# Patient Record
Sex: FEMALE | Race: WHITE | NOT HISPANIC OR LATINO | Employment: OTHER | URBAN - METROPOLITAN AREA
[De-identification: names, ages, dates, MRNs, and addresses within clinical notes are randomized per-mention and may not be internally consistent; named-entity substitution may affect disease eponyms.]

---

## 2017-01-18 ENCOUNTER — TELEPHONE (OUTPATIENT)
Dept: PHARMACY | Facility: CLINIC | Age: 57
End: 2017-01-18

## 2017-01-19 ENCOUNTER — ALLIED HEALTH/NURSE VISIT (OUTPATIENT)
Dept: PHARMACY | Facility: CLINIC | Age: 57
End: 2017-01-19
Payer: MEDICAID

## 2017-01-19 DIAGNOSIS — M79.7 FIBROMYALGIA: ICD-10-CM

## 2017-01-19 DIAGNOSIS — R60.1 GENERALIZED EDEMA: ICD-10-CM

## 2017-01-19 DIAGNOSIS — G25.81 RESTLESS LEGS SYNDROME (RLS): ICD-10-CM

## 2017-01-19 DIAGNOSIS — E78.2 MIXED HYPERLIPIDEMIA: ICD-10-CM

## 2017-01-19 DIAGNOSIS — E03.9 HYPOTHYROIDISM, UNSPECIFIED TYPE: ICD-10-CM

## 2017-01-19 DIAGNOSIS — G47.09 OTHER INSOMNIA: ICD-10-CM

## 2017-01-19 DIAGNOSIS — E11.40 TYPE 2 DIABETES MELLITUS WITH DIABETIC NEUROPATHY, WITHOUT LONG-TERM CURRENT USE OF INSULIN (H): ICD-10-CM

## 2017-01-19 DIAGNOSIS — F33.0 MAJOR DEPRESSIVE DISORDER, RECURRENT EPISODE, MILD (H): Primary | ICD-10-CM

## 2017-01-19 DIAGNOSIS — K21.9 GASTROESOPHAGEAL REFLUX DISEASE WITHOUT ESOPHAGITIS: ICD-10-CM

## 2017-01-19 DIAGNOSIS — M54.16 LEFT LUMBAR RADICULITIS: ICD-10-CM

## 2017-01-19 DIAGNOSIS — Z91.030 ALLERGY TO BEE STING: ICD-10-CM

## 2017-01-19 DIAGNOSIS — G44.029 CHRONIC CLUSTER HEADACHE, NOT INTRACTABLE: ICD-10-CM

## 2017-01-19 PROCEDURE — 99605 MTMS BY PHARM NP 15 MIN: CPT | Performed by: PHARMACIST

## 2017-01-19 PROCEDURE — 99607 MTMS BY PHARM ADDL 15 MIN: CPT | Performed by: PHARMACIST

## 2017-01-19 RX ORDER — DULOXETIN HYDROCHLORIDE 60 MG/1
60 CAPSULE, DELAYED RELEASE ORAL 2 TIMES DAILY
Refills: 0 | COMMUNITY
Start: 2016-02-04 | End: 2017-07-31

## 2017-01-19 NOTE — PROGRESS NOTES
SUBJECTIVE/OBJECTIVE:                                                    Ana Luisa Byers is a 56 year old female called for a follow-up visit for Medication Therapy Management.  She was referred to me from Dr. Garza.   First visit in 2017.    Chief Complaint: issue with 'hotness' - every day all day; sleeping a lot  Goal:  Reduce pill burdon    Allergies/ADRs: Reviewed in Epic; gabapentin caused mood swings  Tobacco: No tobacco use   Alcohol: not currently using  Caffeine: 5 sodas/day  Activity: pool at the [facility] M/W/Fri  PMH: Shoulder replacement; back surgery (2-3 years ago); plate in left elbow    Medication Adherence: issues found and discussed below    Hypothyroidism: Patient is taking levothyroxine 25 mcg daily - has been out for last week. Patient is having the following symptoms: hyperthyroidism -  moist, warm skin - sweating.    Labs checked in July and feels symptoms are getting worse.    Edema: Taking furosemide 40 mg twice daily and potassium daily.  Tolerating regimen without side effects.      GERD: Current medications include: Prilosec (omeprazole) 40 mg once daily. Waiting for a refill approval for pantoprazole.  Pt c/o throat clearing, food doesn't go all the way down (happens every meal). Will choke at times. Will awake with 'acid attack'; maybe occurs less than once per month.  Doesn't eat after 8pm; sleeps with head elevated.  Greasy foods makes symptoms worse.  Not getting much calcium in diet but enjoys some yogurt.    Diabetes:  Pt currently taking glipizide 2.5 mg BID.  Pt is not experiencing side effects  SMBG: three times daily.   Ranges (patient reported): 108-110  Symptoms of low blood sugar? none. Frequency of hypoglycemia? Never; has checked blood sugar when sweating and it has been fine  Recent symptoms of high blood sugar? none  Eye exam: up to date  Foot exam: up to date  Microalbumin is < 30 mg/g. Pt is not taking an ACEi/ARB.  Aspirin: Taking 81mg daily and denies side  effects  Diet/Exercise: has been avoiding sweets    Hyperlipidemia: Current therapy includes atorvastatin 40 mg once daily.  Pt reports myalgias in back since on medication.   The 10-year ASCVD risk score (Homa WILKINS Jr., et al., 2013) is: 4.4%    Values used to calculate the score:      Age: 56 years      Sex: Female      Is an : No      Diabetic: Yes      Tobacco smoker: No      Systolic Blood Pressure: 122 mmHg      Prescribed Antihypertensives: No      HDL Cholesterol: 47 mg/dL      Total Cholesterol: 199 mg/dL     RLS: No therapy at this time.  Was on ropinirole in the past but didn't feel like this was helping.  Symptoms controlled at this time.        HA: Daily headaches; not migraines. Topiramate was helpful but caused numbness.        Back Pain: Taking tramadol 50 mg BID and methocarbamol as needed.  The addition of tramadol did not make her flushing worse.  This regimen has been effective.  No side effects.  Followed by pain clinic.    Depression:  Current medications include: duloxetine 60 mg BID  Has tried bupropion in the past without benefit.  No interesting in counseling.    Pt reports that depression symptoms are worse.  Sleeping all day, not interested in doing anything outside the home.  PHQ-9 SCORE 6/27/2016 8/12/2016 1/19/2017   Total Score - - -   Total Score MyChart - - -   Total Score 20 19 19     Fibromyalgia: Taking duloxetine 120 mg daily and Lyrica 50 mg TID.  This regimen has been helpful.  No side effects with the exception of the flushing; she doesn't related it to a certain medication.      Bee Allergies: Has EPI pen to use as needed.  Needed to use once this summer already.      Insomnia:  Taking zolpidem 10 mg HS as needed. Maybe taking twice per week.    Current labs include:  BP Readings from Last 3 Encounters:   11/23/16 122/78   11/11/16 126/84   11/09/16 130/88     A1C      5.9   7/5/2016.  CHOL      199   4/13/2016  TRIG      166   4/13/2016  HDL       47    4/13/2016  LDL      119   4/13/2016    Liver Function Studies -   Recent Labs   Lab Test  07/05/16   0924   07/31/09   0300   PROTTOTAL  8.5   < >  7.5   ALBUMIN  4.2   < >  4.2   BILITOTAL  0.4   < >  0.3   ALKPHOS  100   < >  68   AST  25   < >  25   ALT  37   < >  27   BILIDIRECT   --    --   0.1    < > = values in this interval not displayed.       MICROL       24   12/4/2015  MICROALBUMIN    16.67   12/4/2015    Last Basic Metabolic Panel:  NA      137   7/5/2016   POTASSIUM      3.7   7/5/2016  CHLORIDE      102   7/5/2016  BUN       17   7/5/2016  BUN   NOT APPLICABLE   11/26/2011  CR     1.14   7/5/2016  GFR ESTIMATE   Date Value Ref Range Status   10/28/2016 53* >60 mL/min/1.7m2 Final     Comment:     Non  GFR Calc   09/09/2016 50* >60 mL/min/1.7m2 Final     Comment:     Non  GFR Calc   07/05/2016 49* >60 mL/min/1.7m2 Final     Comment:     Non  GFR Calc     GFR ESTIMATE IF BLACK   Date Value Ref Range Status   10/28/2016 65 >60 mL/min/1.7m2 Final     Comment:      GFR Calc   09/09/2016 61 >60 mL/min/1.7m2 Final     Comment:      GFR Calc   07/05/2016 60* >60 mL/min/1.7m2 Final     Comment:      GFR Calc     TSH   Date Value Ref Range Status   07/05/2016 2.06 0.40 - 4.00 mU/L Final   ]  LMP 02/13/2009    Most Recent Immunizations   Administered Date(s) Administered     Pneumococcal (PCV 13) 08/31/2015     TD (ADULT, 7+) 05/01/2007     TDAP (BOOSTRIX AGES 10-64) 10/27/2012     ASSESSMENT:                                                       Current medications were reviewed with her today.     Medication Adherence: no issues identified    Hypothyroidism: recommend recheck labs to rule out hyperthyroid as a cause of her flushing    Edema: stable    GERD: recommended ranitidine or TUMS as needed; adding Protonix to omeprazole is not recommended    Diabetes: stable    Hyperlipidemia: stable    RLS: stable    HA:  needs improvement.  Could consider daily verapamil for cluster headaches prophylaxis.      Back Pain: stable.  Tramadol taken with duloxetine may enhance serotonergic effects which include flushing; however she doesn't feel her symptoms have gotten worse with the addition of the tramadol.    Depression:  Needs improvement.  PHQ9 >5.  Could consider atypical such as Abilify for add-on therapy.    Fibromyalgia: stable    Bee Allergies: stable    Insomnia:  Improved.  Not needed zolpidem daily.  Continue to work on sleep hygiene, improvement in mood may be helpful    PLAN:                                                      1.  Ranitidine or TUMS as needed for breakthrough symptoms    Consideration for Dr. Garza:  1.  Recheck thyroid labs - labs checked  2.  Discuss options for depression - could consider additional therapy with abilify  3.  Cluster headache prophylaxis with verapamil 240 mg daily    I spent 60 minutes with this patient today.  All changes were made via collaborative practice agreement with Bridget Garza. A copy of the visit note was provided to the patient's primary care provider.    Will follow up in 3 months.    The patient was given a summary of these recommendations as an after visit summary.     Nadine Johnson , Pharm D  [phone] (phone)  [phone] (pager)  Medication Therapy Management Pharmacist

## 2017-01-20 ENCOUNTER — TRANSFERRED RECORDS (OUTPATIENT)
Dept: HEALTH INFORMATION MANAGEMENT | Facility: CLINIC | Age: 57
End: 2017-01-20

## 2017-01-20 ASSESSMENT — PATIENT HEALTH QUESTIONNAIRE - PHQ9: SUM OF ALL RESPONSES TO PHQ QUESTIONS 1-9: 19

## 2017-01-24 ENCOUNTER — OFFICE VISIT (OUTPATIENT)
Dept: FAMILY MEDICINE | Facility: CLINIC | Age: 57
End: 2017-01-24
Payer: MEDICARE

## 2017-01-24 VITALS
BODY MASS INDEX: 43.65 KG/M2 | DIASTOLIC BLOOD PRESSURE: 80 MMHG | HEART RATE: 82 BPM | SYSTOLIC BLOOD PRESSURE: 124 MMHG | HEIGHT: 65 IN | WEIGHT: 262 LBS | TEMPERATURE: 98.2 F

## 2017-01-24 DIAGNOSIS — I10 BENIGN ESSENTIAL HYPERTENSION: ICD-10-CM

## 2017-01-24 DIAGNOSIS — B00.9 HSV-1 INFECTION: ICD-10-CM

## 2017-01-24 DIAGNOSIS — K21.9 GASTROESOPHAGEAL REFLUX DISEASE WITHOUT ESOPHAGITIS: ICD-10-CM

## 2017-01-24 DIAGNOSIS — E11.40 TYPE 2 DIABETES MELLITUS WITH DIABETIC NEUROPATHY, WITHOUT LONG-TERM CURRENT USE OF INSULIN (H): ICD-10-CM

## 2017-01-24 DIAGNOSIS — Z01.818 PREOP GENERAL PHYSICAL EXAM: Primary | ICD-10-CM

## 2017-01-24 DIAGNOSIS — F33.0 MAJOR DEPRESSIVE DISORDER, RECURRENT EPISODE, MILD (H): ICD-10-CM

## 2017-01-24 DIAGNOSIS — G89.4 CHRONIC PAIN ASSOCIATED WITH SIGNIFICANT PSYCHOSOCIAL DYSFUNCTION: ICD-10-CM

## 2017-01-24 DIAGNOSIS — G56.21 LESION OF RIGHT ULNAR NERVE: ICD-10-CM

## 2017-01-24 DIAGNOSIS — E03.9 ACQUIRED HYPOTHYROIDISM: ICD-10-CM

## 2017-01-24 DIAGNOSIS — E66.01 MORBID OBESITY, UNSPECIFIED OBESITY TYPE (H): ICD-10-CM

## 2017-01-24 LAB
ANION GAP SERPL CALCULATED.3IONS-SCNC: 7 MMOL/L (ref 3–14)
BUN SERPL-MCNC: 13 MG/DL (ref 7–30)
CALCIUM SERPL-MCNC: 9.7 MG/DL (ref 8.5–10.1)
CHLORIDE SERPL-SCNC: 101 MMOL/L (ref 94–109)
CO2 SERPL-SCNC: 30 MMOL/L (ref 20–32)
CREAT SERPL-MCNC: 1 MG/DL (ref 0.52–1.04)
GFR SERPL CREATININE-BSD FRML MDRD: 57 ML/MIN/1.7M2
GLUCOSE SERPL-MCNC: 143 MG/DL (ref 70–99)
POTASSIUM SERPL-SCNC: 3.2 MMOL/L (ref 3.4–5.3)
SODIUM SERPL-SCNC: 138 MMOL/L (ref 133–144)
TSH SERPL DL<=0.005 MIU/L-ACNC: 2.99 MU/L (ref 0.4–4)

## 2017-01-24 PROCEDURE — 93000 ELECTROCARDIOGRAM COMPLETE: CPT | Performed by: FAMILY MEDICINE

## 2017-01-24 PROCEDURE — 99214 OFFICE O/P EST MOD 30 MIN: CPT | Performed by: FAMILY MEDICINE

## 2017-01-24 PROCEDURE — 36415 COLL VENOUS BLD VENIPUNCTURE: CPT | Performed by: FAMILY MEDICINE

## 2017-01-24 PROCEDURE — 84443 ASSAY THYROID STIM HORMONE: CPT | Performed by: FAMILY MEDICINE

## 2017-01-24 PROCEDURE — 80048 BASIC METABOLIC PNL TOTAL CA: CPT | Performed by: FAMILY MEDICINE

## 2017-01-24 RX ORDER — CHOLECALCIFEROL (VITAMIN D3) 50 MCG
1 TABLET ORAL DAILY
Qty: 90 TABLET | Refills: 3 | Status: SHIPPED | OUTPATIENT
Start: 2017-01-24 | End: 2019-12-12

## 2017-01-24 RX ORDER — VALACYCLOVIR HYDROCHLORIDE 1 G/1
2000 TABLET, FILM COATED ORAL 2 TIMES DAILY
Qty: 4 TABLET | Refills: 3 | Status: SHIPPED | OUTPATIENT
Start: 2017-01-24 | End: 2017-10-12

## 2017-01-24 RX ORDER — TRAMADOL HYDROCHLORIDE 50 MG/1
50 TABLET ORAL EVERY 8 HOURS PRN
Qty: 90 TABLET | Refills: 0 | Status: CANCELLED | OUTPATIENT
Start: 2017-01-24

## 2017-01-24 RX ORDER — GLIPIZIDE 2.5 MG/1
2.5 TABLET, EXTENDED RELEASE ORAL 2 TIMES DAILY
Qty: 180 TABLET | Refills: 1 | Status: SHIPPED | OUTPATIENT
Start: 2017-01-24 | End: 2017-07-30

## 2017-01-24 RX ORDER — METHOCARBAMOL 500 MG/1
1000 TABLET, FILM COATED ORAL 3 TIMES DAILY PRN
Qty: 20 TABLET | Refills: 3 | Status: CANCELLED | OUTPATIENT
Start: 2017-01-24

## 2017-01-24 RX ORDER — OMEPRAZOLE 40 MG/1
40 CAPSULE, DELAYED RELEASE ORAL DAILY
Qty: 90 CAPSULE | Refills: 3 | Status: SHIPPED | OUTPATIENT
Start: 2017-01-24 | End: 2017-03-22

## 2017-01-24 ASSESSMENT — ANXIETY QUESTIONNAIRES
1. FEELING NERVOUS, ANXIOUS, OR ON EDGE: MORE THAN HALF THE DAYS
7. FEELING AFRAID AS IF SOMETHING AWFUL MIGHT HAPPEN: SEVERAL DAYS
3. WORRYING TOO MUCH ABOUT DIFFERENT THINGS: MORE THAN HALF THE DAYS
5. BEING SO RESTLESS THAT IT IS HARD TO SIT STILL: NEARLY EVERY DAY
IF YOU CHECKED OFF ANY PROBLEMS ON THIS QUESTIONNAIRE, HOW DIFFICULT HAVE THESE PROBLEMS MADE IT FOR YOU TO DO YOUR WORK, TAKE CARE OF THINGS AT HOME, OR GET ALONG WITH OTHER PEOPLE: SOMEWHAT DIFFICULT
2. NOT BEING ABLE TO STOP OR CONTROL WORRYING: SEVERAL DAYS
6. BECOMING EASILY ANNOYED OR IRRITABLE: MORE THAN HALF THE DAYS
GAD7 TOTAL SCORE: 13

## 2017-01-24 ASSESSMENT — PATIENT HEALTH QUESTIONNAIRE - PHQ9: 5. POOR APPETITE OR OVEREATING: MORE THAN HALF THE DAYS

## 2017-01-24 NOTE — PATIENT INSTRUCTIONS
Hold glipizide and lasix morning of procedure, rest okay to take with sip of water.    Hold tramadol for 5 days prior to procedure.     Before Your Surgery      Call your surgeon if there is any change in your health. This includes signs of a cold or flu (such as a sore throat, runny nose, cough, rash or fever).    Do not smoke, drink alcohol or take over the counter medicine (unless your surgeon or primary care doctor tells you to) for the 24 hours before and after surgery.    If you take prescribed drugs: Follow your doctor s orders about which medicines to take and which to stop until after surgery.    Eating and drinking prior to surgery: follow the instructions from your surgeon    Take a shower or bath the night before surgery. Use the soap your surgeon gave you to gently clean your skin. If you do not have soap from your surgeon, use your regular soap. Do not shave or scrub the surgery site.  Wear clean pajamas and have clean sheets on your bed.

## 2017-01-24 NOTE — NURSING NOTE
"Chief Complaint   Patient presents with     Pre-Op Exam       Initial /80 mmHg  Pulse 82  Temp(Src) 98.2  F (36.8  C) (Oral)  Ht 5' 5\" (1.651 m)  Wt 262 lb (118.842 kg)  BMI 43.60 kg/m2  LMP 02/13/2009  Breastfeeding? No Estimated body mass index is 43.6 kg/(m^2) as calculated from the following:    Height as of this encounter: 5' 5\" (1.651 m).    Weight as of this encounter: 262 lb (118.842 kg).  BP completed using cuff size: zack Cho CMA          "

## 2017-01-24 NOTE — MR AVS SNAPSHOT
After Visit Summary   1/24/2017    Ana Luisa Byers    MRN: [id number]           Patient Information     Date Of Birth          1960        Visit Information        Provider Department      1/24/2017 8:30 AM Bridget Garza MD [facility]        Today's Diagnoses     Preop general physical exam    -  1     Lesion of right ulnar nerve         Gastroesophageal reflux disease without esophagitis         HSV-1 infection         Type 2 diabetes mellitus with diabetic neuropathy, without long-term current use of insulin (H)           Care Instructions    Hold glipizide and lasix morning of procedure, rest okay to take with sip of water.    Hold tramadol for 5 days prior to procedure.     Before Your Surgery      Call your surgeon if there is any change in your health. This includes signs of a cold or flu (such as a sore throat, runny nose, cough, rash or fever).    Do not smoke, drink alcohol or take over the counter medicine (unless your surgeon or primary care doctor tells you to) for the 24 hours before and after surgery.    If you take prescribed drugs: Follow your doctor s orders about which medicines to take and which to stop until after surgery.    Eating and drinking prior to surgery: follow the instructions from your surgeon    Take a shower or bath the night before surgery. Use the soap your surgeon gave you to gently clean your skin. If you do not have soap from your surgeon, use your regular soap. Do not shave or scrub the surgery site.  Wear clean pajamas and have clean sheets on your bed.         Follow-ups after your visit        Who to contact     If you have questions or need follow up information about today's clinic visit or your schedule please contact [facility] directly at [phone].  Normal or non-critical lab and imaging results will be communicated to you by MyChart, letter or phone within 4 business days after the clinic has received the  "results. If you do not hear from us within 7 days, please contact the clinic through [employer] or phone. If you have a critical or abnormal lab result, we will notify you by phone as soon as possible.  Submit refill requests through [employer] or call your pharmacy and they will forward the refill request to us. Please allow 3 business days for your refill to be completed.          Additional Information About Your Visit        [employer] Information     [employer] gives you secure access to your electronic health record. If you see a primary care provider, you can also send messages to your care team and make appointments. If you have questions, please call your primary care clinic.  If you do not have a primary care provider, please call [phone] and they will assist you.        Care EveryWhere ID     This is your Care EveryWhere ID. This could be used by other organizations to access your [place] [facility] medical records  [id number]        Your Vitals Were     Pulse Temperature Height BMI (Body Mass Index) Last Period Breastfeeding?    82 98.2  F (36.8  C) (Oral) 5' 5\" (1.651 m) 43.60 kg/m2 02/13/2009 No       Blood Pressure from Last 3 Encounters:   01/24/17 124/80   11/23/16 122/78   11/11/16 126/84    Weight from Last 3 Encounters:   01/24/17 262 lb (118.842 kg)   11/23/16 266 lb (120.657 kg)   11/11/16 266 lb (120.657 kg)              We Performed the Following     Basic metabolic panel  (Ca, Cl, CO2, Creat, Gluc, K, Na, BUN)     EKG 12-lead complete w/read - Clinics          Today's Medication Changes          These changes are accurate as of: 1/24/17  9:02 AM.  If you have any questions, ask your nurse or doctor.               Start taking these medicines.        Dose/Directions    valACYclovir 1000 mg tablet   Commonly known as:  VALTREX   Used for:  HSV-1 infection   Started by:  Bridget Garza MD        Dose:  2000 mg   Take 2 tablets (2,000 mg) by mouth 2 times daily   Quantity:  4 tablet   Refills:  3       "   These medicines have changed or have updated prescriptions.        Dose/Directions    * order for DME   This may have changed:  Another medication with the same name was added. Make sure you understand how and when to take each.   Used for:  Other lymphedema   Changed by:  Marlin Ayala MD        Equipment being ordered: compression socks, high strength, knee high, custom made 2 pairs, 11 refill   Quantity:  2 Device   Refills:  11       * order for DME   This may have changed:  Another medication with the same name was added. Make sure you understand how and when to take each.   Used for:  Type 2 diabetes mellitus with diabetic neuropathy (H)   Changed by:  Bridget Garza MD        Equipment being ordered: diabetic shoes   Quantity:  1 Units   Refills:  0       * order for DME   This may have changed:  Another medication with the same name was added. Make sure you understand how and when to take each.   Used for:  Morbid obesity due to excess calories (H), Type 2 diabetes mellitus with diabetic neuropathy (H), Fibromyalgia   Changed by:  Bridget Garza MD        Equipment being ordered: Rollator walker with seat   Quantity:  1 each   Refills:  0       * order for DME   This may have changed:  Another medication with the same name was added. Make sure you understand how and when to take each.   Used for:  Fibromyalgia, Left lumbar radiculitis, Morbid obesity, unspecified obesity type (H), Primary osteoarthritis involving multiple joints   Changed by:  Bridget Garza MD        Equipment being ordered: Lift Chair   Quantity:  1 each   Refills:  0       * order for DME   This may have changed:  You were already taking a medication with the same name, and this prescription was added. Make sure you understand how and when to take each.   Used for:  Type 2 diabetes mellitus with diabetic neuropathy, without long-term current use of insulin (H)   Changed by:  Bridget Garza  MD        Equipment being ordered: diabetic shoes  Feet have been examined, no changes   Quantity:  1 Units   Refills:  0       * Notice:  This list has 5 medication(s) that are the same as other medications prescribed for you. Read the directions carefully, and ask your doctor or other care provider to review them with you.         Where to get your medicines      These medications were sent to [facility] [id number] [facility] [id number] [place] [address] AT [address] [address] [facility] [address]     Phone:  [phone]    - glipiZIDE 2.5 MG 24 hr tablet  - omeprazole 40 MG capsule  - valACYclovir 1000 mg tablet  - vitamin D 2000 UNITS tablet      Some of these will need a paper prescription and others can be bought over the counter.  Ask your nurse if you have questions.     Bring a paper prescription for each of these medications    - order for DME             Primary Care Provider Office Phone # Fax #    Bridget Garza [phone]       [facility] [address]  [facility] [address]        Thank you!     Thank you for choosing [facility]  for your care. Our goal is always to provide you with excellent care. Hearing back from our patients is one way we can continue to improve our services. Please take a few minutes to complete the written survey that you may receive in the mail after your visit with us. Thank you!             Your Updated Medication List - Protect others around you: Learn how to safely use, store and throw away your medicines at www.disposemymeds.org.          This list is accurate as of: 1/24/17  9:02 AM.  Always use your most recent med list.                   Brand Name Dispense Instructions for use    aspirin 81 MG chewable tablet     108 tablet    Take 1 tablet (81 mg) by mouth daily       atorvastatin 40 MG tablet    LIPITOR    90 tablet    Take 1 tablet (40 mg) by mouth daily       DULoxetine 60 MG EC  capsule    CYMBALTA     60 mg 2 times daily       EPINEPHrine 0.3 MG/0.3ML injection     2 each    Inject 0.3 mLs (0.3 mg) into the muscle once as needed for anaphylaxis       furosemide 40 MG tablet    LASIX    60 tablet    Take 1 tablet (40 mg) by mouth 2 times daily       glipiZIDE 2.5 MG 24 hr tablet    glipiZIDE XL    180 tablet    Take 1 tablet (2.5 mg) by mouth 2 times daily       levothyroxine 25 MCG tablet    SYNTHROID/LEVOTHROID    90 tablet    Take 1 tablet (25 mcg) by mouth daily       meclizine 25 MG tablet    ANTIVERT    90 tablet    Take 1 tablet (25 mg) by mouth 3 times daily as needed for dizziness       methocarbamol 500 MG tablet    ROBAXIN    20 tablet    Take 2 tablets (1,000 mg) by mouth 3 times daily as needed for muscle spasms       NITROSTAT 0.4 MG sublingual tablet   Generic drug:  nitroglycerin      Place 1 tablet under the tongue as needed       omeprazole 40 MG capsule    priLOSEC    90 capsule    Take 1 capsule (40 mg) by mouth daily Take 30-60 minutes before a meal.       * order for DME     2 Device    Equipment being ordered: compression socks, high strength, knee high, custom made 2 pairs, 11 refill       * order for DME     1 Units    Equipment being ordered: diabetic shoes       * order for DME     1 each    Equipment being ordered: Rollator walker with seat       * order for DME     1 each    Equipment being ordered: Lift Chair       * order for DME     1 Units    Equipment being ordered: diabetic shoes  Feet have been examined, no changes       Potassium Chloride ER 20 MEQ Tbcr     30 tablet    Take 1 tablet (20 mEq) by mouth daily       pregabalin 50 MG capsule    LYRICA    90 capsule    Take 1 capsule (50 mg) by mouth 3 times daily       traMADol 50 MG tablet    ULTRAM    90 tablet    Take 1 tablet (50 mg) by mouth every 8 hours as needed for moderate pain Max #3/day. Last picked up 11/11/16. Ok to dispense on/after 12/09/16 and START 12/11/16. 30 day supply.        valACYclovir 1000 mg tablet    VALTREX    4 tablet    Take 2 tablets (2,000 mg) by mouth 2 times daily       vitamin D 2000 UNITS tablet     90 tablet    Take 1 tablet by mouth daily       zolpidem 10 MG tablet    AMBIEN    30 tablet    Take 1 tablet (10 mg) by mouth nightly as needed for sleep       * Notice:  This list has 5 medication(s) that are the same as other medications prescribed for you. Read the directions carefully, and ask your doctor or other care provider to review them with you.

## 2017-01-24 NOTE — PROGRESS NOTES
[facility]  [id number] [place] [id number]  [phone]  Dept: [phone]    PRE-OP EVALUATION:  Today's date: 2017    Ana Luisa Byers (: 1960) presents for pre-operative evaluation assessment as requested by Dr. Raymond Wagner.  She requires evaluation and anesthesia risk assessment prior to undergoing surgery/procedure for treatment of Ulnar neuropathy.  Proposed procedure: Ulnar Nerve reposition    Date of Surgery/ Procedure: 17  Time of Surgery/ Procedure: 10AM  Hospital/Surgical Facility: [facility]  Fax number for surgical facility: [phone]  Primary Physician: Bridget Garza  Type of Anesthesia Anticipated: General    Patient has a Health Care Directive or Living Will:  NO    1. NO - Do you have a history of heart attack, stroke, stent, bypass or surgery on an artery in the head, neck, heart or legs?  2. NO - Do you ever have any pain or discomfort in your chest?  3. NO - Do you have a history of  Heart Failure?  4. YES - ARE YOUR TROUBLED BY SHORTNESS OF BREATH WHEN WALKING ON THE LEVEL, UP A SLIGHT HILL OR AT NIGHT? Due to weight  5. NO - Do you currently have a cold, bronchitis or other respiratory infection?  6. NO - Do you have a cough, shortness of breath or wheezing?  7. NO - DO YOU SOMETIMES GET PAINS IN THE CALVES OF YOUR LEGS WHEN YOU WALK?  8. NO - Do you or anyone in your family have previous history of blood clots?  9. NO - Do you or does anyone in your family have a serious bleeding problem such as prolonged bleeding following surgeries or cuts?  10. NO - Have you ever had problems with anemia or been told to take iron pills?  11. NO - Have you had any abnormal blood loss such as black, tarry or bloody stools, or abnormal vaginal bleeding?  12. NO - Have you ever had a blood transfusion?  13. YES - HAVE YOU OR ANY OF YOUR RELATIVES EVER HAD PROBLEMS WITH ANESTHESIA? nausea  14. NO - Do you have sleep apnea, excessive snoring or  daytime drowsiness?  15. NO - Do you have any prosthetic heart valves?  16. YES - DO YOU HAVE PROSTHETIC JOINTS? Left shoulder  17. NO - Is there any chance that you may be pregnant?    HPI:                                                      Brief HPI related to upcoming procedure: tingling in right hand, EMG noted ulnar neuropathy    See problem list for active medical problems.  Problems all longstanding and stable, except as noted/documented.  See ROS for pertinent symptoms related to these conditions.                                                                                                  .    MEDICAL HISTORY:                                                      Patient Active Problem List    Diagnosis Date Noted     Type 2 diabetes mellitus with diabetic neuropathy, without long-term current use of insulin (H) 01/24/2017     Priority: Medium     Acquired hypothyroidism 01/24/2017     Priority: Medium     Benign essential hypertension 01/24/2017     Priority: Medium     Morbid obesity, unspecified obesity type (H) 09/09/2016     Priority: Medium     Left lumbar radiculitis 08/25/2016     Priority: Medium     Generalized edema 08/04/2016     Priority: Medium     Restless legs syndrome (RLS) 04/29/2016     Priority: Medium     CKD (chronic kidney disease) stage 2, GFR 60-89 ml/min 04/29/2016     Priority: Medium     HSV-1 infection 04/29/2016     Priority: Medium     Gastroesophageal reflux disease without esophagitis 04/13/2016     Priority: Medium     HTN, goal below 140/90 03/08/2016     Priority: Medium     Other insomnia 12/08/2015     Priority: Medium     Plantar fasciitis 12/03/2015     Priority: Medium     Fibromyalgia      Priority: Medium     Cluster headaches 08/13/2014     Priority: Medium     S/P shoulder replacement 07/31/2013     Priority: Medium     Osteoarthritis 07/25/2013     Priority: Medium     Chronic pain associated with significant psychosocial dysfunction 06/20/2013     Priority:  Medium     Mild major depression (H) 09/11/2012     Priority: Medium      Past Medical History   Diagnosis Date     HTN, goal below 140/90      Hyperlipidemia LDL goal <100      CKD (chronic kidney disease), stage III      Major depression      chronic kidney disease-stage 3     Gastro-oesophageal reflux disease      PONV (postoperative nausea and vomiting)      Arthritis      left shoulder and back     Chronic pain      fibromalgia      Fibromyalgia      Peripheral neuropathy (H) Feb 2015     + callus. diabetic shoes rx done     Hypothyroidism      Rheumatoid arthritis of foot (H)      Type 2 diabetes, HbA1C goal < 8% (H)      Past Surgical History   Procedure Laterality Date     C appendectomy  age 19     Elbow wrist hand finger orthosis, rigid, without joints, may include soft  5/2007     put in [place]     Hcl pap smear  6/2008     WNL     Surgical history of -   2009     ulnar nerve release, carpal tunnel- left     Back surgery  2011     L45 laminectomy     C ligate fallopian tube,postpartum  1982     Tubal Ligation     Esophagoscopy, gastroscopy, duodenoscopy (egd), combined  9/23/2015     Dr. Thomas [facility]     Orthopedic surgery       left shoulder scoped and plate left elbow     Arthroplasty shoulder  7/25/2013     Procedure: ARTHROPLASTY SHOULDER;  RIGHT TOTAL SHOULDER ARTHROPLASTY (TORNIER AFFINITY SHOULDER SYSTEM)^;  Surgeon: Dung Morales MD;  Location:  OR     Esophagoscopy, gastroscopy, duodenoscopy (egd), combined N/A 9/23/2015     Procedure: COMBINED ESOPHAGOSCOPY, GASTROSCOPY, DUODENOSCOPY (EGD), BIOPSY SINGLE OR MULTIPLE;  Surgeon: Jose Thomas MD;  Location:  GI     Current Outpatient Prescriptions   Medication Sig Dispense Refill     omeprazole (PRILOSEC) 40 MG capsule Take 1 capsule (40 mg) by mouth daily Take 30-60 minutes before a meal. 90 capsule 3     Cholecalciferol (VITAMIN D) 2000 UNITS tablet Take 1 tablet by mouth daily 90 tablet 3     glipiZIDE (GLIPIZIDE XL) 2.5 MG 24 hr  tablet Take 1 tablet (2.5 mg) by mouth 2 times daily 180 tablet 1     valACYclovir (VALTREX) 1000 mg tablet Take 2 tablets (2,000 mg) by mouth 2 times daily 4 tablet 3     order for DME Equipment being ordered: diabetic shoes    Feet have been examined, no changes 1 Units 0     DULoxetine (CYMBALTA) 60 MG EC capsule 60 mg 2 times daily  0     Potassium Chloride ER 20 MEQ TBCR Take 1 tablet (20 mEq) by mouth daily 30 tablet 0     furosemide (LASIX) 40 MG tablet Take 1 tablet (40 mg) by mouth 2 times daily 60 tablet 3     traMADol (ULTRAM) 50 MG tablet Take 1 tablet (50 mg) by mouth every 8 hours as needed for moderate pain Max #3/day. Last picked up 11/11/16. Ok to dispense on/after 12/09/16 and START 12/11/16. 30 day supply. 90 tablet 0     pregabalin (LYRICA) 50 MG capsule Take 1 capsule (50 mg) by mouth 3 times daily 90 capsule 3     meclizine (ANTIVERT) 25 MG tablet Take 1 tablet (25 mg) by mouth 3 times daily as needed for dizziness 90 tablet 0     methocarbamol (ROBAXIN) 500 MG tablet Take 2 tablets (1,000 mg) by mouth 3 times daily as needed for muscle spasms 20 tablet 3     order for DME Equipment being ordered: Lift Chair 1 each 0     zolpidem (AMBIEN) 10 MG tablet Take 1 tablet (10 mg) by mouth nightly as needed for sleep 30 tablet 3     levothyroxine (SYNTHROID, LEVOTHROID) 25 MCG tablet Take 1 tablet (25 mcg) by mouth daily 90 tablet 2     order for DME Equipment being ordered: Rollator walker with seat 1 each 0     NITROSTAT 0.4 MG SL tablet Place 1 tablet under the tongue as needed  0     EPINEPHrine (EPIPEN) 0.3 MG/0.3ML injection Inject 0.3 mLs (0.3 mg) into the muscle once as needed for anaphylaxis 2 each 1     atorvastatin (LIPITOR) 40 MG tablet Take 1 tablet (40 mg) by mouth daily 90 tablet 3     order for DME Equipment being ordered: diabetic shoes 1 Units 0     ORDER FOR DME Equipment being ordered: compression socks, high strength, knee high, custom made  2 pairs, 11 refill 2 Device 11      "aspirin 81 MG chewable tablet Take 1 tablet (81 mg) by mouth daily 108 tablet 3     [DISCONTINUED] glipiZIDE (GLIPIZIDE XL) 2.5 MG 24 hr tablet Take 1 tablet (2.5 mg) by mouth 2 times daily 180 tablet 0     OTC products: None, except as noted above    Allergies   Allergen Reactions     Bee Swelling     Gabapentin Other (See Comments)     Mood Swings     Lisinopril Rash     Metformin Itching     Novocain [Procaine Hcl] Swelling     Penicillins      Topiramate Other (See Comments)     numbness      Latex Allergy: NO    Social History   Substance Use Topics     Smoking status: Never Smoker      Smokeless tobacco: Never Used     Alcohol Use: No     History   Drug Use No       REVIEW OF SYSTEMS:                                                    Constitutional, neuro, ENT, endocrine, pulmonary, cardiac, gastrointestinal, genitourinary, musculoskeletal, integument and psychiatric systems are negative, except as otherwise noted.    EXAM:                                                    /80 mmHg  Pulse 82  Temp(Src) 98.2  F (36.8  C) (Oral)  Ht 5' 5\" (1.651 m)  Wt 262 lb (118.842 kg)  BMI 43.60 kg/m2  LMP 02/13/2009  Breastfeeding? No    GENERAL APPEARANCE: healthy, alert and no distress     EYES: EOMI,- PERRL     HENT: ear canals and TM's normal and nose and mouth without ulcers or lesions     NECK: no adenopathy, no asymmetry, masses, or scars and thyroid normal to palpation     RESP: lungs clear to auscultation - no rales, rhonchi or wheezes     CV: regular rates and rhythm, normal S1 S2, no S3 or S4 and no murmur, click or rub -     ABDOMEN:  soft, nontender, no HSM or masses and bowel sounds normal     MS: extremities normal- no gross deformities noted, no evidence of inflammation in joints, FROM in all extremities.     SKIN: no suspicious lesions or rashes     NEURO: Normal strength and tone, sensory exam grossly normal, mentation intact and speech normal     PSYCH: mentation appears normal. and affect " normal/bright     LYMPHATICS: No axillary, cervical, inguinal, or supraclavicular nodes    DIAGNOSTICS:                                                    EKG: appears normal, NSR, normal axis, normal intervals, no acute ST/T changes c/w ischemia, no LVH by voltage criteria, unchanged from previous tracings     Lab work pending  IMPRESSION:                                                      Diagnosis/reason for consult: right ulnar neuropathy    The proposed surgical procedure is considered INTERMEDIATE risk.    REVISED CARDIAC RISK INDEX  The patient has the following serious cardiovascular risks for perioperative complications such as (MI, PE, VFib and 3  AV Block):  No serious cardiac risks  INTERPRETATION: 0 risks: Class I (very low risk - 0.4% complication rate)    The patient has the following additional risks for perioperative complications:  No identified additional risks      ICD-10-CM    1. Preop general physical exam Z01.818 Basic metabolic panel  (Ca, Cl, CO2, Creat, Gluc, K, Na, BUN)     EKG 12-lead complete w/read - Clinics   2. Lesion of right ulnar nerve G56.21    3. Gastroesophageal reflux disease without esophagitis K21.9 Working well, omeprazole (PRILOSEC) 40 MG capsule   4. HSV-1 infection B00.9 Current outbreak, valACYclovir (VALTREX) 1000 mg tablet   5. Type 2 diabetes mellitus with diabetic neuropathy, without long-term current use of insulin (H) E11.40 Refills, Cholecalciferol (VITAMIN D) 2000 UNITS tablet     Due for DM visit in March, glipiZIDE (GLIPIZIDE XL) 2.5 MG 24 hr tablet     order for DME   6. Acquired hypothyroidism E03.9 TSH with free T4 reflex   7. Benign essential hypertension I10 Well controlled   8. Morbid obesity, unspecified obesity type (H) E66.01 Has lost weight through diet and physical activity   9. Chronic pain associated with significant psychosocial dysfunction G89.4 Seeing pain clinic, needs visit with Pain Psych   10. Major depressive disorder, recurrent episode,  mild (H) F33.0 On Cymbalta, would like to discuss in near future       RECOMMENDATIONS:                                                      --Patient is to take all scheduled medications on the day of surgery EXCEPT for modifications listed - hold glipizide and lasix AM of procedure, stop NSAID products 5 days prior to procedure    APPROVAL GIVEN to proceed with proposed procedure, without further diagnostic evaluation       Signed Electronically by: Bridget Garza MD    Copy of this evaluation report is provided to requesting physician.    [place] Preop Guidelines

## 2017-01-25 ASSESSMENT — PATIENT HEALTH QUESTIONNAIRE - PHQ9: SUM OF ALL RESPONSES TO PHQ QUESTIONS 1-9: 19

## 2017-01-25 ASSESSMENT — ANXIETY QUESTIONNAIRES: GAD7 TOTAL SCORE: 13

## 2017-01-31 ENCOUNTER — TRANSFERRED RECORDS (OUTPATIENT)
Dept: HEALTH INFORMATION MANAGEMENT | Facility: CLINIC | Age: 57
End: 2017-01-31

## 2017-02-03 DIAGNOSIS — K21.9 GASTROESOPHAGEAL REFLUX DISEASE WITHOUT ESOPHAGITIS: ICD-10-CM

## 2017-02-03 DIAGNOSIS — M54.2 CERVICALGIA: ICD-10-CM

## 2017-02-03 DIAGNOSIS — G89.4 CHRONIC PAIN ASSOCIATED WITH SIGNIFICANT PSYCHOSOCIAL DYSFUNCTION: Primary | ICD-10-CM

## 2017-02-03 RX ORDER — TOPIRAMATE 50 MG/1
50 TABLET, FILM COATED ORAL 2 TIMES DAILY
Qty: 60 TABLET | Refills: 1 | Status: SHIPPED | OUTPATIENT
Start: 2017-02-03 | End: 2017-03-22

## 2017-02-03 RX ORDER — METHOCARBAMOL 500 MG/1
1000 TABLET, FILM COATED ORAL 3 TIMES DAILY PRN
Qty: 20 TABLET | Refills: 3 | Status: SHIPPED | OUTPATIENT
Start: 2017-02-03 | End: 2017-05-05

## 2017-02-03 RX ORDER — PANTOPRAZOLE SODIUM 40 MG/1
40 TABLET, DELAYED RELEASE ORAL DAILY
Qty: 90 TABLET | Refills: 3 | Status: SHIPPED | OUTPATIENT
Start: 2017-02-03 | End: 2017-11-22

## 2017-02-03 NOTE — TELEPHONE ENCOUNTER
Methocarbamol  Last Written Prescription Date:  11/1/16  Last Fill Quantity: 20,   # refills: 3  Last Office Visit with [facility] or  [facility] prescribing provider: 01/24/17  Future Office visit:       Routing refill request to provider for review/approval because:  Drug not on the [facility] or  [facility] refill protocol or controlled substance    Pantoproazole  Last Written Prescription Date: 08/31/15  Last Fill Quantity: 90,  # refills: 3   Last Office Visit with [facility] or  [facility] prescribing provider: 01/24/17

## 2017-02-03 NOTE — TELEPHONE ENCOUNTER
Topiramate     Last Written Prescription Date: 11/11/16  Last Fill Quantity: 60, # refills: 1  Last Office Visit with [facility], [facility] or [facility] prescribing provider: 01/24/17       BP Readings from Last 3 Encounters:   01/24/17 124/80   11/23/16 122/78   11/11/16 126/84

## 2017-02-06 ENCOUNTER — TRANSFERRED RECORDS (OUTPATIENT)
Dept: HEALTH INFORMATION MANAGEMENT | Facility: CLINIC | Age: 57
End: 2017-02-06

## 2017-02-13 DIAGNOSIS — E66.01 MORBID OBESITY, UNSPECIFIED OBESITY TYPE (H): ICD-10-CM

## 2017-02-13 DIAGNOSIS — G47.09 OTHER INSOMNIA: ICD-10-CM

## 2017-02-13 RX ORDER — PHENTERMINE HYDROCHLORIDE 37.5 MG/1
37.5 TABLET ORAL
Qty: 30 TABLET | Refills: 1 | Status: CANCELLED | OUTPATIENT
Start: 2017-02-13

## 2017-02-13 NOTE — TELEPHONE ENCOUNTER
phentermine (ADIPEX-P) 37.5 MG tablet      Last Written Prescription Date:  12-  Last Fill Quantity: 30,   # refills: 1  Last Office Visit with [facility], [facility] or [facility] prescribing provider: 01-  Future Office visit:       Routing refill request to provider for review/approval because:  phentermine (ADIPEX-P) 37.5 MG tablet

## 2017-02-15 NOTE — TELEPHONE ENCOUNTER
Looks like she has not had in months. Please call Ana Luisa to investigate. Will need baseline weight if we are restarting. JH

## 2017-02-16 NOTE — TELEPHONE ENCOUNTER
Pt has read my chart will postpone and await call back for appt/wt check    RX [place] reviewed:  reviewed- recommend provider review.  She has recent narcotic's prescribed and last phentermine was in November.      [place] profile:  [email or web address]    Shira Hamm RN

## 2017-02-23 ENCOUNTER — TELEPHONE (OUTPATIENT)
Dept: FAMILY MEDICINE | Facility: CLINIC | Age: 57
End: 2017-02-23

## 2017-02-23 NOTE — TELEPHONE ENCOUNTER
Received orders from [facility] regarding [employer] for Medicare,Dr. Garza reviewed the orders and signed them. Orders were faxed to [phone], then orders were sent to [place] folder at the station. Karen Wilde

## 2017-02-28 DIAGNOSIS — M54.2 CERVICAL PAIN: ICD-10-CM

## 2017-02-28 NOTE — TELEPHONE ENCOUNTER
Received fax request from [facility] pharmacy requesting refill(s) for Tramadol    Last picked up at the pharmacy on 12/6/16 ,to start on 12/11/16    Pt last seen on 11/23/16  Next appt not scheduled     Will facilitate refill.

## 2017-03-02 NOTE — TELEPHONE ENCOUNTER
Chart check, patient was asked to follow up in one month, Canceled appt for 01/18/17, no further follow up     Ana Maria RAMÍREZN-RN Care Coordinator  [facility]

## 2017-03-03 RX ORDER — TRAMADOL HYDROCHLORIDE 50 MG/1
50 TABLET ORAL EVERY 8 HOURS PRN
Qty: 90 TABLET | Refills: 0 | Status: SHIPPED | OUTPATIENT
Start: 2017-03-03 | End: 2017-03-20

## 2017-03-03 NOTE — TELEPHONE ENCOUNTER
Rx faxed to [facility]. Fax confirmation received. Hardcopy destroyed.   Marina George, [facility] [place]

## 2017-03-08 ENCOUNTER — TELEPHONE (OUTPATIENT)
Dept: FAMILY MEDICINE | Facility: CLINIC | Age: 57
End: 2017-03-08

## 2017-03-08 RX ORDER — ZOLPIDEM TARTRATE 10 MG/1
10 TABLET ORAL
Qty: 30 TABLET | Refills: 3 | Status: SHIPPED | OUTPATIENT
Start: 2017-03-08 | End: 2017-06-25

## 2017-03-08 NOTE — TELEPHONE ENCOUNTER
RX for Ambien faxed to [phone] to [facility] [address] in [place] at 5pm on 3/8/2017, copy to [facility] folder.Jay Garcia CMA

## 2017-03-08 NOTE — TELEPHONE ENCOUNTER
RX [place] ([place]) reviewed:  reviewed- recommend provider review    Pt has recent hydromorphone RX from [facility]   Otherwise no issues     [place] profile:  [email or web address]    Shira Hamm RN

## 2017-03-08 NOTE — TELEPHONE ENCOUNTER
Pending Prescriptions:                       Disp   Refills    zolpidem (AMBIEN) 10 MG tablet            30 tab*3            Sig: Take 1 tablet (10 mg) by mouth nightly as needed           for sleep          Last Written Prescription Date:  10/28/2016  Last Fill Quantity: 30,   # refills: 3  Last Office Visit with [facility] [place], [facility] or [facility] prescribing provider: 1/24/2017Greg  Future Office visit:       Routing refill request to provider for review/approval because:  Drug not on the [facility] [place], [facility] or [facility] refill protocol or controlled substance    Mariluz Schuler RT(R)

## 2017-03-13 ENCOUNTER — TRANSFERRED RECORDS (OUTPATIENT)
Dept: HEALTH INFORMATION MANAGEMENT | Facility: CLINIC | Age: 57
End: 2017-03-13

## 2017-03-15 ENCOUNTER — TRANSFERRED RECORDS (OUTPATIENT)
Dept: HEALTH INFORMATION MANAGEMENT | Facility: CLINIC | Age: 57
End: 2017-03-15

## 2017-03-20 DIAGNOSIS — M54.2 CERVICAL PAIN: ICD-10-CM

## 2017-03-20 NOTE — TELEPHONE ENCOUNTER
Received fax from [facility] requesting refill(s) of Tramadol     Last picked up from pharmacy on 3/3/2017    Pt last seen by prescribing provider on 11/23/16  Next appt scheduled for 3/22/17    Last urine drug screen date 11/9/16  Current opioid agreement on file (completed within the last year) Yes Date of opioid agreement: 11/9/16    Processing (pick one and delete the others):      Fax to [facility] pharmacy     Will route to nursing pool for review and preparation of prescription(s).     Marina George, [facility]

## 2017-03-20 NOTE — TELEPHONE ENCOUNTER
Medication refill information reviewed.     Due date for Tramadol is 4/2/17     Prescriptions prepped for review.     Will route to provider.

## 2017-03-21 RX ORDER — TRAMADOL HYDROCHLORIDE 50 MG/1
50 TABLET ORAL EVERY 8 HOURS PRN
Qty: 90 TABLET | Refills: 0 | Status: SHIPPED | OUTPATIENT
Start: 2017-04-02 | End: 2017-10-12

## 2017-03-21 NOTE — TELEPHONE ENCOUNTER
I faxed patients prescription to the [facility] in [place]. I received conformation that the fax went through. I destroyed the paper copy.      Neisha [facility] [place]

## 2017-03-22 ENCOUNTER — OFFICE VISIT (OUTPATIENT)
Dept: FAMILY MEDICINE | Facility: CLINIC | Age: 57
End: 2017-03-22
Payer: MEDICARE

## 2017-03-22 VITALS
HEIGHT: 64 IN | DIASTOLIC BLOOD PRESSURE: 82 MMHG | OXYGEN SATURATION: 98 % | BODY MASS INDEX: 45.85 KG/M2 | WEIGHT: 268.6 LBS | SYSTOLIC BLOOD PRESSURE: 130 MMHG | HEART RATE: 90 BPM

## 2017-03-22 DIAGNOSIS — E03.9 ACQUIRED HYPOTHYROIDISM: ICD-10-CM

## 2017-03-22 DIAGNOSIS — R63.5 WEIGHT GAIN: Primary | ICD-10-CM

## 2017-03-22 PROCEDURE — 99214 OFFICE O/P EST MOD 30 MIN: CPT | Performed by: FAMILY MEDICINE

## 2017-03-22 NOTE — MR AVS SNAPSHOT
After Visit Summary   3/22/2017    Ana Luisa Byers    MRN: [id number]           Patient Information     Date Of Birth          1960        Visit Information        Provider Department      3/22/2017 1:30 PM Bridget Garza MD [facility]        Care Instructions    Track food, portion sizes, what you cook foods in for next 1-2 weeks,     Return to clinic to review    Ask Dr. Gan about cymbalta and lyrica and weight gain         Follow-ups after your visit        Who to contact     If you have questions or need follow up information about today's clinic visit or your schedule please contact [facility] directly at [phone].  Normal or non-critical lab and imaging results will be communicated to you by MyChart, letter or phone within 4 business days after the clinic has received the results. If you do not hear from us within 7 days, please contact the clinic through [employer] or phone. If you have a critical or abnormal lab result, we will notify you by phone as soon as possible.  Submit refill requests through [employer] or call your pharmacy and they will forward the refill request to us. Please allow 3 business days for your refill to be completed.          Additional Information About Your Visit        MyChart Information     [employer] gives you secure access to your electronic health record. If you see a primary care provider, you can also send messages to your care team and make appointments. If you have questions, please call your primary care clinic.  If you do not have a primary care provider, please call [phone] and they will assist you.        Care EveryWhere ID     This is your Care EveryWhere ID. This could be used by other organizations to access your [facility] medical records  [id number]        Your Vitals Were     Last Period                   02/13/2009            Blood Pressure from Last 3 Encounters:   01/24/17 124/80   11/23/16 122/78    11/11/16 126/84    Weight from Last 3 Encounters:   01/24/17 262 lb (118.8 kg)   11/23/16 266 lb (120.7 kg)   11/11/16 266 lb (120.7 kg)              Today, you had the following     No orders found for display       Primary Care Provider Office Phone # Fax #    Bridget Ambrocio Garza [phone]       [facility] [address]  [facility] [address]        Thank you!     Thank you for choosing [facility]  for your care. Our goal is always to provide you with excellent care. Hearing back from our patients is one way we can continue to improve our services. Please take a few minutes to complete the written survey that you may receive in the mail after your visit with us. Thank you!             Your Updated Medication List - Protect others around you: Learn how to safely use, store and throw away your medicines at www.disposemymeds.org.          This list is accurate as of: 3/22/17  1:44 PM.  Always use your most recent med list.                   Brand Name Dispense Instructions for use    aspirin 81 MG chewable tablet     108 tablet    Take 1 tablet (81 mg) by mouth daily       atorvastatin 40 MG tablet    LIPITOR    90 tablet    Take 1 tablet (40 mg) by mouth daily       DULoxetine 60 MG EC capsule    CYMBALTA     60 mg 2 times daily       EPINEPHrine 0.3 MG/0.3ML injection     2 each    Inject 0.3 mLs (0.3 mg) into the muscle once as needed for anaphylaxis       furosemide 40 MG tablet    LASIX    60 tablet    Take 1 tablet (40 mg) by mouth 2 times daily       glipiZIDE 2.5 MG 24 hr tablet    glipiZIDE XL    180 tablet    Take 1 tablet (2.5 mg) by mouth 2 times daily       levothyroxine 25 MCG tablet    SYNTHROID/LEVOTHROID    90 tablet    Take 1 tablet (25 mcg) by mouth daily       meclizine 25 MG tablet    ANTIVERT    90 tablet    Take 1 tablet (25 mg) by mouth 3 times daily as needed for dizziness       methocarbamol 500 MG tablet    ROBAXIN    20 tablet    Take 2  tablets (1,000 mg) by mouth 3 times daily as needed for muscle spasms       NITROSTAT 0.4 MG sublingual tablet   Generic drug:  nitroglycerin      Place 1 tablet under the tongue as needed       omeprazole 40 MG capsule    priLOSEC    90 capsule    Take 1 capsule (40 mg) by mouth daily Take 30-60 minutes before a meal.       * order for DME     2 Device    Equipment being ordered: compression socks, high strength, knee high, custom made 2 pairs, 11 refill       * order for DME     1 Units    Equipment being ordered: diabetic shoes       * order for DME     1 each    Equipment being ordered: Rollator walker with seat       * order for DME     1 each    Equipment being ordered: Lift Chair       * order for DME     1 Units    Equipment being ordered: diabetic shoes  Feet have been examined, no changes       pantoprazole 40 MG EC tablet    PROTONIX    90 tablet    Take 1 tablet (40 mg) by mouth daily Take 30-60 minutes before a meal.       Potassium Chloride ER 20 MEQ Tbcr     30 tablet    Take 1 tablet (20 mEq) by mouth daily       pregabalin 50 MG capsule    LYRICA    90 capsule    Take 1 capsule (50 mg) by mouth 3 times daily       topiramate 50 MG tablet    TOPAMAX    60 tablet    Take 1 tablet (50 mg) by mouth 2 times daily       traMADol 50 MG tablet   Start taking on:  4/2/2017    ULTRAM    90 tablet    Take 1 tablet (50 mg) by mouth every 8 hours as needed for moderate pain Max #3/day. OK to dispense 3/31/17 to start 4/2/17.       valACYclovir 1000 mg tablet    VALTREX    4 tablet    Take 2 tablets (2,000 mg) by mouth 2 times daily       vitamin D 2000 UNITS tablet     90 tablet    Take 1 tablet by mouth daily       zolpidem 10 MG tablet    AMBIEN    30 tablet    Take 1 tablet (10 mg) by mouth nightly as needed for sleep       * Notice:  This list has 5 medication(s) that are the same as other medications prescribed for you. Read the directions carefully, and ask your doctor or other care provider to review them  with you.

## 2017-03-22 NOTE — PATIENT INSTRUCTIONS
Track food, portion sizes, what you cook foods in for next 1-2 weeks,     Return to clinic to review    Ask Dr. Gan about cymbalta and lyrica and weight gain

## 2017-03-22 NOTE — PROGRESS NOTES
SUBJECTIVE:                                                    Ana Luisa Byers is a 57 year old female who presents to clinic today for the following health issues:    Has concerns regarding recent weight gain.    Says the scale went up a pound over 2 days.    Restarted Lyrica, in November 2016. Had trouble with weight gain on Lyrica previously, but it was restarted due to worsened low back pain off of the medication.    Thyroid studies in January were in range. She continues to take her Synthroid daily.  Reports some increased fluid retention. No chest pain or shortness of breath.  Denies constipation.    Drinking 72 ounces of water daily. Trying to watch her portions. Exercises every other day.    Says her insurance company will not cover a nutrition consultation more than once.    Reports success with weight loss on phentermine, she was not losing weight according to our scale, so the medication was stopped.      Problem list and histories reviewed & adjusted, as indicated.  Additional history: none    Patient Active Problem List   Diagnosis     Mild major depression (H)     Chronic pain associated with significant psychosocial dysfunction     Osteoarthritis     S/P shoulder replacement     Cluster headaches     Fibromyalgia     Plantar fasciitis     Other insomnia     HTN, goal below 140/90     Gastroesophageal reflux disease without esophagitis     Restless legs syndrome (RLS)     CKD (chronic kidney disease) stage 2, GFR 60-89 ml/min     HSV-1 infection     Generalized edema     Left lumbar radiculitis     Morbid obesity, unspecified obesity type (H)     Type 2 diabetes mellitus with diabetic neuropathy, without long-term current use of insulin (H)     Acquired hypothyroidism     Benign essential hypertension     Past Surgical History:   Procedure Laterality Date     ARTHROPLASTY SHOULDER  7/25/2013    Procedure: ARTHROPLASTY SHOULDER;  RIGHT TOTAL SHOULDER ARTHROPLASTY (TORNIER AFFINITY SHOULDER SYSTEM)^;   "Surgeon: Dung Morales MD;  Location: [facility]     BACK SURGERY  2011    L45 laminectomy     C APPENDECTOMY  age 19     C LIGATE FALLOPIAN TUBE,POSTPARTUM  1982    Tubal Ligation     ELBOW WRIST HAND FINGER ORTHOSIS, RIGID, WITHOUT JOINTS, MAY INCLUDE SOFT  5/2007    put in [place]     ESOPHAGOSCOPY, GASTROSCOPY, DUODENOSCOPY (EGD), COMBINED  9/23/2015    Dr. Thomas [facility]     ESOPHAGOSCOPY, GASTROSCOPY, DUODENOSCOPY (EGD), COMBINED N/A 9/23/2015    Procedure: COMBINED ESOPHAGOSCOPY, GASTROSCOPY, DUODENOSCOPY (EGD), BIOPSY SINGLE OR MULTIPLE;  Surgeon: Jose Thomas MD;  Location: [facility]     HCL PAP SMEAR  6/2008    WNL     ORTHOPEDIC SURGERY      left shoulder scoped and plate left elbow     SURGICAL HISTORY OF -   2009    ulnar nerve release, carpal tunnel- left       Social History   Substance Use Topics     Smoking status: Never Smoker     Smokeless tobacco: Never Used     Alcohol use No     Family History   Problem Relation Age of Onset     C.A.D. Mother      Neurologic Disorder Mother      lupus     Depression Mother      DIABETES Maternal Aunt      Colon Cancer No family hx of      Family History Negative Father            Reviewed and updated as needed this visit by clinical staff  Tobacco  Meds       Reviewed and updated as needed this visit by Provider  Tobacco  Meds         ROS:  Constitutional, HEENT, cardiovascular, pulmonary, gi and gu systems are negative, except as otherwise noted.    OBJECTIVE:                                                    /82  Pulse 90  Ht 5' 4\" (1.626 m)  Wt 268 lb 9.6 oz (121.8 kg)  LMP 02/13/2009  SpO2 98%  BMI 46.11 kg/m2  Body mass index is 46.11 kg/(m^2).  GENERAL: healthy, alert and no distress  NECK: no adenopathy, no asymmetry, masses, or scars and thyroid normal to palpation  RESP: lungs clear to auscultation - no rales, rhonchi or wheezes  CV: regular rate and rhythm, normal S1 S2, no S3 or S4, no murmur, click or rub, no peripheral edema and " peripheral pulses strong  NEURO: Normal strength and tone, mentation intact and speech normal  PSYCH: mentation appears normal, affect normal/bright    Diagnostic Test Results:  none      ASSESSMENT/PLAN:                                                      1. Weight gain - she has concern with weight gain. Is currently in the range that she tends towards. Thyroid is functioning well. Sounds like she has some misconceptions about what high-protein foods were. Advised her to track her foods, portions, additives for the next 1-2 weeks and return with her log. We will review her log, and I can make suggestions to improve her diet. Need to consider this is a side effect of Lyrica as well. She has good control of her low back and neuropathy symptoms, so it would be unfortunate to stop this medication and have those symptoms return. She is interested in restarting phentermine in the near future. Discussed nutrition discussion prior to phentermine.    2. BMI 40.0-44.9, adult (H) - see above    3. Acquired hypothyroidism - was well controlled when checked 2 months ago    4. Diabetes, overdue for a follow-up, advised diabetes recheck in 1-2 weeks when she returns for nutrition consult.    Bridget Garza MD  [facility]

## 2017-04-03 NOTE — TELEPHONE ENCOUNTER
Refill request for topiaramate 50 mg -  This was removed from med list at last OV.     Is pt to still be taking this?    Shira Hamm, RN

## 2017-04-04 ENCOUNTER — OFFICE VISIT (OUTPATIENT)
Dept: FAMILY MEDICINE | Facility: CLINIC | Age: 57
End: 2017-04-04
Payer: MEDICARE

## 2017-04-04 VITALS
BODY MASS INDEX: 46.44 KG/M2 | DIASTOLIC BLOOD PRESSURE: 80 MMHG | HEART RATE: 77 BPM | SYSTOLIC BLOOD PRESSURE: 130 MMHG | TEMPERATURE: 98.2 F | HEIGHT: 64 IN | WEIGHT: 272 LBS

## 2017-04-04 DIAGNOSIS — L98.491 SKIN ULCER, LIMITED TO BREAKDOWN OF SKIN (H): Primary | ICD-10-CM

## 2017-04-04 PROCEDURE — 99213 OFFICE O/P EST LOW 20 MIN: CPT | Performed by: FAMILY MEDICINE

## 2017-04-04 RX ORDER — TOPIRAMATE 50 MG/1
50 TABLET, FILM COATED ORAL 2 TIMES DAILY
Qty: 60 TABLET | Refills: 1 | OUTPATIENT
Start: 2017-04-04

## 2017-04-04 NOTE — MR AVS SNAPSHOT
"              After Visit Summary   4/4/2017    Ana Luisa Byers    MRN: [id number]           Patient Information     Date Of Birth          1960        Visit Information        Provider Department      4/4/2017 9:30 AM Bridget Garza MD [facility]        Today's Diagnoses     Skin ulcer, limited to breakdown of skin (H)    -  1       Follow-ups after your visit        Who to contact     If you have questions or need follow up information about today's clinic visit or your schedule please contact [facility] directly at [phone].  Normal or non-critical lab and imaging results will be communicated to you by [employer], letter or phone within 4 business days after the clinic has received the results. If you do not hear from us within 7 days, please contact the clinic through [employer] or phone. If you have a critical or abnormal lab result, we will notify you by phone as soon as possible.  Submit refill requests through [employer] or call your pharmacy and they will forward the refill request to us. Please allow 3 business days for your refill to be completed.          Additional Information About Your Visit        MyChart Information     [employer] gives you secure access to your electronic health record. If you see a primary care provider, you can also send messages to your care team and make appointments. If you have questions, please call your primary care clinic.  If you do not have a primary care provider, please call [phone] and they will assist you.        Care EveryWhere ID     This is your Care EveryWhere ID. This could be used by other organizations to access your [place] medical records  [id number]        Your Vitals Were     Pulse Temperature Height Last Period Breastfeeding? BMI (Body Mass Index)    77 98.2  F (36.8  C) (Oral) 5' 4\" (1.626 m) 02/13/2009 No 46.69 kg/m2       Blood Pressure from Last 3 Encounters:   04/04/17 130/80   03/22/17 130/82   01/24/17 " 124/80    Weight from Last 3 Encounters:   04/04/17 272 lb (123.4 kg)   03/22/17 268 lb 9.6 oz (121.8 kg)   01/24/17 262 lb (118.8 kg)              Today, you had the following     No orders found for display       Primary Care Provider Office Phone # Fax #    Bridget Ambrocio Garza [phone]       [facility] [address]  [facility] [address]        Thank you!     Thank you for choosing [facility]  for your care. Our goal is always to provide you with excellent care. Hearing back from our patients is one way we can continue to improve our services. Please take a few minutes to complete the written survey that you may receive in the mail after your visit with us. Thank you!             Your Updated Medication List - Protect others around you: Learn how to safely use, store and throw away your medicines at www.disposemymeds.org.          This list is accurate as of: 4/4/17  1:16 PM.  Always use your most recent med list.                   Brand Name Dispense Instructions for use    aspirin 81 MG chewable tablet     108 tablet    Take 1 tablet (81 mg) by mouth daily       atorvastatin 40 MG tablet    LIPITOR    90 tablet    Take 1 tablet (40 mg) by mouth daily       DULoxetine 60 MG EC capsule    CYMBALTA     60 mg 2 times daily       EPINEPHrine 0.3 MG/0.3ML injection     2 each    Inject 0.3 mLs (0.3 mg) into the muscle once as needed for anaphylaxis       furosemide 40 MG tablet    LASIX    60 tablet    Take 1 tablet (40 mg) by mouth 2 times daily       glipiZIDE 2.5 MG 24 hr tablet    glipiZIDE XL    180 tablet    Take 1 tablet (2.5 mg) by mouth 2 times daily       levothyroxine 25 MCG tablet    SYNTHROID/LEVOTHROID    90 tablet    Take 1 tablet (25 mcg) by mouth daily       meclizine 25 MG tablet    ANTIVERT    90 tablet    Take 1 tablet (25 mg) by mouth 3 times daily as needed for dizziness       methocarbamol 500 MG tablet    ROBAXIN    20 tablet    Take 2  tablets (1,000 mg) by mouth 3 times daily as needed for muscle spasms       NITROSTAT 0.4 MG sublingual tablet   Generic drug:  nitroglycerin      Place 1 tablet under the tongue as needed       * order for DME     2 Device    Equipment being ordered: compression socks, high strength, knee high, custom made 2 pairs, 11 refill       * order for DME     1 Units    Equipment being ordered: diabetic shoes       * order for DME     1 each    Equipment being ordered: Rollator walker with seat       * order for DME     1 each    Equipment being ordered: Lift Chair       * order for DME     1 Units    Equipment being ordered: diabetic shoes  Feet have been examined, no changes       pantoprazole 40 MG EC tablet    PROTONIX    90 tablet    Take 1 tablet (40 mg) by mouth daily Take 30-60 minutes before a meal.       Potassium Chloride ER 20 MEQ Tbcr     30 tablet    Take 1 tablet (20 mEq) by mouth daily       pregabalin 50 MG capsule    LYRICA    90 capsule    Take 1 capsule (50 mg) by mouth 3 times daily       traMADol 50 MG tablet    ULTRAM    90 tablet    Take 1 tablet (50 mg) by mouth every 8 hours as needed for moderate pain Max #3/day. OK to dispense 3/31/17 to start 4/2/17.       valACYclovir 1000 mg tablet    VALTREX    4 tablet    Take 2 tablets (2,000 mg) by mouth 2 times daily       vitamin D 2000 UNITS tablet     90 tablet    Take 1 tablet by mouth daily       zolpidem 10 MG tablet    AMBIEN    30 tablet    Take 1 tablet (10 mg) by mouth nightly as needed for sleep       * Notice:  This list has 5 medication(s) that are the same as other medications prescribed for you. Read the directions carefully, and ask your doctor or other care provider to review them with you.

## 2017-04-04 NOTE — PROGRESS NOTES
"  SUBJECTIVE:                                                    Ana Luisa Byers is a 57 year old female who presents to clinic today for the following health issues:    Ulcerations on right elbow, present since shortly after cubital tunnel release in lat January. Was treated with a course of abx through Ortho, did not help.     Feels like the sores have worsened over the past 2 months. Not tender over the ulcerations, but tenderness surrounding the incision. Feels like she \"having a hot iron\" on the skin.     Has an abscess within the larger ulceration, says it drains brown material from time to time.       Problem list and histories reviewed & adjusted, as indicated.  Additional history: none    Patient Active Problem List   Diagnosis     Mild major depression (H)     Chronic pain associated with significant psychosocial dysfunction     Osteoarthritis     S/P shoulder replacement     Cluster headaches     Fibromyalgia     Plantar fasciitis     Other insomnia     HTN, goal below 140/90     Gastroesophageal reflux disease without esophagitis     Restless legs syndrome (RLS)     CKD (chronic kidney disease) stage 2, GFR 60-89 ml/min     HSV-1 infection     Generalized edema     Left lumbar radiculitis     Type 2 diabetes mellitus with diabetic neuropathy, without long-term current use of insulin (H)     Acquired hypothyroidism     Benign essential hypertension     BMI 40.0-44.9, adult (H)     Past Surgical History:   Procedure Laterality Date     ARTHROPLASTY SHOULDER  7/25/2013    Procedure: ARTHROPLASTY SHOULDER;  RIGHT TOTAL SHOULDER ARTHROPLASTY (TORNIER AFFINITY SHOULDER SYSTEM)^;  Surgeon: Dung Morales MD;  Location: [facility]     BACK SURGERY  2011    L45 laminectomy     C APPENDECTOMY  age 19     C LIGATE FALLOPIAN TUBE,POSTPARTUM  1982    Tubal Ligation     ELBOW WRIST HAND FINGER ORTHOSIS, RIGID, WITHOUT JOINTS, MAY INCLUDE SOFT  5/2007    put in [place]     ESOPHAGOSCOPY, GASTROSCOPY, DUODENOSCOPY (EGD), " "COMBINED  9/23/2015    Dr. Thomas [facility]     ESOPHAGOSCOPY, GASTROSCOPY, DUODENOSCOPY (EGD), COMBINED N/A 9/23/2015    Procedure: COMBINED ESOPHAGOSCOPY, GASTROSCOPY, DUODENOSCOPY (EGD), BIOPSY SINGLE OR MULTIPLE;  Surgeon: Jose Thomas MD;  Location: [facility]     HCL PAP SMEAR  6/2008    WNL     ORTHOPEDIC SURGERY      left shoulder scoped and plate left elbow     SURGICAL HISTORY OF -   2009    ulnar nerve release, carpal tunnel- left       Social History   Substance Use Topics     Smoking status: Never Smoker     Smokeless tobacco: Never Used     Alcohol use No     Family History   Problem Relation Age of Onset     C.A.D. Mother      Neurologic Disorder Mother      lupus     Depression Mother      Family History Negative Father      DIABETES Maternal Aunt      Colon Cancer No family hx of            Reviewed and updated as needed this visit by clinical staff       Reviewed and updated as needed this visit by Provider         ROS:  Constitutional, HEENT, cardiovascular, pulmonary, gi and gu systems are negative, except as otherwise noted.    OBJECTIVE:                                                    /80 (BP Location: Right arm, Patient Position: Chair, Cuff Size: Adult Large)  Pulse 77  Temp 98.2  F (36.8  C) (Oral)  Ht 5' 4\" (1.626 m)  Wt 272 lb (123.4 kg)  LMP 02/13/2009  Breastfeeding? No  BMI 46.69 kg/m2  Body mass index is 46.69 kg/(m^2).  GENERAL: healthy, alert and no distress  SKIN: shallow non weeping ulcerations on the right posterior elbow, measuring 3x2 and 1.5x1.5, has a central abscess type lesion within the larger ulceration, attempted I&D but no material able to be expressed    Diagnostic Test Results:  none      ASSESSMENT/PLAN:                                                      1. Skin ulcer, limited to breakdown of skin (H) - suggested keeping wounds moisturized to encourage healthy skin growth. If not improvement in 1 week, will ask wound to consult.       Bridget Albrecht " MD Greg  [facility]

## 2017-04-04 NOTE — NURSING NOTE
"Chief Complaint   Patient presents with     Derm Problem     sores right elbow.        Initial /80 (BP Location: Right arm, Patient Position: Chair, Cuff Size: Adult Large)  Pulse 77  Temp 98.2  F (36.8  C) (Oral)  Ht 5' 4\" (1.626 m)  Wt 272 lb (123.4 kg)  LMP 02/13/2009  Breastfeeding? No  BMI 46.69 kg/m2 Estimated body mass index is 46.69 kg/(m^2) as calculated from the following:    Height as of this encounter: 5' 4\" (1.626 m).    Weight as of this encounter: 272 lb (123.4 kg).  Medication Reconciliation: complete     Gabino Cho CMA          "

## 2017-04-05 DIAGNOSIS — E11.42 DIABETIC POLYNEUROPATHY ASSOCIATED WITH TYPE 2 DIABETES MELLITUS (H): ICD-10-CM

## 2017-04-05 RX ORDER — PREGABALIN 50 MG/1
50 CAPSULE ORAL 3 TIMES DAILY
Qty: 90 CAPSULE | Refills: 3 | Status: SHIPPED | OUTPATIENT
Start: 2017-04-05 | End: 2017-08-30

## 2017-04-05 NOTE — TELEPHONE ENCOUNTER
Routing refill request to provider for review/approval because:  Drug not on the [facility] refill protocol     Shira Hamm RN

## 2017-04-05 NOTE — TELEPHONE ENCOUNTER
Pending Prescriptions:                       Disp   Refills    pregabalin (LYRICA) 50 MG capsule         90 cap*3            Sig: Take 1 capsule (50 mg) by mouth 3 times daily             Last Written Prescription Date: 11/22/2016  Last Fill Quantity: 90, # refills: 3  Last Office Visit with [facility], [facility] or [facility] prescribing provider:  03/04/2017        BP Readings from Last 3 Encounters:   04/04/17 130/80   03/22/17 130/82   01/24/17 124/80     Lab Results   Component Value Date    MICROL 24 12/04/2015     Lab Results   Component Value Date    UMALCR 16.67 12/04/2015     Creatinine   Date Value Ref Range Status   01/24/2017 1.00 0.52 - 1.04 mg/dL Final   ]  GFR Estimate   Date Value Ref Range Status   01/24/2017 57 (L) >60 mL/min/1.7m2 Final     Comment:     Non  GFR Calc   10/28/2016 53 (L) >60 mL/min/1.7m2 Final     Comment:     Non  GFR Calc   09/09/2016 50 (L) >60 mL/min/1.7m2 Final     Comment:     Non  GFR Calc     GFR Estimate If Black   Date Value Ref Range Status   01/24/2017 69 >60 mL/min/1.7m2 Final     Comment:      GFR Calc   10/28/2016 65 >60 mL/min/1.7m2 Final     Comment:      GFR Calc   09/09/2016 61 >60 mL/min/1.7m2 Final     Comment:      GFR Calc     Lab Results   Component Value Date    CHOL 199 04/13/2016     Lab Results   Component Value Date    HDL 47 04/13/2016     Lab Results   Component Value Date     04/13/2016     Lab Results   Component Value Date    TRIG 166 04/13/2016     Lab Results   Component Value Date    CHOLHDLRATIO 3.4 03/16/2015     Lab Results   Component Value Date    AST 25 07/05/2016     Lab Results   Component Value Date    ALT 37 07/05/2016     Lab Results   Component Value Date    A1C 5.9 09/09/2016    A1C 5.9 07/05/2016    A1C 6.1 12/04/2015    A1C 6.3 08/31/2015    A1C 6.3 03/16/2015     Potassium   Date Value Ref Range Status   01/24/2017 3.2 (L) 3.4 - 5.3 mmol/L  Final     Vidal Vargas XRT

## 2017-04-11 ENCOUNTER — OFFICE VISIT (OUTPATIENT)
Dept: FAMILY MEDICINE | Facility: CLINIC | Age: 57
End: 2017-04-11
Payer: MEDICARE

## 2017-04-11 VITALS
WEIGHT: 272 LBS | HEIGHT: 64 IN | HEART RATE: 70 BPM | TEMPERATURE: 98.5 F | DIASTOLIC BLOOD PRESSURE: 72 MMHG | BODY MASS INDEX: 46.44 KG/M2 | SYSTOLIC BLOOD PRESSURE: 114 MMHG

## 2017-04-11 DIAGNOSIS — N18.2 CKD (CHRONIC KIDNEY DISEASE) STAGE 2, GFR 60-89 ML/MIN: ICD-10-CM

## 2017-04-11 DIAGNOSIS — L98.491 SKIN ULCER, LIMITED TO BREAKDOWN OF SKIN (H): Primary | ICD-10-CM

## 2017-04-11 DIAGNOSIS — E11.42 TYPE 2 DIABETES MELLITUS WITH DIABETIC POLYNEUROPATHY, WITHOUT LONG-TERM CURRENT USE OF INSULIN (H): ICD-10-CM

## 2017-04-11 PROBLEM — E11.9 TYPE 2 DIABETES MELLITUS WITHOUT COMPLICATION, WITHOUT LONG-TERM CURRENT USE OF INSULIN (H): Status: ACTIVE | Noted: 2017-04-11

## 2017-04-11 PROBLEM — E11.40 TYPE 2 DIABETES MELLITUS WITH DIABETIC NEUROPATHY, WITHOUT LONG-TERM CURRENT USE OF INSULIN (H): Status: RESOLVED | Noted: 2017-01-24 | Resolved: 2017-04-11

## 2017-04-11 PROBLEM — E11.9 TYPE 2 DIABETES MELLITUS WITHOUT COMPLICATION, WITHOUT LONG-TERM CURRENT USE OF INSULIN (H): Status: RESOLVED | Noted: 2017-04-11 | Resolved: 2017-04-11

## 2017-04-11 PROBLEM — R73.03 PREDIABETES: Status: RESOLVED | Noted: 2017-04-11 | Resolved: 2017-04-11

## 2017-04-11 PROBLEM — I10 BENIGN ESSENTIAL HYPERTENSION: Status: RESOLVED | Noted: 2017-01-24 | Resolved: 2017-04-11

## 2017-04-11 PROBLEM — R73.03 PREDIABETES: Status: ACTIVE | Noted: 2017-04-11

## 2017-04-11 LAB — HBA1C MFR BLD: 6.4 % (ref 4.3–6)

## 2017-04-11 PROCEDURE — 36415 COLL VENOUS BLD VENIPUNCTURE: CPT | Performed by: FAMILY MEDICINE

## 2017-04-11 PROCEDURE — 80048 BASIC METABOLIC PNL TOTAL CA: CPT | Performed by: FAMILY MEDICINE

## 2017-04-11 PROCEDURE — 99214 OFFICE O/P EST MOD 30 MIN: CPT | Performed by: FAMILY MEDICINE

## 2017-04-11 PROCEDURE — 82043 UR ALBUMIN QUANTITATIVE: CPT | Performed by: FAMILY MEDICINE

## 2017-04-11 PROCEDURE — 83036 HEMOGLOBIN GLYCOSYLATED A1C: CPT | Performed by: FAMILY MEDICINE

## 2017-04-11 PROCEDURE — 99207 C FOOT EXAM  NO CHARGE: CPT | Mod: 25 | Performed by: FAMILY MEDICINE

## 2017-04-11 RX ORDER — LOSARTAN POTASSIUM 25 MG/1
12.5 TABLET ORAL DAILY
Qty: 15 TABLET | Refills: 1 | Status: SHIPPED | OUTPATIENT
Start: 2017-04-11 | End: 2017-06-15

## 2017-04-11 NOTE — MR AVS SNAPSHOT
After Visit Summary   4/11/2017    nAa Luisa Byers    MRN: [id number]           Patient Information     Date Of Birth          1960        Visit Information        Provider Department      4/11/2017 10:45 AM Bridget Garza MD [facility]        Today's Diagnoses     Skin ulcer, limited to breakdown of skin (H)    -  1    Type 2 diabetes mellitus without complication, without long-term current use of insulin (H)        CKD (chronic kidney disease) stage 2, GFR 60-89 ml/min        BMI 40.0-44.9, adult (H)          Care Instructions    Keep elbow ulcers moist with bandage overnight.    Keep open to air during the day.    Follow up in 2 weeks        Follow-ups after your visit        Who to contact     If you have questions or need follow up information about today's clinic visit or your schedule please contact [facility] directly at [phone].  Normal or non-critical lab and imaging results will be communicated to you by [employer], letter or phone within 4 business days after the clinic has received the results. If you do not hear from us within 7 days, please contact the clinic through [employer] or phone. If you have a critical or abnormal lab result, we will notify you by phone as soon as possible.  Submit refill requests through [employer] or call your pharmacy and they will forward the refill request to us. Please allow 3 business days for your refill to be completed.          Additional Information About Your Visit        MyChart Information     [employer] gives you secure access to your electronic health record. If you see a primary care provider, you can also send messages to your care team and make appointments. If you have questions, please call your primary care clinic.  If you do not have a primary care provider, please call [phone] and they will assist you.        Care EveryWhere ID     This is your Care EveryWhere ID. This could be used by other  "organizations to access your [place] medical records  [id number]        Your Vitals Were     Pulse Temperature Height Last Period BMI (Body Mass Index)       70 98.5  F (36.9  C) (Oral) 5' 4\" (1.626 m) 02/13/2009 46.69 kg/m2        Blood Pressure from Last 3 Encounters:   04/11/17 114/72   04/04/17 130/80   03/22/17 130/82    Weight from Last 3 Encounters:   04/11/17 272 lb (123.4 kg)   04/04/17 272 lb (123.4 kg)   03/22/17 268 lb 9.6 oz (121.8 kg)              We Performed the Following     Albumin Random Urine Quantitative     Basic metabolic panel  (Ca, Cl, CO2, Creat, Gluc, K, Na, BUN)     Hemoglobin A1c          Today's Medication Changes          These changes are accurate as of: 4/11/17 11:22 AM.  If you have any questions, ask your nurse or doctor.               Start taking these medicines.        Dose/Directions    losartan 25 MG tablet   Commonly known as:  COZAAR   Used for:  Type 2 diabetes mellitus without complication, without long-term current use of insulin (H)   Started by:  Bridget Garza MD        Dose:  12.5 mg   Take 0.5 tablets (12.5 mg) by mouth daily   Quantity:  15 tablet   Refills:  1            Where to get your medicines      [facility] [id number] [place] [address] [facility] [address]  [address] [facility] [address]     Phone:  [phone]     losartan 25 MG tablet                Primary Care Provider Office Phone # Fax #    Bridget Garza [phone]       [facility] [address] [address] [facility] [address]        Thank you!     Thank you for choosing [facility]  for your care. Our goal is always to provide you with excellent care. Hearing back from our patients is one way we can continue to improve our services. Please take a few minutes to complete the written survey that you may receive in the mail after your visit with us. Thank you!             Your " Updated Medication List - Protect others around you: Learn how to safely use, store and throw away your medicines at www.disposemymeds.org.          This list is accurate as of: 4/11/17 11:22 AM.  Always use your most recent med list.                   Brand Name Dispense Instructions for use    aspirin 81 MG chewable tablet     108 tablet    Take 1 tablet (81 mg) by mouth daily       atorvastatin 40 MG tablet    LIPITOR    90 tablet    Take 1 tablet (40 mg) by mouth daily       DULoxetine 60 MG EC capsule    CYMBALTA     60 mg 2 times daily       EPINEPHrine 0.3 MG/0.3ML injection     2 each    Inject 0.3 mLs (0.3 mg) into the muscle once as needed for anaphylaxis       furosemide 40 MG tablet    LASIX    60 tablet    Take 1 tablet (40 mg) by mouth 2 times daily       glipiZIDE 2.5 MG 24 hr tablet    glipiZIDE XL    180 tablet    Take 1 tablet (2.5 mg) by mouth 2 times daily       levothyroxine 25 MCG tablet    SYNTHROID/LEVOTHROID    90 tablet    Take 1 tablet (25 mcg) by mouth daily       losartan 25 MG tablet    COZAAR    15 tablet    Take 0.5 tablets (12.5 mg) by mouth daily       meclizine 25 MG tablet    ANTIVERT    90 tablet    Take 1 tablet (25 mg) by mouth 3 times daily as needed for dizziness       methocarbamol 500 MG tablet    ROBAXIN    20 tablet    Take 2 tablets (1,000 mg) by mouth 3 times daily as needed for muscle spasms       NITROSTAT 0.4 MG sublingual tablet   Generic drug:  nitroglycerin      Place 1 tablet under the tongue as needed       * order for DME     2 Device    Equipment being ordered: compression socks, high strength, knee high, custom made 2 pairs, 11 refill       * order for DME     1 Units    Equipment being ordered: diabetic shoes       * order for DME     1 each    Equipment being ordered: Rollator walker with seat       * order for DME     1 each    Equipment being ordered: Lift Chair       * order for DME     1 Units    Equipment being ordered: diabetic shoes  Feet have been  examined, no changes       pantoprazole 40 MG EC tablet    PROTONIX    90 tablet    Take 1 tablet (40 mg) by mouth daily Take 30-60 minutes before a meal.       Potassium Chloride ER 20 MEQ Tbcr     30 tablet    Take 1 tablet (20 mEq) by mouth daily       pregabalin 50 MG capsule    LYRICA    90 capsule    Take 1 capsule (50 mg) by mouth 3 times daily       traMADol 50 MG tablet    ULTRAM    90 tablet    Take 1 tablet (50 mg) by mouth every 8 hours as needed for moderate pain Max #3/day. OK to dispense 3/31/17 to start 4/2/17.       valACYclovir 1000 mg tablet    VALTREX    4 tablet    Take 2 tablets (2,000 mg) by mouth 2 times daily       vitamin D 2000 UNITS tablet     90 tablet    Take 1 tablet by mouth daily       zolpidem 10 MG tablet    AMBIEN    30 tablet    Take 1 tablet (10 mg) by mouth nightly as needed for sleep       * Notice:  This list has 5 medication(s) that are the same as other medications prescribed for you. Read the directions carefully, and ask your doctor or other care provider to review them with you.

## 2017-04-11 NOTE — PATIENT INSTRUCTIONS
Keep elbow ulcers moist with bandage overnight.    Keep open to air during the day.    Follow up in 2 weeks

## 2017-04-11 NOTE — PROGRESS NOTES
SUBJECTIVE:                                                    Ana Luisa Byers is a 57 year old female who presents to clinic today for the following health issues:    Recheck skin ulcers right elbow    Reports there does not seem to be any significant size change from her visit one week ago. She has been keeping her wounds covered with barrier ointment for the past week. They are not more uncomfortable. No new weeping. No new redness surrounding. Continues to have pain surrounding her surgical incision site.    His overdue for a diabetes visit. Is taking 2.5 mg of long-acting glipizide twice daily. This was initiated prior to establishing care with me as she was having higher blood sugars later in the day. No side effects to her glipizide. No report of paresthesia or numbness in her hands or feet. Not checking blood sugars regularly. Trying to eat a low carbohydrate diet. Has not been exercising regularly since her cubital tunnel release surgery but would like to get back in the pool.    No chest pain or shortness of breath. Is on a statin, and ASA.  Is not yet in ACEI/ARB.       Problem list and histories reviewed & adjusted, as indicated.  Additional history: none    Patient Active Problem List   Diagnosis     Mild major depression (H)     Chronic pain associated with significant psychosocial dysfunction     Osteoarthritis     S/P shoulder replacement     Cluster headaches     Fibromyalgia     Plantar fasciitis     Other insomnia     HTN, goal below 140/90     Gastroesophageal reflux disease without esophagitis     Restless legs syndrome (RLS)     CKD (chronic kidney disease) stage 2, GFR 60-89 ml/min     HSV-1 infection     Generalized edema     Left lumbar radiculitis     Acquired hypothyroidism     BMI 40.0-44.9, adult (H)     Type 2 diabetes mellitus without complication, without long-term current use of insulin (H)     Past Surgical History:   Procedure Laterality Date     ARTHROPLASTY SHOULDER  7/25/2013  "   Procedure: ARTHROPLASTY SHOULDER;  RIGHT TOTAL SHOULDER ARTHROPLASTY (TORNIER AFFINITY SHOULDER SYSTEM)^;  Surgeon: Dung Morales MD;  Location: [facility] OR     BACK SURGERY  2011    L45 laminectomy     C APPENDECTOMY  age 19     C LIGATE FALLOPIAN TUBE,POSTPARTUM  1982    Tubal Ligation     ELBOW WRIST HAND FINGER ORTHOSIS, RIGID, WITHOUT JOINTS, MAY INCLUDE SOFT  5/2007    put in [place]     ESOPHAGOSCOPY, GASTROSCOPY, DUODENOSCOPY (EGD), COMBINED  9/23/2015    Dr. Thomas Replaced by [facility]     ESOPHAGOSCOPY, GASTROSCOPY, DUODENOSCOPY (EGD), COMBINED N/A 9/23/2015    Procedure: COMBINED ESOPHAGOSCOPY, GASTROSCOPY, DUODENOSCOPY (EGD), BIOPSY SINGLE OR MULTIPLE;  Surgeon: Jose Thomas MD;  Location: [facility] GI     HCL PAP SMEAR  6/2008    WNL     ORTHOPEDIC SURGERY      left shoulder scoped and plate left elbow     SURGICAL HISTORY OF -   2009    ulnar nerve release, carpal tunnel- left       Social History   Substance Use Topics     Smoking status: Never Smoker     Smokeless tobacco: Never Used     Alcohol use No     Family History   Problem Relation Age of Onset     C.A.D. Mother      Neurologic Disorder Mother      lupus     Depression Mother      Family History Negative Father      DIABETES Maternal Aunt      Colon Cancer No family hx of            Reviewed and updated as needed this visit by clinical staff       Reviewed and updated as needed this visit by Provider         ROS:  Constitutional, HEENT, cardiovascular, pulmonary, gi and gu systems are negative, except as otherwise noted.    OBJECTIVE:                                                    /72 (BP Location: Right arm, Patient Position: Chair, Cuff Size: Adult Large)  Pulse 70  Temp 98.5  F (36.9  C) (Oral)  Ht 5' 4\" (1.626 m)  Wt 272 lb (123.4 kg)  LMP 02/13/2009  BMI 46.69 kg/m2  Body mass index is 46.69 kg/(m^2).  GENERAL: healthy, alert and no distress  NECK: no adenopathy, no asymmetry, masses, or scars and thyroid normal to palpation  RESP: " lungs clear to auscultation - no rales, rhonchi or wheezes  CV: regular rate and rhythm, normal S1 S2, no S3 or S4, no murmur, click or rub, no peripheral edema and peripheral pulses strong  SKIN: shallow ulcerations on right elbow, measuring 2.5 cm in diameter for the large ulceration and 0.75 cm in diameter for the smaller ulceration, continues to have nodule in the center of the larger ulceration    NEURO: Has decreased pin prick, vibratory sense, position sense from the ankle down on her right foot, has decreased pinprick on the left from the ankle down, normal vibratory sense and position sense in the left foot    Diagnostic Test Results:  none      ASSESSMENT/PLAN:                                                      1. Skin ulcer, limited to breakdown of skin (H) - overall improved based on appearance, although she continues to have pain.     2. Type 2 diabetes mellitus without complication, without long-term current use of insulin (H) - Well controlled. Discussed initiating renal protection. She is agreeable to this. Discussed potential orthostatic hypotension symptoms. Advised to call clinic should this occur.  - losartan (COZAAR) 25 MG tablet; Take 0.5 tablets (12.5 mg) by mouth daily  Dispense: 15 tablet; Refill: 1  - Albumin Random Urine Quantitative  - Hemoglobin A1c  - Basic metabolic panel  (Ca, Cl, CO2, Creat, Gluc, K, Na, BUN)    3. CKD (chronic kidney disease) stage 2, GFR 60-89 ml/min - has been stable    4. BMI 40.0-44.9, adult (H) - would like to get back to exercise with hopes to lose weight    Bridget Garza MD  [facility]

## 2017-04-11 NOTE — NURSING NOTE
"Chief Complaint   Patient presents with     RECHECK       Initial /72 (BP Location: Right arm, Patient Position: Chair, Cuff Size: Adult Large)  Pulse 70  Temp 98.5  F (36.9  C) (Oral)  Ht 5' 4\" (1.626 m)  Wt 272 lb (123.4 kg)  LMP 02/13/2009  BMI 46.69 kg/m2 Estimated body mass index is 46.69 kg/(m^2) as calculated from the following:    Height as of this encounter: 5' 4\" (1.626 m).    Weight as of this encounter: 272 lb (123.4 kg).  Medication Reconciliation: complete     Gabino Cho CMA          "

## 2017-04-12 LAB
ANION GAP SERPL CALCULATED.3IONS-SCNC: 9 MMOL/L (ref 3–14)
BUN SERPL-MCNC: 11 MG/DL (ref 7–30)
CALCIUM SERPL-MCNC: 9.8 MG/DL (ref 8.5–10.1)
CHLORIDE SERPL-SCNC: 106 MMOL/L (ref 94–109)
CO2 SERPL-SCNC: 27 MMOL/L (ref 20–32)
CREAT SERPL-MCNC: 1.02 MG/DL (ref 0.52–1.04)
CREAT UR-MCNC: 70 MG/DL
GFR SERPL CREATININE-BSD FRML MDRD: 56 ML/MIN/1.7M2
GLUCOSE SERPL-MCNC: 77 MG/DL (ref 70–99)
MICROALBUMIN UR-MCNC: 8 MG/L
MICROALBUMIN/CREAT UR: 10.99 MG/G CR (ref 0–25)
POTASSIUM SERPL-SCNC: 4 MMOL/L (ref 3.4–5.3)
SODIUM SERPL-SCNC: 142 MMOL/L (ref 133–144)

## 2017-04-24 ENCOUNTER — TRANSFERRED RECORDS (OUTPATIENT)
Dept: HEALTH INFORMATION MANAGEMENT | Facility: CLINIC | Age: 57
End: 2017-04-24

## 2017-04-26 ENCOUNTER — OFFICE VISIT (OUTPATIENT)
Dept: FAMILY MEDICINE | Facility: CLINIC | Age: 57
End: 2017-04-26
Payer: MEDICARE

## 2017-04-26 VITALS
TEMPERATURE: 97.9 F | HEIGHT: 64 IN | SYSTOLIC BLOOD PRESSURE: 126 MMHG | OXYGEN SATURATION: 96 % | DIASTOLIC BLOOD PRESSURE: 74 MMHG | WEIGHT: 273 LBS | HEART RATE: 85 BPM | BODY MASS INDEX: 46.61 KG/M2 | RESPIRATION RATE: 16 BRPM

## 2017-04-26 DIAGNOSIS — E11.42 TYPE 2 DIABETES MELLITUS WITH DIABETIC POLYNEUROPATHY, WITHOUT LONG-TERM CURRENT USE OF INSULIN (H): ICD-10-CM

## 2017-04-26 DIAGNOSIS — G89.18 POST-OP PAIN: Primary | ICD-10-CM

## 2017-04-26 DIAGNOSIS — Z98.890 S/P CUBITAL TUNNEL RELEASE: ICD-10-CM

## 2017-04-26 DIAGNOSIS — Z12.31 VISIT FOR SCREENING MAMMOGRAM: ICD-10-CM

## 2017-04-26 DIAGNOSIS — L98.491 SKIN ULCER, LIMITED TO BREAKDOWN OF SKIN (H): ICD-10-CM

## 2017-04-26 PROCEDURE — 99214 OFFICE O/P EST MOD 30 MIN: CPT | Performed by: FAMILY MEDICINE

## 2017-04-26 NOTE — PROGRESS NOTES
"  SUBJECTIVE:                                                    Ana Luisa Byers is a 57 year old female who presents to clinic today for the following health issues:      Musculoskeletal problem/pain      Duration: 1/31/2017    Description  Location: right elbow, was stated she damaged her never by icing it too much    Intensity:  moderate, severe    Accompanying signs and symptoms: whole right arm    History  Previous similar problem: no   Previous evaluation:  none    Precipitating or alleviating factors:  Trauma or overuse: no   Aggravating factors include: lifting, exercise and overuse    Therapies tried and outcome: advil PRN       That with her orthopedist last week, was told that she did permanent damage to the nerves of her elbow from a sitting to much. This was evidenced by a healing skin ulcer noted to be \"ice burns\". This upset her because she did not ice her elbow hardly at all. She notes continued pain that radiates down to her right hand, also has weakness.    Is working on cutting back on portions and decreasing carbohydrate. Has started exercising at the pool and feels better. Last A1c was 6.4%, which was higher than 5.9% in July 2016.      Problem list and histories reviewed & adjusted, as indicated.  Additional history: none    Patient Active Problem List   Diagnosis     Mild major depression (H)     Chronic pain associated with significant psychosocial dysfunction     Osteoarthritis     S/P shoulder replacement     Cluster headaches     Fibromyalgia     Plantar fasciitis     Other insomnia     HTN, goal below 140/90     Gastroesophageal reflux disease without esophagitis     Restless legs syndrome (RLS)     CKD (chronic kidney disease) stage 2, GFR 60-89 ml/min     HSV-1 infection     Generalized edema     Left lumbar radiculitis     Acquired hypothyroidism     BMI 40.0-44.9, adult (H)     Type 2 diabetes mellitus with diabetic polyneuropathy, without long-term current use of insulin (H)     " "Past Surgical History:   Procedure Laterality Date     ARTHROPLASTY SHOULDER  7/25/2013    Procedure: ARTHROPLASTY SHOULDER;  RIGHT TOTAL SHOULDER ARTHROPLASTY (TORNIER AFFINITY SHOULDER SYSTEM)^;  Surgeon: Dung Morales MD;  Location: [facility]     BACK SURGERY  2011    L45 laminectomy     C APPENDECTOMY  age 19     C LIGATE FALLOPIAN TUBE,POSTPARTUM  1982    Tubal Ligation     ELBOW WRIST HAND FINGER ORTHOSIS, RIGID, WITHOUT JOINTS, MAY INCLUDE SOFT  5/2007    put in [place]     ESOPHAGOSCOPY, GASTROSCOPY, DUODENOSCOPY (EGD), COMBINED  9/23/2015    Dr. Thomas [facility]     ESOPHAGOSCOPY, GASTROSCOPY, DUODENOSCOPY (EGD), COMBINED N/A 9/23/2015    Procedure: COMBINED ESOPHAGOSCOPY, GASTROSCOPY, DUODENOSCOPY (EGD), BIOPSY SINGLE OR MULTIPLE;  Surgeon: Jose Thomas MD;  Location: [facility]     HCL PAP SMEAR  6/2008    WNL     ORTHOPEDIC SURGERY      left shoulder scoped and plate left elbow     SURGICAL HISTORY OF -   2009    ulnar nerve release, carpal tunnel- left       Social History   Substance Use Topics     Smoking status: Never Smoker     Smokeless tobacco: Never Used     Alcohol use No     Family History   Problem Relation Age of Onset     C.A.D. Mother      Neurologic Disorder Mother      lupus     Depression Mother      Family History Negative Father      DIABETES Maternal Aunt      Colon Cancer No family hx of            Reviewed and updated as needed this visit by clinical staff       Reviewed and updated as needed this visit by Provider         ROS:  Constitutional, HEENT, cardiovascular, pulmonary, gi and gu systems are negative, except as otherwise noted.    OBJECTIVE:                                                    /74 (BP Location: Right arm, Patient Position: Chair, Cuff Size: Adult Large)  Pulse 85  Temp 97.9  F (36.6  C) (Oral)  Resp 16  Ht 5' 4\" (1.626 m)  Wt 273 lb (123.8 kg)  LMP 02/13/2009  SpO2 96%  BMI 46.86 kg/m2  Body mass index is 46.86 kg/(m^2).  GENERAL: healthy, alert and " no distress  SKIN: Well-healing ulceration on the right dorsal elbow, well-healing surgical incision    Diagnostic Test Results:  none      ASSESSMENT/PLAN:                                                      1. Post-op pain: Suggested a second opinion with [facility] to see if they have any additional suggestions.  - ORTHOPEDICS ADULT REFERRAL    2. S/P cubital tunnel release    3. Skin ulcer, limited to breakdown of skin (H) - much improved    4. Type 2 diabetes mellitus with diabetic polyneuropathy, without long-term current use of insulin (H) - still controlled but worsened control over the past 8 months. Suggested reconsultation with diabetes educator to help her work on healthy choices as well as weight loss.  - DIABETES EDUCATOR REFERRAL    5. BMI 40.0-44.9, adult (H)  - DIABETES EDUCATOR REFERRAL    6. Visit for screening mammogram  - MA SCREENING DIGITAL BILAT - Future  (s+30); Future    Bridget Garza MD  [facility]

## 2017-04-26 NOTE — MR AVS SNAPSHOT
After Visit Summary   4/26/2017    Ana Luisa Byers    MRN: [id number]           Patient Information     Date Of Birth          1960        Visit Information        Provider Department      4/26/2017 1:30 PM Bridget Garza MD [facility]        Today's Diagnoses     Post-op pain    -  1    S/P cubital tunnel release        Type 2 diabetes mellitus with diabetic polyneuropathy, without long-term current use of insulin (H)        BMI 40.0-44.9, adult (H)        Visit for screening mammogram           Follow-ups after your visit        Additional Services     DIABETES EDUCATOR REFERRAL       DIABETES SELF MANAGEMENT TRAINING (DSMT)      Your provider has referred you to Diabetes Education: FMG: Diabetes [facility] - [facility] [phone]   [email or web address]    Type of training and number of hours: Previous Diagnosis: Follow-up DSMT - 2 hours.    Medicare covers: 10 hours of initial DSMT in 12 month period from the time of first visit, plus 2 hours of follow-up DSMT annually, and additional hours as requested for insulin training.    Diabetes Type: Type 2 - On Oral Medication             Diabetes Co-Morbidities:                A1C Goal:  <7.0       A1C is: Lab Results       Component                Value               Date                       A1C                      6.4                 04/11/2017              If an urgent visit is needed or A1C is above 12, Care Team to call the Diabetes Education Team at [phone] or send an In Basket message to the Diabetes Education Pool (P DIAB ED-PATIENT CARE).    Diabetes Education Topics: Comprehensive Knowledge Assessment and Instruction and Knowledge: Healthy Eating and Being Active    Special Educational Needs Requiring Individual DSMT: None       MEDICAL NUTRITION THERAPY (MNT) for Diabetes    Medical Nutrition Therapy with a Registered Dietitian can be provided in  coordination with Diabetes Self-Management Training to assist in achieving optimal diabetes management.    MNT Type and Hours: Previous diagnosis: Annual follow-up MNT - 2 hours                       Medicare will cover: 3 hours initial MNT in 12 month period after first visit, plus 2 hours of follow-up MNT annually    Please be aware that coverage of these services is subject to the terms and limitations of your health insurance plan.  Call member services at your health plan to determine Diabetes Self-Management Training benefits and ask which blood glucose monitor brands are covered by your plan.      Please bring the following with you to your appointment:    (1)  List of current medications   (2)  List of Blood Glucose Monitor brands that are covered by your insurance plan  (3)  Blood Glucose Monitor and log book  (4)   Food records for the 3 days prior to your visit    The Certified Diabetes Educator may make diabetes medication adjustments per the CDE Protocol and Collaborative Practice Agreement.            ORTHOPEDICS ADULT REFERRAL       Your provider has referred you to: [facility]: [facility] [phone]   [email or web address]    Please be aware that coverage of these services is subject to the terms and limitations of your health insurance plan.  Call member services at your health plan with any benefit or coverage questions.      Please bring the following to your appointment:    >>   Any x-rays, CTs or MRIs which have been performed.  Contact the facility where they were done to arrange for  prior to your scheduled appointment.    >>   List of current medications   >>   This referral request   >>   Any documents/labs given to you for this referral                  Future tests that were ordered for you today     Open Future Orders        Priority Expected Expires Ordered    MA SCREENING  "DIGITAL BILAT - Future  (s+30) Routine  4/26/2018 4/26/2017            Who to contact     If you have questions or need follow up information about today's clinic visit or your schedule please contact [facility] directly at [phone].  Normal or non-critical lab and imaging results will be communicated to you by MyChart, letter or phone within 4 business days after the clinic has received the results. If you do not hear from us within 7 days, please contact the clinic through Sajan[employer] or phone. If you have a critical or abnormal lab result, we will notify you by phone as soon as possible.  Submit refill requests through [employer] or call your pharmacy and they will forward the refill request to us. Please allow 3 business days for your refill to be completed.          Additional Information About Your Visit        Sajanhart Information     [employer] gives you secure access to your electronic health record. If you see a primary care provider, you can also send messages to your care team and make appointments. If you have questions, please call your primary care clinic.  If you do not have a primary care provider, please call [phone] and they will assist you.        Care EveryWhere ID     This is your Care EveryWhere ID. This could be used by other organizations to access your [place] medical records  [id number]        Your Vitals Were     Pulse Temperature Respirations Height Last Period Pulse Oximetry    85 97.9  F (36.6  C) (Oral) 16 5' 4\" (1.626 m) 02/13/2009 96%    BMI (Body Mass Index)                   46.86 kg/m2            Blood Pressure from Last 3 Encounters:   04/26/17 126/74   04/11/17 114/72   04/04/17 130/80    Weight from Last 3 Encounters:   04/26/17 273 lb (123.8 kg)   04/11/17 272 lb (123.4 kg)   04/04/17 272 lb (123.4 kg)              We Performed the Following     DIABETES EDUCATOR REFERRAL     ORTHOPEDICS ADULT REFERRAL        Primary Care Provider Office Phone # Fax #    " Bridget Garza -[phone]       [facility] [address]  [facility] [address]        Thank you!     Thank you for choosing [facility]  for your care. Our goal is always to provide you with excellent care. Hearing back from our patients is one way we can continue to improve our services. Please take a few minutes to complete the written survey that you may receive in the mail after your visit with us. Thank you!             Your Updated Medication List - Protect others around you: Learn how to safely use, store and throw away your medicines at www.disposemymeds.org.          This list is accurate as of: 4/26/17  2:00 PM.  Always use your most recent med list.                   Brand Name Dispense Instructions for use    aspirin 81 MG chewable tablet     108 tablet    Take 1 tablet (81 mg) by mouth daily       atorvastatin 40 MG tablet    LIPITOR    90 tablet    Take 1 tablet (40 mg) by mouth daily       DULoxetine 60 MG EC capsule    CYMBALTA     60 mg 2 times daily       EPINEPHrine 0.3 MG/0.3ML injection     2 each    Inject 0.3 mLs (0.3 mg) into the muscle once as needed for anaphylaxis       furosemide 40 MG tablet    LASIX    60 tablet    Take 1 tablet (40 mg) by mouth 2 times daily       glipiZIDE 2.5 MG 24 hr tablet    glipiZIDE XL    180 tablet    Take 1 tablet (2.5 mg) by mouth 2 times daily       levothyroxine 25 MCG tablet    SYNTHROID/LEVOTHROID    90 tablet    Take 1 tablet (25 mcg) by mouth daily       losartan 25 MG tablet    COZAAR    15 tablet    Take 0.5 tablets (12.5 mg) by mouth daily       meclizine 25 MG tablet    ANTIVERT    90 tablet    Take 1 tablet (25 mg) by mouth 3 times daily as needed for dizziness       methocarbamol 500 MG tablet    ROBAXIN    20 tablet    Take 2 tablets (1,000 mg) by mouth 3 times daily as needed for muscle spasms       NITROSTAT 0.4 MG sublingual tablet   Generic drug:  nitroglycerin      Place 1 tablet under  the tongue as needed       * order for DME     2 Device    Equipment being ordered: compression socks, high strength, knee high, custom made 2 pairs, 11 refill       * order for DME     1 Units    Equipment being ordered: diabetic shoes       * order for DME     1 each    Equipment being ordered: Rollator walker with seat       * order for DME     1 each    Equipment being ordered: Lift Chair       * order for DME     1 Units    Equipment being ordered: diabetic shoes  Feet have been examined, no changes       pantoprazole 40 MG EC tablet    PROTONIX    90 tablet    Take 1 tablet (40 mg) by mouth daily Take 30-60 minutes before a meal.       Potassium Chloride ER 20 MEQ Tbcr     30 tablet    Take 1 tablet (20 mEq) by mouth daily       pregabalin 50 MG capsule    LYRICA    90 capsule    Take 1 capsule (50 mg) by mouth 3 times daily       traMADol 50 MG tablet    ULTRAM    90 tablet    Take 1 tablet (50 mg) by mouth every 8 hours as needed for moderate pain Max #3/day. OK to dispense 3/31/17 to start 4/2/17.       valACYclovir 1000 mg tablet    VALTREX    4 tablet    Take 2 tablets (2,000 mg) by mouth 2 times daily       vitamin D 2000 UNITS tablet     90 tablet    Take 1 tablet by mouth daily       zolpidem 10 MG tablet    AMBIEN    30 tablet    Take 1 tablet (10 mg) by mouth nightly as needed for sleep       * Notice:  This list has 5 medication(s) that are the same as other medications prescribed for you. Read the directions carefully, and ask your doctor or other care provider to review them with you.

## 2017-04-26 NOTE — NURSING NOTE
"Chief Complaint   Patient presents with     RECHECK     right elbow       Initial /74 (BP Location: Right arm, Patient Position: Chair, Cuff Size: Adult Large)  Pulse 85  Temp 97.9  F (36.6  C) (Oral)  Resp 16  Ht 5' 4\" (1.626 m)  Wt 273 lb (123.8 kg)  LMP 02/13/2009  SpO2 96%  BMI 46.86 kg/m2 Estimated body mass index is 46.86 kg/(m^2) as calculated from the following:    Height as of this encounter: 5' 4\" (1.626 m).    Weight as of this encounter: 273 lb (123.8 kg).  Medication Reconciliation: complete     Jay Garcia CMA      "

## 2017-04-27 DIAGNOSIS — R42 LIGHTHEADEDNESS: ICD-10-CM

## 2017-04-27 DIAGNOSIS — E66.01 MORBID OBESITY, UNSPECIFIED OBESITY TYPE (H): ICD-10-CM

## 2017-04-27 RX ORDER — MECLIZINE HYDROCHLORIDE 25 MG/1
25 TABLET ORAL 3 TIMES DAILY PRN
Qty: 90 TABLET | Refills: 0 | Status: SHIPPED | OUTPATIENT
Start: 2017-04-27 | End: 2017-10-12

## 2017-04-27 RX ORDER — PHENTERMINE HYDROCHLORIDE 37.5 MG/1
37.5 TABLET ORAL
Qty: 30 TABLET | Refills: 1 | OUTPATIENT
Start: 2017-04-27

## 2017-04-27 RX ORDER — LEVOTHYROXINE SODIUM 25 UG/1
25 TABLET ORAL DAILY
Qty: 90 TABLET | Refills: 2 | OUTPATIENT
Start: 2017-04-27

## 2017-04-27 NOTE — TELEPHONE ENCOUNTER
Pending Prescriptions:                       Disp   Refills    levothyroxine (SYNTHROID/LEVOTHROID) 25 M*90 tab*2            Sig: Take 1 tablet (25 mcg) by mouth daily    meclizine (ANTIVERT) 25 MG tablet         90 tab*0            Sig: Take 1 tablet (25 mg) by mouth 3 times daily as           needed for dizziness    phentermine (ADIPEX-P) 37.5 MG tablet     30 tab*1            Sig: Take 1 tablet (37.5 mg) by mouth every morning           (before breakfast)    Levothyroxine       Last Written Prescription Date: 10/18/2016  Last Quantity: 90, # refills: 2  Last Office Visit with [facility], [facility] or  [facility] [facility] [employer] prescribing provider: 04/26/2017        TSH   Date Value Ref Range Status   01/24/2017 2.99 0.40 - 4.00 mU/L Final     Meclizine        Last Written Prescription Date: 11/22/2016  Last Fill Quantity: 90,  # refills: 0   Last Office Visit with [facility], [facility] or  [facility] [facility] [employer] prescribing provider:                                              Phentermine        Last Written Prescription Date:  02/13/2017  Last Fill Quantity: 30,   # refills: 0  Last Office Visit with [facility], [facility] or  [employer] prescribing provider:   Future Office visit:       Routing refill request to provider for review/approval because:  Drug not on the [facility], [facility] or  [facility] refill protocol or controlled substance    Vidal RICHEY

## 2017-04-27 NOTE — TELEPHONE ENCOUNTER
Thyroid should have enough until July 2017    RX [place] ([place]) reviewed:  reviewed- no concerns    [place]:  [email or web address]

## 2017-05-03 ENCOUNTER — OFFICE VISIT (OUTPATIENT)
Dept: NEUROSURGERY | Facility: CLINIC | Age: 57
End: 2017-05-03
Attending: NURSE PRACTITIONER
Payer: MEDICARE

## 2017-05-03 VITALS
DIASTOLIC BLOOD PRESSURE: 88 MMHG | HEART RATE: 80 BPM | HEIGHT: 64 IN | BODY MASS INDEX: 46.61 KG/M2 | TEMPERATURE: 96.9 F | WEIGHT: 273 LBS | SYSTOLIC BLOOD PRESSURE: 149 MMHG | OXYGEN SATURATION: 97 %

## 2017-05-03 DIAGNOSIS — M79.601 RIGHT ARM PAIN: Primary | ICD-10-CM

## 2017-05-03 DIAGNOSIS — R60.1 GENERALIZED EDEMA: ICD-10-CM

## 2017-05-03 PROCEDURE — 99211 OFF/OP EST MAY X REQ PHY/QHP: CPT | Performed by: NURSE PRACTITIONER

## 2017-05-03 PROCEDURE — 99214 OFFICE O/P EST MOD 30 MIN: CPT | Performed by: NURSE PRACTITIONER

## 2017-05-03 RX ORDER — FUROSEMIDE 40 MG
40 TABLET ORAL 2 TIMES DAILY
Qty: 60 TABLET | Refills: 3 | Status: CANCELLED | OUTPATIENT
Start: 2017-05-03

## 2017-05-03 ASSESSMENT — PAIN SCALES - GENERAL: PAINLEVEL: SEVERE PAIN (7)

## 2017-05-03 NOTE — PROGRESS NOTES
[facility]  Neurosurgery followup:    HPI: Ms. Byers is a 57 year old female that returns with right arm pain. She was seen in the past here but since then she did undergo a right ulnar nerve decompression on 1- at [facility] in [place], [place]. She states that the only thing that is better is that her right fingers are no longer numb. However, now she has severe pain to her elbow. She states that it feels like she has Novocain to her elbow and she has shocking pain to her elbow.  She does feel that her hand is weak at times.  She has been back to her surgeon but no new scans were done.      Exam:  Constitutional:  Alert, well nourished, NAD.  HEENT: Normocephalic, atraumatic.   Pulm:  Without shortness of breath   CV:  No pitting edema of BLE.     Neurological:  Awake  Alert  Oriented x 3  Motor exam:     Shoulder Abduction:  Right:  5/5    Left:  5/5  Biceps:                      Right:  5/5    Left:  5/5  Triceps:                     Right:  5/5    Left:  5/5  Wrist Extensors:       Right:  5/5    Left:  5/5  Wrist Flexors:           Right:  5/5    Left:  5/5  Intrinsics:                  Right:  4/5    Left:  5/5  Right grasp strength 4/5    Able to spontaneously move U/E bilaterally  Decreased sensation to right arm and hand    Incisions:  Scarring to right elbow.     A/P: Ms. Byers is a 57 year old female that returns with right arm pain. She was seen in the past here but since then she did undergo a right ulnar nerve decompression on 1- at [facility] in [place], [place]. She states that the only thing that is better is that her right fingers are no longer numb. However, now she has severe pain to her elbow. She states that it feels like she has Novocain to her elbow and she has shocking pain to her elbow.  She does feel that her hand is weak at times.  She has been back to her surgeon but no new scans were done.  It was explained once again that her cervical MRI does not show any correlations for her pain.  She agreed. She denies any neck pain only pain to her elbow post surgery.  She was upset that she was referred back here. It was explained that she may benefit from Neurology or seeing a hand/arm specialist.      Plan:  1.  No surgery indicated.  2.  Recommend see neurology and possible second opinion arm/hand specialist.        Luisana Fisher [facility]  [facility]  [address] [address]    Tel [phone]  Pager [phone]

## 2017-05-03 NOTE — NURSING NOTE
"Ana Luisa Byers is a 57 year old female who presents for:  Chief Complaint   Patient presents with     Neurologic Problem     R arm shooting pain down to elbow, per Dr. Peng to see CNP        Initial Vitals:  /88 (BP Location: Left arm, Patient Position: Chair, Cuff Size: Adult Large)  Pulse 80  Temp 96.9  F (36.1  C)  Ht 5' 4\" (1.626 m)  Wt 273 lb (123.8 kg)  LMP 02/13/2009  SpO2 97%  BMI 46.86 kg/m2 Estimated body mass index is 46.86 kg/(m^2) as calculated from the following:    Height as of this encounter: 5' 4\" (1.626 m).    Weight as of this encounter: 273 lb (123.8 kg).. Body surface area is 2.36 meters squared. BP completed using cuff size: large  Severe Pain (7)    Do you feel safe in your environment?  Yes  Do you need any refills today? No    Nursing Comments: R arm shooting pain down to elbow, per Dr. Peng to see CNP.  Patient rates right arm and right elbow pain today as 7.      5 min. nursing intake time  Sandi Cano CMA      Discharge plan:    1.  No surgery indicated.  2.  Recommend see neurology and possible second opinion arm/hand specialist.      2 min. nursing discharge time  Sandi Cano CMA    "

## 2017-05-03 NOTE — TELEPHONE ENCOUNTER
Pending Prescriptions:                       Disp   Refills    furosemide (LASIX) 40 MG tablet           60 tab*3            Sig: Take 1 tablet (40 mg) by mouth 2 times daily              Last Written Prescription Date: 12/6/2016  Last Fill Quantity: 60, # refills: 3  Last Office Visit with [facility], [facility] or [facility] prescribing provider: 4/26/2017Greg    Next 5 appointments (look out 90 days)     May 04, 2017  9:00 AM CDT   MyChart Long with Bridget Garza MD   [facility]    [address] [place] [address] [phone]                   Potassium   Date Value Ref Range Status   04/11/2017 4.0 3.4 - 5.3 mmol/L Final     Creatinine   Date Value Ref Range Status   04/11/2017 1.02 0.52 - 1.04 mg/dL Final     BP Readings from Last 3 Encounters:   05/03/17 149/88   04/26/17 126/74   04/11/17 114/72         RT Frankie(R)

## 2017-05-03 NOTE — MR AVS SNAPSHOT
After Visit Summary   5/3/2017    Ana Luisa Byers    MRN: [id number]           Patient Information     Date Of Birth          1960        Visit Information        Provider Department      5/3/2017 9:00 AM Luisana Fisher APRN CNP [facility]        Care Instructions    1.  No surgery indicated.  2.  Recommend see neurology and possible second opinion arm/hand specialist.            Follow-ups after your visit        Your next 10 appointments already scheduled     May 04, 2017 10:30 AM CDT   Diabetic Education with [place] [facility]   [facility]    [address]  [facility] [address] [phone]              Who to contact     If you have questions or need follow up information about today's clinic visit or your schedule please contact [facility] directly at [phone].  Normal or non-critical lab and imaging results will be communicated to you by MyChart, letter or phone within 4 business days after the clinic has received the results. If you do not hear from us within 7 days, please contact the clinic through B[employer] or phone. If you have a critical or abnormal lab result, we will notify you by phone as soon as possible.  Submit refill requests through [employer] or call your pharmacy and they will forward the refill request to us. Please allow 3 business days for your refill to be completed.          Additional Information About Your Visit        MyChart Information     [employer] gives you secure access to your electronic health record. If you see a primary care provider, you can also send messages to your care team and make appointments. If you have questions, please call your primary care clinic.  If you do not have a primary care provider, please call [phone] and they will assist you.        Care EveryWhere ID     This is your Care EveryWhere ID. This could be used by  "other organizations to access your [place] medical records  [id number]        Your Vitals Were     Pulse Temperature Height Last Period Pulse Oximetry BMI (Body Mass Index)    80 96.9  F (36.1  C) 5' 4\" (1.626 m) 02/13/2009 97% 46.86 kg/m2       Blood Pressure from Last 3 Encounters:   05/03/17 149/88   04/26/17 126/74   04/11/17 114/72    Weight from Last 3 Encounters:   05/03/17 273 lb (123.8 kg)   04/26/17 273 lb (123.8 kg)   04/11/17 272 lb (123.4 kg)              Today, you had the following     No orders found for display       Primary Care Provider Office Phone # Fax #    Bridget Ambrocio Garza [phone]       [facility] [address] [address] [facility] [address]        Thank you!     Thank you for choosing [facility]  for your care. Our goal is always to provide you with excellent care. Hearing back from our patients is one way we can continue to improve our services. Please take a few minutes to complete the written survey that you may receive in the mail after your visit with us. Thank you!             Your Updated Medication List - Protect others around you: Learn how to safely use, store and throw away your medicines at www.disposemymeds.org.          This list is accurate as of: 5/3/17  9:17 AM.  Always use your most recent med list.                   Brand Name Dispense Instructions for use    aspirin 81 MG chewable tablet     108 tablet    Take 1 tablet (81 mg) by mouth daily       atorvastatin 40 MG tablet    LIPITOR    90 tablet    Take 1 tablet (40 mg) by mouth daily       DULoxetine 60 MG EC capsule    CYMBALTA     60 mg 2 times daily       EPINEPHrine 0.3 MG/0.3ML injection     2 each    Inject 0.3 mLs (0.3 mg) into the muscle once as needed for anaphylaxis       furosemide 40 MG tablet    LASIX    60 tablet    Take 1 tablet (40 mg) by mouth 2 times daily       glipiZIDE 2.5 MG 24 hr tablet    glipiZIDE XL    180 tablet    Take 1 tablet (2.5 mg) " by mouth 2 times daily       levothyroxine 25 MCG tablet    SYNTHROID/LEVOTHROID    90 tablet    Take 1 tablet (25 mcg) by mouth daily       losartan 25 MG tablet    COZAAR    15 tablet    Take 0.5 tablets (12.5 mg) by mouth daily       meclizine 25 MG tablet    ANTIVERT    90 tablet    Take 1 tablet (25 mg) by mouth 3 times daily as needed for dizziness       methocarbamol 500 MG tablet    ROBAXIN    20 tablet    Take 2 tablets (1,000 mg) by mouth 3 times daily as needed for muscle spasms       NITROSTAT 0.4 MG sublingual tablet   Generic drug:  nitroglycerin      Place 1 tablet under the tongue as needed       * order for DME     2 Device    Equipment being ordered: compression socks, high strength, knee high, custom made 2 pairs, 11 refill       * order for DME     1 Units    Equipment being ordered: diabetic shoes       * order for DME     1 each    Equipment being ordered: Rollator walker with seat       * order for DME     1 each    Equipment being ordered: Lift Chair       * order for DME     1 Units    Equipment being ordered: diabetic shoes  Feet have been examined, no changes       pantoprazole 40 MG EC tablet    PROTONIX    90 tablet    Take 1 tablet (40 mg) by mouth daily Take 30-60 minutes before a meal.       Potassium Chloride ER 20 MEQ Tbcr     30 tablet    Take 1 tablet (20 mEq) by mouth daily       pregabalin 50 MG capsule    LYRICA    90 capsule    Take 1 capsule (50 mg) by mouth 3 times daily       traMADol 50 MG tablet    ULTRAM    90 tablet    Take 1 tablet (50 mg) by mouth every 8 hours as needed for moderate pain Max #3/day. OK to dispense 3/31/17 to start 4/2/17.       valACYclovir 1000 mg tablet    VALTREX    4 tablet    Take 2 tablets (2,000 mg) by mouth 2 times daily       vitamin D 2000 UNITS tablet     90 tablet    Take 1 tablet by mouth daily       zolpidem 10 MG tablet    AMBIEN    30 tablet    Take 1 tablet (10 mg) by mouth nightly as needed for sleep       * Notice:  This list has 5  medication(s) that are the same as other medications prescribed for you. Read the directions carefully, and ask your doctor or other care provider to review them with you.

## 2017-05-04 ENCOUNTER — OFFICE VISIT (OUTPATIENT)
Dept: FAMILY MEDICINE | Facility: CLINIC | Age: 57
End: 2017-05-04
Payer: MEDICARE

## 2017-05-04 ENCOUNTER — TELEPHONE (OUTPATIENT)
Dept: FAMILY MEDICINE | Facility: CLINIC | Age: 57
End: 2017-05-04

## 2017-05-04 VITALS
TEMPERATURE: 97.9 F | HEART RATE: 78 BPM | HEIGHT: 64 IN | RESPIRATION RATE: 14 BRPM | BODY MASS INDEX: 46.95 KG/M2 | SYSTOLIC BLOOD PRESSURE: 110 MMHG | WEIGHT: 275 LBS | DIASTOLIC BLOOD PRESSURE: 80 MMHG

## 2017-05-04 DIAGNOSIS — Z98.890 S/P CUBITAL TUNNEL RELEASE: ICD-10-CM

## 2017-05-04 DIAGNOSIS — G89.18 POSTOPERATIVE PAIN: Primary | ICD-10-CM

## 2017-05-04 DIAGNOSIS — R10.9 FLANK PAIN: ICD-10-CM

## 2017-05-04 DIAGNOSIS — R30.0 DYSURIA: ICD-10-CM

## 2017-05-04 DIAGNOSIS — R60.0 BILATERAL LOWER EXTREMITY EDEMA: ICD-10-CM

## 2017-05-04 LAB
ALBUMIN UR-MCNC: NEGATIVE MG/DL
APPEARANCE UR: CLEAR
BILIRUB UR QL STRIP: NEGATIVE
COLOR UR AUTO: YELLOW
GLUCOSE UR STRIP-MCNC: NEGATIVE MG/DL
HGB UR QL STRIP: ABNORMAL
KETONES UR STRIP-MCNC: NEGATIVE MG/DL
LEUKOCYTE ESTERASE UR QL STRIP: NEGATIVE
NITRATE UR QL: NEGATIVE
NON-SQ EPI CELLS #/AREA URNS LPF: NORMAL /LPF
PH UR STRIP: 7 PH (ref 5–7)
RBC #/AREA URNS AUTO: NORMAL /HPF (ref 0–2)
SP GR UR STRIP: 1.01 (ref 1–1.03)
URN SPEC COLLECT METH UR: ABNORMAL
UROBILINOGEN UR STRIP-ACNC: 0.2 EU/DL (ref 0.2–1)
WBC #/AREA URNS AUTO: NORMAL /HPF (ref 0–2)

## 2017-05-04 PROCEDURE — 81001 URINALYSIS AUTO W/SCOPE: CPT | Performed by: FAMILY MEDICINE

## 2017-05-04 PROCEDURE — 99214 OFFICE O/P EST MOD 30 MIN: CPT | Performed by: FAMILY MEDICINE

## 2017-05-04 RX ORDER — PHENTERMINE HYDROCHLORIDE 37.5 MG/1
37.5 CAPSULE ORAL EVERY MORNING
Qty: 30 CAPSULE | Refills: 0 | Status: SHIPPED | OUTPATIENT
Start: 2017-05-04 | End: 2017-06-02

## 2017-05-04 RX ORDER — FUROSEMIDE 40 MG
40 TABLET ORAL 2 TIMES DAILY
Qty: 60 TABLET | Refills: 3 | Status: SHIPPED | OUTPATIENT
Start: 2017-05-04 | End: 2017-09-06

## 2017-05-04 ASSESSMENT — ANXIETY QUESTIONNAIRES
2. NOT BEING ABLE TO STOP OR CONTROL WORRYING: MORE THAN HALF THE DAYS
7. FEELING AFRAID AS IF SOMETHING AWFUL MIGHT HAPPEN: SEVERAL DAYS
3. WORRYING TOO MUCH ABOUT DIFFERENT THINGS: MORE THAN HALF THE DAYS
1. FEELING NERVOUS, ANXIOUS, OR ON EDGE: NEARLY EVERY DAY
6. BECOMING EASILY ANNOYED OR IRRITABLE: MORE THAN HALF THE DAYS
5. BEING SO RESTLESS THAT IT IS HARD TO SIT STILL: NEARLY EVERY DAY
GAD7 TOTAL SCORE: 16

## 2017-05-04 ASSESSMENT — PATIENT HEALTH QUESTIONNAIRE - PHQ9: 5. POOR APPETITE OR OVEREATING: NEARLY EVERY DAY

## 2017-05-04 NOTE — MR AVS SNAPSHOT
After Visit Summary   5/4/2017    Ana Luisa Byers    MRN: [id number]           Patient Information     Date Of Birth          1960        Visit Information        Provider Department      5/4/2017 9:00 AM Bridget Garza MD [facility]        Today's Diagnoses     Postoperative pain    -  1    S/P cubital tunnel release        Dysuria        Bilateral lower extremity edema        BMI 40.0-44.9, adult (H)           Follow-ups after your visit        Additional Services     NEUROLOGY ADULT REFERRAL       Your provider has referred you to: N: [facility] [phone]   [email or web address]    Reason for Referral: Consult and EMG    Please be aware that coverage of these services is subject to the terms and limitations of your health insurance plan.  Call member services at your health plan with any benefit or coverage questions.      Please bring the following with you to your appointment:    (1) Any X-Rays, CTs or MRIs which have been performed.  Contact the facility where they were done to arrange for  prior to your scheduled appointment.    (2) List of current medications  (3) This referral request   (4) Any documents/labs given to you for this referral            PHYSICAL THERAPY REFERRAL       *This therapy referral will be filtered to a centralized scheduling office at [facility] and the patient will receive a call to schedule an appointment at a [facility] location most convenient for them. *     [facility] provides Physical Therapy evaluation and treatment and many specialty services across the [facility] system.  If requesting a specialty program, please choose from the list below.    If you have not heard from the scheduling office within 2 business days, please call [phone] for all locations, with the exception of [place], please call  [phone].  Treatment: Evaluation & Treatment  Special Instructions/Modalities:   Special Programs: [facility]    Please be aware that coverage of these services is subject to the terms and limitations of your health insurance plan.  Call member services at your health plan with any benefit or coverage questions.      **Note to Provider:  If you are referring outside of [facility] for the therapy appointment, please list the name of the location in the  special instructions  above, print the referral and give to the patient to schedule the appointment.                  Who to contact     If you have questions or need follow up information about today's clinic visit or your schedule please contact [facility] directly at [phone].  Normal or non-critical lab and imaging results will be communicated to you by MyChart, letter or phone within 4 business days after the clinic has received the results. If you do not hear from us within 7 days, please contact the clinic through [employer] or phone. If you have a critical or abnormal lab result, we will notify you by phone as soon as possible.  Submit refill requests through [facility] or call your pharmacy and they will forward the refill request to us. Please allow 3 business days for your refill to be completed.          Additional Information About Your Visit        [employer] Information     [facility] PAL gives you secure access to your electronic health record. If you see a primary care provider, you can also send messages to your care team and make appointments. If you have questions, please call your primary care clinic.  If you do not have a primary care provider, please call [phone] and they will assist you.        Care EveryWhere ID     This is your Care EveryWhere ID. This could be used by other organizations to access your [facility] medical records  [id number]        Your Vitals Were     Pulse Temperature Respirations Height Last Period  "BMI (Body Mass Index)    78 97.9  F (36.6  C) (Oral) 14 5' 4\" (1.626 m) 02/13/2009 47.2 kg/m2       Blood Pressure from Last 3 Encounters:   05/04/17 110/80   05/03/17 149/88   04/26/17 126/74    Weight from Last 3 Encounters:   05/04/17 275 lb (124.7 kg)   05/03/17 273 lb (123.8 kg)   04/26/17 273 lb (123.8 kg)              We Performed the Following     *UA reflex to Microscopic and Culture ([place] and [facility] (except [place] and [place])     NEUROLOGY ADULT REFERRAL     PHYSICAL THERAPY REFERRAL     Urine Microscopic          Today's Medication Changes          These changes are accurate as of: 5/4/17  9:41 AM.  If you have any questions, ask your nurse or doctor.               Start taking these medicines.        Dose/Directions    phentermine 37.5 MG capsule   Used for:  BMI 40.0-44.9, adult (H)   Started by:  Bridget Garza MD        Dose:  37.5 mg   Take 1 capsule (37.5 mg) by mouth every morning   Quantity:  30 capsule   Refills:  0            Where to get your medicines      These medications were sent to [facility] [id number] [place] [address] [facility] AT [address] [address] [facility] [address]     Phone:  [phone]     furosemide 40 MG tablet         Some of these will need a paper prescription and others can be bought over the counter.  Ask your nurse if you have questions.     Bring a paper prescription for each of these medications     phentermine 37.5 MG capsule                Primary Care Provider Office Phone # Fax #    Bridget Garza [phone]       [facility] [address]  [facility] [address]        Thank you!     Thank you for choosing [facility]  for your care. Our goal is always to provide you with excellent care. Hearing back from our patients is one way we can continue to improve our services. Please take a few minutes to complete the written survey that you may receive in " the mail after your visit with us. Thank you!             Your Updated Medication List - Protect others around you: Learn how to safely use, store and throw away your medicines at www.disposemymeds.org.          This list is accurate as of: 5/4/17  9:41 AM.  Always use your most recent med list.                   Brand Name Dispense Instructions for use    aspirin 81 MG chewable tablet     108 tablet    Take 1 tablet (81 mg) by mouth daily       atorvastatin 40 MG tablet    LIPITOR    90 tablet    Take 1 tablet (40 mg) by mouth daily       DULoxetine 60 MG EC capsule    CYMBALTA     60 mg 2 times daily       EPINEPHrine 0.3 MG/0.3ML injection     2 each    Inject 0.3 mLs (0.3 mg) into the muscle once as needed for anaphylaxis       furosemide 40 MG tablet    LASIX    60 tablet    Take 1 tablet (40 mg) by mouth 2 times daily       glipiZIDE 2.5 MG 24 hr tablet    glipiZIDE XL    180 tablet    Take 1 tablet (2.5 mg) by mouth 2 times daily       levothyroxine 25 MCG tablet    SYNTHROID/LEVOTHROID    90 tablet    Take 1 tablet (25 mcg) by mouth daily       losartan 25 MG tablet    COZAAR    15 tablet    Take 0.5 tablets (12.5 mg) by mouth daily       meclizine 25 MG tablet    ANTIVERT    90 tablet    Take 1 tablet (25 mg) by mouth 3 times daily as needed for dizziness       methocarbamol 500 MG tablet    ROBAXIN    20 tablet    Take 2 tablets (1,000 mg) by mouth 3 times daily as needed for muscle spasms       NITROSTAT 0.4 MG sublingual tablet   Generic drug:  nitroglycerin      Place 1 tablet under the tongue as needed       * order for DME     2 Device    Equipment being ordered: compression socks, high strength, knee high, custom made 2 pairs, 11 refill       * order for DME     1 Units    Equipment being ordered: diabetic shoes       * order for DME     1 each    Equipment being ordered: Rollator walker with seat       * order for DME     1 each    Equipment being ordered: Lift Chair       * order for DME     1  Units    Equipment being ordered: diabetic shoes  Feet have been examined, no changes       pantoprazole 40 MG EC tablet    PROTONIX    90 tablet    Take 1 tablet (40 mg) by mouth daily Take 30-60 minutes before a meal.       phentermine 37.5 MG capsule     30 capsule    Take 1 capsule (37.5 mg) by mouth every morning       Potassium Chloride ER 20 MEQ Tbcr     30 tablet    Take 1 tablet (20 mEq) by mouth daily       pregabalin 50 MG capsule    LYRICA    90 capsule    Take 1 capsule (50 mg) by mouth 3 times daily       traMADol 50 MG tablet    ULTRAM    90 tablet    Take 1 tablet (50 mg) by mouth every 8 hours as needed for moderate pain Max #3/day. OK to dispense 3/31/17 to start 4/2/17.       valACYclovir 1000 mg tablet    VALTREX    4 tablet    Take 2 tablets (2,000 mg) by mouth 2 times daily       vitamin D 2000 UNITS tablet     90 tablet    Take 1 tablet by mouth daily       zolpidem 10 MG tablet    AMBIEN    30 tablet    Take 1 tablet (10 mg) by mouth nightly as needed for sleep       * Notice:  This list has 5 medication(s) that are the same as other medications prescribed for you. Read the directions carefully, and ask your doctor or other care provider to review them with you.

## 2017-05-04 NOTE — NURSING NOTE
"Chief Complaint   Patient presents with     Urinary Problem       Initial /80 (BP Location: Right arm, Patient Position: Chair, Cuff Size: Adult Large)  Pulse 78  Temp 97.9  F (36.6  C) (Oral)  Resp 14  Ht 5' 4\" (1.626 m)  Wt 275 lb (124.7 kg)  LMP 02/13/2009  BMI 47.2 kg/m2 Estimated body mass index is 47.2 kg/(m^2) as calculated from the following:    Height as of this encounter: 5' 4\" (1.626 m).    Weight as of this encounter: 275 lb (124.7 kg).  Medication Reconciliation: complete   Tati Baker CMA      "

## 2017-05-04 NOTE — PROGRESS NOTES
"  SUBJECTIVE:                                                    Ana Luisa Byers is a 57 year old female who presents to clinic today for the following health issues:      URINARY TRACT SYMPTOMS     Onset: Over 1 week    Description:   Painful urination (Dysuria): no   Blood in urine (Hematuria): YES  Delay in urine (Hesitency): YES    Intensity: severe, 7/10    Progression of Symptoms:  worsening and constant    Accompanying Signs & Symptoms:  Fever/chills: no   Flank pain YES  Nausea and vomiting: no   Any vaginal symptoms: none  Abdominal/Pelvic Pain: YES   History:   History of frequent UTI's: no   History of kidney stones: no   Sexually Active: no   Possibility of pregnancy: No    Precipitating factors:   None         Therapies Tried and outcome: Cranberry juice prn     Problem list and histories reviewed & adjusted, as indicated.  Additional history: as documented      Reports weight gain despite eating well and exercising regularly. Feels hungry all the time.  Notes fluid retention in her legs bilaterally, itchy. Has occurred in the past, was seen at LE clinic, helpful. Would like to do this again. Taking 40 mg lasix daily.    Was - for some reason - sent to neurosurgery clinic after I referred her for second opinion to Ortho. She was told to follow up with Neurology for her post operative right forearm issue - see previous notes.       Reviewed and updated as needed this visit by clinical staff       Reviewed and updated as needed this visit by Provider         ROS:  Constitutional, HEENT, cardiovascular, pulmonary, gi and gu systems are negative, except as otherwise noted.    OBJECTIVE:                                                    /80 (BP Location: Right arm, Patient Position: Chair, Cuff Size: Adult Large)  Pulse 78  Temp 97.9  F (36.6  C) (Oral)  Resp 14  Ht 5' 4\" (1.626 m)  Wt 275 lb (124.7 kg)  LMP 02/13/2009  BMI 47.2 kg/m2  Body mass index is 47.2 kg/(m^2).  GENERAL: healthy, alert " and no distress  NECK: no adenopathy, no asymmetry, masses, or scars and thyroid normal to palpation  RESP: lungs clear to auscultation - no rales, rhonchi or wheezes  CV: regular rate and rhythm, normal S1 S2, no S3 or S4, no murmur, click or rub, 1- pitting edema  MS: no gross musculoskeletal defects noted, no edema  NEURO: Normal strength and tone, mentation intact and speech normal  PSYCH: mentation appears normal, affect normal/bright  SKIN - well healing ulceration right posterior elbow    Diagnostic Test Results:  Results for orders placed or performed in visit on 05/04/17 (from the past 24 hour(s))   *UA reflex to Microscopic and Culture ([place] and [facility] (except [place] and [place])   Result Value Ref Range    Color Urine Yellow     Appearance Urine Clear     Glucose Urine Negative NEG mg/dL    Bilirubin Urine Negative NEG    Ketones Urine Negative NEG mg/dL    Specific Gravity Urine 1.015 1.003 - 1.035    Blood Urine Trace (A) NEG    pH Urine 7.0 5.0 - 7.0 pH    Protein Albumin Urine Negative NEG mg/dL    Urobilinogen Urine 0.2 0.2 - 1.0 EU/dL    Nitrite Urine Negative NEG    Leukocyte Esterase Urine Negative NEG    Source Midstream Urine    Urine Microscopic   Result Value Ref Range    WBC Urine O - 2 0 - 2 /HPF    RBC Urine O - 2 0 - 2 /HPF    Squamous Epithelial /LPF Urine Few FEW /LPF        ASSESSMENT/PLAN:                                                      1. Postoperative pain - was told to have follow up with Neurology  - NEUROLOGY ADULT REFERRAL    2. S/P cubital tunnel release  - NEUROLOGY ADULT REFERRAL    3. Dysuria - UA with small blood today, suggested renal ultrasound to evaluate further  - *UA reflex to Microscopic and Culture ([place] and [facility] (except [place] and [place])  - Urine Microscopic    4. Flank pain - see #3    5. Bilateral lower extremity edema - continue lasix, LE clinic referral  - furosemide (LASIX) 40 MG tablet; Take 1 tablet (40 mg) by mouth 2  times daily  Dispense: 60 tablet; Refill: 3  - PHYSICAL THERAPY REFERRAL    6. BMI 40.0-44.9, adult (H) - will restart phentermine as appetite suppression is something she will benefit from based on her complaint of always being hungry.   - phentermine 37.5 MG capsule; Take 1 capsule (37.5 mg) by mouth every morning  Dispense: 30 capsule; Refill: 0    Bridget Garza MD  [facility]

## 2017-05-04 NOTE — TELEPHONE ENCOUNTER
PA submitted thru covermymeds for    [employer] Non-Formulary Drug Coverage Form  [phone] phone  [phone] fax  Patient ID# [id number]    Vidal RICHEY

## 2017-05-05 DIAGNOSIS — M54.2 CERVICALGIA: ICD-10-CM

## 2017-05-05 RX ORDER — METHOCARBAMOL 500 MG/1
1000 TABLET, FILM COATED ORAL 3 TIMES DAILY PRN
Qty: 20 TABLET | Refills: 3 | Status: SHIPPED | OUTPATIENT
Start: 2017-05-05 | End: 2017-09-05

## 2017-05-05 ASSESSMENT — PATIENT HEALTH QUESTIONNAIRE - PHQ9: SUM OF ALL RESPONSES TO PHQ QUESTIONS 1-9: 22

## 2017-05-05 ASSESSMENT — ANXIETY QUESTIONNAIRES: GAD7 TOTAL SCORE: 16

## 2017-05-05 NOTE — TELEPHONE ENCOUNTER
Pending Prescriptions:                       Disp   Refills    methocarbamol (ROBAXIN) 500 MG tablet     20 tab*3            Sig: Take 2 tablets (1,000 mg) by mouth 3 times daily           as needed for muscle spasms          Last Written Prescription Date:  02/03/2017  Last Fill Quantity: 20,   # refills: 3  Last Office Visit with [facility], [facility] or [facility] prescribing provider: 05/04/2017  Future Office visit:       Routing refill request to provider for review/approval because:  Drug not on the [facility], [facility] or [facility] refill protocol or controlled substance    Vidal RICHEY

## 2017-05-08 ENCOUNTER — HOSPITAL ENCOUNTER (OUTPATIENT)
Dept: ULTRASOUND IMAGING | Facility: CLINIC | Age: 57
Discharge: HOME | End: 2017-05-08
Attending: FAMILY MEDICINE | Admitting: FAMILY MEDICINE
Payer: MEDICARE

## 2017-05-08 DIAGNOSIS — R10.9 FLANK PAIN: ICD-10-CM

## 2017-05-08 DIAGNOSIS — R30.0 DYSURIA: ICD-10-CM

## 2017-05-08 PROCEDURE — 76770 US EXAM ABDO BACK WALL COMP: CPT

## 2017-05-10 ENCOUNTER — HOSPITAL ENCOUNTER (OUTPATIENT)
Dept: OCCUPATIONAL THERAPY | Facility: CLINIC | Age: 57
Setting detail: THERAPIES SERIES
End: 2017-05-10
Attending: FAMILY MEDICINE
Payer: MEDICARE

## 2017-05-10 DIAGNOSIS — R60.9 EDEMA, UNSPECIFIED TYPE: Primary | ICD-10-CM

## 2017-05-10 PROCEDURE — G8979 MOBILITY GOAL STATUS: HCPCS | Mod: GO,CL | Performed by: OCCUPATIONAL THERAPIST

## 2017-05-10 PROCEDURE — 97165 OT EVAL LOW COMPLEX 30 MIN: CPT | Mod: GO | Performed by: OCCUPATIONAL THERAPIST

## 2017-05-10 PROCEDURE — 40000445 ZZHC STATISTIC OT VISIT, LYMPHEDEMA: Performed by: OCCUPATIONAL THERAPIST

## 2017-05-10 PROCEDURE — G8978 MOBILITY CURRENT STATUS: HCPCS | Mod: GO,CL | Performed by: OCCUPATIONAL THERAPIST

## 2017-05-10 PROCEDURE — G8980 MOBILITY D/C STATUS: HCPCS | Mod: GO,CL | Performed by: OCCUPATIONAL THERAPIST

## 2017-05-10 NOTE — PROGRESS NOTES
05/10/17 1300   Quick Adds   Quick Adds Certification   Rehab Discipline   Discipline OT   Type of Visit   Type of visit Initial Edema Evaluation       present No   General Information   Start of care 05/10/17   Referring physician Dr. Bridget Cote   Orders Evaluate and treat as indicated   Order date 05/09/17   Medical diagnosis BLE edema    Onset of illness / date of surgery 04/10/17   Edema onset 02/02/13   Affected body parts LLE;RLE   Edema etiology Insidious   Edema etiology comments Has diabetes, kidney dz x 5 years   Pertinent history of current problem (PT: include personal factors and/or comorbidities that impact the POC; OT: include additional occupational profile info) Recent surgery, worsening diabetes, swelling worsening, she cannot reach to feet for sock/shoe donning.  She has high pain in the legs that relates to her swelling.   Surgical / medical history reviewed Yes   Prior level of functional mobility Pt has help to reach to feet and for days when her pain is high.   Prior treatment Complete decongestive therapy;Compression garments;Exercise;JUMANA hose;Elevation;Diuretics;ACE wraps;MLD;Gradient compression bandaging   Community support Family / friend caregiver   Patient role / employment history (Disabled since 2007)   Psychosocial concerns Depression;Financial concerns;Impaired coping;Impaired sleep   Living environment Apartment / condo   Living environment comments Pt loves walking, but cannot when her pain is too high in the legs.   Current assistive devices 4 wheeled walker   Fall Screening   Fall screen completed by OT   Per patient, fall 2 or more times in past year? Yes   Per patient, fall with injury in past year? Yes   Is patient a fall risk? Yes   Fall screen comments She is numb in the feet.  She will use a walker for longer distances and not walk on high pain days.  She does not feel PT would be helpful, her legs are strong.   System Outcome Measures   Outcome  Measures Lymphedema   Lymphedema Life Impact Scale (score range 0-72). A higher score indicates greater impairment. 56   Subjective Report   Patient report of symptoms High pain with the legs, higher when more swollen.  Legs are taut, she cannot reach to them.  She reports her current compression socks are too small inthe calves.   Precautions   Precautions comments Kidney disease - normal output.   Patient / Family Goals   Patient / family goals statement Pt wants to walk 10 min 3x day every day.   Special instructions Pt currently has days that are bad and some that are better.   Pain   Patient currently in pain Yes   Pain location B LEs   Pain rating 7/10   Pain description Ache   Pain comments She has all over body pain at 5/10 today.   Vital Signs   Vital signs BMI   BMI 4702   Cognitive Status   Orientation Orientation to person, place and time   Level of consciousness Alert   Follows commands and answers questions 100% of the time   Personal safety and judgement Intact   Memory Intact   Edema Exam / Assessment   Skin condition Pitting;Dryness;Intact   Skin condition comments Pt has fibrosis in BLE calves.  There is shiny tauth skin and fullness in the calves.   Pitting 2+   Pitting location pretibial   Scar No   Capillary refill Symmetrical   Dorsal pedal pulse Symmetrical   Stemmer sign Positive   Stemmer sign comments In BLEs.   Ulceration No   Girth Measurements   Girth Measurements Refer to separate girth measurement flowsheet   Volume LE   Right LE (mL) 80950   Left LE (mL) 78653   LE volume comparison LLE volume greater than RLE volume   % difference 2   Range of Motion   ROM Other   ROM comments Pt has difficulty reaching to feet/back/hair.   Strength   Strength No deficits were identified   Posture   Posture Forward head position   Palpation   Palpation Pt has pain with palpation for pitting.   Activities of Daily Living   Activities of Daily Living Pt requires A reaching to feet.   Bed Mobility   Bed  mobility Pt is independent.   Transfers   Transfers Pt is independent.   Gait / Locomotion   Gait / Locomotion Pt can walk some days 3x day for 10 min, other days if pain is too high cannot walk.  She has had multiple falls from numbness.   Sensory   Sensory perception Light touch   Light touch Impaired   Sensory perception comments Pt is numb in her feet and dull in the calves.   Vascular Assessment   Vascular Assessment Comments no concerns   Coordination   Coordination Gross motor coordination appropriate   Muscle Tone   Muscle tone No deficits were identified   Planned Edema Interventions   Planned edema interventions Fit for compression garment;Precautions to prevent infection / exacerbation;Education;ADL training;Skin care / precautions;Home management program development   Clinical Impression   Criteria for skilled therapeutic intervention met Yes   Therapy diagnosis Edema   Influenced by the following impairments / conditions Edema;Stage 2   Functional limitations due to impairments / conditions Pt has great pain with walking that she feels is worse on her more swollen days.  Her current socks do not fit.   Assessment of Occupational Performance 1-3 Performance Deficits   Identified Performance Deficits Pain, walking decreased, cannot reach to feet.   Clinical Decision Making (Complexity) Low complexity   Treatment frequency 1 time / week   Treatment duration this session only.   Patient / family and/or staff in agreement with plan of care Yes   Risks and benefits of therapy have been explained Yes   Clinical impression comments Pt is late for picking up her  and declines further info.   Education Assessment   Preferred learning style Listening;Reading;Demonstration;Pictures / video   Barriers to learning Physical   Goals   Edema Eval Goals 1;2;3;4;5;6   Goal 1   Goal identifier 1   Goal description In order to improve functional mobility for activities of living, by the completion of intensive  treatment, patient and/or caregiver will;   Target date 05/10/17   Goal 2   Goal description know what to wear on her legs for compression.   Target date 05/10/17   Total Evaluation Time   Total evaluation time 40   Certification   Certification date from 05/10/17   Certification date to 05/10/17   Medical Diagnosis Edema   Certification I certify the need for these services furnished under this plan of treatment and while under my care.  (Physician co-signature of this document indicates review and certification of the therapy plan).

## 2017-05-10 NOTE — PROGRESS NOTES
[facility]        OUTPATIENT OCCUPATIONAL THERAPY EDEMA EVALUATION  PLAN OF TREATMENT FOR OUTPATIENT REHABILITATION  (COMPLETE FOR INITIAL CLAIMS ONLY)  Patient's Last Name, First Name, LARYNiyahANNE ByersAna Luisa DELUNA                           Provider s Name:   [facility] Medical Record No.  [id number]     Start of Care Date:  05/10/17   Onset Date:  02/02/13   Type:  OT   Medical Diagnosis:  Edema   Therapy Diagnosis:  Edema Visits from SOC:  1                                     __________________________________________________________________________________   Plan of Treatment/Functional Goals:    Fit for compression garment, Precautions to prevent infection / exacerbation, Education, ADL training, Skin care / precautions, Home management program development        GOALS  1. Goal description: In order to improve functional mobility for activities of living, by the completion of intensive treatment, patient and/or caregiver will;       Target date: 05/10/17  2. Goal description: know what to wear on her legs for compression.       Target date: 05/10/17  3.            4.            5.            6.               7.             8.              Treatment frequency: 1 time / week   Treatment duration: this session only.    Yahaira Phan OT                                    I CERTIFY THE NEED FOR THESE SERVICES FURNISHED UNDER        THIS PLAN OF TREATMENT AND WHILE UNDER MY CARE     (Physician co-signature of this document indicates review and certification of the therapy plan).                   Certification date from: 05/10/17       Certification date to: 05/10/17           Referring physician: Dr. Bridget Cote   Initial Assessment  See Epic Evaluation- Start of care: 05/10/17

## 2017-05-11 NOTE — PROGRESS NOTES
Outpatient Occupational Therapy Discharge Note     Patient: Jonathan Mason  : 1960  Insurance:   Payor/Plan Subscriber Name Rel Member # Group #   MEDICARE - MEDICARE JONATHAN MASON  [id number]       ATTN CLAIMS, PO    [place] JONATHAN MASON  [id number]       [address] [address], [address]       Beginning/End Dates of Reporting Period:  5/10/17 to 5/10/2017    Referring Provider: Dr. Bridget Garza    Therapy Diagnosis: Edema    Client Self Report:   Pt reports high pain that limits walking.  She feels the pain is worse with increased swelling.    Objective Measurements:     no change from eval    Outcome Measures (most recent score):  Lymphedema Life Impact Scale (score range 0-72). A higher score indicates greater impairment.: 56    Goals:   Goal Identifier 1   Goal Description In order to improve functional mobility for activities of living, by the completion of intensive treatment, patient and/or caregiver will;   Target Date 05/10/17   Date Met  05/10/17   Progress:goal met     Goal Identifier     Goal Description know what to wear on her legs for compression.   Target Date 05/10/17   Date Met  05/10/17   Progress:goal met   Progress Toward Goals:   Progress this reporting period: Pt was only seen for this session.  She needs new compression socks as the old won't fit her calves.  She is appropriate for a size III Medi wide calf petite sock.  She does not fit into standard sizes and needed this medically skilled input as the other socks cause damage by not fitting her.      Plan:  Discharge from therapy.    Discharge:    Reason for Discharge: Patient has met all goals.    Equipment Issued: she will get new socks    Discharge Plan: Patient to continue home program.

## 2017-05-16 ENCOUNTER — MYC MEDICAL ADVICE (OUTPATIENT)
Dept: FAMILY MEDICINE | Facility: CLINIC | Age: 57
End: 2017-05-16

## 2017-05-16 DIAGNOSIS — R31.9 HEMATURIA: Primary | ICD-10-CM

## 2017-05-18 ENCOUNTER — MYC MEDICAL ADVICE (OUTPATIENT)
Dept: FAMILY MEDICINE | Facility: CLINIC | Age: 57
End: 2017-05-18

## 2017-05-18 DIAGNOSIS — B37.0 CANDIDIASIS OF MOUTH: Primary | ICD-10-CM

## 2017-05-18 DIAGNOSIS — B37.0 CANDIDA, ORAL: ICD-10-CM

## 2017-05-18 RX ORDER — NYSTATIN 100000/ML
500000 SUSPENSION, ORAL (FINAL DOSE FORM) ORAL 4 TIMES DAILY
Qty: 60 ML | Refills: 0 | Status: SHIPPED | OUTPATIENT
Start: 2017-05-18 | End: 2017-10-12

## 2017-05-18 NOTE — TELEPHONE ENCOUNTER
Please see my chart and sign order for ENT for pt as PCP is out of the clinic.     Shira Hamm, YEISON      Refill sent. Maybe needs to see ENT if infections continue. JH

## 2017-05-25 ENCOUNTER — TRANSFERRED RECORDS (OUTPATIENT)
Dept: HEALTH INFORMATION MANAGEMENT | Facility: CLINIC | Age: 57
End: 2017-05-25

## 2017-05-31 DIAGNOSIS — G89.4 CHRONIC PAIN ASSOCIATED WITH SIGNIFICANT PSYCHOSOCIAL DYSFUNCTION: ICD-10-CM

## 2017-05-31 NOTE — TELEPHONE ENCOUNTER
topiramate (TOPAMAX) 50 MG tablet (Discontinued)      Last Written Prescription Date: 02-  Last Fill Quantity: 60, # refills: 1  Last Office Visit with [facility], [facility] or [facility] prescribing provider: 05-  Next 5 appointments (look out 90 days)     Jun 02, 2017  8:30 AM CDT   MyChart Long with Bridget Garza MD   [facility] ([facility])    [address] [address] [phone]                   BP Readings from Last 3 Encounters:   05/04/17 110/80   05/03/17 149/88   04/26/17 126/74

## 2017-06-01 RX ORDER — TOPIRAMATE 50 MG/1
50 TABLET, FILM COATED ORAL 2 TIMES DAILY
Qty: 60 TABLET | Refills: 1 | OUTPATIENT
Start: 2017-06-01

## 2017-06-02 ENCOUNTER — RADIANT APPOINTMENT (OUTPATIENT)
Dept: GENERAL RADIOLOGY | Facility: CLINIC | Age: 57
End: 2017-06-02
Attending: FAMILY MEDICINE
Payer: MEDICARE

## 2017-06-02 ENCOUNTER — OFFICE VISIT (OUTPATIENT)
Dept: FAMILY MEDICINE | Facility: CLINIC | Age: 57
End: 2017-06-02
Payer: MEDICARE

## 2017-06-02 VITALS
OXYGEN SATURATION: 97 % | TEMPERATURE: 98.6 F | SYSTOLIC BLOOD PRESSURE: 118 MMHG | WEIGHT: 268 LBS | HEART RATE: 74 BPM | BODY MASS INDEX: 46 KG/M2 | DIASTOLIC BLOOD PRESSURE: 72 MMHG

## 2017-06-02 DIAGNOSIS — M25.561 CHRONIC PAIN OF RIGHT KNEE: ICD-10-CM

## 2017-06-02 DIAGNOSIS — M25.561 CHRONIC PAIN OF RIGHT KNEE: Primary | ICD-10-CM

## 2017-06-02 DIAGNOSIS — M54.17 LUMBOSACRAL RADICULOPATHY AT L5: ICD-10-CM

## 2017-06-02 DIAGNOSIS — G89.29 CHRONIC PAIN OF RIGHT KNEE: Primary | ICD-10-CM

## 2017-06-02 DIAGNOSIS — G89.29 CHRONIC PAIN OF RIGHT KNEE: ICD-10-CM

## 2017-06-02 PROCEDURE — 73562 X-RAY EXAM OF KNEE 3: CPT | Mod: RT

## 2017-06-02 PROCEDURE — 99214 OFFICE O/P EST MOD 30 MIN: CPT | Performed by: FAMILY MEDICINE

## 2017-06-02 RX ORDER — PHENTERMINE HYDROCHLORIDE 37.5 MG/1
37.5 CAPSULE ORAL EVERY MORNING
Qty: 30 CAPSULE | Refills: 3 | Status: SHIPPED | OUTPATIENT
Start: 2017-06-02 | End: 2017-11-09

## 2017-06-02 NOTE — MR AVS SNAPSHOT
After Visit Summary   6/2/2017    Ana Luisa Byers    MRN: [id number]           Patient Information     Date Of Birth          1960        Visit Information        Provider Department      6/2/2017 8:30 AM Bridget Garza MD [facility]        Today's Diagnoses     Chronic pain of right knee    -  1    Lumbosacral radiculopathy at L5           Follow-ups after your visit        Additional Services     UMA PT, HAND, AND CHIROPRACTIC REFERRAL       **This order will print in the [facility] Scheduling Office**    Physical Therapy, Hand Therapy and Chiropractic Care are available through:    *[facility]  *[facility]  *[facility]    Call one number to schedule at any of the above locations: [phone].    Your provider has referred you to: Physical Therapy at [facility] or [facility]    Indication/Reason for Referral: numbness lateral right thigh and calf, likely L5 radiculopathy  Onset of Illness: 1-2 month  Therapy Orders: Evaluate and Treat  Special Programs: None  Special Request: None    Cheko Still      Additional Comments for the Therapist or Chiropractor:     Please be aware that coverage of these services is subject to the terms and limitations of your health insurance plan.  Call member services at your health plan with any benefit or coverage questions.      Please bring the following to your appointment:    *Your personal calendar for scheduling future appointments  *Comfortable clothing                  Who to contact     If you have questions or need follow up information about today's clinic visit or your schedule please contact [facility] directly at [phone].  Normal or non-critical lab and imaging results will be communicated to you by MyChart, letter or phone within 4 business days after the clinic has received the results. If you do not hear from us within 7 days, please contact the clinic through  [employer] or phone. If you have a critical or abnormal lab result, we will notify you by phone as soon as possible.  Submit refill requests through [employer] or call your pharmacy and they will forward the refill request to us. Please allow 3 business days for your refill to be completed.          Additional Information About Your Visit        [employer] Information     [employer] gives you secure access to your electronic health record. If you see a primary care provider, you can also send messages to your care team and make appointments. If you have questions, please call your primary care clinic.  If you do not have a primary care provider, please call [phone] and they will assist you.        Care EveryWhere ID     This is your Care EveryWhere ID. This could be used by other organizations to access your [place] medical records  [id number]        Your Vitals Were     Pulse Temperature Last Period Pulse Oximetry BMI (Body Mass Index)       74 98.6  F (37  C) (Oral) 02/13/2009 97% 46 kg/m2        Blood Pressure from Last 3 Encounters:   06/02/17 118/72   05/04/17 110/80   05/03/17 149/88    Weight from Last 3 Encounters:   06/02/17 268 lb (121.6 kg)   05/04/17 275 lb (124.7 kg)   05/03/17 273 lb (123.8 kg)              We Performed the Following     UMA PT, HAND, AND CHIROPRACTIC REFERRAL        Primary Care Provider Office Phone # Fax #    Bridget Ambrocio Garza [phone]       [facility] [address]  [facility] [address]        Thank you!     Thank you for choosing [facility]  for your care. Our goal is always to provide you with excellent care. Hearing back from our patients is one way we can continue to improve our services. Please take a few minutes to complete the written survey that you may receive in the mail after your visit with us. Thank you!             Your Updated Medication List - Protect others around you: Learn how to safely use, store and throw away  your medicines at www.disposemymeds.org.          This list is accurate as of: 6/2/17  9:34 AM.  Always use your most recent med list.                   Brand Name Dispense Instructions for use    aspirin 81 MG chewable tablet     108 tablet    Take 1 tablet (81 mg) by mouth daily       atorvastatin 40 MG tablet    LIPITOR    90 tablet    Take 1 tablet (40 mg) by mouth daily       DULoxetine 60 MG EC capsule    CYMBALTA     60 mg 2 times daily       EPINEPHrine 0.3 MG/0.3ML injection     2 each    Inject 0.3 mLs (0.3 mg) into the muscle once as needed for anaphylaxis       furosemide 40 MG tablet    LASIX    60 tablet    Take 1 tablet (40 mg) by mouth 2 times daily       glipiZIDE 2.5 MG 24 hr tablet    glipiZIDE XL    180 tablet    Take 1 tablet (2.5 mg) by mouth 2 times daily       levothyroxine 25 MCG tablet    SYNTHROID/LEVOTHROID    90 tablet    Take 1 tablet (25 mcg) by mouth daily       losartan 25 MG tablet    COZAAR    15 tablet    Take 0.5 tablets (12.5 mg) by mouth daily       meclizine 25 MG tablet    ANTIVERT    90 tablet    Take 1 tablet (25 mg) by mouth 3 times daily as needed for dizziness       methocarbamol 500 MG tablet    ROBAXIN    20 tablet    Take 2 tablets (1,000 mg) by mouth 3 times daily as needed for muscle spasms       NITROSTAT 0.4 MG sublingual tablet   Generic drug:  nitroglycerin      Place 1 tablet under the tongue as needed       nystatin 423216 UNIT/ML suspension    MYCOSTATIN    60 mL    Take 5 mLs (500,000 Units) by mouth 4 times daily       * order for DME     2 Device    Equipment being ordered: compression socks, high strength, knee high, custom made 2 pairs, 11 refill       * order for DME     1 Units    Equipment being ordered: diabetic shoes       * order for DME     1 each    Equipment being ordered: Rollator walker with seat       * order for DME     1 each    Equipment being ordered: Lift Chair       * order for DME     1 Units    Equipment being ordered: diabetic shoes   Feet have been examined, no changes       * order for DME     1 each    by * route daily BLE knee high compression socks 15-20mmHg       pantoprazole 40 MG EC tablet    PROTONIX    90 tablet    Take 1 tablet (40 mg) by mouth daily Take 30-60 minutes before a meal.       phentermine 37.5 MG capsule     30 capsule    Take 1 capsule (37.5 mg) by mouth every morning       Potassium Chloride ER 20 MEQ Tbcr     30 tablet    Take 1 tablet (20 mEq) by mouth daily       pregabalin 50 MG capsule    LYRICA    90 capsule    Take 1 capsule (50 mg) by mouth 3 times daily       traMADol 50 MG tablet    ULTRAM    90 tablet    Take 1 tablet (50 mg) by mouth every 8 hours as needed for moderate pain Max #3/day. OK to dispense 3/31/17 to start 4/2/17.       valACYclovir 1000 mg tablet    VALTREX    4 tablet    Take 2 tablets (2,000 mg) by mouth 2 times daily       vitamin D 2000 UNITS tablet     90 tablet    Take 1 tablet by mouth daily       zolpidem 10 MG tablet    AMBIEN    30 tablet    Take 1 tablet (10 mg) by mouth nightly as needed for sleep       * Notice:  This list has 6 medication(s) that are the same as other medications prescribed for you. Read the directions carefully, and ask your doctor or other care provider to review them with you.

## 2017-06-02 NOTE — NURSING NOTE
"Chief Complaint   Patient presents with     RECHECK     weight        Initial /72 (BP Location: Right arm, Patient Position: Chair, Cuff Size: Adult Large)  Pulse 74  Temp 98.6  F (37  C) (Oral)  Wt 268 lb (121.6 kg)  LMP 02/13/2009  SpO2 97%  BMI 46 kg/m2 Estimated body mass index is 46 kg/(m^2) as calculated from the following:    Height as of 5/4/17: 5' 4\" (1.626 m).    Weight as of this encounter: 268 lb (121.6 kg).  Medication Reconciliation: complete.Anita QUINTERO MA      "

## 2017-06-02 NOTE — PROGRESS NOTES
"  SUBJECTIVE:                                                    Ana Luisa Byers is a 57 year old female who presents to clinic today for the following health issues:    Medication Followup of phentermine    Taking Medication as prescribed: yes    Side Effects:  None    Medication Helping Symptoms:  Yes, down 11lbs       Feeling better since starting the phentermine. Has lost weight, more energy.     Notes new symptoms of numbness on the lateral right thigh and calf, seems to be spreading. Still has good strength in the leg. No radiation of symptoms to the feet.    No back pain. No new or increased activities.    Or bothersome to her overnight. No recent change medications besides the addition of phentermine. She did not have the symptoms when she was on phentermine previously.    Note continued pain of the right knee, seems to be worsening. On occasion feels a \"crunching\" noise. Climbing stairs painful for her. Rest makes the pain better.      Problem list and histories reviewed & adjusted, as indicated.  Additional history: none    Patient Active Problem List   Diagnosis     Mild major depression (H)     Chronic pain associated with significant psychosocial dysfunction     Osteoarthritis     S/P shoulder replacement     Cluster headaches     Fibromyalgia     Plantar fasciitis     Other insomnia     HTN, goal below 140/90     Gastroesophageal reflux disease without esophagitis     Restless legs syndrome (RLS)     CKD (chronic kidney disease) stage 2, GFR 60-89 ml/min     HSV-1 infection     Left lumbar radiculitis     Acquired hypothyroidism     BMI 40.0-44.9, adult (H)     Type 2 diabetes mellitus with diabetic polyneuropathy, without long-term current use of insulin (H)     Bilateral lower extremity edema     Past Surgical History:   Procedure Laterality Date     ARTHROPLASTY SHOULDER  7/25/2013    Procedure: ARTHROPLASTY SHOULDER;  RIGHT TOTAL SHOULDER ARTHROPLASTY (TORNIER AFFINITY SHOULDER SYSTEM)^;  Surgeon: " Dung Morales MD;  Location:  OR     BACK SURGERY  2011    L45 laminectomy     C APPENDECTOMY  age 19     C LIGATE FALLOPIAN TUBE,POSTPARTUM  1982    Tubal Ligation     ELBOW WRIST HAND FINGER ORTHOSIS, RIGID, WITHOUT JOINTS, MAY INCLUDE SOFT  5/2007    put in [place]     ESOPHAGOSCOPY, GASTROSCOPY, DUODENOSCOPY (EGD), COMBINED  9/23/2015    Dr. [facility]     ESOPHAGOSCOPY, GASTROSCOPY, DUODENOSCOPY (EGD), COMBINED N/A 9/23/2015    Procedure: COMBINED ESOPHAGOSCOPY, GASTROSCOPY, DUODENOSCOPY (EGD), BIOPSY SINGLE OR MULTIPLE;  Surgeon: Jose Thomas MD;  Location:  GI     HCL PAP SMEAR  6/2008    [facility]     ORTHOPEDIC SURGERY      left shoulder scoped and plate left elbow     SURGICAL HISTORY OF -   2009    ulnar nerve release, carpal tunnel- left       Social History   Substance Use Topics     Smoking status: Never Smoker     Smokeless tobacco: Never Used     Alcohol use No     Family History   Problem Relation Age of Onset     C.A.D. Mother      Neurologic Disorder Mother      lupus     Depression Mother      Family History Negative Father      DIABETES Maternal Aunt      Colon Cancer No family hx of            Reviewed and updated as needed this visit by clinical staff  Tobacco  Allergies  Med Hx  Surg Hx  Fam Hx  Soc Hx      Reviewed and updated as needed this visit by Provider         ROS:  Constitutional, HEENT, cardiovascular, pulmonary, gi and gu systems are negative, except as otherwise noted.    OBJECTIVE:                                                    /72 (BP Location: Right arm, Patient Position: Chair, Cuff Size: Adult Large)  Pulse 74  Temp 98.6  F (37  C) (Oral)  Wt 268 lb (121.6 kg)  LMP 02/13/2009  SpO2 97%  BMI 46 kg/m2  Body mass index is 46 kg/(m^2).  GENERAL: healthy, alert and no distress  RESP: lungs clear to auscultation - no rales, rhonchi or wheezes  CV: regular rate and rhythm, normal S1 S2, no S3 or S4, no murmur, click or rub, no peripheral edema and  peripheral pulses strong  MS: Normal range of motion of the hip and ankle of the right leg, no medial or lateral joint pain to palpation, no pain over the patellar ligament, patella is not ballotable, negative grind  NEURO: 5 out of 5 strength in the right lower extremity, slightly decreased sensation over the lateral thigh and calf, normal patellar reflexes    Diagnostic Test Results:  Right knee x-ray, some joint narrowing in the medial compartment, otherwise normal      ASSESSMENT/PLAN:                                                      1. Chronic pain of right knee - discussed may be some arthritis to explain her knee pain, will try topical Biofreeze for now  - XR Knee Right 3 Views; Future    2. Lumbosacral radiculopathy at L5 - suspect lumbar radiculopathy, will try physical therapy as a first measure.  - UMA PT, HAND, AND CHIROPRACTIC REFERRAL    3. BMI 40.0-44.9, adult (H) - given recent weight loss, will continue with phentermine refills. Suggested follow-up in 3 months.  - phentermine 37.5 MG capsule; Take 1 capsule (37.5 mg) by mouth every morning  Dispense: 30 capsule; Refill: 3      Bridget Garza MD  [facility]

## 2017-06-15 ENCOUNTER — TELEPHONE (OUTPATIENT)
Dept: FAMILY MEDICINE | Facility: CLINIC | Age: 57
End: 2017-06-15

## 2017-06-15 DIAGNOSIS — E11.42 TYPE 2 DIABETES MELLITUS WITH DIABETIC POLYNEUROPATHY, WITHOUT LONG-TERM CURRENT USE OF INSULIN (H): ICD-10-CM

## 2017-06-15 RX ORDER — LOSARTAN POTASSIUM 25 MG/1
12.5 TABLET ORAL DAILY
Qty: 45 TABLET | Refills: 0 | Status: SHIPPED | OUTPATIENT
Start: 2017-06-15 | End: 2017-09-06

## 2017-06-15 NOTE — TELEPHONE ENCOUNTER
[email or web address] sent requesting appt.  Pt past due for px and labs can be ordered at that time  Michael Jasso RN, BSN

## 2017-06-15 NOTE — TELEPHONE ENCOUNTER
Pending Prescriptions:                       Disp   Refills    losartan (COZAAR) 25 MG tablet            15 tab*1            Sig: Take 0.5 tablets (12.5 mg) by mouth daily          Last Written Prescription Date: 04/11/2017  Last Fill Quantity: 15, # refills: 1  Last Office Visit with [facility], [facility] or [facility] prescribing provider: 06/02/2017       Potassium   Date Value Ref Range Status   04/11/2017 4.0 3.4 - 5.3 mmol/L Final     Creatinine   Date Value Ref Range Status   04/11/2017 1.02 0.52 - 1.04 mg/dL Final     BP Readings from Last 3 Encounters:   06/02/17 118/72   05/04/17 110/80   05/03/17 149/88     Vidal GARCIAT

## 2017-06-15 NOTE — TELEPHONE ENCOUNTER
Prescription approved per [facility] Refill Protocol.  Michael Jasso RN, BSN

## 2017-06-25 DIAGNOSIS — G47.09 OTHER INSOMNIA: ICD-10-CM

## 2017-06-25 NOTE — TELEPHONE ENCOUNTER
Pending Prescriptions:                       Disp   Refills    zolpidem (AMBIEN) 10 MG tablet            30 tab*3            Sig: Take 1 tablet (10 mg) by mouth nightly as needed           for sleep          ZOLPIDEM      Last Written Prescription Date:  3/8/2017  Last Fill Quantity: 30,   # refills: 3  Last Office Visit with [facility], [facility] or [facility] prescribing provider: 6/2/2017Greg    Future Office visit:       Routing refill request to provider for review/approval because:  Drug not on the [facility], [facility] or [facility] refill protocol or controlled substance

## 2017-06-26 DIAGNOSIS — G89.4 CHRONIC PAIN ASSOCIATED WITH SIGNIFICANT PSYCHOSOCIAL DYSFUNCTION: ICD-10-CM

## 2017-06-26 RX ORDER — TOPIRAMATE 50 MG/1
50 TABLET, FILM COATED ORAL 2 TIMES DAILY
Qty: 60 TABLET | Refills: 1 | OUTPATIENT
Start: 2017-06-26

## 2017-06-26 NOTE — TELEPHONE ENCOUNTER
[place] reviewed:  reviewed- no concerns    [place]  [email or web address]

## 2017-06-26 NOTE — TELEPHONE ENCOUNTER
Pending Prescriptions:                       Disp   Refills    topiramate (TOPAMAX) 50 MG tablet         60 tab*1            Sig: Take 1 tablet (50 mg) by mouth 2 times daily          Last Written Prescription Date: 05/31/2017  Last Fill Quantity: 60, # refills: 1  Last Office Visit with [facility], [facility] or [facility] prescribing provider: 06/02/2017       BP Readings from Last 3 Encounters:   06/02/17 118/72   05/04/17 110/80   05/03/17 149/88     Vidal RICHEY

## 2017-06-28 RX ORDER — ZOLPIDEM TARTRATE 10 MG/1
10 TABLET ORAL
Qty: 30 TABLET | Refills: 3 | Status: SHIPPED | OUTPATIENT
Start: 2017-06-28 | End: 2017-10-29

## 2017-06-29 ENCOUNTER — TRANSFERRED RECORDS (OUTPATIENT)
Dept: HEALTH INFORMATION MANAGEMENT | Facility: CLINIC | Age: 57
End: 2017-06-29

## 2017-06-30 ENCOUNTER — TELEPHONE (OUTPATIENT)
Dept: FAMILY MEDICINE | Facility: CLINIC | Age: 57
End: 2017-06-30

## 2017-06-30 NOTE — TELEPHONE ENCOUNTER
Panel Management Review      Patient has the following on her problem list:     Diabetes    ASA: Failed    Last A1C  Lab Results   Component Value Date    A1C 6.4 04/11/2017    A1C 5.9 09/09/2016    A1C 5.9 07/05/2016    A1C 6.1 12/04/2015    A1C 6.3 08/31/2015     A1C tested: Passed    Last LDL:    Lab Results   Component Value Date    CHOL 199 04/13/2016     Lab Results   Component Value Date    HDL 47 04/13/2016     Lab Results   Component Value Date     04/13/2016     Lab Results   Component Value Date    TRIG 166 04/13/2016     Lab Results   Component Value Date    CHOLHDLRATIO 3.4 03/16/2015     Lab Results   Component Value Date    NHDL 152 04/13/2016       Is the patient on a Statin? YES             Is the patient on Aspirin? NO    Medications     HMG CoA Reductase Inhibitors    atorvastatin (LIPITOR) 40 MG tablet    Salicylates    aspirin 81 MG chewable tablet          Last three blood pressure readings:  BP Readings from Last 3 Encounters:   06/02/17 118/72   05/04/17 110/80   05/03/17 149/88       Date of last diabetes office visit: not seen     Tobacco History:     History   Smoking Status     Never Smoker   Smokeless Tobacco     Never Used           Composite cancer screening  Chart review shows that this patient is due/due soon for the following Mammogram. Scheduled next week.   Gabino Cho [facility]           Chart routed to none .

## 2017-07-20 ENCOUNTER — TRANSFERRED RECORDS (OUTPATIENT)
Dept: HEALTH INFORMATION MANAGEMENT | Facility: CLINIC | Age: 57
End: 2017-07-20

## 2017-07-20 ENCOUNTER — OFFICE VISIT (OUTPATIENT)
Dept: FAMILY MEDICINE | Facility: CLINIC | Age: 57
End: 2017-07-20
Payer: MEDICARE

## 2017-07-20 VITALS
TEMPERATURE: 98.6 F | SYSTOLIC BLOOD PRESSURE: 126 MMHG | WEIGHT: 266 LBS | HEIGHT: 64 IN | HEART RATE: 86 BPM | DIASTOLIC BLOOD PRESSURE: 80 MMHG | BODY MASS INDEX: 45.41 KG/M2

## 2017-07-20 DIAGNOSIS — F43.22 ADJUSTMENT DISORDER WITH ANXIOUS MOOD: ICD-10-CM

## 2017-07-20 DIAGNOSIS — F32.0 MILD MAJOR DEPRESSION (H): ICD-10-CM

## 2017-07-20 DIAGNOSIS — R82.90 NONSPECIFIC FINDING ON EXAMINATION OF URINE: ICD-10-CM

## 2017-07-20 DIAGNOSIS — R31.9 HEMATURIA: ICD-10-CM

## 2017-07-20 DIAGNOSIS — N30.01 ACUTE CYSTITIS WITH HEMATURIA: Primary | ICD-10-CM

## 2017-07-20 LAB
ALBUMIN UR-MCNC: 30 MG/DL
APPEARANCE UR: ABNORMAL
BACTERIA #/AREA URNS HPF: ABNORMAL /HPF
BILIRUB UR QL STRIP: NEGATIVE
COLOR UR AUTO: YELLOW
GLUCOSE UR STRIP-MCNC: NEGATIVE MG/DL
HGB UR QL STRIP: ABNORMAL
KETONES UR STRIP-MCNC: NEGATIVE MG/DL
LEUKOCYTE ESTERASE UR QL STRIP: ABNORMAL
NITRATE UR QL: NEGATIVE
NON-SQ EPI CELLS #/AREA URNS LPF: ABNORMAL /LPF
PH UR STRIP: 6 PH (ref 5–7)
RBC #/AREA URNS AUTO: ABNORMAL /HPF (ref 0–2)
SP GR UR STRIP: 1.02 (ref 1–1.03)
URN SPEC COLLECT METH UR: ABNORMAL
UROBILINOGEN UR STRIP-ACNC: 2 EU/DL (ref 0.2–1)
WBC #/AREA URNS AUTO: ABNORMAL /HPF (ref 0–2)

## 2017-07-20 PROCEDURE — 81001 URINALYSIS AUTO W/SCOPE: CPT | Performed by: FAMILY MEDICINE

## 2017-07-20 PROCEDURE — 87086 URINE CULTURE/COLONY COUNT: CPT | Performed by: FAMILY MEDICINE

## 2017-07-20 PROCEDURE — 99214 OFFICE O/P EST MOD 30 MIN: CPT | Performed by: FAMILY MEDICINE

## 2017-07-20 RX ORDER — NITROFURANTOIN 25; 75 MG/1; MG/1
100 CAPSULE ORAL 2 TIMES DAILY
Qty: 14 CAPSULE | Refills: 0 | Status: SHIPPED | OUTPATIENT
Start: 2017-07-20 | End: 2017-08-18

## 2017-07-20 NOTE — NURSING NOTE
"Chief Complaint   Patient presents with     Urinary Problem       Initial /80 (BP Location: Right arm, Patient Position: Chair, Cuff Size: Adult Large)  Pulse 86  Temp 98.6  F (37  C) (Oral)  Ht 5' 4\" (1.626 m)  Wt 266 lb (120.7 kg)  LMP 02/13/2009  Breastfeeding? No  BMI 45.66 kg/m2 Estimated body mass index is 45.66 kg/(m^2) as calculated from the following:    Height as of this encounter: 5' 4\" (1.626 m).    Weight as of this encounter: 266 lb (120.7 kg).  Medication Reconciliation: complete     Gabino Cho CMA          "

## 2017-07-20 NOTE — PROGRESS NOTES
SUBJECTIVE:                                                    Ana Luisa Byers is a 57 year old female who presents to clinic today for the following health issues:      URINARY TRACT SYMPTOMS      Duration: one week    Description  hematuria and abdominal pain and some incontinence     Intensity:  moderate    Accompanying signs and symptoms:  Fever/chills: no   Flank pain no   Nausea and vomiting: YES- nausea  Vaginal symptoms: none  Abdominal/Pelvic Pain: YES    History  History of frequent UTI's: YES- past  History of kidney stones: no   Sexually Active: no   Possibility of pregnancy: No    Precipitating or alleviating factors: one accident at night    Therapies tried and outcome: cranberry juice    Outcome: not helping      Would like to meet with a counselor to discuss some trouble with her sig other. He retired a few months back. She is having trouble feeling insecure now that he is around more often. Says she knows this is uncalled for, but it is troubling her. Causing her more anxiety.       Problem list and histories reviewed & adjusted, as indicated.  Additional history: none    Patient Active Problem List   Diagnosis     Mild major depression (H)     Chronic pain associated with significant psychosocial dysfunction     Osteoarthritis     S/P shoulder replacement     Cluster headaches     Fibromyalgia     Plantar fasciitis     Other insomnia     HTN, goal below 140/90     Gastroesophageal reflux disease without esophagitis     Restless legs syndrome (RLS)     CKD (chronic kidney disease) stage 2, GFR 60-89 ml/min     HSV-1 infection     Left lumbar radiculitis     Acquired hypothyroidism     BMI 40.0-44.9, adult (H)     Type 2 diabetes mellitus with diabetic polyneuropathy, without long-term current use of insulin (H)     Bilateral lower extremity edema     Past Surgical History:   Procedure Laterality Date     ARTHROPLASTY SHOULDER  7/25/2013    Procedure: ARTHROPLASTY SHOULDER;  RIGHT TOTAL SHOULDER  "ARTHROPLASTY (TORNIER AFFINITY SHOULDER SYSTEM)^;  Surgeon: Dung Morales MD;  Location: [facility] OR     BACK SURGERY  2011    L45 laminectomy     C APPENDECTOMY  age 19     C LIGATE FALLOPIAN TUBE,POSTPARTUM  1982    Tubal Ligation     ELBOW WRIST HAND FINGER ORTHOSIS, RIGID, WITHOUT JOINTS, MAY INCLUDE SOFT  5/2007    put in [place]     ESOPHAGOSCOPY, GASTROSCOPY, DUODENOSCOPY (EGD), COMBINED  9/23/2015    Dr. Thomas [facility]     ESOPHAGOSCOPY, GASTROSCOPY, DUODENOSCOPY (EGD), COMBINED N/A 9/23/2015    Procedure: COMBINED ESOPHAGOSCOPY, GASTROSCOPY, DUODENOSCOPY (EGD), BIOPSY SINGLE OR MULTIPLE;  Surgeon: Jose Thomas MD;  Location:  GI     HCL PAP SMEAR  6/2008    [facility]     ORTHOPEDIC SURGERY      left shoulder scoped and plate left elbow     SURGICAL HISTORY OF -   2009    ulnar nerve release, carpal tunnel- left       Social History   Substance Use Topics     Smoking status: Never Smoker     Smokeless tobacco: Never Used     Alcohol use No     Family History   Problem Relation Age of Onset     C.A.D. Mother      Neurologic Disorder Mother      lupus     Depression Mother      Family History Negative Father      DIABETES Maternal Aunt      Colon Cancer No family hx of              Reviewed and updated as needed this visit by clinical staff     Reviewed and updated as needed this visit by Provider         ROS:  Constitutional, HEENT, cardiovascular, pulmonary, gi and gu systems are negative, except as otherwise noted.      OBJECTIVE:   /80 (BP Location: Right arm, Patient Position: Chair, Cuff Size: Adult Large)  Pulse 86  Temp 98.6  F (37  C) (Oral)  Ht 5' 4\" (1.626 m)  Wt 266 lb (120.7 kg)  LMP 02/13/2009  Breastfeeding? No  BMI 45.66 kg/m2  Body mass index is 45.66 kg/(m^2).  GENERAL: healthy, alert and no distress  ABDOMEN: ttp suprapubic, soft, no masses and bowel sounds normal  BACK: no CVA tenderness  PSYCH: mentation appears normal, affect normal/bright    Diagnostic Test Results:  Results " for orders placed or performed in visit on 07/20/17 (from the past 24 hour(s))   *UA reflex to Microscopic and Culture ([facility] (except [place] and [place])   Result Value Ref Range    Color Urine Yellow     Appearance Urine Cloudy     Glucose Urine Negative NEG mg/dL    Bilirubin Urine Negative NEG    Ketones Urine Negative NEG mg/dL    Specific Gravity Urine 1.020 1.003 - 1.035    Blood Urine Moderate (A) NEG    pH Urine 6.0 5.0 - 7.0 pH    Protein Albumin Urine 30 (A) NEG mg/dL    Urobilinogen Urine 2.0 (H) 0.2 - 1.0 EU/dL    Nitrite Urine Negative NEG    Leukocyte Esterase Urine Small (A) NEG    Source Midstream Urine    Urine Microscopic   Result Value Ref Range    WBC Urine 10-25  CULTURED.   (A) 0 - 2 /HPF    RBC Urine O - 2 0 - 2 /HPF    Squamous Epithelial /LPF Urine Many (A) FEW /LPF    Bacteria Urine Moderate (A) NEG /HPF       ASSESSMENT/PLAN:     1. Acute cystitis with hematuria - will treat with abx, discussed potential side effects, await culture results  - nitroFURantoin, macrocrystal-monohydrate, (MACROBID) 100 MG capsule; Take 1 capsule (100 mg) by mouth 2 times daily  Dispense: 14 capsule; Refill: 0    2. Hematuria  - *UA reflex to Microscopic and Culture ([facility] (except [place] and [place])  - Urine Microscopic  - Urine Culture Aerobic Bacterial    3. Mild major depression (H) - stable  - DEPRESSION ACTION PLAN (DAP)    4. Adjustment disorder with anxious mood - discussed meeting with therapy may be helpful for her, gave Martín Tran's card today  - MENTAL HEALTH REFERRAL    5. Nonspecific finding on examination of urine  - Urine Culture Aerobic Bacterial      Bridget Garza MD  [facility]

## 2017-07-20 NOTE — MR AVS SNAPSHOT
After Visit Summary   7/20/2017    Ana Luisa Byers    MRN: [id number]           Patient Information     Date Of Birth          1960        Visit Information        Provider Department      7/20/2017 8:45 AM Bridget Garza MD [facility]        Today's Diagnoses     Acute cystitis with hematuria    -  1    Hematuria        Mild major depression (H)        Adjustment disorder with anxious mood          Care Instructions    AZO - pyridium for symptom relief temporarily          Follow-ups after your visit        Additional Services     MENTAL HEALTH REFERRAL       Your provider has referred you to: [facility]: [facility] - Counseling (Individual/Couples/Family) - [facility] [phone]   [email or web address]   *Patient will be contacted by [facility] scheduling partner[facility]), to schedule an appointment.  Patients may also call [facility] to schedule.    All scheduling is subject to the client's specific insurance plan & benefits, provider/location availability, and provider clinical specialities.  Please arrive 15 minutes early for your first appointment and bring your completed paperwork.    Please be aware that coverage of these services is subject to the terms and limitations of your health insurance plan.  Call member services at your health plan with any benefit or coverage questions.                  Your next 10 appointments already scheduled     Jul 29, 2017 10:15 AM CDT   MA SCREENING DIGITAL BILATERAL with LVMA1   [facility] ([facility]    [address] [facility] [facility] [address] [phone]           Do not use any powder, lotion or deodorant under your arms or on your breast. If you do, we will ask you to remove it before your exam.  Wear comfortable, two-piece clothing.  If you have any allergies, tell your care team.   "Bring any previous mammograms from other facilities or have them mailed to the breast center.              Who to contact     If you have questions or need follow up information about today's clinic visit or your schedule please contact [facility] directly at [phone].  Normal or non-critical lab and imaging results will be communicated to you by MyChart, letter or phone within 4 business days after the clinic has received the results. If you do not hear from us within 7 days, please contact the clinic through [employer] or phone. If you have a critical or abnormal lab result, we will notify you by phone as soon as possible.  Submit refill requests through [employer] or call your pharmacy and they will forward the refill request to us. Please allow 3 business days for your refill to be completed.          Additional Information About Your Visit        [email or web address] Information     [employer] gives you secure access to your electronic health record. If you see a primary care provider, you can also send messages to your care team and make appointments. If you have questions, please call your primary care clinic.  If you do not have a primary care provider, please call [phone] and they will assist you.        Care EveryWhere ID     This is your Care EveryWhere ID. This could be used by other organizations to access your [facility] medical records  [id number]        Your Vitals Were     Pulse Temperature Height Last Period Breastfeeding? BMI (Body Mass Index)    86 98.6  F (37  C) (Oral) 5' 4\" (1.626 m) 02/13/2009 No 45.66 kg/m2       Blood Pressure from Last 3 Encounters:   07/20/17 126/80   06/02/17 118/72   05/04/17 110/80    Weight from Last 3 Encounters:   07/20/17 266 lb (120.7 kg)   06/02/17 268 lb (121.6 kg)   05/04/17 275 lb (124.7 kg)              We Performed the Following     *UA reflex to Microscopic and Culture ([facility] (except [place] and [place])     DEPRESSION ACTION " PLAN (DAP)     MENTAL HEALTH REFERRAL     Urine Microscopic          Today's Medication Changes          These changes are accurate as of: 7/20/17  9:07 AM.  If you have any questions, ask your nurse or doctor.               Start taking these medicines.        Dose/Directions    nitroFURantoin (macrocrystal-monohydrate) 100 MG capsule   Commonly known as:  MACROBID   Used for:  Acute cystitis with hematuria   Started by:  Bridget Garza MD        Dose:  100 mg   Take 1 capsule (100 mg) by mouth 2 times daily   Quantity:  14 capsule   Refills:  0            Where to get your medicines      These medications were sent to [facility] [address] [facility] [address] [facility] [address] [address] [facility] [address]     Phone:  [phone]     nitroFURantoin (macrocrystal-monohydrate) 100 MG capsule                Primary Care Provider Office Phone # Fax #    Bridget Garza [phone]       [facility] [address] [address] [facility] [address]        Equal Access to Services     [facility] : Hadii aad ku hadasho Soomaali, waaxda luqadaha, qaybta kaalmada adeegyada, pool drew hayalban lopez. So [facility] [phone].    ATENCIÓN: Si habla español, tiene a valerio disposición servicios gratuitos de asistencia lingüística. [facility] [phone].    We comply with applicable federal civil rights laws and [place] laws. We do not discriminate on the basis of race, color, national origin, age, disability sex, sexual orientation or gender identity.            Thank you!     Thank you for choosing [facility]  for your care. Our goal is always to provide you with excellent care. Hearing back from our patients is one way we can continue to improve our services. Please take a few minutes to complete the written survey that you may receive in the mail after your visit with us. Thank you!             Your Updated Medication  List - Protect others around you: Learn how to safely use, store and throw away your medicines at www.disposemymeds.org.          This list is accurate as of: 7/20/17  9:07 AM.  Always use your most recent med list.                   Brand Name Dispense Instructions for use Diagnosis    aspirin 81 MG chewable tablet     108 tablet    Take 1 tablet (81 mg) by mouth daily    Diabetes mellitus, type 2 (H)       atorvastatin 40 MG tablet    LIPITOR    90 tablet    Take 1 tablet (40 mg) by mouth daily    Hyperlipidemia LDL goal <100       DULoxetine 60 MG EC capsule    CYMBALTA     60 mg 2 times daily        EPINEPHrine 0.3 MG/0.3ML injection 2-pack    EPIPEN/ADRENACLICK/or ANY BX GENERIC EQUIV    2 each    Inject 0.3 mLs (0.3 mg) into the muscle once as needed for anaphylaxis    Bee sting allergy       furosemide 40 MG tablet    LASIX    60 tablet    Take 1 tablet (40 mg) by mouth 2 times daily    Bilateral lower extremity edema       glipiZIDE 2.5 MG 24 hr tablet    glipiZIDE XL    180 tablet    Take 1 tablet (2.5 mg) by mouth 2 times daily    Type 2 diabetes mellitus with diabetic neuropathy, without long-term current use of insulin (H)       levothyroxine 25 MCG tablet    SYNTHROID/LEVOTHROID    90 tablet    Take 1 tablet (25 mcg) by mouth daily    Hypothyroidism       losartan 25 MG tablet    COZAAR    45 tablet    Take 0.5 tablets (12.5 mg) by mouth daily    Type 2 diabetes mellitus with diabetic polyneuropathy, without long-term current use of insulin (H)       meclizine 25 MG tablet    ANTIVERT    90 tablet    Take 1 tablet (25 mg) by mouth 3 times daily as needed for dizziness    Lightheadedness       methocarbamol 500 MG tablet    ROBAXIN    20 tablet    Take 2 tablets (1,000 mg) by mouth 3 times daily as needed for muscle spasms    Cervicalgia       nitroFURantoin (macrocrystal-monohydrate) 100 MG capsule    MACROBID    14 capsule    Take 1 capsule (100 mg) by mouth 2 times daily    Acute cystitis with  hematuria       NITROSTAT 0.4 MG sublingual tablet   Generic drug:  nitroGLYcerin      Place 1 tablet under the tongue as needed        nystatin 561815 UNIT/ML suspension    MYCOSTATIN    60 mL    Take 5 mLs (500,000 Units) by mouth 4 times daily    Candida, oral       * order for DME     2 Device    Equipment being ordered: compression socks, high strength, knee high, custom made 2 pairs, 11 refill    Other lymphedema       * order for DME     1 Units    Equipment being ordered: diabetic shoes    Type 2 diabetes mellitus with diabetic neuropathy (H)       * order for DME     1 each    Equipment being ordered: Rollator walker with seat    Morbid obesity due to excess calories (H), Type 2 diabetes mellitus with diabetic neuropathy (H), Fibromyalgia       * order for DME     1 each    Equipment being ordered: Lift Chair    Fibromyalgia, Left lumbar radiculitis, Morbid obesity, unspecified obesity type (H), Primary osteoarthritis involving multiple joints       * order for DME     1 Units    Equipment being ordered: diabetic shoes  Feet have been examined, no changes    Type 2 diabetes mellitus with diabetic neuropathy, without long-term current use of insulin (H)       * order for DME     1 each    by * route daily BLE knee high compression socks 15-20mmHg    Edema, unspecified type       pantoprazole 40 MG EC tablet    PROTONIX    90 tablet    Take 1 tablet (40 mg) by mouth daily Take 30-60 minutes before a meal.    Gastroesophageal reflux disease without esophagitis       phentermine 37.5 MG capsule     30 capsule    Take 1 capsule (37.5 mg) by mouth every morning    BMI 40.0-44.9, adult (H)       Potassium Chloride ER 20 MEQ Tbcr     30 tablet    Take 1 tablet (20 mEq) by mouth daily    Hypokalemia       pregabalin 50 MG capsule    LYRICA    90 capsule    Take 1 capsule (50 mg) by mouth 3 times daily    Diabetic polyneuropathy associated with type 2 diabetes mellitus (H)       traMADol 50 MG tablet    ULTRAM    90  tablet    Take 1 tablet (50 mg) by mouth every 8 hours as needed for moderate pain Max #3/day. OK to dispense 3/31/17 to start 4/2/17.    Cervical pain       valACYclovir 1000 mg tablet    VALTREX    4 tablet    Take 2 tablets (2,000 mg) by mouth 2 times daily    HSV-1 infection       vitamin D 2000 UNITS tablet     90 tablet    Take 1 tablet by mouth daily    Type 2 diabetes mellitus with diabetic neuropathy, without long-term current use of insulin (H)       zolpidem 10 MG tablet    AMBIEN    30 tablet    Take 1 tablet (10 mg) by mouth nightly as needed for sleep    Other insomnia       * Notice:  This list has 6 medication(s) that are the same as other medications prescribed for you. Read the directions carefully, and ask your doctor or other care provider to review them with you.

## 2017-07-21 DIAGNOSIS — M79.18 MYOFASCIAL PAIN: ICD-10-CM

## 2017-07-21 DIAGNOSIS — G89.4 CHRONIC PAIN ASSOCIATED WITH SIGNIFICANT PSYCHOSOCIAL DYSFUNCTION: ICD-10-CM

## 2017-07-21 LAB
BACTERIA SPEC CULT: NORMAL
MICRO REPORT STATUS: NORMAL
SPECIMEN SOURCE: NORMAL

## 2017-07-21 NOTE — TELEPHONE ENCOUNTER
Pending Prescriptions:                       Disp   Refills    DULoxetine (CYMBALTA) 30 MG EC capsule [P*90 cap*0            Sig: TAKE 1 CAPSULE BY MOUTH DAILY WITH 60MG PER DAY           FOR A TOTAL OF 90MG PER DAY            Last Written Prescription Date: 2/4/2016  Last Fill Quantity: not documented , # refills: not documented  Last Office Visit with [facility], [facility] or [facility] prescribing provider[facility]        BP Readings from Last 3 Encounters:   07/20/17 126/80   06/02/17 118/72   05/04/17 110/80     Pulse: (for [place])  Creatinine   Date Value Ref Range Status   04/11/2017 1.02 0.52 - 1.04 mg/dL Final   ]    Last PHQ-9 score on record=   PHQ-9 SCORE 5/4/2017   Total Score -   Total Score MyChart -   Total Score 22

## 2017-07-24 NOTE — TELEPHONE ENCOUNTER
Routing refill request to provider for review/approval because:  Medication is reported/historical  PHQ>5  Michael Jasso RN, BSN

## 2017-07-26 DIAGNOSIS — E78.5 HYPERLIPIDEMIA LDL GOAL <100: ICD-10-CM

## 2017-07-26 RX ORDER — ATORVASTATIN CALCIUM 40 MG/1
40 TABLET, FILM COATED ORAL DAILY
Qty: 90 TABLET | Refills: 3 | Status: SHIPPED | OUTPATIENT
Start: 2017-07-26 | End: 2018-08-13

## 2017-07-26 RX ORDER — DULOXETIN HYDROCHLORIDE 30 MG/1
CAPSULE, DELAYED RELEASE ORAL
Qty: 90 CAPSULE | Refills: 0 | Status: SHIPPED | OUTPATIENT
Start: 2017-07-26 | End: 2017-09-13

## 2017-07-26 NOTE — TELEPHONE ENCOUNTER
Pending Prescriptions:                       Disp   Refills    atorvastatin (LIPITOR) 40 MG tablet       90 tab*3            Sig: Take 1 tablet (40 mg) by mouth daily         Last Written Prescription Date: 06/28/2016  Last Fill Quantity: 90, # refills: 3  Last Office Visit with [facility], [facility] or [facility] prescribing provider: 07/20/2017       Lab Results   Component Value Date    CHOL 199 04/13/2016     Lab Results   Component Value Date    HDL 47 04/13/2016     Lab Results   Component Value Date     04/13/2016     Lab Results   Component Value Date    TRIG 166 04/13/2016     Lab Results   Component Value Date    CHOLHDLRATIO 3.4 03/16/2015     Vidal RICHEY

## 2017-07-26 NOTE — TELEPHONE ENCOUNTER
Routing refill request to provider for review/approval because:  Labs not current:  Lipids  Michael Jasso RN, BSN

## 2017-07-30 DIAGNOSIS — E11.40 TYPE 2 DIABETES MELLITUS WITH DIABETIC NEUROPATHY, WITHOUT LONG-TERM CURRENT USE OF INSULIN (H): ICD-10-CM

## 2017-07-30 NOTE — TELEPHONE ENCOUNTER
Glipizide ER 2.5MG Tablets/Topiramate 50MG Tablets         Last Written Prescription Date: 01/24/2017-02/03/2017  Last Fill Quantity: 180/60, # refills: 1  Last Office Visit with G, P or [facility] prescribing provider:  07/20/2017        BP Readings from Last 3 Encounters:   07/20/17 126/80   06/02/17 118/72   05/04/17 110/80     Lab Results   Component Value Date    MICROL 8 04/11/2017     Lab Results   Component Value Date    UMALCR 10.99 04/11/2017     Creatinine   Date Value Ref Range Status   04/11/2017 1.02 0.52 - 1.04 mg/dL Final   ]  GFR Estimate   Date Value Ref Range Status   04/11/2017 56 (L) >60 mL/min/1.7m2 Final     Comment:     Non  GFR Calc   01/24/2017 57 (L) >60 mL/min/1.7m2 Final     Comment:     Non  GFR Calc   10/28/2016 53 (L) >60 mL/min/1.7m2 Final     Comment:     Non  GFR Calc     GFR Estimate If Black   Date Value Ref Range Status   04/11/2017 68 >60 mL/min/1.7m2 Final     Comment:      GFR Calc   01/24/2017 69 >60 mL/min/1.7m2 Final     Comment:      GFR Calc   10/28/2016 65 >60 mL/min/1.7m2 Final     Comment:      GFR Calc     Lab Results   Component Value Date    CHOL 199 04/13/2016     Lab Results   Component Value Date    HDL 47 04/13/2016     Lab Results   Component Value Date     04/13/2016     Lab Results   Component Value Date    TRIG 166 04/13/2016     Lab Results   Component Value Date    CHOLHDLRATIO 3.4 03/16/2015     Lab Results   Component Value Date    AST 25 07/05/2016     Lab Results   Component Value Date    ALT 37 07/05/2016     Lab Results   Component Value Date    A1C 6.4 04/11/2017    A1C 5.9 09/09/2016    A1C 5.9 07/05/2016    A1C 6.1 12/04/2015    A1C 6.3 08/31/2015     Potassium   Date Value Ref Range Status   04/11/2017 4.0 3.4 - 5.3 mmol/L Final

## 2017-07-31 RX ORDER — GLIPIZIDE 2.5 MG/1
2.5 TABLET, EXTENDED RELEASE ORAL 2 TIMES DAILY
Qty: 180 TABLET | Refills: 0 | Status: SHIPPED | OUTPATIENT
Start: 2017-07-31 | End: 2017-10-23

## 2017-08-14 ENCOUNTER — TELEPHONE (OUTPATIENT)
Dept: PHARMACY | Facility: CLINIC | Age: 57
End: 2017-08-14

## 2017-08-14 NOTE — TELEPHONE ENCOUNTER
Called patient to follow-up. Left message for patient to call back.    Have not been able to connect with patient after multiple attempts.  No further attempts will be made at this time.  I would be happy to assist in the care of this patient in the future if needed.    Nadine Johnson , Pharm D  [phone] (phone)  [phone] (pager)  Medication Therapy Management Pharmacist

## 2017-08-18 ENCOUNTER — OFFICE VISIT (OUTPATIENT)
Dept: FAMILY MEDICINE | Facility: CLINIC | Age: 57
End: 2017-08-18
Payer: MEDICARE

## 2017-08-18 VITALS
SYSTOLIC BLOOD PRESSURE: 140 MMHG | WEIGHT: 258.7 LBS | HEART RATE: 92 BPM | OXYGEN SATURATION: 95 % | BODY MASS INDEX: 44.16 KG/M2 | DIASTOLIC BLOOD PRESSURE: 90 MMHG | TEMPERATURE: 97.8 F | HEIGHT: 64 IN

## 2017-08-18 DIAGNOSIS — R31.9 URINARY TRACT INFECTION WITH HEMATURIA, SITE UNSPECIFIED: ICD-10-CM

## 2017-08-18 DIAGNOSIS — R30.0 DYSURIA: Primary | ICD-10-CM

## 2017-08-18 DIAGNOSIS — R82.90 NONSPECIFIC FINDING ON EXAMINATION OF URINE: ICD-10-CM

## 2017-08-18 DIAGNOSIS — N39.0 URINARY TRACT INFECTION WITH HEMATURIA, SITE UNSPECIFIED: ICD-10-CM

## 2017-08-18 LAB
ALBUMIN UR-MCNC: NEGATIVE MG/DL
APPEARANCE UR: ABNORMAL
BACTERIA #/AREA URNS HPF: ABNORMAL /HPF
BILIRUB UR QL STRIP: NEGATIVE
COLOR UR AUTO: YELLOW
GLUCOSE UR STRIP-MCNC: NEGATIVE MG/DL
HGB UR QL STRIP: ABNORMAL
KETONES UR STRIP-MCNC: NEGATIVE MG/DL
LEUKOCYTE ESTERASE UR QL STRIP: ABNORMAL
NITRATE UR QL: NEGATIVE
PH UR STRIP: 7 PH (ref 5–7)
RBC #/AREA URNS AUTO: ABNORMAL /HPF
SOURCE: ABNORMAL
SP GR UR STRIP: 1.01 (ref 1–1.03)
UROBILINOGEN UR STRIP-ACNC: 1 EU/DL (ref 0.2–1)
WBC #/AREA URNS AUTO: ABNORMAL /HPF

## 2017-08-18 PROCEDURE — 87186 SC STD MICRODIL/AGAR DIL: CPT | Performed by: PHYSICIAN ASSISTANT

## 2017-08-18 PROCEDURE — 87086 URINE CULTURE/COLONY COUNT: CPT | Performed by: PHYSICIAN ASSISTANT

## 2017-08-18 PROCEDURE — 81001 URINALYSIS AUTO W/SCOPE: CPT | Performed by: PHYSICIAN ASSISTANT

## 2017-08-18 PROCEDURE — 87088 URINE BACTERIA CULTURE: CPT | Performed by: PHYSICIAN ASSISTANT

## 2017-08-18 PROCEDURE — 99213 OFFICE O/P EST LOW 20 MIN: CPT | Performed by: PHYSICIAN ASSISTANT

## 2017-08-18 RX ORDER — SULFAMETHOXAZOLE/TRIMETHOPRIM 800-160 MG
1 TABLET ORAL 2 TIMES DAILY
Qty: 14 TABLET | Refills: 0 | Status: SHIPPED | OUTPATIENT
Start: 2017-08-18 | End: 2017-10-12

## 2017-08-18 NOTE — NURSING NOTE
"Chief Complaint   Patient presents with     UTI       Initial /90 (BP Location: Right arm, Patient Position: Chair, Cuff Size: Adult Large)  Pulse 92  Temp 97.8  F (36.6  C) (Oral)  Ht 5' 4\" (1.626 m)  Wt 258 lb 11.2 oz (117.3 kg)  LMP 02/13/2009  SpO2 95%  Breastfeeding? No  BMI 44.41 kg/m2 Estimated body mass index is 44.41 kg/(m^2) as calculated from the following:    Height as of this encounter: 5' 4\" (1.626 m).    Weight as of this encounter: 258 lb 11.2 oz (117.3 kg).  Medication Reconciliation: complete   GILBERTO Mirza      "

## 2017-08-18 NOTE — MR AVS SNAPSHOT
"              After Visit Summary   8/18/2017    Ana Luisa Byers    MRN: [id number]           Patient Information     Date Of Birth          1960        Visit Information        Provider Department      8/18/2017 1:30 PM Aaseby-Aguilera, Ramona Ann, PA-C [facility]        Today's Diagnoses     Dysuria    -  1    Urinary tract infection with hematuria, site unspecified        Nonspecific finding on examination of urine           Follow-ups after your visit        Who to contact     If you have questions or need follow up information about today's clinic visit or your schedule please contact [facility] directly at [phone].  Normal or non-critical lab and imaging results will be communicated to you by MyChart, letter or phone within 4 business days after the clinic has received the results. If you do not hear from us within 7 days, please contact the clinic through E-Househart or phone. If you have a critical or abnormal lab result, we will notify you by phone as soon as possible.  Submit refill requests through ShowEvidence or call your pharmacy and they will forward the refill request to us. Please allow 3 business days for your refill to be completed.          Additional Information About Your Visit        MyChart Information     ShowEvidence gives you secure access to your electronic health record. If you see a primary care provider, you can also send messages to your care team and make appointments. If you have questions, please call your primary care clinic.  If you do not have a primary care provider, please call [phone] and they will assist you.        Care EveryWhere ID     This is your Care EveryWhere ID. This could be used by other organizations to access your [facility] medical records  [id number]        Your Vitals Were     Pulse Temperature Height Last Period Pulse Oximetry Breastfeeding?    92 97.8  F (36.6  C) (Oral) 5' 4\" (1.626 m) 02/13/2009 95% No    BMI (Body Mass " Index)                   44.41 kg/m2            Blood Pressure from Last 3 Encounters:   08/18/17 140/90   07/20/17 126/80   06/02/17 118/72    Weight from Last 3 Encounters:   08/18/17 258 lb 11.2 oz (117.3 kg)   07/20/17 266 lb (120.7 kg)   06/02/17 268 lb (121.6 kg)              We Performed the Following     UA reflex to Microscopic and Culture     Urine Culture Aerobic Bacterial     Urine Microscopic          Today's Medication Changes          These changes are accurate as of: 8/18/17  2:22 PM.  If you have any questions, ask your nurse or doctor.               Start taking these medicines.        Dose/Directions    sulfamethoxazole-trimethoprim 800-160 MG per tablet   Commonly known as:  BACTRIM DS/SEPTRA DS   Used for:  Urinary tract infection with hematuria, site unspecified   Started by:  Aaseby-Aguilera, Ramona Ann, PA-C        Dose:  1 tablet   Take 1 tablet by mouth 2 times daily   Quantity:  14 tablet   Refills:  0            Where to get your medicines      These medications [facility] to [facility] [address] [place] [address] [id number] [place] [address] AT [address]  [address] [facility] [address]     Phone:  [phone]     sulfamethoxazole-trimethoprim 800-160 MG per tablet                Primary Care Provider Office Phone # Fax #    Bridget Ambrocio Garza [phone] [address] [facility] [address]        Equal Access to Services     BOB LUCIA AH: Hadii aad ku hadasho Soomaali, waaxda luqadaha, qaybta kaalmada adeegyada, waxay rajendra lopez. So [facility] [phone].    ATENCIÓN: Si habla español, tiene a valerio disposición servicios gratuitos de asistencia lingüística. Llame al [phone].    We comply with applicable federal civil rights laws and [place] laws. We do not discriminate on the basis of race, color, national origin, age, disability sex, sexual orientation or gender identity.            Thank you!     Thank you for  choosing [facility]  for your care. Our goal is always to provide you with excellent care. Hearing back from our patients is one way we can continue to improve our services. Please take a few minutes to complete the written survey that you may receive in the mail after your visit with us. Thank you!             Your Updated Medication List - Protect others around you: Learn how to safely use, store and throw away your medicines at www.disposemymeds.org.          This list is accurate as of: 8/18/17  2:22 PM.  Always use your most recent med list.                   Brand Name Dispense Instructions for use Diagnosis    aspirin 81 MG chewable tablet     108 tablet    Take 1 tablet (81 mg) by mouth daily    Diabetes mellitus, type 2 (H)       atorvastatin 40 MG tablet    LIPITOR    90 tablet    Take 1 tablet (40 mg) by mouth daily    Hyperlipidemia LDL goal <100       DULoxetine 30 MG EC capsule    CYMBALTA    90 capsule    TAKE 1 CAPSULE BY MOUTH DAILY WITH 60MG PER DAY FOR A TOTAL OF 90MG PER DAY    Myofascial pain, Chronic pain associated with significant psychosocial dysfunction       EPINEPHrine 0.3 MG/0.3ML injection 2-pack    EPIPEN/ADRENACLICK/or ANY BX GENERIC EQUIV    2 each    Inject 0.3 mLs (0.3 mg) into the muscle once as needed for anaphylaxis    Bee sting allergy       furosemide 40 MG tablet    LASIX    60 tablet    Take 1 tablet (40 mg) by mouth 2 times daily    Bilateral lower extremity edema       glipiZIDE 2.5 MG 24 hr tablet    glipiZIDE XL    180 tablet    Take 1 tablet (2.5 mg) by mouth 2 times daily    Type 2 diabetes mellitus with diabetic neuropathy, without long-term current use of insulin (H)       levothyroxine 25 MCG tablet    SYNTHROID/LEVOTHROID    90 tablet    Take 1 tablet (25 mcg) by mouth daily    Hypothyroidism       losartan 25 MG tablet    COZAAR    45 tablet    Take 0.5 tablets (12.5 mg) by mouth daily    Type 2 diabetes mellitus with diabetic polyneuropathy,  without long-term current use of insulin (H)       meclizine 25 MG tablet    ANTIVERT    90 tablet    Take 1 tablet (25 mg) by mouth 3 times daily as needed for dizziness    Lightheadedness       methocarbamol 500 MG tablet    ROBAXIN    20 tablet    Take 2 tablets (1,000 mg) by mouth 3 times daily as needed for muscle spasms    Cervicalgia       NITROSTAT 0.4 MG sublingual tablet   Generic drug:  nitroGLYcerin      Place 1 tablet under the tongue as needed        nystatin 492944 UNIT/ML suspension    MYCOSTATIN    60 mL    Take 5 mLs (500,000 Units) by mouth 4 times daily    Candida, oral       * order for DME     2 Device    Equipment being ordered: compression socks, high strength, knee high, custom made 2 pairs, 11 refill    Other lymphedema       * order for DME     1 Units    Equipment being ordered: diabetic shoes    Type 2 diabetes mellitus with diabetic neuropathy (H)       * order for DME     1 each    Equipment being ordered: Rollator walker with seat    Morbid obesity due to excess calories (H), Type 2 diabetes mellitus with diabetic neuropathy (H), Fibromyalgia       * order for DME     1 each    Equipment being ordered: Lift Chair    Fibromyalgia, Left lumbar radiculitis, Morbid obesity, unspecified obesity type (H), Primary osteoarthritis involving multiple joints       * order for DME     1 Units    Equipment being ordered: diabetic shoes  Feet have been examined, no changes    Type 2 diabetes mellitus with diabetic neuropathy, without long-term current use of insulin (H)       * order for DME     1 each    by * route daily BLE knee high compression socks 15-20mmHg    Edema, unspecified type       pantoprazole 40 MG EC tablet    PROTONIX    90 tablet    Take 1 tablet (40 mg) by mouth daily Take 30-60 minutes before a meal.    Gastroesophageal reflux disease without esophagitis       phentermine 37.5 MG capsule     30 capsule    Take 1 capsule (37.5 mg) by mouth every morning    BMI 40.0-44.9, adult  (H)       Potassium Chloride ER 20 MEQ Tbcr     30 tablet    Take 1 tablet (20 mEq) by mouth daily    Hypokalemia       pregabalin 50 MG capsule    LYRICA    90 capsule    Take 1 capsule (50 mg) by mouth 3 times daily    Diabetic polyneuropathy associated with type 2 diabetes mellitus (H)       sulfamethoxazole-trimethoprim 800-160 MG per tablet    BACTRIM DS/SEPTRA DS    14 tablet    Take 1 tablet by mouth 2 times daily    Urinary tract infection with hematuria, site unspecified       traMADol 50 MG tablet    ULTRAM    90 tablet    Take 1 tablet (50 mg) by mouth every 8 hours as needed for moderate pain Max #3/day. OK to dispense 3/31/17 to start 4/2/17.    Cervical pain       valACYclovir 1000 mg tablet    VALTREX    4 tablet    Take 2 tablets (2,000 mg) by mouth 2 times daily    HSV-1 infection       vitamin D 2000 UNITS tablet     90 tablet    Take 1 tablet by mouth daily    Type 2 diabetes mellitus with diabetic neuropathy, without long-term current use of insulin (H)       zolpidem 10 MG tablet    AMBIEN    30 tablet    Take 1 tablet (10 mg) by mouth nightly as needed for sleep    Other insomnia       * Notice:  This list has 6 medication(s) that are the same as other medications prescribed for you. Read the directions carefully, and ask your doctor or other care provider to review them with you.

## 2017-08-18 NOTE — PROGRESS NOTES
SUBJECTIVE:   Ana Luisa Byers is a 57 year old female who presents to clinic today for the following health issues:      URINARY TRACT SYMPTOMS  Onset: 1 week    Description:   Painful urination (Dysuria): YES  Blood in urine (Hematuria): no   Delay in urine (Hesitency): YES    Intensity: moderate    Progression of Symptoms:  same    Accompanying Signs & Symptoms:  Fever/chills: YES  Flank pain YES  Nausea and vomiting: no   Any vaginal symptoms: none  Abdominal/Pelvic Pain: YES    History:   History of frequent UTI's: no   History of kidney stones: no   Sexually Active: no   Possibility of pregnancy: No    Precipitating factors:       Therapies Tried and outcome: Cranberry juice prn (contraindicated in Coumadin patients) and Increase fluid intake          Problem list and histories reviewed & adjusted, as indicated.  Additional history: as documented    Current Outpatient Prescriptions   Medication Sig Dispense Refill     sulfamethoxazole-trimethoprim (BACTRIM DS/SEPTRA DS) 800-160 MG per tablet Take 1 tablet by mouth 2 times daily 14 tablet 0     glipiZIDE (GLIPIZIDE XL) 2.5 MG 24 hr tablet Take 1 tablet (2.5 mg) by mouth 2 times daily 180 tablet 0     DULoxetine (CYMBALTA) 30 MG EC capsule TAKE 1 CAPSULE BY MOUTH DAILY WITH 60MG PER DAY FOR A TOTAL OF 90MG PER DAY 90 capsule 0     atorvastatin (LIPITOR) 40 MG tablet Take 1 tablet (40 mg) by mouth daily 90 tablet 3     zolpidem (AMBIEN) 10 MG tablet Take 1 tablet (10 mg) by mouth nightly as needed for sleep 30 tablet 3     losartan (COZAAR) 25 MG tablet Take 0.5 tablets (12.5 mg) by mouth daily 45 tablet 0     phentermine 37.5 MG capsule Take 1 capsule (37.5 mg) by mouth every morning 30 capsule 3     nystatin (MYCOSTATIN) 833012 UNIT/ML suspension Take 5 mLs (500,000 Units) by mouth 4 times daily 60 mL 0     order for DME by * route daily BLE knee high compression socks 15-20mmHg 1 each 0     methocarbamol (ROBAXIN) 500 MG tablet Take 2 tablets (1,000 mg) by  mouth 3 times daily as needed for muscle spasms 20 tablet 3     furosemide (LASIX) 40 MG tablet Take 1 tablet (40 mg) by mouth 2 times daily 60 tablet 3     meclizine (ANTIVERT) 25 MG tablet Take 1 tablet (25 mg) by mouth 3 times daily as needed for dizziness 90 tablet 0     pregabalin (LYRICA) 50 MG capsule Take 1 capsule (50 mg) by mouth 3 times daily 90 capsule 3     traMADol (ULTRAM) 50 MG tablet Take 1 tablet (50 mg) by mouth every 8 hours as needed for moderate pain Max #3/day. OK to dispense 3/31/17 to start 4/2/17. 90 tablet 0     pantoprazole (PROTONIX) 40 MG EC tablet Take 1 tablet (40 mg) by mouth daily Take 30-60 minutes before a meal. 90 tablet 3     Cholecalciferol (VITAMIN D) 2000 UNITS tablet Take 1 tablet by mouth daily 90 tablet 3     valACYclovir (VALTREX) 1000 mg tablet Take 2 tablets (2,000 mg) by mouth 2 times daily 4 tablet 3     order for DME Equipment being ordered: diabetic shoes    Feet have been examined, no changes 1 Units 0     Potassium Chloride ER 20 MEQ TBCR Take 1 tablet (20 mEq) by mouth daily 30 tablet 0     order for DME Equipment being ordered: Lift Chair 1 each 0     levothyroxine (SYNTHROID, LEVOTHROID) 25 MCG tablet Take 1 tablet (25 mcg) by mouth daily 90 tablet 2     order for DME Equipment being ordered: Rollator walker with seat 1 each 0     NITROSTAT 0.4 MG SL tablet Place 1 tablet under the tongue as needed  0     EPINEPHrine (EPIPEN) 0.3 MG/0.3ML injection Inject 0.3 mLs (0.3 mg) into the muscle once as needed for anaphylaxis 2 each 1     order for DME Equipment being ordered: diabetic shoes 1 Units 0     ORDER FOR DME Equipment being ordered: compression socks, high strength, knee high, custom made  2 pairs, 11 refill 2 Device 11     aspirin 81 MG chewable tablet Take 1 tablet (81 mg) by mouth daily 108 tablet 3     BP Readings from Last 3 Encounters:   08/18/17 140/90   07/20/17 126/80   06/02/17 118/72    Wt Readings from Last 3 Encounters:   08/18/17 258 lb 11.2 oz  "(117.3 kg)   07/20/17 266 lb (120.7 kg)   06/02/17 268 lb (121.6 kg)                      Reviewed and updated as needed this visit by clinical staffTobacco  Allergies  Med Hx  Surg Hx  Fam Hx  Soc Hx      Reviewed and updated as needed this visit by Provider         ROS:  Constitutional, HEENT, cardiovascular, pulmonary, gi and gu systems are negative, except as otherwise noted.      OBJECTIVE:                                                    /90 (BP Location: Right arm, Patient Position: Chair, Cuff Size: Adult Large)  Pulse 92  Temp 97.8  F (36.6  C) (Oral)  Ht 5' 4\" (1.626 m)  Wt 258 lb 11.2 oz (117.3 kg)  LMP 02/13/2009  SpO2 95%  Breastfeeding? No  BMI 44.41 kg/m2  Body mass index is 44.41 kg/(m^2).  GENERAL APPEARANCE: healthy, alert and no distress  RESP: lungs clear to auscultation - no rales, rhonchi or wheezes  CV: regular rates and rhythm, normal S1 S2, no S3 or S4 and no murmur, click or rub  ABDOMEN: soft, nontender, without hepatosplenomegaly or masses and bowel sounds normal    Diagnostic test results:  Diagnostic Test Results:  none        ASSESSMENT/PLAN:                                                    1. Dysuria    - UA reflex to Microscopic and Culture  - Urine Microscopic  - Urine Culture Aerobic Bacterial    2. Urinary tract infection with hematuria, site unspecified  Ana Luisa had a UTI 1 month ago and was treated with Macrobid she thought she did well after this but now symptoms have been back for over a week. Culture did not show anything last month so we'll switch to Bactrim and advised to follow-up if symptoms fail to improve  - sulfamethoxazole-trimethoprim (BACTRIM DS/SEPTRA DS) 800-160 MG per tablet; Take 1 tablet by mouth 2 times daily  Dispense: 14 tablet; Refill: 0  - Urine Culture Aerobic Bacterial    3. Nonspecific finding on examination of urine    - Urine Culture Aerobic Bacterial      There are no Patient Instructions on file for this visit.    Sarina Patricio " Aaseby-Aguilera, PA-C  [facility]

## 2017-08-20 LAB
BACTERIA SPEC CULT: ABNORMAL
BACTERIA SPEC CULT: ABNORMAL
SPECIMEN SOURCE: ABNORMAL

## 2017-08-21 ENCOUNTER — TRANSFERRED RECORDS (OUTPATIENT)
Dept: HEALTH INFORMATION MANAGEMENT | Facility: CLINIC | Age: 57
End: 2017-08-21

## 2017-08-30 DIAGNOSIS — E11.42 DIABETIC POLYNEUROPATHY ASSOCIATED WITH TYPE 2 DIABETES MELLITUS (H): ICD-10-CM

## 2017-08-30 RX ORDER — PREGABALIN 50 MG/1
50 CAPSULE ORAL 3 TIMES DAILY
Qty: 90 CAPSULE | Refills: 3 | Status: SHIPPED | OUTPATIENT
Start: 2017-08-30 | End: 2019-07-22

## 2017-08-30 NOTE — TELEPHONE ENCOUNTER
Pending Prescriptions:                       Disp   Refills    pregabalin (LYRICA) 50 MG capsule         90 cap*3            Sig: Take 1 capsule (50 mg) by mouth 3 times daily    Controlled Substance Refill Request for   Problem List Complete:  No     PROVIDER TO CONSIDER COMPLETION OF PROBLEM LIST AND OVERVIEW/CONTROLLED SUBSTANCE AGREEMENT    Last Written Prescription Date:  04/05/2017  Last Fill Quantity: 90,   # refills: 3    [place] [facility] [place] with [facility] [place]: 08/18/2017    Future Office visit:     Controlled substance agreement on file: No.     Processing:  Fax Rx to [facility] pharmacy     checked in past 6 months?  No, route to YEISON GARCIAT

## 2017-08-30 NOTE — TELEPHONE ENCOUNTER
[place] reviewed:  reviewed- no concerns    [place]  [email or web address]

## 2017-09-05 DIAGNOSIS — M54.2 CERVICALGIA: ICD-10-CM

## 2017-09-05 NOTE — TELEPHONE ENCOUNTER
Pending Prescriptions:                       Disp   Refills    methocarbamol (ROBAXIN) 500 MG tablet     20 tab*3            Sig: Take 2 tablets (1,000 mg) by mouth 3 times daily           as needed for muscle spasms          Last Written Prescription Date:  05/05/2017  Last Fill Quantity: 20,   # refills: 3  Last Office Visit with [facility], [facility] or [facility] prescribing provider: 08/18/2017  Future Office visit:       Routing refill request to provider for review/approval because:  Drug not on the [facility], [facility] or [facility] refill protocol or controlled substance    Vidal RICHEY

## 2017-09-06 DIAGNOSIS — E11.42 TYPE 2 DIABETES MELLITUS WITH DIABETIC POLYNEUROPATHY, WITHOUT LONG-TERM CURRENT USE OF INSULIN (H): ICD-10-CM

## 2017-09-06 DIAGNOSIS — R60.0 BILATERAL LOWER EXTREMITY EDEMA: ICD-10-CM

## 2017-09-06 RX ORDER — LOSARTAN POTASSIUM 25 MG/1
12.5 TABLET ORAL DAILY
Qty: 5 TABLET | Refills: 0 | Status: SHIPPED | OUTPATIENT
Start: 2017-09-06 | End: 2017-09-17

## 2017-09-06 RX ORDER — FUROSEMIDE 40 MG
40 TABLET ORAL 2 TIMES DAILY
Qty: 10 TABLET | Refills: 0 | Status: SHIPPED | OUTPATIENT
Start: 2017-09-06 | End: 2017-12-07

## 2017-09-06 RX ORDER — METHOCARBAMOL 500 MG/1
1000 TABLET, FILM COATED ORAL 3 TIMES DAILY PRN
Qty: 20 TABLET | Refills: 3 | Status: SHIPPED | OUTPATIENT
Start: 2017-09-06 | End: 2018-01-30

## 2017-09-06 NOTE — TELEPHONE ENCOUNTER
Pending Prescriptions:                       Disp   Refills    furosemide (LASIX) 40 MG tablet           60 tab*3            Sig: Take 1 tablet (40 mg) by mouth 2 times daily          Last Written Prescription Date: 05/04/2017  Last Fill Quantity: 60, # refills: 3  Last Office Visit with [facility], [facility] or [facility] prescribing provider: 08/18/2017       Potassium   Date Value Ref Range Status   04/11/2017 4.0 3.4 - 5.3 mmol/L Final     Creatinine   Date Value Ref Range Status   04/11/2017 1.02 0.52 - 1.04 mg/dL Final     BP Readings from Last 3 Encounters:   08/18/17 140/90   07/20/17 126/80   06/02/17 118/72     Vidal GARCIAT

## 2017-09-06 NOTE — TELEPHONE ENCOUNTER
Routing refill request to provider for review/approval because:  Pt is past due for px and was informed of this via [employer] which she read.  She has been seen for acute issues 2x since message was sent.    Please advise on how many more refills to give    Michael Jasso RN, BSN

## 2017-09-06 NOTE — TELEPHONE ENCOUNTER
Pending Prescriptions:                       Disp   Refills    furosemide (LASIX) 40 MG tablet           60 tab*3            Sig: Take 1 tablet (40 mg) by mouth 2 times daily    losartan (COZAAR) 25 MG tablet            45 tab*0            Sig: Take 0.5 tablets (12.5 mg) by mouth daily    Losartan        Last Written Prescription Date: 06/15/2017  Last Fill Quantity: 45, # refills: 0  Last Office Visit with [facility], [facility] or [facility] prescribing provider: 08/18/2017       Potassium   Date Value Ref Range Status   04/11/2017 4.0 3.4 - 5.3 mmol/L Final     Creatinine   Date Value Ref Range Status   04/11/2017 1.02 0.52 - 1.04 mg/dL Final     BP Readings from Last 3 Encounters:   08/18/17 140/90   07/20/17 126/80   06/02/17 118/72     Vidal RICHEY

## 2017-09-12 DIAGNOSIS — M79.18 MYOFASCIAL PAIN: ICD-10-CM

## 2017-09-12 DIAGNOSIS — G89.4 CHRONIC PAIN ASSOCIATED WITH SIGNIFICANT PSYCHOSOCIAL DYSFUNCTION: ICD-10-CM

## 2017-09-13 RX ORDER — DULOXETIN HYDROCHLORIDE 30 MG/1
30 CAPSULE, DELAYED RELEASE ORAL DAILY
Qty: 90 CAPSULE | Refills: 1 | Status: SHIPPED | OUTPATIENT
Start: 2017-09-13 | End: 2018-11-13

## 2017-09-13 RX ORDER — DULOXETIN HYDROCHLORIDE 30 MG/1
CAPSULE, DELAYED RELEASE ORAL
Qty: 90 CAPSULE | Refills: 0 | Status: CANCELLED | OUTPATIENT
Start: 2017-09-13

## 2017-09-13 NOTE — TELEPHONE ENCOUNTER
Per cristy patient states that she has been taking 90 mg total for a while now.    Please advise on how to fill    Michael Jasso RN, BSN

## 2017-09-13 NOTE — TELEPHONE ENCOUNTER
Can you clarify how she is taking the med and how it is being filled? I don't see a 60 mg tab on her med list

## 2017-09-13 NOTE — TELEPHONE ENCOUNTER
Routing refill request to provider for review/approval because:  This is used for pain but PHQ was very elevated in may  Michael Jasso RN, BSN

## 2017-09-13 NOTE — TELEPHONE ENCOUNTER
Pending Prescriptions:                       Disp   Refills    DULoxetine (CYMBALTA) 30 MG EC capsule    90 cap*0               THIS RX WAS LAST FILLED 7/26/2017  Last Written Prescription Date: 7/26/2017  Last Fill Quantity: 90, # refills: 0  Last Office Visit with [facility], [facility] or [facility] prescribing provider: 8/18/2017, Aaseby-Aguilera        BP Readings from Last 3 Encounters:   08/18/17 140/90   07/20/17 126/80   06/02/17 118/72     Pulse: (for Fetzima)  Creatinine   Date Value Ref Range Status   04/11/2017 1.02 0.52 - 1.04 mg/dL Final   ]    Last PHQ-9 score on record=   PHQ-9 SCORE 5/4/2017   Total Score -   Total Score MyChart -   Total Score 22

## 2017-09-17 DIAGNOSIS — E11.42 TYPE 2 DIABETES MELLITUS WITH DIABETIC POLYNEUROPATHY, WITHOUT LONG-TERM CURRENT USE OF INSULIN (H): ICD-10-CM

## 2017-09-18 NOTE — TELEPHONE ENCOUNTER
Pt due for physical and is aware of this.  Last meds were refilled 9/6/17/ for 5 days, lasix and losartan.  Please advise on amount to refill.

## 2017-09-20 RX ORDER — LOSARTAN POTASSIUM 25 MG/1
12.5 TABLET ORAL DAILY
Qty: 5 TABLET | Refills: 0 | Status: SHIPPED | OUTPATIENT
Start: 2017-09-20 | End: 2018-01-03

## 2017-09-23 ENCOUNTER — RADIANT APPOINTMENT (OUTPATIENT)
Dept: MAMMOGRAPHY | Facility: CLINIC | Age: 57
End: 2017-09-23
Payer: MEDICARE

## 2017-09-23 PROCEDURE — G0202 SCR MAMMO BI INCL CAD: HCPCS | Mod: TC

## 2017-09-25 ENCOUNTER — OFFICE VISIT (OUTPATIENT)
Dept: ORTHOPEDICS | Facility: CLINIC | Age: 57
End: 2017-09-25
Payer: MEDICARE

## 2017-09-25 VITALS
SYSTOLIC BLOOD PRESSURE: 126 MMHG | BODY MASS INDEX: 43.32 KG/M2 | DIASTOLIC BLOOD PRESSURE: 82 MMHG | WEIGHT: 260 LBS | HEIGHT: 65 IN

## 2017-09-25 DIAGNOSIS — G56.21 ULNAR NEUROPATHY AT ELBOW OF RIGHT UPPER EXTREMITY: Primary | ICD-10-CM

## 2017-09-25 DIAGNOSIS — Z98.890 HX OF DECOMPRESSION OF ULNAR NERVE: ICD-10-CM

## 2017-09-25 PROCEDURE — 99204 OFFICE O/P NEW MOD 45 MIN: CPT | Performed by: FAMILY MEDICINE

## 2017-09-25 RX ORDER — METHYLPREDNISOLONE 4 MG
TABLET, DOSE PACK ORAL
Qty: 21 TABLET | Refills: 0 | Status: SHIPPED | OUTPATIENT
Start: 2017-09-25 | End: 2017-10-12

## 2017-09-25 NOTE — NURSING NOTE
"Chief Complaint   Patient presents with     Musculoskeletal Problem     right elbow       Initial /82  Ht 5' 5\" (1.651 m)  Wt 260 lb (117.9 kg)  LMP 02/13/2009  BMI 43.27 kg/m2 Estimated body mass index is 43.27 kg/(m^2) as calculated from the following:    Height as of this encounter: 5' 5\" (1.651 m).    Weight as of this encounter: 260 lb (117.9 kg).  Medication Reconciliation: complete   Mitch Campos ATC    "

## 2017-09-25 NOTE — PROGRESS NOTES
ASSESSMENT & PLAN    ICD-10-CM    1. Ulnar neuropathy at elbow of right upper extremity G56.21 methylPREDNISolone (MEDROL DOSEPAK) 4 MG tablet     [facility] PT, HAND, AND CHIROPRACTIC REFERRAL   2. Hx of decompression of ulnar nerve Z98.890 methylPREDNISolone (MEDROL DOSEPAK) 4 MG tablet     [facility] PT, HAND, AND CHIROPRACTIC REFERRAL   Reassurance provided that lesions do not suggest any joint infection or post-surgical complication  Lesion over olecranon appears similar to ones on forearm and is already being addressed by PCP. Continue.  Referral to hand therapy  Rx for medrol. Since on Lyrica don't want to add Gabapentin.  AIC 6.4    Follow up after 4-6 hand therapy sessions  or sooner if needed. Call direct clinic number [[phone]] at any time with questions or concerns.    -----    SUBJECTIVE  Ana Luisa Byers is a/an 57 year old  right hand dominant female who is seen as self referral for evaluation of right posterior elbow pain. The patient is seen by themselves.    Onset: 4/2017. Patient describes injury as status post ulnar nerve transposition.  Worsened by: pressure, lifting  Better with: none  Quality: clicking sounds, burning, tingling in the entire hand (all digits and feels they are always numb), cold hand  Pain Scale (maximum/current)/10: 8/10 / 6/10  Treatments tried: ice, heat, aleve, aleve, pool: no physical therapay after the surgery  Orthopedic history: NO  Relevant surgical history: YES - Date: 1/31/17 - ulnar nerve transposition Dr Morales [facility].EMG prior that confirmed only ulnar neuropathy. had some issues with sores that began a few months after the surgery, saw Dr. Morales and was told that she over iced her elbow.  Has left ulnar transposition, by Dr. Morales few years prior and had no issues.  Patient Social History: on disability due to back issues    Patient's past medical, surgical, social, and family histories were reviewed today and no changes are noted.    REVIEW OF SYSTEMS:  10 point ROS  "is negative other than symptoms noted above in HPI, Past Medical History or as stated below  Constitutional: NEGATIVE for fever, chills, change in weight  Skin: NEGATIVE for worrisome rashes, moles or lesions  GI/: NEGATIVE for bowel or bladder changes  Neuro: NEGATIVE for weakness, dizziness or paresthesias    OBJECTIVE:  /82  Ht 5' 5\" (1.651 m)  Wt 260 lb (117.9 kg)  LMP 02/13/2009  BMI 43.27 kg/m2   General: healthy, alert and in no distress  HEENT: no scleral icterus or conjunctival erythema  Skin: scattered excoriated macules (unroofed) over left and right forearm. Same lesion over right elbow (olecranon). No jaundice.  CV: regular rhythm by palpation  Resp: normal respiratory effort without conversational dyspnea   Psych: normal mood and affect  Gait: normal steady gait with appropriate coordination and balance  Neuro: Numbness over medial elbow, radial side of digit 2 and ulnar side of digit 5  MSK:  RIGHT ELBOW  Inspection:    No swelling or obvious deformity or asymmetry,healed surgical scar medial elbow  Palpation:    Tender about the lateral epicondyle, medial epicondyle, common flexor tendon, arcade of frohse / supinator arch. Remainder of bony, ligamentous and tendinous landmarks are nontender.    Crepitus is Absent  Range of Motion:     Extension full / flexion full  Strength:    Flexion full extension full  Special Tests:    Positive: pain with resisted wrist flexion    Independent visualization of the below image:  None indicated at this time    Patient's conditions were thoroughly discussed during today's visit with greater than 50% of the visit spent counseling the patient with total time spent face-to-face with the patient being 20 minutes.    Chapito Simmons, DO [facility]    "

## 2017-09-25 NOTE — MR AVS SNAPSHOT
After Visit Summary   9/25/2017    Ana Luisa Byers    MRN: [id number]           Patient Information     Date Of Birth          1960        Visit Information        Provider Department      9/25/2017 8:20 AM Chapito Simmons DO [facility]        Today's Diagnoses     Ulnar neuropathy at elbow of right upper extremity    -  1    Hx of decompression of ulnar nerve          Care Instructions    Thank you for allowing us to participate in your care today.  Please find below your visit diagnosis and the plan going forward.    1. Ulnar neuropathy at elbow of right upper extremity    2. Hx of decompression of ulnar nerve      Hand therapy: [facility] - [phone]  Rx for medrol / steroid. Take in the AM and with food. No other anti-inflamatories while taking steroid.    Follow up after 4-6 hand therapy sessions  or sooner if needed. Call direct clinic number [[phone]] at any time with questions or concerns.    Chapito Simmons DO [facility] [place]  Website: [email or web address]  Twitter: [email or web address]            Follow-ups after your visit        Additional Services     [facility] PT, HAND, AND CHIROPRACTIC REFERRAL       **This order will print in the [facility] Scheduling Office**    Physical Therapy, Hand Therapy and Chiropractic Care are available through:    *[facility]  *[facility]  *[facility]    Call one number to schedule at any of the above locations: [phone].    Your provider has referred you to: Hand Therapy    Indication/Reason for Referral: right elbow - ulnar neuropathy w/ hx of transposition Jan 2017. Continued numbness/pain. Also with lateral elbow pain.  Onset of Illness: see chart  Therapy Orders: Evaluate and Treat  Special Programs: None  Special Request: None    Cheko Still      Additional Comments for the Therapist or Chiropractor:     Please be  "aware that coverage of these services is subject to the terms and limitations of your health insurance plan.  Call member services at your health plan with any benefit or coverage questions.      Please bring the following to your appointment:    *Your personal calendar for scheduling future appointments  *Comfortable clothing                  Who to contact     If you have questions or need follow up information about today's clinic visit or your schedule please contact [facility] directly at [phone].  Normal or non-critical lab and imaging results will be communicated to you by [employer] Hotline[employer], letter or phone within 4 business days after the clinic has received the results. If you do not hear from us within 7 days, please contact the clinic through Godengo or phone. If you have a critical or abnormal lab result, we will notify you by phone as soon as possible.  Submit refill requests through Godengo or call your pharmacy and they will forward the refill request to us. Please allow 3 business days for your refill to be completed.          Additional Information About Your Visit        Godengo Information     Godengo gives you secure access to your electronic health record. If you see a primary care provider, you can also send messages to your care team and make appointments. If you have questions, please call your primary care clinic.  If you do not have a primary care provider, please call [phone] and they will assist you.        Care EveryWhere ID     This is your Care EveryWhere ID. This could be used by other organizations to access your [place] medical records  [id number]        Your Vitals Were     Height Last Period BMI (Body Mass Index)             5' 5\" (1.651 m) 02/13/2009 43.27 kg/m2          Blood Pressure from Last 3 Encounters:   09/25/17 126/82   08/18/17 140/90   07/20/17 126/80    Weight from Last 3 Encounters:   09/25/17 260 lb (117.9 kg)   08/18/17 258 lb 11.2 oz (117.3 " kg)   07/20/17 266 lb (120.7 kg)              We Performed the Following     [facility] PT, HAND, AND CHIROPRACTIC REFERRAL          Today's Medication Changes          These changes are accurate as of: 9/25/17 11:10 AM.  If you have any questions, ask your nurse or doctor.               Start taking these medicines.        Dose/Directions    methylPREDNISolone 4 MG tablet   Commonly known as:  MEDROL DOSEPAK   Used for:  Ulnar neuropathy at elbow of right upper extremity, Hx of decompression of ulnar nerve   Started by:  Chapito Simmons,         Follow package instructions   Quantity:  21 tablet   Refills:  0            Where to get your medicines      These medications were sent to [facility] [address] [facility] [address] [facility] [address]  [address] [facility] [address]     Phone:  [phone]     methylPREDNISolone 4 MG tablet                Primary Care Provider Office Phone # Fax #    Bridget Garza [phone] [address]  [facility] [address]        Equal Access to Services     [facility]: Hadii aad ku hadasho Soomaali, waaxda luqadaha, qaybta kaalmada adeegyada, waxay rajendra hernandez . So [facility] [phone].    ATENCIÓN: Si habla español, tiene a valerio disposición servicios gratuitos de asistencia lingüística. Llame al [phone].    We comply with applicable federal civil rights laws and [place] laws. We do not discriminate on the basis of race, color, national origin, age, disability sex, sexual orientation or gender identity.            Thank you!     Thank you for choosing [facility]  for your care. Our goal is always to provide you with excellent care. Hearing back from our patients is one way we can continue to improve our services. Please take a few minutes to complete the written survey that you may receive in the mail after your visit with us. Thank you!             Your Updated  Medication List - Protect others around you: Learn how to safely use, store and throw away your medicines at www.disposemymeds.org.          This list is accurate as of: 9/25/17 11:10 AM.  Always use your most recent med list.                   Brand Name Dispense Instructions for use Diagnosis    aspirin 81 MG chewable tablet     108 tablet    Take 1 tablet (81 mg) by mouth daily    Diabetes mellitus, type 2 (H)       atorvastatin 40 MG tablet    LIPITOR    90 tablet    Take 1 tablet (40 mg) by mouth daily    Hyperlipidemia LDL goal <100       DULoxetine 30 MG EC capsule    CYMBALTA    90 capsule    Take 1 capsule (30 mg) by mouth daily Take along with 60 mg capsule    Myofascial pain, Chronic pain associated with significant psychosocial dysfunction       EPINEPHrine 0.3 MG/0.3ML injection 2-pack    EPIPEN/ADRENACLICK/or ANY BX GENERIC EQUIV    2 each    Inject 0.3 mLs (0.3 mg) into the muscle once as needed for anaphylaxis    Bee sting allergy       furosemide 40 MG tablet    LASIX    10 tablet    Take 1 tablet (40 mg) by mouth 2 times daily    Bilateral lower extremity edema       glipiZIDE 2.5 MG 24 hr tablet    glipiZIDE XL    180 tablet    Take 1 tablet (2.5 mg) by mouth 2 times daily    Type 2 diabetes mellitus with diabetic neuropathy, without long-term current use of insulin (H)       levothyroxine 25 MCG tablet    SYNTHROID/LEVOTHROID    90 tablet    Take 1 tablet (25 mcg) by mouth daily    Hypothyroidism       losartan 25 MG tablet    COZAAR    5 tablet    Take 0.5 tablets (12.5 mg) by mouth daily    Type 2 diabetes mellitus with diabetic polyneuropathy, without long-term current use of insulin (H)       meclizine 25 MG tablet    ANTIVERT    90 tablet    Take 1 tablet (25 mg) by mouth 3 times daily as needed for dizziness    Lightheadedness       methocarbamol 500 MG tablet    ROBAXIN    20 tablet    Take 2 tablets (1,000 mg) by mouth 3 times daily as needed for muscle spasms    Cervicalgia        methylPREDNISolone 4 MG tablet    MEDROL DOSEPAK    21 tablet    Follow package instructions    Ulnar neuropathy at elbow of right upper extremity, Hx of decompression of ulnar nerve       NITROSTAT 0.4 MG sublingual tablet   Generic drug:  nitroGLYcerin      Place 1 tablet under the tongue as needed        nystatin [id number] UNIT/ML suspension    MYCOSTATIN    60 mL    Take 5 mLs (500,000 Units) by mouth 4 times daily    Candida, oral       * order for DME     2 Device    Equipment being ordered: compression socks, high strength, knee high, custom made 2 pairs, 11 refill    Other lymphedema       * order for DME     1 Units    Equipment being ordered: diabetic shoes    Type 2 diabetes mellitus with diabetic neuropathy (H)       * order for DME     1 each    Equipment being ordered: Rollator walker with seat    Morbid obesity due to excess calories (H), Type 2 diabetes mellitus with diabetic neuropathy (H), Fibromyalgia       * order for DME     1 each    Equipment being ordered: Lift Chair    Fibromyalgia, Left lumbar radiculitis, Morbid obesity, unspecified obesity type (H), Primary osteoarthritis involving multiple joints       * order for DME     1 Units    Equipment being ordered: diabetic shoes  Feet have been examined, no changes    Type 2 diabetes mellitus with diabetic neuropathy, without long-term current use of insulin (H)       * order for DME     1 each    by * route daily BLE knee high compression socks 15-20mmHg    Edema, unspecified type       pantoprazole 40 MG EC tablet    PROTONIX    90 tablet    Take 1 tablet (40 mg) by mouth daily Take 30-60 minutes before a meal.    Gastroesophageal reflux disease without esophagitis       phentermine 37.5 MG capsule     30 capsule    Take 1 capsule (37.5 mg) by mouth every morning    BMI 40.0-44.9, adult (H)       Potassium Chloride ER 20 MEQ Tbcr     30 tablet    Take 1 tablet (20 mEq) by mouth daily    Hypokalemia       pregabalin 50 MG capsule    LYRICA    90  capsule    Take 1 capsule (50 mg) by mouth 3 times daily    Diabetic polyneuropathy associated with type 2 diabetes mellitus (H)       sulfamethoxazole-trimethoprim 800-160 MG per tablet    BACTRIM DS/SEPTRA DS    14 tablet    Take 1 tablet by mouth 2 times daily    Urinary tract infection with hematuria, site unspecified       traMADol 50 MG tablet    ULTRAM    90 tablet    Take 1 tablet (50 mg) by mouth every 8 hours as needed for moderate pain Max #3/day. OK to dispense 3/31/17 to start 4/2/17.    Cervical pain       valACYclovir 1000 mg tablet    VALTREX    4 tablet    Take 2 tablets (2,000 mg) by mouth 2 times daily    HSV-1 infection       vitamin D 2000 UNITS tablet     90 tablet    Take 1 tablet by mouth daily    Type 2 diabetes mellitus with diabetic neuropathy, without long-term current use of insulin (H)       zolpidem 10 MG tablet    AMBIEN    30 tablet    Take 1 tablet (10 mg) by mouth nightly as needed for sleep    Other insomnia       * Notice:  This list has 6 medication(s) that are the same as other medications prescribed for you. Read the directions carefully, and ask your doctor or other care provider to review them with you.

## 2017-09-25 NOTE — PATIENT INSTRUCTIONS
Thank you for allowing us to participate in your care today.  Please find below your visit diagnosis and the plan going forward.    1. Ulnar neuropathy at elbow of right upper extremity    2. Hx of decompression of ulnar nerve      Hand therapy: [facility] - [phone]  Rx for medrol / steroid. Take in the AM and with food. No other anti-inflamatories while taking steroid.    Follow up after 4-6 hand therapy sessions  or sooner if needed. Call direct clinic number [[phone]] at any time with questions or concerns.    Chapito Simmons DO CAQSM  [facility]  Website: [email or web address]  Twitter: [email or web address]

## 2017-10-01 DIAGNOSIS — E11.42 TYPE 2 DIABETES MELLITUS WITH DIABETIC POLYNEUROPATHY, WITHOUT LONG-TERM CURRENT USE OF INSULIN (H): ICD-10-CM

## 2017-10-01 NOTE — TELEPHONE ENCOUNTER
Pending Prescriptions:                       Disp   Refills    losartan (COZAAR) 25 MG tablet            5 tabl*0            Sig: Take 0.5 tablets (12.5 mg) by mouth daily          Last Written Prescription Date: 09/20/2017  Last Fill Quantity: 5, # refills: 0  Last Office Visit with [facility], [facility] or [facility] prescribing provider: 08/18/2017       Potassium   Date Value Ref Range Status   04/11/2017 4.0 3.4 - 5.3 mmol/L Final     Creatinine   Date Value Ref Range Status   04/11/2017 1.02 0.52 - 1.04 mg/dL Final     BP Readings from Last 3 Encounters:   09/25/17 126/82   08/18/17 140/90   07/20/17 126/80     Vidal GARCIAT

## 2017-10-02 ENCOUNTER — TRANSFERRED RECORDS (OUTPATIENT)
Dept: HEALTH INFORMATION MANAGEMENT | Facility: CLINIC | Age: 57
End: 2017-10-02

## 2017-10-02 NOTE — TELEPHONE ENCOUNTER
Routing refill request to provider for review/approval because:  Elma given x1 and patient did not follow up, please advise  Michael Jasso RN, BSN

## 2017-10-03 RX ORDER — LOSARTAN POTASSIUM 25 MG/1
12.5 TABLET ORAL DAILY
Qty: 5 TABLET | Refills: 0 | OUTPATIENT
Start: 2017-10-03

## 2017-10-05 ENCOUNTER — THERAPY VISIT (OUTPATIENT)
Dept: OCCUPATIONAL THERAPY | Facility: CLINIC | Age: 57
End: 2017-10-05
Payer: MEDICARE

## 2017-10-05 DIAGNOSIS — M25.521 RIGHT ELBOW PAIN: Primary | ICD-10-CM

## 2017-10-05 DIAGNOSIS — G56.21 ULNAR NEUROPATHY AT ELBOW, RIGHT: ICD-10-CM

## 2017-10-05 PROCEDURE — 97110 THERAPEUTIC EXERCISES: CPT | Mod: GO | Performed by: OCCUPATIONAL THERAPIST

## 2017-10-05 PROCEDURE — 97112 NEUROMUSCULAR REEDUCATION: CPT | Mod: GO | Performed by: OCCUPATIONAL THERAPIST

## 2017-10-05 PROCEDURE — G8985 CARRY GOAL STATUS: HCPCS | Mod: GO | Performed by: OCCUPATIONAL THERAPIST

## 2017-10-05 PROCEDURE — G8984 CARRY CURRENT STATUS: HCPCS | Mod: GO | Performed by: OCCUPATIONAL THERAPIST

## 2017-10-05 PROCEDURE — 97165 OT EVAL LOW COMPLEX 30 MIN: CPT | Mod: GO | Performed by: OCCUPATIONAL THERAPIST

## 2017-10-05 NOTE — PROGRESS NOTES
Hand Therapy Initial Evaluation    Current Date:  10/5/2017          Procedure:  Ulnar nerve transposition  DOS: 01/31/2017         Subjective:  Ana Luisa Byers is a 57 year old right hand dominant female.    Patient reports symptoms of pain, weakness/loss of strength, edema, numbness and tingling  of the right elbow and hand which occurred due to an ulnar nerve transposition. Since onset symptoms are Gradually getting worse.  Special tests:  x-ray, EMG and MRI.  Previous treatment: Surgery, ice.    General health as reported by patient is good.  Pertinent medical history includes:osteoarthritis, diabetes, overweight, depression , fibromyalgia, kidney disease, anemia, migraines/headaches, sleep disorder/apnea, numbness/tingling, pain at rest/night, weakness, calf pain, swelling, warmth  Medical allergies:Bee.  Surgical history: orthopedic: back, right shoulder, left elbow.  Medication history: sleep, muscle relaxants, anti-depressants.    Occupational Profile Information:  Current occupation is Disable  Prior functional level:  no limitations  Barriers include:none  Mobility: No difficulty  Transportation: drives  Leisure activities/hobbies: Lives with fiance, bowling. Currently unable to bowl due to current problems. Goes to pool 3 x a week, walking.     Upper Extremity Functional Index Score:  SCORE:   Column Totals: /80: 68   (A lower score indicates greater disability.)      Objective:  Pain Level Report  VAS(0-10) 10/5/2017   At Rest: 8-9   With Use: 7-8     Report of Pain:  Location:  elbow and hand  Pain Quality:  Burning, Sharp and Shooting  Frequency: constant    Pain is worst:  nighttime  Exacerbated by:  Movement  Relieved by:  none  Progression:  Gradually getting worse  Edema:  MILD  Sensation: Decreased Ulnar Nerve distribution  Numbness/Tingling Level Report  VAS(0-10) 10/5/2017   At Rest: 10   With Use: 10     Special Tests:  Pain Report:  - none    + mild    ++ moderate    +++ severe    10/5/2017  "  Tinels at [place] +   Tinel's at [place] -   Elbow Flexion Test 30 secs   Ulnar nerve subluxation at elbow -   MOMO  NT   ULTT ulnar nerve ~20 %   Froment's Test +     Ulnar Nerve MMT: (Scale 0/5)   10/5/2017   Finger Adduction 4/5   Finger Abduction 3+/5   Adductor Pollicis 3+/5     Strength:   (Measured in pounds)  Pain Report:  - none    + mild    ++ moderate    +++ severe     10/5/2017   Trials R L   1  2  3 14 20   Average:       Lat Pinch  10/5/2017   Trials R L   1  2  3 5 11   Average:       3 Pt Pinch  10/5/2017   Trials R L   1  2  3 Unable to due to \"zingers\" 7   Average:         Assessment:  Patient presents with symptoms consistent with diagnosis of ulnar neuropathy with ulnar nerve transposition,  with surgical  intervention.     Patient's limitations or Problem List includes:  Pain, Increased edema, Weakness, Sensory disturbance, Adherent scarring, Decreased , Decreased pinch, Tightness in musculature and Adherence in connective tissue of the right elbow which interferes with the patient's ability to perform Self Care Tasks (dressing), Sleep Patterns, Recreational Activities, Household Chores and Driving  as compared to previous level of function.    Rehab Potential:  Good - Return to full activity, some limitations    Patient will benefit from skilled Occupational Therapy to increase  strength and sensation and decrease pain, edema and adherence of scarring to return to previous activity level and resume normal daily tasks and to reach their rehab potential.    Barriers to Learning:  No barrier    Communication Issues:  Patient appears to be able to clearly communicate and understand verbal and written communication and follow directions correctly.    Chart Review: Chart Review and Brief history including review of medical and/or therapy records relating to the presenting problem    Identified Performance Deficits: dressing, hygiene and grooming, care of pets, driving and " community mobility, health management and maintenance, home establishment and management and meal preparation and cleanup    Assessment of Occupational Performance:  5 or more Performance Deficits    Clinical Decision Making (Complexity): Low complexity    Treatment Explanation:  The following has been discussed with the patient:  RX ordered/plan of care  Anticipated outcomes  Possible risks and side effects    Plan:  Frequency:  1 X week, once daily  Duration:  for 6 weeks    Treatment Plan:    Modalities:  US  Therapeutic Exercise:  AROM, AAROM, PROM, Tendon Gliding, Isotonics, Isometrics and Stabilization  Neuromuscular re-education:  Nerve Gliding, Sensory re-education, Desensitization and Isometrics  Manual Techniques:  Joint mobilization, Scar mobilization, Friction massage, Myofascial release and Manual edema mobilization  Orthotic Fabrication:  Static orthosis and elbow pad  Self Care:  Self Care Tasks and Ergonomic Considerations  Discharge Plan:  Achieve all LTG.  Independent in home treatment program.  Reach maximal therapeutic benefit.    Home Exercise Program:  Elbow Flex/ext AROM  Proximal ulnar nerve glide  Distal ulnar nerve glide    Next Visit:  Progress as able

## 2017-10-05 NOTE — LETTER
DEPARTMENT OF HEALTH AND HUMAN SERVICES  CENTERS FOR MEDICARE & MEDICAID SERVICES    PLAN/UPDATED PLAN OF PROGRESS FOR OUTPATIENT REHABILITATION    PATIENTS NAME:  Ana Luisa Byers   : 1960  PROVIDER NUMBER:  [id number]  [id number]  PROVIDER NAME: [facility]  MEDICAL RECORD NUMBER: [id number]   START OF CARE DATE:    SOC Date: 10/05/17   TYPE:  OT    PRIMARY/TREATMENT DIAGNOSIS: (Pertinent Medical Diagnosis)   Right elbow pain  Ulnar neuropathy at elbow, right    VISITS FROM START OF CARE:  Rxs Used: 1     Hand Therapy Initial Evaluation    Current Date:  10/5/2017     Procedure:  Ulnar nerve transposition  DOS: 2017    Subjective:  Ana Luisa Byers is a 57 year old right hand dominant female.  Patient reports symptoms of pain, weakness/loss of strength, edema, numbness and tingling  of the right elbow and hand which occurred due to an ulnar nerve transposition. Since onset symptoms are Gradually getting worse.  Special tests:  x-ray, EMG and MRI.  Previous treatment: Surgery, ice.    General health as reported by patient is good.  Pertinent medical history includes:osteoarthritis, diabetes, overweight, depression , fibromyalgia, kidney disease, anemia, migraines/headaches, sleep disorder/apnea, numbness/tingling, pain at rest/night, weakness, calf pain, swelling, warmth  Medical allergies:Bee.  Surgical history: orthopedic: back, right shoulder, left elbow.  Medication history: sleep, muscle relaxants, anti-depressants.  Occupational Profile Information:  Current occupation is Disable  Prior functional level:  no limitations  Barriers include:none  Mobility: No difficulty  Transportation: drives  Leisure activities/hobbies: Lives with fiance, bowling. Currently unable to bowl due to current problems. Goes to pool 3 x a week, walking.     Upper Extremity Functional Index Score:  SCORE:   Column Totals: /80: 68   (A lower score indicates greater disability.)  Objective:  Pain Level  "Report  VAS(0-10) 10/5/2017   At Rest: 8-9   With Use: 7-8   Report of Pain:  Location:  elbow and hand  Pain Quality:  Burning, Sharp and Shooting  Frequency: constant    Pain is worst:  nighttime  Exacerbated by:  Movement  Relieved by:  none  Progression:  Gradually getting worse    PATIENTS NAME:  Ana Luisa Byers   : 1960  Edema:  MILD  Sensation: Decreased Ulnar Nerve distribution  Numbness/Tingling Level Report  VAS(0-10) 10/5/2017   At Rest: 10   With Use: 10   Special Tests:  Pain Report:  - none    + mild    ++ moderate    +++ severe    10/5/2017   Tinels at cubital tunnel +   Tinel's at [place] -   Elbow Flexion Test 30 secs   Ulnar nerve subluxation at elbow -   MOMO  NT   ULTT ulnar nerve ~20 %   Froment's Test +   Ulnar Nerve MMT: (Scale 0/5)   10/5/2017   Finger Adduction 4/5   Finger Abduction 3+/5   Adductor Pollicis 3+/5   Strength:   (Measured in pounds)  Pain Report:  - none    + mild    ++ moderate    +++ severe     10/5/2017   Trials R L   1  2  3 14 20   Average:       Lat Pinch  10/5/2017   Trials R L   1  2  3 5 11   Average:       3 Pt Pinch  10/5/2017   Trials R L   1  2  3 Unable to due to \"zingers\" 7   Average:       Assessment:  Patient presents with symptoms consistent with diagnosis of ulnar neuropathy with ulnar nerve transposition,  with surgical  intervention.   Patient's limitations or Problem List includes:  Pain, Increased edema, Weakness, Sensory disturbance, Adherent scarring, Decreased , Decreased pinch, Tightness in musculature and Adherence in connective tissue of the right elbow which interferes with the patient's ability to perform Self Care Tasks (dressing), Sleep Patterns, Recreational Activities, Household Chores and Driving  as compared to previous level of function.    Rehab Potential:  Good - Return to full activity, some limitations    Patient will benefit from skilled Occupational Therapy to increase  strength and sensation and decrease " pain, edema and adherence of scarring to return to previous activity level and resume normal daily tasks and to reach their rehab potential.  PATIENTS NAME:  Ana Luisa Byers   : 1960    Barriers to Learning:  No barrier    Communication Issues:  Patient appears to be able to clearly communicate and understand verbal and written communication and follow directions correctly.    Chart Review: Chart Review and Brief history including review of medical and/or therapy records relating to the presenting problem    Identified Performance Deficits: dressing, hygiene and grooming, care of pets, driving and community mobility, health management and maintenance, home establishment and management and meal preparation and cleanup    Assessment of Occupational Performance:  5 or more Performance Deficits    Clinical Decision Making (Complexity): Low complexity    Treatment Explanation:  The following has been discussed with the patient:  RX ordered/plan of care  Anticipated outcomes  Possible risks and side effects    Plan:  Frequency:  1 X week, once daily  Duration:  for 6 weeks    Treatment Plan:    Modalities:  US  Therapeutic Exercise:  AROM, AAROM, PROM, Tendon Gliding, Isotonics, Isometrics and Stabilization  Neuromuscular re-education:  Nerve Gliding, Sensory re-education, Desensitization and Isometrics  Manual Techniques:  Joint mobilization, Scar mobilization, Friction massage, Myofascial release and Manual edema mobilization  Orthotic Fabrication:  Static orthosis and elbow pad  Self Care:  Self Care Tasks and Ergonomic Considerations  Discharge Plan:  Achieve all LTG.  Independent in home treatment program.  Reach maximal therapeutic benefit.    Home Exercise Program:  Elbow Flex/ext AROM  Proximal ulnar nerve glide  Distal ulnar nerve glide    Next Visit:  Progress as able    Caregiver Signature/Credentials ______________________________ Date ________      Treating Provider: Yamini JONAS/OSCAR    I have  "reviewed and certified the need for these services and plan of treatment while under my care.        PHYSICIAN'S SIGNATURE:   _____________________________________  Date___________      Chapito Simmons MD    Certification period: Beginning of Cert date period: 10/05/17 End of Cert period date: 01/03/18     Functional Level Progress Report: Please see attached \"Goal Flow sheet for Functional level.\"    ___X_____ Continue Services or       ________ DC Services                Service dates: SOC Date: 10/05/17  to present                                                                     "

## 2017-10-05 NOTE — MR AVS SNAPSHOT
After Visit Summary   10/5/2017    AnaL uisa Byers    MRN: [id number]           Patient Information     Date Of Birth          1960        Visit Information        Provider Department      10/5/2017 9:30 AM Yamini Weinberg OT [facility]        Today's Diagnoses     Right elbow pain    -  1    Ulnar neuropathy at elbow, right           Follow-ups after your visit        Your next 10 appointments already scheduled     Oct 13, 2017  7:00 AM CDT   [facility] with SHA[facility] ([facility]  )    [address] [facility]  [address]  [facility] [address]   [phone]              Who to contact     If you have questions or need follow up information about today's clinic visit or your schedule please contact [facility] directly at [phone].  Normal or non-critical lab and imaging results will be communicated to you by MyChart, letter or phone within 4 business days after the clinic has received the results. If you do not hear from us within 7 days, please contact the clinic through [employer] or phone. If you have a critical or abnormal lab result, we will notify you by phone as soon as possible.  Submit refill requests through [employer] or call your pharmacy and they will forward the refill request to us. Please allow 3 business days for your refill to be completed.          Additional Information About Your Visit        MyChart Information     [employer] gives you secure access to your electronic health record. If you see a primary care provider, you can also send messages to your care team and make appointments. If you have questions, please call your primary care clinic.  If you do not have a primary care provider, please call [phone] and they will assist you.        Care EveryWhere ID     This is your Care EveryWhere ID. This could be used by other organizations to access your [place] medical records  [id number]        Your Vitals Were      Last Period                   02/13/2009            Blood Pressure from Last 3 Encounters:   09/25/17 126/82   08/18/17 140/90   07/20/17 126/80    Weight from Last 3 Encounters:   09/25/17 117.9 kg (260 lb)   08/18/17 117.3 kg (258 lb 11.2 oz)   07/20/17 120.7 kg (266 lb)              We Performed the Following     HC OT EVAL, LOW COMPLEXITY     [facility] CERT REPORT     [facility] INITIAL EVAL REPORT     NEUROMUSCULAR RE-EDUCATION     THERAPEUTIC EXERCISES        Primary Care Provider Office Phone # Fax #    Bridget Ambrocio Garza [phone] [address] [address] [facility] [address]        Equal Access to Services     BOB LUCIA : Hadii jonathan bermano Sotimothy, waaxda luqadaha, qaybta kaalmada adeegyada, pool lopez. So [facility] [phone].    ATENCIÓN: Si habla español, tiene a valerio disposición servicios gratuitos de asistencia lingüística. Llame al [phone].    We comply with applicable federal civil rights laws and [place] laws. We do not discriminate on the basis of race, color, national origin, age, disability, sex, sexual orientation, or gender identity.            Thank you!     Thank you for choosing [facility]  for your care. Our goal is always to provide you with excellent care. Hearing back from our patients is one way we can continue to improve our services. Please take a few minutes to complete the written survey that you may receive in the mail after your visit with us. Thank you!             Your Updated Medication List - Protect others around you: Learn how to safely use, store and throw away your medicines at www.disposemymeds.org.          This list is accurate as of: 10/5/17  1:20 PM.  Always use your most recent med list.                   Brand Name Dispense Instructions for use Diagnosis    aspirin 81 MG chewable tablet     108 tablet    Take 1 tablet (81 mg) by mouth daily    Diabetes mellitus, type 2 (H)       atorvastatin 40 MG tablet     LIPITOR    90 tablet    Take 1 tablet (40 mg) by mouth daily    Hyperlipidemia LDL goal <100       DULoxetine 30 MG EC capsule    CYMBALTA    90 capsule    Take 1 capsule (30 mg) by mouth daily Take along with 60 mg capsule    Myofascial pain, Chronic pain associated with significant psychosocial dysfunction       EPINEPHrine 0.3 MG/0.3ML injection 2-pack    EPIPEN/ADRENACLICK/or ANY BX GENERIC EQUIV    2 each    Inject 0.3 mLs (0.3 mg) into the muscle once as needed for anaphylaxis    Bee sting allergy       furosemide 40 MG tablet    LASIX    10 tablet    Take 1 tablet (40 mg) by mouth 2 times daily    Bilateral lower extremity edema       glipiZIDE 2.5 MG 24 hr tablet    glipiZIDE XL    180 tablet    Take 1 tablet (2.5 mg) by mouth 2 times daily    Type 2 diabetes mellitus with diabetic neuropathy, without long-term current use of insulin (H)       levothyroxine 25 MCG tablet    SYNTHROID/LEVOTHROID    90 tablet    Take 1 tablet (25 mcg) by mouth daily    Hypothyroidism       losartan 25 MG tablet    COZAAR    5 tablet    Take 0.5 tablets (12.5 mg) by mouth daily    Type 2 diabetes mellitus with diabetic polyneuropathy, without long-term current use of insulin (H)       meclizine 25 MG tablet    ANTIVERT    90 tablet    Take 1 tablet (25 mg) by mouth 3 times daily as needed for dizziness    Lightheadedness       methocarbamol 500 MG tablet    ROBAXIN    20 tablet    Take 2 tablets (1,000 mg) by mouth 3 times daily as needed for muscle spasms    Cervicalgia       methylPREDNISolone 4 MG tablet    MEDROL DOSEPAK    21 tablet    Follow package instructions    Ulnar neuropathy at elbow of right upper extremity, Hx of decompression of ulnar nerve       NITROSTAT 0.4 MG sublingual tablet   Generic drug:  nitroGLYcerin      Place 1 tablet under the tongue as needed        nystatin 561040 UNIT/ML suspension    MYCOSTATIN    60 mL    Take 5 mLs (500,000 Units) by mouth 4 times daily    Candida, oral       * order for DME      2 Device    Equipment being ordered: compression socks, high strength, knee high, custom made 2 pairs, 11 refill    Other lymphedema       * order for DME     1 Units    Equipment being ordered: diabetic shoes    Type 2 diabetes mellitus with diabetic neuropathy (H)       * order for DME     1 each    Equipment being ordered: Rollator walker with seat    Morbid obesity due to excess calories (H), Type 2 diabetes mellitus with diabetic neuropathy (H), Fibromyalgia       * order for DME     1 each    Equipment being ordered: Lift Chair    Fibromyalgia, Left lumbar radiculitis, Morbid obesity, unspecified obesity type (H), Primary osteoarthritis involving multiple joints       * order for DME     1 Units    Equipment being ordered: diabetic shoes  Feet have been examined, no changes    Type 2 diabetes mellitus with diabetic neuropathy, without long-term current use of insulin (H)       * order for DME     1 each    by * route daily BLE knee high compression socks 15-20mmHg    Edema, unspecified type       pantoprazole 40 MG EC tablet    PROTONIX    90 tablet    Take 1 tablet (40 mg) by mouth daily Take 30-60 minutes before a meal.    Gastroesophageal reflux disease without esophagitis       phentermine 37.5 MG capsule     30 capsule    Take 1 capsule (37.5 mg) by mouth every morning    BMI 40.0-44.9, adult (H)       Potassium Chloride ER 20 MEQ Tbcr     30 tablet    Take 1 tablet (20 mEq) by mouth daily    Hypokalemia       pregabalin 50 MG capsule    LYRICA    90 capsule    Take 1 capsule (50 mg) by mouth 3 times daily    Diabetic polyneuropathy associated with type 2 diabetes mellitus (H)       sulfamethoxazole-trimethoprim 800-160 MG per tablet    BACTRIM DS/SEPTRA DS    14 tablet    Take 1 tablet by mouth 2 times daily    Urinary tract infection with hematuria, site unspecified       traMADol 50 MG tablet    ULTRAM    90 tablet    Take 1 tablet (50 mg) by mouth every 8 hours as needed for moderate pain Max  #3/day. OK to dispense 3/31/17 to start 4/2/17.    Cervical pain       valACYclovir 1000 mg tablet    VALTREX    4 tablet    Take 2 tablets (2,000 mg) by mouth 2 times daily    HSV-1 infection       vitamin D 2000 UNITS tablet     90 tablet    Take 1 tablet by mouth daily    Type 2 diabetes mellitus with diabetic neuropathy, without long-term current use of insulin (H)       zolpidem 10 MG tablet    AMBIEN    30 tablet    Take 1 tablet (10 mg) by mouth nightly as needed for sleep    Other insomnia       * Notice:  This list has 6 medication(s) that are the same as other medications prescribed for you. Read the directions carefully, and ask your doctor or other care provider to review them with you.

## 2017-10-09 ENCOUNTER — TELEPHONE (OUTPATIENT)
Dept: FAMILY MEDICINE | Facility: CLINIC | Age: 57
End: 2017-10-09

## 2017-10-09 NOTE — TELEPHONE ENCOUNTER
Andra sent  Pt needs to check with her insurance to see who it in her network and let us know    Michael Jasso RN, BSN

## 2017-10-09 NOTE — TELEPHONE ENCOUNTER
Patient has sore on her arms, Dr. Garza referred her to [facility], that clinic told patient to see Derm at [facility] in [place], but they don't except her insurance. Patient needs a referral to derm that will accept her insurance.  Karen Wilde

## 2017-10-12 ENCOUNTER — OFFICE VISIT (OUTPATIENT)
Dept: FAMILY MEDICINE | Facility: CLINIC | Age: 57
End: 2017-10-12
Payer: MEDICARE

## 2017-10-12 VITALS
SYSTOLIC BLOOD PRESSURE: 120 MMHG | WEIGHT: 259 LBS | TEMPERATURE: 98.3 F | DIASTOLIC BLOOD PRESSURE: 86 MMHG | BODY MASS INDEX: 43.15 KG/M2 | HEIGHT: 65 IN | HEART RATE: 93 BPM

## 2017-10-12 DIAGNOSIS — K13.70 ORAL LESION: ICD-10-CM

## 2017-10-12 DIAGNOSIS — L03.113 CELLULITIS OF RIGHT UPPER EXTREMITY: Primary | ICD-10-CM

## 2017-10-12 PROCEDURE — 99213 OFFICE O/P EST LOW 20 MIN: CPT | Performed by: FAMILY MEDICINE

## 2017-10-12 RX ORDER — SULFAMETHOXAZOLE/TRIMETHOPRIM 800-160 MG
1 TABLET ORAL 2 TIMES DAILY
Qty: 10 TABLET | Refills: 0 | Status: SHIPPED | OUTPATIENT
Start: 2017-10-12 | End: 2017-12-05

## 2017-10-12 RX ORDER — FLUCONAZOLE 200 MG/1
TABLET ORAL 2 TIMES DAILY
Refills: 0 | COMMUNITY
Start: 2017-10-02 | End: 2018-02-06

## 2017-10-12 NOTE — PATIENT INSTRUCTIONS
Discharge Instructions for Cellulitis  You have been diagnosed with cellulitis. This is an infection in the deepest layer of the skin. In some cases, the infection also affects the muscle. Cellulitis is caused by bacteria. The bacteria can enter the body through broken skin. This can happen with a cut, scratch, animal bite, or an insect bite that has been scratched. You may have been treated in the hospital with antibiotics and fluids. You will likely be given a prescription for antibiotics to take at home. This sheet will help you take care of yourself at home.  Home care  When you are home:    Take the prescribed antibiotic medicine you are given as directed until it is gone. Take it even if you feel better. It treats the infection and stops it from returning. Not taking all the medicine can make future infections hard to treat.    Keep the infected area clean.    When possible, raise the infected area above the level of your heart. This helps keep swelling down.    Talk with your healthcare provider if you are in pain. Ask what kind of over-the-counter medicine you can take for pain.    Apply clean bandages as advised.    Take your temperature once a day for a week.    Wash your hands often to prevent spreading the infection.  In the future, wash your hands before and after you touch cuts, scratches, or bandages. This will help prevent infection.   When to call your healthcare provider  Call your healthcare provider immediately if you have any of the following:    Difficulty or pain when moving the joints above or below the infected area    Discharge or pus draining from the area    Fever of 100.4 F (38 C) or higher, or as directed by your healthcare provider    Pain that gets worse in or around the infected     Redness that gets worse in or around the infected area, particularly if the area of redness expands to a wider area    Shaking chills    Swelling of the infected area    Vomiting   Date Last Reviewed:  8/1/2016 2000-2017 [employer]. [address] [place] [address], [place] [address]. All rights reserved. This information is not intended as a substitute for professional medical care. Always follow your healthcare professional's instructions.

## 2017-10-12 NOTE — MR AVS SNAPSHOT
After Visit Summary   10/12/2017    Ana Luisa Byers    MRN: [id number]           Patient Information     Date Of Birth          1960        Visit Information        Provider Department      10/12/2017 2:45 PM Bridget Garza MD [facility]        Today's Diagnoses     Cellulitis of right upper extremity    -  1      Care Instructions      Discharge Instructions for Cellulitis  You have been diagnosed with cellulitis. This is an infection in the deepest layer of the skin. In some cases, the infection also affects the muscle. Cellulitis is caused by bacteria. The bacteria can enter the body through broken skin. This can happen with a cut, scratch, animal bite, or an insect bite that has been scratched. You may have been treated in the hospital with antibiotics and fluids. You will likely be given a prescription for antibiotics to take at home. This sheet will help you take care of yourself at home.  Home care  When you are home:    Take the prescribed antibiotic medicine you are given as directed until it is gone. Take it even if you feel better. It treats the infection and stops it from returning. Not taking all the medicine can make future infections hard to treat.    Keep the infected area clean.    When possible, raise the infected area above the level of your heart. This helps keep swelling down.    Talk with your healthcare provider if you are in pain. Ask what kind of over-the-counter medicine you can take for pain.    Apply clean bandages as advised.    Take your temperature once a day for a week.    Wash your hands often to prevent spreading the infection.  In the future, wash your hands before and after you touch cuts, scratches, or bandages. This will help prevent infection.   When to call your healthcare provider  Call your healthcare provider immediately if you have any of the following:    Difficulty or pain when moving the joints above or below the infected  area    Discharge or pus draining from the area    Fever of 100.4 F (38 C) or higher, or as directed by your healthcare provider    Pain that gets worse in or around the infected     Redness that gets worse in or around the infected area, particularly if the area of redness expands to a wider area    Shaking chills    Swelling of the infected area    Vomiting   Date Last Reviewed: 8/1/2016 2000-2017 [employer]. [address] [place] [address], [place] [address]. All rights reserved. This information is not intended as a substitute for professional medical care. Always follow your healthcare professional's instructions.                Follow-ups after your visit        Your next 10 appointments already scheduled     Oct 13, 2017  7:00 AM CDT   [facility] with Yamini Weinberg OT   [facility] ([facility]  )    [address] [address] [address]   [phone]              Who to contact     If you have questions or need follow up information about today's clinic visit or your schedule please contact [facility] directly at [phone].  Normal or non-critical lab and imaging results will be communicated to you by [employer], letter or phone within 4 business days after the clinic has received the results. If you do not hear from us within 7 days, please contact the clinic through [employer] or phone. If you have a critical or abnormal lab result, we will notify you by phone as soon as possible.  Submit refill requests through [employer] or call your pharmacy and they will forward the refill request to us. Please allow 3 business days for your refill to be completed.          Additional Information About Your Visit        [employer] Information     [employer] gives you secure access to your electronic health record. If you see a primary care provider, you can also send messages to your care team and make appointments. If you have questions, please call your primary care clinic.  " If you do not have a primary care provider, please call [phone] and they will assist you.        Care EveryWhere ID     This is your Care EveryWhere ID. This could be used by other organizations to access your [facility] medical records  [id number]        Your Vitals Were     Pulse Temperature Height Last Period Breastfeeding? BMI (Body Mass Index)    93 98.3  F (36.8  C) (Oral) 5' 5\" (1.651 m) 02/13/2009 No 43.1 kg/m2       Blood Pressure from Last 3 Encounters:   10/12/17 120/86   09/25/17 126/82   08/18/17 140/90    Weight from Last 3 Encounters:   10/12/17 259 lb (117.5 kg)   09/25/17 260 lb (117.9 kg)   08/18/17 258 lb 11.2 oz (117.3 kg)              Today, you had the following     No orders found for display         Today's Medication Changes          These changes are accurate as of: 10/12/17  3:23 PM.  If you have any questions, ask your nurse or doctor.               These medicines have changed or have updated prescriptions.        Dose/Directions    * order for DME   This may have changed:  Another medication with the same name was removed. Continue taking this medication, and follow the directions you see here.   Used for:  Other lymphedema   Changed by:  Marlin Ayala MD        Equipment being ordered: compression socks, high strength, knee high, custom made 2 pairs, 11 refill   Quantity:  2 Device   Refills:  11       * order for DME   This may have changed:  Another medication with the same name was removed. Continue taking this medication, and follow the directions you see here.   Used for:  Type 2 diabetes mellitus with diabetic neuropathy (H)   Changed by:  Bridget Garza MD        Equipment being ordered: diabetic shoes   Quantity:  1 Units   Refills:  0       * order for DME   This may have changed:  Another medication with the same name was removed. Continue taking this medication, and follow the directions you see here.   Used for:  Morbid obesity due to excess " calories (H), Type 2 diabetes mellitus with diabetic neuropathy (H), Fibromyalgia   Changed by:  Bridget Garza MD        Equipment being ordered: Rollator walker with seat   Quantity:  1 each   Refills:  0       * order for DME   This may have changed:  Another medication with the same name was removed. Continue taking this medication, and follow the directions you see here.   Used for:  Type 2 diabetes mellitus with diabetic neuropathy, without long-term current use of insulin (H)   Changed by:  Bridget Garza MD        Equipment being ordered: diabetic shoes  Feet have been examined, no changes   Quantity:  1 Units   Refills:  0       * order for DME   This may have changed:  Another medication with the same name was removed. Continue taking this medication, and follow the directions you see here.   Used for:  Edema, unspecified type   Changed by:  Yahaira Phan, OT        by * route daily BLE knee high compression socks 15-20mmHg   Quantity:  1 each   Refills:  0       * Notice:  This list has 5 medication(s) that are the same as other medications prescribed for you. Read the directions carefully, and ask your doctor or other care provider to review them with you.      Stop taking these medicines if you haven't already. Please contact your care team if you have questions.     meclizine 25 MG tablet   Commonly known as:  ANTIVERT   Stopped by:  Bridget Garza MD           methylPREDNISolone 4 MG tablet   Commonly known as:  MEDROL DOSEPAK   Stopped by:  Bridget Garza MD           nystatin 202681 UNIT/ML suspension   Commonly known as:  MYCOSTATIN   Stopped by:  Bridget Garza MD           Potassium Chloride ER 20 MEQ Tbcr   Stopped by:  Bridget Garza MD           traMADol 50 MG tablet   Commonly known as:  ULTRAM   Stopped by:  Bridget Garza MD           valACYclovir 1000 mg tablet   Commonly known as:  VALTREX   Stopped by:  Bridget Garza MD                 Where to get your medicines      These medications were sent to [id number] - [facility] [address] [facility] [address]  [address] [facility], [facility] [address]     Phone:  [phone]     sulfamethoxazole-trimethoprim 800-160 MG per tablet                Primary Care Provider Office Phone # Fax #    Bridget Ambrocio Garza [phone] [address]  [facility] [address]        Equal Access to Services     [facility] : Hadii aad ku hadasho Soomaali, waaxda luqadaha, qaybta kaalmada adeegyada, waxay idiin hayaan philip kharajunaid hernandez . So [facility] [phone].    ATENCIÓN: Si habla español, tiene a valerio disposición servicios gratuitos de asistencia lingüística. [facility] [phone].    We comply with applicable federal civil rights laws and [place] laws. We do not discriminate on the basis of race, color, national origin, age, disability, sex, sexual orientation, or gender identity.            Thank you!     Thank you for choosing [facility]  for your care. Our goal is always to provide you with excellent care. Hearing back from our patients is one way we can continue to improve our services. Please take a few minutes to complete the written survey that you may receive in the mail after your visit with us. Thank you!             Your Updated Medication List - Protect others around you: Learn how to safely use, store and throw away your medicines at www.disposemymeds.org.          This list is accurate as of: 10/12/17  3:23 PM.  Always use your most recent med list.                   Brand Name Dispense Instructions for use Diagnosis    aspirin 81 MG chewable tablet     108 tablet    Take 1 tablet (81 mg) by mouth daily    Diabetes mellitus, type 2 (H)       atorvastatin 40 MG tablet    LIPITOR    90 tablet    Take 1 tablet (40 mg) by mouth daily    Hyperlipidemia LDL goal <100       clotrimazole 10 MG Lozg lozenge    MYCELEX     every 4  hours    Cellulitis of right upper extremity       DULoxetine 30 MG EC capsule    CYMBALTA    90 capsule    Take 1 capsule (30 mg) by mouth daily Take along with 60 mg capsule    Myofascial pain, Chronic pain associated with significant psychosocial dysfunction       EPINEPHrine 0.3 MG/0.3ML injection 2-pack    EPIPEN/ADRENACLICK/or ANY BX GENERIC EQUIV    2 each    Inject 0.3 mLs (0.3 mg) into the muscle once as needed for anaphylaxis    Bee sting allergy       fluconazole 200 MG tablet    DIFLUCAN     2 times daily    Cellulitis of right upper extremity       furosemide 40 MG tablet    LASIX    10 tablet    Take 1 tablet (40 mg) by mouth 2 times daily    Bilateral lower extremity edema       glipiZIDE 2.5 MG 24 hr tablet    glipiZIDE XL    180 tablet    Take 1 tablet (2.5 mg) by mouth 2 times daily    Type 2 diabetes mellitus with diabetic neuropathy, without long-term current use of insulin (H)       levothyroxine 25 MCG tablet    SYNTHROID/LEVOTHROID    90 tablet    Take 1 tablet (25 mcg) by mouth daily    Hypothyroidism       losartan 25 MG tablet    COZAAR    5 tablet    Take 0.5 tablets (12.5 mg) by mouth daily    Type 2 diabetes mellitus with diabetic polyneuropathy, without long-term current use of insulin (H)       methocarbamol 500 MG tablet    ROBAXIN    20 tablet    Take 2 tablets (1,000 mg) by mouth 3 times daily as needed for muscle spasms    Cervicalgia       NITROSTAT 0.4 MG sublingual tablet   Generic drug:  nitroGLYcerin      Place 1 tablet under the tongue as needed        * order for DME     2 Device    Equipment being ordered: compression socks, high strength, knee high, custom made 2 pairs, 11 refill    Other lymphedema       * order for DME     1 Units    Equipment being ordered: diabetic shoes    Type 2 diabetes mellitus with diabetic neuropathy (H)       * order for DME     1 each    Equipment being ordered: Rollator walker with seat    Morbid obesity due to excess calories (H), Type 2  diabetes mellitus with diabetic neuropathy (H), Fibromyalgia       * order for DME     1 Units    Equipment being ordered: diabetic shoes  Feet have been examined, no changes    Type 2 diabetes mellitus with diabetic neuropathy, without long-term current use of insulin (H)       * order for DME     1 each    by * route daily BLE knee high compression socks 15-20mmHg    Edema, unspecified type       pantoprazole 40 MG EC tablet    PROTONIX    90 tablet    Take 1 tablet (40 mg) by mouth daily Take 30-60 minutes before a meal.    Gastroesophageal reflux disease without esophagitis       phentermine 37.5 MG capsule     30 capsule    Take 1 capsule (37.5 mg) by mouth every morning    BMI 40.0-44.9, adult (H)       pregabalin 50 MG capsule    LYRICA    90 capsule    Take 1 capsule (50 mg) by mouth 3 times daily    Diabetic polyneuropathy associated with type 2 diabetes mellitus (H)       sulfamethoxazole-trimethoprim 800-160 MG per tablet    BACTRIM DS/SEPTRA DS    10 tablet    Take 1 tablet by mouth 2 times daily    Cellulitis of right upper extremity       vitamin D 2000 UNITS tablet     90 tablet    Take 1 tablet by mouth daily    Type 2 diabetes mellitus with diabetic neuropathy, without long-term current use of insulin (H)       zolpidem 10 MG tablet    AMBIEN    30 tablet    Take 1 tablet (10 mg) by mouth nightly as needed for sleep    Other insomnia       * Notice:  This list has 5 medication(s) that are the same as other medications prescribed for you. Read the directions carefully, and ask your doctor or other care provider to review them with you.

## 2017-10-12 NOTE — PROGRESS NOTES
SUBJECTIVE:   Ana Luisa Byers is a 57 year old female who presents to clinic today for the following health issues:      Had biopsies of lesions in her arms 10 days ago. Since then, lesion on her right arm becoming increasingly red around biopsy site. No drainage.     Other sites healing well. Hasn't heard back on biopsy results.       Was started on clotrimazole troches and fluconazole to treat resistant thrush. Now has an oral sore that is painful. No hx of cold sores. Called her MD office, was told to stop meds. Hasn't heard any other direction.         Problem list and histories reviewed & adjusted, as indicated.  Additional history: none    Patient Active Problem List   Diagnosis     Mild major depression (H)     Chronic pain associated with significant psychosocial dysfunction     Osteoarthritis     S/P shoulder replacement     Cluster headaches     Fibromyalgia     Plantar fasciitis     Other insomnia     HTN, goal below 140/90     Gastroesophageal reflux disease without esophagitis     Restless legs syndrome (RLS)     CKD (chronic kidney disease) stage 2, GFR 60-89 ml/min     HSV-1 infection     Left lumbar radiculitis     Acquired hypothyroidism     BMI 40.0-44.9, adult (H)     Type 2 diabetes mellitus with diabetic polyneuropathy, without long-term current use of insulin (H)     Bilateral lower extremity edema     Right elbow pain     Ulnar neuropathy at elbow, right     Past Surgical History:   Procedure Laterality Date     ARTHROPLASTY SHOULDER  7/25/2013    Procedure: ARTHROPLASTY SHOULDER;  RIGHT TOTAL SHOULDER ARTHROPLASTY (TORNIER AFFINITY SHOULDER SYSTEM)^;  Surgeon: Dung Morales MD;  Location:  OR     BACK SURGERY  2011    L45 laminectomy     C APPENDECTOMY  age 19     C LIGATE FALLOPIAN TUBE,POSTPARTUM  1982    Tubal Ligation     ELBOW WRIST HAND FINGER ORTHOSIS, RIGID, WITHOUT JOINTS, MAY INCLUDE SOFT  5/2007    put in Main     ESOPHAGOSCOPY, GASTROSCOPY, DUODENOSCOPY (EGD), COMBINED  " 9/23/2015    Dr. Thomas [facility]     ESOPHAGOSCOPY, GASTROSCOPY, DUODENOSCOPY (EGD), COMBINED N/A 9/23/2015    Procedure: COMBINED ESOPHAGOSCOPY, GASTROSCOPY, DUODENOSCOPY (EGD), BIOPSY SINGLE OR MULTIPLE;  Surgeon: Jose Thomas MD;  Location: [facility]     HCL PAP SMEAR  6/2008    WNL     ORTHOPEDIC SURGERY      left shoulder scoped and plate left elbow     SURGICAL HISTORY OF -   2009    ulnar nerve release, carpal tunnel- left       Social History   Substance Use Topics     Smoking status: Never Smoker     Smokeless tobacco: Never Used     Alcohol use No     Family History   Problem Relation Age of Onset     C.A.D. Mother      Neurologic Disorder Mother      lupus     Depression Mother      Family History Negative Father      DIABETES Maternal Aunt      Colon Cancer No family hx of              Reviewed and updated as needed this visit by clinical staff       Reviewed and updated as needed this visit by Provider         ROS:  Constitutional, HEENT, cardiovascular, pulmonary, gi and gu systems are negative, except as otherwise noted.      OBJECTIVE:   /86 (BP Location: Right arm, Patient Position: Sitting, Cuff Size: Adult Large)  Pulse 93  Temp 98.3  F (36.8  C) (Oral)  Ht 5' 5\" (1.651 m)  Wt 259 lb (117.5 kg)  LMP 02/13/2009  Breastfeeding? No  BMI 43.1 kg/m2  Body mass index is 43.1 kg/(m^2).  GENERAL: healthy, alert and no distress  SKIN: healing biopsy sites on right and leg forearm, right biopsy site with no drainage but warmth and erythema surrounding  MOUTH: 0.3 cm ulceration with grey base on inside of right lower lip    Diagnostic Test Results:  none     ASSESSMENT/PLAN:     1. Cellulitis of right upper extremity - suspected based on worsening erythema and warmth, will treat with abx  - sulfamethoxazole-trimethoprim (BACTRIM DS/SEPTRA DS) 800-160 MG per tablet; Take 1 tablet by mouth 2 times daily  Dispense: 10 tablet; Refill: 0    2. Oral lesion - possible reaction to anti-fungals, suspect " cloches, discussed cessation of meds, use orajel to help with pain    Advised she was overdue for DM visit, will schedule in near future    Bridget Garza MD  [facility]

## 2017-10-20 ENCOUNTER — OFFICE VISIT (OUTPATIENT)
Dept: FAMILY MEDICINE | Facility: CLINIC | Age: 57
End: 2017-10-20
Payer: MEDICARE

## 2017-10-20 VITALS
HEIGHT: 65 IN | BODY MASS INDEX: 42.82 KG/M2 | SYSTOLIC BLOOD PRESSURE: 126 MMHG | HEART RATE: 83 BPM | DIASTOLIC BLOOD PRESSURE: 82 MMHG | TEMPERATURE: 97.7 F | WEIGHT: 257 LBS

## 2017-10-20 DIAGNOSIS — N18.2 CKD (CHRONIC KIDNEY DISEASE) STAGE 2, GFR 60-89 ML/MIN: ICD-10-CM

## 2017-10-20 DIAGNOSIS — Z12.11 SPECIAL SCREENING FOR MALIGNANT NEOPLASMS, COLON: ICD-10-CM

## 2017-10-20 DIAGNOSIS — E11.42 TYPE 2 DIABETES MELLITUS WITH DIABETIC POLYNEUROPATHY, WITHOUT LONG-TERM CURRENT USE OF INSULIN (H): Primary | ICD-10-CM

## 2017-10-20 LAB
ANION GAP SERPL CALCULATED.3IONS-SCNC: 9 MMOL/L (ref 3–14)
BUN SERPL-MCNC: 13 MG/DL (ref 7–30)
CALCIUM SERPL-MCNC: 9.5 MG/DL (ref 8.5–10.1)
CHLORIDE SERPL-SCNC: 105 MMOL/L (ref 94–109)
CHOLEST SERPL-MCNC: 250 MG/DL
CO2 SERPL-SCNC: 25 MMOL/L (ref 20–32)
CREAT SERPL-MCNC: 1.11 MG/DL (ref 0.52–1.04)
ERYTHROCYTE [DISTWIDTH] IN BLOOD BY AUTOMATED COUNT: 12.8 % (ref 10–15)
GFR SERPL CREATININE-BSD FRML MDRD: 51 ML/MIN/1.7M2
GLUCOSE SERPL-MCNC: 121 MG/DL (ref 70–99)
HBA1C MFR BLD: 6.3 % (ref 4.3–6)
HCT VFR BLD AUTO: 40.8 % (ref 35–47)
HDLC SERPL-MCNC: 64 MG/DL
HGB BLD-MCNC: 13.7 G/DL (ref 11.7–15.7)
LDLC SERPL CALC-MCNC: 158 MG/DL
MCH RBC QN AUTO: 30.9 PG (ref 26.5–33)
MCHC RBC AUTO-ENTMCNC: 33.6 G/DL (ref 31.5–36.5)
MCV RBC AUTO: 92 FL (ref 78–100)
NONHDLC SERPL-MCNC: 186 MG/DL
PLATELET # BLD AUTO: 220 10E9/L (ref 150–450)
POTASSIUM SERPL-SCNC: 3.6 MMOL/L (ref 3.4–5.3)
RBC # BLD AUTO: 4.43 10E12/L (ref 3.8–5.2)
SODIUM SERPL-SCNC: 139 MMOL/L (ref 133–144)
TRIGL SERPL-MCNC: 140 MG/DL
WBC # BLD AUTO: 4.1 10E9/L (ref 4–11)

## 2017-10-20 PROCEDURE — 36415 COLL VENOUS BLD VENIPUNCTURE: CPT | Performed by: FAMILY MEDICINE

## 2017-10-20 PROCEDURE — 80061 LIPID PANEL: CPT | Performed by: FAMILY MEDICINE

## 2017-10-20 PROCEDURE — 80048 BASIC METABOLIC PNL TOTAL CA: CPT | Performed by: FAMILY MEDICINE

## 2017-10-20 PROCEDURE — 99214 OFFICE O/P EST MOD 30 MIN: CPT | Performed by: FAMILY MEDICINE

## 2017-10-20 PROCEDURE — 83036 HEMOGLOBIN GLYCOSYLATED A1C: CPT | Performed by: FAMILY MEDICINE

## 2017-10-20 PROCEDURE — 85027 COMPLETE CBC AUTOMATED: CPT | Performed by: FAMILY MEDICINE

## 2017-10-20 NOTE — NURSING NOTE
"Chief Complaint   Patient presents with     RECHECK     derm problem       Initial /82 (BP Location: Right arm, Patient Position: Chair, Cuff Size: Adult Large)  Pulse 83  Temp 97.7  F (36.5  C) (Oral)  Ht 5' 5\" (1.651 m)  Wt 257 lb (116.6 kg)  LMP 02/13/2009  BMI 42.77 kg/m2 Estimated body mass index is 42.77 kg/(m^2) as calculated from the following:    Height as of this encounter: 5' 5\" (1.651 m).    Weight as of this encounter: 257 lb (116.6 kg).  Medication Reconciliation: complete     Health maintenance- labs due.    Gabino Cho CMA          "

## 2017-10-20 NOTE — MR AVS SNAPSHOT
After Visit Summary   10/20/2017    Ana Luisa Byers    MRN: [id number]           Patient Information     Date Of Birth          1960        Visit Information        Provider Department      10/20/2017 7:30 AM Bridget Garza MD [facility]        Today's Diagnoses     Type 2 diabetes mellitus with diabetic polyneuropathy, without long-term current use of insulin (H)    -  1    Special screening for malignant neoplasms, colon        CKD (chronic kidney disease) stage 2, GFR 60-89 ml/min          Care Instructions    Try black cohosh first for hot flashes          Follow-ups after your visit        Future tests that were ordered for you today     Open Future Orders        Priority Expected Expires Ordered    Fecal colorectal cancer screen (FIT) Routine 11/10/2017 1/12/2018 10/20/2017            Who to contact     If you have questions or need follow up information about today's clinic visit or your schedule please contact [facility] directly at [phone].  Normal or non-critical lab and imaging results will be communicated to you by [employer]arn[employer], letter or phone within 4 business days after the clinic has received the results. If you do not hear from us within 7 days, please contact the clinic through [facility] or phone. If you have a critical or abnormal lab result, we will notify you by phone as soon as possible.  Submit refill requests through [employer] or call your pharmacy and they will forward the refill request to us. Please allow 3 business days for your refill to be completed.          Additional Information About Your Visit        MyChart Information     [employer] gives you secure access to your electronic health record. If you see a primary care provider, you can also send messages to your care team and make appointments. If you have questions, please call your primary care clinic.  If you do not have a primary care provider, please call [phone] and they  "will assist you.        Care EveryWhere ID     This is your Care EveryWhere ID. This could be used by other organizations to access your [facility] medical records  [id number]        Your Vitals Were     Pulse Temperature Height Last Period BMI (Body Mass Index)       83 97.7  F (36.5  C) (Oral) 5' 5\" (1.651 m) 02/13/2009 42.77 kg/m2        Blood Pressure from Last 3 Encounters:   10/20/17 126/82   10/12/17 120/86   09/25/17 126/82    Weight from Last 3 Encounters:   10/20/17 257 lb (116.6 kg)   10/12/17 259 lb (117.5 kg)   09/25/17 260 lb (117.9 kg)              We Performed the Following     Basic metabolic panel  (Ca, Cl, CO2, Creat, Gluc, K, Na, BUN)     CBC with platelets     Hemoglobin A1c     Lipid panel reflex to direct LDL        Primary Care Provider Office Phone # Fax #    Bridget Garza [phone] [address] [facility] [address]        Equal Access to Services     [facility]: Hadii aad ku hadasho Soomaali, waaxda luqadaha, qaybta kaalmada adeegyapaz, pool hernandez . So [facility] [phone].    ATENCIÓN: Si habla español, tiene a valerio disposición servicios gratuitos de asistencia lingüística. Jani al [phone].    We comply with applicable federal civil rights laws and [place] laws. We do not discriminate on the basis of race, color, national origin, age, disability, sex, sexual orientation, or gender identity.            Thank you!     Thank you for choosing [facility]  for your care. Our goal is always to provide you with excellent care. Hearing back from our patients is one way we can continue to improve our services. Please take a few minutes to complete the written survey that you may receive in the mail after your visit with us. Thank you!             Your Updated Medication List - Protect others around you: Learn how to safely use, store and throw away your medicines at www.disposemymeds.org.          This list is " accurate as of: 10/20/17  8:14 AM.  Always use your most recent med list.                   Brand Name Dispense Instructions for use Diagnosis    aspirin 81 MG chewable tablet     108 tablet    Take 1 tablet (81 mg) by mouth daily    Diabetes mellitus, type 2 (H)       atorvastatin 40 MG tablet    LIPITOR    90 tablet    Take 1 tablet (40 mg) by mouth daily    Hyperlipidemia LDL goal <100       clotrimazole 10 MG Lozg lozenge    MYCELEX     every 4 hours    Cellulitis of right upper extremity       DULoxetine 30 MG EC capsule    CYMBALTA    90 capsule    Take 1 capsule (30 mg) by mouth daily Take along with 60 mg capsule    Myofascial pain, Chronic pain associated with significant psychosocial dysfunction       EPINEPHrine 0.3 MG/0.3ML injection 2-pack    EPIPEN/ADRENACLICK/or ANY BX GENERIC EQUIV    2 each    Inject 0.3 mLs (0.3 mg) into the muscle once as needed for anaphylaxis    Bee sting allergy       fluconazole 200 MG tablet    DIFLUCAN     2 times daily    Cellulitis of right upper extremity       furosemide 40 MG tablet    LASIX    10 tablet    Take 1 tablet (40 mg) by mouth 2 times daily    Bilateral lower extremity edema       glipiZIDE 2.5 MG 24 hr tablet    glipiZIDE XL    180 tablet    Take 1 tablet (2.5 mg) by mouth 2 times daily    Type 2 diabetes mellitus with diabetic neuropathy, without long-term current use of insulin (H)       levothyroxine 25 MCG tablet    SYNTHROID/LEVOTHROID    90 tablet    Take 1 tablet (25 mcg) by mouth daily    Hypothyroidism       losartan 25 MG tablet    COZAAR    5 tablet    Take 0.5 tablets (12.5 mg) by mouth daily    Type 2 diabetes mellitus with diabetic polyneuropathy, without long-term current use of insulin (H)       methocarbamol 500 MG tablet    ROBAXIN    20 tablet    Take 2 tablets (1,000 mg) by mouth 3 times daily as needed for muscle spasms    Cervicalgia       NITROSTAT 0.4 MG sublingual tablet   Generic drug:  nitroGLYcerin      Place 1 tablet under the  tongue as needed        * order for DME     2 Device    Equipment being ordered: compression socks, high strength, knee high, custom made 2 pairs, 11 refill    Other lymphedema       * order for DME     1 Units    Equipment being ordered: diabetic shoes    Type 2 diabetes mellitus with diabetic neuropathy (H)       * order for DME     1 each    Equipment being ordered: Rollator walker with seat    Morbid obesity due to excess calories (H), Type 2 diabetes mellitus with diabetic neuropathy (H), Fibromyalgia       * order for DME     1 Units    Equipment being ordered: diabetic shoes  Feet have been examined, no changes    Type 2 diabetes mellitus with diabetic neuropathy, without long-term current use of insulin (H)       * order for DME     1 each    by * route daily BLE knee high compression socks 15-20mmHg    Edema, unspecified type       pantoprazole 40 MG EC tablet    PROTONIX    90 tablet    Take 1 tablet (40 mg) by mouth daily Take 30-60 minutes before a meal.    Gastroesophageal reflux disease without esophagitis       phentermine 37.5 MG capsule     30 capsule    Take 1 capsule (37.5 mg) by mouth every morning    BMI 40.0-44.9, adult (H)       pregabalin 50 MG capsule    LYRICA    90 capsule    Take 1 capsule (50 mg) by mouth 3 times daily    Diabetic polyneuropathy associated with type 2 diabetes mellitus (H)       sulfamethoxazole-trimethoprim 800-160 MG per tablet    BACTRIM DS/SEPTRA DS    10 tablet    Take 1 tablet by mouth 2 times daily    Cellulitis of right upper extremity       vitamin D 2000 UNITS tablet     90 tablet    Take 1 tablet by mouth daily    Type 2 diabetes mellitus with diabetic neuropathy, without long-term current use of insulin (H)       zolpidem 10 MG tablet    AMBIEN    30 tablet    Take 1 tablet (10 mg) by mouth nightly as needed for sleep    Other insomnia       * Notice:  This list has 5 medication(s) that are the same as other medications prescribed for you. Read the directions  carefully, and ask your doctor or other care provider to review them with you.

## 2017-10-20 NOTE — PROGRESS NOTES
SUBJECTIVE:   Ana Luisa Byers is a 57 year old female who presents to clinic today for the following health issues:      Recheck derm problem    Has had skin lesions appearing for the past few months. Has had lesions biopsied by a derm, over 2 weeks ago, but hasn't heard anything back.     The lesions seem to be spreading. Now she has them on her face, which is bothersome for her.     Seems to be spreading.     Would also like her DM lab work. Diet controlled. Exercising in the pool most days of the week. No hypoglycemia. No paresthesias.         Problem list and histories reviewed & adjusted, as indicated.  Additional history: none    Patient Active Problem List   Diagnosis     Mild major depression (H)     Chronic pain associated with significant psychosocial dysfunction     Osteoarthritis     S/P shoulder replacement     Cluster headaches     Fibromyalgia     Plantar fasciitis     Other insomnia     HTN, goal below 140/90     Gastroesophageal reflux disease without esophagitis     Restless legs syndrome (RLS)     CKD (chronic kidney disease) stage 2, GFR 60-89 ml/min     HSV-1 infection     Left lumbar radiculitis     Acquired hypothyroidism     BMI 40.0-44.9, adult (H)     Type 2 diabetes mellitus with diabetic polyneuropathy, without long-term current use of insulin (H)     Bilateral lower extremity edema     Right elbow pain     Ulnar neuropathy at elbow, right     Past Surgical History:   Procedure Laterality Date     ARTHROPLASTY SHOULDER  7/25/2013    Procedure: ARTHROPLASTY SHOULDER;  RIGHT TOTAL SHOULDER ARTHROPLASTY (TORNIER AFFINITY SHOULDER SYSTEM)^;  Surgeon: Dung Morales MD;  Location: [facility] OR     BACK SURGERY  2011    L45 laminectomy     C APPENDECTOMY  age 19     C LIGATE FALLOPIAN TUBE,POSTPARTUM  1982    Tubal Ligation     ELBOW WRIST HAND FINGER ORTHOSIS, RIGID, WITHOUT JOINTS, MAY INCLUDE SOFT  5/2007    put in [place]     ESOPHAGOSCOPY, GASTROSCOPY, DUODENOSCOPY (EGD), COMBINED   "9/23/2015    Dr. Thomas [facility]     ESOPHAGOSCOPY, GASTROSCOPY, DUODENOSCOPY (EGD), COMBINED N/A 9/23/2015    Procedure: COMBINED ESOPHAGOSCOPY, GASTROSCOPY, DUODENOSCOPY (EGD), BIOPSY SINGLE OR MULTIPLE;  Surgeon: Jose Thomas MD;  Location: [facility] GI     HCL PAP SMEAR  6/2008    WNL     ORTHOPEDIC SURGERY      left shoulder scoped and plate left elbow     SURGICAL HISTORY OF -   2009    ulnar nerve release, carpal tunnel- left       Social History   Substance Use Topics     Smoking status: Never Smoker     Smokeless tobacco: Never Used     Alcohol use No     Family History   Problem Relation Age of Onset     C.A.D. Mother      Neurologic Disorder Mother      lupus     Depression Mother      Family History Negative Father      DIABETES Maternal Aunt      Colon Cancer No family hx of              Reviewed and updated as needed this visit by clinical staff       Reviewed and updated as needed this visit by Provider         ROS:  Constitutional, HEENT, cardiovascular, pulmonary, gi and gu systems are negative, except as otherwise noted.      OBJECTIVE:   /82 (BP Location: Right arm, Patient Position: Chair, Cuff Size: Adult Large)  Pulse 83  Temp 97.7  F (36.5  C) (Oral)  Ht 5' 5\" (1.651 m)  Wt 257 lb (116.6 kg)  LMP 02/13/2009  BMI 42.77 kg/m2  Body mass index is 42.77 kg/(m^2).  GENERAL: healthy, alert and no distress  SKIN: 1 cm lesion between the eyebrows, looks like an abrasion, no drainage, 1 cm lesion right chin, looks similar, well healing biopsy site on right forearm - improved from previous    Diagnostic Test Results:  Lab pending      ASSESSMENT/PLAN:     1. Type 2 diabetes mellitus with diabetic polyneuropathy, without long-term current use of insulin (H) - previously well controlled, will await lab work, on statin ASA and ACEI  - Lipid panel reflex to direct LDL  - Hemoglobin A1c  - CBC with platelets  - Basic metabolic panel  (Ca, Cl, CO2, Creat, Gluc, K, Na, BUN)    2. Special screening for " malignant neoplasms, colon  - Fecal colorectal cancer screen (FIT); Future    3. CKD (chronic kidney disease) stage 2, GFR 60-89 ml/min - previously stable, await lab work  - BMP today    Bridget Garza MD  [facility]

## 2017-10-23 DIAGNOSIS — E11.40 TYPE 2 DIABETES MELLITUS WITH DIABETIC NEUROPATHY, WITHOUT LONG-TERM CURRENT USE OF INSULIN (H): ICD-10-CM

## 2017-10-24 RX ORDER — GLIPIZIDE 2.5 MG/1
2.5 TABLET, EXTENDED RELEASE ORAL 2 TIMES DAILY
Qty: 180 TABLET | Refills: 1 | Status: SHIPPED | OUTPATIENT
Start: 2017-10-24 | End: 2018-04-12

## 2017-10-24 NOTE — TELEPHONE ENCOUNTER
Prescription approved per [facility] Refill Protocol.  Shira Hamm RN

## 2017-10-29 DIAGNOSIS — G47.09 OTHER INSOMNIA: ICD-10-CM

## 2017-10-29 NOTE — TELEPHONE ENCOUNTER
zolpidem (AMBIEN) 10 MG tablet      Last Written Prescription Date:  6/28/2017  Last Fill Quantity: 30,   # refills: 3  Last Office visit: 10/20/2017, Greg Mejia refill request to provider for review/approval because:  Drug not on the [facility], [facility] or [facility] refill protocol or controlled substance

## 2017-10-30 NOTE — TELEPHONE ENCOUNTER
RX [place]) reviewed:  reviewed- no concerns    [place]:  [email or web address]

## 2017-10-31 RX ORDER — ZOLPIDEM TARTRATE 10 MG/1
10 TABLET ORAL
Qty: 30 TABLET | Refills: 3 | Status: SHIPPED | OUTPATIENT
Start: 2017-10-31 | End: 2018-02-26

## 2017-11-02 PROBLEM — G56.21 ULNAR NEUROPATHY AT ELBOW, RIGHT: Status: RESOLVED | Noted: 2017-10-05 | Resolved: 2017-11-02

## 2017-11-02 PROBLEM — M25.521 RIGHT ELBOW PAIN: Status: RESOLVED | Noted: 2017-10-05 | Resolved: 2017-11-02

## 2017-11-02 NOTE — PROGRESS NOTES
Pt has not returned since initial evaluation on 10/5/17.  Assume all goals are met to pt satisfaction.  D/C [facility]

## 2017-11-09 DIAGNOSIS — K21.9 GASTROESOPHAGEAL REFLUX DISEASE WITHOUT ESOPHAGITIS: ICD-10-CM

## 2017-11-09 RX ORDER — PANTOPRAZOLE SODIUM 40 MG/1
40 TABLET, DELAYED RELEASE ORAL DAILY
Qty: 90 TABLET | Refills: 3 | OUTPATIENT
Start: 2017-11-09

## 2017-11-09 NOTE — TELEPHONE ENCOUNTER
lov 10-20-17  phentermine      Last Written Prescription Date:  6-2-17  Last Fill Quantity: 30,   # refills: 3  Future Office visit:       Routing refill request to provider for review/approval because:  Drug not on the [facility], [facility] or [facility] refill protocol or controlled substance

## 2017-11-09 NOTE — TELEPHONE ENCOUNTER
RX [place]) reviewed:  reviewed- no concerns    Last fill was September- break in therapy    [place] profile:  [email or web address]

## 2017-11-09 NOTE — TELEPHONE ENCOUNTER
Pt should have refills until Feb 2018  E-Prescribing Status: Receipt confirmed by pharmacy (2/3/2017  5:02 PM CST  Michael Jasso RN, BSN

## 2017-11-15 RX ORDER — PHENTERMINE HYDROCHLORIDE 37.5 MG/1
37.5 CAPSULE ORAL EVERY MORNING
Qty: 30 CAPSULE | Refills: 3 | Status: SHIPPED | OUTPATIENT
Start: 2017-11-15 | End: 2018-03-25

## 2017-11-22 RX ORDER — PANTOPRAZOLE SODIUM 40 MG/1
40 TABLET, DELAYED RELEASE ORAL DAILY
Qty: 90 TABLET | Refills: 0 | Status: SHIPPED | OUTPATIENT
Start: 2017-11-22 | End: 2017-11-29

## 2017-11-22 NOTE — TELEPHONE ENCOUNTER
This RX is only for 9 months -      Prescription approved per [facility] Refill Protocol.  Shira Hamm RN

## 2017-11-24 DIAGNOSIS — K21.9 GASTROESOPHAGEAL REFLUX DISEASE WITHOUT ESOPHAGITIS: ICD-10-CM

## 2017-11-25 RX ORDER — OMEPRAZOLE 40 MG/1
40 CAPSULE, DELAYED RELEASE ORAL DAILY
Qty: 90 CAPSULE | Refills: 3 | OUTPATIENT
Start: 2017-11-25

## 2017-11-27 ENCOUNTER — MYC MEDICAL ADVICE (OUTPATIENT)
Dept: FAMILY MEDICINE | Facility: CLINIC | Age: 57
End: 2017-11-27

## 2017-11-27 DIAGNOSIS — K21.9 GASTROESOPHAGEAL REFLUX DISEASE WITHOUT ESOPHAGITIS: Primary | ICD-10-CM

## 2017-11-28 NOTE — TELEPHONE ENCOUNTER
Pt states she is taking omeprazole and not protonix but omeprazole was stopped on 3/22/17 and protonix ordered.    Please advise what patient should be taking    Michael Jasso RN, BSN

## 2017-11-29 RX ORDER — OMEPRAZOLE 40 MG/1
40 CAPSULE, DELAYED RELEASE ORAL DAILY
Qty: 90 CAPSULE | Refills: 3 | Status: SHIPPED | OUTPATIENT
Start: 2017-11-29 | End: 2017-12-05

## 2017-11-29 NOTE — TELEPHONE ENCOUNTER
Looks like we first tried pantoprazole in Aug 2015 because omeprazole wasn't working well enough for her, 5 months later - she wanted back on omeprazole.     Looks like someone t'ed up the wrong med at one point.     Sent in omeprazole scripts. JH

## 2017-12-05 ENCOUNTER — OFFICE VISIT (OUTPATIENT)
Dept: FAMILY MEDICINE | Facility: CLINIC | Age: 57
End: 2017-12-05
Payer: MEDICARE

## 2017-12-05 ENCOUNTER — RADIANT APPOINTMENT (OUTPATIENT)
Dept: GENERAL RADIOLOGY | Facility: CLINIC | Age: 57
End: 2017-12-05
Attending: FAMILY MEDICINE
Payer: MEDICARE

## 2017-12-05 VITALS
WEIGHT: 259 LBS | DIASTOLIC BLOOD PRESSURE: 78 MMHG | BODY MASS INDEX: 43.15 KG/M2 | HEIGHT: 65 IN | HEART RATE: 98 BPM | TEMPERATURE: 97.6 F | SYSTOLIC BLOOD PRESSURE: 134 MMHG

## 2017-12-05 DIAGNOSIS — N95.1 MENOPAUSAL FLUSHING: ICD-10-CM

## 2017-12-05 DIAGNOSIS — K21.9 GASTROESOPHAGEAL REFLUX DISEASE WITHOUT ESOPHAGITIS: ICD-10-CM

## 2017-12-05 DIAGNOSIS — N95.0 POSTMENOPAUSAL BLEEDING: Primary | ICD-10-CM

## 2017-12-05 DIAGNOSIS — M25.561 ACUTE PAIN OF RIGHT KNEE: ICD-10-CM

## 2017-12-05 DIAGNOSIS — E03.9 ACQUIRED HYPOTHYROIDISM: ICD-10-CM

## 2017-12-05 DIAGNOSIS — M26.609 TMJ (TEMPOROMANDIBULAR JOINT SYNDROME): ICD-10-CM

## 2017-12-05 PROCEDURE — 36415 COLL VENOUS BLD VENIPUNCTURE: CPT | Performed by: FAMILY MEDICINE

## 2017-12-05 PROCEDURE — 84443 ASSAY THYROID STIM HORMONE: CPT | Performed by: FAMILY MEDICINE

## 2017-12-05 PROCEDURE — 84439 ASSAY OF FREE THYROXINE: CPT | Performed by: FAMILY MEDICINE

## 2017-12-05 PROCEDURE — 73562 X-RAY EXAM OF KNEE 3: CPT | Mod: RT

## 2017-12-05 PROCEDURE — 99214 OFFICE O/P EST MOD 30 MIN: CPT | Performed by: FAMILY MEDICINE

## 2017-12-05 RX ORDER — OMEPRAZOLE 40 MG/1
40 CAPSULE, DELAYED RELEASE ORAL DAILY
Qty: 90 CAPSULE | Refills: 3 | Status: SHIPPED | OUTPATIENT
Start: 2017-12-05 | End: 2018-11-10

## 2017-12-05 RX ORDER — NITROGLYCERIN 0.4 MG/1
0.4 TABLET SUBLINGUAL PRN
Qty: 25 TABLET | Refills: 0 | Status: SHIPPED | OUTPATIENT
Start: 2017-12-05 | End: 2017-12-07

## 2017-12-05 RX ORDER — LEVOTHYROXINE SODIUM 25 UG/1
25 TABLET ORAL DAILY
Qty: 90 TABLET | Refills: 2 | Status: SHIPPED | OUTPATIENT
Start: 2017-12-05 | End: 2019-04-18

## 2017-12-05 NOTE — PROGRESS NOTES
SUBJECTIVE:   Ana Luisa Byers is a 57 year old female who presents to clinic today for the following health issues:    1. TMJ concerns. Notes jaw pops whenever she chews, ongoing for months. No jaw injuries. Causing pain behind the ears and headache.   TMJ concerns. 7/10 at times with headaches    Right knee pain. 7/10 today recent fall     Medication Followup of Synthroid    Taking Medication as prescribed: yes    Side Effects:  None    Medication Helping Symptoms:  Pt states having feelings of being hot       Reports an episode of blood on the toilet paper for 3 days recently, small amount, less than a tsp. Has since stopped. Reports hx of ovarian cysts.      Problem list and histories reviewed & adjusted, as indicated.  Additional history: none    Patient Active Problem List   Diagnosis     Mild major depression (H)     Chronic pain associated with significant psychosocial dysfunction     Osteoarthritis     S/P shoulder replacement     Cluster headaches     Fibromyalgia     Plantar fasciitis     Other insomnia     HTN, goal below 140/90     Gastroesophageal reflux disease without esophagitis     Restless legs syndrome (RLS)     CKD (chronic kidney disease) stage 2, GFR 60-89 ml/min     HSV-1 infection     Left lumbar radiculitis     Acquired hypothyroidism     BMI 40.0-44.9, adult (H)     Type 2 diabetes mellitus with diabetic polyneuropathy, without long-term current use of insulin (H)     Bilateral lower extremity edema     Past Surgical History:   Procedure Laterality Date     ARTHROPLASTY SHOULDER  7/25/2013    Procedure: ARTHROPLASTY SHOULDER;  RIGHT TOTAL SHOULDER ARTHROPLASTY (TORNIER AFFINITY SHOULDER SYSTEM)^;  Surgeon: Dung Morales MD;  Location:  OR     BACK SURGERY  2011    L45 laminectomy     C APPENDECTOMY  age 19     C LIGATE FALLOPIAN TUBE,POSTPARTUM  1982    Tubal Ligation     ELBOW WRIST HAND FINGER ORTHOSIS, RIGID, WITHOUT JOINTS, MAY INCLUDE SOFT  5/2007    put in [place]      "ESOPHAGOSCOPY, GASTROSCOPY, DUODENOSCOPY (EGD), COMBINED  9/23/2015    Dr. Thomas [facility]     ESOPHAGOSCOPY, GASTROSCOPY, DUODENOSCOPY (EGD), COMBINED N/A 9/23/2015    Procedure: COMBINED ESOPHAGOSCOPY, GASTROSCOPY, DUODENOSCOPY (EGD), BIOPSY SINGLE OR MULTIPLE;  Surgeon: Jose Thomas MD;  Location: [facility]     HCL PAP SMEAR  6/2008    WNL     ORTHOPEDIC SURGERY      left shoulder scoped and plate left elbow     SURGICAL HISTORY OF -   2009    ulnar nerve release, carpal tunnel- left       Social History   Substance Use Topics     Smoking status: Never Smoker     Smokeless tobacco: Never Used     Alcohol use No     Family History   Problem Relation Age of Onset     C.A.D. Mother      Neurologic Disorder Mother      lupus     Depression Mother      Family History Negative Father      DIABETES Maternal Aunt      Colon Cancer No family hx of              Reviewed and updated as needed this visit by clinical staffAllergies       Reviewed and updated as needed this visit by Provider         ROS:  Constitutional, HEENT, cardiovascular, pulmonary, gi and gu systems are negative, except as otherwise noted.      OBJECTIVE:   /78 (BP Location: Right arm, Patient Position: Chair, Cuff Size: Adult Large)  Pulse 98  Temp 97.6  F (36.4  C) (Oral)  Ht 5' 5\" (1.651 m)  Wt 259 lb (117.5 kg)  LMP 02/13/2009  BMI 43.1 kg/m2  Body mass index is 43.1 kg/(m^2).  GENERAL: healthy, alert and no distress  HENT: ear canals and TM's normal, nose and mouth without ulcers or lesions  JAW: click with jaw opening and closing  NECK: no adenopathy  RESP: lungs clear to auscultation - no rales, rhonchi or wheezes  CV: regular rate and rhythm, normal S1 S2, no S3 or S4, no murmur  MS: ttp medial joint line, patella, + patellar grind, pain with posterior drawer, anterior drawer, negative varus and valgus, negative Saul's  PSYCH: mentation appears normal, affect normal/bright    Diagnostic Test Results:  Xray - right knee - DJD "     ASSESSMENT/PLAN:     1. Postmenopausal bleeding - will get ultrasound to further assess, discussed endometrial biopsy if uterine lining thickened. Also comment on small uterine fibroid ultrasound 2012  - US Pelvic Complete w Transvaginal; Future    2. Menopausal flushing - unclear if this is related to #1 versus uncontrolled hypothyroidism    3. Acute pain of right knee - seems to be patellar DJD, discussed PT as next step  - XR Knee Right 3 Views; Future  - [facility] PT, HAND, AND CHIROPRACTIC REFERRAL    4. TMJ (temporomandibular joint syndrome) - has already tried NSAIDs  - OTOLARYNGOLOGY REFERRAL    5. Gastroesophageal reflux disease without esophagitis - not controlled, protonix didn't work, advised take zantac with omeprazole  - omeprazole (PRILOSEC) 40 MG capsule; Take 1 capsule (40 mg) by mouth daily Take 30-60 minutes before a meal.  Dispense: 90 capsule; Refill: 3    6. Acquired hypothyroidism - refills, will check TSH levels today  - levothyroxine (SYNTHROID/LEVOTHROID) 25 MCG tablet; Take 1 tablet (25 mcg) by mouth daily  Dispense: 90 tablet; Refill: 2  - NITROSTAT 0.4 MG sublingual tablet; Place 1 tablet (0.4 mg) under the tongue as needed  Dispense: 25 tablet; Refill: 0  - TSH with free T4 reflex      Bridget Garza MD  [facility]

## 2017-12-05 NOTE — MR AVS SNAPSHOT
After Visit Summary   12/5/2017    Ana Luisa Byers    MRN: [id number]           Patient Information     Date Of Birth          1960        Visit Information        Provider Department      12/5/2017 10:30 AM Bridget Garza MD [facility]        Today's Diagnoses     Postmenopausal bleeding    -  1    Menopausal flushing        Acute pain of right knee        TMJ (temporomandibular joint syndrome)        Gastroesophageal reflux disease without esophagitis        Acquired hypothyroidism          Care Instructions    Try zantac in addition - take 75 mg daily, can increase to 150 mg daily if needed    Schedule with ENT for TMJ    Schedule with PT for knee    I will contact with lab results          Follow-ups after your visit        Additional Services     UMA PT, HAND, AND CHIROPRACTIC REFERRAL       **This order will print in the [facility] Scheduling Office**    Physical Therapy, Hand Therapy and Chiropractic Care are available through:    *[facility]  *[facility]  *[facility]    Call one number to schedule at any of the above locations: [phone].    Your provider has referred you to: Physical Therapy at [facility] or [facility]    Indication/Reason for Referral: Knee Pain, right - XR showing DJD  Onset of Illness: few months  Therapy Orders: Evaluate and Treat  Special Programs: None  Special Request: None    [place]      Additional Comments for the Therapist or Chiropractor:     Please be aware that coverage of these services is subject to the terms and limitations of your health insurance plan.  Call member services at your health plan with any benefit or coverage questions.      Please bring the following to your appointment:    *Your personal calendar for scheduling future appointments  *Comfortable clothing            OTOLARYNGOLOGY REFERRAL       Your provider has referred you to: [facility] [phone]   [email or web address]    Please be aware that coverage of these services is subject to the terms and limitations of your health insurance plan.  Call member services at your health plan with any benefit or coverage questions.      Please bring the following with you to your appointment:    (1) Any X-Rays, CTs or MRIs which have been performed.  Contact the facility where they were done to arrange for  prior to your scheduled appointment.   (2) List of current medications  (3) This referral request   (4) Any documents/labs given to you for this referral                  Future tests that were ordered for you today     Open Future Orders        Priority Expected Expires Ordered    US Pelvic Complete w Transvaginal Routine  12/5/2018 12/5/2017            Who to contact     If you have questions or need follow up information about today's clinic visit or your schedule please contact [facility] directly at [phone].  Normal or non-critical lab and imaging results will be communicated to you by [employer], letter or phone within 4 business days after the clinic has received the results. If you do not hear from us within 7 days, please contact the clinic through [employer] or phone. If you have a critical or abnormal lab result, we will notify you by phone as soon as possible.  Submit refill requests through Navdy or call your pharmacy and they will forward the refill request to us. Please allow 3 business days for your refill to be completed.          Additional Information About Your Visit        Navdy Information     Navdy gives you secure access to your electronic health record. If you see a primary care provider, you can also send messages to your care team and make appointments. If you have questions, please call your primary care clinic.  If you do not have a primary care provider, please call [phone] and  "they will assist you.        Care EveryWhere ID     This is your Care EveryWhere ID. This could be used by other organizations to access your [facility] medical records  [id number]        Your Vitals Were     Pulse Temperature Height Last Period BMI (Body Mass Index)       98 97.6  F (36.4  C) (Oral) 5' 5\" (1.651 m) 02/13/2009 43.1 kg/m2        Blood Pressure from Last 3 Encounters:   12/05/17 134/78   10/20/17 126/82   10/12/17 120/86    Weight from Last 3 Encounters:   12/05/17 259 lb (117.5 kg)   10/20/17 257 lb (116.6 kg)   10/12/17 259 lb (117.5 kg)              We Performed the Following     UMA PT, HAND, AND CHIROPRACTIC REFERRAL     OTOLARYNGOLOGY REFERRAL     TSH with free T4 reflex          Today's Medication Changes          These changes are accurate as of: 12/5/17 11:38 AM.  If you have any questions, ask your nurse or doctor.               These medicines have changed or have updated prescriptions.        Dose/Directions    NITROSTAT 0.4 MG sublingual tablet   This may have changed:  how much to take   Used for:  Acquired hypothyroidism   Generic drug:  nitroGLYcerin   Changed by:  Bridget Garza MD        Dose:  0.4 mg   Place 1 tablet (0.4 mg) under the tongue as needed   Quantity:  25 tablet   Refills:  0       * order for DME   This may have changed:  Another medication with the same name was removed. Continue taking this medication, and follow the directions you see here.   Used for:  Type 2 diabetes mellitus with diabetic neuropathy (H)   Changed by:  Bridget Garza MD        Equipment being ordered: diabetic shoes   Quantity:  1 Units   Refills:  0       * order for DME   This may have changed:  Another medication with the same name was removed. Continue taking this medication, and follow the directions you see here.   Used for:  Morbid obesity due to excess calories (H), Type 2 diabetes mellitus with diabetic neuropathy (H), Fibromyalgia   Changed by:  Bridget Garza MD     "    Equipment being ordered: Rollator walker with seat   Quantity:  1 each   Refills:  0       * order for DME   This may have changed:  Another medication with the same name was removed. Continue taking this medication, and follow the directions you see here.   Used for:  Type 2 diabetes mellitus with diabetic neuropathy, without long-term current use of insulin (H)   Changed by:  Bridget Garza MD        Equipment being ordered: diabetic shoes  Feet have been examined, no changes   Quantity:  1 Units   Refills:  0       * order for DME   This may have changed:  Another medication with the same name was removed. Continue taking this medication, and follow the directions you see here.   Used for:  Edema, unspecified type   Changed by:  Yahaira Phan OT        by * route daily BLE knee high compression socks 15-20mmHg   Quantity:  1 each   Refills:  0       * Notice:  This list has 4 medication(s) that are the same as other medications prescribed for you. Read the directions carefully, and ask your doctor or other care provider to review them with you.      Stop taking these medicines if you haven't already. Please contact your care team if you have questions.     clotrimazole 10 MG Lozg lozenge   Commonly known as:  MYCELEX   Stopped by:  Bridget Garza MD           sulfamethoxazole-trimethoprim 800-160 MG per tablet   Commonly known as:  BACTRIM DS/SEPTRA DS   Stopped by:  Bridget Garza MD                Where to get your medicines      These medications were sent to [facility] [address] [place] [address] AT [address] [address] [facility] [address]     Phone:  [phone]     levothyroxine 25 MCG tablet    NITROSTAT 0.4 MG sublingual tablet    omeprazole 40 MG capsule                Primary Care Provider Office Phone # Fax #    Bridget Garza [phone] [address]  [facility] [address]        Equal Access to  Services     [facility]: Hadii aad ku hadbeverlytashi Kelsey, waaxda luqadaha, qaybta kaalmada jannette, pool hernandez . So [facility] [phone].    ATENCIÓN: Si edwardla giovanna, tiene a valerio disposición servicios gratuitos de asistencia lingüística. Llame al [phone].    We comply with applicable federal civil rights laws and [place] laws. We do not discriminate on the basis of race, color, national origin, age, disability, sex, sexual orientation, or gender identity.            Thank you!     Thank you for choosing [facility]  for your care. Our goal is always to provide you with excellent care. Hearing back from our patients is one way we can continue to improve our services. Please take a few minutes to complete the written survey that you may receive in the mail after your visit with us. Thank you!             Your Updated Medication List - Protect others around you: Learn how to safely use, store and throw away your medicines at www.disposemymeds.org.          This list is accurate as of: 12/5/17 11:38 AM.  Always use your most recent med list.                   Brand Name Dispense Instructions for use Diagnosis    aspirin 81 MG chewable tablet     108 tablet    Take 1 tablet (81 mg) by mouth daily    Diabetes mellitus, type 2 (H)       atorvastatin 40 MG tablet    LIPITOR    90 tablet    Take 1 tablet (40 mg) by mouth daily    Hyperlipidemia LDL goal <100       DULoxetine 30 MG EC capsule    CYMBALTA    90 capsule    Take 1 capsule (30 mg) by mouth daily Take along with 60 mg capsule    Myofascial pain, Chronic pain associated with significant psychosocial dysfunction       EPINEPHrine 0.3 MG/0.3ML injection 2-pack    EPIPEN/ADRENACLICK/or ANY BX GENERIC EQUIV    2 each    Inject 0.3 mLs (0.3 mg) into the muscle once as needed for anaphylaxis    Bee sting allergy       fluconazole 200 MG tablet    DIFLUCAN     2 times daily    Cellulitis of right upper extremity        furosemide 40 MG tablet    LASIX    10 tablet    Take 1 tablet (40 mg) by mouth 2 times daily    Bilateral lower extremity edema       glipiZIDE 2.5 MG 24 hr tablet    glipiZIDE XL    180 tablet    Take 1 tablet (2.5 mg) by mouth 2 times daily    Type 2 diabetes mellitus with diabetic neuropathy, without long-term current use of insulin (H)       levothyroxine 25 MCG tablet    SYNTHROID/LEVOTHROID    90 tablet    Take 1 tablet (25 mcg) by mouth daily    Acquired hypothyroidism       losartan 25 MG tablet    COZAAR    5 tablet    Take 0.5 tablets (12.5 mg) by mouth daily    Type 2 diabetes mellitus with diabetic polyneuropathy, without long-term current use of insulin (H)       methocarbamol 500 MG tablet    ROBAXIN    20 tablet    Take 2 tablets (1,000 mg) by mouth 3 times daily as needed for muscle spasms    Cervicalgia       NITROSTAT 0.4 MG sublingual tablet   Generic drug:  nitroGLYcerin     25 tablet    Place 1 tablet (0.4 mg) under the tongue as needed    Acquired hypothyroidism       omeprazole 40 MG capsule    priLOSEC    90 capsule    Take 1 capsule (40 mg) by mouth daily Take 30-60 minutes before a meal.    Gastroesophageal reflux disease without esophagitis       * order for DME     1 Units    Equipment being ordered: diabetic shoes    Type 2 diabetes mellitus with diabetic neuropathy (H)       * order for DME     1 each    Equipment being ordered: Rollator walker with seat    Morbid obesity due to excess calories (H), Type 2 diabetes mellitus with diabetic neuropathy (H), Fibromyalgia       * order for DME     1 Units    Equipment being ordered: diabetic shoes  Feet have been examined, no changes    Type 2 diabetes mellitus with diabetic neuropathy, without long-term current use of insulin (H)       * order for DME     1 each    by * route daily BLE knee high compression socks 15-20mmHg    Edema, unspecified type       phentermine 37.5 MG capsule     30 capsule    Take 1 capsule (37.5 mg) by mouth every  morning    BMI 40.0-44.9, adult (H)       pregabalin 50 MG capsule    LYRICA    90 capsule    Take 1 capsule (50 mg) by mouth 3 times daily    Diabetic polyneuropathy associated with type 2 diabetes mellitus (H)       vitamin D 2000 UNITS tablet     90 tablet    Take 1 tablet by mouth daily    Type 2 diabetes mellitus with diabetic neuropathy, without long-term current use of insulin (H)       zolpidem 10 MG tablet    AMBIEN    30 tablet    Take 1 tablet (10 mg) by mouth nightly as needed for sleep    Other insomnia       * Notice:  This list has 4 medication(s) that are the same as other medications prescribed for you. Read the directions carefully, and ask your doctor or other care provider to review them with you.

## 2017-12-05 NOTE — PATIENT INSTRUCTIONS
Try zantac in addition - take 75 mg daily, can increase to 150 mg daily if needed    Schedule with ENT for TMJ    Schedule with PT for knee    I will contact with lab results

## 2017-12-05 NOTE — NURSING NOTE
"Chief Complaint   Patient presents with     Recheck Medication     Musculoskeletal Problem     jaw pain       Initial /78 (BP Location: Right arm, Patient Position: Chair, Cuff Size: Adult Large)  Pulse 98  Temp 97.6  F (36.4  C) (Oral)  Ht 5' 5\" (1.651 m)  Wt 259 lb (117.5 kg)  LMP 02/13/2009  BMI 43.1 kg/m2 Estimated body mass index is 43.1 kg/(m^2) as calculated from the following:    Height as of this encounter: 5' 5\" (1.651 m).    Weight as of this encounter: 259 lb (117.5 kg).  Medication Reconciliation: complete     Health maintenance- phq given    Gabino Cho CMA          "

## 2017-12-06 ENCOUNTER — TELEPHONE (OUTPATIENT)
Dept: FAMILY MEDICINE | Facility: CLINIC | Age: 57
End: 2017-12-06

## 2017-12-06 LAB
T4 FREE SERPL-MCNC: 0.84 NG/DL (ref 0.76–1.46)
TSH SERPL DL<=0.005 MIU/L-ACNC: 4.6 MU/L (ref 0.4–4)

## 2017-12-06 ASSESSMENT — ANXIETY QUESTIONNAIRES
3. WORRYING TOO MUCH ABOUT DIFFERENT THINGS: MORE THAN HALF THE DAYS
6. BECOMING EASILY ANNOYED OR IRRITABLE: MORE THAN HALF THE DAYS
GAD7 TOTAL SCORE: 15
2. NOT BEING ABLE TO STOP OR CONTROL WORRYING: MORE THAN HALF THE DAYS
7. FEELING AFRAID AS IF SOMETHING AWFUL MIGHT HAPPEN: SEVERAL DAYS
5. BEING SO RESTLESS THAT IT IS HARD TO SIT STILL: NEARLY EVERY DAY
1. FEELING NERVOUS, ANXIOUS, OR ON EDGE: MORE THAN HALF THE DAYS

## 2017-12-06 ASSESSMENT — PATIENT HEALTH QUESTIONNAIRE - PHQ9
SUM OF ALL RESPONSES TO PHQ QUESTIONS 1-9: 20
5. POOR APPETITE OR OVEREATING: NEARLY EVERY DAY

## 2017-12-06 NOTE — TELEPHONE ENCOUNTER
PA submitted thru covermymeds for NITROSTAT 0.4 MG sublingual tablet    [facility]    Angel@ [phone]  Patient ID# [id number]    Vidal RICHEY

## 2017-12-07 DIAGNOSIS — R60.0 BILATERAL LOWER EXTREMITY EDEMA: ICD-10-CM

## 2017-12-07 ASSESSMENT — ANXIETY QUESTIONNAIRES: GAD7 TOTAL SCORE: 15

## 2017-12-07 NOTE — TELEPHONE ENCOUNTER
Not for hypothyroidism. Actually, unclear why she takes. Please call her to see why, maybe doesn't need. I don't see a cardiac history for her. JH

## 2017-12-07 NOTE — TELEPHONE ENCOUNTER
Nitro is linked to hypothyroidism.  Pharmacy is questioning the diagnosis.    Please advise    Michael Jasso RN, BSN

## 2017-12-07 NOTE — TELEPHONE ENCOUNTER
Peg from [facility] is working on this PA she needs to know the correct dx for this medication. Please call them at [phone].  Karen Wilde

## 2017-12-08 RX ORDER — FUROSEMIDE 40 MG
40 TABLET ORAL DAILY
Qty: 30 TABLET | Refills: 3 | Status: SHIPPED | OUTPATIENT
Start: 2017-12-08 | End: 2018-04-07

## 2017-12-08 NOTE — TELEPHONE ENCOUNTER
Is this suppose to be an ongoing rx or just as needed?  Last prescribed for 10 tabs in September  Michael Jasso RN, BSN

## 2017-12-12 ENCOUNTER — HOSPITAL PATHOLOGY (OUTPATIENT)
Dept: OTHER | Facility: CLINIC | Age: 57
End: 2017-12-12

## 2017-12-14 LAB — COPATH REPORT: NORMAL

## 2018-01-03 DIAGNOSIS — E11.42 TYPE 2 DIABETES MELLITUS WITH DIABETIC POLYNEUROPATHY, WITHOUT LONG-TERM CURRENT USE OF INSULIN (H): ICD-10-CM

## 2018-01-03 RX ORDER — LOSARTAN POTASSIUM 25 MG/1
12.5 TABLET ORAL DAILY
Qty: 45 TABLET | Refills: 3 | Status: SHIPPED | OUTPATIENT
Start: 2018-01-03 | End: 2019-06-28

## 2018-01-03 NOTE — TELEPHONE ENCOUNTER
Requested Prescriptions   Pending Prescriptions Disp Refills     losartan (COZAAR) 25 MG tablet 5 tablet 0    Last Written Prescription Date:  09/20/2017  Last Fill Quantity: 5 tablet,  # refills: 0   Last Office Visit with [facility], P or [facility] prescribing provider:  12/05/2017   Future Office Visit:      Sig: Take 0.5 tablets (12.5 mg) by mouth daily    Angiotensin-II Receptors Failed    1/3/2018  7:30 AM       Failed - Normal serum creatinine on file in past 12 months    Recent Labs   Lab Test  10/20/17   0803   CR  1.11*            Passed - Blood pressure under 140/90 in past 12 months.    BP Readings from Last 3 Encounters:   12/05/17 134/78   10/20/17 126/82   10/12/17 120/86                Passed - Recent or future visit with authorizing provider's specialty    Patient had office visit in the last year or has a visit in the next 30 days with authorizing provider.  See chart review.              Passed - Patient is age 18 or older       Passed - No active pregnancy on record       Passed - Normal serum potassium on file in past 12 months    Recent Labs   Lab Test  10/20/17   0803   POTASSIUM  3.6                   Passed - No positive pregnancy test in past 12 months          Vidal GARCIAT

## 2018-01-03 NOTE — TELEPHONE ENCOUNTER
Guessing it was to cover her until her appt, which she recently no showed? Has been canceling and no showing several appts lately.

## 2018-01-16 ENCOUNTER — OFFICE VISIT (OUTPATIENT)
Dept: FAMILY MEDICINE | Facility: CLINIC | Age: 58
End: 2018-01-16
Payer: MEDICARE

## 2018-01-16 VITALS
HEIGHT: 65 IN | OXYGEN SATURATION: 96 % | BODY MASS INDEX: 42.65 KG/M2 | RESPIRATION RATE: 16 BRPM | SYSTOLIC BLOOD PRESSURE: 128 MMHG | HEART RATE: 68 BPM | TEMPERATURE: 98.1 F | DIASTOLIC BLOOD PRESSURE: 72 MMHG | WEIGHT: 256 LBS

## 2018-01-16 DIAGNOSIS — L20.9 ATOPIC DERMATITIS, UNSPECIFIED TYPE: ICD-10-CM

## 2018-01-16 DIAGNOSIS — Z12.11 SPECIAL SCREENING FOR MALIGNANT NEOPLASMS, COLON: Primary | ICD-10-CM

## 2018-01-16 PROCEDURE — 99213 OFFICE O/P EST LOW 20 MIN: CPT | Performed by: NURSE PRACTITIONER

## 2018-01-16 RX ORDER — PREDNISONE 20 MG/1
40 TABLET ORAL DAILY
Qty: 10 TABLET | Refills: 0 | Status: SHIPPED | OUTPATIENT
Start: 2018-01-16 | End: 2018-01-21

## 2018-01-16 RX ORDER — HYDROXYZINE HYDROCHLORIDE 25 MG/1
25-50 TABLET, FILM COATED ORAL EVERY 6 HOURS PRN
Qty: 30 TABLET | Refills: 0 | Status: SHIPPED | OUTPATIENT
Start: 2018-01-16 | End: 2018-01-29

## 2018-01-16 ASSESSMENT — PATIENT HEALTH QUESTIONNAIRE - PHQ9
5. POOR APPETITE OR OVEREATING: NEARLY EVERY DAY
SUM OF ALL RESPONSES TO PHQ QUESTIONS 1-9: 16

## 2018-01-16 ASSESSMENT — ANXIETY QUESTIONNAIRES
2. NOT BEING ABLE TO STOP OR CONTROL WORRYING: SEVERAL DAYS
5. BEING SO RESTLESS THAT IT IS HARD TO SIT STILL: NEARLY EVERY DAY
3. WORRYING TOO MUCH ABOUT DIFFERENT THINGS: SEVERAL DAYS
7. FEELING AFRAID AS IF SOMETHING AWFUL MIGHT HAPPEN: SEVERAL DAYS
1. FEELING NERVOUS, ANXIOUS, OR ON EDGE: SEVERAL DAYS
6. BECOMING EASILY ANNOYED OR IRRITABLE: SEVERAL DAYS
IF YOU CHECKED OFF ANY PROBLEMS ON THIS QUESTIONNAIRE, HOW DIFFICULT HAVE THESE PROBLEMS MADE IT FOR YOU TO DO YOUR WORK, TAKE CARE OF THINGS AT HOME, OR GET ALONG WITH OTHER PEOPLE: EXTREMELY DIFFICULT
GAD7 TOTAL SCORE: 11

## 2018-01-16 NOTE — MR AVS SNAPSHOT
After Visit Summary   1/16/2018    Ana Luisa Byers    MRN: [id number]           Patient Information     Date Of Birth          1960        Visit Information        Provider Department      1/16/2018 10:00 AM Sheyla Arcos NP [facility]        Today's Diagnoses     Special screening for malignant neoplasms, colon    -  1    Atopic dermatitis, unspecified type           Follow-ups after your visit        Future tests that were ordered for you today     Open Future Orders        Priority Expected Expires Ordered    Fecal colorectal cancer screen FIT Routine 2/6/2018 4/10/2018 1/16/2018            Who to contact     If you have questions or need follow up information about today's clinic visit or your schedule please contact [facility] directly at [phone].  Normal or non-critical lab and imaging results will be communicated to you by [employer], letter or phone within 4 business days after the clinic has received the results. If you do not hear from us within 7 days, please contact the clinic through [employer] or phone. If you have a critical or abnormal lab result, we will notify you by phone as soon as possible.  Submit refill requests through [employer] or call your pharmacy and they will forward the refill request to us. Please allow 3 business days for your refill to be completed.          Additional Information About Your Visit        MyChart Information     [employer] gives you secure access to your electronic health record. If you see a primary care provider, you can also send messages to your care team and make appointments. If you have questions, please call your primary care clinic.  If you do not have a primary care provider, please call [phone] and they will assist you.        Care EveryWhere ID     This is your Care EveryWhere ID. This could be used by other organizations to access your [place] medical records  [id number]        Your Vitals Were      "Pulse Temperature Respirations Height Last Period Pulse Oximetry    68 98.1  F (36.7  C) (Oral) 16 5' 5\" (1.651 m) 02/13/2009 96%    Breastfeeding? BMI (Body Mass Index)                No 42.6 kg/m2           Blood Pressure from Last 3 Encounters:   01/16/18 128/72   12/05/17 134/78   10/20/17 126/82    Weight from Last 3 Encounters:   01/16/18 256 lb (116.1 kg)   12/05/17 259 lb (117.5 kg)   10/20/17 257 lb (116.6 kg)                 Today's Medication Changes          These changes are accurate as of: 1/16/18 10:10 AM.  If you have any questions, ask your nurse or doctor.               Start taking these medicines.        Dose/Directions    hydrOXYzine 25 MG tablet   Commonly known as:  ATARAX   Used for:  Atopic dermatitis, unspecified type   Started by:  Sheyla Arcos NP        Dose:  25-50 mg   Take 1-2 tablets (25-50 mg) by mouth every 6 hours as needed for itching   Quantity:  30 tablet   Refills:  0       predniSONE 20 MG tablet   Commonly known as:  DELTASONE   Used for:  Atopic dermatitis, unspecified type   Started by:  Sheyla Arcos NP        Dose:  40 mg   Take 2 tablets (40 mg) by mouth daily for 5 days   Quantity:  10 tablet   Refills:  0            Where to get your medicines      These medications were sent to [facility] [id number] [place] [address] [id number] [place] [address] AT [address]  [address] [facility] [address]     Phone:  [phone]     hydrOXYzine 25 MG tablet    predniSONE 20 MG tablet                Primary Care Provider Office Phone # Fax #    Bridget Garza [phone] [id number]  [facility] [address]        Equal Access to Services     [facility] KHARI AH: Alexandra Kelsey, waaxda luqadaha, qaybta kaalmada jannette, pool lopez. So [facility] [phone].    ATENCIÓN: Si habla español, tiene a valerio disposición servicios gratuitos de asistencia lingüística. Llame al [phone].    We comply " with applicable federal civil rights laws and [place] laws. We do not discriminate on the basis of race, color, national origin, age, disability, sex, sexual orientation, or gender identity.            Thank you!     Thank you for choosing [facility]  for your care. Our goal is always to provide you with excellent care. Hearing back from our patients is one way we can continue to improve our services. Please take a few minutes to complete the written survey that you may receive in the mail after your visit with us. Thank you!             Your Updated Medication List - Protect others around you: Learn how to safely use, store and throw away your medicines at www.disposemymeds.org.          This list is accurate as of: 1/16/18 10:10 AM.  Always use your most recent med list.                   Brand Name Dispense Instructions for use Diagnosis    aspirin 81 MG chewable tablet     108 tablet    Take 1 tablet (81 mg) by mouth daily    Diabetes mellitus, type 2 (H)       atorvastatin 40 MG tablet    LIPITOR    90 tablet    Take 1 tablet (40 mg) by mouth daily    Hyperlipidemia LDL goal <100       DULoxetine 30 MG EC capsule    CYMBALTA    90 capsule    Take 1 capsule (30 mg) by mouth daily Take along with 60 mg capsule    Myofascial pain, Chronic pain associated with significant psychosocial dysfunction       EPINEPHrine 0.3 MG/0.3ML injection 2-pack    EPIPEN/ADRENACLICK/or ANY BX GENERIC EQUIV    2 each    Inject 0.3 mLs (0.3 mg) into the muscle once as needed for anaphylaxis    Bee sting allergy       fluconazole 200 MG tablet    DIFLUCAN     2 times daily    Cellulitis of right upper extremity       furosemide 40 MG tablet    LASIX    30 tablet    Take 1 tablet (40 mg) by mouth daily    Bilateral lower extremity edema       glipiZIDE 2.5 MG 24 hr tablet    glipiZIDE XL    180 tablet    Take 1 tablet (2.5 mg) by mouth 2 times daily    Type 2 diabetes mellitus with diabetic neuropathy, without  long-term current use of insulin (H)       hydrOXYzine 25 MG tablet    ATARAX    30 tablet    Take 1-2 tablets (25-50 mg) by mouth every 6 hours as needed for itching    Atopic dermatitis, unspecified type       levothyroxine 25 MCG tablet    SYNTHROID/LEVOTHROID    90 tablet    Take 1 tablet (25 mcg) by mouth daily    Acquired hypothyroidism       losartan 25 MG tablet    COZAAR    45 tablet    Take 0.5 tablets (12.5 mg) by mouth daily    Type 2 diabetes mellitus with diabetic polyneuropathy, without long-term current use of insulin (H)       methocarbamol 500 MG tablet    ROBAXIN    20 tablet    Take 2 tablets (1,000 mg) by mouth 3 times daily as needed for muscle spasms    Cervicalgia       omeprazole 40 MG capsule    priLOSEC    90 capsule    Take 1 capsule (40 mg) by mouth daily Take 30-60 minutes before a meal.    Gastroesophageal reflux disease without esophagitis       * order for DME     1 Units    Equipment being ordered: diabetic shoes    Type 2 diabetes mellitus with diabetic neuropathy (H)       * order for DME     1 each    Equipment being ordered: Rollator walker with seat    Morbid obesity due to excess calories (H), Type 2 diabetes mellitus with diabetic neuropathy (H), Fibromyalgia       * order for DME     1 Units    Equipment being ordered: diabetic shoes  Feet have been examined, no changes    Type 2 diabetes mellitus with diabetic neuropathy, without long-term current use of insulin (H)       * order for DME     1 each    by * route daily BLE knee high compression socks 15-20mmHg    Edema, unspecified type       phentermine 37.5 MG capsule     30 capsule    Take 1 capsule (37.5 mg) by mouth every morning    BMI 40.0-44.9, adult (H)       predniSONE 20 MG tablet    DELTASONE    10 tablet    Take 2 tablets (40 mg) by mouth daily for 5 days    Atopic dermatitis, unspecified type       pregabalin 50 MG capsule    LYRICA    90 capsule    Take 1 capsule (50 mg) by mouth 3 times daily    Diabetic  polyneuropathy associated with type 2 diabetes mellitus (H)       vitamin D 2000 UNITS tablet     90 tablet    Take 1 tablet by mouth daily    Type 2 diabetes mellitus with diabetic neuropathy, without long-term current use of insulin (H)       zolpidem 10 MG tablet    AMBIEN    30 tablet    Take 1 tablet (10 mg) by mouth nightly as needed for sleep    Other insomnia       * Notice:  This list has 4 medication(s) that are the same as other medications prescribed for you. Read the directions carefully, and ask your doctor or other care provider to review them with you.

## 2018-01-16 NOTE — NURSING NOTE
"Pended FIT and advised pap is due as of 1/16/2018.Jay Garcia CMA    Chief Complaint   Patient presents with     Derm Problem       Initial /72 (BP Location: Right arm, Patient Position: Chair, Cuff Size: Adult Large)  Pulse 68  Temp 98.1  F (36.7  C) (Oral)  Resp 16  Ht 5' 5\" (1.651 m)  Wt 256 lb (116.1 kg)  LMP 02/13/2009  SpO2 96%  Breastfeeding? No  BMI 42.6 kg/m2 Estimated body mass index is 42.6 kg/(m^2) as calculated from the following:    Height as of this encounter: 5' 5\" (1.651 m).    Weight as of this encounter: 256 lb (116.1 kg).  Medication Reconciliation: complete     Jay Garcia CMA      "

## 2018-01-16 NOTE — PROGRESS NOTES
SUBJECTIVE:   Ana Luisa Byers is a 57 year old female who presents to clinic today for the following health issues:      Rash, itch      Duration: x 2 months    Description  Location:  Lower back  Itching: severe    Intensity:  severe    Accompanying signs and symptoms: mostly itching    History (similar episodes/previous evaluation): None    Precipitating or alleviating factors:  New exposures:  None  Recent travel: no      Therapies tried and outcome: hydrocortisone cream -  not effective and many OCT with no relief, also some Benedryl    Here with urticaria most pronounced on her low back and mid back and torso. No new detergents or bath products. No new foods. No new allergies. Has tried OTC lotions and creams, benadryl, and hydrocortisone. Itching is not improving and keeping her up at night. History of stage 2 CKD.         Problem list and histories reviewed & adjusted, as indicated.  Additional history: none    Patient Active Problem List   Diagnosis     Mild major depression (H)     Chronic pain associated with significant psychosocial dysfunction     Osteoarthritis     S/P shoulder replacement     Cluster headaches     Fibromyalgia     Plantar fasciitis     Other insomnia     HTN, goal below 140/90     Gastroesophageal reflux disease without esophagitis     Restless legs syndrome (RLS)     CKD (chronic kidney disease) stage 2, GFR 60-89 ml/min     HSV-1 infection     Left lumbar radiculitis     Acquired hypothyroidism     BMI 40.0-44.9, adult (H)     Type 2 diabetes mellitus with diabetic polyneuropathy, without long-term current use of insulin (H)     Bilateral lower extremity edema     Past Surgical History:   Procedure Laterality Date     ARTHROPLASTY SHOULDER  7/25/2013    Procedure: ARTHROPLASTY SHOULDER;  RIGHT TOTAL SHOULDER ARTHROPLASTY (TORNIER AFFINITY SHOULDER SYSTEM)^;  Surgeon: Dung Morales MD;  Location: [facility] OR     BACK SURGERY  2011    L45 laminectomy     C APPENDECTOMY  age 19     C  "LIGATE FALLOPIAN TUBE,POSTPARTUM  1982    Tubal Ligation     ELBOW WRIST HAND FINGER ORTHOSIS, RIGID, WITHOUT JOINTS, MAY INCLUDE SOFT  5/2007    put in [place]     ESOPHAGOSCOPY, GASTROSCOPY, DUODENOSCOPY (EGD), COMBINED  9/23/2015    [facility]     ESOPHAGOSCOPY, GASTROSCOPY, DUODENOSCOPY (EGD), COMBINED N/A 9/23/2015    Procedure: COMBINED ESOPHAGOSCOPY, GASTROSCOPY, DUODENOSCOPY (EGD), BIOPSY SINGLE OR MULTIPLE;  Surgeon: Jose Thomas MD;  Location: [facility]     HCL PAP SMEAR  6/2008    [facility]     ORTHOPEDIC SURGERY      left shoulder scoped and plate left elbow     SURGICAL HISTORY OF -   2009    ulnar nerve release, carpal tunnel- left       Social History   Substance Use Topics     Smoking status: Never Smoker     Smokeless tobacco: Never Used     Alcohol use No     Family History   Problem Relation Age of Onset     C.A.D. Mother      Neurologic Disorder Mother      lupus     Depression Mother      Family History Negative Father      DIABETES Maternal Aunt      Colon Cancer No family hx of              Reviewed and updated as needed this visit by clinical staffTobacco  Allergies  Meds  Problems  Med Hx  Surg Hx  Fam Hx  Soc Hx        Reviewed and updated as needed this visit by Provider  Allergies  Meds  Problems  Med Hx  Surg Hx  Fam Hx         ROS:  Constitutional, HEENT, cardiovascular, pulmonary, gi and gu systems are negative, except as otherwise noted.      OBJECTIVE:   /72 (BP Location: Right arm, Patient Position: Chair, Cuff Size: Adult Large)  Pulse 68  Temp 98.1  F (36.7  C) (Oral)  Resp 16  Ht 5' 5\" (1.651 m)  Wt 256 lb (116.1 kg)  LMP 02/13/2009  SpO2 96%  Breastfeeding? No  BMI 42.6 kg/m2  Body mass index is 42.6 kg/(m^2).  GENERAL:, alert and no distress, overweight  RESP: lungs clear to auscultation - no rales, rhonchi or wheezes  CV: regular rate and rhythm, normal S1 S2, no S3 or S4, no murmur, click or rub, no peripheral edema and peripheral pulses " strong  SKIN: mild erythema noted on low back and mid back. Macular lesion on low back.   PSYCH: mentation appears normal, affect normal/bright        ASSESSMENT/PLAN:             1. Special screening for malignant neoplasms, colon  Agreed to complete the FIT test.   - Fecal colorectal cancer screen FIT; Future    2. Atopic dermatitis, unspecified type  Will start on prednisone and she will taper off slowly. Vistaril for itching.   - predniSONE (DELTASONE) 20 MG tablet; Take 2 tablets (40 mg) by mouth daily for 5 days  Dispense: 10 tablet; Refill: 0  - hydrOXYzine (ATARAX) 25 MG tablet; Take 1-2 tablets (25-50 mg) by mouth every 6 hours as needed for itching  Dispense: 30 tablet; Refill: 0    Follow up with PCP if no improvement. Encouraged avoiding any new products. Emphasis on keeping skin moisturized with sensitive skin moisturizers.     Sheyla Arcos, NP  [facility]

## 2018-01-17 ASSESSMENT — ANXIETY QUESTIONNAIRES: GAD7 TOTAL SCORE: 11

## 2018-01-28 NOTE — NURSING NOTE
"Chief Complaint   Patient presents with     Derm Problem       Initial /86 (BP Location: Right arm, Patient Position: Sitting, Cuff Size: Adult Large)  Pulse 93  Temp 98.3  F (36.8  C) (Oral)  Ht 5' 5\" (1.651 m)  Wt 259 lb (117.5 kg)  LMP 02/13/2009  Breastfeeding? No  BMI 43.1 kg/m2 Estimated body mass index is 43.1 kg/(m^2) as calculated from the following:    Height as of this encounter: 5' 5\" (1.651 m).    Weight as of this encounter: 259 lb (117.5 kg).  Medication Reconciliation: complete     Gabino Cho CMA          "
needs assistance with showering, however reports being able to toilet independently/needed assist

## 2018-01-29 DIAGNOSIS — L20.9 ATOPIC DERMATITIS, UNSPECIFIED TYPE: ICD-10-CM

## 2018-01-30 DIAGNOSIS — M54.2 CERVICALGIA: ICD-10-CM

## 2018-01-30 RX ORDER — METHOCARBAMOL 500 MG/1
1000 TABLET, FILM COATED ORAL 3 TIMES DAILY PRN
Qty: 20 TABLET | Refills: 3 | Status: SHIPPED | OUTPATIENT
Start: 2018-01-30 | End: 2018-03-18

## 2018-01-30 NOTE — TELEPHONE ENCOUNTER
methocarbamol (ROBAXIN) 500 MG tablet      Last Written Prescription Date:  09/06/2017  Last Fill Quantity: 20 TABLET,   # refills: 3  Last Office Visit: 01/16/2018  Future Office visit:       Routing refill request to provider for review/approval because:  Drug not on the [facility], [facility] or [facility] refill protocol or controlled substance      Vidal RICHEY

## 2018-01-30 NOTE — TELEPHONE ENCOUNTER
Andra sent to see how pt is doing.  If still having issues she was to follow up with PCP  Michael Jasso RN, BSN

## 2018-01-30 NOTE — TELEPHONE ENCOUNTER
"Requested Prescriptions   Pending Prescriptions Disp Refills     hydrOXYzine (ATARAX) 25 MG tablet [Pharmacy Med Name: HYDROXYZINE HCL 25MG TABS (WHITE)] 30 tablet 0    Last Written Prescription Date:  01/16/2018  Last Fill Quantity: 30 tablet,  # refills: 0   Last Office Visit with [facility] provider:  01/16/2018   Future Office Visit:      Sig: TAKE 1 TO 2 TABLETS(25 TO 50 MG) BY MOUTH EVERY 6 HOURS AS NEEDED FOR ITCHING    Antihistamines Protocol Passed    1/29/2018  3:59 PM       Passed - Patient is 3-64 years of age    Apply weight-based dosing for peds patients age 3 - 12 years of age.    Forward request to provider for patients under the age of 3 or over the age of 64.         Passed - Recent or future visit with authorizing provider's specialty    Patient had office visit in the last year or has a visit in the next 30 days with authorizing provider.  See \"Patient Info\" tab in inbasket, or \"Choose Columns\" in Meds & Orders section of the refill encounter.               Vidal Vargas XRT  "

## 2018-02-01 RX ORDER — HYDROXYZINE HYDROCHLORIDE 25 MG/1
TABLET, FILM COATED ORAL
Qty: 30 TABLET | Refills: 0 | Status: SHIPPED | OUTPATIENT
Start: 2018-02-01 | End: 2018-02-06

## 2018-02-06 ENCOUNTER — OFFICE VISIT (OUTPATIENT)
Dept: FAMILY MEDICINE | Facility: CLINIC | Age: 58
End: 2018-02-06
Payer: MEDICARE

## 2018-02-06 VITALS
SYSTOLIC BLOOD PRESSURE: 126 MMHG | WEIGHT: 256 LBS | TEMPERATURE: 97.7 F | HEART RATE: 103 BPM | BODY MASS INDEX: 42.65 KG/M2 | DIASTOLIC BLOOD PRESSURE: 78 MMHG | HEIGHT: 65 IN

## 2018-02-06 DIAGNOSIS — L20.9 ATOPIC DERMATITIS, UNSPECIFIED TYPE: Primary | ICD-10-CM

## 2018-02-06 DIAGNOSIS — M62.838 NECK MUSCLE SPASM: ICD-10-CM

## 2018-02-06 PROCEDURE — 99214 OFFICE O/P EST MOD 30 MIN: CPT | Performed by: FAMILY MEDICINE

## 2018-02-06 RX ORDER — HYDROXYZINE HYDROCHLORIDE 25 MG/1
25-50 TABLET, FILM COATED ORAL 2 TIMES DAILY PRN
Qty: 120 TABLET | Refills: 1 | Status: SHIPPED | OUTPATIENT
Start: 2018-02-06 | End: 2018-04-07

## 2018-02-06 NOTE — MR AVS SNAPSHOT
"              After Visit Summary   2/6/2018    Ana Luisa Byers    MRN: [id number]           Patient Information     Date Of Birth          1960        Visit Information        Provider Department      2/6/2018 9:00 AM Bridget Garza MD [facility]        Today's Diagnoses     Atopic dermatitis, unspecified type          Care Instructions    Advil 600 mg twice daily    Continue atarax twice daily          Follow-ups after your visit        Who to contact     If you have questions or need follow up information about today's clinic visit or your schedule please contact [facility] directly at [phone].  Normal or non-critical lab and imaging results will be communicated to you by YaBattlehart, letter or phone within 4 business days after the clinic has received the results. If you do not hear from us within 7 days, please contact the clinic through YaBattlehart or phone. If you have a critical or abnormal lab result, we will notify you by phone as soon as possible.  Submit refill requests through IncreaseCard or call your pharmacy and they will forward the refill request to us. Please allow 3 business days for your refill to be completed.          Additional Information About Your Visit        MyChart Information     IncreaseCard gives you secure access to your electronic health record. If you see a primary care provider, you can also send messages to your care team and make appointments. If you have questions, please call your primary care clinic.  If you do not have a primary care provider, please call [phone] and they will assist you.        Care EveryWhere ID     This is your Care EveryWhere ID. This could be used by other organizations to access your [facility] medical records  [id number]        Your Vitals Were     Pulse Temperature Height Last Period Breastfeeding? BMI (Body Mass Index)    103 97.7  F (36.5  C) (Oral) 5' 5\" (1.651 m) 02/13/2009 No 42.6 kg/m2       Blood Pressure " from Last 3 Encounters:   02/06/18 126/78   01/16/18 128/72   12/05/17 134/78    Weight from Last 3 Encounters:   02/06/18 256 lb (116.1 kg)   01/16/18 256 lb (116.1 kg)   12/05/17 259 lb (117.5 kg)              Today, you had the following     No orders found for display         Today's Medication Changes          These changes are accurate as of 2/6/18  9:38 AM.  If you have any questions, ask your nurse or doctor.               These medicines have changed or have updated prescriptions.        Dose/Directions    hydrOXYzine 25 MG tablet   Commonly known as:  ATARAX   This may have changed:  See the new instructions.   Used for:  Atopic dermatitis, unspecified type   Changed by:  Bridget Garza MD        Dose:  25-50 mg   Take 1-2 tablets (25-50 mg) by mouth 2 times daily as needed for itching   Quantity:  120 tablet   Refills:  1         Stop taking these medicines if you haven't already. Please contact your care team if you have questions.     fluconazole 200 MG tablet   Commonly known as:  DIFLUCAN   Stopped by:  Bridget Garza MD                Where to get your medicines      These medications were sent to [facility] [address] [place] [address] AT [address]  [address] [place] [id number]     Phone:  [phone]     hydrOXYzine 25 MG tablet                Primary Care Provider Office Phone # Fax #    Bridget Garza [phone] [address] [facility] [facility] [place] [address]        Equal Access to Services     [facility] AH: Hadii jonathan rae hadasho Soomaali, waaxda luqadaha, qaybta kaalmada adeegyapaz, pool lopez. So [facility] [phone].    ATENCIÓN: Si habla español, tiene a valerio disposición servicios gratuitos de asistencia lingüística. Llame al [phone].    We comply with applicable federal civil rights laws and [place] laws. We do not discriminate on the basis of race, color, national origin, age,  disability, sex, sexual orientation, or gender identity.            Thank you!     Thank you for choosing [facility]  for your care. Our goal is always to provide you with excellent care. Hearing back from our patients is one way we can continue to improve our services. Please take a few minutes to complete the written survey that you may receive in the mail after your visit with us. Thank you!             Your Updated Medication List - Protect others around you: Learn how to safely use, store and throw away your medicines at www.disposemymeds.org.          This list is accurate as of 2/6/18  9:38 AM.  Always use your most recent med list.                   Brand Name Dispense Instructions for use Diagnosis    aspirin 81 MG chewable tablet     108 tablet    Take 1 tablet (81 mg) by mouth daily    Diabetes mellitus, type 2 (H)       atorvastatin 40 MG tablet    LIPITOR    90 tablet    Take 1 tablet (40 mg) by mouth daily    Hyperlipidemia LDL goal <100       DULoxetine 30 MG EC capsule    CYMBALTA    90 capsule    Take 1 capsule (30 mg) by mouth daily Take along with 60 mg capsule    Myofascial pain, Chronic pain associated with significant psychosocial dysfunction       EPINEPHrine 0.3 MG/0.3ML injection 2-pack    EPIPEN/ADRENACLICK/or ANY BX GENERIC EQUIV    2 each    Inject 0.3 mLs (0.3 mg) into the muscle once as needed for anaphylaxis    Bee sting allergy       furosemide 40 MG tablet    LASIX    30 tablet    Take 1 tablet (40 mg) by mouth daily    Bilateral lower extremity edema       glipiZIDE 2.5 MG 24 hr tablet    glipiZIDE XL    180 tablet    Take 1 tablet (2.5 mg) by mouth 2 times daily    Type 2 diabetes mellitus with diabetic neuropathy, without long-term current use of insulin (H)       hydrOXYzine 25 MG tablet    ATARAX    120 tablet    Take 1-2 tablets (25-50 mg) by mouth 2 times daily as needed for itching    Atopic dermatitis, unspecified type       levothyroxine 25 MCG tablet     SYNTHROID/LEVOTHROID    90 tablet    Take 1 tablet (25 mcg) by mouth daily    Acquired hypothyroidism       losartan 25 MG tablet    COZAAR    45 tablet    Take 0.5 tablets (12.5 mg) by mouth daily    Type 2 diabetes mellitus with diabetic polyneuropathy, without long-term current use of insulin (H)       methocarbamol 500 MG tablet    ROBAXIN    20 tablet    Take 2 tablets (1,000 mg) by mouth 3 times daily as needed for muscle spasms    Cervicalgia       omeprazole 40 MG capsule    priLOSEC    90 capsule    Take 1 capsule (40 mg) by mouth daily Take 30-60 minutes before a meal.    Gastroesophageal reflux disease without esophagitis       * order for DME     1 Units    Equipment being ordered: diabetic shoes    Type 2 diabetes mellitus with diabetic neuropathy (H)       * order for DME     1 each    Equipment being ordered: Rollator walker with seat    Morbid obesity due to excess calories (H), Type 2 diabetes mellitus with diabetic neuropathy (H), Fibromyalgia       * order for DME     1 Units    Equipment being ordered: diabetic shoes  Feet have been examined, no changes    Type 2 diabetes mellitus with diabetic neuropathy, without long-term current use of insulin (H)       * order for DME     1 each    by * route daily BLE knee high compression socks 15-20mmHg    Edema, unspecified type       phentermine 37.5 MG capsule     30 capsule    Take 1 capsule (37.5 mg) by mouth every morning    BMI 40.0-44.9, adult (H)       pregabalin 50 MG capsule    LYRICA    90 capsule    Take 1 capsule (50 mg) by mouth 3 times daily    Diabetic polyneuropathy associated with type 2 diabetes mellitus (H)       vitamin D 2000 UNITS tablet     90 tablet    Take 1 tablet by mouth daily    Type 2 diabetes mellitus with diabetic neuropathy, without long-term current use of insulin (H)       zolpidem 10 MG tablet    AMBIEN    30 tablet    Take 1 tablet (10 mg) by mouth nightly as needed for sleep    Other insomnia       * Notice:  This  list has 4 medication(s) that are the same as other medications prescribed for you. Read the directions carefully, and ask your doctor or other care provider to review them with you.

## 2018-02-06 NOTE — PROGRESS NOTES
SUBJECTIVE:   Ana Luisa Byers is a 58 year old female who presents to clinic today for the following health issues:      Medication Followup of atarax    Taking Medication as prescribed: yes    Side Effects:  None    Medication Helping Symptoms:  yes       Taking 50 mg atarax BID for mid back itching. Working well for her. Not causing any drowsiness.   Using luke warm showers, applying moisturizer most days of the week. Has stopped scratching with her back scratcher. Using cloth instead.   Has hard water, landlord [facility] address.     No changes in medications.       Fell in her parking lot twice last week, having right neck pain since then. Tender to touch.     Continues to work on weight loss, having success. Going to the [address] with her  now.       Problem list and histories reviewed & adjusted, as indicated.  Additional history: none    Patient Active Problem List   Diagnosis     Mild major depression (H)     Chronic pain associated with significant psychosocial dysfunction     Osteoarthritis     S/P shoulder replacement     Cluster headaches     Fibromyalgia     Plantar fasciitis     Other insomnia     HTN, goal below 140/90     Gastroesophageal reflux disease without esophagitis     Restless legs syndrome (RLS)     CKD (chronic kidney disease) stage 2, GFR 60-89 ml/min     HSV-1 infection     Left lumbar radiculitis     Acquired hypothyroidism     BMI 40.0-44.9, adult (H)     Type 2 diabetes mellitus with diabetic polyneuropathy, without long-term current use of insulin (H)     Bilateral lower extremity edema     Past Surgical History:   Procedure Laterality Date     ARTHROPLASTY SHOULDER  7/25/2013    Procedure: ARTHROPLASTY SHOULDER;  RIGHT TOTAL SHOULDER ARTHROPLASTY (TORNIER AFFINITY SHOULDER SYSTEM)^;  Surgeon: Dung Morales MD;  Location: [facility] OR     BACK SURGERY  2011    L45 laminectomy     C APPENDECTOMY  age 19     C LIGATE FALLOPIAN TUBE,POSTPARTUM  1982    Tubal Ligation     ELBOW WRIST HAND  "FINGER ORTHOSIS, RIGID, WITHOUT JOINTS, MAY INCLUDE SOFT  5/2007    put in [place]     ESOPHAGOSCOPY, GASTROSCOPY, DUODENOSCOPY (EGD), COMBINED  9/23/2015    Dr. Thomas [facility]     ESOPHAGOSCOPY, GASTROSCOPY, DUODENOSCOPY (EGD), COMBINED N/A 9/23/2015    Procedure: COMBINED ESOPHAGOSCOPY, GASTROSCOPY, DUODENOSCOPY (EGD), BIOPSY SINGLE OR MULTIPLE;  Surgeon: Jose Thomas MD;  Location: [facility] GI     HCL PAP SMEAR  6/2008    WNL     ORTHOPEDIC SURGERY      left shoulder scoped and plate left elbow     SURGICAL HISTORY OF -   2009    ulnar nerve release, carpal tunnel- left       Social History   Substance Use Topics     Smoking status: Never Smoker     Smokeless tobacco: Never Used     Alcohol use No     Family History   Problem Relation Age of Onset     C.A.D. Mother      Neurologic Disorder Mother      lupus     Depression Mother      Family History Negative Father      DIABETES Maternal Aunt      Colon Cancer No family hx of            Reviewed and updated as needed this visit by clinical staff       Reviewed and updated as needed this visit by Provider         ROS:  Constitutional, HEENT, cardiovascular, pulmonary, gi and gu systems are negative, except as otherwise noted.    OBJECTIVE:     /78 (BP Location: Right arm, Patient Position: Sitting, Cuff Size: Adult Large)  Pulse 103  Temp 97.7  F (36.5  C) (Oral)  Ht 5' 5\" (1.651 m)  Wt 256 lb (116.1 kg)  LMP 02/13/2009  Breastfeeding? No  BMI 42.6 kg/m2  Body mass index is 42.6 kg/(m^2).  GENERAL: healthy, alert and no distress  NECK: no adenopathy, right trap in spasm, ttp  RESP: lungs clear to auscultation - no rales, rhonchi or wheezes  CV: regular rate and rhythm, normal S1 S2, no S3 or S4, no murmur  SKIN: no rash on the mid back, no xerosis  PSYCH: mentation appears normal, affect normal/bright    Diagnostic Test Results:  none     ASSESSMENT/PLAN:     1. Atopic dermatitis, unspecified type - atarax working well for her, suspect hard water " contributing, discussed good skin hygiene.   - hydrOXYzine (ATARAX) 25 MG tablet; Take 1-2 tablets (25-50 mg) by mouth 2 times daily as needed for itching  Dispense: 120 tablet; Refill: 1    2. Neck muscle spasm - NSAIDs, heat, and mm relaxers    3. BMI 40.0-44.9, adult (H) - having success with weight loss, will continue    Bridget Garza MD  [facility]

## 2018-02-06 NOTE — NURSING NOTE
"Chief Complaint   Patient presents with     Recheck Medication       Initial /78 (BP Location: Right arm, Patient Position: Sitting, Cuff Size: Adult Large)  Pulse 103  Temp 97.7  F (36.5  C) (Oral)  Ht 5' 5\" (1.651 m)  Wt 256 lb (116.1 kg)  LMP 02/13/2009  Breastfeeding? No  BMI 42.6 kg/m2 Estimated body mass index is 42.6 kg/(m^2) as calculated from the following:    Height as of this encounter: 5' 5\" (1.651 m).    Weight as of this encounter: 256 lb (116.1 kg).  Medication Reconciliation: complete     Health maintenance- fit due pt aware    Gaibno Cho CMA            "

## 2018-02-14 DIAGNOSIS — K21.9 GASTROESOPHAGEAL REFLUX DISEASE WITHOUT ESOPHAGITIS: Primary | ICD-10-CM

## 2018-02-15 RX ORDER — PANTOPRAZOLE SODIUM 40 MG/1
40 TABLET, DELAYED RELEASE ORAL DAILY
Qty: 90 TABLET | Refills: 3 | Status: SHIPPED | OUTPATIENT
Start: 2018-02-15 | End: 2019-01-09

## 2018-02-15 NOTE — TELEPHONE ENCOUNTER
Prescription approved per [facility] Refill Protocol.  Michael Jasso RN, BSN

## 2018-02-15 NOTE — TELEPHONE ENCOUNTER
Requested Prescriptions   Pending Prescriptions Disp Refills     pantoprazole (PROTONIX) 40 MG EC tablet  Last Written Prescription Date:  11/22/2017  Last Fill Quantity: 90,  # refills: 0   Last office visit: 12/5/2017 with prescribing provider:  02/06/2018   Future Office Visit:     30 tablet 1     Sig: Take 1 tablet (40 mg) by mouth daily Take 30-60 minutes before a meal.    There is no refill protocol information for this order

## 2018-02-23 ENCOUNTER — TELEPHONE (OUTPATIENT)
Dept: FAMILY MEDICINE | Facility: CLINIC | Age: 58
End: 2018-02-23

## 2018-02-23 NOTE — TELEPHONE ENCOUNTER
Form faxed [employer] for medicare.  Faxed to [phone]. Ana Luisa Rawls CMA  copied and abstracted. Ana Luisa Rawls CMA

## 2018-02-26 ENCOUNTER — TELEPHONE (OUTPATIENT)
Dept: FAMILY MEDICINE | Facility: CLINIC | Age: 58
End: 2018-02-26

## 2018-02-26 DIAGNOSIS — G47.09 OTHER INSOMNIA: ICD-10-CM

## 2018-02-27 RX ORDER — ZOLPIDEM TARTRATE 10 MG/1
10 TABLET ORAL
Qty: 30 TABLET | Refills: 3 | Status: SHIPPED | OUTPATIENT
Start: 2018-02-27 | End: 2018-06-30

## 2018-02-27 NOTE — TELEPHONE ENCOUNTER
Controlled Substance Refill Request for zolpidem (AMBIEN) 10 MG tablet  Problem List Complete:  No     PROVIDER TO CONSIDER COMPLETION OF PROBLEM LIST AND OVERVIEW/CONTROLLED SUBSTANCE AGREEMENT    Last Written Prescription Date:  10/31/2017  Last Fill Quantity: 30 tablet,   # refills: 3    [facility] [place] [facility] [place]: 02/06/2018    Future Office visit:     Controlled substance agreement on file: Yes:  Date 11/16/2016.     Processing:  Fax Rx to [facility] pharmacy   checked in past 6 months?  No, route to YEISON RICHEY

## 2018-02-27 NOTE — TELEPHONE ENCOUNTER
RX [place] reviewed:  reviewed- no concerns    [place]  [email or web address]

## 2018-02-28 ENCOUNTER — MYC MEDICAL ADVICE (OUTPATIENT)
Dept: FAMILY MEDICINE | Facility: CLINIC | Age: 58
End: 2018-02-28

## 2018-02-28 NOTE — TELEPHONE ENCOUNTER
Rx approved, fax to the [facility] will notified patient when prescription is ready to be picked up.   Karen Wilde

## 2018-02-28 NOTE — TELEPHONE ENCOUNTER
Has this been faxed to the pharmacy yet?  Approved yesterday but no documentation it was faxed    Michael Jasso RN, BSN

## 2018-02-28 NOTE — TELEPHONE ENCOUNTER
Sent patient a [employer] message stating Rx was sent to the pharmacy this morning. Karen Wilde

## 2018-03-15 ENCOUNTER — OFFICE VISIT (OUTPATIENT)
Dept: FAMILY MEDICINE | Facility: CLINIC | Age: 58
End: 2018-03-15
Payer: MEDICARE

## 2018-03-15 VITALS
BODY MASS INDEX: 43.32 KG/M2 | WEIGHT: 260 LBS | DIASTOLIC BLOOD PRESSURE: 80 MMHG | HEIGHT: 65 IN | SYSTOLIC BLOOD PRESSURE: 128 MMHG | TEMPERATURE: 97.6 F | HEART RATE: 86 BPM

## 2018-03-15 DIAGNOSIS — M79.7 FIBROMYALGIA: ICD-10-CM

## 2018-03-15 DIAGNOSIS — Z12.11 SPECIAL SCREENING FOR MALIGNANT NEOPLASMS, COLON: ICD-10-CM

## 2018-03-15 DIAGNOSIS — M72.2 PLANTAR FASCIITIS: Primary | ICD-10-CM

## 2018-03-15 DIAGNOSIS — G89.4 CHRONIC PAIN ASSOCIATED WITH SIGNIFICANT PSYCHOSOCIAL DYSFUNCTION: ICD-10-CM

## 2018-03-15 DIAGNOSIS — M62.830 LUMBAR PARASPINAL MUSCLE SPASM: ICD-10-CM

## 2018-03-15 DIAGNOSIS — I10 BENIGN ESSENTIAL HYPERTENSION: ICD-10-CM

## 2018-03-15 PROCEDURE — 99214 OFFICE O/P EST MOD 30 MIN: CPT | Performed by: FAMILY MEDICINE

## 2018-03-15 PROCEDURE — 82274 ASSAY TEST FOR BLOOD FECAL: CPT | Performed by: NURSE PRACTITIONER

## 2018-03-15 RX ORDER — OXYCODONE AND ACETAMINOPHEN 5; 325 MG/1; MG/1
1 TABLET ORAL EVERY 6 HOURS PRN
Qty: 20 TABLET | Refills: 0 | Status: SHIPPED | OUTPATIENT
Start: 2018-03-15 | End: 2018-03-20

## 2018-03-15 RX ORDER — PREDNISONE 20 MG/1
TABLET ORAL
Qty: 20 TABLET | Refills: 0 | Status: SHIPPED | OUTPATIENT
Start: 2018-03-15 | End: 2018-03-20

## 2018-03-15 NOTE — PROGRESS NOTES
SUBJECTIVE:   Ana Luisa Byers is a 58 year old female who presents to clinic today for the following health issues:    1. Low back pain - no overuse or trauma, tough to rise from chair, pain radiates down the back of the legs. Trouble sitting for too long, gets up and down frequently during the day. No loss of b/b. Taking BID robaxin, apap. Hx of discectomy L4-5 years ago.     2. Pain bottom of the feet - when she walks on them, hx of plantar fasciitis. Has been doing her normal ADLs, no increased time on her feet. Feet have been cramping when she crosses her legs.     3. Cracked skin on heel - has been applying vaseline and wearing socks, not helping, stings       Hx of chronic pain, fibromyalgia.     Been exercising in the pool and trying to eat well. Been drinking more water 8 - 12 oz bottles daily.       Problem list and histories reviewed & adjusted, as indicated.  Additional history: none    Patient Active Problem List   Diagnosis     Mild major depression (H)     Chronic pain associated with significant psychosocial dysfunction     Osteoarthritis     S/P shoulder replacement     Cluster headaches     Fibromyalgia     Plantar fasciitis     Other insomnia     HTN, goal below 140/90     Gastroesophageal reflux disease without esophagitis     Restless legs syndrome (RLS)     CKD (chronic kidney disease) stage 2, GFR 60-89 ml/min     HSV-1 infection     Left lumbar radiculitis     Acquired hypothyroidism     BMI 40.0-44.9, adult (H)     Type 2 diabetes mellitus with diabetic polyneuropathy, without long-term current use of insulin (H)     Bilateral lower extremity edema     Past Surgical History:   Procedure Laterality Date     ARTHROPLASTY SHOULDER  7/25/2013    Procedure: ARTHROPLASTY SHOULDER;  RIGHT TOTAL SHOULDER ARTHROPLASTY (TORNIER AFFINITY SHOULDER SYSTEM)^;  Surgeon: Dung Morales MD;  Location: [facility] OR     BACK SURGERY  2011    L45 laminectomy     C APPENDECTOMY  age 19     C LIGATE FALLOPIAN  "TUBE,POSTPARTUM  1982    Tubal Ligation     ELBOW WRIST HAND FINGER ORTHOSIS, RIGID, WITHOUT JOINTS, MAY INCLUDE SOFT  5/2007    put in [place]     ESOPHAGOSCOPY, GASTROSCOPY, DUODENOSCOPY (EGD), COMBINED  9/23/2015    Dr. Thomas [facility]     ESOPHAGOSCOPY, GASTROSCOPY, DUODENOSCOPY (EGD), COMBINED N/A 9/23/2015    Procedure: COMBINED ESOPHAGOSCOPY, GASTROSCOPY, DUODENOSCOPY (EGD), BIOPSY SINGLE OR MULTIPLE;  Surgeon: Jose Thomas MD;  Location: [facility]     HCL PAP SMEAR  6/2008    WNL     ORTHOPEDIC SURGERY      left shoulder scoped and plate left elbow     SURGICAL HISTORY OF -   2009    ulnar nerve release, carpal tunnel- left       Social History   Substance Use Topics     Smoking status: Never Smoker     Smokeless tobacco: Never Used     Alcohol use No     Family History   Problem Relation Age of Onset     C.A.D. Mother      Neurologic Disorder Mother      lupus     Depression Mother      Family History Negative Father      DIABETES Maternal Aunt      Colon Cancer No family hx of            Reviewed and updated as needed this visit by clinical staff  Allergies       Reviewed and updated as needed this visit by Provider         ROS:  Constitutional, HEENT, cardiovascular, pulmonary, gi and gu systems are negative, except as otherwise noted.    OBJECTIVE:     /80 (BP Location: Right arm, Patient Position: Chair, Cuff Size: Adult Large)  Pulse 86  Temp 97.6  F (36.4  C) (Oral)  Ht 5' 5\" (1.651 m)  Wt 260 lb (117.9 kg)  LMP 02/13/2009  Breastfeeding? No  BMI 43.27 kg/m2  Body mass index is 43.27 kg/(m^2).  GENERAL: healthy, alert and no distress  RESP: lungs clear to auscultation - no rales, rhonchi or wheezes  CV: regular rate and rhythm, normal S1 S2, no S3 or S4, no murmur, click or rub, no peripheral edema and peripheral pulses strong  MS: both feet - ttp over the medial calcaneus  SKIN: left heel - skin cracked 2 cm in length, no surrounding erythema  BACK: paralumbar tenderness to " palpation    Diagnostic Test Results:  none     ASSESSMENT/PLAN:     1. Plantar fasciitis - discussed diagnosis, will benefit from treatment for back as below, advised regular massage. Not a typical location for fibromyalgia pain, but could be associated.     2. Lumbar paraspinal muscle spasm - no injuries, likely all muscular. Advised continued use of mm relaxer, add prednisone - reviewed side effects - massage and heat.   - oxyCODONE-acetaminophen (PERCOCET) 5-325 MG per tablet; Take 1 tablet by mouth every 6 hours as needed for pain maximum 4 tablet(s) per day  Dispense: 20 tablet; Refill: 0  - predniSONE (DELTASONE) 20 MG tablet; Take 3 tabs (60 mg) by mouth daily x 3 days, 2 tabs (40 mg) daily x 3 days, 1 tab (20 mg) daily x 3 days, then 1/2 tab (10 mg) x 3 days.  Dispense: 20 tablet; Refill: 0    3. Special screening for malignant neoplasms, colon  - Fecal colorectal cancer screen (FIT); Future    4. Chronic pain associated with significant psychosocial dysfunction - likely contributing to above issues, although typically very functional and active    5. Fibromyalgia - perhaps contributing to above    6. BMI 40.0-44.9, adult (H) - working on weight loss through activity and diet    7. Benign essential hypertension - in range today    Bridget Garza MD  [facility]

## 2018-03-15 NOTE — MR AVS SNAPSHOT
After Visit Summary   3/15/2018    Ana Luisa Byers    MRN: [id number]           Patient Information     Date Of Birth          1960        Visit Information        Provider Department      3/15/2018 8:40 AM Bridget Garza MD [facility]        Today's Diagnoses     Plantar fasciitis    -  1    Lumbar paraspinal muscle spasm        Special screening for malignant neoplasms, colon        Chronic pain associated with significant psychosocial dysfunction        Fibromyalgia        BMI 40.0-44.9, adult (H)        Benign essential hypertension           Follow-ups after your visit        Future tests that were ordered for you today     Open Future Orders        Priority Expected Expires Ordered    Fecal colorectal cancer screen (FIT) Routine 4/5/2018 6/7/2018 3/15/2018            Who to contact     If you have questions or need follow up information about today's clinic visit or your schedule please contact [facility] directly at [phone].  Normal or non-critical lab and imaging results will be communicated to you by "[employer]hart, letter or phone within 4 business days after the clinic has received the results. If you do not hear from us within 7 days, please contact the clinic through "[employer]hart or phone. If you have a critical or abnormal lab result, we will notify you by phone as soon as possible.  Submit refill requests through [employer] or call your pharmacy and they will forward the refill request to us. Please allow 3 business days for your refill to be completed.          Additional Information About Your Visit        MyChart Information     [employer] gives you secure access to your electronic health record. If you see a primary care provider, you can also send messages to your care team and make appointments. If you have questions, please call your primary care clinic.  If you do not have a primary care provider, please call [phone] and they will assist  "you.        Care EveryWhere ID     This is your Care EveryWhere ID. This could be used by other organizations to access your [place] medical records  [id number]        Your Vitals Were     Pulse Temperature Height Last Period Breastfeeding? BMI (Body Mass Index)    86 97.6  F (36.4  C) (Oral) 5' 5\" (1.651 m) 02/13/2009 No 43.27 kg/m2       Blood Pressure from Last 3 Encounters:   03/15/18 128/80   02/06/18 126/78   01/16/18 128/72    Weight from Last 3 Encounters:   03/15/18 260 lb (117.9 kg)   02/06/18 256 lb (116.1 kg)   01/16/18 256 lb (116.1 kg)                 Today's Medication Changes          These changes are accurate as of 3/15/18 11:38 AM.  If you have any questions, ask your nurse or doctor.               Start taking these medicines.        Dose/Directions    oxyCODONE-acetaminophen 5-325 MG per tablet   Commonly known as:  PERCOCET   Used for:  Plantar fasciitis   Started by:  Bridget Garza MD        Dose:  1 tablet   Take 1 tablet by mouth every 6 hours as needed for pain maximum 4 tablet(s) per day   Quantity:  20 tablet   Refills:  0       predniSONE 20 MG tablet   Commonly known as:  DELTASONE   Used for:  Lumbar paraspinal muscle spasm   Started by:  Bridget Garza MD        Take 3 tabs (60 mg) by mouth daily x 3 days, 2 tabs (40 mg) daily x 3 days, 1 tab (20 mg) daily x 3 days, then 1/2 tab (10 mg) x 3 days.   Quantity:  20 tablet   Refills:  0            Where to get your medicines      These medications were sent to [facility] [address] [facility] [address] [facility] AT [address]  [address] [facility], [facility] [address]     Phone:  [phone]     predniSONE 20 MG tablet         Some of these will need a paper prescription and others can be bought over the counter.  Ask your nurse if you have questions.     Bring a paper prescription for each of these medications     oxyCODONE-acetaminophen 5-325 MG per tablet                Primary Care Provider " Office Phone # Fax #    Bridget Ambrocio Garza [phone] [address]  [facility] [address]        Equal Access to Services     BOB LUCIA : Hadii aad ku hadbeverlyo Sofadiaali, waaxda luqadaha, qaybta kaalmada aderussell, pool whiteside ronalddelmy steele mary lopez. So [facility] [phone].    ATENCIÓN: Si habla español, tiene a valerio disposición servicios gratuitos de asistencia lingüística. Llame al [phone].    We comply with applicable federal civil rights laws and [place] laws. We do not discriminate on the basis of race, color, national origin, age, disability, sex, sexual orientation, or gender identity.            Thank you!     Thank you for choosing [facility]  for your care. Our goal is always to provide you with excellent care. Hearing back from our patients is one way we can continue to improve our services. Please take a few minutes to complete the written survey that you may receive in the mail after your visit with us. Thank you!             Your Updated Medication List - Protect others around you: Learn how to safely use, store and throw away your medicines at www.disposemymeds.org.          This list is accurate as of 3/15/18 11:38 AM.  Always use your most recent med list.                   Brand Name Dispense Instructions for use Diagnosis    aspirin 81 MG chewable tablet     108 tablet    Take 1 tablet (81 mg) by mouth daily    Diabetes mellitus, type 2 (H)       atorvastatin 40 MG tablet    LIPITOR    90 tablet    Take 1 tablet (40 mg) by mouth daily    Hyperlipidemia LDL goal <100       DULoxetine 30 MG EC capsule    CYMBALTA    90 capsule    Take 1 capsule (30 mg) by mouth daily Take along with 60 mg capsule    Myofascial pain, Chronic pain associated with significant psychosocial dysfunction       EPINEPHrine 0.3 MG/0.3ML injection 2-pack    EPIPEN/ADRENACLICK/or ANY BX GENERIC EQUIV    2 each    Inject 0.3 mLs (0.3 mg) into the muscle once as needed for  anaphylaxis    Bee sting allergy       furosemide 40 MG tablet    LASIX    30 tablet    Take 1 tablet (40 mg) by mouth daily    Bilateral lower extremity edema       glipiZIDE 2.5 MG 24 hr tablet    glipiZIDE XL    180 tablet    Take 1 tablet (2.5 mg) by mouth 2 times daily    Type 2 diabetes mellitus with diabetic neuropathy, without long-term current use of insulin (H)       hydrOXYzine 25 MG tablet    ATARAX    120 tablet    Take 1-2 tablets (25-50 mg) by mouth 2 times daily as needed for itching    Atopic dermatitis, unspecified type       levothyroxine 25 MCG tablet    SYNTHROID/LEVOTHROID    90 tablet    Take 1 tablet (25 mcg) by mouth daily    Acquired hypothyroidism       losartan 25 MG tablet    COZAAR    45 tablet    Take 0.5 tablets (12.5 mg) by mouth daily    Type 2 diabetes mellitus with diabetic polyneuropathy, without long-term current use of insulin (H)       methocarbamol 500 MG tablet    ROBAXIN    20 tablet    Take 2 tablets (1,000 mg) by mouth 3 times daily as needed for muscle spasms    Cervicalgia       omeprazole 40 MG capsule    priLOSEC    90 capsule    Take 1 capsule (40 mg) by mouth daily Take 30-60 minutes before a meal.    Gastroesophageal reflux disease without esophagitis       * order for DME     1 Units    Equipment being ordered: diabetic shoes    Type 2 diabetes mellitus with diabetic neuropathy (H)       * order for DME     1 each    Equipment being ordered: Rollator walker with seat    Morbid obesity due to excess calories (H), Type 2 diabetes mellitus with diabetic neuropathy (H), Fibromyalgia       * order for DME     1 Units    Equipment being ordered: diabetic shoes  Feet have been examined, no changes    Type 2 diabetes mellitus with diabetic neuropathy, without long-term current use of insulin (H)       * order for DME     1 each    by * route daily BLE knee high compression socks 15-20mmHg    Edema, unspecified type       oxyCODONE-acetaminophen 5-325 MG per tablet     PERCOCET    20 tablet    Take 1 tablet by mouth every 6 hours as needed for pain maximum 4 tablet(s) per day    Plantar fasciitis       pantoprazole 40 MG EC tablet    PROTONIX    90 tablet    Take 1 tablet (40 mg) by mouth daily Take 30-60 minutes before a meal.    Gastroesophageal reflux disease without esophagitis       phentermine 37.5 MG capsule     30 capsule    Take 1 capsule (37.5 mg) by mouth every morning    BMI 40.0-44.9, adult (H)       predniSONE 20 MG tablet    DELTASONE    20 tablet    Take 3 tabs (60 mg) by mouth daily x 3 days, 2 tabs (40 mg) daily x 3 days, 1 tab (20 mg) daily x 3 days, then 1/2 tab (10 mg) x 3 days.    Lumbar paraspinal muscle spasm       pregabalin 50 MG capsule    LYRICA    90 capsule    Take 1 capsule (50 mg) by mouth 3 times daily    Diabetic polyneuropathy associated with type 2 diabetes mellitus (H)       vitamin D 2000 UNITS tablet     90 tablet    Take 1 tablet by mouth daily    Type 2 diabetes mellitus with diabetic neuropathy, without long-term current use of insulin (H)       zolpidem 10 MG tablet    AMBIEN    30 tablet    Take 1 tablet (10 mg) by mouth nightly as needed for sleep    Other insomnia       * Notice:  This list has 4 medication(s) that are the same as other medications prescribed for you. Read the directions carefully, and ask your doctor or other care provider to review them with you.

## 2018-03-15 NOTE — NURSING NOTE
"Chief Complaint   Patient presents with     Foot Injury       Initial /80 (BP Location: Right arm, Patient Position: Chair, Cuff Size: Adult Large)  Pulse 86  Temp 97.6  F (36.4  C) (Oral)  Ht 5' 5\" (1.651 m)  Wt 260 lb (117.9 kg)  LMP 02/13/2009  Breastfeeding? No  BMI 43.27 kg/m2 Estimated body mass index is 43.27 kg/(m^2) as calculated from the following:    Height as of this encounter: 5' 5\" (1.651 m).    Weight as of this encounter: 260 lb (117.9 kg).  Medication Reconciliation: complete     Gabino Cho CMA          "

## 2018-03-17 DIAGNOSIS — Z12.11 SPECIAL SCREENING FOR MALIGNANT NEOPLASMS, COLON: ICD-10-CM

## 2018-03-17 LAB — HEMOCCULT STL QL IA: NEGATIVE

## 2018-03-18 DIAGNOSIS — M54.2 CERVICALGIA: ICD-10-CM

## 2018-03-18 NOTE — TELEPHONE ENCOUNTER
methocarbamol (ROBAXIN) 500 MG tablet      Last Written Prescription Date:  1/30/2018  Last Fill Quantity: 20 tablet,   # refills: 3  Last Office Visit: 3/15/2018, Greg Mejia refill request to provider for review/approval because:  Drug not on the [facility], [facility] or [place] refill protocol or controlled substance

## 2018-03-19 NOTE — TELEPHONE ENCOUNTER
Routing refill request to provider for review/approval because:  Drug not on the [facility] refill protocol   Michael Jasso RN, BSN

## 2018-03-20 ENCOUNTER — OFFICE VISIT (OUTPATIENT)
Dept: FAMILY MEDICINE | Facility: CLINIC | Age: 58
End: 2018-03-20
Payer: MEDICARE

## 2018-03-20 VITALS
OXYGEN SATURATION: 99 % | SYSTOLIC BLOOD PRESSURE: 124 MMHG | HEIGHT: 65 IN | WEIGHT: 261 LBS | BODY MASS INDEX: 43.49 KG/M2 | TEMPERATURE: 98 F | DIASTOLIC BLOOD PRESSURE: 83 MMHG | HEART RATE: 98 BPM

## 2018-03-20 DIAGNOSIS — M54.42 ACUTE LEFT-SIDED LOW BACK PAIN WITH LEFT-SIDED SCIATICA: Primary | ICD-10-CM

## 2018-03-20 DIAGNOSIS — R11.0 NAUSEA: ICD-10-CM

## 2018-03-20 PROCEDURE — 99213 OFFICE O/P EST LOW 20 MIN: CPT | Performed by: PHYSICIAN ASSISTANT

## 2018-03-20 RX ORDER — ONDANSETRON 4 MG/1
4-8 TABLET, ORALLY DISINTEGRATING ORAL
Qty: 20 TABLET | Refills: 1 | Status: SHIPPED | OUTPATIENT
Start: 2018-03-20 | End: 2018-11-13

## 2018-03-20 NOTE — MR AVS SNAPSHOT
After Visit Summary   3/20/2018    Ana Luisa Byers    MRN: [id number]           Patient Information     Date Of Birth          1960        Visit Information        Provider Department      3/20/2018 10:30 AM Aaseby-Aguilera, Ramona Ann, PA-C [facility]        Today's Diagnoses     Acute left-sided low back pain with left-sided sciatica    -  1    Nausea           Follow-ups after your visit        Additional Services     ORTHO  REFERRAL       [facility] is referring you to the Orthopedic  Services at [facility].       The  Representative will assist you in the coordination of your Orthopedic and Musculoskeletal Care as prescribed by your physician.    The  Representative will call you within 1 business day to help schedule your appointment, or you may contact the  Representative at:    All areas ~ [phone]     Type of Referral : Non Surgical       Timeframe requested: 1 - 2 days    Coverage of these services is subject to the terms and limitations of your health insurance plan.  Please call member services at your health plan with any benefit or coverage questions.      If X-rays, CT or MRI's have been performed, please contact the facility where they were done to arrange for , prior to your scheduled appointment.  Please bring this referral request to your appointment and present it to your specialist.                  Future tests that were ordered for you today     Open Future Orders        Priority Expected Expires Ordered    MR Lumbar Spine w/o Contrast Routine  3/20/2019 3/20/2018            Who to contact     If you have questions or need follow up information about today's clinic visit or your schedule please contact [facility] directly at [phone].  Normal or non-critical lab and imaging results will be communicated to you by MyChart, letter or phone within  "4 business days after the clinic has received the results. If you do not hear from us within 7 days, please contact the clinic through [employer] or phone. If you have a critical or abnormal lab result, we will notify you by phone as soon as possible.  Submit refill requests through [employer] or call your pharmacy and they will forward the refill request to us. Please allow 3 business days for your refill to be completed.          Additional Information About Your Visit        [employer] Information     [employer] gives you secure access to your electronic health record. If you see a primary care provider, you can also send messages to your care team and make appointments. If you have questions, please call your primary care clinic.  If you do not have a primary care provider, please call [phone] and they will assist you.        Care EveryWhere ID     This is your Care EveryWhere ID. This could be used by other organizations to access your [place] medical records  [id number]        Your Vitals Were     Pulse Temperature Height Last Period Pulse Oximetry BMI (Body Mass Index)    98 98  F (36.7  C) (Oral) 5' 5\" (1.651 m) 02/13/2009 99% 43.43 kg/m2       Blood Pressure from Last 3 Encounters:   03/20/18 124/83   03/15/18 128/80   02/06/18 126/78    Weight from Last 3 Encounters:   03/20/18 261 lb (118.4 kg)   03/15/18 260 lb (117.9 kg)   02/06/18 256 lb (116.1 kg)              We Performed the Following     ORTHO  REFERRAL          Today's Medication Changes          These changes are accurate as of 3/20/18 11:09 AM.  If you have any questions, ask your nurse or doctor.               Start taking these medicines.        Dose/Directions    ondansetron 4 MG ODT tab   Commonly known as:  ZOFRAN ODT   Used for:  Nausea   Started by:  Aaseby-Aguilera, Ramona Ann, PA-C        Dose:  4-8 mg   Take 1-2 tablets (4-8 mg) by mouth 3 times daily (before meals)   Quantity:  20 tablet   Refills:  1         Stop taking these " medicines if you haven't already. Please contact your care team if you have questions.     oxyCODONE-acetaminophen 5-325 MG per tablet   Commonly known as:  PERCOCET   Stopped by:  Aaseby-Aguilera, Ramona Ann, PA-C           predniSONE 20 MG tablet   Commonly known as:  DELTASONE   Stopped by:  Aaseby-Aguilera, Ramona Ann, PA-C                Where to get your medicines      These medications were sent to [email or web address] [address] [place] [address] [facility] AT [address] [address] [facility] [address]     Phone:  [phone]     ondansetron 4 MG ODT tab                Primary Care Provider Office Phone # Fax #    Bridget Garza [phone] [address]  [facility] [address]        Equal Access to Services     [facility] : Hadii aad ku hadasho Soomaali, waaxda luqadaha, qaybta kaalmada adeegyada, waxay idiin hayaan adedelmy kharash labruce . So [facility] [phone].    ATENCIÓN: Si habla español, tiene a valerio disposición servicios gratuitos de asistencia lingüística. aJni al [phone].    We comply with applicable federal civil rights laws and [place] laws. We do not discriminate on the basis of race, color, national origin, age, disability, sex, sexual orientation, or gender identity.            Thank you!     Thank you for choosing [facility]  for your care. Our goal is always to provide you with excellent care. Hearing back from our patients is one way we can continue to improve our services. Please take a few minutes to complete the written survey that you may receive in the mail after your visit with us. Thank you!             Your Updated Medication List - Protect others around you: Learn how to safely use, store and throw away your medicines at www.disposemymeds.org.          This list is accurate as of 3/20/18 11:09 AM.  Always use your most recent med list.                   Brand Name Dispense Instructions for use Diagnosis     aspirin 81 MG chewable tablet     108 tablet    Take 1 tablet (81 mg) by mouth daily    Diabetes mellitus, type 2 (H)       atorvastatin 40 MG tablet    LIPITOR    90 tablet    Take 1 tablet (40 mg) by mouth daily    Hyperlipidemia LDL goal <100       DULoxetine 30 MG EC capsule    CYMBALTA    90 capsule    Take 1 capsule (30 mg) by mouth daily Take along with 60 mg capsule    Myofascial pain, Chronic pain associated with significant psychosocial dysfunction       EPINEPHrine 0.3 MG/0.3ML injection 2-pack    EPIPEN/ADRENACLICK/or ANY BX GENERIC EQUIV    2 each    Inject 0.3 mLs (0.3 mg) into the muscle once as needed for anaphylaxis    Bee sting allergy       furosemide 40 MG tablet    LASIX    30 tablet    Take 1 tablet (40 mg) by mouth daily    Bilateral lower extremity edema       glipiZIDE 2.5 MG 24 hr tablet    glipiZIDE XL    180 tablet    Take 1 tablet (2.5 mg) by mouth 2 times daily    Type 2 diabetes mellitus with diabetic neuropathy, without long-term current use of insulin (H)       hydrOXYzine 25 MG tablet    ATARAX    120 tablet    Take 1-2 tablets (25-50 mg) by mouth 2 times daily as needed for itching    Atopic dermatitis, unspecified type       levothyroxine 25 MCG tablet    SYNTHROID/LEVOTHROID    90 tablet    Take 1 tablet (25 mcg) by mouth daily    Acquired hypothyroidism       losartan 25 MG tablet    COZAAR    45 tablet    Take 0.5 tablets (12.5 mg) by mouth daily    Type 2 diabetes mellitus with diabetic polyneuropathy, without long-term current use of insulin (H)       methocarbamol 500 MG tablet    ROBAXIN    20 tablet    Take 2 tablets (1,000 mg) by mouth 3 times daily as needed for muscle spasms    Cervicalgia       omeprazole 40 MG capsule    priLOSEC    90 capsule    Take 1 capsule (40 mg) by mouth daily Take 30-60 minutes before a meal.    Gastroesophageal reflux disease without esophagitis       ondansetron 4 MG ODT tab    ZOFRAN ODT    20 tablet    Take 1-2 tablets (4-8 mg) by mouth 3  times daily (before meals)    Nausea       * order for DME     1 Units    Equipment being ordered: diabetic shoes    Type 2 diabetes mellitus with diabetic neuropathy (H)       * order for DME     1 each    Equipment being ordered: Rollator walker with seat    Morbid obesity due to excess calories (H), Type 2 diabetes mellitus with diabetic neuropathy (H), Fibromyalgia       * order for DME     1 Units    Equipment being ordered: diabetic shoes  Feet have been examined, no changes    Type 2 diabetes mellitus with diabetic neuropathy, without long-term current use of insulin (H)       * order for DME     1 each    by * route daily BLE knee high compression socks 15-20mmHg    Edema, unspecified type       pantoprazole 40 MG EC tablet    PROTONIX    90 tablet    Take 1 tablet (40 mg) by mouth daily Take 30-60 minutes before a meal.    Gastroesophageal reflux disease without esophagitis       phentermine 37.5 MG capsule     30 capsule    Take 1 capsule (37.5 mg) by mouth every morning    BMI 40.0-44.9, adult (H)       pregabalin 50 MG capsule    LYRICA    90 capsule    Take 1 capsule (50 mg) by mouth 3 times daily    Diabetic polyneuropathy associated with type 2 diabetes mellitus (H)       vitamin D 2000 UNITS tablet     90 tablet    Take 1 tablet by mouth daily    Type 2 diabetes mellitus with diabetic neuropathy, without long-term current use of insulin (H)       zolpidem 10 MG tablet    AMBIEN    30 tablet    Take 1 tablet (10 mg) by mouth nightly as needed for sleep    Other insomnia       * Notice:  This list has 4 medication(s) that are the same as other medications prescribed for you. Read the directions carefully, and ask your doctor or other care provider to review them with you.

## 2018-03-20 NOTE — NURSING NOTE
"Chief Complaint   Patient presents with     RECHECK     follow up on back pain       Initial /83 (BP Location: Right arm, Patient Position: Chair, Cuff Size: Adult Large)  Pulse 98  Temp 98  F (36.7  C) (Oral)  Ht 5' 5\" (1.651 m)  Wt 261 lb (118.4 kg)  LMP 02/13/2009  SpO2 99%  BMI 43.43 kg/m2 Estimated body mass index is 43.43 kg/(m^2) as calculated from the following:    Height as of this encounter: 5' 5\" (1.651 m).    Weight as of this encounter: 261 lb (118.4 kg).  Medication Reconciliation: complete      Health Maintenance addressed:  Pap Smear    n/a      "

## 2018-03-20 NOTE — PROGRESS NOTES
SUBJECTIVE:   Ana Luisa Byers is a 58 year old female who presents to clinic today for the following health issues:      Back Pain       Duration: one month         Specific cause: fell three times this winter    Description:   Location of pain: low back left  Character of pain: sharp and stabbing  Pain radiation:radiates into the left foot  New numbness or weakness in legs, not attributed to pain:  YES    Intensity: Currently 10/10, severe    History:   Pain interferes with job:   History of back problems: previous herniated disc - l4 and l 5 right  side    Any previous MRI or X-rays: Yes- years ago for right side   Sees a specialist for back pain:  No  Therapies tried without relief: not for the left side     Alleviating factors:   Improved by: none      Precipitating factors:  Worsened by: Sitting and laying down    Functional and Psychosocial Screen (Cheko STarT Back):      Not performed today          Accompanying Signs & Symptoms:  Risk of Fracture:    Risk of Cauda Equina:    Risk of Infection:    Risk of Cancer:  None  Risk of Ankylosing Spondylitis:  Onset at age <35, male, AND morning back stiffness. no                       Problem list and histories reviewed & adjusted, as indicated.  Additional history: as documented    Current Outpatient Prescriptions   Medication Sig Dispense Refill     ondansetron (ZOFRAN ODT) 4 MG ODT tab Take 1-2 tablets (4-8 mg) by mouth 3 times daily (before meals) 20 tablet 1     zolpidem (AMBIEN) 10 MG tablet Take 1 tablet (10 mg) by mouth nightly as needed for sleep 30 tablet 3     pantoprazole (PROTONIX) 40 MG EC tablet Take 1 tablet (40 mg) by mouth daily Take 30-60 minutes before a meal. 90 tablet 3     hydrOXYzine (ATARAX) 25 MG tablet Take 1-2 tablets (25-50 mg) by mouth 2 times daily as needed for itching 120 tablet 1     losartan (COZAAR) 25 MG tablet Take 0.5 tablets (12.5 mg) by mouth daily 45 tablet 3     furosemide (LASIX) 40 MG tablet Take 1 tablet (40 mg) by  mouth daily 30 tablet 3     omeprazole (PRILOSEC) 40 MG capsule Take 1 capsule (40 mg) by mouth daily Take 30-60 minutes before a meal. 90 capsule 3     levothyroxine (SYNTHROID/LEVOTHROID) 25 MCG tablet Take 1 tablet (25 mcg) by mouth daily 90 tablet 2     phentermine 37.5 MG capsule Take 1 capsule (37.5 mg) by mouth every morning 30 capsule 3     glipiZIDE (GLIPIZIDE XL) 2.5 MG 24 hr tablet Take 1 tablet (2.5 mg) by mouth 2 times daily 180 tablet 1     DULoxetine (CYMBALTA) 30 MG EC capsule Take 1 capsule (30 mg) by mouth daily Take along with 60 mg capsule 90 capsule 1     pregabalin (LYRICA) 50 MG capsule Take 1 capsule (50 mg) by mouth 3 times daily 90 capsule 3     atorvastatin (LIPITOR) 40 MG tablet Take 1 tablet (40 mg) by mouth daily 90 tablet 3     order for DME by * route daily BLE knee high compression socks 15-20mmHg 1 each 0     Cholecalciferol (VITAMIN D) 2000 UNITS tablet Take 1 tablet by mouth daily 90 tablet 3     order for DME Equipment being ordered: diabetic shoes    Feet have been examined, no changes 1 Units 0     order for DME Equipment being ordered: Rollator walker with seat 1 each 0     EPINEPHrine (EPIPEN) 0.3 MG/0.3ML injection Inject 0.3 mLs (0.3 mg) into the muscle once as needed for anaphylaxis 2 each 1     order for DME Equipment being ordered: diabetic shoes 1 Units 0     aspirin 81 MG chewable tablet Take 1 tablet (81 mg) by mouth daily 108 tablet 3     methocarbamol (ROBAXIN) 500 MG tablet Take 2 tablets (1,000 mg) by mouth 3 times daily as needed for muscle spasms 20 tablet 3     BP Readings from Last 3 Encounters:   03/20/18 124/83   03/15/18 128/80   02/06/18 126/78    Wt Readings from Last 3 Encounters:   03/20/18 261 lb (118.4 kg)   03/15/18 260 lb (117.9 kg)   02/06/18 256 lb (116.1 kg)                    Reviewed and updated as needed this visit by clinical staff       Reviewed and updated as needed this visit by Provider         ROS:  Constitutional, HEENT, cardiovascular,  "pulmonary, gi and gu systems are negative, except as otherwise noted.    OBJECTIVE:                                                    /83 (BP Location: Right arm, Patient Position: Chair, Cuff Size: Adult Large)  Pulse 98  Temp 98  F (36.7  C) (Oral)  Ht 5' 5\" (1.651 m)  Wt 261 lb (118.4 kg)  LMP 02/13/2009  SpO2 99%  BMI 43.43 kg/m2  Body mass index is 43.43 kg/(m^2).  GENERAL APPEARANCE: healthy, alert and moderate distress  RESP: lungs clear to auscultation - no rales, rhonchi or wheezes  CV: regular rates and rhythm, normal S1 S2, no S3 or S4 and no murmur, click or rub  Comprehensive back pain exam:  Unable to get a good exam due to pain          ASSESSMENT/PLAN:                                                    1. Acute left-sided low back pain with left-sided sciatica  Advised follow-up with ortho. Well get MRI.   Pain severe and making her nauseated so gave zofran  - ORTHO  REFERRAL  - MR Lumbar Spine w/o Contrast; Future    2. Nausea    - ondansetron (ZOFRAN ODT) 4 MG ODT tab; Take 1-2 tablets (4-8 mg) by mouth 3 times daily (before meals)  Dispense: 20 tablet; Refill: 1      There are no Patient Instructions on file for this visit.    Ramona Ann Aaseby-Aguilera, PA-C  [facility]    "

## 2018-03-21 RX ORDER — METHOCARBAMOL 500 MG/1
1000 TABLET, FILM COATED ORAL 3 TIMES DAILY PRN
Qty: 20 TABLET | Refills: 3 | Status: SHIPPED | OUTPATIENT
Start: 2018-03-21 | End: 2018-07-09

## 2018-03-22 ENCOUNTER — TRANSFERRED RECORDS (OUTPATIENT)
Dept: HEALTH INFORMATION MANAGEMENT | Facility: CLINIC | Age: 58
End: 2018-03-22

## 2018-03-25 NOTE — TELEPHONE ENCOUNTER
phentermine 37.5 MG capsule      Last Written Prescription Date:  11/15/2017  Last Fill Quantity: 30,   # refills: 3  Last Office Visit: 03/20/2018  Future Office visit:    Next 5 appointments (look out 90 days)     Mar 28, 2018 11:00 AM CDT   MyChart Injury Follow Up with Bridget Garza MD   [facility]    [address] [facility] [address] [phone]                   Routing refill request to provider for review/approval because:  Drug not on the [facility], [facility] or [facility] refill protocol or controlled substance

## 2018-03-26 NOTE — TELEPHONE ENCOUNTER
[place] reviewed:  reviewed- no concerns  Last filled 1/31/18    [place]  [email or web address]

## 2018-03-26 NOTE — TELEPHONE ENCOUNTER
Controlled Substance Refill Request for phentermine 37.5 MG capsule  Problem List Complete:  No     PROVIDER TO CONSIDER COMPLETION OF PROBLEM LIST AND OVERVIEW/CONTROLLED SUBSTANCE AGREEMENT    Last Written Prescription Date:  11/15/2017  Last Fill Quantity: 30 capsule,   # refills: 3    Last Office Visit with G primary care provider: 03/15/2018    Future Office visit:   Next 5 appointments (look out 90 days)     Mar 28, 2018 11:00 AM CDT   MyChart Injury Follow Up with Bridget Garza MD   [facility]    [id number] [place] [address] [phone]                  Controlled substance agreement on file: Yes:  Date 11/16/2015.     Processing:  Fax Rx to [facility] pharmacy     checked in past 6 months? Yes, 11/09/2017      Vidal RICHEY

## 2018-03-27 RX ORDER — PHENTERMINE HYDROCHLORIDE 37.5 MG/1
37.5 CAPSULE ORAL EVERY MORNING
Qty: 30 CAPSULE | Refills: 3 | Status: SHIPPED | OUTPATIENT
Start: 2018-03-27 | End: 2018-06-21

## 2018-03-28 ENCOUNTER — OFFICE VISIT (OUTPATIENT)
Dept: FAMILY MEDICINE | Facility: CLINIC | Age: 58
End: 2018-03-28
Payer: MEDICARE

## 2018-03-28 ENCOUNTER — MYC MEDICAL ADVICE (OUTPATIENT)
Dept: FAMILY MEDICINE | Facility: CLINIC | Age: 58
End: 2018-03-28

## 2018-03-28 VITALS
SYSTOLIC BLOOD PRESSURE: 118 MMHG | TEMPERATURE: 98.1 F | HEIGHT: 65 IN | DIASTOLIC BLOOD PRESSURE: 76 MMHG | BODY MASS INDEX: 44.15 KG/M2 | HEART RATE: 85 BPM | WEIGHT: 265 LBS

## 2018-03-28 DIAGNOSIS — M51.27 HERNIATED NUCLEUS PULPOSUS, L5-S1, LEFT: Primary | ICD-10-CM

## 2018-03-28 DIAGNOSIS — M54.42 ACUTE LEFT-SIDED LOW BACK PAIN WITH LEFT-SIDED SCIATICA: ICD-10-CM

## 2018-03-28 PROCEDURE — 99214 OFFICE O/P EST MOD 30 MIN: CPT | Performed by: FAMILY MEDICINE

## 2018-03-28 NOTE — TELEPHONE ENCOUNTER
Sent patient a Parental Health message. Need MARIFER signed before we can send records. Karen Wilde

## 2018-03-28 NOTE — NURSING NOTE
"Chief Complaint   Patient presents with     Consult     recent MRI results       Initial /76 (BP Location: Right arm, Patient Position: Sitting, Cuff Size: Adult Large)  Pulse 85  Temp 98.1  F (36.7  C) (Oral)  Ht 5' 5\" (1.651 m)  Wt 265 lb (120.2 kg)  LMP 02/13/2009  Breastfeeding? No  BMI 44.1 kg/m2 Estimated body mass index is 44.1 kg/(m^2) as calculated from the following:    Height as of this encounter: 5' 5\" (1.651 m).    Weight as of this encounter: 265 lb (120.2 kg).  Medication Reconciliation: complete     Health maintenance- a1c and micro today. Pt advised pap due soon.    Gabino Cho CMA          "

## 2018-03-28 NOTE — PROGRESS NOTES
SUBJECTIVE:   Ana Luisa Byers is a 58 year old female who presents to clinic today for the following health issues:      Consult recent MRI results - had through [facility], reports has been scanned.   Showed worsened herniated disc abutting the left S1 nerve root.     Has been having left low back pain. Worsened in the past week. Has had a course of prednisone and short course of percocet. She has also been taking NSAIDs since then.     She is limiting her physical activity, not exercising but still spending time in the pool, floating.     She has pain down the back of the left leg but no weakness or tingling.         Problem list and histories reviewed & adjusted, as indicated.  Additional history: none    Patient Active Problem List   Diagnosis     Mild major depression (H)     Chronic pain associated with significant psychosocial dysfunction     Osteoarthritis     S/P shoulder replacement     Cluster headaches     Fibromyalgia     Plantar fasciitis     Other insomnia     HTN, goal below 140/90     Gastroesophageal reflux disease without esophagitis     Restless legs syndrome (RLS)     CKD (chronic kidney disease) stage 2, GFR 60-89 ml/min     HSV-1 infection     Left lumbar radiculitis     Acquired hypothyroidism     BMI 40.0-44.9, adult (H)     Type 2 diabetes mellitus with diabetic polyneuropathy, without long-term current use of insulin (H)     Bilateral lower extremity edema     Benign essential hypertension     Past Surgical History:   Procedure Laterality Date     ARTHROPLASTY SHOULDER  7/25/2013    Procedure: ARTHROPLASTY SHOULDER;  RIGHT TOTAL SHOULDER ARTHROPLASTY (TORNIER AFFINITY SHOULDER SYSTEM)^;  Surgeon: Dung Morales MD;  Location: [facility] OR     BACK SURGERY  2011    L45 laminectomy     C APPENDECTOMY  age 19     C LIGATE FALLOPIAN TUBE,POSTPARTUM  1982    Tubal Ligation     ELBOW WRIST HAND FINGER ORTHOSIS, RIGID, WITHOUT JOINTS, MAY INCLUDE SOFT  5/2007    put in [place]     ESOPHAGOSCOPY,  "GASTROSCOPY, DUODENOSCOPY (EGD), COMBINED  9/23/2015    Dr. Thomas [facility]     ESOPHAGOSCOPY, GASTROSCOPY, DUODENOSCOPY (EGD), COMBINED N/A 9/23/2015    Procedure: COMBINED ESOPHAGOSCOPY, GASTROSCOPY, DUODENOSCOPY (EGD), BIOPSY SINGLE OR MULTIPLE;  Surgeon: Jose Thomas MD;  Location: [facility] GI     HCL PAP SMEAR  6/2008    WNL     ORTHOPEDIC SURGERY      left shoulder scoped and plate left elbow     SURGICAL HISTORY OF -   2009    ulnar nerve release, carpal tunnel- left       Social History   Substance Use Topics     Smoking status: Never Smoker     Smokeless tobacco: Never Used     Alcohol use No     Family History   Problem Relation Age of Onset     C.A.D. Mother      Neurologic Disorder Mother      lupus     Depression Mother      Family History Negative Father      DIABETES Maternal Aunt      Colon Cancer No family hx of            Reviewed and updated as needed this visit by clinical staff       Reviewed and updated as needed this visit by Provider         ROS:  Constitutional, HEENT, cardiovascular, pulmonary, gi and gu systems are negative, except as otherwise noted.    OBJECTIVE:     /76 (BP Location: Right arm, Patient Position: Sitting, Cuff Size: Adult Large)  Pulse 85  Temp 98.1  F (36.7  C) (Oral)  Ht 5' 5\" (1.651 m)  Wt 265 lb (120.2 kg)  LMP 02/13/2009  Breastfeeding? No  BMI 44.1 kg/m2  Body mass index is 44.1 kg/(m^2).  GENERAL: healthy, alert and no distress  Comprehensive back pain exam:  Tenderness of left lumbar paraspinals, Pain limits the following motions: bending at the waist, Lower extremity strength functional and equal on both sides, Lower extremity reflexes within normal limits bilaterally, Lower extremity sensation normal and equal on both sides and Straight leg positive on  left, indicating possible ipsilateral radiculopathy    Diagnostic Test Results:  none     ASSESSMENT/PLAN:     1. Herniated nucleus pulposus, L5-S1, left - she has had history of similar at another " level of the lumbar spine, would like to start with neurosurgery first, would be open to PT if they feel appropriate. This is limiting her ADLs, but at this point not an urgent surgical issue as she has normal neuro exam today.   - ORTHO  REFERRAL    2. Acute left-sided low back pain with left-sided sciatica - advised NSAIDs, continue ice alternating with heat    Bridget Garza MD  [facility]

## 2018-03-28 NOTE — MR AVS SNAPSHOT
After Visit Summary   3/28/2018    Ana Luisa Byers    MRN: [id number]           Patient Information     Date Of Birth          1960        Visit Information        Provider Department      3/28/2018 11:00 AM Bridget Garza MD [facility]        Today's Diagnoses     Herniated nucleus pulposus, L5-S1, left    -  1    Acute left-sided low back pain with left-sided sciatica           Follow-ups after your visit        Additional Services     ORTHO  REFERRAL       [facility] is referring you to the Orthopedic  Services at [facility].       The  Representative will assist you in the coordination of your Orthopedic and Musculoskeletal Care as prescribed by your physician.    The  Representative will call you within 1 business day to help schedule your appointment, or you may contact the  Representative at:    All areas ~ [phone]     Type of Referral : Spine: Lumbar  **Choose Medical Spine Specialist (unless patient was seen by a Medical Spine Specialist within the past 6 months).**  Surgical Evaluation is advised if the patient presents with one or more of the following red flags: Evidence of Spinal Tumor, Infection or Fracture, Cauda Equina Syndrome, Sudden or Progressive Weakness, Loss of Bowel or Bladder Control, or any other documented emergent neurological condition resulting from a Lumbar Spinal Condition. Spine Surgeon  - needs to see Neurosurgery - MRI report with L5-S1 herniated disc - scanned in to record      Timeframe requested: 1 - 2 days    Coverage of these services is subject to the terms and limitations of your health insurance plan.  Please call member services at your health plan with any benefit or coverage questions.      If X-rays, CT or MRI's have been performed, please contact the facility where they were done to arrange for , prior to your scheduled  "appointment.  Please bring this referral request to your appointment and present it to your specialist.                  Who to contact     If you have questions or need follow up information about today's clinic visit or your schedule please contact [facility] directly at [phone].  Normal or non-critical lab and imaging results will be communicated to you by MyChart, letter or phone within 4 business days after the clinic has received the results. If you do not hear from us within 7 days, please contact the clinic through [facility] or phone. If you have a critical or abnormal lab result, we will notify you by phone as soon as possible.  Submit refill requests through [facility] or call your pharmacy and they will forward the refill request to us. Please allow 3 business days for your refill to be completed.          Additional Information About Your Visit        MyChart Information     [facility] gives you secure access to your electronic health record. If you see a primary care provider, you can also send messages to your care team and make appointments. If you have questions, please call your primary care clinic.  If you do not have a primary care provider, please call [phone] and they will assist you.        Care EveryWhere ID     This is your Care EveryWhere ID. This could be used by other organizations to access your [place] medical records  [id number]        Your Vitals Were     Pulse Temperature Height Last Period Breastfeeding? BMI (Body Mass Index)    85 98.1  F (36.7  C) (Oral) 5' 5\" (1.651 m) 02/13/2009 No 44.1 kg/m2       Blood Pressure from Last 3 Encounters:   03/28/18 118/76   03/20/18 124/83   03/15/18 128/80    Weight from Last 3 Encounters:   03/28/18 265 lb (120.2 kg)   03/20/18 261 lb (118.4 kg)   03/15/18 260 lb (117.9 kg)              We Performed the Following     ORTHO  REFERRAL        Primary Care Provider Office Phone # Fax #    Bridget Garza MD " [phone]       [address]  [facility] [address]        Equal Access to Services     BOB LUCIA : Hadjacque aad ku hadmartin Kelsey, washaunada luqalondra, qaybta kaalmada jannette, opol florentinojean steele laZanalban lopez. So [facility] [phone].    ATENCIÓN: Si habla español, tiene a valerio disposición servicios gratuitos de asistencia lingüística. Llame al [phone].    We comply with applicable federal civil rights laws and [place] laws. We do not discriminate on the basis of race, color, national origin, age, disability, sex, sexual orientation, or gender identity.            Thank you!     Thank you for choosing [facility]  for your care. Our goal is always to provide you with excellent care. Hearing back from our patients is one way we can continue to improve our services. Please take a few minutes to complete the written survey that you may receive in the mail after your visit with us. Thank you!             Your Updated Medication List - Protect others around you: Learn how to safely use, store and throw away your medicines at www.disposemymeds.org.          This list is accurate as of 3/28/18 12:42 PM.  Always use your most recent med list.                   Brand Name Dispense Instructions for use Diagnosis    aspirin 81 MG chewable tablet     108 tablet    Take 1 tablet (81 mg) by mouth daily    Diabetes mellitus, type 2 (H)       atorvastatin 40 MG tablet    LIPITOR    90 tablet    Take 1 tablet (40 mg) by mouth daily    Hyperlipidemia LDL goal <100       DULoxetine 30 MG EC capsule    CYMBALTA    90 capsule    Take 1 capsule (30 mg) by mouth daily Take along with 60 mg capsule    Myofascial pain, Chronic pain associated with significant psychosocial dysfunction       EPINEPHrine 0.3 MG/0.3ML injection 2-pack    EPIPEN/ADRENACLICK/or ANY BX GENERIC EQUIV    2 each    Inject 0.3 mLs (0.3 mg) into the muscle once as needed for anaphylaxis    Bee sting allergy       furosemide  40 MG tablet    LASIX    30 tablet    Take 1 tablet (40 mg) by mouth daily    Bilateral lower extremity edema       glipiZIDE 2.5 MG 24 hr tablet    glipiZIDE XL    180 tablet    Take 1 tablet (2.5 mg) by mouth 2 times daily    Type 2 diabetes mellitus with diabetic neuropathy, without long-term current use of insulin (H)       hydrOXYzine 25 MG tablet    ATARAX    120 tablet    Take 1-2 tablets (25-50 mg) by mouth 2 times daily as needed for itching    Atopic dermatitis, unspecified type       levothyroxine 25 MCG tablet    SYNTHROID/LEVOTHROID    90 tablet    Take 1 tablet (25 mcg) by mouth daily    Acquired hypothyroidism       losartan 25 MG tablet    COZAAR    45 tablet    Take 0.5 tablets (12.5 mg) by mouth daily    Type 2 diabetes mellitus with diabetic polyneuropathy, without long-term current use of insulin (H)       methocarbamol 500 MG tablet    ROBAXIN    20 tablet    Take 2 tablets (1,000 mg) by mouth 3 times daily as needed for muscle spasms    Cervicalgia       omeprazole 40 MG capsule    priLOSEC    90 capsule    Take 1 capsule (40 mg) by mouth daily Take 30-60 minutes before a meal.    Gastroesophageal reflux disease without esophagitis       ondansetron 4 MG ODT tab    ZOFRAN ODT    20 tablet    Take 1-2 tablets (4-8 mg) by mouth 3 times daily (before meals)    Nausea       * order for DME     1 Units    Equipment being ordered: diabetic shoes    Type 2 diabetes mellitus with diabetic neuropathy (H)       * order for DME     1 each    Equipment being ordered: Rollator walker with seat    Morbid obesity due to excess calories (H), Type 2 diabetes mellitus with diabetic neuropathy (H), Fibromyalgia       * order for DME     1 Units    Equipment being ordered: diabetic shoes  Feet have been examined, no changes    Type 2 diabetes mellitus with diabetic neuropathy, without long-term current use of insulin (H)       * order for DME     1 each    by * route daily BLE knee high compression socks 15-20mmHg     Edema, unspecified type       pantoprazole 40 MG EC tablet    PROTONIX    90 tablet    Take 1 tablet (40 mg) by mouth daily Take 30-60 minutes before a meal.    Gastroesophageal reflux disease without esophagitis       phentermine 37.5 MG capsule     30 capsule    Take 1 capsule (37.5 mg) by mouth every morning    BMI 40.0-44.9, adult (H)       pregabalin 50 MG capsule    LYRICA    90 capsule    Take 1 capsule (50 mg) by mouth 3 times daily    Diabetic polyneuropathy associated with type 2 diabetes mellitus (H)       vitamin D 2000 UNITS tablet     90 tablet    Take 1 tablet by mouth daily    Type 2 diabetes mellitus with diabetic neuropathy, without long-term current use of insulin (H)       zolpidem 10 MG tablet    AMBIEN    30 tablet    Take 1 tablet (10 mg) by mouth nightly as needed for sleep    Other insomnia       * Notice:  This list has 4 medication(s) that are the same as other medications prescribed for you. Read the directions carefully, and ask your doctor or other care provider to review them with you.

## 2018-04-07 DIAGNOSIS — R60.0 BILATERAL LOWER EXTREMITY EDEMA: ICD-10-CM

## 2018-04-07 DIAGNOSIS — L20.9 ATOPIC DERMATITIS, UNSPECIFIED TYPE: ICD-10-CM

## 2018-04-07 NOTE — TELEPHONE ENCOUNTER
"Requested Prescriptions   Pending Prescriptions Disp Refills     hydrOXYzine (ATARAX) 25 MG tablet  Last Written Prescription Date:  02/06/2018  Last Fill Quantity: 120,  # refills: 1   Last office visit: 12/5/2017 with prescribing provider:  03/28/2018   Future Office Visit:     120 tablet 1     Sig: Take 1-2 tablets (25-50 mg) by mouth 2 times daily as needed for itching    Antihistamines Protocol Passed    4/7/2018 10:47 AM       Passed - Recent (12 mo) or future (30 days) visit within the authorizing provider's specialty    Patient had office visit in the last 12 months or has a visit in the next 30 days with authorizing provider or within the authorizing provider's specialty.  See \"Patient Info\" tab in inbasket, or \"Choose Columns\" in Meds & Orders section of the refill encounter.           Passed - Patient is age 3 or older    Apply age and/or weight-based dosing for peds patients age 3 and older.    Forward request to provider for patients under the age of 3.          furosemide (LASIX) 40 MG tablet  Last Written Prescription Date:  12/08/2017  Last Fill Quantity: 30,  # refills: 3   Last office visit: 12/5/2017 with prescribing provider:  03/28/2018   Future Office Visit:     30 tablet 3     Sig: Take 1 tablet (40 mg) by mouth daily    Diuretics (Including Combos) Protocol Failed    4/7/2018 10:47 AM       Failed - Normal serum creatinine on file in past 12 months    Recent Labs   Lab Test  10/20/17   0803   CR  1.11*             Passed - Blood pressure under 140/90 in past 12 months    BP Readings from Last 3 Encounters:   03/28/18 118/76   03/20/18 124/83   03/15/18 128/80                Passed - Recent (12 mo) or future (30 days) visit within the authorizing provider's specialty    Patient had office visit in the last 12 months or has a visit in the next 30 days with authorizing provider or within the authorizing provider's specialty.  See \"Patient Info\" tab in inbasket, or \"Choose Columns\" in Meds & " Orders section of the refill encounter.           Passed - Patient is age 18 or older       Passed - No active pregancy on record       Passed - Normal serum potassium on file in past 12 months    Recent Labs   Lab Test  10/20/17   0803   POTASSIUM  3.6                   Passed - Normal serum sodium on file in past 12 months    Recent Labs   Lab Test  10/20/17   0803   NA  139             Passed - No positive pregnancy test in past 12 months

## 2018-04-09 RX ORDER — FUROSEMIDE 40 MG
40 TABLET ORAL DAILY
Qty: 30 TABLET | Refills: 3 | Status: SHIPPED | OUTPATIENT
Start: 2018-04-09 | End: 2018-08-02

## 2018-04-10 RX ORDER — HYDROXYZINE HYDROCHLORIDE 25 MG/1
25-50 TABLET, FILM COATED ORAL 2 TIMES DAILY PRN
Qty: 120 TABLET | Refills: 1 | Status: SHIPPED | OUTPATIENT
Start: 2018-04-10 | End: 2018-07-23

## 2018-04-12 DIAGNOSIS — E11.40 TYPE 2 DIABETES MELLITUS WITH DIABETIC NEUROPATHY, WITHOUT LONG-TERM CURRENT USE OF INSULIN (H): ICD-10-CM

## 2018-04-12 RX ORDER — GLIPIZIDE 2.5 MG/1
2.5 TABLET, EXTENDED RELEASE ORAL 2 TIMES DAILY
Qty: 180 TABLET | Refills: 0 | Status: SHIPPED | OUTPATIENT
Start: 2018-04-12 | End: 2018-07-23

## 2018-04-12 NOTE — TELEPHONE ENCOUNTER
"Requested Prescriptions   Pending Prescriptions Disp Refills     glipiZIDE (GLIPIZIDE XL) 2.5 MG 24 hr tablet 180 tablet 1    Last Written Prescription Date:  10/24.2017  Last Fill Quantity: 180 tablet,  # refills: 1   Last office visit: 3/28/2018 with prescribing provider:  03/28/2018   Future Office Visit:     Sig: Take 1 tablet (2.5 mg) by mouth 2 times daily    Sulfonylurea Agents Failed    4/12/2018 10:22 AM       Failed - Patient has had a Microalbumin in the past 12 mos.    Recent Labs   Lab Test  04/11/17   1132   MICROL  8   UMALCR  10.99            Failed - Patient has a recent creatinine (normal) within the past 12 mos.    Recent Labs   Lab Test  10/20/17   0803   CR  1.11*            Passed - Blood pressure less than 140/90 in past 6 months    BP Readings from Last 3 Encounters:   03/28/18 118/76   03/20/18 124/83   03/15/18 128/80                Passed - Patient has documented LDL within the past 12 mos.    Recent Labs   Lab Test  10/20/17   0803   LDL  158*            Passed - Patient has documented A1c within the specified period of time.    Recent Labs   Lab Test  10/20/17   0803   A1C  6.3*            Passed - Patient is age 18 or older       Passed - No active pregnancy on record       Passed - Patient has not had a positive pregnancy test within the past 12 mos.       Passed - Recent (6 mo) or future (30 days) visit within the authorizing provider's specialty    Patient had office visit in the last 6 months or has a visit in the next 30 days with authorizing provider or within the authorizing provider's specialty.  See \"Patient Info\" tab in inbasket, or \"Choose Columns\" in Meds & Orders section of the refill encounter.              Vidal Vargas XRT  "

## 2018-04-12 NOTE — TELEPHONE ENCOUNTER
Medication is being filled for 1 time refill only due to:  Patient needs to be seen because due for Dm appt.  Last DM appt on file was 9/2016 that specifically address DM.  Yehudat sent.

## 2018-05-01 ENCOUNTER — OFFICE VISIT (OUTPATIENT)
Dept: FAMILY MEDICINE | Facility: CLINIC | Age: 58
End: 2018-05-01
Payer: MEDICARE

## 2018-05-01 VITALS
RESPIRATION RATE: 16 BRPM | HEIGHT: 65 IN | HEART RATE: 101 BPM | WEIGHT: 265 LBS | BODY MASS INDEX: 44.15 KG/M2 | SYSTOLIC BLOOD PRESSURE: 120 MMHG | OXYGEN SATURATION: 97 % | TEMPERATURE: 97.1 F | DIASTOLIC BLOOD PRESSURE: 72 MMHG

## 2018-05-01 DIAGNOSIS — M54.16 LEFT LUMBAR RADICULITIS: Primary | ICD-10-CM

## 2018-05-01 DIAGNOSIS — I10 BENIGN ESSENTIAL HYPERTENSION: ICD-10-CM

## 2018-05-01 DIAGNOSIS — E11.42 TYPE 2 DIABETES MELLITUS WITH DIABETIC POLYNEUROPATHY, WITHOUT LONG-TERM CURRENT USE OF INSULIN (H): ICD-10-CM

## 2018-05-01 DIAGNOSIS — Z91.030 BEE STING ALLERGY: ICD-10-CM

## 2018-05-01 DIAGNOSIS — L20.9 ATOPIC DERMATITIS, UNSPECIFIED TYPE: ICD-10-CM

## 2018-05-01 LAB
ANION GAP SERPL CALCULATED.3IONS-SCNC: 9 MMOL/L (ref 3–14)
BUN SERPL-MCNC: 14 MG/DL (ref 7–30)
CALCIUM SERPL-MCNC: 9.6 MG/DL (ref 8.5–10.1)
CHLORIDE SERPL-SCNC: 102 MMOL/L (ref 94–109)
CO2 SERPL-SCNC: 28 MMOL/L (ref 20–32)
CREAT SERPL-MCNC: 1.07 MG/DL (ref 0.52–1.04)
CREAT UR-MCNC: 52 MG/DL
GFR SERPL CREATININE-BSD FRML MDRD: 53 ML/MIN/1.7M2
GLUCOSE SERPL-MCNC: 232 MG/DL (ref 70–99)
HBA1C MFR BLD: 6.7 % (ref 0–5.6)
MICROALBUMIN UR-MCNC: 52 MG/L
MICROALBUMIN/CREAT UR: 100.19 MG/G CR (ref 0–25)
POTASSIUM SERPL-SCNC: 3.4 MMOL/L (ref 3.4–5.3)
SODIUM SERPL-SCNC: 139 MMOL/L (ref 133–144)

## 2018-05-01 PROCEDURE — 80048 BASIC METABOLIC PNL TOTAL CA: CPT | Performed by: FAMILY MEDICINE

## 2018-05-01 PROCEDURE — 99214 OFFICE O/P EST MOD 30 MIN: CPT | Performed by: FAMILY MEDICINE

## 2018-05-01 PROCEDURE — 82043 UR ALBUMIN QUANTITATIVE: CPT | Performed by: FAMILY MEDICINE

## 2018-05-01 PROCEDURE — 99207 C FOOT EXAM  NO CHARGE: CPT | Mod: 25 | Performed by: FAMILY MEDICINE

## 2018-05-01 PROCEDURE — 83036 HEMOGLOBIN GLYCOSYLATED A1C: CPT | Performed by: FAMILY MEDICINE

## 2018-05-01 PROCEDURE — 36415 COLL VENOUS BLD VENIPUNCTURE: CPT | Performed by: FAMILY MEDICINE

## 2018-05-01 RX ORDER — BETAMETHASONE DIPROPIONATE 0.5 MG/G
CREAM TOPICAL
Qty: 15 G | Refills: 1 | Status: SHIPPED | OUTPATIENT
Start: 2018-05-01 | End: 2018-08-22

## 2018-05-01 RX ORDER — EPINEPHRINE 0.3 MG/.3ML
0.3 INJECTION SUBCUTANEOUS
Qty: 1 ML | Refills: 1 | Status: SHIPPED | OUTPATIENT
Start: 2018-05-01 | End: 2020-04-15

## 2018-05-01 RX ORDER — OXYCODONE AND ACETAMINOPHEN 5; 325 MG/1; MG/1
1 TABLET ORAL EVERY 6 HOURS PRN
Qty: 20 TABLET | Refills: 0 | Status: SHIPPED | OUTPATIENT
Start: 2018-05-01 | End: 2018-11-13

## 2018-05-01 NOTE — PROGRESS NOTES
SUBJECTIVE:   Ana Luisa Byers is a 58 year old female who presents to clinic today for the following health issues:      Back Pain      Duration: x 6 months        Specific cause: fall     Description:   Location of pain: low back bilateral, when she gets up from sitting/lying down, has SOB  Character of pain: stabbing, gnawing, burning and fullness  Pain radiation:none and down both legs  New numbness or weakness in legs, not attributed to pain:  YES- numbness    Intensity: Currently 8/10    History:   Pain interferes with job: at home, does not work outside the house  History of back problems: back surgery in 2007?  Any previous MRI or X-rays: Yes--at [facility].  Date April 2018, MRI  Sees a specialist for back pain:  Seeing Dr Amado  Therapies tried without relief: pool therapy, chiropractor, cold, heat and stretch    Alleviating factors:   Improved by: see above      Precipitating factors:  Worsened by: Lifting, Bending, Standing, Sitting, Lying Flat and Walking    Functional and Psychosocial Screen ([place] STarT Back):      Not performed today      Has seen back specialist through [facility]. Was referred to PCP for pain medication until her insurance approves the proposed surgery - L4-5 fusion.     PT has been somewhat helpful for her back but still experiencing pain with most movements.       Overdue for diabetes visit. Diet controlled. Unable to exercise due to back issues.     Skin rash on the forearms and hands. Itchy, present for months. Open sores in areas.         Problem list and histories reviewed & adjusted, as indicated.  Additional history: none    Patient Active Problem List   Diagnosis     Mild major depression (H)     Chronic pain associated with significant psychosocial dysfunction     Osteoarthritis     S/P shoulder replacement     Cluster headaches     Fibromyalgia     Plantar fasciitis     Other insomnia     HTN, goal below 140/90     Gastroesophageal reflux disease without esophagitis      Restless legs syndrome (RLS)     CKD (chronic kidney disease) stage 2, GFR 60-89 ml/min     HSV-1 infection     Left lumbar radiculitis     Acquired hypothyroidism     BMI 40.0-44.9, adult (H)     Type 2 diabetes mellitus with diabetic polyneuropathy, without long-term current use of insulin (H)     Bilateral lower extremity edema     Benign essential hypertension     Past Surgical History:   Procedure Laterality Date     ARTHROPLASTY SHOULDER  7/25/2013    Procedure: ARTHROPLASTY SHOULDER;  RIGHT TOTAL SHOULDER ARTHROPLASTY (TORNIER AFFINITY SHOULDER SYSTEM)^;  Surgeon: Dung Morales MD;  Location: [facility] OR     BACK SURGERY  2011    L45 laminectomy     C APPENDECTOMY  age 19     C LIGATE FALLOPIAN TUBE,POSTPARTUM  1982    Tubal Ligation     ELBOW WRIST HAND FINGER ORTHOSIS, RIGID, WITHOUT JOINTS, MAY INCLUDE SOFT  5/2007    put in [place]     ESOPHAGOSCOPY, GASTROSCOPY, DUODENOSCOPY (EGD), COMBINED  9/23/2015    Dr. Thomas [facility]     ESOPHAGOSCOPY, GASTROSCOPY, DUODENOSCOPY (EGD), COMBINED N/A 9/23/2015    Procedure: COMBINED ESOPHAGOSCOPY, GASTROSCOPY, DUODENOSCOPY (EGD), BIOPSY SINGLE OR MULTIPLE;  Surgeon: Jose Thomas MD;  Location:  GI     HCL PAP SMEAR  6/2008    WNL     ORTHOPEDIC SURGERY      left shoulder scoped and plate left elbow     SURGICAL HISTORY OF -   2009    ulnar nerve release, carpal tunnel- left       Social History   Substance Use Topics     Smoking status: Never Smoker     Smokeless tobacco: Never Used     Alcohol use No     Family History   Problem Relation Age of Onset     C.A.D. Mother      Neurologic Disorder Mother      lupus     Depression Mother      Family History Negative Father      DIABETES Maternal Aunt      Colon Cancer No family hx of            Reviewed and updated as needed this visit by clinical staff       Reviewed and updated as needed this visit by Provider         ROS:  Constitutional, HEENT, cardiovascular, pulmonary, gi and gu systems are negative, except as  "otherwise noted.    OBJECTIVE:     /72 (BP Location: Right arm, Patient Position: Chair, Cuff Size: Adult Large)  Pulse 101  Temp 97.1  F (36.2  C) (Oral)  Resp 16  Ht 5' 4.5\" (1.638 m)  Wt 265 lb (120.2 kg)  LMP 02/13/2009  SpO2 97%  Breastfeeding? No  BMI 44.78 kg/m2  Body mass index is 44.78 kg/(m^2).  GENERAL: healthy, alert and no distress  PSYCH: mentation appears normal, affect normal/bright  Diabetic foot exam: normal DP and PT pulses, no trophic changes or ulcerative lesions and normal sensory exam    Diagnostic Test Results:  Results for orders placed or performed in visit on 05/01/18   Hemoglobin A1c   Result Value Ref Range    Hemoglobin A1C 6.7 (H) 0 - 5.6 %     *Note: Due to a large number of results and/or encounters for the requested time period, some results have not been displayed. A complete set of results can be found in Results Review.     Lab Results   Component Value Date    A1C 6.7 05/01/2018    A1C 6.3 10/20/2017    A1C 6.4 04/11/2017    A1C 5.9 09/09/2016    A1C 5.9 07/05/2016           ASSESSMENT/PLAN:     1. Left lumbar radiculitis - discussed short course of pain medications - advised twice daily dosing at max.   - oxyCODONE-acetaminophen (PERCOCET) 5-325 MG per tablet; Take 1 tablet by mouth every 6 hours as needed for severe pain maximum 6 tablet(s) per day  Dispense: 20 tablet; Refill: 0    2. Type 2 diabetes mellitus with diabetic polyneuropathy, without long-term current use of insulin (H) - slightly increased but still well in range, likely because she isn't able to exercise. No changes for now. Will continue to monitor every 3 months.   - Hemoglobin A1c  - Albumin Random Urine Quantitative with Creat Ratio  - Basic metabolic panel  (Ca, Cl, CO2, Creat, Gluc, K, Na, BUN)    3. BMI 40.0-44.9, adult (H) - stable from last visit, continue to monitor, she does still have weight loss as a long term goal    4. Benign essential hypertension - in range, continue " current    5. Bee sting allergy - refills  - EPINEPHrine (EPIPEN/ADRENACLICK/OR ANY BX GENERIC EQUIV) 0.3 MG/0.3ML injection 2-pack; Inject 0.3 mLs (0.3 mg) into the muscle once as needed for anaphylaxis  Dispense: 1 mL; Refill: 1    6. Atopic dermatitis, unspecified type - will trial topical steroid, advised d/c use once rash clears to avoid side effects.   - betamethasone dipropionate (DIPROSONE) 0.05 % cream; Apply sparingly to affected area twice daily for 14 days.  Do not apply to face.  Dispense: 15 g; Refill: 1    3 month DM visit    Bridget Garza MD  [facility]

## 2018-05-01 NOTE — NURSING NOTE
"Chief Complaint   Patient presents with     Back Pain     and shortness of breath       Initial /72 (BP Location: Right arm, Patient Position: Chair, Cuff Size: Adult Large)  Pulse 101  Temp 97.1  F (36.2  C) (Oral)  Resp 16  Ht 5' 4.5\" (1.638 m)  Wt 265 lb (120.2 kg)  LMP 02/13/2009  SpO2 97%  Breastfeeding? No  BMI 44.78 kg/m2 Estimated body mass index is 44.78 kg/(m^2) as calculated from the following:    Height as of this encounter: 5' 4.5\" (1.638 m).    Weight as of this encounter: 265 lb (120.2 kg).  Medication Reconciliation: vicky Garcia CMA      "

## 2018-05-01 NOTE — MR AVS SNAPSHOT
After Visit Summary   5/1/2018    Ana Luisa Byers    MRN: [id number]           Patient Information     Date Of Birth          1960        Visit Information        Provider Department      5/1/2018 9:00 AM Bridget Garza MD [facility]        Today's Diagnoses     Left lumbar radiculitis    -  1    Type 2 diabetes mellitus with diabetic polyneuropathy, without long-term current use of insulin (H)        BMI 40.0-44.9, adult (H)        Benign essential hypertension        Bee sting allergy        Atopic dermatitis, unspecified type           Follow-ups after your visit        Follow-up notes from your care team     Return in about 3 months (around 8/1/2018) for Diabetes Visit.      Who to contact     If you have questions or need follow up information about today's clinic visit or your schedule please contact [facility] directly at [phone].  Normal or non-critical lab and imaging results will be communicated to you by MyChart, letter or phone within 4 business days after the clinic has received the results. If you do not hear from us within 7 days, please contact the clinic through [employer] or phone. If you have a critical or abnormal lab result, we will notify you by phone as soon as possible.  Submit refill requests through [employer] or call your pharmacy and they will forward the refill request to us. Please allow 3 business days for your refill to be completed.          Additional Information About Your Visit        MyChart Information     [employer] gives you secure access to your electronic health record. If you see a primary care provider, you can also send messages to your care team and make appointments. If you have questions, please call your primary care clinic.  If you do not have a primary care provider, please call [phone] and they will assist you.        Care EveryWhere ID     This is your Care EveryWhere ID. This could be used by other  "organizations to access your [facility] medical records  [id number]        Your Vitals Were     Pulse Temperature Respirations Height Last Period Pulse Oximetry    101 97.1  F (36.2  C) (Oral) 16 5' 4.5\" (1.638 m) 02/13/2009 97%    Breastfeeding? BMI (Body Mass Index)                No 44.78 kg/m2           Blood Pressure from Last 3 Encounters:   05/01/18 120/72   03/28/18 118/76   03/20/18 124/83    Weight from Last 3 Encounters:   05/01/18 265 lb (120.2 kg)   03/28/18 265 lb (120.2 kg)   03/20/18 261 lb (118.4 kg)              We Performed the Following     Albumin Random Urine Quantitative with Creat Ratio     Basic metabolic panel  (Ca, Cl, CO2, Creat, Gluc, K, Na, BUN)     FOOT EXAM     Hemoglobin A1c          Today's Medication Changes          These changes are accurate as of 5/1/18  9:40 AM.  If you have any questions, ask your nurse or doctor.               Start taking these medicines.        Dose/Directions    betamethasone dipropionate 0.05 % cream   Commonly known as:  DIPROSONE   Used for:  Atopic dermatitis, unspecified type   Started by:  Bridget Garza MD        Apply sparingly to affected area twice daily for 14 days.  Do not apply to face.   Quantity:  15 g   Refills:  1       oxyCODONE-acetaminophen 5-325 MG per tablet   Commonly known as:  PERCOCET   Used for:  Left lumbar radiculitis   Started by:  Bridget Garza MD        Dose:  1 tablet   Take 1 tablet by mouth every 6 hours as needed for severe pain maximum 6 tablet(s) per day   Quantity:  20 tablet   Refills:  0            Where to get your medicines      These medications were sent to [facility] [address] [place] [address] [facility] [address]  [address] [facility] [address]     Phone:  [phone]     betamethasone dipropionate 0.05 % cream    EPINEPHrine 0.3 MG/0.3ML injection 2-pack         Some of these will need a paper prescription and others can be bought over the counter.  Ask " your nurse if you have questions.     Bring a paper prescription for each of these medications     oxyCODONE-acetaminophen 5-325 MG per tablet               Information about OPIOIDS     PRESCRIPTION OPIOIDS: WHAT YOU NEED TO KNOW   You have a prescription for an opioid (narcotic) pain medicine. Opioids can cause addiction. If you have a history of chemical dependency of any type, you are at a higher risk of becoming addicted to opioids. Only take this medicine after all other options have been tried. Take it for as short a time and as few doses as possible.     Do not:    Drive. If you drive while taking these medicines, you could be arrested for driving under the influence (DUI).    Operate heavy machinery    Do any other dangerous activities while taking these medicines.     Drink any alcohol while taking these medicines.      Take with any other medicines that contain acetaminophen. Read all labels carefully. Look for the word  acetaminophen  or  Tylenol.  Ask your pharmacist if you have questions or are unsure.    Store your pills in a secure place, locked if possible. We will not replace any lost or stolen medicine. If you don t finish your medicine, please throw away (dispose) as directed by your pharmacist. The [place] Pollution Control Agency has more information about safe disposal: https://www.pca.[facility].[place].us/living-green/managing-unwanted-medications    All opioids tend to cause constipation. Drink plenty of water and eat foods that have a lot of fiber, such as fruits, vegetables, prune juice, apple juice and high-fiber cereal. Take a laxative (Miralax, milk of magnesia, Colace, Senna) if you don t move your bowels at least every other day.          Primary Care Provider Office Phone # Fax #    Bridget Garza [phone] [id number]  [facility] [address]        Equal Access to Services     BOB LUCIA AH: latha Coley, ayanna villegas  pool bhatiadelmy ifrahmaria isabel dominguez'aan ah. So [facility] [phone].    ATENCIÓN: Si edwardla giovanna, tiene a valerio disposición servicios gratuitos de asistencia lingüística. Jani al [phone].    We comply with applicable federal civil rights laws and [place] laws. We do not discriminate on the basis of race, color, national origin, age, disability, sex, sexual orientation, or gender identity.            Thank you!     Thank you for choosing [facility]  for your care. Our goal is always to provide you with excellent care. Hearing back from our patients is one way we can continue to improve our services. Please take a few minutes to complete the written survey that you may receive in the mail after your visit with us. Thank you!             Your Updated Medication List - Protect others around you: Learn how to safely use, store and throw away your medicines at www.disposemymeds.org.          This list is accurate as of 5/1/18  9:40 AM.  Always use your most recent med list.                   Brand Name Dispense Instructions for use Diagnosis    aspirin 81 MG chewable tablet     108 tablet    Take 1 tablet (81 mg) by mouth daily    Diabetes mellitus, type 2 (H)       atorvastatin 40 MG tablet    LIPITOR    90 tablet    Take 1 tablet (40 mg) by mouth daily    Hyperlipidemia LDL goal <100       betamethasone dipropionate 0.05 % cream    DIPROSONE    15 g    Apply sparingly to affected area twice daily for 14 days.  Do not apply to face.    Atopic dermatitis, unspecified type       DULoxetine 30 MG EC capsule    CYMBALTA    90 capsule    Take 1 capsule (30 mg) by mouth daily Take along with 60 mg capsule    Myofascial pain, Chronic pain associated with significant psychosocial dysfunction       EPINEPHrine 0.3 MG/0.3ML injection 2-pack    EPIPEN/ADRENACLICK/or ANY BX GENERIC EQUIV    1 mL    Inject 0.3 mLs (0.3 mg) into the muscle once as needed for anaphylaxis    Bee sting allergy        furosemide 40 MG tablet    LASIX    30 tablet    Take 1 tablet (40 mg) by mouth daily    Bilateral lower extremity edema       glipiZIDE 2.5 MG 24 hr tablet    glipiZIDE XL    180 tablet    Take 1 tablet (2.5 mg) by mouth 2 times daily    Type 2 diabetes mellitus with diabetic neuropathy, without long-term current use of insulin (H)       hydrOXYzine 25 MG tablet    ATARAX    120 tablet    Take 1-2 tablets (25-50 mg) by mouth 2 times daily as needed for itching    Atopic dermatitis, unspecified type       levothyroxine 25 MCG tablet    SYNTHROID/LEVOTHROID    90 tablet    Take 1 tablet (25 mcg) by mouth daily    Acquired hypothyroidism       losartan 25 MG tablet    COZAAR    45 tablet    Take 0.5 tablets (12.5 mg) by mouth daily    Type 2 diabetes mellitus with diabetic polyneuropathy, without long-term current use of insulin (H)       methocarbamol 500 MG tablet    ROBAXIN    20 tablet    Take 2 tablets (1,000 mg) by mouth 3 times daily as needed for muscle spasms    Cervicalgia       omeprazole 40 MG capsule    priLOSEC    90 capsule    Take 1 capsule (40 mg) by mouth daily Take 30-60 minutes before a meal.    Gastroesophageal reflux disease without esophagitis       ondansetron 4 MG ODT tab    ZOFRAN ODT    20 tablet    Take 1-2 tablets (4-8 mg) by mouth 3 times daily (before meals)    Nausea       * order for DME     1 Units    Equipment being ordered: diabetic shoes    Type 2 diabetes mellitus with diabetic neuropathy (H)       * order for DME     1 each    Equipment being ordered: Rollator walker with seat    Morbid obesity due to excess calories (H), Type 2 diabetes mellitus with diabetic neuropathy (H), Fibromyalgia       * order for DME     1 Units    Equipment being ordered: diabetic shoes  Feet have been examined, no changes    Type 2 diabetes mellitus with diabetic neuropathy, without long-term current use of insulin (H)       * order for DME     1 each    by * route daily BLE knee high compression  socks 15-20mmHg    Edema, unspecified type       oxyCODONE-acetaminophen 5-325 MG per tablet    PERCOCET    20 tablet    Take 1 tablet by mouth every 6 hours as needed for severe pain maximum 6 tablet(s) per day    Left lumbar radiculitis       pantoprazole 40 MG EC tablet    PROTONIX    90 tablet    Take 1 tablet (40 mg) by mouth daily Take 30-60 minutes before a meal.    Gastroesophageal reflux disease without esophagitis       phentermine 37.5 MG capsule     30 capsule    Take 1 capsule (37.5 mg) by mouth every morning    BMI 40.0-44.9, adult (H)       pregabalin 50 MG capsule    LYRICA    90 capsule    Take 1 capsule (50 mg) by mouth 3 times daily    Diabetic polyneuropathy associated with type 2 diabetes mellitus (H)       vitamin D 2000 units tablet     90 tablet    Take 1 tablet by mouth daily    Type 2 diabetes mellitus with diabetic neuropathy, without long-term current use of insulin (H)       zolpidem 10 MG tablet    AMBIEN    30 tablet    Take 1 tablet (10 mg) by mouth nightly as needed for sleep    Other insomnia       * Notice:  This list has 4 medication(s) that are the same as other medications prescribed for you. Read the directions carefully, and ask your doctor or other care provider to review them with you.

## 2018-05-22 ENCOUNTER — TELEPHONE (OUTPATIENT)
Dept: FAMILY MEDICINE | Facility: CLINIC | Age: 58
End: 2018-05-22

## 2018-05-22 NOTE — TELEPHONE ENCOUNTER
Prior Authorization Retail Medication Request    Medication/Dose: EPINEPHrine (EPIPEN/ADRENACLICK/OR ANY BX GENERIC EQUIV) 0.3 MG/0.3ML injection 2-pack  ICD code (if different than what is on RX):  Bee sting allergy [Z91.038]   Previously Tried and Failed:  None  Rationale:      Insurance Name:  [employer]  Insurance ID:  [id number]      Pharmacy Information (if different than what is on RX)  Name:  [facility]  Phone:  [phone]  Fax: [phone]    Vidal RICHEY

## 2018-05-23 NOTE — TELEPHONE ENCOUNTER
Prior Authorization Not Needed per Insurance    Medication: EPINEPHRINE- PA NOT NEEDED   Insurance Company: [employer] - Phone [phone] Fax [phone]  Expected CoPay:      Pharmacy Filling the Rx: [facility] [address] [place] [id number] [facility] AT [address] [facility] [address]  Pharmacy Notified: Yes  Patient Notified: Yes    PER INSURANCE NO PA NEEDED. CONFIRMED WITH PHARMACY PATIENT RECEIVED GENERIC MEDICATION.

## 2018-06-09 DIAGNOSIS — B37.0 THRUSH: ICD-10-CM

## 2018-06-09 RX ORDER — NYSTATIN 100000/ML
SUSPENSION, ORAL (FINAL DOSE FORM) ORAL
Qty: 60 ML | Refills: 0 | Status: SHIPPED | OUTPATIENT
Start: 2018-06-09 | End: 2018-08-22

## 2018-06-21 ENCOUNTER — OFFICE VISIT (OUTPATIENT)
Dept: FAMILY MEDICINE | Facility: CLINIC | Age: 58
End: 2018-06-21
Payer: MEDICARE

## 2018-06-21 VITALS
BODY MASS INDEX: 45.58 KG/M2 | SYSTOLIC BLOOD PRESSURE: 126 MMHG | HEIGHT: 64 IN | DIASTOLIC BLOOD PRESSURE: 82 MMHG | TEMPERATURE: 97.7 F | HEART RATE: 76 BPM | WEIGHT: 267 LBS

## 2018-06-21 DIAGNOSIS — L91.8 SKIN TAG: ICD-10-CM

## 2018-06-21 DIAGNOSIS — F33.2 SEVERE EPISODE OF RECURRENT MAJOR DEPRESSIVE DISORDER, WITHOUT PSYCHOTIC FEATURES (H): ICD-10-CM

## 2018-06-21 DIAGNOSIS — F41.9 ANXIETY: ICD-10-CM

## 2018-06-21 DIAGNOSIS — L73.9 FOLLICULITIS: Primary | ICD-10-CM

## 2018-06-21 DIAGNOSIS — I10 BENIGN ESSENTIAL HYPERTENSION: ICD-10-CM

## 2018-06-21 DIAGNOSIS — Z12.4 SCREENING FOR CERVICAL CANCER: ICD-10-CM

## 2018-06-21 PROCEDURE — G0145 SCR C/V CYTO,THINLAYER,RESCR: HCPCS | Performed by: FAMILY MEDICINE

## 2018-06-21 PROCEDURE — 11200 RMVL SKIN TAGS UP TO&INC 15: CPT | Performed by: FAMILY MEDICINE

## 2018-06-21 PROCEDURE — G0476 HPV COMBO ASSAY CA SCREEN: HCPCS | Performed by: FAMILY MEDICINE

## 2018-06-21 PROCEDURE — 99214 OFFICE O/P EST MOD 30 MIN: CPT | Mod: 25 | Performed by: FAMILY MEDICINE

## 2018-06-21 RX ORDER — ESCITALOPRAM OXALATE 10 MG/1
10 TABLET ORAL DAILY
Qty: 30 TABLET | Refills: 1 | Status: SHIPPED | OUTPATIENT
Start: 2018-06-21 | End: 2018-08-31

## 2018-06-21 RX ORDER — SULFAMETHOXAZOLE AND TRIMETHOPRIM 400; 80 MG/1; MG/1
1 TABLET ORAL 2 TIMES DAILY
Qty: 20 TABLET | Refills: 0 | Status: SHIPPED | OUTPATIENT
Start: 2018-06-21 | End: 2018-06-21

## 2018-06-21 RX ORDER — SULFAMETHOXAZOLE/TRIMETHOPRIM 800-160 MG
1 TABLET ORAL 2 TIMES DAILY
Qty: 20 TABLET | Refills: 0 | Status: SHIPPED | OUTPATIENT
Start: 2018-06-21 | End: 2018-08-22

## 2018-06-21 ASSESSMENT — ANXIETY QUESTIONNAIRES
3. WORRYING TOO MUCH ABOUT DIFFERENT THINGS: NEARLY EVERY DAY
7. FEELING AFRAID AS IF SOMETHING AWFUL MIGHT HAPPEN: NEARLY EVERY DAY
6. BECOMING EASILY ANNOYED OR IRRITABLE: NEARLY EVERY DAY
2. NOT BEING ABLE TO STOP OR CONTROL WORRYING: NEARLY EVERY DAY
IF YOU CHECKED OFF ANY PROBLEMS ON THIS QUESTIONNAIRE, HOW DIFFICULT HAVE THESE PROBLEMS MADE IT FOR YOU TO DO YOUR WORK, TAKE CARE OF THINGS AT HOME, OR GET ALONG WITH OTHER PEOPLE: VERY DIFFICULT
5. BEING SO RESTLESS THAT IT IS HARD TO SIT STILL: NEARLY EVERY DAY
1. FEELING NERVOUS, ANXIOUS, OR ON EDGE: NEARLY EVERY DAY
GAD7 TOTAL SCORE: 21

## 2018-06-21 ASSESSMENT — PATIENT HEALTH QUESTIONNAIRE - PHQ9: 5. POOR APPETITE OR OVEREATING: NEARLY EVERY DAY

## 2018-06-21 NOTE — MR AVS SNAPSHOT
After Visit Summary   6/21/2018    Ana Luisa Byers    MRN: [id number]           Patient Information     Date Of Birth          1960        Visit Information        Provider Department      6/21/2018 8:00 AM Bridget Garza MD [facility]        Today's Diagnoses     Folliculitis    -  1    Screening for cervical cancer        Anxiety        Severe episode of recurrent major depressive disorder, without psychotic features (H)           Follow-ups after your visit        Additional Services     DERMATOLOGY REFERRAL       Your provider has referred you to: [facility] [phone]   [email or web address]    Please be aware that coverage of these services is subject to the terms and limitations of your health insurance plan.  Call member services at your health plan with any benefit or coverage questions.      Please bring the following with you to your appointment:    (1) Any X-Rays, CTs or MRIs which have been performed.  Contact the facility where they were done to arrange for  prior to your scheduled appointment.    (2) List of current medications  (3) This referral request   (4) Any documents/labs given to you for this referral                  Follow-up notes from your care team     Return in about 2 months (around 8/21/2018) for Diabetes Visit.      Who to contact     If you have questions or need follow up information about today's clinic visit or your schedule please contact [facility] directly at [phone].  Normal or non-critical lab and imaging results will be communicated to you by MyChart, letter or phone within 4 business days after the clinic has received the results. If you do not hear from us within 7 days, please contact the clinic through MyChart or phone. If you have a critical or abnormal lab result, we will notify you by phone as soon as possible.  Submit refill requests through [employer] or  "call your pharmacy and they will forward the refill request to us. Please allow 3 business days for your refill to be completed.          Additional Information About Your Visit        [facility] Information     [employer] gives you secure access to your electronic health record. If you see a primary care provider, you can also send messages to your care team and make appointments. If you have questions, please call your primary care clinic.  If you do not have a primary care provider, please call [phone] and they will assist you.        Care EveryWhere ID     This is your Care EveryWhere ID. This could be used by other organizations to access your [facility] medical records  [id number]        Your Vitals Were     Pulse Temperature Height Last Period Breastfeeding? BMI (Body Mass Index)    76 97.7  F (36.5  C) (Oral) 5' 4.25\" (1.632 m) 02/13/2009 No 45.47 kg/m2       Blood Pressure from Last 3 Encounters:   06/21/18 126/82   05/01/18 120/72   03/28/18 118/76    Weight from Last 3 Encounters:   06/21/18 267 lb (121.1 kg)   05/01/18 265 lb (120.2 kg)   03/28/18 265 lb (120.2 kg)              We Performed the Following     DEPRESSION ACTION PLAN (DAP)     DERMATOLOGY REFERRAL     Pap imaged thin layer screen with HPV - recommended age 30 - 65 years (select HPV order below)          Today's Medication Changes          These changes are accurate as of 6/21/18  8:44 AM.  If you have any questions, ask your nurse or doctor.               Start taking these medicines.        Dose/Directions    escitalopram 10 MG tablet   Commonly known as:  LEXAPRO   Used for:  Anxiety, Severe episode of recurrent major depressive disorder, without psychotic features (H)   Started by:  Bridget Garza MD        Dose:  10 mg   Take 1 tablet (10 mg) by mouth daily   Quantity:  30 tablet   Refills:  1       sulfamethoxazole-trimethoprim 400-80 MG per tablet   Commonly known as:  BACTRIM/SEPTRA   Used for:  Folliculitis   Started by:  " Bridget Garza MD        Dose:  1 tablet   Take 1 tablet by mouth 2 times daily   Quantity:  20 tablet   Refills:  0         Stop taking these medicines if you haven't already. Please contact your care team if you have questions.     phentermine 37.5 MG capsule   Stopped by:  Bridget Garza MD                Where to get your medicines      These medications were sent to [facility] [address] [facility] [address] [address] [address] [facility] [address]     Phone:  [phone]     escitalopram 10 MG tablet    sulfamethoxazole-trimethoprim 400-80 MG per tablet                Primary Care Provider Office Phone # Fax #    Bridget Garza [phone] [address]  [facility] [address]        Equal Access to Services     [facility]: Hadii aad ku hadasho Soomaali, waaxda luqadaha, qaybta kaalmada adeegyada, waxay idiin hayaan adeeg kharash mary . So [facility] [phone].    ATENCIÓN: Si habla español, tiene a valerio disposición servicios gratuitos de asistencia lingüística. Llame al [phone].    We comply with applicable federal civil rights laws and [place] laws. We do not discriminate on the basis of race, color, national origin, age, disability, sex, sexual orientation, or gender identity.            Thank you!     Thank you for choosing [facility]  for your care. Our goal is always to provide you with excellent care. Hearing back from our patients is one way we can continue to improve our services. Please take a few minutes to complete the written survey that you may receive in the mail after your visit with us. Thank you!             Your Updated Medication List - Protect others around you: Learn how to safely use, store and throw away your medicines at www.disposemymeds.org.          This list is accurate as of 6/21/18  8:44 AM.  Always use your most recent med list.                   Brand Name Dispense  Instructions for use Diagnosis    aspirin 81 MG chewable tablet     108 tablet    Take 1 tablet (81 mg) by mouth daily    Diabetes mellitus, type 2 (H)       atorvastatin 40 MG tablet    LIPITOR    90 tablet    Take 1 tablet (40 mg) by mouth daily    Hyperlipidemia LDL goal <100       betamethasone dipropionate 0.05 % cream    DIPROSONE    15 g    Apply sparingly to affected area twice daily for 14 days.  Do not apply to face.    Atopic dermatitis, unspecified type       DULoxetine 30 MG EC capsule    CYMBALTA    90 capsule    Take 1 capsule (30 mg) by mouth daily Take along with 60 mg capsule    Myofascial pain, Chronic pain associated with significant psychosocial dysfunction       EPINEPHrine 0.3 MG/0.3ML injection 2-pack    EPIPEN/ADRENACLICK/or ANY BX GENERIC EQUIV    1 mL    Inject 0.3 mLs (0.3 mg) into the muscle once as needed for anaphylaxis    Bee sting allergy       escitalopram 10 MG tablet    LEXAPRO    30 tablet    Take 1 tablet (10 mg) by mouth daily    Anxiety, Severe episode of recurrent major depressive disorder, without psychotic features (H)       furosemide 40 MG tablet    LASIX    30 tablet    Take 1 tablet (40 mg) by mouth daily    Bilateral lower extremity edema       glipiZIDE 2.5 MG 24 hr tablet    glipiZIDE XL    180 tablet    Take 1 tablet (2.5 mg) by mouth 2 times daily    Type 2 diabetes mellitus with diabetic neuropathy, without long-term current use of insulin (H)       hydrOXYzine 25 MG tablet    ATARAX    120 tablet    Take 1-2 tablets (25-50 mg) by mouth 2 times daily as needed for itching    Atopic dermatitis, unspecified type       levothyroxine 25 MCG tablet    SYNTHROID/LEVOTHROID    90 tablet    Take 1 tablet (25 mcg) by mouth daily    Acquired hypothyroidism       losartan 25 MG tablet    COZAAR    45 tablet    Take 0.5 tablets (12.5 mg) by mouth daily    Type 2 diabetes mellitus with diabetic polyneuropathy, without long-term current use of insulin (H)       methocarbamol  500 MG tablet    ROBAXIN    20 tablet    Take 2 tablets (1,000 mg) by mouth 3 times daily as needed for muscle spasms    Cervicalgia       nystatin 167548 UNIT/ML suspension    MYCOSTATIN    60 mL    TAKE 5 ML BY MOUTH FOUR TIMES DAILY    Thrush       omeprazole 40 MG capsule    priLOSEC    90 capsule    Take 1 capsule (40 mg) by mouth daily Take 30-60 minutes before a meal.    Gastroesophageal reflux disease without esophagitis       ondansetron 4 MG ODT tab    ZOFRAN ODT    20 tablet    Take 1-2 tablets (4-8 mg) by mouth 3 times daily (before meals)    Nausea       * order for DME     1 Units    Equipment being ordered: diabetic shoes    Type 2 diabetes mellitus with diabetic neuropathy (H)       * order for DME     1 each    Equipment being ordered: Rollator walker with seat    Morbid obesity due to excess calories (H), Type 2 diabetes mellitus with diabetic neuropathy (H), Fibromyalgia       * order for DME     1 Units    Equipment being ordered: diabetic shoes  Feet have been examined, no changes    Type 2 diabetes mellitus with diabetic neuropathy, without long-term current use of insulin (H)       * order for DME     1 each    by * route daily BLE knee high compression socks 15-20mmHg    Edema, unspecified type       oxyCODONE-acetaminophen 5-325 MG per tablet    PERCOCET    20 tablet    Take 1 tablet by mouth every 6 hours as needed for severe pain maximum 6 tablet(s) per day    Left lumbar radiculitis       pantoprazole 40 MG EC tablet    PROTONIX    90 tablet    Take 1 tablet (40 mg) by mouth daily Take 30-60 minutes before a meal.    Gastroesophageal reflux disease without esophagitis       pregabalin 50 MG capsule    LYRICA    90 capsule    Take 1 capsule (50 mg) by mouth 3 times daily    Diabetic polyneuropathy associated with type 2 diabetes mellitus (H)       sulfamethoxazole-trimethoprim 400-80 MG per tablet    BACTRIM/SEPTRA    20 tablet    Take 1 tablet by mouth 2 times daily    Folliculitis        vitamin D 2000 units tablet     90 tablet    Take 1 tablet by mouth daily    Type 2 diabetes mellitus with diabetic neuropathy, without long-term current use of insulin (H)       zolpidem 10 MG tablet    AMBIEN    30 tablet    Take 1 tablet (10 mg) by mouth nightly as needed for sleep    Other insomnia       * Notice:  This list has 4 medication(s) that are the same as other medications prescribed for you. Read the directions carefully, and ask your doctor or other care provider to review them with you.

## 2018-06-21 NOTE — LETTER
My Depression Action Plan  Name: Ana Luisa Byers   Date of Birth 1960  Date: 6/21/2018    My doctor: Bridget Garza   My clinic: [facility]  [id number] [place] [id number] [phone]          GREEN    ZONE   Good Control    What it looks like:     Things are going generally well. You have normal up s and down s. You may even feel depressed from time to time, but bad moods usually last less than a day.   What you need to do:  1. Continue to care for yourself (see self care plan)  2. Check your depression survival kit and update it as needed  3. Follow your physician s recommendations including any medication.  4. Do not stop taking medication unless you consult with your physician first.           YELLOW         ZONE Getting Worse    What it looks like:     Depression is starting to interfere with your life.     It may be hard to get out of bed; you may be starting to isolate yourself from others.    Symptoms of depression are starting to last most all day and this has happened for several days.     You may have suicidal thoughts but they are not constant.   What you need to do:     1. Call your care team, your response to treatment will improve if you keep your care team informed of your progress. Yellow periods are signs an adjustment may need to be made.     2. Continue your self-care, even if you have to fake it!    3. Talk to someone in your support network    4. Open up your depression survival kit           RED    ZONE Medical Alert - Get Help    What it looks like:     Depression is seriously interfering with your life.     You may experience these or other symptoms: You can t get out of bed most days, can t work or engage in other necessary activities, you have trouble taking care of basic hygiene, or basic responsibilities, thoughts of suicide or death that will not go away, self-injurious behavior.     What you need to do:  1. Call your care team  and request a same-day appointment. If they are not available (weekends or after hours) call your local crisis line, emergency room or 911.            Depression Self Care Plan / Survival Kit    Self-Care for Depression  Here s the deal. Your body and mind are really not as separate as most people think.  What you do and think affects how you feel and how you feel influences what you do and think. This means if you do things that people who feel good do, it will help you feel better.  Sometimes this is all it takes.  There is also a place for medication and therapy depending on how severe your depression is, so be sure to consult with your medical provider and/ or Behavioral Health Consultant if your symptoms are worsening or not improving.     In order to better manage my stress, I will:    Exercise  Get some form of exercise, every day. This will help reduce pain and release endorphins, the  feel good  chemicals in your brain. This is almost as good as taking antidepressants!  This is not the same as joining a gym and then never going! (they count on that by the way ) It can be as simple as just going for a walk or doing some gardening, anything that will get you moving.      Hygiene   Maintain good hygiene (Get out of bed in the morning, Make your bed, Brush your teeth, Take a shower, and Get dressed like you were going to work, even if you are unemployed).  If your clothes don't fit try to get ones that do.    Diet  I will strive to eat foods that are good for me, drink plenty of water, and avoid excessive sugar, caffeine, alcohol, and other mood-altering substances.  Some foods that are helpful in depression are: complex carbohydrates, B vitamins, flaxseed, fish or fish oil, fresh fruits and vegetables.    Psychotherapy  I agree to participate in Individual Therapy (if recommended).    Medication  If prescribed medications, I agree to take them.  Missing doses can result in serious side effects.  I understand  that drinking alcohol, or other illicit drug use, may cause potential side effects.  I will not stop my medication abruptly without first discussing it with my provider.    Staying Connected With Others  I will stay in touch with my friends, family members, and my primary care provider/team.    Use your imagination  Be creative.  We all have a creative side; it doesn t matter if it s oil painting, sand castles, or mud pies! This will also kick up the endorphins.    Witness Beauty  (AKA stop and smell the roses) Take a look outside, even in mid-winter. Notice colors, textures. Watch the squirrels and birds.     Service to others  Be of service to others.  There is always someone else in need.  By helping others we can  get out of ourselves  and remember the really important things.  This also provides opportunities for practicing all the other parts of the program.    Humor  Laugh and be silly!  Adjust your TV habits for less news and crime-drama and more comedy.    Control your stress  Try breathing deep, massage therapy, biofeedback, and meditation. Find time to relax each day.     My support system    Clinic Contact:  Phone number:    Contact 1:  Phone number:    Contact 2:  Phone number:    Buddhist/:  Phone number:    Therapist:  Phone number:    Local crisis center:    Phone number:    Other community support:  Phone number:

## 2018-06-21 NOTE — NURSING NOTE
"  Chief Complaint   Patient presents with     Derm Problem     Gyn Exam     pap needed.       Initial /82 (BP Location: Right arm, Patient Position: Chair, Cuff Size: Adult Large)  Pulse 76  Temp 97.7  F (36.5  C) (Oral)  Ht 5' 4.25\" (1.632 m)  Wt 267 lb (121.1 kg)  LMP 02/13/2009  Breastfeeding? No  BMI 45.47 kg/m2 Estimated body mass index is 45.47 kg/(m^2) as calculated from the following:    Height as of this encounter: 5' 4.25\" (1.632 m).    Weight as of this encounter: 267 lb (121.1 kg).  Medication Reconciliation: complete      Health Maintenance addressed:  Pap Smear    Gabino Cho CMA        "

## 2018-06-21 NOTE — PROGRESS NOTES
SUBJECTIVE:   Ana Luisa Byers is a 58 year old female who presents to clinic today for the following health issues:    Due for pap    Rash on hands and forearms is spreading, nonhealing areas on the forearms, says she is not picking at them. New spots appearing on the legs and ankles. Spares the trunk. No bed bugs seen,  has not bites.     Has skin tags she would like removed.     Reports recent worsening mood. Feels like there are so many stressors, particularly surrounding her 's and her health issues. She feels she cannot handle this any longer.     Taking cymbalta 90 mg daily for her fibromyalgia. Feels this is not helping.       Problem list and histories reviewed & adjusted, as indicated.  Additional history: none    Patient Active Problem List   Diagnosis     Chronic pain associated with significant psychosocial dysfunction     Osteoarthritis     S/P shoulder replacement     Cluster headaches     Fibromyalgia     Plantar fasciitis     Other insomnia     HTN, goal below 140/90     Gastroesophageal reflux disease without esophagitis     Restless legs syndrome (RLS)     CKD (chronic kidney disease) stage 2, GFR 60-89 ml/min     HSV-1 infection     Left lumbar radiculitis     Acquired hypothyroidism     BMI 40.0-44.9, adult (H)     Type 2 diabetes mellitus with diabetic polyneuropathy, without long-term current use of insulin (H)     Bilateral lower extremity edema     Benign essential hypertension     Anxiety     Severe episode of recurrent major depressive disorder, without psychotic features (H)     Past Surgical History:   Procedure Laterality Date     ARTHROPLASTY SHOULDER  7/25/2013    Procedure: ARTHROPLASTY SHOULDER;  RIGHT TOTAL SHOULDER ARTHROPLASTY (TORNIER AFFINITY SHOULDER SYSTEM)^;  Surgeon: Dung Morales MD;  Location: [facility] OR     BACK SURGERY  2011    L45 laminectomy     C APPENDECTOMY  age 19     C LIGATE FALLOPIAN TUBE,POSTPARTUM  1982    Tubal Ligation     ELBOW WRIST HAND  "FINGER ORTHOSIS, RIGID, WITHOUT JOINTS, MAY INCLUDE SOFT  5/2007    put in [place]     ESOPHAGOSCOPY, GASTROSCOPY, DUODENOSCOPY (EGD), COMBINED  9/23/2015    Dr. Thomas [facility]     ESOPHAGOSCOPY, GASTROSCOPY, DUODENOSCOPY (EGD), COMBINED N/A 9/23/2015    Procedure: COMBINED ESOPHAGOSCOPY, GASTROSCOPY, DUODENOSCOPY (EGD), BIOPSY SINGLE OR MULTIPLE;  Surgeon: Jose Thomas MD;  Location: [facility] GI     HCL PAP SMEAR  6/2008    WNL     ORTHOPEDIC SURGERY      left shoulder scoped and plate left elbow     SURGICAL HISTORY OF -   2009    ulnar nerve release, carpal tunnel- left       Social History   Substance Use Topics     Smoking status: Never Smoker     Smokeless tobacco: Never Used     Alcohol use No     Family History   Problem Relation Age of Onset     C.A.D. Mother      Neurologic Disorder Mother      lupus     Depression Mother      Family History Negative Father      Diabetes Maternal Aunt      Colon Cancer No family hx of            Reviewed and updated as needed this visit by clinical staff       Reviewed and updated as needed this visit by Provider         ROS:  Constitutional, HEENT, cardiovascular, pulmonary, gi and gu systems are negative, except as otherwise noted.    OBJECTIVE:     /82 (BP Location: Right arm, Patient Position: Chair, Cuff Size: Adult Large)  Pulse 76  Temp 97.7  F (36.5  C) (Oral)  Ht 5' 4.25\" (1.632 m)  Wt 267 lb (121.1 kg)  LMP 02/13/2009  Breastfeeding? No  BMI 45.47 kg/m2  Body mass index is 45.47 kg/(m^2).  GENERAL: healthy, alert and no distress  SKIN: rash on the forearms and hands - seems to be in a state of healing but some with surrounding scar tissue buildup, new lesions on legs appear to be papular, follicular  PSYCH: mentation appears normal, affect normal but tearful at times  GYN: normal appearing labia, vulva, vagina, cervix, physiologic discharge    Diagnostic Test Results:  GAD7 - 21  PHQ9 - 25    Procedure: skin tag removal  Total of 7 skin tags clipped with " iris scissors, one dressed with a bandage      ASSESSMENT/PLAN:     1. Folliculitis - discussed likely diagnosis, treat with PO abx, derm referral in case lesions fail to clear  - DERMATOLOGY REFERRAL  - sulfamethoxazole-trimethoprim (BACTRIM DS/SEPTRA DS) 800-160 MG per tablet; Take 1 tablet by mouth 2 times daily  Dispense: 20 tablet; Refill: 0    2. Screening for cervical cancer  - Pap imaged thin layer screen with HPV - recommended age 30 - 65 years (select HPV order below)    3. Anxiety - discussed start of SSRI, already on SNRI, so will use low dose, can wean cymbalta in future if she needs higher dose of SSRI  - escitalopram (LEXAPRO) 10 MG tablet; Take 1 tablet (10 mg) by mouth daily  Dispense: 30 tablet; Refill: 1    4. Severe episode of recurrent major depressive disorder, without psychotic features (H) - as above, provided therapy card as well  - escitalopram (LEXAPRO) 10 MG tablet; Take 1 tablet (10 mg) by mouth daily  Dispense: 30 tablet; Refill: 1    5. Skin tag  - REMOVAL OF SKIN TAGS, FIRST 15    6. Benign essential hypertension - in range today, continue current    7. BMI 45.0-49.9, adult (H) - she notes she has not been watching her weight or diet closely since her stressors began    Bridget Garza MD  [facility]

## 2018-06-21 NOTE — PROGRESS NOTES
"   SUBJECTIVE:   CC: Ana Luisa Byers is an 58 year old woman who presents for preventive health visit.     Physical             {Outside tests to abstract? :923411}    {additional problems to add (Optional):276333}    Today's PHQ-2 Score:   PHQ-2 ( 1999 [employer]) 1/16/2018   Q1: Little interest or pleasure in doing things 2   Q2: Feeling down, depressed or hopeless 2   PHQ-2 Score 4       Abuse: Current or Past(Physical, Sexual or Emotional)- {YES/NO/NA:351408}  Do you feel safe in your environment - {YES/NO/NA:395688}    Social History   Substance Use Topics     Smoking status: Never Smoker     Smokeless tobacco: Never Used     Alcohol use No     No flowsheet data found.{add AUDIT responses (Optional) (A score of 7 for adult men is an indication of hazardous drinking; a score of 8 or more is an indication of an alcohol use disorder.  A score of 7 or more for adult women is an indication of hazardous drinking or an alchohol use disorder):264908}    Reviewed orders with patient.  Reviewed health maintenance and updated orders accordingly - {Yes/No:550594::\"Yes\"}  {Chronicprobdata (Optional):512182}    {Mammo Decision Support (Optional):248465}    Pertinent mammograms are reviewed under the imaging tab.  History of abnormal Pap smear: {PAP HX:447675}    Reviewed and updated as needed this visit by clinical staff         Reviewed and updated as needed this visit by Provider        {HISTORY OPTIONS (Optional):941548}    Review of Systems  {FEMALE PREVENTATIVE ROS:946691}     OBJECTIVE:   LMP 02/13/2009  Physical Exam  {Exam Choices:127352}    ASSESSMENT/PLAN:   {Diag Picklist:490506}    COUNSELING:  {FEMALE COUNSELING MESSAGES:506563::\"Reviewed preventive health counseling, as reflected in patient instructions\"}    {BP Counseling- Complete if BP >= 120/80  (Optional):025859}     reports that she has never smoked. She has never used smokeless tobacco.  {Tobacco Cessation -- Complete if patient is a smoker " "(Optional):215360}  Estimated body mass index is 44.78 kg/(m^2) as calculated from the following:    Height as of 5/1/18: 5' 4.5\" (1.638 m).    Weight as of 5/1/18: 265 lb (120.2 kg).   {Weight Management Plan (ACO) Complete if BMI is abnormal-  Ages 18-64  BMI >24.9.  Age 65+ with BMI <23 or >30 (Optional):073497}    Counseling Resources:  ATP IV Guidelines  Pooled Cohorts Equation Calculator  Breast Cancer Risk Calculator  FRAX Risk Assessment  ICSI Preventive Guidelines  Dietary Guidelines for Americans, 2010  USDA's MyPlate  ASA Prophylaxis  Lung CA Screening    Bridget Garza MD  [facility]  "

## 2018-06-22 ASSESSMENT — PATIENT HEALTH QUESTIONNAIRE - PHQ9: SUM OF ALL RESPONSES TO PHQ QUESTIONS 1-9: 24

## 2018-06-22 ASSESSMENT — ANXIETY QUESTIONNAIRES: GAD7 TOTAL SCORE: 21

## 2018-06-25 LAB
COPATH REPORT: NORMAL
PAP: NORMAL

## 2018-06-27 LAB
FINAL DIAGNOSIS: NORMAL
HPV HR 12 DNA CVX QL NAA+PROBE: NEGATIVE
HPV16 DNA SPEC QL NAA+PROBE: NEGATIVE
HPV18 DNA SPEC QL NAA+PROBE: NEGATIVE
SPECIMEN DESCRIPTION: NORMAL
SPECIMEN SOURCE CVX/VAG CYTO: NORMAL

## 2018-06-28 ENCOUNTER — HOSPITAL ENCOUNTER (INPATIENT)
Facility: CLINIC | Age: 58
Setting detail: SURGERY ADMIT
End: 2018-06-28
Attending: ORTHOPAEDIC SURGERY | Admitting: ORTHOPAEDIC SURGERY
Payer: MEDICARE

## 2018-06-30 DIAGNOSIS — G47.09 OTHER INSOMNIA: ICD-10-CM

## 2018-07-02 ENCOUNTER — MYC MEDICAL ADVICE (OUTPATIENT)
Dept: FAMILY MEDICINE | Facility: CLINIC | Age: 58
End: 2018-07-02

## 2018-07-02 NOTE — TELEPHONE ENCOUNTER
Controlled Substance Refill Request for Ambien  Problem List Complete:  No     PROVIDER TO CONSIDER COMPLETION OF PROBLEM LIST AND OVERVIEW/CONTROLLED SUBSTANCE AGREEMENT    Last Written Prescription Date:  2/27/18  Last Fill Quantity: 30,   # refills: 3    Last Office Visit with [facility] primary care provider: 6/21/18    Future Office visit:   Next 5 appointments (look out 90 days)     Jul 06, 2018  9:20 AM CDT   [facility]    [address] [address] [phone]                  Controlled substance agreement on file: No.     Processing:  Fax Rx to on file pharmacy   checked in past 3 months?  Yes 7/2/18

## 2018-07-03 RX ORDER — ZOLPIDEM TARTRATE 10 MG/1
10 TABLET ORAL
Qty: 30 TABLET | Refills: 5 | Status: SHIPPED | OUTPATIENT
Start: 2018-07-03 | End: 2019-02-05

## 2018-07-09 DIAGNOSIS — M54.2 CERVICALGIA: ICD-10-CM

## 2018-07-09 NOTE — TELEPHONE ENCOUNTER
methocarbamol (ROBAXIN) 500 MG tablet      Last Written Prescription Date:  03/21/2018  Last Fill Quantity: 20 tablet,   # refills: 3  Last Office Visit: 06/21/2018  Future Office visit:       Routing refill request to provider for review/approval because:  Drug not on the [facility], [facility] or [facility] refill protocol or controlled substance      Vidal RICHEY

## 2018-07-10 RX ORDER — METHOCARBAMOL 500 MG/1
1000 TABLET, FILM COATED ORAL 3 TIMES DAILY PRN
Qty: 20 TABLET | Refills: 3 | Status: SHIPPED | OUTPATIENT
Start: 2018-07-10 | End: 2018-12-18

## 2018-07-20 ENCOUNTER — HOSPITAL ENCOUNTER (INPATIENT)
Facility: CLINIC | Age: 58
Setting detail: SURGERY ADMIT
End: 2018-07-20
Attending: ORTHOPAEDIC SURGERY | Admitting: ORTHOPAEDIC SURGERY
Payer: MEDICARE

## 2018-07-23 DIAGNOSIS — L20.9 ATOPIC DERMATITIS, UNSPECIFIED TYPE: ICD-10-CM

## 2018-07-23 DIAGNOSIS — E11.40 TYPE 2 DIABETES MELLITUS WITH DIABETIC NEUROPATHY, WITHOUT LONG-TERM CURRENT USE OF INSULIN (H): ICD-10-CM

## 2018-07-23 RX ORDER — GLIPIZIDE 2.5 MG/1
2.5 TABLET, EXTENDED RELEASE ORAL 2 TIMES DAILY
Qty: 180 TABLET | Refills: 0 | Status: SHIPPED | OUTPATIENT
Start: 2018-07-23 | End: 2018-11-26

## 2018-07-23 RX ORDER — HYDROXYZINE HYDROCHLORIDE 25 MG/1
25-50 TABLET, FILM COATED ORAL 2 TIMES DAILY PRN
Qty: 120 TABLET | Refills: 1 | Status: SHIPPED | OUTPATIENT
Start: 2018-07-23 | End: 2018-10-17

## 2018-07-23 NOTE — TELEPHONE ENCOUNTER
Prescription approved per [facility] Refill Protocol.  Michael Jasso RN, BSN

## 2018-07-23 NOTE — TELEPHONE ENCOUNTER
"Requested Prescriptions   Pending Prescriptions Disp Refills     glipiZIDE (GLIPIZIDE XL) 2.5 MG 24 hr tablet 180 tablet 0    Last Written Prescription Date:  04/12/2018  Last Fill Quantity: 120 tablet,  # refills: 0   Last office visit: 6/21/2018 with prescribing provider:  06/21/2018   Future Office Visit:     Sig: Take 1 tablet (2.5 mg) by mouth 2 times daily    Sulfonylurea Agents Failed    7/23/2018  7:36 AM       Failed - Patient has a recent creatinine (normal) within the past 12 mos.    Recent Labs   Lab Test  05/01/18   0913   CR  1.07*            Passed - Blood pressure less than 140/90 in past 6 months    BP Readings from Last 3 Encounters:   06/21/18 126/82   05/01/18 120/72   03/28/18 118/76                Passed - Patient has documented LDL within the past 12 mos.    Recent Labs   Lab Test  10/20/17   0803   LDL  158*            Passed - Patient has had a Microalbumin in the past 12 mos.    Recent Labs   Lab Test  05/01/18 0913   MICROL  52   UMALCR  100.19*            Passed - Patient has documented A1c within the specified period of time.    If HgbA1C is 8 or greater, it needs to be on file within the past 3 months.  If less than 8, must be on file within the past 6 months.     Recent Labs   Lab Test  05/01/18 0913   A1C  6.7*            Passed - Patient is age 18 or older       Passed - No active pregnancy on record       Passed - Patient has not had a positive pregnancy test within the past 12 mos.       Passed - Recent (6 mo) or future (30 days) visit within the authorizing provider's specialty    Patient had office visit in the last 6 months or has a visit in the next 30 days with authorizing provider or within the authorizing provider's specialty.  See \"Patient Info\" tab in inbasket, or \"Choose Columns\" in Meds & Orders section of the refill encounter.                  hydrOXYzine (ATARAX) 25 MG tablet 120 tablet 1    Last Written Prescription Date:  04/10/2018  Last Fill Quantity: 120 " "tablet,  # refills: 1   Last office visit: 6/21/2018 with prescribing provider:  06/21/2018   Future Office Visit:     Sig: Take 1-2 tablets (25-50 mg) by mouth 2 times daily as needed for itching    Antihistamines Protocol Passed    7/23/2018  7:36 AM       Passed - Recent (12 mo) or future (30 days) visit within the authorizing provider's specialty    Patient had office visit in the last 12 months or has a visit in the next 30 days with authorizing provider or within the authorizing provider's specialty.  See \"Patient Info\" tab in inbasket, or \"Choose Columns\" in Meds & Orders section of the refill encounter.           Passed - Patient is age 3 or older    Apply age and/or weight-based dosing for peds patients age 3 and older.    Forward request to provider for patients under the age of 3.            Vidal Vargas XRT  "

## 2018-08-02 DIAGNOSIS — R60.0 BILATERAL LOWER EXTREMITY EDEMA: ICD-10-CM

## 2018-08-02 RX ORDER — FUROSEMIDE 40 MG
40 TABLET ORAL DAILY
Qty: 30 TABLET | Refills: 3 | Status: SHIPPED | OUTPATIENT
Start: 2018-08-02 | End: 2018-11-26

## 2018-08-02 NOTE — TELEPHONE ENCOUNTER
Prescription approved per [facility] Refill Protocol.  Michael Jasso RN, BSN

## 2018-08-02 NOTE — TELEPHONE ENCOUNTER
"Requested Prescriptions   Pending Prescriptions Disp Refills     furosemide (LASIX) 40 MG tablet 30 tablet 3    Last Written Prescription Date:  04/09/2018  Last Fill Quantity: 30 tablet,  # refills: 3   Last office visit: 6/21/2018 with prescribing provider:  06/21/2018   Future Office Visit:     Sig: Take 1 tablet (40 mg) by mouth daily    Diuretics (Including Combos) Protocol Failed    8/2/2018  7:52 AM       Failed - Normal serum creatinine on file in past 12 months    Recent Labs   Lab Test  05/01/18 0913   CR  1.07*             Passed - Blood pressure under 140/90 in past 12 months    BP Readings from Last 3 Encounters:   06/21/18 126/82   05/01/18 120/72   03/28/18 118/76                Passed - Recent (12 mo) or future (30 days) visit within the authorizing provider's specialty    Patient had office visit in the last 12 months or has a visit in the next 30 days with authorizing provider or within the authorizing provider's specialty.  See \"Patient Info\" tab in inbasket, or \"Choose Columns\" in Meds & Orders section of the refill encounter.           Passed - Patient is age 18 or older       Passed - No active pregancy on record       Passed - Normal serum potassium on file in past 12 months    Recent Labs   Lab Test  05/01/18 0913   POTASSIUM  3.4                   Passed - Normal serum sodium on file in past 12 months    Recent Labs   Lab Test  05/01/18 0913   NA  139             Passed - No positive pregnancy test in past 12 months          Vidal Vargas XRT  "

## 2018-08-13 DIAGNOSIS — E78.5 HYPERLIPIDEMIA LDL GOAL <100: ICD-10-CM

## 2018-08-13 NOTE — TELEPHONE ENCOUNTER
Routing refill request to provider for review/approval because:  Labs out of range:  LDL was higher than previously and pt never responded as to why this might be    Michael Jasso RN, BSN

## 2018-08-13 NOTE — TELEPHONE ENCOUNTER
"Requested Prescriptions   Pending Prescriptions Disp Refills     atorvastatin (LIPITOR) 40 MG tablet 90 tablet 3    Last Written Prescription Date:  07/26/2017  Last Fill Quantity: 90 tablet,  # refills: 3   Last office visit: 6/21/2018 with prescribing provider:  06/21/2018   Future Office Visit:   Next 5 appointments (look out 90 days)     Aug 22, 2018  8:00 AM CDT   [facility] with [facility]za [facility]    [address] [address] [phone]                  Sig: Take 1 tablet (40 mg) by mouth daily    Statins Protocol Passed    8/13/2018  1:41 PM       Passed - LDL on file in past 12 months    Recent Labs   Lab Test  10/20/17   0803   LDL  158*            Passed - No abnormal creatine kinase in past 12 months    No lab results found.            Passed - Recent (12 mo) or future (30 days) visit within the authorizing provider's specialty    Patient had office visit in the last 12 months or has a visit in the next 30 days with authorizing provider or within the authorizing provider's specialty.  See \"Patient Info\" tab in inbasket, or \"Choose Columns\" in Meds & Orders section of the refill encounter.           Passed - Patient is age 18 or older       Passed - No active pregnancy on record       Passed - No positive pregnancy test in past 12 months          Vidal GARCIAT  "

## 2018-08-14 RX ORDER — ATORVASTATIN CALCIUM 40 MG/1
40 TABLET, FILM COATED ORAL DAILY
Qty: 90 TABLET | Refills: 3 | Status: SHIPPED | OUTPATIENT
Start: 2018-08-14 | End: 2019-09-04

## 2018-08-22 ENCOUNTER — OFFICE VISIT (OUTPATIENT)
Dept: FAMILY MEDICINE | Facility: CLINIC | Age: 58
End: 2018-08-22
Payer: MEDICARE

## 2018-08-22 VITALS
HEART RATE: 84 BPM | BODY MASS INDEX: 46.32 KG/M2 | HEIGHT: 65 IN | SYSTOLIC BLOOD PRESSURE: 136 MMHG | DIASTOLIC BLOOD PRESSURE: 78 MMHG | WEIGHT: 278 LBS | TEMPERATURE: 98.2 F

## 2018-08-22 DIAGNOSIS — E03.9 ACQUIRED HYPOTHYROIDISM: ICD-10-CM

## 2018-08-22 DIAGNOSIS — R11.2 POST-OPERATIVE NAUSEA AND VOMITING: ICD-10-CM

## 2018-08-22 DIAGNOSIS — M54.16 LEFT LUMBAR RADICULITIS: ICD-10-CM

## 2018-08-22 DIAGNOSIS — N18.2 CKD (CHRONIC KIDNEY DISEASE) STAGE 2, GFR 60-89 ML/MIN: ICD-10-CM

## 2018-08-22 DIAGNOSIS — E11.42 TYPE 2 DIABETES MELLITUS WITH DIABETIC POLYNEUROPATHY, WITHOUT LONG-TERM CURRENT USE OF INSULIN (H): ICD-10-CM

## 2018-08-22 DIAGNOSIS — I10 BENIGN ESSENTIAL HYPERTENSION: ICD-10-CM

## 2018-08-22 DIAGNOSIS — G89.4 CHRONIC PAIN ASSOCIATED WITH SIGNIFICANT PSYCHOSOCIAL DYSFUNCTION: ICD-10-CM

## 2018-08-22 DIAGNOSIS — Z98.890 POST-OPERATIVE NAUSEA AND VOMITING: ICD-10-CM

## 2018-08-22 DIAGNOSIS — Z01.818 PREOP GENERAL PHYSICAL EXAM: Primary | ICD-10-CM

## 2018-08-22 LAB
ANION GAP SERPL CALCULATED.3IONS-SCNC: 9 MMOL/L (ref 3–14)
BUN SERPL-MCNC: 12 MG/DL (ref 7–30)
CALCIUM SERPL-MCNC: 9 MG/DL (ref 8.5–10.1)
CHLORIDE SERPL-SCNC: 108 MMOL/L (ref 94–109)
CHOLEST SERPL-MCNC: 199 MG/DL
CO2 SERPL-SCNC: 23 MMOL/L (ref 20–32)
CREAT SERPL-MCNC: 1.04 MG/DL (ref 0.52–1.04)
ERYTHROCYTE [DISTWIDTH] IN BLOOD BY AUTOMATED COUNT: 12.4 % (ref 10–15)
GFR SERPL CREATININE-BSD FRML MDRD: 54 ML/MIN/1.7M2
GLUCOSE SERPL-MCNC: 196 MG/DL (ref 70–99)
HCT VFR BLD AUTO: 39.4 % (ref 35–47)
HDLC SERPL-MCNC: 57 MG/DL
HGB BLD-MCNC: 12.8 G/DL (ref 11.7–15.7)
LDLC SERPL CALC-MCNC: 116 MG/DL
MCH RBC QN AUTO: 29.6 PG (ref 26.5–33)
MCHC RBC AUTO-ENTMCNC: 32.5 G/DL (ref 31.5–36.5)
MCV RBC AUTO: 91 FL (ref 78–100)
NONHDLC SERPL-MCNC: 142 MG/DL
PLATELET # BLD AUTO: 193 10E9/L (ref 150–450)
POTASSIUM SERPL-SCNC: 4.1 MMOL/L (ref 3.4–5.3)
RBC # BLD AUTO: 4.32 10E12/L (ref 3.8–5.2)
SODIUM SERPL-SCNC: 140 MMOL/L (ref 133–144)
TRIGL SERPL-MCNC: 130 MG/DL
WBC # BLD AUTO: 5.2 10E9/L (ref 4–11)

## 2018-08-22 PROCEDURE — 80048 BASIC METABOLIC PNL TOTAL CA: CPT | Performed by: FAMILY MEDICINE

## 2018-08-22 PROCEDURE — 99214 OFFICE O/P EST MOD 30 MIN: CPT | Performed by: FAMILY MEDICINE

## 2018-08-22 PROCEDURE — 36415 COLL VENOUS BLD VENIPUNCTURE: CPT | Performed by: FAMILY MEDICINE

## 2018-08-22 PROCEDURE — 85027 COMPLETE CBC AUTOMATED: CPT | Performed by: FAMILY MEDICINE

## 2018-08-22 PROCEDURE — 80061 LIPID PANEL: CPT | Performed by: FAMILY MEDICINE

## 2018-08-22 PROCEDURE — 93000 ELECTROCARDIOGRAM COMPLETE: CPT | Performed by: FAMILY MEDICINE

## 2018-08-22 NOTE — PROGRESS NOTES
[facility]  [id number] [place] [id number]  [phone]  Dept: [phone]    PRE-OP EVALUATION:  Today's date: 2018    Ana Luisa Byers (: 1960) presents for pre-operative evaluation assessment as requested by Dr. LEON Amado.  She requires evaluation and anesthesia risk assessment prior to undergoing surgery/procedure for treatment of lumbar radiculopathy.    Primary Physician: Bridget Garza  Type of Anesthesia Anticipated: General    Patient has a Health Care Directive or Living Will:  NO    Preop Questions 2018   Who is doing your surgery? Dr Sancho Amado   What are you having done? Lumbar spine fusion   Date of Surgery/Procedure: 2018   Facility or Hospital where procedure/surgery will be performed: [facility]    1.  Do you have a history of Heart attack, stroke, stent, coronary bypass surgery, or other heart surgery? No   2.  Do you ever have any pain or discomfort in your chest? No   3.  Do you have a history of  Heart Failure? No   4.   Are you troubled by shortness of breath when:  walking on a level surface, or up a slight hill, or at night? YES - due to carrying excess weight   5.  Do you currently have a cold, bronchitis or other respiratory infection? No   6.  Do you have a cough, shortness of breath, or wheezing? No   7.  Do you sometimes get pains in the calves of your legs when you walk? YES - due to low back    8. Do you or anyone in your family have previous history of blood clots? No   9.  Do you or does anyone in your family have a serious bleeding problem such as prolonged bleeding following surgeries or cuts? No   10. Have you ever had problems with anemia or been told to take iron pills? No   11. Have you had any abnormal blood loss such as black, tarry or bloody stools, or abnormal vaginal bleeding? No   12. Have you ever had a blood transfusion? No   13. Have you or any of your relatives ever had problems with anesthesia? YES  - post operative nausea/vomiting   14. Do you have sleep apnea, excessive snoring or daytime drowsiness? YES - snoring   15. Do you have any prosthetic heart valves? No   16. Do you have prosthetic joints? No   17. Is there any chance that you may be pregnant? No         HPI:     HPI related to upcoming procedure: hx of lumbar radiculopathy      DEPRESSION - Patient has a long history of Depression of moderate severity requiring medication for control with recent symptoms being stable..Current symptoms of depression include none.                                                                                                                                                                                    .  DIABETES - Patient has a longstanding history of DiabetesType Type II . Patient is being treated with diet, oral agents and exercise and denies significant side effects. Control has been fair. Complicating factors include but are not limited to: hypertension, hyperlipidemia and morbid obesity .                                                                                                                            .  HYPERLIPIDEMIA - Patient has a long history of significant Hyperlipidemia requiring medication for treatment with recent good control. Patient reports no problems or side effects with the medication.                                                                                                                                                       .  HYPERTENSION - Patient has longstanding history of HTN , currently denies any symptoms referable to elevated blood pressure. Specifically denies chest pain, palpitations, dyspnea, orthopnea, PND or peripheral edema. Blood pressure readings have been in normal range. Current medication regimen is as listed below. Patient denies any side effects of medication.                                                                                                                                                                                           .  HYPOTHYROIDISM - Patient has a longstanding history of chronic Hypothyroidism. Patient has been doing well, noting no tremor, insomnia, hair loss or changes in skin texture. Continues to take medications as directed, without adverse reactions or side effects. Last TSH   Lab Results   Component Value Date    TSH 4.60 (H) 12/05/2017   .                                                                                                                                                                                                                        .    MEDICAL HISTORY:     Patient Active Problem List    Diagnosis Date Noted     Anxiety 06/21/2018     Priority: Medium     Severe episode of recurrent major depressive disorder, without psychotic features (H) 06/21/2018     Priority: Medium     BMI 45.0-49.9, adult (H) 06/21/2018     Priority: Medium     Benign essential hypertension 03/15/2018     Priority: Medium     Bilateral lower extremity edema 05/04/2017     Priority: Medium     Type 2 diabetes mellitus with diabetic polyneuropathy, without long-term current use of insulin (H) 04/11/2017     Priority: Medium     Acquired hypothyroidism 01/24/2017     Priority: Medium     Left lumbar radiculitis 08/25/2016     Priority: Medium     Restless legs syndrome (RLS) 04/29/2016     Priority: Medium     CKD (chronic kidney disease) stage 2, GFR 60-89 ml/min 04/29/2016     Priority: Medium     HSV-1 infection 04/29/2016     Priority: Medium     Gastroesophageal reflux disease without esophagitis 04/13/2016     Priority: Medium     HTN, goal below 140/90 03/08/2016     Priority: Medium     Other insomnia 12/08/2015     Priority: Medium     Plantar fasciitis 12/03/2015     Priority: Medium     Fibromyalgia      Priority: Medium     Cluster headaches 08/13/2014     Priority: Medium     S/P shoulder replacement 07/31/2013     Priority:  Medium     Osteoarthritis 07/25/2013     Priority: Medium     Chronic pain associated with significant psychosocial dysfunction 06/20/2013     Priority: Medium      Past Medical History:   Diagnosis Date     Arthritis     left shoulder and back     Chronic pain     fibromalgia      CKD (chronic kidney disease), stage III      Fibromyalgia      Gastro-oesophageal reflux disease      HTN, goal below 140/90      Hyperlipidemia LDL goal <100      Hypothyroidism      Major depression     chronic kidney disease-stage 3     Peripheral neuropathy Feb 2015    + callus. diabetic shoes rx done     PONV (postoperative nausea and vomiting)      Rheumatoid arthritis of foot (H)      Type 2 diabetes, HbA1C goal < 8% (H)      Past Surgical History:   Procedure Laterality Date     ARTHROPLASTY SHOULDER  7/25/2013    Procedure: ARTHROPLASTY SHOULDER;  RIGHT TOTAL SHOULDER ARTHROPLASTY (TORNIER AFFINITY SHOULDER SYSTEM)^;  Surgeon: Dung Morales MD;  Location: [facility] OR     BACK SURGERY  2011    L45 laminectomy     C APPENDECTOMY  age 19     C LIGATE FALLOPIAN TUBE,POSTPARTUM  1982    Tubal Ligation     ELBOW WRIST HAND FINGER ORTHOSIS, RIGID, WITHOUT JOINTS, MAY INCLUDE SOFT  5/2007    put in [place]     ESOPHAGOSCOPY, GASTROSCOPY, DUODENOSCOPY (EGD), COMBINED  9/23/2015    [facility]     ESOPHAGOSCOPY, GASTROSCOPY, DUODENOSCOPY (EGD), COMBINED N/A 9/23/2015    Procedure: COMBINED ESOPHAGOSCOPY, GASTROSCOPY, DUODENOSCOPY (EGD), BIOPSY SINGLE OR MULTIPLE;  Surgeon: Jose Thomas MD;  Location:  GI     HCL PAP SMEAR  6/2008    [facility]     ORTHOPEDIC SURGERY      left shoulder scoped and plate left elbow     SURGICAL HISTORY OF -   2009    ulnar nerve release, carpal tunnel- left     Current Outpatient Prescriptions   Medication Sig Dispense Refill     aspirin 81 MG chewable tablet Take 1 tablet (81 mg) by mouth daily 108 tablet 3     atorvastatin (LIPITOR) 40 MG tablet Take 1 tablet (40 mg) by mouth daily 90 tablet 3      Cholecalciferol (VITAMIN D) 2000 UNITS tablet Take 1 tablet by mouth daily 90 tablet 3     DULoxetine (CYMBALTA) 30 MG EC capsule Take 1 capsule (30 mg) by mouth daily Take along with 60 mg capsule 90 capsule 1     EPINEPHrine (EPIPEN/ADRENACLICK/OR ANY BX GENERIC EQUIV) 0.3 MG/0.3ML injection 2-pack Inject 0.3 mLs (0.3 mg) into the muscle once as needed for anaphylaxis 1 mL 1     escitalopram (LEXAPRO) 10 MG tablet Take 1 tablet (10 mg) by mouth daily 30 tablet 1     furosemide (LASIX) 40 MG tablet Take 1 tablet (40 mg) by mouth daily 30 tablet 3     glipiZIDE (GLIPIZIDE XL) 2.5 MG 24 hr tablet Take 1 tablet (2.5 mg) by mouth 2 times daily 180 tablet 0     hydrOXYzine (ATARAX) 25 MG tablet Take 1-2 tablets (25-50 mg) by mouth 2 times daily as needed for itching 120 tablet 1     levothyroxine (SYNTHROID/LEVOTHROID) 25 MCG tablet Take 1 tablet (25 mcg) by mouth daily 90 tablet 2     losartan (COZAAR) 25 MG tablet Take 0.5 tablets (12.5 mg) by mouth daily 45 tablet 3     methocarbamol (ROBAXIN) 500 MG tablet Take 2 tablets (1,000 mg) by mouth 3 times daily as needed for muscle spasms 20 tablet 3     omeprazole (PRILOSEC) 40 MG capsule Take 1 capsule (40 mg) by mouth daily Take 30-60 minutes before a meal. 90 capsule 3     ondansetron (ZOFRAN ODT) 4 MG ODT tab Take 1-2 tablets (4-8 mg) by mouth 3 times daily (before meals) 20 tablet 1     order for DME Equipment being ordered: diabetic shoes    Feet have been examined, no changes 1 Units 0     order for DME Equipment being ordered: Rollator walker with seat 1 each 0     oxyCODONE-acetaminophen (PERCOCET) 5-325 MG per tablet Take 1 tablet by mouth every 6 hours as needed for severe pain maximum 6 tablet(s) per day 20 tablet 0     pantoprazole (PROTONIX) 40 MG EC tablet Take 1 tablet (40 mg) by mouth daily Take 30-60 minutes before a meal. 90 tablet 3     pregabalin (LYRICA) 50 MG capsule Take 1 capsule (50 mg) by mouth 3 times daily 90 capsule 3     zolpidem (AMBIEN) 10  "MG tablet Take 1 tablet (10 mg) by mouth nightly as needed for sleep 30 tablet 5     OTC products: None, except as noted above    Allergies   Allergen Reactions     Bee Swelling     Gabapentin Other (See Comments)     Mood Swings     Lisinopril Rash     Metformin Itching     Novocain [Procaine Hcl] Swelling     Penicillins      Topiramate Other (See Comments)     numbness      Latex Allergy: NO    Social History   Substance Use Topics     Smoking status: Never Smoker     Smokeless tobacco: Never Used     Alcohol use No     History   Drug Use No       REVIEW OF SYSTEMS:   Constitutional, neuro, ENT, endocrine, pulmonary, cardiac, gastrointestinal, genitourinary, musculoskeletal, integument and psychiatric systems are negative, except as otherwise noted.    EXAM:   /78 (BP Location: Right arm, Patient Position: Sitting, Cuff Size: Adult Large)  Pulse 84  Temp 98.2  F (36.8  C) (Oral)  Ht 5' 4.5\" (1.638 m)  Wt 278 lb (126.1 kg)  LMP 02/13/2009  Breastfeeding? No  BMI 46.98 kg/m2    GENERAL APPEARANCE: healthy, alert and no distress     EYES: EOMI, PERRL     HENT: ear canals and TM's normal and nose and mouth without ulcers or lesions     NECK: no adenopathy, no asymmetry, masses, or scars and thyroid normal to palpation     RESP: lungs clear to auscultation - no rales, rhonchi or wheezes     CV: regular rates and rhythm, normal S1 S2, no S3 or S4 and no murmur, click or rub     ABDOMEN:  soft, nontender, no HSM or masses and bowel sounds normal     MS: extremities normal- no gross deformities noted, no evidence of inflammation in joints, FROM in all extremities.     SKIN: no suspicious lesions or rashes     NEURO: Normal strength and tone, sensory exam grossly normal, mentation intact and speech normal     PSYCH: mentation appears normal. and affect normal/bright     LYMPHATICS: No cervical adenopathy    DIAGNOSTICS:     EKG: appears normal, NSR, normal axis, normal intervals, no acute ST/T changes c/w " ischemia, no LVH by voltage criteria, unchanged from previous tracings  Labs Drawn and in Process:   Unresulted Labs Ordered in the Past 30 Days of this Admission     Date and Time Order Name Status Description    8/22/2018 0803 CBC WITH PLATELETS In process     8/22/2018 0803 LIPID REFLEX TO DIRECT LDL PANEL In process     8/22/2018 0803 BASIC METABOLIC PANEL In process           Recent Labs   Lab Test  05/01/18   0913  10/20/17   0803   07/05/16   0924   HGB   --   13.7   --   12.9   PLT   --   220   --   227   NA  139  139   < >  137   POTASSIUM  3.4  3.6   < >  3.7   CR  1.07*  1.11*   < >  1.14*   A1C  6.7*  6.3*   < >  5.9    < > = values in this interval not displayed.        IMPRESSION:   Diagnosis/reason for consult: pre operative clearance for lumbar spine surgery    The proposed surgical procedure is considered INTERMEDIATE risk.    REVISED CARDIAC RISK INDEX  The patient has the following serious cardiovascular risks for perioperative complications such as (MI, PE, VFib and 3  AV Block):  No serious cardiac risks  INTERPRETATION: 0 risks: Class I (very low risk - 0.4% complication rate)    The patient has the following additional risks for perioperative complications:  No identified additional risks      ICD-10-CM    1. Preop general physical exam Z01.818 Basic metabolic panel  (Ca, Cl, CO2, Creat, Gluc, K, Na, BUN)     CBC with platelets     EKG 12-lead complete w/read - Clinics   2. Left lumbar radiculitis M54.16    3. Type 2 diabetes mellitus with diabetic polyneuropathy, without long-term current use of insulin (H) E11.42 Hemoglobin A1c     Lipid panel reflex to direct LDL Fasting   4. Acquired hypothyroidism E03.9    5. Benign essential hypertension I10 Basic metabolic panel  (Ca, Cl, CO2, Creat, Gluc, K, Na, BUN)   6. BMI 45.0-49.9, adult (H) Z68.42    7. Chronic pain associated with significant psychosocial dysfunction G89.4    8. CKD (chronic kidney disease) stage 2, GFR 60-89 ml/min N18.2 Basic  metabolic panel  (Ca, Cl, CO2, Creat, Gluc, K, Na, BUN)       RECOMMENDATIONS:     --Consult hospital rounder / IM to assist post-op medical management    --Patient is to take all scheduled medications on the day of surgery EXCEPT for modifications listed below.    Diabetes Medication Use  -----Hold usual oral and non-insulin diabetic meds (e.g. Metformin, Actos, Glipizide) while NPO.       Anticoagulant or Antiplatelet Medication Use  ASPIRIN: Discontinue ASA 7-10 days prior to procedure to reduce bleeding risk.  It should be resumed post-operatively.        APPROVAL GIVEN to proceed with proposed procedure, without further diagnostic evaluation       Signed Electronically by: Bridget Garza MD

## 2018-08-22 NOTE — MR AVS SNAPSHOT
After Visit Summary   8/22/2018    Ana Luisa Byers    MRN: [id number]           Patient Information     Date Of Birth          1960        Visit Information        Provider Department      8/22/2018 8:00 AM Bridget Garza MD [facility]        Today's Diagnoses     Preop general physical exam    -  1    Left lumbar radiculitis        Post-operative nausea and vomiting        Type 2 diabetes mellitus with diabetic polyneuropathy, without long-term current use of insulin (H)        Acquired hypothyroidism        Benign essential hypertension        BMI 45.0-49.9, adult (H)        Chronic pain associated with significant psychosocial dysfunction        CKD (chronic kidney disease) stage 2, GFR 60-89 ml/min          Care Instructions      Before Your Surgery      Call your surgeon if there is any change in your health. This includes signs of a cold or flu (such as a sore throat, runny nose, cough, rash or fever).    Do not smoke, drink alcohol or take over the counter medicine (unless your surgeon or primary care doctor tells you to) for the 24 hours before and after surgery.    If you take prescribed drugs: Follow your doctor s orders about which medicines to take and which to stop until after surgery.    Eating and drinking prior to surgery: follow the instructions from your surgeon    Take a shower or bath the night before surgery. Use the soap your surgeon gave you to gently clean your skin. If you do not have soap from your surgeon, use your regular soap. Do not shave or scrub the surgery site.  Wear clean pajamas and have clean sheets on your bed.           Follow-ups after your visit        Follow-up notes from your care team     Return in about 3 months (around 11/22/2018) for Diabetes Visit.      Your next 10 appointments already scheduled     Sep 20, 2018   Procedure with Sancho Amado MD   [facility] (--)    [address]  [place] [address] [phone]              Future tests that were ordered for you today     Open Future Orders        Priority Expected Expires Ordered    Hemoglobin A1c Routine  8/22/2019 8/22/2018            Who to contact     If you have questions or need follow up information about today's clinic visit or your schedule please contact [facility] directly at [phone].  Normal or non-critical lab and imaging results will be communicated to you by ONE Changehart, letter or phone within 4 business days after the clinic has received the results. If you do not hear from us within 7 days, please contact the clinic through Rettyt or phone. If you have a critical or abnormal lab result, we will notify you by phone as soon as possible.  Submit refill requests through Orecon or call your pharmacy and they will forward the refill request to us. Please allow 3 business days for your refill to be completed.          Additional Information About Your Visit        ONE Changehart Information     Orecon gives you secure access to your electronic health record. If you see a primary care provider, you can also send messages to your care team and make appointments. If you have questions, please call your primary care clinic.  If you do not have a primary care provider, please call [phone] and they will assist you.        Care EveryWhere ID     This is your Care EveryWhere ID. This could be used by other organizations to access your [place] medical records  [id number]        Your Vitals Were     Pulse Temperature Height Last Period Breastfeeding? BMI (Body Mass Index)    84 98.2  F (36.8  C) (Oral) 5' 4.5\" (1.638 m) 02/13/2009 No 46.98 kg/m2       Blood Pressure from Last 3 Encounters:   08/22/18 136/78   06/21/18 126/82   05/01/18 120/72    Weight from Last 3 Encounters:   08/22/18 278 lb (126.1 kg)   06/21/18 267 lb (121.1 kg)   05/01/18 265 lb (120.2 kg)              We Performed the Following     Basic " metabolic panel  (Ca, Cl, CO2, Creat, Gluc, K, Na, BUN)     CBC with platelets     EKG 12-lead complete w/read - Clinics     Lipid panel reflex to direct LDL Fasting          Today's Medication Changes          These changes are accurate as of 8/22/18  9:47 AM.  If you have any questions, ask your nurse or doctor.               These medicines have changed or have updated prescriptions.        Dose/Directions    * order for DME   This may have changed:  Another medication with the same name was removed. Continue taking this medication, and follow the directions you see here.   Used for:  Morbid obesity due to excess calories (H), Type 2 diabetes mellitus with diabetic neuropathy (H), Fibromyalgia   Changed by:  Bridget Garza MD        Equipment being ordered: Rollator walker with seat   Quantity:  1 each   Refills:  0       * order for DME   This may have changed:  Another medication with the same name was removed. Continue taking this medication, and follow the directions you see here.   Used for:  Type 2 diabetes mellitus with diabetic neuropathy, without long-term current use of insulin (H)   Changed by:  Bridget Garza MD        Equipment being ordered: diabetic shoes  Feet have been examined, no changes   Quantity:  1 Units   Refills:  0       * Notice:  This list has 2 medication(s) that are the same as other medications prescribed for you. Read the directions carefully, and ask your doctor or other care provider to review them with you.      Stop taking these medicines if you haven't already. Please contact your care team if you have questions.     betamethasone dipropionate 0.05 % cream   Commonly known as:  DIPROSONE   Stopped by:  Bridget Garza MD           nystatin [id number] UNIT/ML suspension   Commonly known as:  MYCOSTATIN   Stopped by:  Bridget Garza MD           sulfamethoxazole-trimethoprim 800-160 MG per tablet   Commonly known as:  BACTRIM DS/SEPTRA DS   Stopped by:   Bridget Garza MD                    Primary Care Provider Office Phone # Fax #    Bridget Garza [phone] [address]  [facility] [address]        Equal Access to Services     BOB KHARI : Hadii aad ku hadbeverlyo Soomaali, waaxda luqadaha, qaybta kaalmada adeegyada, waxkelsey idiin haydannan adedelmy steele lamaudeshalom jessica. So [facility] [phone].    ATENCIÓN: Si habla español, tiene a valerio disposición servicios gratuitos de asistencia lingüística. Llame al [phone].    We comply with applicable federal civil rights laws and [place] laws. We do not discriminate on the basis of race, color, national origin, age, disability, sex, sexual orientation, or gender identity.            Thank you!     Thank you for choosing [facility]  for your care. Our goal is always to provide you with excellent care. Hearing back from our patients is one way we can continue to improve our services. Please take a few minutes to complete the written survey that you may receive in the mail after your visit with us. Thank you!             Your Updated Medication List - Protect others around you: Learn how to safely use, store and throw away your medicines at www.disposemymeds.org.          This list is accurate as of 8/22/18  9:47 AM.  Always use your most recent med list.                   Brand Name Dispense Instructions for use Diagnosis    aspirin 81 MG chewable tablet     108 tablet    Take 1 tablet (81 mg) by mouth daily    Diabetes mellitus, type 2 (H)       atorvastatin 40 MG tablet    LIPITOR    90 tablet    Take 1 tablet (40 mg) by mouth daily    Hyperlipidemia LDL goal <100       DULoxetine 30 MG EC capsule    CYMBALTA    90 capsule    Take 1 capsule (30 mg) by mouth daily Take along with 60 mg capsule    Myofascial pain, Chronic pain associated with significant psychosocial dysfunction       EPINEPHrine 0.3 MG/0.3ML injection 2-pack    EPIPEN/ADRENACLICK/or ANY BX GENERIC EQUIV    1 mL     Inject 0.3 mLs (0.3 mg) into the muscle once as needed for anaphylaxis    Bee sting allergy       escitalopram 10 MG tablet    LEXAPRO    30 tablet    Take 1 tablet (10 mg) by mouth daily    Anxiety, Severe episode of recurrent major depressive disorder, without psychotic features (H)       furosemide 40 MG tablet    LASIX    30 tablet    Take 1 tablet (40 mg) by mouth daily    Bilateral lower extremity edema       glipiZIDE 2.5 MG 24 hr tablet    glipiZIDE XL    180 tablet    Take 1 tablet (2.5 mg) by mouth 2 times daily    Type 2 diabetes mellitus with diabetic neuropathy, without long-term current use of insulin (H)       hydrOXYzine 25 MG tablet    ATARAX    120 tablet    Take 1-2 tablets (25-50 mg) by mouth 2 times daily as needed for itching    Atopic dermatitis, unspecified type       levothyroxine 25 MCG tablet    SYNTHROID/LEVOTHROID    90 tablet    Take 1 tablet (25 mcg) by mouth daily    Acquired hypothyroidism       losartan 25 MG tablet    COZAAR    45 tablet    Take 0.5 tablets (12.5 mg) by mouth daily    Type 2 diabetes mellitus with diabetic polyneuropathy, without long-term current use of insulin (H)       methocarbamol 500 MG tablet    ROBAXIN    20 tablet    Take 2 tablets (1,000 mg) by mouth 3 times daily as needed for muscle spasms    Cervicalgia       omeprazole 40 MG capsule    priLOSEC    90 capsule    Take 1 capsule (40 mg) by mouth daily Take 30-60 minutes before a meal.    Gastroesophageal reflux disease without esophagitis       ondansetron 4 MG ODT tab    ZOFRAN ODT    20 tablet    Take 1-2 tablets (4-8 mg) by mouth 3 times daily (before meals)    Nausea       * order for DME     1 each    Equipment being ordered: Rollator walker with seat    Morbid obesity due to excess calories (H), Type 2 diabetes mellitus with diabetic neuropathy (H), Fibromyalgia       * order for DME     1 Units    Equipment being ordered: diabetic shoes  Feet have been examined, no changes    Type 2 diabetes  mellitus with diabetic neuropathy, without long-term current use of insulin (H)       oxyCODONE-acetaminophen 5-325 MG per tablet    PERCOCET    20 tablet    Take 1 tablet by mouth every 6 hours as needed for severe pain maximum 6 tablet(s) per day    Left lumbar radiculitis       pantoprazole 40 MG EC tablet    PROTONIX    90 tablet    Take 1 tablet (40 mg) by mouth daily Take 30-60 minutes before a meal.    Gastroesophageal reflux disease without esophagitis       pregabalin 50 MG capsule    LYRICA    90 capsule    Take 1 capsule (50 mg) by mouth 3 times daily    Diabetic polyneuropathy associated with type 2 diabetes mellitus (H)       vitamin D 2000 units tablet     90 tablet    Take 1 tablet by mouth daily    Type 2 diabetes mellitus with diabetic neuropathy, without long-term current use of insulin (H)       zolpidem 10 MG tablet    AMBIEN    30 tablet    Take 1 tablet (10 mg) by mouth nightly as needed for sleep    Other insomnia       * Notice:  This list has 2 medication(s) that are the same as other medications prescribed for you. Read the directions carefully, and ask your doctor or other care provider to review them with you.

## 2018-08-22 NOTE — NURSING NOTE
"  Chief Complaint   Patient presents with     Pre-Op Exam       Initial /78 (BP Location: Right arm, Patient Position: Sitting, Cuff Size: Adult Large)  Pulse 84  Temp 98.2  F (36.8  C) (Oral)  Ht 5' 4.5\" (1.638 m)  Wt 278 lb (126.1 kg)  LMP 02/13/2009  Breastfeeding? No  BMI 46.98 kg/m2 Estimated body mass index is 46.98 kg/(m^2) as calculated from the following:    Height as of this encounter: 5' 4.5\" (1.638 m).    Weight as of this encounter: 278 lb (126.1 kg).  Medication Reconciliation: complete      Health Maintenance addressed:  a1c Gabino Cho CMA          "

## 2018-08-31 DIAGNOSIS — F33.2 SEVERE EPISODE OF RECURRENT MAJOR DEPRESSIVE DISORDER, WITHOUT PSYCHOTIC FEATURES (H): ICD-10-CM

## 2018-08-31 DIAGNOSIS — F41.9 ANXIETY: ICD-10-CM

## 2018-09-01 NOTE — TELEPHONE ENCOUNTER
"Requested Prescriptions   Pending Prescriptions Disp Refills     escitalopram (LEXAPRO) 10 MG tablet  Last Written Prescription Date:  6/21/2018  Last Fill Quantity: 30 tablet,  # refills: 1   Last office visit: 8/22/2018 with prescribing provider:  Greg      30 tablet 1     Sig: Take 1 tablet (10 mg) by mouth daily    SSRIs Protocol Failed    PHQ-9 SCORE 12/6/2017 1/16/2018 6/21/2018   Total Score - - -   Total Score MyChart - - -   Total Score 20 16 24     BEATRIZ-7 SCORE 12/6/2017 1/16/2018 6/21/2018   Total Score - - -   Total Score 15 11 21              Failed - PHQ-9 score less than 5 in past 6 months    PHQ-9 SCORE 12/6/2017 1/16/2018 6/21/2018   Total Score - - -   Total Score MyChart - - -   Total Score 20 16 24              Passed - Patient is age 18 or older       Passed - No active pregnancy on record       Passed - No positive pregnancy test in last 12 months       Passed - Recent (6 mo) or future (30 days) visit within the authorizing provider's specialty    Patient had office visit in the last 6 months or has a visit in the next 30 days with authorizing provider or within the authorizing provider's specialty.  See \"Patient Info\" tab in inbasket, or \"Choose Columns\" in Meds & Orders section of the refill encounter.              "

## 2018-09-05 RX ORDER — ESCITALOPRAM OXALATE 10 MG/1
10 TABLET ORAL DAILY
Qty: 30 TABLET | Refills: 0 | Status: SHIPPED | OUTPATIENT
Start: 2018-09-05 | End: 2018-10-03

## 2018-09-05 NOTE — TELEPHONE ENCOUNTER
Refilled.  With severe symptoms recommend at least monthly follow-up with PCP for now - sent 1 month.

## 2018-09-05 NOTE — TELEPHONE ENCOUNTER
Pt completed BEATRIZ but not PHQ.  Relevant e-solution sent requesting pt to complete that   Michael Jasso RN, BSN

## 2018-09-05 NOTE — TELEPHONE ENCOUNTER
PHQ-9 SCORE 1/16/2018 6/21/2018 9/5/2018   Total Score - - -   Total Score MyChart - - 21 (Severe depression)   Total Score 16 24 21     BEATRIZ-7 SCORE 1/16/2018 6/21/2018 9/4/2018   Total Score - - -   Total Score - - 19 (severe anxiety)   Total Score 11 21 19       Routing refill request to provider for review/approval because:  Labs out of range:  PHQ/BEATRIZ    Michael Jasso RN, BSN

## 2018-10-03 DIAGNOSIS — F41.9 ANXIETY: ICD-10-CM

## 2018-10-03 DIAGNOSIS — F33.2 SEVERE EPISODE OF RECURRENT MAJOR DEPRESSIVE DISORDER, WITHOUT PSYCHOTIC FEATURES (H): ICD-10-CM

## 2018-10-03 RX ORDER — ESCITALOPRAM OXALATE 10 MG/1
10 TABLET ORAL DAILY
Qty: 90 TABLET | Refills: 1 | Status: SHIPPED | OUTPATIENT
Start: 2018-10-03 | End: 2019-09-24

## 2018-10-03 NOTE — TELEPHONE ENCOUNTER
"Routing refill request to provider for review/approval because:  Labs out of range:  PHQ  Michael Jasso RN, BSN    Last Written Prescription Date:  9/5/18  Last Fill Quantity: 30,  # refills: 0   Last office visit: 8/22/2018 with prescribing provider:  elle   Future Office Visit:   Next 5 appointments (look out 90 days)     Oct 11, 2018  7:40 AM CDT   MyChart Long with Bridget Garza MD   [facility]    [address] [facility] [address] [phone]                 Requested Prescriptions   Pending Prescriptions Disp Refills     escitalopram (LEXAPRO) 10 MG tablet [Pharmacy Med Name: ESCITALOPRAM 10MG TABLETS] 30 tablet 0     Sig: TAKE 1 TABLET BY MOUTH DAILY    SSRIs Protocol Failed    10/3/2018  5:00 AM       Failed - PHQ-9 score less than 5 in past 6 months    Please review last PHQ-9 score.   PHQ-9 SCORE 1/16/2018 6/21/2018 9/5/2018   Total Score - - -   Total Score [place] - - 21 (Severe depression)   Total Score 16 24 21              Passed - Patient is age 18 or older       Passed - No active pregnancy on record       Passed - No positive pregnancy test in last 12 months       Passed - Recent (6 mo) or future (30 days) visit within the authorizing provider's specialty    Patient had office visit in the last 6 months or has a visit in the next 30 days with authorizing provider or within the authorizing provider's specialty.  See \"Patient Info\" tab in inbasket, or \"Choose Columns\" in Meds & Orders section of the refill encounter.              "

## 2018-10-05 ENCOUNTER — MYC MEDICAL ADVICE (OUTPATIENT)
Dept: FAMILY MEDICINE | Facility: CLINIC | Age: 58
End: 2018-10-05

## 2018-10-05 ENCOUNTER — OFFICE VISIT (OUTPATIENT)
Dept: FAMILY MEDICINE | Facility: CLINIC | Age: 58
End: 2018-10-05
Payer: MEDICARE

## 2018-10-05 ENCOUNTER — TELEPHONE (OUTPATIENT)
Dept: FAMILY MEDICINE | Facility: CLINIC | Age: 58
End: 2018-10-05

## 2018-10-05 VITALS
TEMPERATURE: 98 F | DIASTOLIC BLOOD PRESSURE: 78 MMHG | HEART RATE: 76 BPM | OXYGEN SATURATION: 97 % | BODY MASS INDEX: 46.32 KG/M2 | SYSTOLIC BLOOD PRESSURE: 130 MMHG | RESPIRATION RATE: 16 BRPM | HEIGHT: 65 IN | WEIGHT: 278 LBS
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
## 2018-10-05 DIAGNOSIS — S61.452A CAT BITE OF HAND, LEFT, INITIAL ENCOUNTER: ICD-10-CM
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
## 2018-10-05 DIAGNOSIS — S61.452A CAT BITE OF HAND, LEFT, INITIAL ENCOUNTER: Primary | ICD-10-CM

## 2018-10-05 PROCEDURE — 99214 OFFICE O/P EST MOD 30 MIN: CPT | Performed by: FAMILY MEDICINE

## 2018-10-05 RX ORDER — DOXYCYCLINE 100 MG/1
100 CAPSULE ORAL 2 TIMES DAILY
Qty: 20 CAPSULE | Refills: 0 | Status: SHIPPED | OUTPATIENT
Start: 2018-10-05 | End: 2018-10-05

## 2018-10-05 RX ORDER — DOXYCYCLINE 100 MG/1
100 CAPSULE ORAL 2 TIMES DAILY
Qty: 20 CAPSULE | Refills: 0 | Status: SHIPPED | OUTPATIENT
Start: 2018-10-05 | End: 2019-01-24

## 2018-10-05 RX ORDER — PHENTERMINE HYDROCHLORIDE 30 MG/1
30 CAPSULE ORAL EVERY MORNING
Qty: 30 CAPSULE | Refills: 0 | Status: SHIPPED | OUTPATIENT
Start: 2018-10-05 | End: 2018-10-11

## 2018-10-05 NOTE — MR AVS SNAPSHOT
After Visit Summary   10/5/2018    Ana Luisa Byers    MRN: [id number]           Patient Information     Date Of Birth          1960        Visit Information        Provider Department      10/5/2018 12:30 PM Tre Onofre MD [facility]        Today's Diagnoses     Cat bite of hand, left, initial encounter    -  1    BMI 45.0-49.9, adult (H)           Follow-ups after your visit        Follow-up notes from your care team     Return in about 1 month (around 11/5/2018) for Routine Visit.      Your next 10 appointments already scheduled     Oct 11, 2018  7:40 AM CDT   MyChart Long with Bridget Garza MD   [facility]    [id number] [place] [address] [phone]              Who to contact     If you have questions or need follow up information about today's clinic visit or your schedule please contact [facility] directly at [phone].  Normal or non-critical lab and imaging results will be communicated to you by Starboard Storage Systemshart, letter or phone within 4 business days after the clinic has received the results. If you do not hear from us within 7 days, please contact the clinic through Chance (app)t or phone. If you have a critical or abnormal lab result, we will notify you by phone as soon as possible.  Submit refill requests through LiB or call your pharmacy and they will forward the refill request to us. Please allow 3 business days for your refill to be completed.          Additional Information About Your Visit        Starboard Storage Systemshart Information     LiB gives you secure access to your electronic health record. If you see a primary care provider, you can also send messages to your care team and make appointments. If you have questions, please call your primary care clinic.  If you do not have a primary care provider, please call [phone] and they will assist you.        Care EveryWhere ID     This is  "your Care EveryWhere ID. This could be used by other organizations to access your [place] medical records  [id number]        Your Vitals Were     Pulse Temperature Respirations Height Last Period Pulse Oximetry    76 98  F (36.7  C) (Oral) 16 5' 4.5\" (1.638 m) 02/13/2009 97%    BMI (Body Mass Index)                   46.98 kg/m2            Blood Pressure from Last 3 Encounters:   10/05/18 130/78   08/22/18 136/78   06/21/18 126/82    Weight from Last 3 Encounters:   10/05/18 278 lb (126.1 kg)   08/22/18 278 lb (126.1 kg)   06/21/18 267 lb (121.1 kg)              Today, you had the following     No orders found for display         Today's Medication Changes          These changes are accurate as of 10/5/18  2:10 PM.  If you have any questions, ask your nurse or doctor.               Start taking these medicines.        Dose/Directions    doxycycline monohydrate 100 MG capsule   Used for:  Cat bite of hand, left, initial encounter   Started by:  Bridget Garza MD        Dose:  100 mg   Take 1 capsule (100 mg) by mouth 2 times daily   Quantity:  20 capsule   Refills:  0       phentermine 30 MG capsule   Used for:  BMI 45.0-49.9, adult (H)   Started by:  Tre Onofre MD        Dose:  30 mg   Take 1 capsule (30 mg) by mouth every morning   Quantity:  30 capsule   Refills:  0            Where to get your medicines      These medications were sent to [facility] [address] [place] [address] [id number] [place] [address] AT [address]  [address] [facility] [address]     Phone:  [phone]     doxycycline monohydrate 100 MG capsule         Some of these will need a paper prescription and others can be bought over the counter.  Ask your nurse if you have questions.     Bring a paper prescription for each of these medications     phentermine 30 MG capsule                Primary Care Provider Office Phone # Fax #    Bridget Garza [phone] [address] [facility] [address]        Equal Access to Services     BOB LUCIA : Hadii aad ku hadbeverlytashi Sotimothy, waaxda luqadaha, qaybta kaalmapaz bhatia, pool lopez. So [facility] [phone].    ATENCIÓN: Si habla español, tiene a valerio disposición servicios gratuitos de asistencia lingüística. Llame al [phone].    We comply with applicable federal civil rights laws and [place] laws. We do not discriminate on the basis of race, color, national origin, age, disability, sex, sexual orientation, or gender identity.            Thank you!     Thank you for choosing [facility]  for your care. Our goal is always to provide you with excellent care. Hearing back from our patients is one way we can continue to improve our services. Please take a few minutes to complete the written survey that you may receive in the mail after your visit with us. Thank you!             Your Updated Medication List - Protect others around you: Learn how to safely use, store and throw away your medicines at www.disposemymeds.org.          This list is accurate as of 10/5/18  2:10 PM.  Always use your most recent med list.                   Brand Name Dispense Instructions for use Diagnosis    aspirin 81 MG chewable tablet     108 tablet    Take 1 tablet (81 mg) by mouth daily    Diabetes mellitus, type 2 (H)       atorvastatin 40 MG tablet    LIPITOR    90 tablet    Take 1 tablet (40 mg) by mouth daily    Hyperlipidemia LDL goal <100       doxycycline monohydrate 100 MG capsule     20 capsule    Take 1 capsule (100 mg) by mouth 2 times daily    Cat bite of hand, left, initial encounter       DULoxetine 30 MG EC capsule    CYMBALTA    90 capsule    Take 1 capsule (30 mg) by mouth daily Take along with 60 mg capsule    Myofascial pain, Chronic pain associated with significant psychosocial dysfunction       EPINEPHrine 0.3 MG/0.3ML injection 2-pack    EPIPEN/ADRENACLICK/or ANY BX GENERIC EQUIV    1 mL     Inject 0.3 mLs (0.3 mg) into the muscle once as needed for anaphylaxis    Bee sting allergy       escitalopram 10 MG tablet    LEXAPRO    90 tablet    Take 1 tablet (10 mg) by mouth daily    Anxiety, Severe episode of recurrent major depressive disorder, without psychotic features (H)       furosemide 40 MG tablet    LASIX    30 tablet    Take 1 tablet (40 mg) by mouth daily    Bilateral lower extremity edema       glipiZIDE 2.5 MG 24 hr tablet    glipiZIDE XL    180 tablet    Take 1 tablet (2.5 mg) by mouth 2 times daily    Type 2 diabetes mellitus with diabetic neuropathy, without long-term current use of insulin (H)       hydrOXYzine 25 MG tablet    ATARAX    120 tablet    Take 1-2 tablets (25-50 mg) by mouth 2 times daily as needed for itching    Atopic dermatitis, unspecified type       levothyroxine 25 MCG tablet    SYNTHROID/LEVOTHROID    90 tablet    Take 1 tablet (25 mcg) by mouth daily    Acquired hypothyroidism       losartan 25 MG tablet    COZAAR    45 tablet    Take 0.5 tablets (12.5 mg) by mouth daily    Type 2 diabetes mellitus with diabetic polyneuropathy, without long-term current use of insulin (H)       methocarbamol 500 MG tablet    ROBAXIN    20 tablet    Take 2 tablets (1,000 mg) by mouth 3 times daily as needed for muscle spasms    Cervicalgia       omeprazole 40 MG capsule    priLOSEC    90 capsule    Take 1 capsule (40 mg) by mouth daily Take 30-60 minutes before a meal.    Gastroesophageal reflux disease without esophagitis       ondansetron 4 MG ODT tab    ZOFRAN ODT    20 tablet    Take 1-2 tablets (4-8 mg) by mouth 3 times daily (before meals)    Nausea       * order for DME     1 each    Equipment being ordered: Rollator walker with seat    Morbid obesity due to excess calories (H), Type 2 diabetes mellitus with diabetic neuropathy (H), Fibromyalgia       * order for DME     1 Units    Equipment being ordered: diabetic shoes  Feet have been examined, no changes    Type 2 diabetes  mellitus with diabetic neuropathy, without long-term current use of insulin (H)       oxyCODONE-acetaminophen 5-325 MG per tablet    PERCOCET    20 tablet    Take 1 tablet by mouth every 6 hours as needed for severe pain maximum 6 tablet(s) per day    Left lumbar radiculitis       pantoprazole 40 MG EC tablet    PROTONIX    90 tablet    Take 1 tablet (40 mg) by mouth daily Take 30-60 minutes before a meal.    Gastroesophageal reflux disease without esophagitis       phentermine 30 MG capsule     30 capsule    Take 1 capsule (30 mg) by mouth every morning    BMI 45.0-49.9, adult (H)       pregabalin 50 MG capsule    LYRICA    90 capsule    Take 1 capsule (50 mg) by mouth 3 times daily    Diabetic polyneuropathy associated with type 2 diabetes mellitus (H)       vitamin D 2000 units tablet     90 tablet    Take 1 tablet by mouth daily    Type 2 diabetes mellitus with diabetic neuropathy, without long-term current use of insulin (H)       zolpidem 10 MG tablet    AMBIEN    30 tablet    Take 1 tablet (10 mg) by mouth nightly as needed for sleep    Other insomnia       * Notice:  This list has 2 medication(s) that are the same as other medications prescribed for you. Read the directions carefully, and ask your doctor or other care provider to review them with you.

## 2018-10-05 NOTE — TELEPHONE ENCOUNTER
Pharmacy calling about recent rx  # [phone]    Disp Refills Start End NAVIN    doxycycline monohydrate 100 MG capsule 20 capsule 0 10/5/2018 10/19/2018 --   Sig - Route: Take 1 capsule (100 mg) by mouth 2 times daily for 14 days - Oral     Do you want 20 caps for 10 days or 28 caps for 14 days?    Route to provider to review and advise    Gena Pride RN Nurse Triage

## 2018-10-11 ENCOUNTER — OFFICE VISIT (OUTPATIENT)
Dept: FAMILY MEDICINE | Facility: CLINIC | Age: 58
End: 2018-10-11
Payer: MEDICARE

## 2018-10-11 VITALS
TEMPERATURE: 98 F | DIASTOLIC BLOOD PRESSURE: 78 MMHG | HEIGHT: 65 IN | WEIGHT: 280 LBS | SYSTOLIC BLOOD PRESSURE: 128 MMHG | BODY MASS INDEX: 46.65 KG/M2 | HEART RATE: 83 BPM

## 2018-10-11 DIAGNOSIS — R21 RASH AND NONSPECIFIC SKIN ERUPTION: ICD-10-CM

## 2018-10-11 DIAGNOSIS — L29.9 ITCHY SCALP: Primary | ICD-10-CM

## 2018-10-11 PROCEDURE — 99214 OFFICE O/P EST MOD 30 MIN: CPT | Performed by: FAMILY MEDICINE

## 2018-10-11 RX ORDER — CLOBETASOL PROPIONATE 0.5 MG/ML
SOLUTION TOPICAL
Qty: 50 ML | Refills: 0 | Status: SHIPPED | OUTPATIENT
Start: 2018-10-11 | End: 2018-11-13

## 2018-10-11 RX ORDER — PHENTERMINE HYDROCHLORIDE 30 MG/1
30 CAPSULE ORAL EVERY MORNING
Qty: 30 CAPSULE | Refills: 1 | Status: SHIPPED | OUTPATIENT
Start: 2018-10-11 | End: 2018-10-30

## 2018-10-11 RX ORDER — PHENTERMINE HYDROCHLORIDE 30 MG/1
30 CAPSULE ORAL EVERY MORNING
Qty: 30 CAPSULE | Refills: 1 | Status: SHIPPED | OUTPATIENT
Start: 2018-10-11 | End: 2018-10-11

## 2018-10-11 NOTE — MR AVS SNAPSHOT
After Visit Summary   10/11/2018    Ana Luisa Byers    MRN: [id number]           Patient Information     Date Of Birth          1960        Visit Information        Provider Department      10/11/2018 7:40 AM Bridget Garza MD [facility]        Today's Diagnoses     Itchy scalp    -  1    Rash and nonspecific skin eruption        BMI 45.0-49.9, adult (H)           Follow-ups after your visit        Follow-up notes from your care team     Return in about 1 month (around 11/11/2018) for Diabetes Visit.      Your next 10 appointments already scheduled     Nov 13, 2018  8:40 AM CST   SHORT with Bridget Garza MD   [facility] ([facility])    [address] [facility] [address] [phone]              Who to contact     If you have questions or need follow up information about today's clinic visit or your schedule please contact [facility] directly at [phone].  Normal or non-critical lab and imaging results will be communicated to you by Playfirehart, letter or phone within 4 business days after the clinic has received the results. If you do not hear from us within 7 days, please contact the clinic through Bioaxialt or phone. If you have a critical or abnormal lab result, we will notify you by phone as soon as possible.  Submit refill requests through SwipeToSpin or call your pharmacy and they will forward the refill request to us. Please allow 3 business days for your refill to be completed.          Additional Information About Your Visit        Playfirehart Information     SwipeToSpin gives you secure access to your electronic health record. If you see a primary care provider, you can also send messages to your care team and make appointments. If you have questions, please call your primary care clinic.  If you do not have a primary care provider, please call [phone] and they will assist you.        Care EveryWhere ID   "   This is your Care EveryWhere ID. This could be used by other organizations to access your [place] medical records  [id number]        Your Vitals Were     Pulse Temperature Height Last Period BMI (Body Mass Index)       83 98  F (36.7  C) (Oral) 5' 4.5\" (1.638 m) 02/13/2009 47.32 kg/m2        Blood Pressure from Last 3 Encounters:   10/11/18 128/78   10/05/18 130/78   08/22/18 136/78    Weight from Last 3 Encounters:   10/11/18 280 lb (127 kg)   10/05/18 278 lb (126.1 kg)   08/22/18 278 lb (126.1 kg)              Today, you had the following     No orders found for display         Today's Medication Changes          These changes are accurate as of 10/11/18  2:31 PM.  If you have any questions, ask your nurse or doctor.               Start taking these medicines.        Dose/Directions    clobetasol 0.05 % external solution   Commonly known as:  TEMOVATE   Used for:  Itchy scalp   Started by:  Bridget Garza MD        Apply sparingly to affected area twice daily for 14 days.  Do not apply to face.   Quantity:  50 mL   Refills:  0       phentermine 30 MG capsule   Used for:  BMI 45.0-49.9, adult (H)   Started by:  Bridget Garza MD        Dose:  30 mg   Take 1 capsule (30 mg) by mouth every morning Can fill 11/2/18   Quantity:  30 capsule   Refills:  1            Where to get your medicines      These medications were sent to [facility] [id number] [place] [address] [facility] AT [address]  [address] [facility] [address]     Phone:  [phone]     clobetasol 0.05 % external solution         Some of these will need a paper prescription and others can be bought over the counter.  Ask your nurse if you have questions.     Bring a paper prescription for each of these medications     phentermine 30 MG capsule                Primary Care Provider Office Phone # Fax #    Bridget Garza [phone] [address]  [facility] [address]      "   Equal Access to Services     [facility] : Hadii aad ku hadbeverlytashi Natalieali, waaxda luqadaha, qaybta kaalmada jannette, pool lopez. So [facility] [place] [facility] [phone].    ATENCIÓN: Si habla español, tiene a valerio disposición servicios gratuitos de asistencia lingüística. Llame al [phone].    We comply with applicable federal civil rights laws and [place] laws. We do not discriminate on the basis of race, color, national origin, age, disability, sex, sexual orientation, or gender identity.            Thank you!     Thank you for choosing [facility]  for your care. Our goal is always to provide you with excellent care. Hearing back from our patients is one way we can continue to improve our services. Please take a few minutes to complete the written survey that you may receive in the mail after your visit with us. Thank you!             Your Updated Medication List - Protect others around you: Learn how to safely use, store and throw away your medicines at www.disposemymeds.org.          This list is accurate as of 10/11/18  2:31 PM.  Always use your most recent med list.                   Brand Name Dispense Instructions for use Diagnosis    aspirin 81 MG chewable tablet     108 tablet    Take 1 tablet (81 mg) by mouth daily    Diabetes mellitus, type 2 (H)       atorvastatin 40 MG tablet    LIPITOR    90 tablet    Take 1 tablet (40 mg) by mouth daily    Hyperlipidemia LDL goal <100       clobetasol 0.05 % external solution    TEMOVATE    50 mL    Apply sparingly to affected area twice daily for 14 days.  Do not apply to face.    Itchy scalp       doxycycline monohydrate 100 MG capsule     20 capsule    Take 1 capsule (100 mg) by mouth 2 times daily    Cat bite of hand, left, initial encounter       DULoxetine 30 MG EC capsule    CYMBALTA    90 capsule    Take 1 capsule (30 mg) by mouth daily Take along with 60 mg capsule    Myofascial pain, Chronic pain associated with  significant psychosocial dysfunction       EPINEPHrine 0.3 MG/0.3ML injection 2-pack    EPIPEN/ADRENACLICK/or ANY BX GENERIC EQUIV    1 mL    Inject 0.3 mLs (0.3 mg) into the muscle once as needed for anaphylaxis    Bee sting allergy       escitalopram 10 MG tablet    LEXAPRO    90 tablet    Take 1 tablet (10 mg) by mouth daily    Anxiety, Severe episode of recurrent major depressive disorder, without psychotic features (H)       furosemide 40 MG tablet    LASIX    30 tablet    Take 1 tablet (40 mg) by mouth daily    Bilateral lower extremity edema       glipiZIDE 2.5 MG 24 hr tablet    glipiZIDE XL    180 tablet    Take 1 tablet (2.5 mg) by mouth 2 times daily    Type 2 diabetes mellitus with diabetic neuropathy, without long-term current use of insulin (H)       hydrOXYzine 25 MG tablet    ATARAX    120 tablet    Take 1-2 tablets (25-50 mg) by mouth 2 times daily as needed for itching    Atopic dermatitis, unspecified type       levothyroxine 25 MCG tablet    SYNTHROID/LEVOTHROID    90 tablet    Take 1 tablet (25 mcg) by mouth daily    Acquired hypothyroidism       losartan 25 MG tablet    COZAAR    45 tablet    Take 0.5 tablets (12.5 mg) by mouth daily    Type 2 diabetes mellitus with diabetic polyneuropathy, without long-term current use of insulin (H)       methocarbamol 500 MG tablet    ROBAXIN    20 tablet    Take 2 tablets (1,000 mg) by mouth 3 times daily as needed for muscle spasms    Cervicalgia       omeprazole 40 MG capsule    priLOSEC    90 capsule    Take 1 capsule (40 mg) by mouth daily Take 30-60 minutes before a meal.    Gastroesophageal reflux disease without esophagitis       ondansetron 4 MG ODT tab    ZOFRAN ODT    20 tablet    Take 1-2 tablets (4-8 mg) by mouth 3 times daily (before meals)    Nausea       * order for DME     1 each    Equipment being ordered: Rollator walker with seat    Morbid obesity due to excess calories (H), Type 2 diabetes mellitus with diabetic neuropathy (H),  Fibromyalgia       * order for DME     1 Units    Equipment being ordered: diabetic shoes  Feet have been examined, no changes    Type 2 diabetes mellitus with diabetic neuropathy, without long-term current use of insulin (H)       oxyCODONE-acetaminophen 5-325 MG per tablet    PERCOCET    20 tablet    Take 1 tablet by mouth every 6 hours as needed for severe pain maximum 6 tablet(s) per day    Left lumbar radiculitis       pantoprazole 40 MG EC tablet    PROTONIX    90 tablet    Take 1 tablet (40 mg) by mouth daily Take 30-60 minutes before a meal.    Gastroesophageal reflux disease without esophagitis       phentermine 30 MG capsule     30 capsule    Take 1 capsule (30 mg) by mouth every morning Can fill 11/2/18    BMI 45.0-49.9, adult (H)       pregabalin 50 MG capsule    LYRICA    90 capsule    Take 1 capsule (50 mg) by mouth 3 times daily    Diabetic polyneuropathy associated with type 2 diabetes mellitus (H)       vitamin D 2000 units tablet     90 tablet    Take 1 tablet by mouth daily    Type 2 diabetes mellitus with diabetic neuropathy, without long-term current use of insulin (H)       zolpidem 10 MG tablet    AMBIEN    30 tablet    Take 1 tablet (10 mg) by mouth nightly as needed for sleep    Other insomnia       * Notice:  This list has 2 medication(s) that are the same as other medications prescribed for you. Read the directions carefully, and ask your doctor or other care provider to review them with you.

## 2018-10-11 NOTE — PROGRESS NOTES
SUBJECTIVE:   Ana Luisa Byers is a 58 year old female who presents to clinic today for the following health issues:      Has been following with [facility] for ongoing skin rash, has appt with them at end of month. Rash not getting better. She notes they don't know what is going on. Notes new bumps in her scalp as well. Very itchy. Notes she scratches overnight but tries her best during the day. Wearing long sleeved shirts now to help reduce her itching overnight.     Would like to resume phentermine. Having trouble losing weight. Exercising 3 days per week. Trying to eat well.     Overdue for DM visit, doesn't keep good track of this.       Health maintenance- up to date      Problem list and histories reviewed & adjusted, as indicated.  Additional history: none    Patient Active Problem List   Diagnosis     Chronic pain associated with significant psychosocial dysfunction     Osteoarthritis     S/P shoulder replacement     Cluster headaches     Fibromyalgia     Plantar fasciitis     Other insomnia     HTN, goal below 140/90     Gastroesophageal reflux disease without esophagitis     Restless legs syndrome (RLS)     CKD (chronic kidney disease) stage 2, GFR 60-89 ml/min     HSV-1 infection     Left lumbar radiculitis     Acquired hypothyroidism     Type 2 diabetes mellitus with diabetic polyneuropathy, without long-term current use of insulin (H)     Bilateral lower extremity edema     Benign essential hypertension     Anxiety     Severe episode of recurrent major depressive disorder, without psychotic features (H)     BMI 45.0-49.9, adult (H)     Past Surgical History:   Procedure Laterality Date     ARTHROPLASTY SHOULDER  7/25/2013    Procedure: ARTHROPLASTY SHOULDER;  RIGHT TOTAL SHOULDER ARTHROPLASTY (TORNIER AFFINITY SHOULDER SYSTEM)^;  Surgeon: Dung Morales MD;  Location: [facility] OR     BACK SURGERY  2011    L45 laminectomy     C APPENDECTOMY  age 19     C LIGATE FALLOPIAN TUBE,POSTPARTUM   "1982    Tubal Ligation     ELBOW WRIST HAND FINGER ORTHOSIS, RIGID, WITHOUT JOINTS, MAY INCLUDE SOFT  5/2007    put in [place]     ESOPHAGOSCOPY, GASTROSCOPY, DUODENOSCOPY (EGD), COMBINED  9/23/2015    Dr. Thomas [facility]     ESOPHAGOSCOPY, GASTROSCOPY, DUODENOSCOPY (EGD), COMBINED N/A 9/23/2015    Procedure: COMBINED ESOPHAGOSCOPY, GASTROSCOPY, DUODENOSCOPY (EGD), BIOPSY SINGLE OR MULTIPLE;  Surgeon: Jose Thomas MD;  Location: [facility]     HCL PAP SMEAR  6/2008    WNL     ORTHOPEDIC SURGERY      left shoulder scoped and plate left elbow     SURGICAL HISTORY OF -   2009    ulnar nerve release, carpal tunnel- left       Social History   Substance Use Topics     Smoking status: Never Smoker     Smokeless tobacco: Never Used     Alcohol use No     Family History   Problem Relation Age of Onset     C.A.D. Mother      Neurologic Disorder Mother      lupus     Depression Mother      Family History Negative Father      Diabetes Maternal Aunt      Colon Cancer No family hx of            Reviewed and updated as needed this visit by clinical staff       Reviewed and updated as needed this visit by Provider         ROS:  Constitutional, HEENT, cardiovascular, pulmonary, gi and gu systems are negative, except as otherwise noted.    OBJECTIVE:     /78 (BP Location: Right arm, Patient Position: Sitting, Cuff Size: Adult Large)  Pulse 83  Temp 98  F (36.7  C) (Oral)  Ht 5' 4.5\" (1.638 m)  Wt 280 lb (127 kg)  LMP 02/13/2009  BMI 47.32 kg/m2  Body mass index is 47.32 kg/(m^2).  GENERAL: healthy, alert and no distress  RESP: lungs clear to auscultation - no rales, rhonchi or wheezes  CV: regular rate and rhythm, normal S1 S2, no S3 or S4, no murmur, click or rub, no peripheral edema and peripheral pulses strong  SKIN: scattered ulcerations on the forearms, appear excoriated, some healing lesions as well, scalp - erythematous papules scattered throughout scalp    Diagnostic Test Results:  none     ASSESSMENT/PLAN:     1. " Itchy scalp - will trial topical steroid to help with scalp itch, working with Dermatology to uncover source of her rash  - clobetasol (TEMOVATE) 0.05 % external solution; Apply sparingly to affected area twice daily for 14 days.  Do not apply to face.  Dispense: 50 mL; Refill: 0    2. Rash and nonspecific skin eruption - unfortunately, I believe her continued manipulation of her rash perpetuates the issue, advised long sleeve shirts, gloves overnight, or keeping the nails trimmed, follow up with dermatology    3. BMI 45.0-49.9, adult (H) - discussed resuming phentermine to help with weight loss, she has previously tolerated well  - phentermine 30 MG capsule; Take 1 capsule (30 mg) by mouth every morning Can fill 11/2/18  Dispense: 30 capsule; Refill: 1      Bridget Garza MD  [facility]

## 2018-10-17 DIAGNOSIS — L20.9 ATOPIC DERMATITIS, UNSPECIFIED TYPE: ICD-10-CM

## 2018-10-18 RX ORDER — HYDROXYZINE HYDROCHLORIDE 25 MG/1
25-50 TABLET, FILM COATED ORAL 2 TIMES DAILY PRN
Qty: 120 TABLET | Refills: 1 | Status: SHIPPED | OUTPATIENT
Start: 2018-10-18 | End: 2018-11-13

## 2018-10-18 NOTE — TELEPHONE ENCOUNTER
"Requested Prescriptions   Pending Prescriptions Disp Refills     hydrOXYzine (ATARAX) 25 MG tablet  Last Written Prescription Date:  07/23/2018  Last Fill Quantity: 120,  # refills: 1   Last office visit: 12/5/2017 with prescribing provider:  10/11/2018   Future Office Visit:   Next 5 appointments (look out 90 days)     Nov 13, 2018  8:40 AM CST   SHORT with Bridget Garza MD   [facility] ([facility]    [address] [facility] [address] [phone]                  120 tablet 1     Sig: Take 1-2 tablets (25-50 mg) by mouth 2 times daily as needed for itching    Antihistamines Protocol Passed    10/17/2018  8:48 PM       Passed - Recent (12 mo) or future (30 days) visit within the authorizing provider's specialty    Patient had office visit in the last 12 months or has a visit in the next 30 days with authorizing provider or within the authorizing provider's specialty.  See \"Patient Info\" tab in inbasket, or \"Choose Columns\" in Meds & Orders section of the refill encounter.             Passed - Patient is age 3 or older    Apply age and/or weight-based dosing for peds patients age 3 and older.    Forward request to provider for patients under the age of 3.            "

## 2018-10-18 NOTE — TELEPHONE ENCOUNTER
Prescription approved per [facility] Refill Protocol.  Michael Jasso RN, BSN

## 2018-10-30 ENCOUNTER — MYC MEDICAL ADVICE (OUTPATIENT)
Dept: FAMILY MEDICINE | Facility: CLINIC | Age: 58
End: 2018-10-30

## 2018-10-30 RX ORDER — PHENTERMINE HYDROCHLORIDE 37.5 MG/1
37.5 CAPSULE ORAL EVERY MORNING
Qty: 30 CAPSULE | Refills: 2 | Status: SHIPPED | OUTPATIENT
Start: 2018-10-30 | End: 2019-03-13

## 2018-10-30 NOTE — TELEPHONE ENCOUNTER
There will not be a big difference between 30 mg and 37.5 mg dosing for phentermine. Nonetheless, I placed new script with higher dose in packers bin to fax. JH

## 2018-10-30 NOTE — TELEPHONE ENCOUNTER
Rx approved, fax to the [facility] will notified patient when prescription is ready to be picked up.   Karen Wilde

## 2018-11-10 DIAGNOSIS — K21.9 GASTROESOPHAGEAL REFLUX DISEASE WITHOUT ESOPHAGITIS: ICD-10-CM

## 2018-11-10 NOTE — TELEPHONE ENCOUNTER
"Requested Prescriptions   Pending Prescriptions Disp Refills     omeprazole (PRILOSEC) 40 MG capsule  Last Written Prescription Date:  12/5/2017  Last Fill Quantity: 90 capsule,  # refills: 3   Last office visit: 10/11/2018 with prescribing provider:  Greg   Future Office Visit:   Next 5 appointments (look out 90 days)     Nov 13, 2018  8:40 AM CST   SHORT with Bridget Garza MD   [facility]    [id number] [place] [address] [phone]                  90 capsule 3     Sig: Take 1 capsule (40 mg) by mouth daily Take 30-60 minutes before a meal.    PPI Protocol Passed    11/10/2018  4:04 PM       Passed - Not on Clopidogrel (unless Pantoprazole ordered)       Passed - No diagnosis of osteoporosis on record       Passed - Recent (12 mo) or future (30 days) visit within the authorizing provider's specialty    Patient had office visit in the last 12 months or has a visit in the next 30 days with authorizing provider or within the authorizing provider's specialty.  See \"Patient Info\" tab in inbasket, or \"Choose Columns\" in Meds & Orders section of the refill encounter.             Passed - Patient is age 18 or older       Passed - No active pregnacy on record       Passed - No positive pregnancy test in past 12 months          "

## 2018-11-12 RX ORDER — OMEPRAZOLE 40 MG/1
40 CAPSULE, DELAYED RELEASE ORAL DAILY
Qty: 90 CAPSULE | Refills: 3 | Status: SHIPPED | OUTPATIENT
Start: 2018-11-12 | End: 2019-07-22

## 2018-11-12 NOTE — TELEPHONE ENCOUNTER
Prescription approved per [facility] Refill Protocol.  Michael Jasso RN, BSN

## 2018-11-13 ENCOUNTER — OFFICE VISIT (OUTPATIENT)
Dept: FAMILY MEDICINE | Facility: CLINIC | Age: 58
End: 2018-11-13
Payer: MEDICARE

## 2018-11-13 VITALS
DIASTOLIC BLOOD PRESSURE: 78 MMHG | SYSTOLIC BLOOD PRESSURE: 120 MMHG | HEIGHT: 65 IN | HEART RATE: 76 BPM | BODY MASS INDEX: 46.65 KG/M2 | WEIGHT: 280 LBS | TEMPERATURE: 97.7 F

## 2018-11-13 DIAGNOSIS — E11.42 TYPE 2 DIABETES MELLITUS WITH DIABETIC POLYNEUROPATHY, WITHOUT LONG-TERM CURRENT USE OF INSULIN (H): Primary | ICD-10-CM

## 2018-11-13 DIAGNOSIS — G89.4 CHRONIC PAIN ASSOCIATED WITH SIGNIFICANT PSYCHOSOCIAL DYSFUNCTION: ICD-10-CM

## 2018-11-13 DIAGNOSIS — E78.2 MIXED HYPERLIPIDEMIA: ICD-10-CM

## 2018-11-13 DIAGNOSIS — M79.18 MYOFASCIAL PAIN: ICD-10-CM

## 2018-11-13 DIAGNOSIS — L20.9 ATOPIC DERMATITIS, UNSPECIFIED TYPE: ICD-10-CM

## 2018-11-13 DIAGNOSIS — I10 BENIGN ESSENTIAL HYPERTENSION: ICD-10-CM

## 2018-11-13 LAB — HBA1C MFR BLD: 6.7 % (ref 0–5.6)

## 2018-11-13 PROCEDURE — 36415 COLL VENOUS BLD VENIPUNCTURE: CPT | Performed by: FAMILY MEDICINE

## 2018-11-13 PROCEDURE — 83036 HEMOGLOBIN GLYCOSYLATED A1C: CPT | Performed by: FAMILY MEDICINE

## 2018-11-13 PROCEDURE — 99214 OFFICE O/P EST MOD 30 MIN: CPT | Performed by: FAMILY MEDICINE

## 2018-11-13 RX ORDER — HYDROXYZINE HYDROCHLORIDE 25 MG/1
25-50 TABLET, FILM COATED ORAL 2 TIMES DAILY PRN
Qty: 120 TABLET | Refills: 1 | Status: SHIPPED | OUTPATIENT
Start: 2018-11-13 | End: 2019-01-09

## 2018-11-13 RX ORDER — DULOXETIN HYDROCHLORIDE 60 MG/1
60 CAPSULE, DELAYED RELEASE ORAL DAILY
Qty: 90 CAPSULE | Refills: 3 | Status: SHIPPED | OUTPATIENT
Start: 2018-11-13 | End: 2020-02-27

## 2018-11-13 RX ORDER — DULOXETIN HYDROCHLORIDE 30 MG/1
30 CAPSULE, DELAYED RELEASE ORAL DAILY
Qty: 90 CAPSULE | Refills: 3 | Status: SHIPPED | OUTPATIENT
Start: 2018-11-13 | End: 2020-06-12 | Stop reason: DRUGHIGH

## 2018-11-13 NOTE — PROGRESS NOTES
SUBJECTIVE:   Ana Luisa Byers is a 58 year old female who presents to clinic today for the following health issues:      Diabetes     Diabetes:     Frequency of checking blood sugars::  2 times a day    Diabetic concerns::  Other    Hypoglycemia symptoms::  None    Paraesthesia present::  YES    Eye Exam in the last year::  NO    Diabetes Management Resources  History of Present Illness     Diabetes:     Frequency of checking blood sugars::  2 times a day    Diabetic concerns::  Other    Hypoglycemia symptoms::  None    Paraesthesia present::  YES    Eye Exam in the last year::  NO    Diabetes Management Resources      Taking glipzide 2.5 mg BID. On ARB and statin. No side effects.   Stopped brining large backs of chocolate into her home, feeling better about this.  Still getting exercise through the pool at her gym.     Continues to have trouble with her skin rash, new lesions still appearing. Has been on a course of abx, topical steroids. Nothing seems to be helping. Following with [facility], hasn't seen them in 3 months but has follow up tomorrow. Would like to address whether she could have an AI condition causing this rash.     Fibromyalgia pain is stable. Taking duloxetine 90 mg in AM, Lyrica 50 mg TID.        Problem list and histories reviewed & adjusted, as indicated.  Additional history: as documented        Patient Active Problem List   Diagnosis     Chronic pain associated with significant psychosocial dysfunction     Osteoarthritis     S/P shoulder replacement     Cluster headaches     Fibromyalgia     Plantar fasciitis     Other insomnia     HTN, goal below 140/90     Gastroesophageal reflux disease without esophagitis     Restless legs syndrome (RLS)     CKD (chronic kidney disease) stage 2, GFR 60-89 ml/min     HSV-1 infection     Left lumbar radiculitis     Acquired hypothyroidism     Type 2 diabetes mellitus with diabetic polyneuropathy, without long-term current use of insulin (H)  "    Bilateral lower extremity edema     Benign essential hypertension     Anxiety     Severe episode of recurrent major depressive disorder, without psychotic features (H)     BMI 45.0-49.9, adult (H)     Past Surgical History:   Procedure Laterality Date     ARTHROPLASTY SHOULDER  7/25/2013    Procedure: ARTHROPLASTY SHOULDER;  RIGHT TOTAL SHOULDER ARTHROPLASTY (TORNIER AFFINITY SHOULDER SYSTEM)^;  Surgeon: Dung Morales MD;  Location: [facility] OR     BACK SURGERY  2011    L45 laminectomy     C APPENDECTOMY  age 19     C LIGATE FALLOPIAN TUBE,POSTPARTUM  1982    Tubal Ligation     ELBOW WRIST HAND FINGER ORTHOSIS, RIGID, WITHOUT JOINTS, MAY INCLUDE SOFT  5/2007    put in [place]     ESOPHAGOSCOPY, GASTROSCOPY, DUODENOSCOPY (EGD), COMBINED  9/23/2015    [facility]     ESOPHAGOSCOPY, GASTROSCOPY, DUODENOSCOPY (EGD), COMBINED N/A 9/23/2015    Procedure: COMBINED ESOPHAGOSCOPY, GASTROSCOPY, DUODENOSCOPY (EGD), BIOPSY SINGLE OR MULTIPLE;  Surgeon: Jose Thomas MD;  Location: [facility]     HCL PAP SMEAR  6/2008    WNL     ORTHOPEDIC SURGERY      left shoulder scoped and plate left elbow     SURGICAL HISTORY OF -   2009    ulnar nerve release, carpal tunnel- left       Social History   Substance Use Topics     Smoking status: Never Smoker     Smokeless tobacco: Never Used     Alcohol use No     Family History   Problem Relation Age of Onset     C.A.D. Mother      Neurologic Disorder Mother      lupus     Depression Mother      Family History Negative Father      Diabetes Maternal Aunt      Colon Cancer No family hx of            ROS:  Constitutional, HEENT, cardiovascular, pulmonary, gi and gu systems are negative, except as otherwise noted.    OBJECTIVE:     /78  Pulse 76  Temp 97.7  F (36.5  C) (Oral)  Ht 5' 4.5\" (1.638 m)  Wt 280 lb (127 kg)  LMP 02/13/2009  Breastfeeding? No  BMI 47.32 kg/m2  Body mass index is 47.32 kg/(m^2).  GENERAL: healthy, alert and no distress  RESP: lungs clear to " auscultation - no rales, rhonchi or wheezes  CV: regular rate and rhythm, normal S1 S2, no S3 or S4, no murmur, click or rub, no peripheral edema and peripheral pulses strong  SKIN: several healing and new circular lesions on the forearms and hands, erythematous, flat  PSYCH: mentation appears normal, affect normal/bright    Diagnostic Test Results:  Results for orders placed or performed in visit on 11/13/18 (from the past 24 hour(s))   Hemoglobin A1c   Result Value Ref Range    Hemoglobin A1C 6.7 (H) 0 - 5.6 %     *Note: Due to a large number of results and/or encounters for the requested time period, some results have not been displayed. A complete set of results can be found in Results Review.     Lab Results   Component Value Date    A1C 6.7 11/13/2018    A1C 6.7 05/01/2018    A1C 6.3 10/20/2017    A1C 6.4 04/11/2017    A1C 5.9 09/09/2016       ASSESSMENT/PLAN:     1. Type 2 diabetes mellitus with diabetic polyneuropathy, without long-term current use of insulin (H) - stable control, continue current  - Hemoglobin A1c    2. Benign essential hypertension - in range on recheck, continue current    3. Mixed hyperlipidemia - statin, continue current    4. BMI 45.0-49.9, adult (H) - stable weight, encouraged continue with exercise and diet    5. Myofascial pain - stable, continue current  - DULoxetine (CYMBALTA) 30 MG EC capsule; Take 1 capsule (30 mg) by mouth daily Take along with 60 mg capsule  Dispense: 90 capsule; Refill: 3  - DULoxetine (CYMBALTA) 60 MG EC capsule; Take 1 capsule (60 mg) by mouth daily  Dispense: 90 capsule; Refill: 3    6. Chronic pain associated with significant psychosocial dysfunction - as above  - DULoxetine (CYMBALTA) 30 MG EC capsule; Take 1 capsule (30 mg) by mouth daily Take along with 60 mg capsule  Dispense: 90 capsule; Refill: 3  - DULoxetine (CYMBALTA) 60 MG EC capsule; Take 1 capsule (60 mg) by mouth daily  Dispense: 90 capsule; Refill: 3    7. Atopic dermatitis, unspecified type  - stable, refills  - hydrOXYzine (ATARAX) 25 MG tablet; Take 1-2 tablets (25-50 mg) by mouth 2 times daily as needed for itching  Dispense: 120 tablet; Refill: 1    8. Skin rash - following with Dermatology for this, suggested discussing further testing      Bridget Garza MD  [facility]

## 2018-11-13 NOTE — MR AVS SNAPSHOT
After Visit Summary   11/13/2018    Ana Luisa Byers    MRN: [id number]           Patient Information     Date Of Birth          1960        Visit Information        Provider Department      11/13/2018 8:40 AM Bridget Garza MD [facility]        Today's Diagnoses     Type 2 diabetes mellitus with diabetic polyneuropathy, without long-term current use of insulin (H)    -  1    Benign essential hypertension        Mixed hyperlipidemia        BMI 45.0-49.9, adult (H)        Myofascial pain        Chronic pain associated with significant psychosocial dysfunction        Atopic dermatitis, unspecified type           Follow-ups after your visit        Follow-up notes from your care team     Return in about 3 months (around 2/13/2019) for Diabetes Visit.      Who to contact     If you have questions or need follow up information about today's clinic visit or your schedule please contact [facility] directly at [phone].  Normal or non-critical lab and imaging results will be communicated to you by Pure life renalhart, letter or phone within 4 business days after the clinic has received the results. If you do not hear from us within 7 days, please contact the clinic through Pure life renalhart or phone. If you have a critical or abnormal lab result, we will notify you by phone as soon as possible.  Submit refill requests through pickrset or call your pharmacy and they will forward the refill request to us. Please allow 3 business days for your refill to be completed.          Additional Information About Your Visit        MyChart Information     pickrset gives you secure access to your electronic health record. If you see a primary care provider, you can also send messages to your care team and make appointments. If you have questions, please call your primary care clinic.  If you do not have a primary care provider, please call [phone] and they will assist you.        Care EveryWhere  "ID     This is your Care EveryWhere ID. This could be used by other organizations to access your [facility] medical records  [id number]        Your Vitals Were     Pulse Temperature Height Last Period Breastfeeding? BMI (Body Mass Index)    76 97.7  F (36.5  C) (Oral) 5' 4.5\" (1.638 m) 02/13/2009 No 47.32 kg/m2       Blood Pressure from Last 3 Encounters:   11/13/18 120/78   10/11/18 128/78   10/05/18 130/78    Weight from Last 3 Encounters:   11/13/18 280 lb (127 kg)   10/11/18 280 lb (127 kg)   10/05/18 278 lb (126.1 kg)              We Performed the Following     Hemoglobin A1c          Today's Medication Changes          These changes are accurate as of 11/13/18 10:41 AM.  If you have any questions, ask your nurse or doctor.               These medicines have changed or have updated prescriptions.        Dose/Directions    * DULoxetine 30 MG EC capsule   Commonly known as:  CYMBALTA   This may have changed:  Another medication with the same name was added. Make sure you understand how and when to take each.   Used for:  Myofascial pain, Chronic pain associated with significant psychosocial dysfunction   Changed by:  Bridget Garza MD        Dose:  30 mg   Take 1 capsule (30 mg) by mouth daily Take along with 60 mg capsule   Quantity:  90 capsule   Refills:  3       * DULoxetine 60 MG EC capsule   Commonly known as:  CYMBALTA   This may have changed:  You were already taking a medication with the same name, and this prescription was added. Make sure you understand how and when to take each.   Used for:  Myofascial pain, Chronic pain associated with significant psychosocial dysfunction   Changed by:  Bridget Garza MD        Dose:  60 mg   Take 1 capsule (60 mg) by mouth daily   Quantity:  90 capsule   Refills:  3       * Notice:  This list has 2 medication(s) that are the same as other medications prescribed for you. Read the directions carefully, and ask your doctor or other care provider to review " them with you.      Stop taking these medicines if you haven't already. Please contact your care team if you have questions.     clobetasol 0.05 % external solution   Commonly known as:  TEMOVATE   Stopped by:  Bridget Garza MD           ondansetron 4 MG ODT tab   Commonly known as:  ZOFRAN ODT   Stopped by:  Bridget Garza MD           oxyCODONE-acetaminophen 5-325 MG per tablet   Commonly known as:  PERCOCET   Stopped by:  Bridget Garza MD                Where to get your medicines      These medications were sent to [facility] [address] [facility] [address] [address] [address], [facility] [address]     Phone:  [phone]     DULoxetine 30 MG EC capsule    DULoxetine 60 MG EC capsule    hydrOXYzine 25 MG tablet                Primary Care Provider Office Phone # Fax #    Bridget Garza [phone] [address]  [facility] [address]        Equal Access to Services     [facility]: Hadii aad ku hadasho Soomaali, waaxda luqadaha, qaybta kaalmada adeegyada, waxay rajendra hayalban hernandez . So [facility] [phone].    ATENCIÓN: Si habla español, tiene a valerio disposición servicios gratuitos de asistencia lingüística. [facility] [phone].    We comply with applicable federal civil rights laws and [place] laws. We do not discriminate on the basis of race, color, national origin, age, disability, sex, sexual orientation, or gender identity.            Thank you!     Thank you for choosing [facility]  for your care. Our goal is always to provide you with excellent care. Hearing back from our patients is one way we can continue to improve our services. Please take a few minutes to complete the written survey that you may receive in the mail after your visit with us. Thank you!             Your Updated Medication List - Protect others around you: Learn how to safely use, store and throw away your  medicines at www.disposemymeds.org.          This list is accurate as of 11/13/18 10:41 AM.  Always use your most recent med list.                   Brand Name Dispense Instructions for use Diagnosis    aspirin 81 MG chewable tablet     108 tablet    Take 1 tablet (81 mg) by mouth daily    Diabetes mellitus, type 2 (H)       atorvastatin 40 MG tablet    LIPITOR    90 tablet    Take 1 tablet (40 mg) by mouth daily    Hyperlipidemia LDL goal <100       doxycycline monohydrate 100 MG capsule     20 capsule    Take 1 capsule (100 mg) by mouth 2 times daily    Cat bite of hand, left, initial encounter       * DULoxetine 30 MG EC capsule    CYMBALTA    90 capsule    Take 1 capsule (30 mg) by mouth daily Take along with 60 mg capsule    Myofascial pain, Chronic pain associated with significant psychosocial dysfunction       * DULoxetine 60 MG EC capsule    CYMBALTA    90 capsule    Take 1 capsule (60 mg) by mouth daily    Myofascial pain, Chronic pain associated with significant psychosocial dysfunction       EPINEPHrine 0.3 MG/0.3ML injection 2-pack    EPIPEN/ADRENACLICK/or ANY BX GENERIC EQUIV    1 mL    Inject 0.3 mLs (0.3 mg) into the muscle once as needed for anaphylaxis    Bee sting allergy       escitalopram 10 MG tablet    LEXAPRO    90 tablet    Take 1 tablet (10 mg) by mouth daily    Anxiety, Severe episode of recurrent major depressive disorder, without psychotic features (H)       furosemide 40 MG tablet    LASIX    30 tablet    Take 1 tablet (40 mg) by mouth daily    Bilateral lower extremity edema       glipiZIDE 2.5 MG 24 hr tablet    glipiZIDE XL    180 tablet    Take 1 tablet (2.5 mg) by mouth 2 times daily    Type 2 diabetes mellitus with diabetic neuropathy, without long-term current use of insulin (H)       hydrOXYzine 25 MG tablet    ATARAX    120 tablet    Take 1-2 tablets (25-50 mg) by mouth 2 times daily as needed for itching    Atopic dermatitis, unspecified type       levothyroxine 25 MCG tablet     SYNTHROID/LEVOTHROID    90 tablet    Take 1 tablet (25 mcg) by mouth daily    Acquired hypothyroidism       losartan 25 MG tablet    COZAAR    45 tablet    Take 0.5 tablets (12.5 mg) by mouth daily    Type 2 diabetes mellitus with diabetic polyneuropathy, without long-term current use of insulin (H)       methocarbamol 500 MG tablet    ROBAXIN    20 tablet    Take 2 tablets (1,000 mg) by mouth 3 times daily as needed for muscle spasms    Cervicalgia       omeprazole 40 MG capsule    priLOSEC    90 capsule    Take 1 capsule (40 mg) by mouth daily Take 30-60 minutes before a meal.    Gastroesophageal reflux disease without esophagitis       * order for DME     1 each    Equipment being ordered: Rollator walker with seat    Morbid obesity due to excess calories (H), Type 2 diabetes mellitus with diabetic neuropathy (H), Fibromyalgia       * order for DME     1 Units    Equipment being ordered: diabetic shoes  Feet have been examined, no changes    Type 2 diabetes mellitus with diabetic neuropathy, without long-term current use of insulin (H)       pantoprazole 40 MG EC tablet    PROTONIX    90 tablet    Take 1 tablet (40 mg) by mouth daily Take 30-60 minutes before a meal.    Gastroesophageal reflux disease without esophagitis       phentermine 37.5 MG capsule     30 capsule    Take 1 capsule (37.5 mg) by mouth every morning    BMI 45.0-49.9, adult (H)       pregabalin 50 MG capsule    LYRICA    90 capsule    Take 1 capsule (50 mg) by mouth 3 times daily    Diabetic polyneuropathy associated with type 2 diabetes mellitus (H)       vitamin D 2000 units tablet     90 tablet    Take 1 tablet by mouth daily    Type 2 diabetes mellitus with diabetic neuropathy, without long-term current use of insulin (H)       zolpidem 10 MG tablet    AMBIEN    30 tablet    Take 1 tablet (10 mg) by mouth nightly as needed for sleep    Other insomnia       * Notice:  This list has 4 medication(s) that are the same as other medications  prescribed for you. Read the directions carefully, and ask your doctor or other care provider to review them with you.

## 2018-11-26 DIAGNOSIS — E11.40 TYPE 2 DIABETES MELLITUS WITH DIABETIC NEUROPATHY, WITHOUT LONG-TERM CURRENT USE OF INSULIN (H): ICD-10-CM

## 2018-11-26 DIAGNOSIS — R60.0 BILATERAL LOWER EXTREMITY EDEMA: ICD-10-CM

## 2018-11-27 RX ORDER — FUROSEMIDE 40 MG
40 TABLET ORAL DAILY
Qty: 30 TABLET | Refills: 3 | Status: SHIPPED | OUTPATIENT
Start: 2018-11-27 | End: 2019-03-27

## 2018-11-27 RX ORDER — GLIPIZIDE 2.5 MG/1
2.5 TABLET, EXTENDED RELEASE ORAL 2 TIMES DAILY
Qty: 180 TABLET | Refills: 0 | Status: SHIPPED | OUTPATIENT
Start: 2018-11-27 | End: 2019-03-07

## 2018-11-27 NOTE — TELEPHONE ENCOUNTER
"Requested Prescriptions   Pending Prescriptions Disp Refills     furosemide (LASIX) 40 MG tablet  Last Written Prescription Date:  08/02/2018  Last Fill Quantity: 30,  # refills: 3  Last office visit: 12/5/2017 with prescribing provider:  11/13/2018   Future Office Visit:      3     Sig: Take 1 tablet (40 mg) by mouth daily    Diuretics (Including Combos) Protocol Passed    11/26/2018  8:54 PM       Passed - Blood pressure under 140/90 in past 12 months    BP Readings from Last 3 Encounters:   11/13/18 120/78   10/11/18 128/78   10/05/18 130/78                Passed - Recent (12 mo) or future (30 days) visit within the authorizing provider's specialty    Patient had office visit in the last 12 months or has a visit in the next 30 days with authorizing provider or within the authorizing provider's specialty.  See \"Patient Info\" tab in inbasket, or \"Choose Columns\" in Meds & Orders section of the refill encounter.             Passed - Patient is age 18 or older       Passed - No active pregancy on record       Passed - Normal serum creatinine on file in past 12 months    Recent Labs   Lab Test  08/22/18   0824   CR  1.04             Passed - Normal serum potassium on file in past 12 months    Recent Labs   Lab Test  08/22/18   0824   POTASSIUM  4.1                   Passed - Normal serum sodium on file in past 12 months    Recent Labs   Lab Test  08/22/18   0824   NA  140             Passed - No positive pregnancy test in past 12 months        glipiZIDE (GLIPIZIDE XL) 2.5 MG 24 hr tablet  Last Written Prescription Date:  07/23/2018  Last Fill Quantity: 180,  # refills: 0   Last office visit: 12/5/2017 with prescribing provider:  07/23/2018   Future Office Visit:     180 tablet 0     Sig: Take 1 tablet (2.5 mg) by mouth 2 times daily    Sulfonylurea Agents Passed    11/26/2018  8:54 PM       Passed - Blood pressure less than 140/90 in past 6 months    BP Readings from Last 3 Encounters:   11/13/18 120/78   10/11/18 " "128/78   10/05/18 130/78                Passed - Patient has documented LDL within the past 12 mos.    Recent Labs   Lab Test  08/22/18   0824   LDL  116*            Passed - Patient has had a Microalbumin in the past 12 mos.    Recent Labs   Lab Test  05/01/18   0913   MICROL  52   UMALCR  100.19*            Passed - Patient has documented A1c within the specified period of time.    If HgbA1C is 8 or greater, it needs to be on file within the past 3 months.  If less than 8, must be on file within the past 6 months.     Recent Labs   Lab Test  11/13/18   0837   A1C  6.7*            Passed - Patient is age 18 or older       Passed - No active pregnancy on record       Passed - Patient has a recent creatinine (normal) within the past 12 mos.    Recent Labs   Lab Test  08/22/18   0824   09/02/15   1102   CR  1.04   < >   --    CREAT   --    --   1.0    < > = values in this interval not displayed.            Passed - Patient has not had a positive pregnancy test within the past 12 mos.       Passed - Recent (6 mo) or future (30 days) visit within the authorizing provider's specialty    Patient had office visit in the last 6 months or has a visit in the next 30 days with authorizing provider or within the authorizing provider's specialty.  See \"Patient Info\" tab in inbasket, or \"Choose Columns\" in Meds & Orders section of the refill encounter.              "

## 2018-11-27 NOTE — TELEPHONE ENCOUNTER
Prescription approved per [facility] Refill Protocol.  Instructions        Return in about 3 months (around 2/13/2019) for Diabetes Visit.     Michael Jasso RN, BSN

## 2018-12-14 ENCOUNTER — TELEPHONE (OUTPATIENT)
Dept: FAMILY MEDICINE | Facility: CLINIC | Age: 58
End: 2018-12-14

## 2018-12-14 ASSESSMENT — PATIENT HEALTH QUESTIONNAIRE - PHQ9: SUM OF ALL RESPONSES TO PHQ QUESTIONS 1-9: 15

## 2018-12-14 NOTE — TELEPHONE ENCOUNTER
Panel Management Review      Patient has the following on her problem list:   Depression / Dysthymia review    Measure:  Needs PHQ-9 score of 4 or less during index window.  Administer PHQ-9 and if score is 5 or more, send encounter to provider for next steps.    5 - 7 month window range: yes    PHQ-9 SCORE 1/16/2018 6/21/2018 9/5/2018   PHQ-9 Total Score - - -   PHQ-9 Total Score MyChart - - 21 (Severe depression)   PHQ-9 Total Score 16 24 21       If PHQ-9 recheck is 5 or more, route to provider for next steps.    Patient is due for:  PHQ9      Composite cancer screening  Chart review shows that this patient is due/due soon for the following None  Summary:    Patient is due/failing the following:   PHQ9    Action needed:   Patient needs to do PHQ9.    Type of outreach:    lmom for pt to call back. needs to do phq 9 and jeremi    Questions for provider review:    None                                                                                                                                    Gabino Cho [facility]           Chart routed to Care Team .

## 2018-12-17 DIAGNOSIS — M54.2 CERVICALGIA: ICD-10-CM

## 2018-12-17 NOTE — TELEPHONE ENCOUNTER
methocarbamol (ROBAXIN) 500 MG tablet      Last Written Prescription Date:  07/10/2018  Last Fill Quantity: 20 tablet,   # refills: 3  Last Office Visit: 11/13/2018  Future Office visit:       Routing refill request to provider for review/approval because:  Drug not on the [facility], [facility] or [facility] refill protocol or controlled substance      Vidal RICHEY

## 2018-12-18 RX ORDER — METHOCARBAMOL 500 MG/1
1000 TABLET, FILM COATED ORAL 3 TIMES DAILY PRN
Qty: 20 TABLET | Refills: 3 | Status: SHIPPED | OUTPATIENT
Start: 2018-12-18 | End: 2019-05-23

## 2019-01-09 ENCOUNTER — TRANSFERRED RECORDS (OUTPATIENT)
Dept: HEALTH INFORMATION MANAGEMENT | Facility: CLINIC | Age: 59
End: 2019-01-09

## 2019-01-14 ENCOUNTER — TRANSFERRED RECORDS (OUTPATIENT)
Dept: HEALTH INFORMATION MANAGEMENT | Facility: CLINIC | Age: 59
End: 2019-01-14

## 2019-01-14 LAB — RETINOPATHY: NEGATIVE

## 2019-01-24 ENCOUNTER — OFFICE VISIT (OUTPATIENT)
Dept: FAMILY MEDICINE | Facility: CLINIC | Age: 59
End: 2019-01-24
Payer: MEDICARE

## 2019-01-24 VITALS
HEART RATE: 62 BPM | TEMPERATURE: 98.6 F | HEIGHT: 65 IN | SYSTOLIC BLOOD PRESSURE: 132 MMHG | BODY MASS INDEX: 47.15 KG/M2 | WEIGHT: 283 LBS | DIASTOLIC BLOOD PRESSURE: 78 MMHG

## 2019-01-24 DIAGNOSIS — L21.9 SEBORRHEIC DERMATITIS: Primary | ICD-10-CM

## 2019-01-24 DIAGNOSIS — B96.89 ACUTE BACTERIAL RHINOSINUSITIS: ICD-10-CM

## 2019-01-24 DIAGNOSIS — B37.0 THRUSH: ICD-10-CM

## 2019-01-24 DIAGNOSIS — J01.90 ACUTE BACTERIAL RHINOSINUSITIS: ICD-10-CM

## 2019-01-24 PROCEDURE — 99214 OFFICE O/P EST MOD 30 MIN: CPT | Performed by: FAMILY MEDICINE

## 2019-01-24 RX ORDER — CLOBETASOL PROPIONATE 0.05 G/100ML
SHAMPOO TOPICAL
Qty: 118 ML | Refills: 0 | Status: SHIPPED | OUTPATIENT
Start: 2019-01-25 | End: 2019-07-22

## 2019-01-24 RX ORDER — KETOCONAZOLE 20 MG/ML
SHAMPOO TOPICAL DAILY PRN
Qty: 120 ML | Refills: 1 | Status: SHIPPED | OUTPATIENT
Start: 2019-01-24 | End: 2019-07-22

## 2019-01-24 RX ORDER — NYSTATIN 100000/ML
500000 SUSPENSION, ORAL (FINAL DOSE FORM) ORAL 4 TIMES DAILY
Qty: 200 ML | Refills: 0 | Status: SHIPPED | OUTPATIENT
Start: 2019-01-24 | End: 2019-03-13

## 2019-01-24 RX ORDER — DOXYCYCLINE 100 MG/1
100 CAPSULE ORAL 2 TIMES DAILY
Qty: 20 CAPSULE | Refills: 0 | Status: SHIPPED | OUTPATIENT
Start: 2019-01-24 | End: 2019-03-13

## 2019-01-24 ASSESSMENT — MIFFLIN-ST. JEOR: SCORE: 1856.62

## 2019-01-24 NOTE — PROGRESS NOTES
"  SUBJECTIVE:   Ana Luisa Byers is a 58 year old female who presents to clinic today for the following health issues:    1. Sores on the scalp - itchy and painful. Consulted with Derm on this. Was told to try Head and Shoulders, this has not helped. Has not tried any other topicals.   Recheck - scalp sores/issue    2. Consulted with Dermatology, was told she has an autoimmune condition causing her skin lesions, she does not recall the name. Meeting with Derm again to discuss treatment options. Overall, they seem to be improving.     3. Sinus pressure x 2 weeks - causing HA, facial pain. No fevers or upper jaw pain. No ear pressure. Does have nasal congestion. Prone to ABRS.     4. Possible thrush - has had this in the past, tongue feels \"spongy\" and sore. No pain with swallowing.         Problem list and histories reviewed & adjusted, as indicated.  Additional history: none    Patient Active Problem List   Diagnosis     Mixed hyperlipidemia     Chronic pain associated with significant psychosocial dysfunction     Osteoarthritis     S/P shoulder replacement     Cluster headaches     Fibromyalgia     Plantar fasciitis     Other insomnia     HTN, goal below 140/90     Gastroesophageal reflux disease without esophagitis     Restless legs syndrome (RLS)     CKD (chronic kidney disease) stage 2, GFR 60-89 ml/min     HSV-1 infection     Left lumbar radiculitis     Acquired hypothyroidism     Type 2 diabetes mellitus with diabetic polyneuropathy, without long-term current use of insulin (H)     Bilateral lower extremity edema     Benign essential hypertension     Anxiety     Severe episode of recurrent major depressive disorder, without psychotic features (H)     BMI 45.0-49.9, adult (H)     Past Surgical History:   Procedure Laterality Date     ARTHROPLASTY SHOULDER  7/25/2013    Procedure: ARTHROPLASTY SHOULDER;  RIGHT TOTAL SHOULDER ARTHROPLASTY (TORNIER AFFINITY SHOULDER SYSTEM)^;  Surgeon: Dung Morales MD;  " "Location: [facility] OR     BACK SURGERY  2011    L45 laminectomy     C APPENDECTOMY  age 19     C LIGATE FALLOPIAN TUBE,POSTPARTUM  1982    Tubal Ligation     ELBOW WRIST HAND FINGER ORTHOSIS, RIGID, WITHOUT JOINTS, MAY INCLUDE SOFT  5/2007    put in [place]     ESOPHAGOSCOPY, GASTROSCOPY, DUODENOSCOPY (EGD), COMBINED  9/23/2015    Dr. Thomas [facility]     ESOPHAGOSCOPY, GASTROSCOPY, DUODENOSCOPY (EGD), COMBINED N/A 9/23/2015    Procedure: COMBINED ESOPHAGOSCOPY, GASTROSCOPY, DUODENOSCOPY (EGD), BIOPSY SINGLE OR MULTIPLE;  Surgeon: Jose Thomas MD;  Location: [facility]     HCL PAP SMEAR  6/2008    WNL     ORTHOPEDIC SURGERY      left shoulder scoped and plate left elbow     SURGICAL HISTORY OF -   2009    ulnar nerve release, carpal tunnel- left       Social History     Tobacco Use     Smoking status: Never Smoker     Smokeless tobacco: Never Used   Substance Use Topics     Alcohol use: No     Family History   Problem Relation Age of Onset     C.A.D. Mother      Neurologic Disorder Mother         lupus     Depression Mother      Family History Negative Father      Diabetes Maternal Aunt      Colon Cancer No family hx of            Reviewed and updated as needed this visit by clinical staff       Reviewed and updated as needed this visit by Provider         ROS:  Constitutional, HEENT, cardiovascular, pulmonary, gi and gu systems are negative, except as otherwise noted.    OBJECTIVE:     /78 (BP Location: Right arm, Patient Position: Chair, Cuff Size: Adult Large)   Pulse 62   Temp 98.6  F (37  C) (Oral)   Ht 1.638 m (5' 4.5\")   Wt 128.4 kg (283 lb)   LMP 02/13/2009   BMI 47.83 kg/m    Body mass index is 47.83 kg/m .  GENERAL: healthy, alert and no distress  EYES: Eyes grossly normal to inspection, PERRL and conjunctivae and sclerae normal  HENT: right maxillary sinus pressure to percussion, ear canals and TM's normal, thin white plaques on the buccal mucosa, tongue is erythematous and indurated  NECK: no " adenopathy  RESP: lungs clear to auscultation - no rales, rhonchi or wheezes  CV: regular rate and rhythm, normal S1 S2, no S3 or S4, no murmur, click or rub, no peripheral edema and peripheral pulses strong  SKIN: scalp with scattered 3 mm papules, no scaling    Diagnostic Test Results:  none     ASSESSMENT/PLAN:     1. Seborrheic dermatitis - will try rx strength medications to help with her condition, advised to revisit with dermatology if not improved after our treatment  - ketoconazole (NIZORAL) 2 % external shampoo; Apply topically daily as needed for itching or irritation  Dispense: 120 mL; Refill: 1  - clobetasol propionate (CLOBEX) 0.05 % external shampoo; Apply topically three times a week  Dispense: 118 mL; Refill: 0    2. Acute bacterial rhinosinusitis - will treat given length of symptoms and hx of similar  - doxycycline monohydrate (MONODOX) 100 MG capsule; Take 1 capsule (100 mg) by mouth 2 times daily for 10 days  Dispense: 20 capsule; Refill: 0    3. Thrush - will treat for presumed thrush, no scraping taken today  - nystatin (MYCOSTATIN) 909488 UNIT/ML suspension; Take 5 mLs (500,000 Units) by mouth 4 times daily for 10 days  Dispense: 200 mL; Refill: 0    Bridget Garza MD  [facility]

## 2019-01-28 ENCOUNTER — TELEPHONE (OUTPATIENT)
Dept: FAMILY MEDICINE | Facility: CLINIC | Age: 59
End: 2019-01-28

## 2019-01-28 NOTE — TELEPHONE ENCOUNTER
Prior Authorization Specialty Medication Request    Medication/Dose: clobetasol propionate (CLOBEX) 0.05 % external shampoo  ICD code (if different than what is on RX):    Previously Tried and Failed:   Important Lab Values:   Rationale: Patient has an autoimmune condition causing her skin lesions    Insurance Name: [facility]  Insurance ID: [id number]  Insurance Phone Number: [phone]    Pharmacy Information (if different than what is on RX)  Name:    Phone:      Karen Wilde

## 2019-01-31 NOTE — TELEPHONE ENCOUNTER
Central Prior Authorization Team   Phone: [phone]      PA Initiation    Medication: clobetasol propionate (CLOBEX) 0.05 % external shampoo  Insurance Company: [employer] - Phone [phone] Fax [phone]  Pharmacy Filling the Rx: [facility] [address] [place] [address] [address] [facility] [address]  Filling Pharmacy Phone: [phone]  Filling Pharmacy Fax: [phone]  Start Date: 1/31/2019

## 2019-01-31 NOTE — TELEPHONE ENCOUNTER
Prior Authorization Approval    Authorization Effective Date: 1/1/2019  Authorization Expiration Date: 1/31/2020  Medication: clobetasol propionate (CLOBEX) 0.05 % external shampoo  Approved Dose/Quantity:    Reference #:     Insurance Company: [employer] - Phone [phone] Fax [phone]  Expected CoPay:       CoPay Card Available:      Foundation Assistance Needed:    Which Pharmacy is filling the prescription (Not needed for infusion/clinic administered): [facility] [address] [place] [address] [facility] [address]  Pharmacy Notified: Yes  Patient Notified: Yes

## 2019-02-02 DIAGNOSIS — G47.09 OTHER INSOMNIA: ICD-10-CM

## 2019-02-02 NOTE — TELEPHONE ENCOUNTER
Requested Prescriptions   Pending Prescriptions Disp Refills     zolpidem (AMBIEN) 10 MG tablet  Last Written Prescription Date:  7/3/2018  Last Fill Quantity: 30 tablet,  # refills: 5   Last Office Visit: 1/24/2019   Future Office Visit:      30 tablet 5     Sig: Take 1 tablet (10 mg) by mouth nightly as needed for sleep    There is no refill protocol information for this order

## 2019-02-03 ENCOUNTER — MYC REFILL (OUTPATIENT)
Dept: GENERAL RADIOLOGY | Facility: CLINIC | Age: 59
End: 2019-02-03

## 2019-02-03 DIAGNOSIS — G47.09 OTHER INSOMNIA: ICD-10-CM

## 2019-02-03 RX ORDER — ZOLPIDEM TARTRATE 10 MG/1
10 TABLET ORAL
Qty: 30 TABLET | Refills: 5 | Status: CANCELLED | OUTPATIENT
Start: 2019-02-03

## 2019-02-04 NOTE — TELEPHONE ENCOUNTER
RX [place] ([place]) reviewed:  reviewed- no concerns    [place] profile:  [email or web address]

## 2019-02-05 RX ORDER — ZOLPIDEM TARTRATE 10 MG/1
10 TABLET ORAL
Qty: 30 TABLET | Refills: 5 | Status: SHIPPED | OUTPATIENT
Start: 2019-02-05 | End: 2019-07-26

## 2019-02-15 DIAGNOSIS — L20.9 ATOPIC DERMATITIS, UNSPECIFIED TYPE: ICD-10-CM

## 2019-02-15 RX ORDER — HYDROXYZINE HYDROCHLORIDE 25 MG/1
25-50 TABLET, FILM COATED ORAL 2 TIMES DAILY PRN
Qty: 120 TABLET | Refills: 0 | Status: SHIPPED | OUTPATIENT
Start: 2019-02-15 | End: 2019-07-02

## 2019-02-15 NOTE — TELEPHONE ENCOUNTER
"Requested Prescriptions   Pending Prescriptions Disp Refills     hydrOXYzine (ATARAX) 25 MG tablet 120 tablet 0    Last Written Prescription Date:  01/09/2019  Last Fill Quantity: 120 tablet,  # refills: 0   Last office visit: 1/24/2019 with prescribing provider:  01/24/2019   Future Office Visit:     Sig: Take 1-2 tablets (25-50 mg) by mouth 2 times daily as needed for itching    Antihistamines Protocol Passed - 2/15/2019  7:02 AM       Passed - Recent (12 mo) or future (30 days) visit within the authorizing provider's specialty    Patient had office visit in the last 12 months or has a visit in the next 30 days with authorizing provider or within the authorizing provider's specialty.  See \"Patient Info\" tab in inbasket, or \"Choose Columns\" in Meds & Orders section of the refill encounter.             Passed - Patient is age 3 or older    Apply age and/or weight-based dosing for peds patients age 3 and older.    Forward request to provider for patients under the age of 3.         Passed - Medication is active on med list        Vidal GARCIAT  "

## 2019-02-15 NOTE — TELEPHONE ENCOUNTER
Prescription approved per [facility] Refill Protocol.    Valerie Parada RN -- [facility]

## 2019-03-07 DIAGNOSIS — E11.40 TYPE 2 DIABETES MELLITUS WITH DIABETIC NEUROPATHY, WITHOUT LONG-TERM CURRENT USE OF INSULIN (H): ICD-10-CM

## 2019-03-07 RX ORDER — GLIPIZIDE 2.5 MG/1
2.5 TABLET, EXTENDED RELEASE ORAL 2 TIMES DAILY
Qty: 60 TABLET | Refills: 0 | Status: SHIPPED | OUTPATIENT
Start: 2019-03-07 | End: 2019-03-27

## 2019-03-07 NOTE — TELEPHONE ENCOUNTER
"Requested Prescriptions   Pending Prescriptions Disp Refills     glipiZIDE (GLIPIZIDE XL) 2.5 MG 24 hr tablet 180 tablet 0    Last Written Prescription Date:  11/27/2018  Last Fill Quantity: 180 TABLET,  # refills: 0   Last office visit: 1/24/2019 with prescribing provider:  01/24/2019   Future Office Visit:     Sig: Take 1 tablet (2.5 mg) by mouth 2 times daily    Sulfonylurea Agents Passed - 3/7/2019  2:06 PM       Passed - Blood pressure less than 140/90 in past 6 months    BP Readings from Last 3 Encounters:   01/24/19 132/78   11/13/18 120/78   10/11/18 128/78                Passed - Patient has documented LDL within the past 12 mos.    Recent Labs   Lab Test 08/22/18  0824   *            Passed - Patient has had a Microalbumin in the past 12 mos.    Recent Labs   Lab Test 05/01/18  0913   MICROL 52   UMALCR 100.19*            Passed - Patient has documented A1c within the specified period of time.    If HgbA1C is 8 or greater, it needs to be on file within the past 3 months.  If less than 8, must be on file within the past 6 months.     Recent Labs   Lab Test 11/13/18  0837   A1C 6.7*            Passed - Medication is active on med list       Passed - Patient is age 18 or older       Passed - No active pregnancy on record       Passed - Patient has a recent creatinine (normal) within the past 12 mos.    Recent Labs   Lab Test 08/22/18  0824  09/02/15  1102   CR 1.04   < >  --    CREAT  --   --  1.0    < > = values in this interval not displayed.            Passed - Patient has not had a positive pregnancy test within the past 12 mos.       Passed - Recent (6 mo) or future (30 days) visit within the authorizing provider's specialty    Patient had office visit in the last 6 months or has a visit in the next 30 days with authorizing provider or within the authorizing provider's specialty.  See \"Patient Info\" tab in inbasket, or \"Choose Columns\" in Meds & Orders section of the refill encounter.        "     Vidal Vargas XRT

## 2019-03-07 NOTE — TELEPHONE ENCOUNTER
Medication is being filled for 1 time refill only due to:  Patient needs to be seen because past due for Dm appt (was due in February).  Veezeonhart sent.   Michael Jasso RN, BSN

## 2019-03-13 ENCOUNTER — OFFICE VISIT (OUTPATIENT)
Dept: FAMILY MEDICINE | Facility: CLINIC | Age: 59
End: 2019-03-13
Payer: MEDICARE

## 2019-03-13 VITALS
TEMPERATURE: 97.9 F | HEART RATE: 88 BPM | BODY MASS INDEX: 47.62 KG/M2 | WEIGHT: 285.8 LBS | DIASTOLIC BLOOD PRESSURE: 80 MMHG | HEIGHT: 65 IN | OXYGEN SATURATION: 95 % | RESPIRATION RATE: 18 BRPM | SYSTOLIC BLOOD PRESSURE: 130 MMHG

## 2019-03-13 DIAGNOSIS — E11.42 TYPE 2 DIABETES MELLITUS WITH DIABETIC POLYNEUROPATHY, WITHOUT LONG-TERM CURRENT USE OF INSULIN (H): ICD-10-CM

## 2019-03-13 DIAGNOSIS — J01.00 ACUTE MAXILLARY SINUSITIS, RECURRENCE NOT SPECIFIED: Primary | ICD-10-CM

## 2019-03-13 PROCEDURE — 99214 OFFICE O/P EST MOD 30 MIN: CPT | Performed by: FAMILY MEDICINE

## 2019-03-13 RX ORDER — CLINDAMYCIN HCL 300 MG
300 CAPSULE ORAL 3 TIMES DAILY
Qty: 30 CAPSULE | Refills: 0 | Status: SHIPPED | OUTPATIENT
Start: 2019-03-13 | End: 2019-07-02

## 2019-03-13 RX ORDER — MUPIROCIN 20 MG/G
OINTMENT TOPICAL
Refills: 1 | COMMUNITY
Start: 2018-09-29 | End: 2020-06-12

## 2019-03-13 RX ORDER — ONDANSETRON 4 MG/1
TABLET, ORALLY DISINTEGRATING ORAL
Refills: 1 | COMMUNITY
Start: 2018-04-08 | End: 2019-08-20

## 2019-03-13 RX ORDER — BETAMETHASONE DIPROPIONATE 0.5 MG/G
CREAM TOPICAL
Refills: 2 | COMMUNITY
Start: 2018-11-14 | End: 2020-11-25

## 2019-03-13 ASSESSMENT — ENCOUNTER SYMPTOMS
SHORTNESS OF BREATH: 0
DIARRHEA: 0
FEVER: 0
WHEEZING: 0
COUGH: 0
VOMITING: 0
SINUS PAIN: 1
LIGHT-HEADEDNESS: 1
HEADACHES: 1
ABDOMINAL DISTENTION: 0
SORE THROAT: 1
SINUS PRESSURE: 1
NAUSEA: 0

## 2019-03-13 ASSESSMENT — MIFFLIN-ST. JEOR: SCORE: 1864.32

## 2019-03-13 NOTE — PROGRESS NOTES
SUBJECTIVE:   Ana Luisa Byers is a 59 year old female presenting with a chief complaint of   Chief Complaint   Patient presents with     Sinus Problem     She is an established patient of [facility].    HPI: The patient is a 59-year-old female, who was diagnosed with acute sinusitis 1/24/19, treated with 10 days of Doxycycline.  Condition improved for 24 hours, but patient notes worsening sinus symptoms since that time.  Patient still has a headache, facial pain/pressure, pain involving her upper gums, rhinorrhea, postnasal drainage, and her typical sinusitis symptoms.  Patient would like to consider Clindamycin treatment, which she states worked well 1/13/15.  Patient is trying multiple OTC medications, without relief.    No allergies or previous sinus surgery.  ENT recommended an MRI in the past, per patient.    Known history of diabetes.  Patient was recently given a refill on her Glipizide XL.  Last A1c was 6.7 in 11/13/18.  Creatinine was 1.04 8/22/18    Review of Systems   Constitutional: Negative for fever.   HENT: Positive for postnasal drip, sinus pressure, sinus pain and sore throat.    Eyes: Negative for visual disturbance.   Respiratory: Negative for cough, shortness of breath and wheezing.    Cardiovascular: Negative for chest pain.   Gastrointestinal: Negative for abdominal distention, diarrhea, nausea and vomiting.   Neurological: Positive for light-headedness (If I get up fast.) and headaches.     Patient Active Problem List   Diagnosis     Mixed hyperlipidemia     Chronic pain associated with significant psychosocial dysfunction     Osteoarthritis     S/P shoulder replacement     Cluster headaches     Fibromyalgia     Plantar fasciitis     Other insomnia     HTN, goal below 140/90     Gastroesophageal reflux disease without esophagitis     Restless legs syndrome (RLS)     CKD (chronic kidney disease) stage 2, GFR 60-89 ml/min     HSV-1 infection     Left lumbar radiculitis     Acquired hypothyroidism      Type 2 diabetes mellitus with diabetic polyneuropathy, without long-term current use of insulin (H)     Bilateral lower extremity edema     Benign essential hypertension     Anxiety     Severe episode of recurrent major depressive disorder, without psychotic features (H)     BMI 45.0-49.9, adult (H)     Past Medical History:   Diagnosis Date     Arthritis     left shoulder and back     Chronic pain     fibromalgia      CKD (chronic kidney disease), stage III (H)      Fibromyalgia      Gastro-oesophageal reflux disease      HTN, goal below 140/90      Hyperlipidemia LDL goal <100      Hypothyroidism      Major depression     chronic kidney disease-stage 3     Peripheral neuropathy Feb 2015    + callus. diabetic shoes rx done     PONV (postoperative nausea and vomiting)      Rheumatoid arthritis of foot (H)      Type 2 diabetes, HbA1C goal < 8% (H)      Allergies   Allergen Reactions     Penicillins Anaphylaxis     Bee Swelling     Gabapentin Other (See Comments)     Mood Swings     Lisinopril Rash     Metformin Itching     Novocain [Procaine Hcl] Swelling     Topiramate Other (See Comments)     numbness     Family History   Problem Relation Age of Onset     C.A.D. Mother      Neurologic Disorder Mother         lupus     Depression Mother      Family History Negative Father      Diabetes Maternal Aunt      Colon Cancer No family hx of      Current Outpatient Medications   Medication Sig Dispense Refill     aspirin 81 MG chewable tablet Take 1 tablet (81 mg) by mouth daily 108 tablet 3     atorvastatin (LIPITOR) 40 MG tablet Take 1 tablet (40 mg) by mouth daily 90 tablet 3     betamethasone dipropionate (DIPROSONE) 0.05 % external cream [place] EXT AA BID FOR 2 TO 3 WKS THEN PRN  2     Cholecalciferol (VITAMIN D) 2000 UNITS tablet Take 1 tablet by mouth daily 90 tablet 3     clobetasol propionate (CLOBEX) 0.05 % external shampoo Apply topically three times a week 118 mL 0     DULoxetine (CYMBALTA) 30 MG EC capsule Take  1 capsule (30 mg) by mouth daily Take along with 60 mg capsule 90 capsule 3     DULoxetine (CYMBALTA) 60 MG EC capsule Take 1 capsule (60 mg) by mouth daily 90 capsule 3     EPINEPHrine (EPIPEN/ADRENACLICK/OR ANY BX GENERIC EQUIV) 0.3 MG/0.3ML injection 2-pack Inject 0.3 mLs (0.3 mg) into the muscle once as needed for anaphylaxis 1 mL 1     escitalopram (LEXAPRO) 10 MG tablet Take 1 tablet (10 mg) by mouth daily 90 tablet 1     furosemide (LASIX) 40 MG tablet Take 1 tablet (40 mg) by mouth daily 30 tablet 3     glipiZIDE (GLIPIZIDE XL) 2.5 MG 24 hr tablet Take 1 tablet (2.5 mg) by mouth 2 times daily 60 tablet 0     hydrOXYzine (ATARAX) 25 MG tablet Take 1-2 tablets (25-50 mg) by mouth 2 times daily as needed for itching 120 tablet 0     ketoconazole (NIZORAL) 2 % external shampoo Apply topically daily as needed for itching or irritation 120 mL 1     levothyroxine (SYNTHROID/LEVOTHROID) 25 MCG tablet Take 1 tablet (25 mcg) by mouth daily 90 tablet 2     losartan (COZAAR) 25 MG tablet Take 0.5 tablets (12.5 mg) by mouth daily 45 tablet 3     methocarbamol (ROBAXIN) 500 MG tablet Take 2 tablets (1,000 mg) by mouth 3 times daily as needed for muscle spasms 20 tablet 3     mupirocin (BACTROBAN) 2 % external ointment APPLY 1 APPLICATION TO SORES ON ARMS AND FACE TID  1     omeprazole (PRILOSEC) 40 MG capsule Take 1 capsule (40 mg) by mouth daily Take 30-60 minutes before a meal. 90 capsule 3     ondansetron (ZOFRAN-ODT) 4 MG ODT tab   1     order for DME Equipment being ordered: diabetic shoes    Feet have been examined, no changes 1 Units 0     order for DME Equipment being ordered: Rollator walker with seat 1 each 0     pantoprazole (PROTONIX) 40 MG EC tablet Take 1 tablet (40 mg) by mouth daily Take 30-60 minutes before a meal. 90 tablet 0     pregabalin (LYRICA) 50 MG capsule Take 1 capsule (50 mg) by mouth 3 times daily 90 capsule 3     zolpidem (AMBIEN) 10 MG tablet Take 1 tablet (10 mg) by mouth nightly as needed  "for sleep 30 tablet 5     Social History     Tobacco Use     Smoking status: Never Smoker     Smokeless tobacco: Never Used   Substance Use Topics     Alcohol use: No       OBJECTIVE  /80 (BP Location: Right arm, Patient Position: Chair, Cuff Size: Adult Large)   Pulse 88   Temp 97.9  F (36.6  C) (Oral)   Resp 18   Ht 1.638 m (5' 4.5\")   Wt 129.6 kg (285 lb 12.8 oz)   LMP 02/13/2009   SpO2 95%   Breastfeeding? No   BMI 48.30 kg/m      Physical Exam    GENERAL APPEARANCE:  Awake, alert, and in no acute distress.  PSYCHIATRIC:  Pleasant affect.  HEENT:  Sclera anicteric.  No conjunctivitis.  PERRLA.  No proptosis.  Eyes are not tender to palpation.  Extraocular movements intact.  Bilateral TM's and canals are within normal limits.  Mild to moderate nasal congestion, with tenderness to palpation noted over the maxillary sinuses.  Neglible erythema in the posterior pharynx, with postnasal drainage noted.  No edema or exudates of the oral mucosa or posterior pharynx.  Mucous membranes moist.  NECK:  Spontaneous full range of motion, without nuchal rigidity.  No thyromegaly or mass, but mild tenderness is noted in overlying the thyroid.  No lymphadenopathy.  HEART:  Normal S1, S2.  Regular rate and rhythm.  No murmurs, rubs, or gallops.  LUNGS:  No respiratory distress.  No wheezes, rales, or rhonchi.  ABDOMEN:  Morbidly obese.  EXTREMITIES:  Moves 4 extremities.     NEUROLOGIC:  Gait within normal limits.  No ptosis, facial droop, or acute neurologic deficits.  SKIN:  No rash.    Labs:  No results found. However, due to the size of the patient record, not all encounters were searched. Please check Results Review for a complete set of results.    ASSESSMENT:      ICD-10-CM    1. Acute maxillary sinusitis, recurrence not specified, suboptimally treated with Doxycycline.  Known Penicillin allergy. J01.00 clindamycin (CLEOCIN) 300 MG capsule   2. Type 2 diabetes mellitus with diabetic polyneuropathy, without " long-term current use of insulin (H), with last A1c 6.7 in 11/2018. E11.42       PLAN:      Patient prefers to proceed with Clindamycin treatment, as prescribed.    Patient declines a lower dose of Clindamycin, as previously prescribed.    Discussed risks of diarrhea and C. difficile related to Clindamycin treatment.      Patient stated understanding, in agreement with the following plan:    Patient Instructions     Over-the-counter medications, only as discussed.    Nasal saline irrigation, as discussed.    Limit Afrin to 3 days use.    Proceed with Clindamycin treatment, as discussed.    Follow up if:  worsening symptoms, fever, or if you are not gradually improving over the next week.    Follow up in the ER immediately if:  severe/worsening headache, uncontrolled vomiting, or other severe/emergent symptoms, as discussed.      Follow-up with Dr. Garza, as previously instructed.      Next A1c is due 05/2019, per chart review.    Discussed risks and benefits of treatment strategies, as noted in the Assessment and Plan sections.    The patient was discharged ambulatory and in stable condition post discussion of follow up.     Joanna Salcedo MD  [facility]

## 2019-03-13 NOTE — PATIENT INSTRUCTIONS
Over-the-counter medications, only as discussed.    Nasal saline irrigation, as discussed.    Limit Afrin to 3 days use.    Proceed with Clindamycin treatment, as discussed.    Follow up if:  worsening symptoms, fever, or if you are not gradually improving over the next week.    Follow up in the ER immediately if:  severe/worsening headache, uncontrolled vomiting, or other severe/emergent symptoms, as discussed.      Follow-up with Dr. Garza, as previously instructed.

## 2019-03-27 DIAGNOSIS — K21.9 GASTROESOPHAGEAL REFLUX DISEASE WITHOUT ESOPHAGITIS: ICD-10-CM

## 2019-03-27 DIAGNOSIS — E11.40 TYPE 2 DIABETES MELLITUS WITH DIABETIC NEUROPATHY, WITHOUT LONG-TERM CURRENT USE OF INSULIN (H): ICD-10-CM

## 2019-03-27 DIAGNOSIS — R60.0 BILATERAL LOWER EXTREMITY EDEMA: ICD-10-CM

## 2019-03-27 RX ORDER — PANTOPRAZOLE SODIUM 40 MG/1
40 TABLET, DELAYED RELEASE ORAL DAILY
Qty: 90 TABLET | Refills: 0 | Status: SHIPPED | OUTPATIENT
Start: 2019-03-27 | End: 2019-04-16

## 2019-03-27 RX ORDER — FUROSEMIDE 40 MG
40 TABLET ORAL DAILY
Qty: 30 TABLET | Refills: 0 | Status: SHIPPED | OUTPATIENT
Start: 2019-03-27 | End: 2019-04-16

## 2019-03-27 RX ORDER — GLIPIZIDE 2.5 MG/1
2.5 TABLET, EXTENDED RELEASE ORAL 2 TIMES DAILY
Qty: 30 TABLET | Refills: 0 | Status: SHIPPED | OUTPATIENT
Start: 2019-03-27 | End: 2019-04-16

## 2019-03-27 NOTE — TELEPHONE ENCOUNTER
Prescription approved per [facility] Refill Protocol.  Michael Jasso RN, BSN

## 2019-03-27 NOTE — TELEPHONE ENCOUNTER
"Requested Prescriptions   Pending Prescriptions Disp Refills     glipiZIDE (GLIPIZIDE XL) 2.5 MG 24 hr tablet 60 tablet 0    Last Written Prescription Date:  03/07/2019  Last Fill Quantity: 60 tablet,  # refills: 0   Last office visit: 3/13/2019 with prescribing provider:  01/24/2019   Future Office Visit:     Sig: Take 1 tablet (2.5 mg) by mouth 2 times daily    Sulfonylurea Agents Passed - 3/27/2019  6:46 AM       Passed - Blood pressure less than 140/90 in past 6 months    BP Readings from Last 3 Encounters:   03/13/19 130/80   01/24/19 132/78   11/13/18 120/78                Passed - Patient has documented LDL within the past 12 mos.    Recent Labs   Lab Test 08/22/18  0824   *            Passed - Patient has had a Microalbumin in the past 12 mos.    Recent Labs   Lab Test 05/01/18  0913   MICROL 52   UMALCR 100.19*            Passed - Patient has documented A1c within the specified period of time.    If HgbA1C is 8 or greater, it needs to be on file within the past 3 months.  If less than 8, must be on file within the past 6 months.     Recent Labs   Lab Test 11/13/18  0837   A1C 6.7*            Passed - Medication is active on med list       Passed - Patient is age 18 or older       Passed - No active pregnancy on record       Passed - Patient has a recent creatinine (normal) within the past 12 mos.    Recent Labs   Lab Test 08/22/18  0824  09/02/15  1102   CR 1.04   < >  --    CREAT  --   --  1.0    < > = values in this interval not displayed.            Passed - Patient has not had a positive pregnancy test within the past 12 mos.       Passed - Recent (6 mo) or future (30 days) visit within the authorizing provider's specialty    Patient had office visit in the last 6 months or has a visit in the next 30 days with authorizing provider or within the authorizing provider's specialty.  See \"Patient Info\" tab in inbasket, or \"Choose Columns\" in Meds & Orders section of the refill encounter.        "     Vidal Vargas XRT

## 2019-03-27 NOTE — TELEPHONE ENCOUNTER
"Requested Prescriptions   Pending Prescriptions Disp Refills     furosemide (LASIX) 40 MG tablet 30 tablet 3    Last Written Prescription Date:  11/27/2018  Last Fill Quantity: 30 tablet,  # refills: 3   Last office visit: 3/13/2019 with prescribing provider:  11/24/2019   Future Office Visit:     Sig: Take 1 tablet (40 mg) by mouth daily    Diuretics (Including Combos) Protocol Passed - 3/27/2019 11:00 AM       Passed - Blood pressure under 140/90 in past 12 months    BP Readings from Last 3 Encounters:   03/13/19 130/80   01/24/19 132/78   11/13/18 120/78                Passed - Recent (12 mo) or future (30 days) visit within the authorizing provider's specialty    Patient had office visit in the last 12 months or has a visit in the next 30 days with authorizing provider or within the authorizing provider's specialty.  See \"Patient Info\" tab in inbasket, or \"Choose Columns\" in Meds & Orders section of the refill encounter.             Passed - Medication is active on med list       Passed - Patient is age 18 or older       Passed - No active pregancy on record       Passed - Normal serum creatinine on file in past 12 months    Recent Labs   Lab Test 08/22/18  0824   CR 1.04             Passed - Normal serum potassium on file in past 12 months    Recent Labs   Lab Test 08/22/18  0824   POTASSIUM 4.1                   Passed - Normal serum sodium on file in past 12 months    Recent Labs   Lab Test 08/22/18  0824                Passed - No positive pregnancy test in past 12 months        Vidal GARCIAT  "

## 2019-03-27 NOTE — TELEPHONE ENCOUNTER
"Requested Prescriptions   Pending Prescriptions Disp Refills     furosemide (LASIX) 40 MG tablet 30 tablet 3    Last Written Prescription Date:  11/27/2018  Last Fill Quantity: 30 tablet,  # refills: 3   Last office visit: 3/13/2019 with prescribing provider:  01/24/2019   Future Office Visit:     Sig: Take 1 tablet (40 mg) by mouth daily    Diuretics (Including Combos) Protocol Passed - 3/27/2019 11:05 AM       Passed - Blood pressure under 140/90 in past 12 months    BP Readings from Last 3 Encounters:   03/13/19 130/80   01/24/19 132/78   11/13/18 120/78                Passed - Recent (12 mo) or future (30 days) visit within the authorizing provider's specialty    Patient had office visit in the last 12 months or has a visit in the next 30 days with authorizing provider or within the authorizing provider's specialty.  See \"Patient Info\" tab in inbasket, or \"Choose Columns\" in Meds & Orders section of the refill encounter.             Passed - Medication is active on med list       Passed - Patient is age 18 or older       Passed - No active pregancy on record       Passed - Normal serum creatinine on file in past 12 months    Recent Labs   Lab Test 08/22/18  0824   CR 1.04             Passed - Normal serum potassium on file in past 12 months    Recent Labs   Lab Test 08/22/18  0824   POTASSIUM 4.1                   Passed - Normal serum sodium on file in past 12 months    Recent Labs   Lab Test 08/22/18  0824                Passed - No positive pregnancy test in past 12 months            pantoprazole (PROTONIX) 40 MG EC tablet 90 tablet 0    Last Written Prescription Date:  01/09/2019  Last Fill Quantity: 90 tablet,  # refills: 0   Last office visit: 3/13/2019 with prescribing provider:  01/24/2019   Future Office Visit:     Sig: Take 1 tablet (40 mg) by mouth daily Take 30-60 minutes before a meal.    PPI Protocol Passed - 3/27/2019 11:05 AM       Passed - Not on Clopidogrel (unless Pantoprazole ordered) " "      Passed - No diagnosis of osteoporosis on record       Passed - Recent (12 mo) or future (30 days) visit within the authorizing provider's specialty    Patient had office visit in the last 12 months or has a visit in the next 30 days with authorizing provider or within the authorizing provider's specialty.  See \"Patient Info\" tab in inbasket, or \"Choose Columns\" in Meds & Orders section of the refill encounter.             Passed - Medication is active on med list       Passed - Patient is age 18 or older       Passed - No active pregnacy on record       Passed - No positive pregnancy test in past 12 months        Vidal GARCIAT  "

## 2019-03-27 NOTE — TELEPHONE ENCOUNTER
Routing refill request to provider for review/approval because:  Damian given x1 and patient did not follow up, please advise  Patient needs to be seen because:  Past due for DM appt.  Pt read mychart with damian refill but has not scheduled    Mychart sent again    Michael Jasso RN, BSN

## 2019-04-16 ENCOUNTER — OFFICE VISIT (OUTPATIENT)
Dept: FAMILY MEDICINE | Facility: CLINIC | Age: 59
End: 2019-04-16
Payer: MEDICARE

## 2019-04-16 VITALS
HEART RATE: 91 BPM | WEIGHT: 282 LBS | TEMPERATURE: 98.4 F | BODY MASS INDEX: 46.98 KG/M2 | DIASTOLIC BLOOD PRESSURE: 86 MMHG | RESPIRATION RATE: 14 BRPM | SYSTOLIC BLOOD PRESSURE: 130 MMHG | HEIGHT: 65 IN

## 2019-04-16 DIAGNOSIS — I10 BENIGN ESSENTIAL HYPERTENSION: ICD-10-CM

## 2019-04-16 DIAGNOSIS — R60.0 BILATERAL LOWER EXTREMITY EDEMA: ICD-10-CM

## 2019-04-16 DIAGNOSIS — E03.9 ACQUIRED HYPOTHYROIDISM: ICD-10-CM

## 2019-04-16 DIAGNOSIS — B37.0 THRUSH: ICD-10-CM

## 2019-04-16 DIAGNOSIS — E11.42 TYPE 2 DIABETES MELLITUS WITH DIABETIC POLYNEUROPATHY, WITHOUT LONG-TERM CURRENT USE OF INSULIN (H): Primary | ICD-10-CM

## 2019-04-16 DIAGNOSIS — K21.9 GASTROESOPHAGEAL REFLUX DISEASE WITHOUT ESOPHAGITIS: ICD-10-CM

## 2019-04-16 LAB — HBA1C MFR BLD: 7.2 % (ref 0–5.6)

## 2019-04-16 PROCEDURE — 99207 C FOOT EXAM  NO CHARGE: CPT | Performed by: FAMILY MEDICINE

## 2019-04-16 PROCEDURE — 83036 HEMOGLOBIN GLYCOSYLATED A1C: CPT | Performed by: FAMILY MEDICINE

## 2019-04-16 PROCEDURE — 36415 COLL VENOUS BLD VENIPUNCTURE: CPT | Performed by: FAMILY MEDICINE

## 2019-04-16 PROCEDURE — 84443 ASSAY THYROID STIM HORMONE: CPT | Performed by: FAMILY MEDICINE

## 2019-04-16 PROCEDURE — 84439 ASSAY OF FREE THYROXINE: CPT | Performed by: FAMILY MEDICINE

## 2019-04-16 PROCEDURE — 99214 OFFICE O/P EST MOD 30 MIN: CPT | Performed by: FAMILY MEDICINE

## 2019-04-16 RX ORDER — FUROSEMIDE 40 MG
40 TABLET ORAL DAILY
Qty: 90 TABLET | Refills: 3 | Status: SHIPPED | OUTPATIENT
Start: 2019-04-16 | End: 2020-03-09

## 2019-04-16 RX ORDER — FLUCONAZOLE 150 MG/1
150 TABLET ORAL
Qty: 6 TABLET | Refills: 0 | Status: SHIPPED | OUTPATIENT
Start: 2019-04-16 | End: 2019-07-22

## 2019-04-16 RX ORDER — GLIPIZIDE 2.5 MG/1
2.5 TABLET, EXTENDED RELEASE ORAL 2 TIMES DAILY
Qty: 180 TABLET | Refills: 0 | Status: SHIPPED | OUTPATIENT
Start: 2019-04-16 | End: 2019-07-15

## 2019-04-16 ASSESSMENT — MIFFLIN-ST. JEOR: SCORE: 1847.08

## 2019-04-16 NOTE — PROGRESS NOTES
SUBJECTIVE:   Ana Luisa Byers is a 59 year old female who presents to clinic today for the following health issues:    History of Present Illness     Diabetes:     Frequency of checking blood sugars::  2 times a day    Diabetic concerns::  Other    Hypoglycemia symptoms::  Shaky, Dizzy and Blurred vision    Paraesthesia present::  YES    Eye Exam in the last year::  Yes    dec 2018    Diabetes Management Resources      Taking 2.5 mg glipizide twice daily.  No side effects.    Notes no change in her level of physical activity, exercising a few times weekly typically in the pool.  No big changes to her diet.  Tries to watch her portion size.  Notes she is unaware of all of the potential foods that could cause an increase in her blood sugar.  She does note that she needs to avoid sugary foods.    Notes were current oral thrush, wonders what continues to cause this.  Has had success in treating her thrush with oral nystatin in the past.  She is not using any inhaled steroids.    Having difficulty swallowing at times, attributes this to her acid reflux.  Feels this is not appropriately controlled with Prilosec 40 mg daily.  She has had esophageal dilatation in the past, which offered her good relief.  She is wondering if she needs to have this done once again.  At one point, she was on Protonix.  Does not remember that it was particularly beneficial.      Additional history: as documented    Reviewed and updated as needed this visit by clinical staff  Tobacco  Allergies  Meds  Problems  Med Hx  Surg Hx  Fam Hx  Soc Hx      Reviewed and updated as needed this visit by Provider  Tobacco  Allergies  Meds  Problems  Med Hx  Surg Hx  Fam Hx       Patient Active Problem List   Diagnosis     Mixed hyperlipidemia     Chronic pain associated with significant psychosocial dysfunction     Osteoarthritis     S/P shoulder replacement     Cluster headaches     Fibromyalgia     Plantar fasciitis     Other insomnia      HTN, goal below 140/90     Gastroesophageal reflux disease without esophagitis     Restless legs syndrome (RLS)     CKD (chronic kidney disease) stage 2, GFR 60-89 ml/min     HSV-1 infection     Left lumbar radiculitis     Acquired hypothyroidism     Type 2 diabetes mellitus with diabetic polyneuropathy, without long-term current use of insulin (H)     Bilateral lower extremity edema     Benign essential hypertension     Anxiety     Severe episode of recurrent major depressive disorder, without psychotic features (H)     BMI 45.0-49.9, adult (H)     Past Surgical History:   Procedure Laterality Date     ARTHROPLASTY SHOULDER  7/25/2013    Procedure: ARTHROPLASTY SHOULDER;  RIGHT TOTAL SHOULDER ARTHROPLASTY (TORNIER AFFINITY SHOULDER SYSTEM)^;  Surgeon: Dung Morales MD;  Location:  OR     BACK SURGERY  2011    L45 laminectomy     C APPENDECTOMY  age 19     C LIGATE FALLOPIAN TUBE,POSTPARTUM  1982    Tubal Ligation     ELBOW WRIST HAND FINGER ORTHOSIS, RIGID, WITHOUT JOINTS, MAY INCLUDE SOFT  5/2007    put in [place]     ESOPHAGOSCOPY, GASTROSCOPY, DUODENOSCOPY (EGD), COMBINED  9/23/2015    Dr. [facility]     ESOPHAGOSCOPY, GASTROSCOPY, DUODENOSCOPY (EGD), COMBINED N/A 9/23/2015    Procedure: COMBINED ESOPHAGOSCOPY, GASTROSCOPY, DUODENOSCOPY (EGD), BIOPSY SINGLE OR MULTIPLE;  Surgeon: Jose Thomas MD;  Location:  GI     HCL PAP SMEAR  6/2008    [facility]     ORTHOPEDIC SURGERY      left shoulder scoped and plate left elbow     SURGICAL HISTORY OF -   2009    ulnar nerve release, carpal tunnel- left       Social History     Tobacco Use     Smoking status: Never Smoker     Smokeless tobacco: Never Used   Substance Use Topics     Alcohol use: No     Family History   Problem Relation Age of Onset     C.A.D. Mother      Neurologic Disorder Mother         lupus     Depression Mother      Family History Negative Father      Diabetes Maternal Aunt      Colon Cancer No family hx of            ROS:  Constitutional,  "HEENT, cardiovascular, pulmonary, gi and gu systems are negative, except as otherwise noted.    OBJECTIVE:     /86   Pulse 91   Temp 98.4  F (36.9  C) (Oral)   Resp 14   Ht 1.638 m (5' 4.5\")   Wt 127.9 kg (282 lb)   LMP 02/13/2009   Breastfeeding? No   BMI 47.66 kg/m    Body mass index is 47.66 kg/m .  GENERAL: healthy, alert and no distress  MOUTH: white coating on the tongue, not removable with tongue depressor, no areas of white plaque on the buccal mucosa  NECK: no adenopathy  RESP: lungs clear to auscultation - no rales, rhonchi or wheezes  CV: regular rate and rhythm, normal S1 S2, no S3 or S4, no murmur  PSYCH: mentation appears normal, affect normal/bright  Diabetic foot exam: normal DP and PT pulses, no trophic changes or ulcerative lesions and normal sensory exam    Diagnostic Test Results:  Results for orders placed or performed in visit on 04/16/19 (from the past 24 hour(s))   Hemoglobin A1c   Result Value Ref Range    Hemoglobin A1C 7.2 (H) 0 - 5.6 %     *Note: Due to a large number of results and/or encounters for the requested time period, some results have not been displayed. A complete set of results can be found in Results Review.     Lab Results   Component Value Date    A1C 7.2 04/16/2019    A1C 6.7 11/13/2018    A1C 6.7 05/01/2018    A1C 6.3 10/20/2017    A1C 6.4 04/11/2017       ASSESSMENT/PLAN:     1. Type 2 diabetes mellitus with diabetic polyneuropathy, without long-term current use of insulin (H) - 0.5% increase in A1c since we last checked, we reviewed high carbohydrate foods during her office visit.  Suggested no more than 20 carbs per small meal.  By she will need to be more mindful about nutrition labels going forward.  No changes to medications.  3-month recheck  - Hemoglobin A1c  - glipiZIDE (GLIPIZIDE XL) 2.5 MG 24 hr tablet; Take 1 tablet (2.5 mg) by mouth 2 times daily  Dispense: 180 tablet; Refill: 0    2. Thrush -suggested oral Diflucan to better help with her " thrush, reviewed that uncontrolled diabetes can increase risk of thrush.  In addition, uncontrolled acid reflux can also predispose.  - fluconazole (DIFLUCAN) 150 MG tablet; Take 1 tablet (150 mg) by mouth every 7 days  Dispense: 6 tablet; Refill: 0    3. Gastroesophageal reflux disease without esophagitis -suggested adding Zantac for the next month to her omeprazole regimen.  After 1 month, discontinue if it was helpful in monitor symptoms closely.  If not helpful after a month, will pursue EGD with possible dilatation.  - ranitidine (ZANTAC) 150 MG tablet; Take 1 tablet (150 mg) by mouth 2 times daily  Dispense: 60 tablet; Refill: 0    4. Bilateral lower extremity edema -stable, refills  - furosemide (LASIX) 40 MG tablet; Take 1 tablet (40 mg) by mouth daily  Dispense: 90 tablet; Refill: 3    5. BMI 45.0-49.9, adult (H) -she is not actively trying to lose weight, reviewed benefits of weight loss to her diabetes, blood pressure, lower extremity edema, acid reflux, hyperlipidemia    6. Benign essential hypertension -in range on recheck, will continue to monitor.  No changes.    7. Acquired hypothyroidism - check levels today  - TSH with free T4 reflex    Bridget Garza MD  [facility]

## 2019-04-16 NOTE — PATIENT INSTRUCTIONS
Sugary foods    High sugar fruits - bananas, grapes, watermelon    High starches - corn, potatoes, rice, noodles, cereal, oatmeal, bread    Look at nutrition labels - carbs and sugar

## 2019-04-16 NOTE — PROGRESS NOTES
"  SUBJECTIVE:   Ana Luisa Byers is a 59 year old female who presents to clinic today for the following health issues:  {Provider please address medication reconciliation discrepancies--rooming staff please delete if no med/rec issues}    Diabetes     Diabetes:     Frequency of checking blood sugars::  2 times a day    Diabetic concerns::  Other    Hypoglycemia symptoms::  Shaky, Dizzy and Blurred vision    Paraesthesia present::  YES    Eye Exam in the last year::  Yes    dec 2018    Diabetes Management Resources  History of Present Illness     Diabetes:     Frequency of checking blood sugars::  2 times a day    Diabetic concerns::  Other    Hypoglycemia symptoms::  Shaky, Dizzy and Blurred vision    Paraesthesia present::  YES    Eye Exam in the last year::  Yes    dec 2018    Diabetes Management Resources    {additional problems for roomer to add, delete if none:902138}  Additional history: {NONE - AS DOCUMENTED:716172::\"as documented\"}    Reviewed and updated as needed this visit by clinical staff         Reviewed and updated as needed this visit by Provider         {ACUTE Problem SUPERLIST - brief histories:888377}    {HIST REVIEW/ LINKS 2:299524}    {PROVIDER CHARTING PREFERENCE:180786}  "

## 2019-04-16 NOTE — PROGRESS NOTES
Answers for HPI/ROS submitted by the patient on 4/16/2019   Chronic problems general questions HPI Form  Frequency of checking blood sugars:: 2 times a day  Diabetic concerns:: Other  Hypoglycemia symptoms:: Shaky, Dizzy, Blurred vision  Paraesthesia present:: YES  Eye Exam in the last year:: Yes  Date of last Diabetic Eye Exam:: dec 2018

## 2019-04-17 LAB
T4 FREE SERPL-MCNC: 0.94 NG/DL (ref 0.76–1.46)
TSH SERPL DL<=0.005 MIU/L-ACNC: 4.36 MU/L (ref 0.4–4)

## 2019-04-18 DIAGNOSIS — E03.9 ACQUIRED HYPOTHYROIDISM: Primary | ICD-10-CM

## 2019-04-18 RX ORDER — LEVOTHYROXINE SODIUM 50 UG/1
50 TABLET ORAL DAILY
Qty: 90 TABLET | Refills: 0 | Status: SHIPPED | OUTPATIENT
Start: 2019-04-18 | End: 2019-07-22

## 2019-04-21 ENCOUNTER — MYC MEDICAL ADVICE (OUTPATIENT)
Dept: FAMILY MEDICINE | Facility: CLINIC | Age: 59
End: 2019-04-21

## 2019-04-21 DIAGNOSIS — B37.0 THRUSH: Primary | ICD-10-CM

## 2019-04-23 RX ORDER — NYSTATIN 100000/ML
500000 SUSPENSION, ORAL (FINAL DOSE FORM) ORAL 4 TIMES DAILY
Qty: 400 ML | Refills: 0 | Status: SHIPPED | OUTPATIENT
Start: 2019-04-23 | End: 2019-07-22

## 2019-05-07 ENCOUNTER — MYC MEDICAL ADVICE (OUTPATIENT)
Dept: FAMILY MEDICINE | Facility: CLINIC | Age: 59
End: 2019-05-07

## 2019-05-09 DIAGNOSIS — K21.9 GASTROESOPHAGEAL REFLUX DISEASE WITHOUT ESOPHAGITIS: ICD-10-CM

## 2019-05-10 NOTE — TELEPHONE ENCOUNTER
Royce sent:    3. Gastroesophageal reflux disease without esophagitis -suggested adding Zantac for the next month to her omeprazole regimen.  After 1 month, discontinue if it was helpful in monitor symptoms closely.  If not helpful after a month, will pursue EGD with possible dilatation.

## 2019-05-10 NOTE — TELEPHONE ENCOUNTER
"Requested Prescriptions   Pending Prescriptions Disp Refills     ranitidine (ZANTAC) 150 MG tablet  Last Written Prescription Date:  04/16/2019  Last Fill Quantity: 60,  # refills: 0   Last office visit: 4/16/2019 with prescribing provider:  04/16/2019   Future Office Visit:     60 tablet 0     Sig: Take 1 tablet (150 mg) by mouth 2 times daily       H2 Blockers Protocol Passed - 5/9/2019  8:47 PM        Passed - Patient is age 12 or older        Passed - Recent (12 mo) or future (30 days) visit within the authorizing provider's specialty     Patient had office visit in the last 12 months or has a visit in the next 30 days with authorizing provider or within the authorizing provider's specialty.  See \"Patient Info\" tab in inbasket, or \"Choose Columns\" in Meds & Orders section of the refill encounter.              Passed - Medication is active on med list          "

## 2019-05-13 NOTE — TELEPHONE ENCOUNTER
Pt read mychart but has not responded.  Rx denied based on provider notes that pt was only to take for one month  Michael Jasso RN, BSN

## 2019-05-14 ENCOUNTER — TRANSFERRED RECORDS (OUTPATIENT)
Dept: HEALTH INFORMATION MANAGEMENT | Facility: CLINIC | Age: 59
End: 2019-05-14

## 2019-05-21 ENCOUNTER — TRANSFERRED RECORDS (OUTPATIENT)
Dept: HEALTH INFORMATION MANAGEMENT | Facility: CLINIC | Age: 59
End: 2019-05-21

## 2019-05-23 DIAGNOSIS — M54.2 CERVICALGIA: ICD-10-CM

## 2019-05-23 RX ORDER — METHOCARBAMOL 500 MG/1
1000 TABLET, FILM COATED ORAL 3 TIMES DAILY PRN
Qty: 20 TABLET | Refills: 3 | Status: SHIPPED | OUTPATIENT
Start: 2019-05-23 | End: 2020-03-31

## 2019-05-23 NOTE — TELEPHONE ENCOUNTER
methocarbamol (ROBAXIN) 500 MG tablet      Last Written Prescription Date:  12/18/2018  Last Fill Quantity: 20 tablet,   # refills: 3  Last Office Visit: 04/16/2019  Future Office visit:       Routing refill request to provider for review/approval because:  Drug not on the [facility], [facility] or [facility] refill protocol or controlled substance    Vidal RICHEY

## 2019-06-28 ENCOUNTER — OFFICE VISIT (OUTPATIENT)
Dept: FAMILY MEDICINE | Facility: CLINIC | Age: 59
End: 2019-06-28
Payer: MEDICARE

## 2019-06-28 VITALS
RESPIRATION RATE: 22 BRPM | HEIGHT: 65 IN | BODY MASS INDEX: 46.98 KG/M2 | DIASTOLIC BLOOD PRESSURE: 82 MMHG | WEIGHT: 282 LBS | HEART RATE: 90 BPM | TEMPERATURE: 98.3 F | SYSTOLIC BLOOD PRESSURE: 146 MMHG

## 2019-06-28 DIAGNOSIS — E11.42 TYPE 2 DIABETES MELLITUS WITH DIABETIC POLYNEUROPATHY, WITHOUT LONG-TERM CURRENT USE OF INSULIN (H): ICD-10-CM

## 2019-06-28 DIAGNOSIS — Z12.11 SPECIAL SCREENING FOR MALIGNANT NEOPLASMS, COLON: ICD-10-CM

## 2019-06-28 DIAGNOSIS — R25.2 CRAMP OF LIMB: ICD-10-CM

## 2019-06-28 DIAGNOSIS — E03.9 ACQUIRED HYPOTHYROIDISM: ICD-10-CM

## 2019-06-28 DIAGNOSIS — I10 BENIGN ESSENTIAL HYPERTENSION: Primary | ICD-10-CM

## 2019-06-28 LAB
ANION GAP SERPL CALCULATED.3IONS-SCNC: 5 MMOL/L (ref 3–14)
BUN SERPL-MCNC: 12 MG/DL (ref 7–30)
CALCIUM SERPL-MCNC: 9.2 MG/DL (ref 8.5–10.1)
CHLORIDE SERPL-SCNC: 107 MMOL/L (ref 94–109)
CO2 SERPL-SCNC: 25 MMOL/L (ref 20–32)
CREAT SERPL-MCNC: 0.94 MG/DL (ref 0.52–1.04)
GFR SERPL CREATININE-BSD FRML MDRD: 66 ML/MIN/{1.73_M2}
GLUCOSE SERPL-MCNC: 185 MG/DL (ref 70–99)
POTASSIUM SERPL-SCNC: 3.2 MMOL/L (ref 3.4–5.3)
SODIUM SERPL-SCNC: 137 MMOL/L (ref 133–144)
T4 FREE SERPL-MCNC: 0.98 NG/DL (ref 0.76–1.46)
TSH SERPL DL<=0.005 MIU/L-ACNC: 7.56 MU/L (ref 0.4–4)

## 2019-06-28 PROCEDURE — 84443 ASSAY THYROID STIM HORMONE: CPT | Performed by: PHYSICIAN ASSISTANT

## 2019-06-28 PROCEDURE — 99214 OFFICE O/P EST MOD 30 MIN: CPT | Performed by: PHYSICIAN ASSISTANT

## 2019-06-28 PROCEDURE — 82043 UR ALBUMIN QUANTITATIVE: CPT | Performed by: PHYSICIAN ASSISTANT

## 2019-06-28 PROCEDURE — 80048 BASIC METABOLIC PNL TOTAL CA: CPT | Performed by: PHYSICIAN ASSISTANT

## 2019-06-28 PROCEDURE — 36415 COLL VENOUS BLD VENIPUNCTURE: CPT | Performed by: PHYSICIAN ASSISTANT

## 2019-06-28 PROCEDURE — 84439 ASSAY OF FREE THYROXINE: CPT | Performed by: PHYSICIAN ASSISTANT

## 2019-06-28 RX ORDER — LOSARTAN POTASSIUM 25 MG/1
12.5 TABLET ORAL DAILY
Qty: 45 TABLET | Refills: 3 | Status: SHIPPED | OUTPATIENT
Start: 2019-06-28 | End: 2020-11-04

## 2019-06-28 ASSESSMENT — ANXIETY QUESTIONNAIRES
2. NOT BEING ABLE TO STOP OR CONTROL WORRYING: MORE THAN HALF THE DAYS
GAD7 TOTAL SCORE: 17
IF YOU CHECKED OFF ANY PROBLEMS ON THIS QUESTIONNAIRE, HOW DIFFICULT HAVE THESE PROBLEMS MADE IT FOR YOU TO DO YOUR WORK, TAKE CARE OF THINGS AT HOME, OR GET ALONG WITH OTHER PEOPLE: SOMEWHAT DIFFICULT
6. BECOMING EASILY ANNOYED OR IRRITABLE: MORE THAN HALF THE DAYS
7. FEELING AFRAID AS IF SOMETHING AWFUL MIGHT HAPPEN: MORE THAN HALF THE DAYS
3. WORRYING TOO MUCH ABOUT DIFFERENT THINGS: MORE THAN HALF THE DAYS
5. BEING SO RESTLESS THAT IT IS HARD TO SIT STILL: NEARLY EVERY DAY
1. FEELING NERVOUS, ANXIOUS, OR ON EDGE: NEARLY EVERY DAY

## 2019-06-28 ASSESSMENT — PATIENT HEALTH QUESTIONNAIRE - PHQ9
5. POOR APPETITE OR OVEREATING: NEARLY EVERY DAY
SUM OF ALL RESPONSES TO PHQ QUESTIONS 1-9: 21

## 2019-06-28 ASSESSMENT — MIFFLIN-ST. JEOR: SCORE: 1847.08

## 2019-06-28 NOTE — PROGRESS NOTES
"Subjective     Ana Luisa Byers is a 59 year old female who presents to clinic today for the following health issues:    HPI   Hypertension Follow-up      Do you check your blood pressure regularly outside of the clinic? Yes     Are you following a low salt diet? Yes    Are your blood pressures ever more than 140 on the top number (systolic) OR more   than 90 on the bottom number (diastolic), for example 140/90? Yes    Amount of exercise or physical activity: 2-3 days/week for an average of 45-60 minutes    Problems taking medications regularly: No    Medication side effects: Constipation     Diet: regular (no restrictions) and low salt    BP Readings from Last 6 Encounters:   06/28/19 146/82   04/16/19 130/86   03/13/19 130/80   01/24/19 132/78   11/13/18 120/78   10/11/18 128/78       Patient states that she has been having severe fatigue and having headaches in the temple area but more on the right side side than the left.   Patient states she is concerned about her blood pressure being high. Has a home BP monitor and has been getting rather inconsistent results.  160 systolic at times, others 120.  Diastolic typically in the 80's.  She takes Lasix daily, not sure why she is not taking losartan anymore.  Patient is diabetic, not on ACE or ARB at this time.  Due for a few labs today and coming up.  She is asking about CGM today, notes that she really hates \"sticking\" herself to check BS daily and would be interested in giuliano monitor.    Patient states she has also has been getting bad cramps in her toes. Patient is considered about blood clots.  Random cramping, painful.  She has neuropathy and takes gabapentin for this, can still feel the cramping pain.    Reviewed and updated as needed this visit by Provider         Review of Systems   ROS COMP: Constitutional, HEENT, cardiovascular, pulmonary, gi and gu systems are negative, except as otherwise noted.      Objective    /82 (BP Location: Right arm, Patient " "Position: Sitting, Cuff Size: Adult Large)   Pulse 90   Temp 98.3  F (36.8  C) (Oral)   Resp 22   Ht 1.638 m (5' 4.5\")   Wt 127.9 kg (282 lb)   LMP 02/13/2009   BMI 47.66 kg/m    Body mass index is 47.66 kg/m .  Physical Exam   GENERAL: healthy, alert and no distress  MS: no gross musculoskeletal defects noted, no edema  SKIN: no suspicious lesions or rashes  PSYCH: mentation appears normal, affect normal/bright    Diagnostic Test Results:  Labs reviewed in Epic        Assessment & Plan     1. Benign essential hypertension  BP just slightly up today in office.  Will ask her to come back to repeat this with a nurse appointment in about 2 weeks.  Check labs.  - Albumin Random Urine Quantitative with Creat Ratio  - TSH with free T4 reflex  - Basic metabolic panel    2. Type 2 diabetes mellitus with diabetic polyneuropathy, without long-term current use of insulin (H)    - Albumin Random Urine Quantitative with Creat Ratio  - TSH with free T4 reflex  - Basic metabolic panel  - losartan (COZAAR) 25 MG tablet; Take 0.5 tablets (12.5 mg) by mouth daily  Dispense: 45 tablet; Refill: 3    3. Acquired hypothyroidism    - TSH with free T4 reflex    4. Cramp of limb    - Basic metabolic panel    5. Special screening for malignant neoplasms, colon    - Fecal colorectal cancer screen (FIT); Future     BMI:   Estimated body mass index is 47.66 kg/m  as calculated from the following:    Height as of this encounter: 1.638 m (5' 4.5\").    Weight as of this encounter: 127.9 kg (282 lb).   Weight management plan: Discussed healthy diet and exercise guidelines      Return in about 2 weeks (around 7/12/2019) for BP Recheck with Nurse, Lab Appt- fasting.    Garcia Najera PA-C  [facility]    "

## 2019-06-29 LAB
CREAT UR-MCNC: 218 MG/DL
MICROALBUMIN UR-MCNC: 29 MG/L
MICROALBUMIN/CREAT UR: 13.26 MG/G CR (ref 0–25)

## 2019-06-29 ASSESSMENT — ANXIETY QUESTIONNAIRES: GAD7 TOTAL SCORE: 17

## 2019-07-02 ENCOUNTER — OFFICE VISIT (OUTPATIENT)
Dept: FAMILY MEDICINE | Facility: CLINIC | Age: 59
End: 2019-07-02
Payer: MEDICARE

## 2019-07-02 VITALS
DIASTOLIC BLOOD PRESSURE: 82 MMHG | SYSTOLIC BLOOD PRESSURE: 130 MMHG | TEMPERATURE: 97.7 F | HEIGHT: 65 IN | BODY MASS INDEX: 47.18 KG/M2 | OXYGEN SATURATION: 94 % | WEIGHT: 283.2 LBS | HEART RATE: 83 BPM
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
## 2019-07-02 DIAGNOSIS — L20.9 ATOPIC DERMATITIS, UNSPECIFIED TYPE: ICD-10-CM

## 2019-07-02 DIAGNOSIS — E03.9 ACQUIRED HYPOTHYROIDISM: ICD-10-CM

## 2019-07-02 DIAGNOSIS — I10 BENIGN ESSENTIAL HYPERTENSION: ICD-10-CM

## 2019-07-02 PROCEDURE — 99214 OFFICE O/P EST MOD 30 MIN: CPT | Performed by: FAMILY MEDICINE

## 2019-07-02 RX ORDER — HYDROXYZINE HYDROCHLORIDE 25 MG/1
25-50 TABLET, FILM COATED ORAL 2 TIMES DAILY PRN
Qty: 120 TABLET | Refills: 0 | Status: SHIPPED | OUTPATIENT
Start: 2019-07-02 | End: 2019-07-26

## 2019-07-02 RX ORDER — DOXYCYCLINE 100 MG/1
100 TABLET ORAL 2 TIMES DAILY
Qty: 20 TABLET | Refills: 0 | Status: SHIPPED | OUTPATIENT
Start: 2019-07-02 | End: 2019-07-22

## 2019-07-02 ASSESSMENT — MIFFLIN-ST. JEOR: SCORE: 1852.53

## 2019-07-02 NOTE — PROGRESS NOTES
Subjective     Ana Luisa Byers is a 59 year old female who presents to clinic today for the following health issues:    HPI   Her cat bit her right arm 4 days ago. Became painful and red about 3 days ago. No fevers. No streaking up the arm.     Cat is UTD with vaccines. House cat.       Recent lab work. Was in to see another provider for HTN and HA. HA resolved. Taking losartan 12.5 mg daily now, no side effects.         Patient Active Problem List   Diagnosis     Mixed hyperlipidemia     Chronic pain associated with significant psychosocial dysfunction     Osteoarthritis     S/P shoulder replacement     Cluster headaches     Fibromyalgia     Plantar fasciitis     Other insomnia     HTN, goal below 140/90     Gastroesophageal reflux disease without esophagitis     Restless legs syndrome (RLS)     CKD (chronic kidney disease) stage 2, GFR 60-89 ml/min     HSV-1 infection     Left lumbar radiculitis     Acquired hypothyroidism     Type 2 diabetes mellitus with diabetic polyneuropathy, without long-term current use of insulin (H)     Bilateral lower extremity edema     Benign essential hypertension     Anxiety     Severe episode of recurrent major depressive disorder, without psychotic features (H)     BMI 45.0-49.9, adult (H)     Past Surgical History:   Procedure Laterality Date     ARTHROPLASTY SHOULDER  7/25/2013    Procedure: ARTHROPLASTY SHOULDER;  RIGHT TOTAL SHOULDER ARTHROPLASTY (TORNIER AFFINITY SHOULDER SYSTEM)^;  Surgeon: Dung Morales MD;  Location: [facility]     BACK SURGERY  2011    L45 laminectomy     C APPENDECTOMY  age 19     C LIGATE FALLOPIAN TUBE,POSTPARTUM  1982    Tubal Ligation     ELBOW WRIST HAND FINGER ORTHOSIS, RIGID, WITHOUT JOINTS, MAY INCLUDE SOFT  5/2007    put in [place]     ESOPHAGOSCOPY, GASTROSCOPY, DUODENOSCOPY (EGD), COMBINED  9/23/2015    Dr. Thomas [facility]     ESOPHAGOSCOPY, GASTROSCOPY, DUODENOSCOPY (EGD), COMBINED N/A 9/23/2015    Procedure: COMBINED ESOPHAGOSCOPY, GASTROSCOPY,  "DUODENOSCOPY (EGD), BIOPSY SINGLE OR MULTIPLE;  Surgeon: Jose Thomas MD;  Location: [facility]     HCL PAP SMEAR  6/2008    WNL     ORTHOPEDIC SURGERY      left shoulder scoped and plate left elbow     SURGICAL HISTORY OF -   2009    ulnar nerve release, carpal tunnel- left       Social History     Tobacco Use     Smoking status: Never Smoker     Smokeless tobacco: Never Used   Substance Use Topics     Alcohol use: No     Family History   Problem Relation Age of Onset     C.A.D. Mother      Neurologic Disorder Mother         lupus     Depression Mother      Family History Negative Father      Diabetes Maternal Aunt      Colon Cancer No family hx of          Reviewed and updated as needed this visit by Provider  Tobacco  Allergies  Meds  Problems  Med Hx  Surg Hx  Fam Hx         Review of Systems   ROS COMP: Constitutional, HEENT, cardiovascular, pulmonary, gi and gu systems are negative, except as otherwise noted.      Objective    /82 (BP Location: Right arm, Patient Position: Chair, Cuff Size: Adult Large)   Pulse 83   Temp 97.7  F (36.5  C) (Oral)   Ht 1.638 m (5' 4.5\")   Wt 128.5 kg (283 lb 3.2 oz)   LMP 02/13/2009   SpO2 94%   BMI 47.86 kg/m    Body mass index is 47.86 kg/m .  Physical Exam   GENERAL: healthy, alert and no distress  MS: right arm - normal ROM at elbow and wrist  SKIN: 1 inch dry circular lesion of the right forarm with surrounding erythema and induration, no palpable abscess beneath    Diagnostic Test Results:  Labs reviewed in Epic        Assessment & Plan     1. Cat bite, initial encounter - with surrounding cellulitis, will treat with abx - PCN allergic  - doxycycline monohydrate (ADOXA) 100 MG tablet; Take 1 tablet (100 mg) by mouth 2 times daily for 10 days  Dispense: 20 tablet; Refill: 0    2. Benign essential hypertension - in range today, recently started on losartan 12.5 mg daily. Needs recheck BMP 1 month.   - **Basic metabolic panel FUTURE anytime; Future    3. " Acquired hypothyroidism - recent TSH levels high while T4 levels in range, will recheck in a month.   - **TSH with free T4 reflex FUTURE anytime; Future       Return in about 1 month (around 8/2/2019) for Lab Work.    Bridget Garza MD  [facility]

## 2019-07-02 NOTE — TELEPHONE ENCOUNTER
"Requested Prescriptions   Pending Prescriptions Disp Refills     hydrOXYzine (ATARAX) 25 MG tablet 120 tablet 0     Sig: Take 1-2 tablets (25-50 mg) by mouth 2 times daily as needed for itching     Last Written Prescription Date:  02/15/2019  Last Fill Quantity: 120 tablet,  # refills: 0   Last office visit: 6/28/2019 with prescribing provider:  04/16/2019   Future Office Visit:   Next 5 appointments (look out 90 days)    Jul 02, 2019  8:40 AM CDT  MyChart New Injury with Bridget Garza MD  [facility] ([facility] [address] [facility] [address] [phone]               Antihistamines Protocol Passed - 7/2/2019  7:07 AM        Passed - Recent (12 mo) or future (30 days) visit within the authorizing provider's specialty     Patient had office visit in the last 12 months or has a visit in the next 30 days with authorizing provider or within the authorizing provider's specialty.  See \"Patient Info\" tab in inbasket, or \"Choose Columns\" in Meds & Orders section of the refill encounter.              Passed - Patient is age 3 or older     Apply age and/or weight-based dosing for peds patients age 3 and older.    Forward request to provider for patients under the age of 3.          Passed - Medication is active on med list          Vidal GARCIAT  "

## 2019-07-02 NOTE — TELEPHONE ENCOUNTER
Prescription approved per [facility] Refill Protocol.  Michael Jasso RN, BSN

## 2019-07-15 DIAGNOSIS — E11.42 TYPE 2 DIABETES MELLITUS WITH DIABETIC POLYNEUROPATHY, WITHOUT LONG-TERM CURRENT USE OF INSULIN (H): ICD-10-CM

## 2019-07-15 RX ORDER — GLIPIZIDE 2.5 MG/1
2.5 TABLET, EXTENDED RELEASE ORAL 2 TIMES DAILY
Qty: 180 TABLET | Refills: 0 | Status: SHIPPED | OUTPATIENT
Start: 2019-07-15 | End: 2019-07-22

## 2019-07-15 NOTE — TELEPHONE ENCOUNTER
"Requested Prescriptions   Pending Prescriptions Disp Refills     glipiZIDE (GLIPIZIDE XL) 2.5 MG 24 hr tablet 180 tablet 0     Sig: Take 1 tablet (2.5 mg) by mouth 2 times daily     Last Written Prescription Date:  04/16/2019  Last Fill Quantity: 180 tablet,  # refills: 0   Last office visit: 7/2/2019 with prescribing provider:  07/02/2019   Future Office Visit:          Sulfonylurea Agents Passed - 7/15/2019  7:45 AM        Passed - Blood pressure less than 140/90 in past 6 months     BP Readings from Last 3 Encounters:   07/02/19 130/82   06/28/19 146/82   04/16/19 130/86                 Passed - Patient has documented LDL within the past 12 mos.     Recent Labs   Lab Test 08/22/18  0824   *             Passed - Patient has had a Microalbumin in the past 15 mos.     Recent Labs   Lab Test 06/28/19  0836   MICROL 29   UMALCR 13.26             Passed - Patient has documented A1c within the specified period of time.     If HgbA1C is 8 or greater, it needs to be on file within the past 3 months.  If less than 8, must be on file within the past 6 months.     Recent Labs   Lab Test 04/16/19  1115   A1C 7.2*             Passed - Medication is active on med list        Passed - Patient is age 18 or older        Passed - No active pregnancy on record        Passed - Patient has a recent creatinine (normal) within the past 12 mos.     Recent Labs   Lab Test 06/28/19  0836  09/02/15  1102   CR 0.94   < >  --    CREAT  --   --  1.0    < > = values in this interval not displayed.             Passed - Patient has not had a positive pregnancy test within the past 12 mos.        Passed - Recent (6 mo) or future (30 days) visit within the authorizing provider's specialty     Patient had office visit in the last 6 months or has a visit in the next 30 days with authorizing provider or within the authorizing provider's specialty.  See \"Patient Info\" tab in inbasket, or \"Choose Columns\" in Meds & Orders section of the refill " encounter.            Vidal Vargas XRT

## 2019-07-15 NOTE — TELEPHONE ENCOUNTER
Prescription approved per [facility] Refill Protocol.  Michael Jasso RN, BSN

## 2019-07-19 ENCOUNTER — E-VISIT (OUTPATIENT)
Dept: FAMILY MEDICINE | Facility: CLINIC | Age: 59
End: 2019-07-19
Payer: MEDICARE

## 2019-07-19 DIAGNOSIS — Z53.9 ERRONEOUS ENCOUNTER--DISREGARD: Primary | ICD-10-CM

## 2019-07-22 ENCOUNTER — OFFICE VISIT (OUTPATIENT)
Dept: FAMILY MEDICINE | Facility: CLINIC | Age: 59
End: 2019-07-22
Payer: MEDICARE

## 2019-07-22 VITALS
TEMPERATURE: 97.8 F | SYSTOLIC BLOOD PRESSURE: 153 MMHG | HEART RATE: 76 BPM | RESPIRATION RATE: 16 BRPM | WEIGHT: 280 LBS | BODY MASS INDEX: 47.32 KG/M2 | DIASTOLIC BLOOD PRESSURE: 85 MMHG

## 2019-07-22 DIAGNOSIS — E78.5 HYPERLIPIDEMIA LDL GOAL <100: ICD-10-CM

## 2019-07-22 DIAGNOSIS — E03.9 ACQUIRED HYPOTHYROIDISM: ICD-10-CM

## 2019-07-22 DIAGNOSIS — J01.01 ACUTE RECURRENT MAXILLARY SINUSITIS: ICD-10-CM

## 2019-07-22 DIAGNOSIS — I10 BENIGN ESSENTIAL HYPERTENSION: ICD-10-CM

## 2019-07-22 DIAGNOSIS — E11.42 TYPE 2 DIABETES MELLITUS WITH DIABETIC POLYNEUROPATHY, WITHOUT LONG-TERM CURRENT USE OF INSULIN (H): Primary | ICD-10-CM

## 2019-07-22 LAB — HBA1C MFR BLD: 7.3 % (ref 0–5.6)

## 2019-07-22 PROCEDURE — 99214 OFFICE O/P EST MOD 30 MIN: CPT | Performed by: FAMILY MEDICINE

## 2019-07-22 PROCEDURE — 84443 ASSAY THYROID STIM HORMONE: CPT | Performed by: FAMILY MEDICINE

## 2019-07-22 PROCEDURE — 36415 COLL VENOUS BLD VENIPUNCTURE: CPT | Performed by: FAMILY MEDICINE

## 2019-07-22 PROCEDURE — 84439 ASSAY OF FREE THYROXINE: CPT | Performed by: FAMILY MEDICINE

## 2019-07-22 PROCEDURE — 83036 HEMOGLOBIN GLYCOSYLATED A1C: CPT | Performed by: FAMILY MEDICINE

## 2019-07-22 PROCEDURE — 80061 LIPID PANEL: CPT | Performed by: FAMILY MEDICINE

## 2019-07-22 PROCEDURE — 80048 BASIC METABOLIC PNL TOTAL CA: CPT | Performed by: FAMILY MEDICINE

## 2019-07-22 RX ORDER — AZITHROMYCIN 250 MG/1
TABLET, FILM COATED ORAL
Qty: 6 TABLET | Refills: 0 | Status: SHIPPED | OUTPATIENT
Start: 2019-07-22 | End: 2019-08-20

## 2019-07-22 RX ORDER — ESOMEPRAZOLE MAGNESIUM 40 MG/1
CAPSULE, DELAYED RELEASE ORAL
Refills: 0 | COMMUNITY
Start: 2019-05-21 | End: 2020-01-29

## 2019-07-22 RX ORDER — GLIPIZIDE 5 MG/1
5 TABLET, FILM COATED, EXTENDED RELEASE ORAL 2 TIMES DAILY
Qty: 180 TABLET | Refills: 1 | Status: SHIPPED | OUTPATIENT
Start: 2019-07-22 | End: 2019-10-15

## 2019-07-22 ASSESSMENT — ENCOUNTER SYMPTOMS
PALPITATIONS: 0
COUGH: 1
CONSTITUTIONAL NEGATIVE: 1
SHORTNESS OF BREATH: 0
SINUS PRESSURE: 1
HEADACHES: 1

## 2019-07-22 NOTE — PROGRESS NOTES
Subjective     Ana Luisa Byers is a 59 year old female who presents to clinic today for the following health issues:    HPI   Diabetes Follow-up      How often are you checking your blood sugar? Three times daily    What time of day are you checking your blood sugars (select all that apply)?  Before meals    Have you had any blood sugars above 200?  No    Have you had any blood sugars below 70?  No    What symptoms do you notice when your blood sugar is low?  Shaky and Other: fatigue    What concerns do you have today about your diabetes? None     Do you have any of these symptoms? (Select all that apply)  Numbness in feet and Burning in feet     Have you had a diabetic eye exam in the last 12 months? Yes- Date of last eye exam: 10/2018    Diabetes Management Resources    Hyperlipidemia Follow-Up      Are you having any of the following symptoms? (Select all that apply)  Pain in calves when walking 1-2 blocks    Are you regularly taking any medication or supplement to lower your cholesterol?   Yes- lipitor    Are you having muscle aches or other side effects that you think could be caused by your cholesterol lowering medication?  No    Hypertension Follow-up      Do you check your blood pressure regularly outside of the clinic? Yes     Are you following a low salt diet? Yes    Are your blood pressures ever more than 140 on the top number (systolic) OR more   than 90 on the bottom number (diastolic), for example 140/90? Yes    BP Readings from Last 2 Encounters:   07/22/19 153/85   07/02/19 130/82     Hemoglobin A1C (%)   Date Value   04/16/2019 7.2 (H)   11/13/2018 6.7 (H)     LDL Cholesterol Calculated (mg/dL)   Date Value   08/22/2018 116 (H)   10/20/2017 158 (H)       Amount of exercise or physical activity: 2-3 days/week for an average of greater than 60 minutes    Problems taking medications regularly: No    Medication side effects: none    Diet: regular (no restrictions)    Has been getting higher BP readings  at home.  Took BP medications about 90 minutes ago.  Denies CP, palpitations, edema, dyspnea, HA, vision changes.  Of notes, PCP had placed her on phentermine for weight loss.  Wondering about a dose increase.    Sinus infection for about 2 weeks.  C/o facial and frontal pain, ear pain, congestion, slight cough.  Notes that she gets these fairly often.      Reviewed and updated as needed this visit by Provider         Review of Systems   Constitutional: Negative.    HENT: Positive for congestion and sinus pressure.    Eyes: Negative for visual disturbance.   Respiratory: Positive for cough. Negative for shortness of breath.    Cardiovascular: Negative for chest pain, palpitations and peripheral edema.   Neurological: Positive for headaches.         Objective    /85 (BP Location: Right arm, Patient Position: Chair, Cuff Size: Adult Large)   Pulse 76   Temp 97.8  F (36.6  C) (Oral)   Resp 16   Wt 127 kg (280 lb)   LMP 02/13/2009   BMI 47.32 kg/m    Body mass index is 47.32 kg/m .  Physical Exam   Constitutional: She is oriented to person, place, and time. She appears well-developed and well-nourished. No distress.   HENT:   Right Ear: Tympanic membrane, external ear and ear canal normal.   Left Ear: Tympanic membrane, external ear and ear canal normal.   Mouth/Throat: Oropharynx is clear and moist. No oropharyngeal exudate.   Eyes: Conjunctivae are normal.   Cardiovascular: Normal rate, regular rhythm and normal heart sounds.   Pulmonary/Chest: Effort normal and breath sounds normal.   Lymphadenopathy:     She has no cervical adenopathy.   Neurological: She is alert and oriented to person, place, and time.   Skin: Skin is warm and dry. No rash noted.   Nursing note and vitals reviewed.     (E11.42) Type 2 diabetes mellitus with diabetic polyneuropathy, without long-term current use of insulin (H)  (primary encounter diagnosis)  Comment: A1c remaining high, increasing glipizide  Plan: Hemoglobin A1c,  glipiZIDE (GLUCOTROL XL) 5 MG         24 hr tablet            (I10) Benign essential hypertension  Comment: noting recent addition of phentermine, will have her hold this and f/u in 2 weeks for BP recehc  Plan:     (E78.5) Hyperlipidemia LDL goal <100  Comment: checking today  Plan: Lipid panel reflex to direct LDL Fasting            (E03.9) Acquired hypothyroidism  Comment:   Plan:     (J01.01) Acute recurrent maxillary sinusitis  Comment:   Plan: azithromycin (ZITHROMAX) 250 MG tablet              RTC in 2w for BP    Jose Mora MD

## 2019-07-23 LAB
ANION GAP SERPL CALCULATED.3IONS-SCNC: 11 MMOL/L (ref 3–14)
BUN SERPL-MCNC: 12 MG/DL (ref 7–30)
CALCIUM SERPL-MCNC: 9.2 MG/DL (ref 8.5–10.1)
CHLORIDE SERPL-SCNC: 101 MMOL/L (ref 94–109)
CHOLEST SERPL-MCNC: 172 MG/DL
CO2 SERPL-SCNC: 26 MMOL/L (ref 20–32)
CREAT SERPL-MCNC: 0.97 MG/DL (ref 0.52–1.04)
GFR SERPL CREATININE-BSD FRML MDRD: 64 ML/MIN/{1.73_M2}
GLUCOSE SERPL-MCNC: 133 MG/DL (ref 70–99)
HDLC SERPL-MCNC: 53 MG/DL
LDLC SERPL CALC-MCNC: 101 MG/DL
NONHDLC SERPL-MCNC: 119 MG/DL
POTASSIUM SERPL-SCNC: 3.2 MMOL/L (ref 3.4–5.3)
SODIUM SERPL-SCNC: 138 MMOL/L (ref 133–144)
T4 FREE SERPL-MCNC: 1.05 NG/DL (ref 0.76–1.46)
TRIGL SERPL-MCNC: 88 MG/DL
TSH SERPL DL<=0.005 MIU/L-ACNC: 6.04 MU/L (ref 0.4–4)

## 2019-07-24 PROCEDURE — 82274 ASSAY TEST FOR BLOOD FECAL: CPT | Performed by: PHYSICIAN ASSISTANT

## 2019-07-26 DIAGNOSIS — G47.09 OTHER INSOMNIA: ICD-10-CM

## 2019-07-26 DIAGNOSIS — L20.9 ATOPIC DERMATITIS, UNSPECIFIED TYPE: ICD-10-CM

## 2019-07-26 RX ORDER — ZOLPIDEM TARTRATE 10 MG/1
10 TABLET ORAL
Qty: 30 TABLET | Refills: 0 | Status: SHIPPED | OUTPATIENT
Start: 2019-07-26 | End: 2019-09-05

## 2019-07-26 RX ORDER — HYDROXYZINE HYDROCHLORIDE 25 MG/1
25-50 TABLET, FILM COATED ORAL 2 TIMES DAILY PRN
Qty: 120 TABLET | Refills: 0 | Status: SHIPPED | OUTPATIENT
Start: 2019-07-26 | End: 2019-09-24

## 2019-07-26 NOTE — TELEPHONE ENCOUNTER
"Requested Prescriptions   Pending Prescriptions Disp Refills                         hydrOXYzine (ATARAX) 25 MG tablet  Last Written Prescription Date:  07/02/2019  Last Fill Quantity: 120,  # refills: 0   Last office visit: 7/22/2019 with prescribing provider:  07/22/2019   Future Office Visit:   Next 5 appointments (look out 90 days)    Aug 05, 2019  7:40 AM CDT  SHORT with Jose Mora MD  [address] [address] [phone]          120 tablet 0     Sig: Take 1-2 tablets (25-50 mg) by mouth 2 times daily as needed for itching       Antihistamines Protocol Passed - 7/26/2019  1:21 PM        Passed - Recent (12 mo) or future (30 days) visit within the authorizing provider's specialty     Patient had office visit in the last 12 months or has a visit in the next 30 days with authorizing provider or within the authorizing provider's specialty.  See \"Patient Info\" tab in inbasket, or \"Choose Columns\" in Meds & Orders section of the refill encounter.              Passed - Patient is age 3 or older     Apply age and/or weight-based dosing for peds patients age 3 and older.    Forward request to provider for patients under the age of 3.          Passed - Medication is active on med list      Controlled Substance Refill Request for zolpidem (AMBIEN) 10 MG tablet     Problem List Complete:    No     PROVIDER TO CONSIDER COMPLETION OF PROBLEM LIST AND OVERVIEW/CONTROLLED SUBSTANCE AGREEMENT    Last Written Prescription Date:  02/05/2019  Last Fill Quantity: 30,   # refills: 5    Last Office Visit with [facility] primary care provider: 07/22/2019    Future Office visit:   Next 5 appointments (look out 90 days)    Aug 05, 2019  7:40 AM CDT  SHORT with Jose Mora MD  [facility] ([facility] [address] [facility], [address] [address] [phone]          Controlled " substance agreement:   Encounter-Level CSA - 11/09/2016:    Controlled Substance Agreement - Scan on 11/16/2016  5:00 PM: CONTROLLED SUBSTANCE AGREEMENT (below)       Patient-Level CSA:    There are no patient-level csa.         Last Urine Drug Screen:   Pain Drug SCR UR W RPTD Meds   Date Value Ref Range Status   11/09/2016   Final    FINAL  (Note)  ====================================================================  TOXASSURE COMP DRUG ANALYSIS,UR  ====================================================================  Test                             Result       Flag       Units  Drug Present and Declared for Prescription Verification   Pregabalin                     PRESENT      EXPECTED   Topiramate                     PRESENT      EXPECTED   Zolpidem                       PRESENT      EXPECTED    Drug Present not Declared for Prescription Verification   Phentermine                    PRESENT      UNEXPECTED   Doxylamine                     PRESENT      UNEXPECTED   Dextromethorphan               PRESENT      UNEXPECTED   Dextrorphan/Levorphanol        PRESENT      UNEXPECTED    Dextrorphan is an expected metabolite of dextromethorphan, an    over-the-counter or prescription cough suppressant. Dextrorphan    cannot be distinguished from the scheduled prescription    medication levorphanol by the method used for analysis.    ====================================================================  Test                      Result    Flag   Units      Ref Range   Creatinine              31               mg/dL      >=20  ====================================================================  Declared Medications:  The flagging and interpretation on this report are based on the  following declared medications.  Unexpected results may arise from  inaccuracies in the declared medications.    **Note: The testing scope of this panel includes these medications:    Pregabalin (Lyrica)  Topiramate (Topamax)    **Note: The  testing scope of this panel does not include small to  moderate amounts of these reported medications:    Zolpidem  ====================================================================  For clinical consultation, please call [phone].  ====================================================================  Analysis performed by [employer] [place] [address]     , No results found for: COMDAT, No results found for: THC13, PCP13, COC13, MAMP13, OPI13, AMP13, BZO13, TCA13, MTD13, BAR13, OXY13, PPX13, BUP13     Processing:  Patient will  in clinic     [email or web address]       checked in past 3 months?  No, route to RN

## 2019-07-26 NOTE — TELEPHONE ENCOUNTER
[place] reviewed:  reviewed- no concerns    Pt last filled on 7/7/19 for 30 days     [place]:  [email or web address]    Pt saw Mora on 7/22/19 and has a follow up with him next week.      Michael Jasso RN, BSN

## 2019-07-30 DIAGNOSIS — Z12.11 SPECIAL SCREENING FOR MALIGNANT NEOPLASMS, COLON: ICD-10-CM

## 2019-07-30 LAB — HEMOCCULT STL QL IA: NEGATIVE

## 2019-08-17 ENCOUNTER — OFFICE VISIT (OUTPATIENT)
Dept: URGENT CARE | Facility: URGENT CARE | Age: 59
End: 2019-08-17
Payer: MEDICARE

## 2019-08-17 VITALS
HEART RATE: 82 BPM | DIASTOLIC BLOOD PRESSURE: 67 MMHG | BODY MASS INDEX: 46.48 KG/M2 | WEIGHT: 279 LBS | TEMPERATURE: 98.2 F | OXYGEN SATURATION: 97 % | SYSTOLIC BLOOD PRESSURE: 119 MMHG | HEIGHT: 65 IN

## 2019-08-17 DIAGNOSIS — L03.90 CELLULITIS, UNSPECIFIED CELLULITIS SITE: Primary | ICD-10-CM

## 2019-08-17 PROCEDURE — 99213 OFFICE O/P EST LOW 20 MIN: CPT | Performed by: FAMILY MEDICINE

## 2019-08-17 RX ORDER — DOXYCYCLINE 100 MG/1
100 TABLET ORAL DAILY
Qty: 20 TABLET | Refills: 0 | Status: SHIPPED | OUTPATIENT
Start: 2019-08-17 | End: 2019-10-15

## 2019-08-17 ASSESSMENT — MIFFLIN-ST. JEOR: SCORE: 1833.48

## 2019-08-17 NOTE — PROGRESS NOTES
"SUBJECTIVE:  Ana Luisa Byers is a 59 year old female complains of cat bite to the RT hand. This happened several hours ago. Seems to be getting swollen and red. There is pain and it seems to be  Hindering th emovemennt of her hand    OBJECTIVE:  /67 (BP Location: Left arm, Patient Position: Sitting, Cuff Size: Adult Large)   Pulse 82   Temp 98.2  F (36.8  C) (Oral)   Ht 1.638 m (5' 4.5\")   Wt 126.6 kg (279 lb)   LMP 02/13/2009   SpO2 97%   Breastfeeding? No   BMI 47.15 kg/m    Exam;  RT hand swelling redness and tenderness over MCP's 3 and 4. Abrasion across hand.     Decreased ROM.    ASSESSMENT:  1. Cat bite    PLAN:  1. Doxycycline for 10 days    If this is worst before she gets to her PCP on Tues she should come in ASAP rt hand swelling over the 3rd and 4th MCP's. Looks more abrasion than puncture . There is swelling over this area.                  Allergy to PCN.  "

## 2019-08-20 ENCOUNTER — OFFICE VISIT (OUTPATIENT)
Dept: FAMILY MEDICINE | Facility: CLINIC | Age: 59
End: 2019-08-20
Payer: MEDICARE

## 2019-08-20 VITALS
SYSTOLIC BLOOD PRESSURE: 126 MMHG | OXYGEN SATURATION: 97 % | BODY MASS INDEX: 47.66 KG/M2 | WEIGHT: 282 LBS | TEMPERATURE: 97.8 F | RESPIRATION RATE: 14 BRPM | HEART RATE: 92 BPM | DIASTOLIC BLOOD PRESSURE: 80 MMHG
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
## 2019-08-20 DIAGNOSIS — I10 HTN, GOAL BELOW 140/90: ICD-10-CM

## 2019-08-20 PROCEDURE — 99214 OFFICE O/P EST MOD 30 MIN: CPT | Performed by: FAMILY MEDICINE

## 2019-08-20 RX ORDER — CEFUROXIME AXETIL 500 MG/1
500 TABLET ORAL 2 TIMES DAILY
Qty: 20 TABLET | Refills: 0 | Status: CANCELLED | OUTPATIENT
Start: 2019-08-20 | End: 2019-08-30

## 2019-08-20 RX ORDER — AMOXICILLIN AND CLAVULANATE POTASSIUM 500; 125 MG/1; MG/1
1 TABLET, FILM COATED ORAL 2 TIMES DAILY
Qty: 20 TABLET | Refills: 0 | Status: SHIPPED | OUTPATIENT
Start: 2019-08-20 | End: 2019-09-24

## 2019-08-20 ASSESSMENT — ENCOUNTER SYMPTOMS
RESPIRATORY NEGATIVE: 1
ARTHRALGIAS: 1
CHILLS: 0
FEVER: 0
JOINT SWELLING: 1
CARDIOVASCULAR NEGATIVE: 1

## 2019-08-20 NOTE — PROGRESS NOTES
Subjective     Ana Liusa Byers is a 59 year old female who presents to clinic today for the following health issues:    HPI   Hypertension Follow-up      Do you check your blood pressure regularly outside of the clinic? Yes     Are you following a low salt diet? Yes    Are your blood pressures ever more than 140 on the top number (systolic) OR more   than 90 on the bottom number (diastolic), for example 140/90? No      How many servings of fruits and vegetables do you eat daily?  2-3    On average, how many sweetened beverages do you drink each day (soda, juice, sweet tea, etc)?   1    How many days per week do you miss taking your medication? 0        ED/UC Followup:    Facility:  [facility]  Date of visit: 8/17/2019  Reason for visit: cat bite  Current Status: worse       Notes that redness is spreading - now over towards thumb and extending up forearm. Hand is swollen and very tender.  Hurts especially when dependant.   No fever.           Review of Systems   Constitutional: Negative for chills and fever.   Respiratory: Negative.    Cardiovascular: Negative.    Musculoskeletal: Positive for arthralgias and joint swelling.         Objective    Wt 127.9 kg (282 lb)   LMP 02/13/2009   BMI 47.66 kg/m    Body mass index is 47.66 kg/m .  Physical Exam   Constitutional: She is oriented to person, place, and time.   Eyes: Conjunctivae and EOM are normal.   Cardiovascular: Normal rate, regular rhythm and normal heart sounds.   Pulmonary/Chest: Effort normal and breath sounds normal.   Musculoskeletal: She exhibits no edema.        Hands:  Warmth, erythema and swelling involving most of the dorsum of R hand, extending beyond wrist to distal forearm.  Boundaries marked 0815 8/20/19.   Neurological: She is alert and oriented to person, place, and time.   Skin: Skin is warm and dry.   Vitals reviewed.     (W55.01XD) Infected cat bite, subsequent encounter  (primary encounter diagnosis)  Comment: pt  notes that she has tolerated Augmentin in the past.  As this is first line tx in cat bite will ADD this to doxy.  Luce of infection marked.  Advised to go to ED if continues to spread despite addition of second abx  Plan: amoxicillin-clavulanate (AUGMENTIN) 500-125 MG         tablet            (I10) HTN, goal below 140/90  Comment: well controlled  Plan: routine follow up      RTC in 1w    Jose Mora MD

## 2019-08-26 ENCOUNTER — TELEPHONE (OUTPATIENT)
Dept: FAMILY MEDICINE | Facility: CLINIC | Age: 59
End: 2019-08-26

## 2019-08-26 NOTE — TELEPHONE ENCOUNTER
amoxicillin-clavulanate (AUGMENTIN) 500-125 MG tablet    Message from Pharm- Pt is allergic to penicillin, can we change RX?

## 2019-08-26 NOTE — TELEPHONE ENCOUNTER
Routing to covering provider to review today.     See below. Was prescribed for cat bite. Pt has Penicillin allergy -- anaphylaxis listed as reaction type.     (W55.01XD) Infected cat bite, subsequent encounter  (primary encounter diagnosis)  Comment: pt notes that she has tolerated Augmentin in the past.  As this is first line tx in cat bite will ADD this to doxy.  Banks of infection marked.  Advised to go to ED if continues to spread despite addition of second abx  Plan: amoxicillin-clavulanate (AUGMENTIN) 500-125 MG         tablet               Valerie Parada RN -- [facility]

## 2019-08-26 NOTE — TELEPHONE ENCOUNTER
Should have started this 6 days ago??  Note says she has tolerated augmentin in the past.  Should be okay to take this.     Loreto Reynaga CNP

## 2019-08-27 NOTE — TELEPHONE ENCOUNTER
I spoke with the Pt, apparently the pharmacy did dispense this to her and she has already taken and completed this, I think the initial call just got to us late.     Either way, she has finished course of treatment.     Dr. Mora:    The Pt reports the redness has resolved on her hand, but it continues to feel stiff and swollen, mainly right over the knuckles. Pain, redness is gone, but the hand is feeling stiff.     Valerie Parada RN -- [facility]

## 2019-08-27 NOTE — TELEPHONE ENCOUNTER
OK, let's just have her keep an eye onthis for now and f/u in one week if not improving.  Sooner if getting worse.    Jose Mora MD

## 2019-09-01 DIAGNOSIS — E78.5 HYPERLIPIDEMIA LDL GOAL <100: ICD-10-CM

## 2019-09-01 NOTE — TELEPHONE ENCOUNTER
"Requested Prescriptions   Pending Prescriptions Disp Refills     atorvastatin (LIPITOR) 40 MG tablet  Last Written Prescription Date:  8/14/2018  Last Fill Quantity: 90 tablet,  # refills: 3   Last office visit: 7/2/2019 with prescribing provider:  Greg   Future Office Visit:     90 tablet 3     Sig: Take 1 tablet (40 mg) by mouth daily       Statins Protocol Passed - 9/1/2019 10:12 AM        Passed - LDL on file in past 12 months     Recent Labs   Lab Test 07/22/19  0743   *             Passed - No abnormal creatine kinase in past 12 months     No lab results found.             Passed - Recent (12 mo) or future (30 days) visit within the authorizing provider's specialty     Patient had office visit in the last 12 months or has a visit in the next 30 days with authorizing provider or within the authorizing provider's specialty.  See \"Patient Info\" tab in inbasket, or \"Choose Columns\" in Meds & Orders section of the refill encounter.              Passed - Medication is active on med list        Passed - Patient is age 18 or older        Passed - No active pregnancy on record        Passed - No positive pregnancy test in past 12 months          "

## 2019-09-04 NOTE — TELEPHONE ENCOUNTER
Routing refill request to provider for review/approval because:  Labs out of range:  See below.     Valerie Parada RN -- [facility]

## 2019-09-05 DIAGNOSIS — G47.09 OTHER INSOMNIA: ICD-10-CM

## 2019-09-05 RX ORDER — ATORVASTATIN CALCIUM 40 MG/1
40 TABLET, FILM COATED ORAL DAILY
Qty: 90 TABLET | Refills: 3 | Status: SHIPPED | OUTPATIENT
Start: 2019-09-05 | End: 2020-11-25

## 2019-09-06 RX ORDER — ZOLPIDEM TARTRATE 10 MG/1
TABLET ORAL
Qty: 30 TABLET | Refills: 0 | Status: SHIPPED | OUTPATIENT
Start: 2019-09-06 | End: 2019-10-06

## 2019-09-06 NOTE — TELEPHONE ENCOUNTER
Requested Prescriptions   Pending Prescriptions Disp Refills     zolpidem (AMBIEN) 10 MG tablet [Pharmacy Med Name: ZOLPIDEM 10MG TABLETS] 30 tablet 0     Sig: TAKE 1 TABLET(10 MG) BY MOUTH EVERY NIGHT AS NEEDED FOR SLEEP   Last Written Prescription Date:  7/26/19  Last Fill Quantity: 30,  # refills: 0   Last Office Visit: 8/20/2019 Mora      Return in about 1 week (around 8/27/2019), or infection, for if symptoms fail to resolve or worsen.     Future Office Visit:         There is no refill protocol information for this order

## 2019-09-06 NOTE — TELEPHONE ENCOUNTER
Pt should see me about continuing use of this medication.  I will provide 1m refill, but please call her to schedule an appointment.    Jose Mora MD

## 2019-09-24 ENCOUNTER — OFFICE VISIT (OUTPATIENT)
Dept: FAMILY MEDICINE | Facility: CLINIC | Age: 59
End: 2019-09-24
Payer: MEDICARE

## 2019-09-24 VITALS
TEMPERATURE: 97.9 F | HEART RATE: 88 BPM | OXYGEN SATURATION: 97 % | SYSTOLIC BLOOD PRESSURE: 130 MMHG | BODY MASS INDEX: 47.15 KG/M2 | WEIGHT: 279 LBS | RESPIRATION RATE: 12 BRPM | DIASTOLIC BLOOD PRESSURE: 78 MMHG

## 2019-09-24 DIAGNOSIS — L20.9 ATOPIC DERMATITIS, UNSPECIFIED TYPE: ICD-10-CM

## 2019-09-24 DIAGNOSIS — G47.09 OTHER INSOMNIA: ICD-10-CM

## 2019-09-24 DIAGNOSIS — S90.229A CONTUSION OF TOENAIL, UNSPECIFIED LATERALITY, INITIAL ENCOUNTER: Primary | ICD-10-CM

## 2019-09-24 DIAGNOSIS — F33.2 SEVERE EPISODE OF RECURRENT MAJOR DEPRESSIVE DISORDER, WITHOUT PSYCHOTIC FEATURES (H): ICD-10-CM

## 2019-09-24 PROCEDURE — 99214 OFFICE O/P EST MOD 30 MIN: CPT | Performed by: FAMILY MEDICINE

## 2019-09-24 RX ORDER — BUPROPION HYDROCHLORIDE 150 MG/1
150 TABLET ORAL EVERY MORNING
Qty: 30 TABLET | Refills: 1 | Status: SHIPPED | OUTPATIENT
Start: 2019-09-24 | End: 2020-01-07

## 2019-09-24 RX ORDER — HYDROXYZINE HYDROCHLORIDE 25 MG/1
25-50 TABLET, FILM COATED ORAL 2 TIMES DAILY PRN
Qty: 120 TABLET | Refills: 0 | Status: SHIPPED | OUTPATIENT
Start: 2019-09-24 | End: 2019-12-12

## 2019-09-24 ASSESSMENT — ENCOUNTER SYMPTOMS
CONSTITUTIONAL NEGATIVE: 1
SLEEP DISTURBANCE: 1
DYSPHORIC MOOD: 1
HEADACHES: 0
SHORTNESS OF BREATH: 0
PALPITATIONS: 0

## 2019-09-24 ASSESSMENT — ANXIETY QUESTIONNAIRES
5. BEING SO RESTLESS THAT IT IS HARD TO SIT STILL: NEARLY EVERY DAY
7. FEELING AFRAID AS IF SOMETHING AWFUL MIGHT HAPPEN: NOT AT ALL
1. FEELING NERVOUS, ANXIOUS, OR ON EDGE: SEVERAL DAYS
GAD7 TOTAL SCORE: 9
4. TROUBLE RELAXING: MORE THAN HALF THE DAYS
6. BECOMING EASILY ANNOYED OR IRRITABLE: SEVERAL DAYS
GAD7 TOTAL SCORE: 9
GAD7 TOTAL SCORE: 9
7. FEELING AFRAID AS IF SOMETHING AWFUL MIGHT HAPPEN: NOT AT ALL
3. WORRYING TOO MUCH ABOUT DIFFERENT THINGS: SEVERAL DAYS
2. NOT BEING ABLE TO STOP OR CONTROL WORRYING: SEVERAL DAYS

## 2019-09-24 ASSESSMENT — PATIENT HEALTH QUESTIONNAIRE - PHQ9
10. IF YOU CHECKED OFF ANY PROBLEMS, HOW DIFFICULT HAVE THESE PROBLEMS MADE IT FOR YOU TO DO YOUR WORK, TAKE CARE OF THINGS AT HOME, OR GET ALONG WITH OTHER PEOPLE: SOMEWHAT DIFFICULT
SUM OF ALL RESPONSES TO PHQ QUESTIONS 1-9: 18
SUM OF ALL RESPONSES TO PHQ QUESTIONS 1-9: 18

## 2019-09-24 NOTE — TELEPHONE ENCOUNTER
"Requested Prescriptions   Pending Prescriptions Disp Refills     hydrOXYzine (ATARAX) 25 MG tablet 120 tablet 0     Sig: Take 1-2 tablets (25-50 mg) by mouth 2 times daily as needed for itching     Last Written Prescription Date:  07/26/2019  Last Fill Quantity: 120 tablet,  # refills: 0   Last office visit: 9/24/2019 with prescribing provider:  07/19/2019   Future Office Visit:          Antihistamines Protocol Passed - 9/24/2019 12:37 PM        Passed - Recent (12 mo) or future (30 days) visit within the authorizing provider's specialty     Patient had office visit in the last 12 months or has a visit in the next 30 days with authorizing provider or within the authorizing provider's specialty.  See \"Patient Info\" tab in inbasket, or \"Choose Columns\" in Meds & Orders section of the refill encounter.              Passed - Patient is age 3 or older     Apply age and/or weight-based dosing for peds patients age 3 and older.    Forward request to provider for patients under the age of 3.          Passed - Medication is active on med list          Vidal GARCIAT  "

## 2019-09-24 NOTE — PROGRESS NOTES
Subjective     Ana Luisa Byers is a 59 year old female who presents to clinic today for the following health issues:    History of Present Illness        Back Pain:  She presents for follow up of back pain. Patient's back pain is a recurring problem.  Location of back pain:  Right lower back, left lower back, right middle of back, right buttock and left buttock  Description of back pain: burning and other  Back pain spreads: right buttocks, left buttocks, right thigh, left thigh and right knee    Since patient first noticed back pain, pain is: always present, but gets better and worse  Does back pain interfere with her job:  Not applicable      Mental Health Follow-up:  Patient presents to follow-up on Depression & Anxiety.Patient's depression since last visit has been:  No change  The patient is not having other symptoms associated with depression.  Patient's anxiety since last visit has been:  Medium  The patient is not having other symptoms associated with anxiety.  Any significant life events: No  Patient is not feeling anxious or having panic attacks.  Patient has no concerns about alcohol or drug use.     Social History  Tobacco Use    Smoking status: Never Smoker    Smokeless tobacco: Never Used  Alcohol use: No  Drug use: No      Today's PHQ-9         PHQ-9 Total Score:     (P) 18   PHQ-9 Q9 Thoughts of better off dead/self-harm past 2 weeks :   (P) Not at all   Thoughts of suicide or self harm:      Self-harm Plan:        Self-harm Action:          Safety concerns for self or others:           Diabetes:   She presents for follow up of diabetes.  She is checking home blood glucose three times daily. She checks blood glucose before and after meals.  Blood glucose is never over 200 and never under 70. She is aware of hypoglycemia symptoms including dizziness and blurred vision. She has no concerns regarding her diabetes at this time.  She is having numbness in feet and burning in feet. The patient has had a  "diabetic eye exam in the last 12 months. Eye exam performed on 6 2018.    Diabetes Management Resources    She eats 2-3 servings of fruits and vegetables daily.She consumes 2 sweetened beverage(s) daily.  She is taking medications regularly.     Answers for HPI/ROS submitted by the patient on 9/24/2019   Chronic problems general questions HPI Form  If you checked off any problems, how difficult have these problems made it for you to do your work, take care of things at home, or get along with other people?: Somewhat difficult  PHQ9 TOTAL SCORE: 18  BEATRIZ 7 TOTAL SCORE: 9    Notes that both great toenails are black.  Links to a long day walking at the fair.  No trauma.  Tender, but improving.  Does have neuropathy in her feet.  Wears custom fitted diabetic shoes.    Does use 10mg ambien nightly for a very long time.  In the past when she's tried to reduce dose of frequency her sleep has really suffers.  Generally feels mood is poor, \"downin the dumps\" lately, not feeling like doing anything.  Base medication is 90mg duloxetine, previous escitalopram adjunct not helpful.         Review of Systems   Constitutional: Negative.    Eyes: Negative for visual disturbance.   Respiratory: Negative for shortness of breath.    Cardiovascular: Negative for chest pain, palpitations and peripheral edema.   Neurological: Negative for headaches.   Psychiatric/Behavioral: Positive for dysphoric mood, mood changes and sleep disturbance. Negative for suicidal ideas.            Objective    /78 (BP Location: Right arm, Patient Position: Chair, Cuff Size: Adult Regular)   Pulse 88   Temp 97.9  F (36.6  C) (Oral)   Resp 12   Wt 126.6 kg (279 lb)   LMP 02/13/2009   SpO2 97%   BMI 47.15 kg/m    Body mass index is 47.15 kg/m .  Physical Exam  Vitals signs reviewed.   Neck:      Thyroid: No thyromegaly.   Cardiovascular:      Rate and Rhythm: Normal rate and regular rhythm.      Heart sounds: Normal heart sounds.   Pulmonary:      " "Effort: Pulmonary effort is normal.      Breath sounds: Normal breath sounds.   Skin:     General: Skin is warm and dry.   Neurological:      Mental Status: She is alert and oriented to person, place, and time.   Psychiatric:         Mood and Affect: Mood normal.         Behavior: Behavior normal.        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

## 2019-09-25 ASSESSMENT — ANXIETY QUESTIONNAIRES: GAD7 TOTAL SCORE: 9

## 2019-09-25 ASSESSMENT — PATIENT HEALTH QUESTIONNAIRE - PHQ9: SUM OF ALL RESPONSES TO PHQ QUESTIONS 1-9: 18

## 2019-10-01 ENCOUNTER — TELEPHONE (OUTPATIENT)
Dept: FAMILY MEDICINE | Facility: CLINIC | Age: 59
End: 2019-10-01

## 2019-10-01 NOTE — TELEPHONE ENCOUNTER
Prior Authorization Retail Medication Request    Medication/Dose: methocarbamol (ROBAXIN) 500 MG tablet  ICD code (if different than what is on RX):    Previously Tried and Failed:  tiZANidine (ZANAFLEX) 4 MG tablet   Rationale:  Patient has been taking this medication since 07/05/2016.     Insurance Name:  [employer]  Insurance ID:  [id number]      Pharmacy Information (if different than what is on RX)  Name:  [id number] [place], [place] - [address]  Phone:  [phone]      Vidal RICHEY

## 2019-10-04 NOTE — TELEPHONE ENCOUNTER
Central Prior Authorization Team   Phone: [phone]      PA Initiation    Medication: methocarbamol (ROBAXIN) 500 MG tablet  Insurance Company: [employer] - Phone [phone] Fax [phone]  Pharmacy Filling the Rx: [id number] [place], [place] - [address]  Filling Pharmacy Phone: [phone]  Filling Pharmacy Fax:    Start Date: 10/4/2019

## 2019-10-04 NOTE — TELEPHONE ENCOUNTER
PRIOR AUTHORIZATION DENIED    Medication: methocarbamol (ROBAXIN) 500 MG tablet    Denial Date: 10/4/2019    Denial Rational:  Patient must have a history of trial & failure to the formulary alternative(s) or have a contraindication or intolerance to the formulary alternatives:  Baclofen and Tizanidine.       Appeal Information:    If you would like to appeal, please supply P/A team with a letter of medical necessity with clinical reason.

## 2019-10-06 DIAGNOSIS — G47.09 OTHER INSOMNIA: ICD-10-CM

## 2019-10-07 NOTE — TELEPHONE ENCOUNTER
Controlled Substance Refill Request for zolpidem (AMBIEN) 10 MG tablet  Problem List Complete:    No     PROVIDER TO CONSIDER COMPLETION OF PROBLEM LIST AND OVERVIEW/CONTROLLED SUBSTANCE AGREEMENT    Last Written Prescription Date:  09/06/19  Last Fill Quantity: 30,   # refills: 0    THE MOST RECENT OFFICE VISIT MUST BE WITHIN THE PAST 3 MONTHS. AT LEAST ONE FACE TO FACE VISIT MUST OCCUR EVERY 6 MONTHS. ADDITIONAL VISITS CAN BE VIRTUAL.  (THIS STATEMENT SHOULD BE DELETED.)    Last Office Visit with [facility] primary care provider: Dr Mora    Future Office visit:   Next 5 appointments (look out 90 days)    Oct 15, 2019  7:40 AM CDT  SHORT with Jose [facility] MD  [facility] [address] [place] [address], [address]  [facility] [address] [phone]          Controlled substance agreement:   Encounter-Level CSA - 11/09/2016:    Controlled Substance Agreement - Scan on 11/16/2016  5:00 PM: CONTROLLED SUBSTANCE AGREEMENT     Patient-Level CSA:    There are no patient-level csa.         Last Urine Drug Screen:   Pain Drug SCR UR W RPTD Meds   Date Value Ref Range Status   11/09/2016   Final    FINAL  (Note)  ====================================================================  [facility] COMP DRUG ANALYSIS,UR  ====================================================================  Test                             Result       Flag       Units  Drug Present and Declared for Prescription Verification   Pregabalin                     PRESENT      EXPECTED   Topiramate                     PRESENT      EXPECTED   Zolpidem                       PRESENT      EXPECTED    Drug Present not Declared for Prescription Verification   Phentermine                    PRESENT      UNEXPECTED   Doxylamine                     PRESENT      UNEXPECTED   Dextromethorphan               PRESENT      UNEXPECTED   Dextrorphan/Levorphanol        PRESENT      UNEXPECTED    Dextrorphan is an expected  metabolite of dextromethorphan, an    over-the-counter or prescription cough suppressant. Dextrorphan    cannot be distinguished from the scheduled prescription    medication levorphanol by the method used for analysis.    ====================================================================  Test                      Result    Flag   Units      Ref Range   Creatinine              31               mg/dL      >=20  ====================================================================  Declared Medications:  The flagging and interpretation on this report are based on the  following declared medications.  Unexpected results may arise from  inaccuracies in the declared medications.    **Note: The testing scope of this panel includes these medications:    Pregabalin (Lyrica)  Topiramate (Topamax)    **Note: The testing scope of this panel does not include small to  moderate amounts of these reported medications:    Zolpidem  ====================================================================  For clinical consultation, please call [phone].  ====================================================================  Analysis performed by [employer] [place] [address]     , No results found for: COMDAT, No results found for: THC13, PCP13, COC13, MAMP13, OPI13, AMP13, BZO13, TCA13, MTD13, BAR13, OXY13, PPX13, BUP13     [facility]  [facility]     [email or web address]       checked in past 3 months?  No, route to RN

## 2019-10-07 NOTE — TELEPHONE ENCOUNTER
[place] reviewed:  reviewed- no concerns    Last fill 9/7/19    Shira Hamm RN    Controlled Substance Refill Request for zolpidem   Problem List Complete:    No     PROVIDER TO CONSIDER COMPLETION OF PROBLEM LIST AND OVERVIEW/CONTROLLED SUBSTANCE AGREEMENT    Last Written Prescription Date:  9/6/19  Last Fill Quantity: 30,   # refills: 0      Last Office Visit with [facility] primary care provider: 9/24/19    Future Office visit:   Next 5 appointments (look out 90 days)    Oct 24, 2019 11:20 AM CDT  [facility] [address] [address] [phone]          Controlled substance agreement:   Encounter-Level CSA - 11/09/2016:    Controlled Substance Agreement - Scan on 11/16/2016  5:00 PM: CONTROLLED SUBSTANCE AGREEMENT     Patient-Level CSA:    There are no patient-level csa.         Last Urine Drug Screen:   Pain Drug SCR UR W RPTD Meds   Date Value Ref Range Status   11/09/2016   Final    FINAL  (Note)  ====================================================================  [facility]RE COMP DRUG ANALYSIS,UR  ====================================================================  Test                             Result       Flag       Units  Drug Present and Declared for Prescription Verification   Pregabalin                     PRESENT      EXPECTED   Topiramate                     PRESENT      EXPECTED   Zolpidem                       PRESENT      EXPECTED    Drug Present not Declared for Prescription Verification   Phentermine                    PRESENT      UNEXPECTED   Doxylamine                     PRESENT      UNEXPECTED   Dextromethorphan               PRESENT      UNEXPECTED   Dextrorphan/Levorphanol        PRESENT      UNEXPECTED    Dextrorphan is an expected metabolite of dextromethorphan, an    over-the-counter or prescription cough suppressant. Dextrorphan    cannot be distinguished from  the scheduled prescription    medication levorphanol by the method used for analysis.    ====================================================================  Test                      Result    Flag   Units      Ref Range   Creatinine              31               mg/dL      >=20  ====================================================================  Declared Medications:  The flagging and interpretation on this report are based on the  following declared medications.  Unexpected results may arise from  inaccuracies in the declared medications.    **Note: The testing scope of this panel includes these medications:    Pregabalin (Lyrica)  Topiramate (Topamax)    **Note: The testing scope of this panel does not include small to  moderate amounts of these reported medications:    Zolpidem  ====================================================================  For clinical consultation, please call [phone].  ====================================================================  Analysis performed by [employer] [place] [address]     , No results found for: COMDAT, No results found for: THC13, PCP13, COC13, MAMP13, OPI13, AMP13, BZO13, TCA13, MTD13, BAR13, OXY13, PPX13, BUP13     Processing:  escribe      [email or web address]       checked in past 3 months?  Yes today

## 2019-10-08 RX ORDER — ZOLPIDEM TARTRATE 10 MG/1
TABLET ORAL
Qty: 30 TABLET | Refills: 0 | Status: SHIPPED | OUTPATIENT
Start: 2019-10-08 | End: 2019-11-06

## 2019-10-15 ENCOUNTER — OFFICE VISIT (OUTPATIENT)
Dept: FAMILY MEDICINE | Facility: CLINIC | Age: 59
End: 2019-10-15
Payer: MEDICARE

## 2019-10-15 VITALS
HEART RATE: 83 BPM | TEMPERATURE: 97.6 F | SYSTOLIC BLOOD PRESSURE: 110 MMHG | HEIGHT: 65 IN | WEIGHT: 282 LBS | RESPIRATION RATE: 18 BRPM | BODY MASS INDEX: 46.98 KG/M2 | DIASTOLIC BLOOD PRESSURE: 70 MMHG | OXYGEN SATURATION: 96 %

## 2019-10-15 DIAGNOSIS — Z23 NEED FOR PROPHYLACTIC VACCINATION AND INOCULATION AGAINST INFLUENZA: Primary | ICD-10-CM

## 2019-10-15 DIAGNOSIS — M54.41 CHRONIC BILATERAL LOW BACK PAIN WITH RIGHT-SIDED SCIATICA: ICD-10-CM

## 2019-10-15 DIAGNOSIS — G89.29 CHRONIC BILATERAL LOW BACK PAIN WITH RIGHT-SIDED SCIATICA: ICD-10-CM

## 2019-10-15 DIAGNOSIS — E11.42 TYPE 2 DIABETES MELLITUS WITH DIABETIC POLYNEUROPATHY, WITHOUT LONG-TERM CURRENT USE OF INSULIN (H): ICD-10-CM

## 2019-10-15 LAB — HBA1C MFR BLD: 7.8 % (ref 0–5.6)

## 2019-10-15 PROCEDURE — 99214 OFFICE O/P EST MOD 30 MIN: CPT | Performed by: FAMILY MEDICINE

## 2019-10-15 PROCEDURE — 36415 COLL VENOUS BLD VENIPUNCTURE: CPT | Performed by: FAMILY MEDICINE

## 2019-10-15 PROCEDURE — 83036 HEMOGLOBIN GLYCOSYLATED A1C: CPT | Performed by: FAMILY MEDICINE

## 2019-10-15 RX ORDER — CELECOXIB 200 MG/1
200 CAPSULE ORAL 2 TIMES DAILY
Qty: 60 CAPSULE | Refills: 1 | Status: SHIPPED | OUTPATIENT
Start: 2019-10-15 | End: 2020-01-31

## 2019-10-15 RX ORDER — PREDNISONE 20 MG/1
20 TABLET ORAL DAILY
Qty: 5 TABLET | Refills: 0 | Status: SHIPPED | OUTPATIENT
Start: 2019-10-15 | End: 2019-12-12

## 2019-10-15 RX ORDER — GLIPIZIDE 10 MG/1
10 TABLET, FILM COATED, EXTENDED RELEASE ORAL 2 TIMES DAILY
Qty: 180 TABLET | Refills: 1 | Status: SHIPPED | OUTPATIENT
Start: 2019-10-15 | End: 2020-06-09

## 2019-10-15 RX ORDER — HYDROCODONE BITARTRATE AND ACETAMINOPHEN 5; 325 MG/1; MG/1
1 TABLET ORAL EVERY 6 HOURS PRN
Qty: 10 TABLET | Refills: 0 | Status: SHIPPED | OUTPATIENT
Start: 2019-10-15 | End: 2020-01-31

## 2019-10-15 ASSESSMENT — ENCOUNTER SYMPTOMS
CONSTITUTIONAL NEGATIVE: 1
DIFFICULTY URINATING: 0
PARESTHESIAS: 1
WEAKNESS: 0
BACK PAIN: 1

## 2019-10-15 ASSESSMENT — ANXIETY QUESTIONNAIRES
7. FEELING AFRAID AS IF SOMETHING AWFUL MIGHT HAPPEN: SEVERAL DAYS
7. FEELING AFRAID AS IF SOMETHING AWFUL MIGHT HAPPEN: SEVERAL DAYS
3. WORRYING TOO MUCH ABOUT DIFFERENT THINGS: SEVERAL DAYS
GAD7 TOTAL SCORE: 7
4. TROUBLE RELAXING: SEVERAL DAYS
2. NOT BEING ABLE TO STOP OR CONTROL WORRYING: SEVERAL DAYS
1. FEELING NERVOUS, ANXIOUS, OR ON EDGE: SEVERAL DAYS
5. BEING SO RESTLESS THAT IT IS HARD TO SIT STILL: SEVERAL DAYS
GAD7 TOTAL SCORE: 7
GAD7 TOTAL SCORE: 7
6. BECOMING EASILY ANNOYED OR IRRITABLE: SEVERAL DAYS

## 2019-10-15 ASSESSMENT — PATIENT HEALTH QUESTIONNAIRE - PHQ9
SUM OF ALL RESPONSES TO PHQ QUESTIONS 1-9: 13
SUM OF ALL RESPONSES TO PHQ QUESTIONS 1-9: 13
10. IF YOU CHECKED OFF ANY PROBLEMS, HOW DIFFICULT HAVE THESE PROBLEMS MADE IT FOR YOU TO DO YOUR WORK, TAKE CARE OF THINGS AT HOME, OR GET ALONG WITH OTHER PEOPLE: SOMEWHAT DIFFICULT

## 2019-10-15 ASSESSMENT — MIFFLIN-ST. JEOR: SCORE: 1847.08

## 2019-10-15 NOTE — PATIENT INSTRUCTIONS
Pairing acetaminophen (Tylenol) with ibuprofen (Advil) has been shown to be as effective as morphine for controlling pain.    600-800 mg ibuprofen (3-4 tabs) with breakfast, lunch and dinner (Or Aleve/Celebrex twice daily)    1000 mg acetaminophen (2 extra strength tabs) at mid-morning, mid-afternoon and evening.    Vicodin and Percocet combine a narcotic pain medication with acetaminophen in one pill.  If using these medications you may substitute one Vicodin or Percocet for one of the extra strength acetaminophen in the above schedule as needed.    Do not take more than 3000mg of acetaminophen (6 extra strength tabs) in 24 hours.

## 2019-10-15 NOTE — PROGRESS NOTES
Subjective     Ana Luisa Byers is a 59 year old female who presents to clinic today for the following health issues:    History of Present Illness        She eats 0-1 servings of fruits and vegetables daily.She consumes 0 sweetened beverage(s) daily.  She is taking medications regularly.     Back Pain       Duration: Flare up couple weeks ago        Specific cause: none    Description:   Location of pain: low back bilateral  Character of pain: sharp, dull ache, stabbing, gnawing, burning, fullness, cramping and constant  Pain radiation:radiates into the right leg, radiates into the right foot, radiates into the left leg and radiates into the left foot  New numbness or weakness in legs, not attributed to pain:  no     Intensity: Currently 7/10, At its worst 9/10    History:   Pain interferes with job: YES  History of back problems: previous osteoarthritis of lumbar spine  Any previous MRI or X-rays: Yes- at [facility].  Date 5/18  Sees a specialist for back pain:  No  Therapies tried without relief: asper cream, doughnut seat cussion, acetaminophen (Tylenol), cold and heat    Alleviating factors:   Improved by: none      Precipitating factors:  Worsened by: Lifting, Bending, Standing, Sitting, Lying Flat, Walking and Coughing    Recurrent back pain, s/p laminectomy in 2009.  Noting some numbness in her R leg, burning pain into R buttock.  Currently using tylenol for pain - 1000mg every 4-6 hours.  Avoids NSAIDs due to reflux sx.    MRI from 5/18 reviewed, showing multilevel, bilateral nerve root impingement.  Ana Luisa notes that at that time her pain was primarily on the left.      Accompanying Signs & Symptoms:  Risk of Fracture:  None  Risk of Cauda Equina:  None  Risk of Infection:  None  Risk of Cancer:  None  Risk of Ankylosing Spondylitis:  Onset at age <35, male, AND morning back stiffness. no                Review of Systems   Constitutional: Negative.    Gastrointestinal:        Denies fecal incontinence  "  Genitourinary: Negative for difficulty urinating.   Musculoskeletal: Positive for back pain.   Neurological: Positive for paresthesias. Negative for weakness.            Objective    /70 (BP Location: Right arm, Patient Position: Sitting, Cuff Size: Adult Large)   Pulse 83   Temp 97.6  F (36.4  C) (Oral)   Resp 18   Ht 1.638 m (5' 4.5\")   Wt 127.9 kg (282 lb)   LMP 02/13/2009   SpO2 96%   BMI 47.66 kg/m    Body mass index is 47.66 kg/m .  Physical Exam  Vitals signs reviewed.   HENT:      Head: Normocephalic and atraumatic.   Musculoskeletal:      Lumbar back: She exhibits spasm. She exhibits normal range of motion, no tenderness and no pain.   Skin:     General: Skin is warm and dry.   Neurological:      Mental Status: She is alert and oriented to person, place, and time.      Gait: Gait normal.      Deep Tendon Reflexes: Reflexes are normal and symmetric.      Reflex Scores:       Patellar reflexes are 2+ on the right side and 2+ on the left side.       Assessment and Plan    (Z23) Need for prophylactic vaccination and inoculation against influenza  (primary encounter diagnosis)  Comment: declines  Plan:     (E11.42) Type 2 diabetes mellitus with diabetic polyneuropathy, without long-term current use of insulin (H)  Comment: A1c trending up, increasing glipizide today  Plan: HEMOGLOBIN A1C, glipiZIDE (GLUCOTROL XL) 10 MG         24 hr tablet            (M54.41,  G89.29) Chronic bilateral low back pain with right-sided sciatica  Comment: discussed pain ladder, last MRI looks like an indication for surgery, will refer back to neurosurg for further eval  Plan: celecoxib (CELEBREX) 200 MG capsule, predniSONE        (DELTASONE) 20 MG tablet,         HYDROcodone-acetaminophen (NORCO) 5-325 MG         tablet, ORTHO  REFERRAL              RTC in     Jose Mora MD      Answers for HPI/ROS submitted by the patient on 10/15/2019   Chronic problems general questions HPI Form  If you checked off " any problems, how difficult have these problems made it for you to do your work, take care of things at home, or get along with other people?: Somewhat difficult  PHQ9 TOTAL SCORE: 13  BEATRIZ 7 TOTAL SCORE: 7

## 2019-10-16 RX ORDER — CELECOXIB 200 MG/1
CAPSULE ORAL
Qty: 180 CAPSULE | Refills: 1 | OUTPATIENT
Start: 2019-10-16

## 2019-10-16 ASSESSMENT — ANXIETY QUESTIONNAIRES: GAD7 TOTAL SCORE: 7

## 2019-10-16 ASSESSMENT — PATIENT HEALTH QUESTIONNAIRE - PHQ9: SUM OF ALL RESPONSES TO PHQ QUESTIONS 1-9: 13

## 2019-10-16 NOTE — TELEPHONE ENCOUNTER
"Requested Prescriptions   Pending Prescriptions Disp Refills     celecoxib (CELEBREX) 200 MG capsule [Pharmacy Med Name: CELECOXIB 200MG CAPSULES] 180 capsule 1     Sig: TAKE 1 CAPSULE(200 MG) BY MOUTH TWICE DAILY   Last Written Prescription Date:  10/15/19  Last Fill Quantity: 60,  # refills: 1   Last Office Visit: 10/15/2019 Mora  Future Office Visit:         NSAID Medications Failed - 10/15/2019  7:50 PM        Failed - Normal ALT on file in past 12 months     Recent Labs   Lab Test 07/05/16  0924   ALT 37             Failed - Normal AST on file in past 12 months     Recent Labs   Lab Test 07/05/16  0924   AST 25             Failed - Normal CBC on file in past 12 months     Recent Labs   Lab Test 08/22/18  0824   WBC 5.2   RBC 4.32   HGB 12.8   HCT 39.4                    Passed - Blood pressure under 140/90 in past 12 months     BP Readings from Last 3 Encounters:   10/15/19 110/70   09/24/19 130/78   08/20/19 126/80                 Passed - Recent (12 mo) or future (30 days) visit within the authorizing provider's specialty     Patient has had an office visit with the authorizing provider or a provider within the authorizing providers department within the previous 12 mos or has a future within next 30 days. See \"Patient Info\" tab in inbasket, or \"Choose Columns\" in Meds & Orders section of the refill encounter.              Passed - Patient is age 6-64 years        Passed - Medication is active on med list        Passed - No active pregnancy on record        Passed - Normal serum creatinine on file in past 12 months     Recent Labs   Lab Test 07/22/19  0743  09/02/15  1102   CR 0.97   < >  --    CREAT  --   --  1.0    < > = values in this interval not displayed.             Passed - No positive pregnancy test in past 12 months        "

## 2019-11-01 ENCOUNTER — TRANSFERRED RECORDS (OUTPATIENT)
Dept: HEALTH INFORMATION MANAGEMENT | Facility: CLINIC | Age: 59
End: 2019-11-01

## 2019-11-04 ENCOUNTER — OFFICE VISIT (OUTPATIENT)
Dept: NEUROSURGERY | Facility: CLINIC | Age: 59
End: 2019-11-04
Attending: FAMILY MEDICINE
Payer: MEDICARE

## 2019-11-04 VITALS
OXYGEN SATURATION: 96 % | HEART RATE: 90 BPM | SYSTOLIC BLOOD PRESSURE: 133 MMHG | DIASTOLIC BLOOD PRESSURE: 81 MMHG | TEMPERATURE: 98.1 F

## 2019-11-04 DIAGNOSIS — M48.061 SPINAL STENOSIS OF LUMBAR REGION WITH RADICULOPATHY: Primary | ICD-10-CM

## 2019-11-04 DIAGNOSIS — M54.16 SPINAL STENOSIS OF LUMBAR REGION WITH RADICULOPATHY: Primary | ICD-10-CM

## 2019-11-04 PROCEDURE — G0463 HOSPITAL OUTPT CLINIC VISIT: HCPCS

## 2019-11-04 PROCEDURE — 99203 OFFICE O/P NEW LOW 30 MIN: CPT | Performed by: NEUROLOGICAL SURGERY

## 2019-11-04 ASSESSMENT — PAIN SCALES - GENERAL: PAINLEVEL: SEVERE PAIN (6)

## 2019-11-04 NOTE — NURSING NOTE
Pre-operative Education    Education included but not limited to:  - Pre-operative physical with primary care physician within 30 days of surgical date. Pre op [facility].  - Pre-operative clearance (cardiology, hematology, etc).   - Discontinue Aspirin, NSAIDs, Diclofenac x 7 days prior to surgical date.   -May try tylenol for pain 1000 mg three times per day for pain  -you may resume taking NSAIDs 7 days post op   -Patient is not a smoker  -Forms to be completed: none per patient  -Surgical risks: blood clots in the leg or lung, problems urinating, nerve damage, drainage from the incision, infection,stiffness  -Preparation timeline  - When to start NPO           -Special bathing procedure.Patient received Hibiclens and showering instruction sheet.   Hospital stay:    Checking in    The surgery itself     Recovery room    - Managing pain   - Likely same day procedure with discharge home day of surgery, may stay for 23 hour observation hospitalization for monitoring  - Post operative incisional pain x 1-2 weeks which will require pain medications and muscle relaxants.   -Do NOT drive or drink alcohol while taking narcotic pain medication.  -Post operative incision care-Keep your incision clean and dry at all times. OK to remove dressing on postop day 2. OK to shower on postop day 3 and allow water to run over incision, pat dry after shower. No bathing, swimming or submerging in water. Call immediately or come to ED if any drainage occurs, or you develop new pain. Watch for signs of infection: redness, swelling, warmth, drainage, and fever of 101 degrees or higher. Notify clinic.   - Post operative activity limitations: 6-8 weeks, no lifting > 10 pounds, no bending, twisting, or overhead reaching.  -Ok to walk as tolerated, avoid bed rest and prolonged sitting.  -No contact sports until after follow up visit  -No high impact activities such as; running/jogging, snowmobile or 4 piper riding or any other  recreational vehicles.  - Post op follow up appointments:  6 weeks with Physician Assistant or Nurse Practitioner. Please call to schedule follow up appointment at [phone].   - Spine Education information and printout was also given to the patient for further review.    Patient verbalized understanding of above instructions. All questions were answered to the best of my ability and the patient's satisfaction. Patient advised to call with any additional questions or concerns.  Cadence Dawson RN

## 2019-11-04 NOTE — PROGRESS NOTES
It was a pleasure to see Ana Luisa Byers today in Neurosurgery Clinic. She is a 59 year old female who has several years of back pain with numbness radiating down the lateral right lower extremity.  She does not have any symptoms in the left lower extremity.  The symptoms get worse if she stands and walks after about 5 or 10 minutes.  Her back pain and leg numbness are concordant.  She has a positive shopping cart sign.  She has a history of a previous lumbar decompression and discectomy in 2011 at what appears to be L4-5.  She also has had other orthopedic problems.  Including shoulder problems and peripheral nerve issues.  She has had previous injections into her shoulder which have not helped and is hesitant to do injections into the lumbar spine.  She has done physical therapy in the past and currently continues to work out in a pool 3 times a week doing pool therapy exercises.  She has a history of postop nausea and vomiting and has not had any problems with her previous surgeries.    Past Medical History:   Diagnosis Date     Arthritis     left shoulder and back     Chronic pain     fibromalgia      CKD (chronic kidney disease), stage III (H)      Fibromyalgia      Gastro-oesophageal reflux disease      HTN, goal below 140/90      Hyperlipidemia LDL goal <100      Hypothyroidism      Major depression     chronic kidney disease-stage 3     Peripheral neuropathy Feb 2015    + callus. diabetic shoes rx done     PONV (postoperative nausea and vomiting)      Rheumatoid arthritis of foot (H)      Type 2 diabetes, HbA1C goal < 8% (H)      Past Surgical History:   Procedure Laterality Date     ARTHROPLASTY SHOULDER  7/25/2013    Procedure: ARTHROPLASTY SHOULDER;  RIGHT TOTAL SHOULDER ARTHROPLASTY (TORNIER AFFINITY SHOULDER SYSTEM)^;  Surgeon: Dung Morales MD;  Location: [facility] OR     BACK SURGERY  2011    L45 laminectomy     C APPENDECTOMY  age 19     C LIGATE FALLOPIAN TUBE,POSTPARTUM  1982    Tubal Ligation      ELBOW WRIST HAND FINGER ORTHOSIS, RIGID, WITHOUT JOINTS, MAY INCLUDE SOFT  5/2007    put in [place]     ESOPHAGOSCOPY, GASTROSCOPY, DUODENOSCOPY (EGD), COMBINED  9/23/2015    Dr. Thomas [facility]     ESOPHAGOSCOPY, GASTROSCOPY, DUODENOSCOPY (EGD), COMBINED N/A 9/23/2015    Procedure: COMBINED ESOPHAGOSCOPY, GASTROSCOPY, DUODENOSCOPY (EGD), BIOPSY SINGLE OR MULTIPLE;  Surgeon: Jose Thomas MD;  Location: [facility]     HCL PAP SMEAR  6/2008    WNL     ORTHOPEDIC SURGERY      left shoulder scoped and plate left elbow     SURGICAL HISTORY OF -   2009    ulnar nerve release, carpal tunnel- left        Allergies   Allergen Reactions     Penicillins Anaphylaxis     Bee Swelling     Gabapentin Other (See Comments)     Mood Swings     Lisinopril Rash     Metformin Itching     Novocain [Procaine Hcl] Swelling     Topiramate Other (See Comments)     numbness       Current Outpatient Medications:      aspirin 81 MG chewable tablet, Take 1 tablet (81 mg) by mouth daily, Disp: 108 tablet, Rfl: 3     atorvastatin (LIPITOR) 40 MG tablet, Take 1 tablet (40 mg) by mouth daily, Disp: 90 tablet, Rfl: 3     betamethasone dipropionate (DIPROSONE) 0.05 % external cream, [place] EXT AA BID FOR 2 TO 3 WKS THEN PRN, Disp: , Rfl: 2     buPROPion (WELLBUTRIN XL) 150 MG 24 hr tablet, Take 1 tablet (150 mg) by mouth every morning, Disp: 30 tablet, Rfl: 1     celecoxib (CELEBREX) 200 MG capsule, Take 1 capsule (200 mg) by mouth 2 times daily, Disp: 60 capsule, Rfl: 1     Cholecalciferol (VITAMIN D) 2000 UNITS tablet, Take 1 tablet by mouth daily, Disp: 90 tablet, Rfl: 3     DULoxetine (CYMBALTA) 30 MG EC capsule, Take 1 capsule (30 mg) by mouth daily Take along with 60 mg capsule, Disp: 90 capsule, Rfl: 3     DULoxetine (CYMBALTA) 60 MG EC capsule, Take 1 capsule (60 mg) by mouth daily, Disp: 90 capsule, Rfl: 3     EPINEPHrine (EPIPEN/ADRENACLICK/OR ANY BX GENERIC EQUIV) 0.3 MG/0.3ML injection 2-pack, Inject 0.3 mLs (0.3 mg) into the muscle once as  needed for anaphylaxis, Disp: 1 mL, Rfl: 1     esomeprazole (NEXIUM) 40 MG DR capsule, TK ONE C PO QD, Disp: , Rfl: 0     furosemide (LASIX) 40 MG tablet, Take 1 tablet (40 mg) by mouth daily, Disp: 90 tablet, Rfl: 3     glipiZIDE (GLUCOTROL XL) 10 MG 24 hr tablet, Take 1 tablet (10 mg) by mouth 2 times daily, Disp: 180 tablet, Rfl: 1     hydrOXYzine (ATARAX) 25 MG tablet, Take 1-2 tablets (25-50 mg) by mouth 2 times daily as needed for itching, Disp: 120 tablet, Rfl: 0     losartan (COZAAR) 25 MG tablet, Take 0.5 tablets (12.5 mg) by mouth daily, Disp: 45 tablet, Rfl: 3     methocarbamol (ROBAXIN) 500 MG tablet, Take 2 tablets (1,000 mg) by mouth 3 times daily as needed for muscle spasms, Disp: 20 tablet, Rfl: 3     mupirocin (BACTROBAN) 2 % external ointment, APPLY 1 APPLICATION TO SORES ON ARMS AND FACE TID, Disp: , Rfl: 1     zolpidem (AMBIEN) 10 MG tablet, TAKE 1 TABLET(10 MG) BY MOUTH EVERY NIGHT AS NEEDED FOR SLEEP, Disp: 30 tablet, Rfl: 0  Social History     Socioeconomic History     Marital status:      Spouse name: Not on file     Number of children: 4     Years of education: Not on file     Highest education level: Not on file   Occupational History     Employer: UNEMPLOYED   Social Needs     Financial resource strain: Not on file     Food insecurity:     Worry: Not on file     Inability: Not on file     Transportation needs:     Medical: Not on file     Non-medical: Not on file   Tobacco Use     Smoking status: Never Smoker     Smokeless tobacco: Never Used   Substance and Sexual Activity     Alcohol use: No     Drug use: No     Sexual activity: Not Currently     Birth control/protection: Surgical   Lifestyle     Physical activity:     Days per week: Not on file     Minutes per session: Not on file     Stress: Not on file   Relationships     Social connections:     Talks on phone: Not on file     Gets together: Not on file     Attends Yarsani service: Not on file     Active member of club or  organization: Not on file     Attends meetings of clubs or organizations: Not on file     Relationship status: Not on file     Intimate partner violence:     Fear of current or ex partner: Not on file     Emotionally abused: Not on file     Physically abused: Not on file     Forced sexual activity: Not on file   Other Topics Concern     Parent/sibling w/ CABG, MI or angioplasty before 65F 55M? Yes     Comment: mother  of congested heart failure -  at age 60   Social History Narrative     Not on file      Problem (# of Occurrences) Relation (Name,Age of Onset)    C.A.D. (1) Mother    Depression (1) Mother    Diabetes (1) Maternal Aunt    Family History Negative (1) Father    Neurologic Disorder (1) Mother: lupus       Negative family history of: Colon Cancer           ROS: 10 point ROS neg other than the symptoms noted above in the HPI.    There were no vitals filed for this visit.  There is no height or weight on file to calculate BMI.  Data Unavailable    Oswestry (EARL) Questionnaire    OSWESTRY DISABILITY INDEX 5/3/2017   Count 10   Sum 32   Oswestry Score (%) 64   Some recent data might be hidden       Visual Analog Scale (VAS) Questionnaire    No flowsheet data found.       Awake alert oriented.  Speech clear and fluent  Facial movements full and symmetric.  Bilateral upper extremity strength is 5 out of 5 in all muscle groups.  Bilateral lower extremity strength is 5 out of 5 in all muscle groups.  Reflexes 1+ bilateral patella 0 bilateral Achilles.    She has diffuse numbness from the ankles down related to neuropathy.    She has some more extensive right lower extremity numbness to pinprick.  Is not clear which pattern that it may follow.    Straight leg raise negative bilaterally.    Well-healed lumbar incision.  Diffuse tenderness to palpation of the lumbar spine    Posture erect without obvious deformity.    Imaging: She has an MRI of the lumbar spine from  that demonstrates previous  surgery at the L4-5 level with recurrent stenosis particularly in the right lateral recess from a combination of disc bulging and facet hypertrophy.  This is clearly worse than an MRI that was performed several years prior.  The imaging was reviewed with the patient and shown to the patient in clinic today.    Assessment: Lumbar stenosis with radiculopathy/neurogenic claudication.    Plan: I think the patient would benefit from a right L4-5 minimally invasive redo decompression/discectomy.  We discussed the risk benefits and alternatives to this procedure, risks including bleeding infection failure to improve cerebrospinal fluid leak or nerve injury or other complications from anesthesia.  Patient is interested in proceeding with surgery in the near future.  We will work on scheduling her.

## 2019-11-04 NOTE — LETTER
11/4/2019         RE: Ana Luisa Byers  [address]  [facility] [address]        Dear Colleague,    Thank you for referring your patient, Ana Luisa Byers, to the [facility]. Please see a copy of my visit note below.    It was a pleasure to see Ana Luisa Byers today in Neurosurgery Clinic. She is a 59 year old female who has several years of back pain with numbness radiating down the lateral right lower extremity.  She does not have any symptoms in the left lower extremity.  The symptoms get worse if she stands and walks after about 5 or 10 minutes.  Her back pain and leg numbness are concordant.  She has a positive shopping cart sign.  She has a history of a previous lumbar decompression and discectomy in 2011 at what appears to be L4-5.  She also has had other orthopedic problems.  Including shoulder problems and peripheral nerve issues.  She has had previous injections into her shoulder which have not helped and is hesitant to do injections into the lumbar spine.  She has done physical therapy in the past and currently continues to work out in a pool 3 times a week doing pool therapy exercises.  She has a history of postop nausea and vomiting and has not had any problems with her previous surgeries.    Past Medical History:   Diagnosis Date     Arthritis     left shoulder and back     Chronic pain     fibromalgia      CKD (chronic kidney disease), stage III (H)      Fibromyalgia      Gastro-oesophageal reflux disease      HTN, goal below 140/90      Hyperlipidemia LDL goal <100      Hypothyroidism      Major depression     chronic kidney disease-stage 3     Peripheral neuropathy Feb 2015    + callus. diabetic shoes rx done     PONV (postoperative nausea and vomiting)      Rheumatoid arthritis of foot (H)      Type 2 diabetes, HbA1C goal < 8% (H)      Past Surgical History:   Procedure Laterality Date     ARTHROPLASTY SHOULDER  7/25/2013    Procedure: ARTHROPLASTY  SHOULDER;  RIGHT TOTAL SHOULDER ARTHROPLASTY (TORNIER AFFINITY SHOULDER SYSTEM)^;  Surgeon: uDng Morales MD;  Location: [facility] OR     BACK SURGERY  2011    L45 laminectomy     C APPENDECTOMY  age 19     C LIGATE FALLOPIAN TUBE,POSTPARTUM  1982    Tubal Ligation     ELBOW WRIST HAND FINGER ORTHOSIS, RIGID, WITHOUT JOINTS, MAY INCLUDE SOFT  5/2007    put in [place]     ESOPHAGOSCOPY, GASTROSCOPY, DUODENOSCOPY (EGD), COMBINED  9/23/2015    Dr. Thomas [facility]     ESOPHAGOSCOPY, GASTROSCOPY, DUODENOSCOPY (EGD), COMBINED N/A 9/23/2015    Procedure: COMBINED ESOPHAGOSCOPY, GASTROSCOPY, DUODENOSCOPY (EGD), BIOPSY SINGLE OR MULTIPLE;  Surgeon: Jose Thomas MD;  Location:  GI     HCL PAP SMEAR  6/2008    [facility]     ORTHOPEDIC SURGERY      left shoulder scoped and plate left elbow     SURGICAL HISTORY OF -   2009    ulnar nerve release, carpal tunnel- left        Allergies   Allergen Reactions     Penicillins Anaphylaxis     Bee Swelling     Gabapentin Other (See Comments)     Mood Swings     Lisinopril Rash     Metformin Itching     Novocain [Procaine Hcl] Swelling     Topiramate Other (See Comments)     numbness       Current Outpatient Medications:      aspirin 81 MG chewable tablet, Take 1 tablet (81 mg) by mouth daily, Disp: 108 tablet, Rfl: 3     atorvastatin (LIPITOR) 40 MG tablet, Take 1 tablet (40 mg) by mouth daily, Disp: 90 tablet, Rfl: 3     betamethasone dipropionate (DIPROSONE) 0.05 % external cream, [place] EXT AA BID FOR 2 TO 3 WKS THEN PRN, Disp: , Rfl: 2     buPROPion (WELLBUTRIN XL) 150 MG 24 hr tablet, Take 1 tablet (150 mg) by mouth every morning, Disp: 30 tablet, Rfl: 1     celecoxib (CELEBREX) 200 MG capsule, Take 1 capsule (200 mg) by mouth 2 times daily, Disp: 60 capsule, Rfl: 1     Cholecalciferol (VITAMIN D) 2000 UNITS tablet, Take 1 tablet by mouth daily, Disp: 90 tablet, Rfl: 3     DULoxetine (CYMBALTA) 30 MG EC capsule, Take 1 capsule (30 mg) by mouth daily Take along with 60 mg capsule, Disp: 90  capsule, Rfl: 3     DULoxetine (CYMBALTA) 60 MG EC capsule, Take 1 capsule (60 mg) by mouth daily, Disp: 90 capsule, Rfl: 3     EPINEPHrine (EPIPEN/ADRENACLICK/OR ANY BX GENERIC EQUIV) 0.3 MG/0.3ML injection 2-pack, Inject 0.3 mLs (0.3 mg) into the muscle once as needed for anaphylaxis, Disp: 1 mL, Rfl: 1     esomeprazole (NEXIUM) 40 MG DR capsule, TK ONE C PO QD, Disp: , Rfl: 0     furosemide (LASIX) 40 MG tablet, Take 1 tablet (40 mg) by mouth daily, Disp: 90 tablet, Rfl: 3     glipiZIDE (GLUCOTROL XL) 10 MG 24 hr tablet, Take 1 tablet (10 mg) by mouth 2 times daily, Disp: 180 tablet, Rfl: 1     hydrOXYzine (ATARAX) 25 MG tablet, Take 1-2 tablets (25-50 mg) by mouth 2 times daily as needed for itching, Disp: 120 tablet, Rfl: 0     losartan (COZAAR) 25 MG tablet, Take 0.5 tablets (12.5 mg) by mouth daily, Disp: 45 tablet, Rfl: 3     methocarbamol (ROBAXIN) 500 MG tablet, Take 2 tablets (1,000 mg) by mouth 3 times daily as needed for muscle spasms, Disp: 20 tablet, Rfl: 3     mupirocin (BACTROBAN) 2 % external ointment, APPLY 1 APPLICATION TO SORES ON ARMS AND FACE TID, Disp: , Rfl: 1     zolpidem (AMBIEN) 10 MG tablet, TAKE 1 TABLET(10 MG) BY MOUTH EVERY NIGHT AS NEEDED FOR SLEEP, Disp: 30 tablet, Rfl: 0  Social History     Socioeconomic History     Marital status:      Spouse name: Not on file     Number of children: 4     Years of education: Not on file     Highest education level: Not on file   Occupational History     Employer: UNEMPLOYED   Social Needs     Financial resource strain: Not on file     Food insecurity:     Worry: Not on file     Inability: Not on file     Transportation needs:     Medical: Not on file     Non-medical: Not on file   Tobacco Use     Smoking status: Never Smoker     Smokeless tobacco: Never Used   Substance and Sexual Activity     Alcohol use: No     Drug use: No     Sexual activity: Not Currently     Birth control/protection: Surgical   Lifestyle     Physical activity:      Days per week: Not on file     Minutes per session: Not on file     Stress: Not on file   Relationships     Social connections:     Talks on phone: Not on file     Gets together: Not on file     Attends Church service: Not on file     Active member of club or organization: Not on file     Attends meetings of clubs or organizations: Not on file     Relationship status: Not on file     Intimate partner violence:     Fear of current or ex partner: Not on file     Emotionally abused: Not on file     Physically abused: Not on file     Forced sexual activity: Not on file   Other Topics Concern     Parent/sibling w/ CABG, MI or angioplasty before 65F 55M? Yes     Comment: mother  of congested heart failure -  at age 60   Social History Narrative     Not on file      Problem (# of Occurrences) Relation (Name,Age of Onset)    C.A.D. (1) Mother    Depression (1) Mother    Diabetes (1) Maternal Aunt    Family History Negative (1) Father    Neurologic Disorder (1) Mother: lupus       Negative family history of: Colon Cancer           ROS: 10 point ROS neg other than the symptoms noted above in the HPI.    There were no vitals filed for this visit.  There is no height or weight on file to calculate BMI.  Data Unavailable    Oswestry (EARL) Questionnaire    OSWESTRY DISABILITY INDEX 5/3/2017   Count 10   Sum 32   Oswestry Score (%) 64   Some recent data might be hidden       Visual Analog Scale (VAS) Questionnaire    No flowsheet data found.       Awake alert oriented.  Speech clear and fluent  Facial movements full and symmetric.  Bilateral upper extremity strength is 5 out of 5 in all muscle groups.  Bilateral lower extremity strength is 5 out of 5 in all muscle groups.  Reflexes 1+ bilateral patella 0 bilateral Achilles.    She has diffuse numbness from the ankles down related to neuropathy.    She has some more extensive right lower extremity numbness to pinprick.  Is not clear which pattern that it may  follow.    Straight leg raise negative bilaterally.    Well-healed lumbar incision.  Diffuse tenderness to palpation of the lumbar spine    Posture erect without obvious deformity.    Imaging: She has an MRI of the lumbar spine from November 1 that demonstrates previous surgery at the L4-5 level with recurrent stenosis particularly in the right lateral recess from a combination of disc bulging and facet hypertrophy.  This is clearly worse than an MRI that was performed several years prior.  The imaging was reviewed with the patient and shown to the patient in clinic today.    Assessment: Lumbar stenosis with radiculopathy/neurogenic claudication.    Plan: I think the patient would benefit from a right L4-5 minimally invasive redo decompression/discectomy.  We discussed the risk benefits and alternatives to this procedure, risks including bleeding infection failure to improve cerebrospinal fluid leak or nerve injury or other complications from anesthesia.  Patient is interested in proceeding with surgery in the near future.  We will work on scheduling her.        Again, thank you for allowing me to participate in the care of your patient.        Sincerely,        Yash Alfonso MD

## 2019-11-04 NOTE — PATIENT INSTRUCTIONS
Patient Instructions  Pre-Operative:  -Surgery scheduled at [facility] for Redo Right L4-5 Decompression and discectomy.  -Surgical risks: blood clots in the leg or lung, problems urinating, nerve damage, drainage from the incision, infection,stiffness  - Pre-operative physical with primary care physician within 30 days of surgical date.   - Likely same day procedure with discharge home day of surgery, may stay for 23 hour observation hospitalization for monitoring.     -Shower procedure: please shower with antimicrobial soap the night before surgery and morning of surgery. Please refer to showering instruction sheet in folder.  -Stop all solid foods 8 hours before surgery.  -Keep drinking clear liquids until 4 hours before surgery. Clear liquids include water, clear juice, black coffee, or clear tea without milk, Gatorade, clear soda.   - Discontinue Aspirin, NSAIDs (Advil/Ibuprofen, Naproxen,Nuprin,Relafen/Nabumetone, Diclofenac,Meloxicam, Aleve, Celebrex) x 7 days prior to surgical date.    - May try tylenol for pain 1000 mg three times per day for pain    -Ana Luisa to follow up with Primary Care provider regarding elevated blood pressure.        Post-Operative:  -you may resume taking NSAIDs post op   - Post operative incisional pain x 1-2 weeks which will require pain medications and muscle relaxants. You will receive medication upon discharge.  -Do NOT drive while taking narcotic pain medication.  -Do not drink alcohol while using any pain medication  -Post operative incision care- Watch for signs of infection: redness, swelling, warmth, drainage, and fever of 101 degrees or higher. Notify clinic [phone].  -No submerging incision in water such as pools, hot tubs,baths for at least 8 weeks or until incision is healed. Showers are fine.   - Post operative activity limitations: 6-8 weeks, no lifting > 10 pounds, no bending, twisting, or overhead reaching.  -Ok to walk as tolerated, avoid bed  rest and prolonged sitting.  -No contact sports until after follow up visit  -No high impact activities such as; running/jogging, snowmobile or 4 piper riding or any other recreational vehicles.  - Post op follow up appointments:6 week post op follow up visit with Nurse practitioner or Physician Assistant.Please call to schedule follow up appointment at [phone].  -If you are currently employed, you will need to be off work for 2-4 weeks for post op recovery and healing.

## 2019-11-04 NOTE — NURSING NOTE
"Ana Luisa Byers is a 59 year old female who presents for:  Chief Complaint   Patient presents with     Back Pain     low back pain and right leg numbness         Initial Vitals:  BP (!) 144/82 (BP Location: Right arm, Patient Position: Sitting, Cuff Size: Adult Large)   Pulse 90   Temp 98.1  F (36.7  C) (Oral)   LMP 02/13/2009   SpO2 96%  Estimated body mass index is 47.66 kg/m  as calculated from the following:    Height as of 10/15/19: 5' 4.5\" (1.638 m).    Weight as of 10/15/19: 282 lb (127.9 kg).. There is no height or weight on file to calculate BSA. BP completed using cuff size: large  Severe Pain (6)          Cadence Dawson RN    "

## 2019-11-08 ENCOUNTER — HEALTH MAINTENANCE LETTER (OUTPATIENT)
Age: 59
End: 2019-11-08

## 2019-11-13 DIAGNOSIS — M79.18 MYOFASCIAL PAIN: ICD-10-CM

## 2019-11-13 DIAGNOSIS — G89.4 CHRONIC PAIN ASSOCIATED WITH SIGNIFICANT PSYCHOSOCIAL DYSFUNCTION: ICD-10-CM

## 2019-11-13 RX ORDER — DULOXETIN HYDROCHLORIDE 30 MG/1
30 CAPSULE, DELAYED RELEASE ORAL DAILY
Qty: 90 CAPSULE | Refills: 3 | OUTPATIENT
Start: 2019-11-13

## 2019-11-13 NOTE — TELEPHONE ENCOUNTER
"Requested Prescriptions   Pending Prescriptions Disp Refills     DULoxetine (CYMBALTA) 30 MG capsule 90 capsule 3     Sig: Take 1 capsule (30 mg) by mouth daily Take along with 60 mg capsule     Last Written Prescription Date:  11/13/2018  Last Fill Quantity: 90 capsule,  # refills: 3   Last office visit: 10/15/2019 with prescribing provider:  07/19/2019   Future Office Visit:     Next 5 appointments (look out 90 days)    Nov 19, 2019  9:40 AM CST  MyChart Short with Jose Mora MD  [facility] [address] [place] [address], [address]  [facility] [address] [phone]               Serotonin-Norepinephrine Reuptake Inhibitors  Passed - 11/13/2019  7:14 AM        Passed - Blood pressure under 140/90 in past 12 months     BP Readings from Last 3 Encounters:   11/04/19 133/81   10/15/19 110/70   09/24/19 130/78                 Passed - Recent (12 mo) or future (30 days) visit within the authorizing provider's specialty     Patient has had an office visit with the authorizing provider or a provider within the authorizing providers department within the previous 12 mos or has a future within next 30 days. See \"Patient Info\" tab in inbasket, or \"Choose Columns\" in Meds & Orders section of the refill encounter.              Passed - Medication is active on med list        Passed - Patient is age 18 or older        Passed - No active pregnancy on record        Passed - No positive pregnancy test in past 12 months          Vidal RICHEY  "

## 2019-11-29 ENCOUNTER — MYC MEDICAL ADVICE (OUTPATIENT)
Dept: FAMILY MEDICINE | Facility: CLINIC | Age: 59
End: 2019-11-29

## 2019-11-29 NOTE — LETTER
[address], [address]  [facility] [address]  Phone: [phone]  Fax: [phone]    December 2, 2019        Ana Luisa E Byers  [address]   [facility] [address]          To whom it may concern:    RE: Ana Luisa Orosco is under my care for anxiety.  She finds great comfort in her cat and should be allowed to have it with her in her apartment.    Please contact me for questions or concerns.      Sincerely,        Jose Mora MD

## 2019-12-02 NOTE — TELEPHONE ENCOUNTER
Left message for patient to return call, see if she wants to / mail/ or email letter     Shannan Bellamy/KALI

## 2019-12-12 ENCOUNTER — OFFICE VISIT (OUTPATIENT)
Dept: FAMILY MEDICINE | Facility: CLINIC | Age: 59
End: 2019-12-12
Payer: MEDICARE

## 2019-12-12 VITALS
HEIGHT: 64 IN | WEIGHT: 276.9 LBS | DIASTOLIC BLOOD PRESSURE: 88 MMHG | TEMPERATURE: 97.9 F | HEART RATE: 82 BPM | RESPIRATION RATE: 18 BRPM | BODY MASS INDEX: 47.27 KG/M2 | OXYGEN SATURATION: 97 % | SYSTOLIC BLOOD PRESSURE: 130 MMHG

## 2019-12-12 DIAGNOSIS — J01.90 ACUTE SINUSITIS WITH SYMPTOMS > 10 DAYS: ICD-10-CM

## 2019-12-12 DIAGNOSIS — L20.9 ATOPIC DERMATITIS, UNSPECIFIED TYPE: ICD-10-CM

## 2019-12-12 DIAGNOSIS — Z12.31 VISIT FOR SCREENING MAMMOGRAM: Primary | ICD-10-CM

## 2019-12-12 DIAGNOSIS — E11.40 TYPE 2 DIABETES MELLITUS WITH DIABETIC NEUROPATHY, WITHOUT LONG-TERM CURRENT USE OF INSULIN (H): ICD-10-CM

## 2019-12-12 PROCEDURE — 99214 OFFICE O/P EST MOD 30 MIN: CPT | Performed by: FAMILY MEDICINE

## 2019-12-12 RX ORDER — CHOLECALCIFEROL (VITAMIN D3) 50 MCG
1 TABLET ORAL DAILY
Qty: 90 TABLET | Refills: 3 | Status: SHIPPED | OUTPATIENT
Start: 2019-12-12 | End: 2020-11-02

## 2019-12-12 RX ORDER — HYDROXYZINE HYDROCHLORIDE 25 MG/1
25-50 TABLET, FILM COATED ORAL 2 TIMES DAILY PRN
Qty: 120 TABLET | Refills: 0 | Status: SHIPPED | OUTPATIENT
Start: 2019-12-12 | End: 2020-01-13

## 2019-12-12 RX ORDER — AMOXICILLIN AND CLAVULANATE POTASSIUM 500; 125 MG/1; MG/1
1 TABLET, FILM COATED ORAL 3 TIMES DAILY
Qty: 30 TABLET | Refills: 0 | Status: SHIPPED | OUTPATIENT
Start: 2019-12-12 | End: 2019-12-17 | Stop reason: SINTOL

## 2019-12-12 ASSESSMENT — ENCOUNTER SYMPTOMS
FATIGUE: 1
HEADACHES: 1
SINUS PAIN: 1
COUGH: 0
SINUS PRESSURE: 1
SORE THROAT: 0

## 2019-12-12 ASSESSMENT — MIFFLIN-ST. JEOR: SCORE: 1816.01

## 2019-12-12 NOTE — PROGRESS NOTES
"Subjective     Ana Luisa Byers is a 59 year old female who presents to clinic today for the following health issues:    HPI     Currently on esomeprazole and states omeprazole has worked a lot better.     Has been feeling hot for 1.5 months with diaphoresis. Has been seeing dermatologist for red blotches on skin.     Acute Illness   Acute illness concerns: Feeling Hot, Diaphoresis, and Sinus Problems  Onset: One Month     Fever: no    Chills/Sweats: YES- Sweats    Headache (location?): YES- Lots of frontal HA's    Sinus Pressure:YES- Facial and PND    Conjunctivitis:  no    Ear Pain: YES- More so the Right    Rhinorrhea: YES    Congestion: YES- Nasal     Sore Throat: no     Cough: no    Wheeze: no    Decreased Appetite: no    Nausea: no    Vomiting: no    Diarrhea:  no    Dysuria/Freq.: no    Fatigue/Achiness: No     Sick/Strep Exposure: no     Therapies Tried and outcome: Has tried an OTC medication safe for HTN (unknwon medication) for sinus pressure and has not worked.    Feeling warm and sweaty all the time.  Not the same as hot flashes.  Sinuses have been bothering her, lots of drainage, ear pain.  Is having a frontal HA.  No cough, dyspnea.      recently had a heart attack.  Because of this they are eating better and Ana Luisa feels this has contributed to some weight loss.    Review of Systems   Constitutional: Positive for fatigue.   HENT: Positive for ear pain, sinus pressure and sinus pain. Negative for sore throat.    Respiratory: Negative for cough.    Endocrine: Positive for heat intolerance.   Neurological: Positive for headaches.            Objective    /88 (BP Location: Right arm, Patient Position: Chair, Cuff Size: Adult Large)   Pulse 82   Temp 97.9  F (36.6  C) (Oral)   Resp 18   Ht 1.626 m (5' 4\")   Wt 125.6 kg (276 lb 14.4 oz)   LMP 02/13/2009   SpO2 97%   BMI 47.53 kg/m    Body mass index is 47.53 kg/m .  Physical Exam  Vitals signs and nursing note reviewed. "   Constitutional:       General: She is not in acute distress.  HENT:      Right Ear: Tympanic membrane, ear canal and external ear normal.      Left Ear: Ear canal and external ear normal. Tympanic membrane is retracted.      Nose:      Right Sinus: Maxillary sinus tenderness and frontal sinus tenderness present.      Left Sinus: Maxillary sinus tenderness and frontal sinus tenderness present.      Mouth/Throat:      Pharynx: No oropharyngeal exudate.   Eyes:      Conjunctiva/sclera: Conjunctivae normal.   Cardiovascular:      Rate and Rhythm: Normal rate and regular rhythm.      Heart sounds: Normal heart sounds.   Pulmonary:      Effort: Pulmonary effort is normal.      Breath sounds: Normal breath sounds.   Lymphadenopathy:      Cervical: No cervical adenopathy.   Skin:     General: Skin is warm and dry.      Findings: No rash.        Assessment and Plan    (Z12.31) Visit for screening mammogram  (primary encounter diagnosis)  Comment: is overdue  Plan: MA SCREENING DIGITAL BILAT - Future  (s+30)            (L20.9) Atopic dermatitis, unspecified type  Comment: refill, occasional use  Plan: hydrOXYzine (ATARAX) 25 MG tablet            (E11.40) Type 2 diabetes mellitus with diabetic neuropathy, without long-term current use of insulin (H)  Comment:   Plan: TSH with free T4 reflex, vitamin D3         (CHOLECALCIFEROL) 2000 units (50 mcg) tablet            (J01.90) Acute sinusitis with symptoms > 10 days  Comment:   Plan: amoxicillin-clavulanate (AUGMENTIN) 500-125 MG         tablet              RTC in 1w prn    Jose Mora MD

## 2019-12-17 ENCOUNTER — MYC MEDICAL ADVICE (OUTPATIENT)
Dept: FAMILY MEDICINE | Facility: CLINIC | Age: 59
End: 2019-12-17

## 2019-12-17 DIAGNOSIS — J01.90 ACUTE SINUSITIS WITH SYMPTOMS > 10 DAYS: Primary | ICD-10-CM

## 2019-12-17 RX ORDER — CLARITHROMYCIN 500 MG
500 TABLET ORAL 2 TIMES DAILY
Qty: 14 TABLET | Refills: 0 | Status: SHIPPED | OUTPATIENT
Start: 2019-12-17 | End: 2020-03-17

## 2020-01-02 DIAGNOSIS — G89.4 CHRONIC PAIN ASSOCIATED WITH SIGNIFICANT PSYCHOSOCIAL DYSFUNCTION: ICD-10-CM

## 2020-01-02 DIAGNOSIS — M79.18 MYOFASCIAL PAIN: ICD-10-CM

## 2020-01-02 RX ORDER — DULOXETIN HYDROCHLORIDE 60 MG/1
60 CAPSULE, DELAYED RELEASE ORAL DAILY
Qty: 90 CAPSULE | Refills: 3 | OUTPATIENT
Start: 2020-01-02

## 2020-01-03 ENCOUNTER — MYC MEDICAL ADVICE (OUTPATIENT)
Dept: FAMILY MEDICINE | Facility: CLINIC | Age: 60
End: 2020-01-03

## 2020-01-03 DIAGNOSIS — K21.9 GASTROESOPHAGEAL REFLUX DISEASE WITHOUT ESOPHAGITIS: Primary | ICD-10-CM

## 2020-01-07 ENCOUNTER — TELEPHONE (OUTPATIENT)
Dept: FAMILY MEDICINE | Facility: CLINIC | Age: 60
End: 2020-01-07

## 2020-01-07 DIAGNOSIS — K21.9 GASTROESOPHAGEAL REFLUX DISEASE WITHOUT ESOPHAGITIS: ICD-10-CM

## 2020-01-07 RX ORDER — ESOMEPRAZOLE MAGNESIUM 40 MG/1
CAPSULE, DELAYED RELEASE ORAL
Qty: 180 CAPSULE | OUTPATIENT
Start: 2020-01-07

## 2020-01-07 RX ORDER — ESOMEPRAZOLE MAGNESIUM 40 MG/1
40 CAPSULE, DELAYED RELEASE ORAL
Qty: 30 CAPSULE | Refills: 1 | Status: SHIPPED | OUTPATIENT
Start: 2020-01-07 | End: 2020-01-07

## 2020-01-07 NOTE — TELEPHONE ENCOUNTER
"Pt calling she states  this am after eating breakfast and taking glipizide 10.     She check BS about 20-30 min after taking the glipizide.   Fasting BS was 155.  Advised pt that BS will go up with eating.      Pt is c/o being fatigued \"all the time\" but no other symptoms.     Advised to check BS at 9-930 am this will be 1 hours post glipizide.  If BS still high call back for further instructions.  Pt advised should be checking fasting BS and if she does want to check post meal to wait at least 1-2 hours post med.     Pt expressed understanding and acceptance of the plan.  Pt had no further questions at this time.  Advised can call back to clinic at any time with concerns.       Shira Hamm RN    "

## 2020-01-08 ENCOUNTER — E-VISIT (OUTPATIENT)
Dept: FAMILY MEDICINE | Facility: CLINIC | Age: 60
End: 2020-01-08
Payer: MEDICARE

## 2020-01-08 DIAGNOSIS — B37.0 ORAL CANDIDIASIS: Primary | ICD-10-CM

## 2020-01-08 PROCEDURE — 99421 OL DIG E/M SVC 5-10 MIN: CPT | Performed by: FAMILY MEDICINE

## 2020-01-09 RX ORDER — NYSTATIN 100000/ML
500000 SUSPENSION, ORAL (FINAL DOSE FORM) ORAL 4 TIMES DAILY
Qty: 140 ML | Refills: 0 | Status: SHIPPED | OUTPATIENT
Start: 2020-01-09 | End: 2020-06-12

## 2020-01-11 DIAGNOSIS — L20.9 ATOPIC DERMATITIS, UNSPECIFIED TYPE: ICD-10-CM

## 2020-01-13 RX ORDER — HYDROXYZINE HYDROCHLORIDE 25 MG/1
TABLET, FILM COATED ORAL
Qty: 120 TABLET | Refills: 0 | Status: SHIPPED | OUTPATIENT
Start: 2020-01-13 | End: 2020-02-27

## 2020-01-13 NOTE — TELEPHONE ENCOUNTER
Prescription approved per [facility] Refill Protocol.  Shira Hamm RN

## 2020-01-22 ENCOUNTER — OFFICE VISIT (OUTPATIENT)
Dept: FAMILY MEDICINE | Facility: CLINIC | Age: 60
End: 2020-01-22
Payer: MEDICARE

## 2020-01-22 VITALS
RESPIRATION RATE: 20 BRPM | SYSTOLIC BLOOD PRESSURE: 122 MMHG | HEIGHT: 65 IN | DIASTOLIC BLOOD PRESSURE: 76 MMHG | BODY MASS INDEX: 47.15 KG/M2 | TEMPERATURE: 98.2 F | WEIGHT: 283 LBS | HEART RATE: 82 BPM

## 2020-01-22 DIAGNOSIS — Z12.31 ENCOUNTER FOR SCREENING MAMMOGRAM FOR BREAST CANCER: ICD-10-CM

## 2020-01-22 DIAGNOSIS — B37.0 THRUSH: Primary | ICD-10-CM

## 2020-01-22 DIAGNOSIS — E11.42 TYPE 2 DIABETES MELLITUS WITH DIABETIC POLYNEUROPATHY, WITHOUT LONG-TERM CURRENT USE OF INSULIN (H): ICD-10-CM

## 2020-01-22 LAB
GLUCOSE BLD-MCNC: 209 MG/DL (ref 70–99)
HBA1C MFR BLD: 7.7 % (ref 0–5.6)

## 2020-01-22 PROCEDURE — 36415 COLL VENOUS BLD VENIPUNCTURE: CPT | Performed by: PHYSICIAN ASSISTANT

## 2020-01-22 PROCEDURE — 82947 ASSAY GLUCOSE BLOOD QUANT: CPT | Performed by: PHYSICIAN ASSISTANT

## 2020-01-22 PROCEDURE — 83036 HEMOGLOBIN GLYCOSYLATED A1C: CPT | Performed by: PHYSICIAN ASSISTANT

## 2020-01-22 PROCEDURE — 99214 OFFICE O/P EST MOD 30 MIN: CPT | Performed by: PHYSICIAN ASSISTANT

## 2020-01-22 RX ORDER — FLUCONAZOLE 200 MG/1
200 TABLET ORAL DAILY
Qty: 14 TABLET | Refills: 0 | Status: SHIPPED | OUTPATIENT
Start: 2020-01-22 | End: 2020-06-12

## 2020-01-22 ASSESSMENT — MIFFLIN-ST. JEOR: SCORE: 1851.62

## 2020-01-22 NOTE — PROGRESS NOTES
"Subjective     Ana Luisa Byers is a 59 year old female who presents to clinic today for the following health issues:    HPI   Acute Illness   Acute illness concerns: mouth, tongue sores  Onset: 2 weeks ago    Fever: no    Chills/Sweats: YES- chills    Headache (location?): YES    Sinus Pressure:YES    Conjunctivitis:  YES- watery, irritated, light sensitive    Ear Pain: no    Rhinorrhea: YES    Congestion: YES    Sore Throat: YES     Cough: no    Wheeze: no    Decreased Appetite: YES- due to tongue issue    Nausea: no    Vomiting: no    Diarrhea:  no    Dysuria/Freq.: no    Fatigue/Achiness: YES    Sick/Strep Exposure: YES- boyfriend     Therapies Tried and outcome: Nystatin mouth wash Rx from Dr. Mora, salt water gargle, Mucus Relief MARLYS Orosco is here for ongoing mouth/thrush concerns.  She was treated in mid December for a sinus and ear infection and states she almost always ends up with thrush from ABXs.  She then completed and Evisit on 1/8 and was given oral Nystatin which she has now completed without resolution of her symptoms.  She continues to get worse and is noting that her BS have been up in the 300+ range (close to 400).  On questioning today she realizes she thinks she was also given prednisone for the Dec illness.        Reviewed and updated as needed this visit by Provider         Review of Systems   ROS COMP: Constitutional, HEENT, cardiovascular, pulmonary, gi and gu systems are negative, except as otherwise noted.      Objective    /76 (BP Location: Right arm, Patient Position: Sitting, Cuff Size: Adult Large)   Pulse 82   Temp 98.2  F (36.8  C) (Oral)   Resp 20   Ht 1.638 m (5' 4.5\")   Wt 128.4 kg (283 lb)   LMP 02/13/2009   BMI 47.83 kg/m    Body mass index is 47.83 kg/m .  Physical Exam   GENERAL: healthy, alert and no distress  HENT: tongue only is coated with white material  NECK: no adenopathy, no asymmetry, masses, or scars and thyroid normal to palpation  MS: no gross " "musculoskeletal defects noted, no edema  SKIN: no suspicious lesions or rashes  PSYCH: mentation appears normal, affect normal/bright    Diagnostic Test Results:  Labs reviewed in Epic  Results for orders placed or performed in visit on 01/22/20 (from the past 24 hour(s))   HEMOGLOBIN A1C   Result Value Ref Range    Hemoglobin A1C 7.7 (H) 0 - 5.6 %   Glucose whole blood   Result Value Ref Range    Glucose Whole Blood 209 (H) 70 - 99 mg/dL     *Note: Due to a large number of results and/or encounters for the requested time period, some results have not been displayed. A complete set of results can be found in Results Review.           Assessment & Plan     1. Thrush  Will treat with oral tabs for two weeks.  Follow up if no improvement.    - fluconazole (DIFLUCAN) 200 MG tablet; Take 1 tablet (200 mg) by mouth daily for 14 days  Dispense: 14 tablet; Refill: 0    2. Type 2 diabetes mellitus with diabetic polyneuropathy, without long-term current use of insulin (H)  Prednisone likely worsened blood sugars temporarily.  Glucose and A1C stable today.  - HEMOGLOBIN A1C  - Glucose whole blood    3. Encounter for screening mammogram for breast cancer  Due for mammo, she will call to schedule  - MA SCREENING DIGITAL BILAT - Future  (s+30); Future     BMI:   Estimated body mass index is 47.83 kg/m  as calculated from the following:    Height as of this encounter: 1.638 m (5' 4.5\").    Weight as of this encounter: 128.4 kg (283 lb).   Weight management plan: Discussed healthy diet and exercise guidelines      Return in about 1 week (around 1/29/2020) for if symptoms worsen or fail to improve.    Garcia Najera PA-C  [facility]    "

## 2020-01-27 ENCOUNTER — MYC MEDICAL ADVICE (OUTPATIENT)
Dept: FAMILY MEDICINE | Facility: CLINIC | Age: 60
End: 2020-01-27

## 2020-01-27 DIAGNOSIS — K21.9 GASTROESOPHAGEAL REFLUX DISEASE WITHOUT ESOPHAGITIS: Primary | ICD-10-CM

## 2020-01-27 NOTE — TELEPHONE ENCOUNTER
Please review.  Patient requesting something for her heartburn.  Recently switched from Omeprazole to Nexium and is not being controlled.    Shakira Garcia RN

## 2020-01-28 RX ORDER — OMEPRAZOLE 40 MG/1
40 CAPSULE, DELAYED RELEASE ORAL DAILY
Qty: 90 CAPSULE | Refills: 1 | Status: SHIPPED | OUTPATIENT
Start: 2020-01-28 | End: 2020-02-20

## 2020-01-31 ENCOUNTER — OFFICE VISIT (OUTPATIENT)
Dept: FAMILY MEDICINE | Facility: CLINIC | Age: 60
End: 2020-01-31
Payer: MEDICARE

## 2020-01-31 ENCOUNTER — TELEPHONE (OUTPATIENT)
Dept: FAMILY MEDICINE | Facility: CLINIC | Age: 60
End: 2020-01-31

## 2020-01-31 VITALS
RESPIRATION RATE: 18 BRPM | SYSTOLIC BLOOD PRESSURE: 140 MMHG | BODY MASS INDEX: 47.79 KG/M2 | TEMPERATURE: 97.9 F | DIASTOLIC BLOOD PRESSURE: 80 MMHG | HEART RATE: 97 BPM | OXYGEN SATURATION: 98 % | WEIGHT: 279.9 LBS | HEIGHT: 64 IN

## 2020-01-31 DIAGNOSIS — B37.0 ORAL CANDIDIASIS: Primary | ICD-10-CM

## 2020-01-31 PROCEDURE — 99213 OFFICE O/P EST LOW 20 MIN: CPT | Performed by: FAMILY MEDICINE

## 2020-01-31 RX ORDER — ESOMEPRAZOLE MAGNESIUM 40 MG/1
CAPSULE, DELAYED RELEASE ORAL
Refills: 2 | COMMUNITY
Start: 2019-12-03 | End: 2020-06-12

## 2020-01-31 RX ORDER — VORICONAZOLE 40 MG/ML
200 POWDER, FOR SUSPENSION ORAL 2 TIMES DAILY
Qty: 210 ML | Refills: 0 | Status: SHIPPED | OUTPATIENT
Start: 2020-01-31 | End: 2020-01-31

## 2020-01-31 RX ORDER — ITRACONAZOLE 10 MG/ML
200 SOLUTION ORAL DAILY
Qty: 420 ML | Refills: 0 | Status: SHIPPED | OUTPATIENT
Start: 2020-01-31 | End: 2020-03-17

## 2020-01-31 ASSESSMENT — ENCOUNTER SYMPTOMS
ABDOMINAL PAIN: 0
FEVER: 0
TROUBLE SWALLOWING: 1
SORE THROAT: 1
CHEST TIGHTNESS: 0
SHORTNESS OF BREATH: 0

## 2020-01-31 ASSESSMENT — MIFFLIN-ST. JEOR: SCORE: 1824.62

## 2020-01-31 NOTE — PROGRESS NOTES
"Subjective     Ana Luisa Byers is a 60 year old female who presents to clinic today for the following health issues:    HPI   Vaginal Symptoms  Onset: reoccurring yeast infection     Description:  Vaginal Discharge: {.:661412::\"none\"}   Itching (Pruritis): { :760778}  Burning sensation:  { :178582}  Odor: { :595158}    Accompanying Signs & Symptoms:  Pain with Urination: { :190675}  Abdominal Pain: { :416532}  Fever: { :565707}    History:   Sexually active: { :520703}  New Partner: { :583618}  Possibility of Pregnancy:  { :014399::\"No\"}    Precipitating factors:   Recent Antibiotic Use: { :552973}    Alleviating factors:  ***    Therapies Tried and outcome: ***  {additonal problems for provider to add (Optional):142125}    {HIST REVIEW/ LINKS 2 (Optional):538270}    {Additional problems for the provider to add (optional):123644}  Reviewed and updated as needed this visit by Provider         Review of Systems   {ROS COMP (Optional):421401}      Objective    LMP 02/13/2009   There is no height or weight on file to calculate BMI.  Physical Exam   {Exam List (Optional):700489}    {Diagnostic Test Results (Optional):535598::\"Diagnostic Test Results:\",\"Labs reviewed in Epic\"}        {PROVIDER CHARTING PREFERENCE:976250}        "

## 2020-01-31 NOTE — TELEPHONE ENCOUNTER
Prior Authorization Retail Medication Request    Medication/Dose: voriconazole (VFEND) 40 MG/ML suspension  ICD code (if different than what is on RX):    Previously Tried and Failed:  fluconazole (DIFLUCAN) 200 MG tablet  Rationale:  thrush getting worse with medication Fluconazole. Throat is sore and feels like its closing      [employer]    KEY: [id number]  PATIENT LAST NAME: MARLON  : 1960      Pharmacy Information (if different than what is on RX)  Name:  [id number] [place], [place] - [address]  Phone:  [phone]      Vidal RICHEY

## 2020-01-31 NOTE — PROGRESS NOTES
"Subjective     Ana Luisa Byers is a 60 year old female who presents to clinic today for the following health issues:    HPI   Patient is a pleasant 60-year-old female who presents to the clinic with concerns for throat pain.  Recently diagnosed with sinusitis, given amoxicillin complicated by oral candidiasis; refractory to treatments with nystatin and fluconazole.    Feels symptoms are getting worse, now having pain with swallowing solids and liquids.  Feels like throat is closing up.  No difficulties with breathing.  Reported history of prediabetes.  No HIV or AIDS.      Reviewed and updated as needed this visit by Provider         Review of Systems   Constitutional: Negative for fever.   HENT: Positive for sore throat and trouble swallowing.    Respiratory: Negative for chest tightness and shortness of breath.    Cardiovascular: Negative for chest pain.   Gastrointestinal: Negative for abdominal pain.          Objective    BP (!) 140/80 (BP Location: Right arm, Patient Position: Chair, Cuff Size: Adult Large)   Pulse 97   Temp 97.9  F (36.6  C) (Oral)   Resp 18   Ht 1.626 m (5' 4\")   Wt 127 kg (279 lb 14.4 oz)   LMP 02/13/2009   SpO2 98%   Breastfeeding No   BMI 48.04 kg/m    Body mass index is 48.04 kg/m .  Physical Exam  HENT:      Mouth/Throat:      Comments: Posterior pharyngeal erythema without exudates.    The superior aspect of the tongue is completely coated and white-green substance.  Non-scrappable, no buccal lesions.  Cardiovascular:      Rate and Rhythm: Normal rate and regular rhythm.   Pulmonary:      Breath sounds: Normal breath sounds.   Lymphadenopathy:      Cervical: No cervical adenopathy.        Diagnostic Test Results:  Labs reviewed in Epic        Assessment & Plan     1. Oral candidiasis  Refractory to nystatin and fluconazole.  Now symptoms with concerns for possible esophageal candidiasis. Referral to GI to schedule EGD for evaluation of suspected esophageal candidiasis.  " Follow-up in 1 week for symptom recheck, precautions for go to emergency department were given including if having difficulties with breathing or if unable to drink.  Otherwise, please see PCP for additional management.    - GASTROENTEROLOGY ADULT REF PROCEDURE ONLY Dawna Segoviage [phone]; No Provider Preference     Addendum: Patient notified office, voriconazole was $3000.  Patient was contacted via phone.  Prescription for a solution based itraconazole was given.    - itraconazole (SPORANOX) 10 MG/ML solution; Take 20 mLs (200 mg) by mouth daily for 21 days  Dispense: 420 mL; Refill: 0      Return in about 1 week (around 2/7/2020) for Candidiasis/GI.    Leonides Juárez MD  [facility]    Documentation was prepared using Dragon voice recognition software. Please excuse typographical errors. Please contact me if documentation is unclear.

## 2020-02-04 ENCOUNTER — HOSPITAL ENCOUNTER (OUTPATIENT)
Facility: CLINIC | Age: 60
Discharge: HOME | End: 2020-02-04
Attending: INTERNAL MEDICINE | Admitting: INTERNAL MEDICINE
Payer: MEDICARE

## 2020-02-04 VITALS
HEART RATE: 80 BPM | OXYGEN SATURATION: 93 % | BODY MASS INDEX: 43.32 KG/M2 | WEIGHT: 260 LBS | DIASTOLIC BLOOD PRESSURE: 67 MMHG | RESPIRATION RATE: 16 BRPM | HEIGHT: 65 IN | SYSTOLIC BLOOD PRESSURE: 130 MMHG

## 2020-02-04 LAB
GLUCOSE BLDC GLUCOMTR-MCNC: 157 MG/DL (ref 70–99)
UPPER GI ENDOSCOPY: NORMAL

## 2020-02-04 PROCEDURE — 43235 EGD DIAGNOSTIC BRUSH WASH: CPT | Performed by: INTERNAL MEDICINE

## 2020-02-04 PROCEDURE — 25000125 ZZHC RX 250: Performed by: INTERNAL MEDICINE

## 2020-02-04 PROCEDURE — 82962 GLUCOSE BLOOD TEST: CPT

## 2020-02-04 PROCEDURE — 25000128 H RX IP 250 OP 636: Performed by: INTERNAL MEDICINE

## 2020-02-04 PROCEDURE — 40000104 ZZH STATISTIC MODERATE SEDATION < 10 MIN: Performed by: INTERNAL MEDICINE

## 2020-02-04 RX ORDER — NALOXONE HYDROCHLORIDE 0.4 MG/ML
.1-.4 INJECTION, SOLUTION INTRAMUSCULAR; INTRAVENOUS; SUBCUTANEOUS
Status: DISCONTINUED | OUTPATIENT
Start: 2020-02-04 | End: 2020-02-04 | Stop reason: HOSPADM

## 2020-02-04 RX ORDER — ONDANSETRON 4 MG/1
4 TABLET, ORALLY DISINTEGRATING ORAL EVERY 6 HOURS PRN
Status: DISCONTINUED | OUTPATIENT
Start: 2020-02-04 | End: 2020-02-04 | Stop reason: HOSPADM

## 2020-02-04 RX ORDER — LIDOCAINE 40 MG/G
CREAM TOPICAL
Status: DISCONTINUED | OUTPATIENT
Start: 2020-02-04 | End: 2020-02-04 | Stop reason: HOSPADM

## 2020-02-04 RX ORDER — FLUMAZENIL 0.1 MG/ML
0.2 INJECTION, SOLUTION INTRAVENOUS
Status: DISCONTINUED | OUTPATIENT
Start: 2020-02-04 | End: 2020-02-04 | Stop reason: HOSPADM

## 2020-02-04 RX ORDER — FENTANYL CITRATE 50 UG/ML
INJECTION, SOLUTION INTRAMUSCULAR; INTRAVENOUS PRN
Status: DISCONTINUED | OUTPATIENT
Start: 2020-02-04 | End: 2020-02-04 | Stop reason: HOSPADM

## 2020-02-04 RX ORDER — ONDANSETRON 2 MG/ML
4 INJECTION INTRAMUSCULAR; INTRAVENOUS EVERY 6 HOURS PRN
Status: DISCONTINUED | OUTPATIENT
Start: 2020-02-04 | End: 2020-02-04 | Stop reason: HOSPADM

## 2020-02-04 RX ORDER — ONDANSETRON 2 MG/ML
4 INJECTION INTRAMUSCULAR; INTRAVENOUS
Status: DISCONTINUED | OUTPATIENT
Start: 2020-02-04 | End: 2020-02-04 | Stop reason: HOSPADM

## 2020-02-04 ASSESSMENT — MIFFLIN-ST. JEOR: SCORE: 1750.23

## 2020-02-04 NOTE — TELEPHONE ENCOUNTER
Central Prior Authorization Team   Phone: [phone]      PA NOT NEEDED    Medication: voriconazole (VFEND) 40 MG/ML suspension-MEDICATION CHANGED  Insurance Company:    Pharmacy Filling the Rx:    Filling Pharmacy Phone:    Filling Pharmacy Fax:    Start Date: 2/4/2020    Medication was changed to itraconazole due to the cost of the medication.

## 2020-02-04 NOTE — H&P
Pre-Endoscopy History and Physical     Ana Luisa Byers MRN# [id number]   YOB: 1960 Age: 60 year old     Date of Procedure: 2/4/2020  Primary care provider: Leonides Juárez  Type of Endoscopy: Gastroscopy with possible biopsy, possible dilation  Reason for Procedure: dysphagia  Type of Anesthesia Anticipated: Conscious Sedation    HPI:    Ana Luisa is a 60 year old female who will be undergoing the above procedure.      A history and physical has been performed. The patient's medications and allergies have been reviewed. The risks and benefits of the procedure and the sedation options and risks were discussed with the patient.  All questions were answered and informed consent was obtained.      She denies a personal or family history of anesthesia complications or bleeding disorders.     Patient Active Problem List   Diagnosis     Mixed hyperlipidemia     Chronic pain associated with significant psychosocial dysfunction     Osteoarthritis     S/P shoulder replacement     Cluster headaches     Fibromyalgia     Plantar fasciitis     Other insomnia     HTN, goal below 140/90     Gastroesophageal reflux disease without esophagitis     Restless legs syndrome (RLS)     CKD (chronic kidney disease) stage 2, GFR 60-89 ml/min     HSV-1 infection     Left lumbar radiculitis     Acquired hypothyroidism     Type 2 diabetes mellitus with diabetic polyneuropathy, without long-term current use of insulin (H)     Bilateral lower extremity edema     Benign essential hypertension     Anxiety     Severe episode of recurrent major depressive disorder, without psychotic features (H)     BMI 45.0-49.9, adult (H)     Spinal stenosis of lumbar region with radiculopathy        Past Medical History:   Diagnosis Date     Arthritis     left shoulder and back     Chronic pain     fibromalgia      CKD (chronic kidney disease), stage III (H)      Fibromyalgia      Gastro-oesophageal reflux disease      HTN, goal below 140/90       Hyperlipidemia LDL goal <100      Hypothyroidism      Major depression     chronic kidney disease-stage 3     Peripheral neuropathy Feb 2015    + callus. diabetic shoes rx done     PONV (postoperative nausea and vomiting)      Rheumatoid arthritis of foot (H)      Type 2 diabetes, HbA1C goal < 8% (H)     pre-diabetic        Past Surgical History:   Procedure Laterality Date     ARTHROPLASTY SHOULDER  7/25/2013    Procedure: right ARTHROPLASTY SHOULDER;  RIGHT TOTAL SHOULDER ARTHROPLASTY (TORNIER AFFINITY SHOULDER SYSTEM)^;  Surgeon: Dung Morales MD;  Location:  OR     BACK SURGERY  2011    L45 laminectomy     C APPENDECTOMY  age 19     C LIGATE FALLOPIAN TUBE,POSTPARTUM  1982    Tubal Ligation     ELBOW WRIST HAND FINGER ORTHOSIS, RIGID, WITHOUT JOINTS, MAY INCLUDE SOFT  5/2007    put in [place]     ESOPHAGOSCOPY, GASTROSCOPY, DUODENOSCOPY (EGD), COMBINED  9/23/2015    Dr. William AMBROSIO     ESOPHAGOSCOPY, GASTROSCOPY, DUODENOSCOPY (EGD), COMBINED N/A 9/23/2015    Procedure: COMBINED ESOPHAGOSCOPY, GASTROSCOPY, DUODENOSCOPY (EGD), BIOPSY SINGLE OR MULTIPLE;  Surgeon: Jose Thomas MD;  Location:  GI     ESOPHAGOSCOPY, GASTROSCOPY, DUODENOSCOPY (EGD), COMBINED  02/04/2020    Dr. William AMBROSIO     HCL PAP SMEAR  6/2008    WNL     ORTHOPEDIC SURGERY      left shoulder scoped and plate left elbow     SURGICAL HISTORY OF -   2009    ulnar nerve release, carpal tunnel- left       Social History     Tobacco Use     Smoking status: Never Smoker     Smokeless tobacco: Never Used   Substance Use Topics     Alcohol use: No       Family History   Problem Relation Age of Onset     C.A.D. Mother      Neurologic Disorder Mother         lupus     Depression Mother      Family History Negative Father      Diabetes Maternal Aunt      Colon Cancer No family hx of        Prior to Admission medications    Medication Sig Start Date End Date Taking? Authorizing Provider   aspirin 81 MG chewable tablet Take 1 tablet (81 mg) by  mouth daily 7/30/14  Yes Mari Cook APRN CNP   atorvastatin (LIPITOR) 40 MG tablet Take 1 tablet (40 mg) by mouth daily 9/5/19  Yes Jose Mora MD   buPROPion (WELLBUTRIN XL) 150 MG 24 hr tablet TAKE 1 TABLET(150 MG) BY MOUTH EVERY MORNING 1/7/20  Yes Jose Mora MD   DULoxetine (CYMBALTA) 30 MG EC capsule Take 1 capsule (30 mg) by mouth daily Take along with 60 mg capsule 11/13/18  Yes Bridget Garza MD   DULoxetine (CYMBALTA) 60 MG EC capsule Take 1 capsule (60 mg) by mouth daily 11/13/18  Yes Bridget Garza MD   esomeprazole (NEXIUM) 40 MG DR capsule TK 1 C PO QD 12/3/19  Yes Reported, Patient   fluconazole (DIFLUCAN) 200 MG tablet Take 1 tablet (200 mg) by mouth daily for 14 days 1/22/20 2/5/20 Yes Garcia Najera PA-C   furosemide (LASIX) 40 MG tablet Take 1 tablet (40 mg) by mouth daily 4/16/19  Yes Bridget Garza MD   glipiZIDE (GLUCOTROL XL) 10 MG 24 hr tablet Take 1 tablet (10 mg) by mouth 2 times daily 10/15/19  Yes Jose Mora MD   hydrOXYzine (ATARAX) 25 MG tablet TAKE 1 TO 2 TABLETS(25 TO 50 MG) BY MOUTH TWICE DAILY AS NEEDED FOR ITCHING 1/13/20  Yes Jose Mora MD   losartan (COZAAR) 25 MG tablet Take 0.5 tablets (12.5 mg) by mouth daily 6/28/19  Yes Garcia Najera PA-C   omeprazole (PRILOSEC) 40 MG DR capsule Take 1 capsule (40 mg) by mouth daily 1/28/20  Yes Niki Raymundo MD   omeprazole (PRILOSEC) 40 MG DR capsule Take 1 capsule by mouth daily 10/9/19  Yes Reported, Patient   vitamin D3 (CHOLECALCIFEROL) 2000 units (50 mcg) tablet Take 1 tablet (2,000 Units) by mouth daily 12/12/19  Yes Jose Mora MD   zolpidem (AMBIEN) 10 MG tablet TAKE 1 TABLET(10 MG) BY MOUTH EVERY NIGHT AS NEEDED FOR SLEEP 11/7/19  Yes Jose Mora MD   betamethasone dipropionate (DIPROSONE) 0.05 % external cream [place] EXT AA BID FOR 2 TO 3 WKS THEN PRN 11/14/18   Reported, Patient   clarithromycin (BIAXIN) 500  "MG tablet Take 1 tablet (500 mg) by mouth 2 times daily for 7 days 12/17/19 12/24/19  Jose Mora MD   EPINEPHrine (EPIPEN/ADRENACLICK/OR ANY BX GENERIC EQUIV) 0.3 MG/0.3ML injection 2-pack Inject 0.3 mLs (0.3 mg) into the muscle once as needed for anaphylaxis 5/1/18   Bridget Garza MD   itraconazole (SPORANOX) 10 MG/ML solution Take 20 mLs (200 mg) by mouth daily for 21 days 1/31/20 2/21/20  Loenides Juárez MD   methocarbamol (ROBAXIN) 500 MG tablet Take 2 tablets (1,000 mg) by mouth 3 times daily as needed for muscle spasms 5/23/19   Bridget Garza MD   mupirocin (BACTROBAN) 2 % external ointment APPLY 1 APPLICATION TO SORES ON ARMS AND FACE TID 9/29/18   Reported, Patient   nystatin (MYCOSTATIN) 988603 UNIT/ML suspension Take 5 mLs (500,000 Units) by mouth 4 times daily for 7 days Swish and swallow 1/9/20 1/16/20  Jose Mora MD       Allergies   Allergen Reactions     Penicillins Anaphylaxis     Augmentin [Amoxicillin-Pot Clavulanate] GI Disturbance     Bee Swelling     Gabapentin Other (See Comments)     Mood Swings     Lisinopril Rash     Metformin Itching     Topiramate Other (See Comments)     numbness        REVIEW OF SYSTEMS:   5 point ROS negative except as noted above in HPI, including Gen., Resp., CV, GI &  system review.    PHYSICAL EXAM:   [facility] 02/13/2009  Estimated body mass index is 48.04 kg/m  as calculated from the following:    Height as of 1/31/20: 1.626 m (5' 4\").    Weight as of 1/31/20: 127 kg (279 lb 14.4 oz).   GENERAL APPEARANCE: alert, and oriented  MENTAL STATUS: alert  AIRWAY EXAM: Mallampatti Class I (visualization of the soft palate, fauces, uvula, anterior and posterior pillars)  RESP: lungs clear to auscultation - no rales, rhonchi or wheezes  CV: regular rates and rhythm  DIAGNOSTICS:    Not indicated    IMPRESSION   ASA Class 2 - Mild systemic disease    PLAN:   Plan for Gastroscopy with possible biopsy, possible dilation. We discussed the " risks, benefits and alternatives and the patient wished to proceed.    The above has been forwarded to the consulting provider.      Signed Electronically by: Jose Thomas MD  February 4, 2020

## 2020-02-04 NOTE — DISCHARGE INSTRUCTIONS
"  Tips to Control Acid Reflux  To control acid reflux, you ll need to make some basic diet and lifestyle changes. The simple steps outlined below may be all you ll need to relieve discomfort.   Watch What You Eat      Avoid fatty foods and spicy foods.    Eat fewer acidic foods, such as citrus and tomato-based foods. These can increase symptoms.     Limit drinking alcohol, caffeine, and fizzy beverages. All increase acid reflux.    Try limiting chocolate, peppermint, and spearmint. These can worsen acid reflux in some people.    Watch When You Eat    Avoid lying down for 3 hours after eating.    Do not snack before going to bed.    Raise Your Head  Raising your head and upper body by 4\" to 6\" helps limit reflux when you re lying down. Put blocks under the head of the bed frame to raise it.                    [id number] [facility], [address] [place] [address], [place] [address]. All rights reserved. This information is not intended as a substitute for professional medical care. Always follow your healthcare professional's instructions.    "

## 2020-02-06 DIAGNOSIS — G89.4 CHRONIC PAIN ASSOCIATED WITH SIGNIFICANT PSYCHOSOCIAL DYSFUNCTION: ICD-10-CM

## 2020-02-06 DIAGNOSIS — M79.18 MYOFASCIAL PAIN: ICD-10-CM

## 2020-02-06 RX ORDER — DULOXETIN HYDROCHLORIDE 30 MG/1
30 CAPSULE, DELAYED RELEASE ORAL DAILY
Qty: 90 CAPSULE | Refills: 3 | Status: CANCELLED | OUTPATIENT
Start: 2020-02-06

## 2020-02-06 NOTE — TELEPHONE ENCOUNTER
This has not been filled since 11/2018     Pt states she take 30 mg and 60 mg every day.     Advised to discuss with provider at OV tomorrow    Pt expressed understanding and acceptance of the plan. She had no further questions at this time.  Advised can call back to clinic at any time with concerns.     Shira Hamm RN

## 2020-02-07 ENCOUNTER — OFFICE VISIT (OUTPATIENT)
Dept: FAMILY MEDICINE | Facility: CLINIC | Age: 60
End: 2020-02-07
Payer: MEDICARE

## 2020-02-07 VITALS
WEIGHT: 285 LBS | TEMPERATURE: 98.5 F | HEART RATE: 92 BPM | OXYGEN SATURATION: 96 % | RESPIRATION RATE: 16 BRPM | HEIGHT: 65 IN | DIASTOLIC BLOOD PRESSURE: 72 MMHG | SYSTOLIC BLOOD PRESSURE: 130 MMHG | BODY MASS INDEX: 47.48 KG/M2

## 2020-02-07 DIAGNOSIS — Q38.3 TONGUE ABNORMALITY: Primary | ICD-10-CM

## 2020-02-07 DIAGNOSIS — G47.09 OTHER INSOMNIA: ICD-10-CM

## 2020-02-07 PROCEDURE — 99214 OFFICE O/P EST MOD 30 MIN: CPT | Performed by: FAMILY MEDICINE

## 2020-02-07 RX ORDER — ZOLPIDEM TARTRATE 10 MG/1
TABLET ORAL
Qty: 30 TABLET | Refills: 2 | Status: SHIPPED | OUTPATIENT
Start: 2020-02-07 | End: 2020-02-25

## 2020-02-07 ASSESSMENT — ENCOUNTER SYMPTOMS
SORE THROAT: 1
VOMITING: 0
SLEEP DISTURBANCE: 0
TROUBLE SWALLOWING: 1
NAUSEA: 0
FEVER: 0

## 2020-02-07 ASSESSMENT — MIFFLIN-ST. JEOR: SCORE: 1863.63

## 2020-02-07 NOTE — PROGRESS NOTES
Subjective     Ana Luisa Byers is a 60 year old female who presents to clinic today for the following health issues:    HPI   Patient is a pleasant 60-year-old female presents the clinic for follow-up of oral thrush.  Evaluated by me last week, please see encounter note dated 1-.  Symptoms refractory to nystatin and fluconazole.  I initially prescribed voriconazole then changed later to itraconazole, liquid formulations however both were too costly for the patient.  Also evaluated GI, had an EGD which showed that she does not have esophageal candidiasis.    Is been accompanies her today.  Still having some throat pain, is not using any over-the-counter medications for pain.    Would like a refill of her Ambien, tolerating well, no daytime symptoms of drowsiness. Chronic therapy.  Medication is effective for her.    Reviewed and updated as needed this visit by Provider    Medications updated and reviewed.  Past, family and surgical history is updated and reviewed in the record.  Past Medical History:   Diagnosis Date     Arthritis     left shoulder and back     Chronic pain     fibromalgia      CKD (chronic kidney disease), stage III (H)      Fibromyalgia      Gastro-oesophageal reflux disease      HTN, goal below 140/90      Hyperlipidemia LDL goal <100      Hypothyroidism      Major depression     chronic kidney disease-stage 3     Peripheral neuropathy Feb 2015    + callus. diabetic shoes rx done     PONV (postoperative nausea and vomiting)      Rheumatoid arthritis of foot (H)      Type 2 diabetes, HbA1C goal < 8% (H)     pre-diabetic               Review of Systems   Constitutional: Negative for fever.   HENT: Positive for sore throat and trouble swallowing.    Gastrointestinal: Negative for nausea and vomiting.   Psychiatric/Behavioral: Negative for sleep disturbance.      Medications updated and reviewed.  Past, family and surgical history is updated and reviewed in the record.  Past Medical History:  "  Diagnosis Date     Arthritis     left shoulder and back     Chronic pain     fibromalgia      CKD (chronic kidney disease), stage III (H)      Fibromyalgia      Gastro-oesophageal reflux disease      HTN, goal below 140/90      Hyperlipidemia LDL goal <100      Hypothyroidism      Major depression     chronic kidney disease-stage 3     Peripheral neuropathy Feb 2015    + callus. diabetic shoes rx done     PONV (postoperative nausea and vomiting)      Rheumatoid arthritis of foot (H)      Type 2 diabetes, HbA1C goal < 8% (H)     pre-diabetic            Objective    /72 (BP Location: Right arm, Patient Position: Chair, Cuff Size: Adult Large)   Pulse 92   Temp 98.5  F (36.9  C) (Oral)   Resp 16   Ht 1.651 m (5' 5\")   Wt 129.3 kg (285 lb)   LMP 02/13/2009   SpO2 96%   BMI 47.43 kg/m    Body mass index is 47.43 kg/m .  Physical Exam  Vitals signs reviewed.   Constitutional:       Appearance: She is not ill-appearing.   HENT:      Mouth/Throat:      Pharynx: Posterior oropharyngeal erythema present. No oropharyngeal exudate.      Comments: Dark green-colored tongue coating    No buccal lesions, uvula midline.  No oral pharyngeal ulcers.  Cardiovascular:      Rate and Rhythm: Normal rate and regular rhythm.   Pulmonary:      Effort: Pulmonary effort is normal.      Breath sounds: Normal breath sounds.   Lymphadenopathy:      Cervical: No cervical adenopathy.                  Assessment & Plan     1. Tongue abnormality  Suspected oral candidiasis, refractory to nystatin and fluconazole.  Is unable to get voriconazole or itraconazole liquid solution due to cost.  Continues to have odynophagia without dysphagia.  Tolerating liquids.  No sign of dehydration.  Referred to GI, negative endoscopic evaluation for esophageal candidiasis.  - OTOLARYNGOLOGY REFERRAL    2. Other insomnia  Controlled.  Continue chronic medical therapy.  - zolpidem (AMBIEN) 10 MG tablet; TAKE 1 TABLET(10 MG) BY MOUTH EVERY NIGHT AS " NEEDED FOR SLEEP  Dispense: 30 tablet; Refill: 2     Return in about 1 week (around 2/14/2020) for If symptoms do not improve or gets worse..    Leonides Juárez MD  [facility]      Documentation was prepared using Dragon voice recognition software. Please excuse typographical errors. Please contact me if documentation is unclear.

## 2020-02-20 ENCOUNTER — MYC MEDICAL ADVICE (OUTPATIENT)
Dept: FAMILY MEDICINE | Facility: CLINIC | Age: 60
End: 2020-02-20

## 2020-02-20 DIAGNOSIS — K21.9 GASTROESOPHAGEAL REFLUX DISEASE WITHOUT ESOPHAGITIS: ICD-10-CM

## 2020-02-20 RX ORDER — OMEPRAZOLE 40 MG/1
40 CAPSULE, DELAYED RELEASE ORAL
Qty: 180 CAPSULE | Refills: 1 | Status: SHIPPED | OUTPATIENT
Start: 2020-02-20 | End: 2020-03-17

## 2020-02-20 NOTE — TELEPHONE ENCOUNTER
Prilosec prescription updated and sent to her pharmacy.  As for her phentermine, please contact patient to have an appointment scheduled for evaluation.      Best regards,     Leonides Juárez MD

## 2020-02-23 ENCOUNTER — HEALTH MAINTENANCE LETTER (OUTPATIENT)
Age: 60
End: 2020-02-23

## 2020-02-25 ENCOUNTER — OFFICE VISIT (OUTPATIENT)
Dept: FAMILY MEDICINE | Facility: CLINIC | Age: 60
End: 2020-02-25
Payer: MEDICARE

## 2020-02-25 VITALS
HEART RATE: 91 BPM | TEMPERATURE: 98.3 F | WEIGHT: 281 LBS | BODY MASS INDEX: 46.82 KG/M2 | HEIGHT: 65 IN | RESPIRATION RATE: 16 BRPM | DIASTOLIC BLOOD PRESSURE: 76 MMHG | OXYGEN SATURATION: 98 % | SYSTOLIC BLOOD PRESSURE: 128 MMHG

## 2020-02-25 DIAGNOSIS — E11.9 TYPE 2 DIABETES MELLITUS WITHOUT COMPLICATION, WITHOUT LONG-TERM CURRENT USE OF INSULIN (H): Primary | ICD-10-CM

## 2020-02-25 DIAGNOSIS — G47.09 OTHER INSOMNIA: ICD-10-CM

## 2020-02-25 DIAGNOSIS — Z23 ENCOUNTER FOR IMMUNIZATION: ICD-10-CM

## 2020-02-25 PROCEDURE — G0009 ADMIN PNEUMOCOCCAL VACCINE: HCPCS | Performed by: FAMILY MEDICINE

## 2020-02-25 PROCEDURE — 90732 PPSV23 VACC 2 YRS+ SUBQ/IM: CPT | Performed by: FAMILY MEDICINE

## 2020-02-25 PROCEDURE — 99214 OFFICE O/P EST MOD 30 MIN: CPT | Mod: 25 | Performed by: FAMILY MEDICINE

## 2020-02-25 RX ORDER — ZOLPIDEM TARTRATE 10 MG/1
TABLET ORAL
Qty: 30 TABLET | Refills: 2 | Status: SHIPPED | OUTPATIENT
Start: 2020-02-25 | End: 2020-06-04

## 2020-02-25 RX ORDER — PHENTERMINE HYDROCHLORIDE 15 MG/1
15 CAPSULE ORAL EVERY MORNING
Qty: 30 CAPSULE | Refills: 0 | Status: SHIPPED | OUTPATIENT
Start: 2020-02-25 | End: 2020-03-30

## 2020-02-25 ASSESSMENT — MIFFLIN-ST. JEOR: SCORE: 1845.49

## 2020-02-25 ASSESSMENT — ENCOUNTER SYMPTOMS
WOUND: 0
SLEEP DISTURBANCE: 0
PALPITATIONS: 0

## 2020-02-25 NOTE — PROGRESS NOTES
Subjective     Ana Luisa Byers is a 60 year old female who presents to clinic today for the following health issues:    HPI   Medication Followup -- discuss phentermine    Taking Medication as prescribed: yes    Side Effects:  None    Medication Helping Symptoms:  Yes, but off of it, its been a year     Patient with multiple medical concerns.    History of type 2 diabetes mellitus, requesting diabetic shoes.  States her insurance covers diabetic shoes twice per year.  No new lesions on the bottom of her feet.  Checks her feet daily, does not wear sandals.    Wants to restart on phentermine.  Previously on this medication, was also on concomitant Cymbalta and Wellbutrin at the time.  Tolerating medication well.  No cardiac history of myocardial infarction, coronary artery disease.  Does not have chest pain at rest or with exertion.  Recently started a carb restricted diet, got a membership at the [employer] with her .    Voices appreciation that her candidal esophagitis is now resolved.  No difficulty swallowing, no pain.    Patient requesting refill of her Ambien, tolerating well.  Sleeping well.  Feels well refreshed.  No daytime somnolence.      Reviewed and updated as needed this visit by Provider         Review of Systems   Cardiovascular: Negative for chest pain and palpitations.   Skin: Negative for rash and wound.   Psychiatric/Behavioral: Negative for sleep disturbance.      Past Medical History:   Diagnosis Date     Arthritis     left shoulder and back     Chronic pain     fibromalgia      CKD (chronic kidney disease), stage III (H)      Fibromyalgia      Gastro-oesophageal reflux disease      HTN, goal below 140/90      Hyperlipidemia LDL goal <100      Hypothyroidism      Major depression     chronic kidney disease-stage 3     Peripheral neuropathy Feb 2015    + callus. diabetic shoes rx done     PONV (postoperative nausea and vomiting)      Rheumatoid arthritis of foot (H)      Tongue abnormality       "Type 2 diabetes, HbA1C goal < 8% (H)     pre-diabetic           Objective    /76 (BP Location: Right arm, Patient Position: Chair, Cuff Size: Adult Large)   Pulse 91   Temp 98.3  F (36.8  C) (Oral)   Resp 16   Ht 1.651 m (5' 5\")   Wt 127.5 kg (281 lb)   LMP 02/13/2009   SpO2 98%   BMI 46.76 kg/m    Body mass index is 46.76 kg/m .   Vital Signs 1/16/2018 2/6/2018 3/15/2018 3/20/2018 3/28/2018   Weight (LB) 256 lb 256 lb 260 lb 261 lb 265 lb     Vital Signs 5/1/2018 6/21/2018 8/22/2018 10/5/2018 10/11/2018   Weight (LB) 265 lb 267 lb 278 lb 278 lb 280 lb     Vital Signs 11/13/2018 11/13/2018 1/24/2019 3/13/2019 4/16/2019   Weight (LB) 280 lb  283 lb 285 lb 12.8 oz 282 lb     Vital Signs 4/16/2019 6/28/2019 7/2/2019 7/22/2019 8/17/2019   Weight (LB)  282 lb 283 lb 3.2 oz 280 lb 279 lb     Vital Signs 8/20/2019 9/24/2019 10/15/2019 11/4/2019 11/4/2019   Weight (LB) 282 lb 279 lb 282 lb       Vital Signs 11/4/2019 12/12/2019 1/22/2020 1/31/2020 2/4/2020   Weight (LB)  276 lb 14.4 oz 283 lb 279 lb 14.4 oz 260 lb     Vital Signs 2/4/2020 2/4/2020 2/4/2020 2/4/2020 2/4/2020 2/4/2020   Weight (LB)           Vital Signs 2/7/2020 2/25/2020   Weight (LB) 285 lb 281 lb     Physical Exam  Vitals signs reviewed.   Constitutional:       Appearance: She is obese.   HENT:      Mouth/Throat:      Mouth: Mucous membranes are moist.      Pharynx: Oropharynx is clear. No oropharyngeal exudate or posterior oropharyngeal erythema.   Cardiovascular:      Rate and Rhythm: Normal rate and regular rhythm.   Pulmonary:      Effort: Pulmonary effort is normal.      Breath sounds: Normal breath sounds.   Neurological:      Mental Status: She is alert.   Psychiatric:         Mood and Affect: Mood normal.         Thought Content: Thought content normal.        Lab Results   Component Value Date    A1C 7.7 01/22/2020    A1C 7.8 10/15/2019    A1C 7.3 07/22/2019    A1C 7.2 04/16/2019    A1C 6.7 11/13/2018         Diagnostic Test " Results:  Labs reviewed in Epic        Assessment & Plan     1. Other insomnia  Controlled.  Continue current medical therapy.  - zolpidem (AMBIEN) 10 MG tablet; TAKE 1 TABLET(10 MG) BY MOUTH EVERY NIGHT AS NEEDED FOR SLEEP  Dispense: 30 tablet; Refill: 2    2. Type 2 diabetes mellitus without complication, without long-term current use of insulin (H)  Will need interval follow-up with diabetes.  - ADMIN 1st VACCINE  - PPSV23, IM/SUBQ (2+ YRS) - [id number]  - order for DME; Equipment being ordered: Diabetic Shoes  Dispense: 1 Units; Refill: 0    3. BMI 45.0-49.9, adult (H)  Planning stage, motivated.  Has been on this medication regimen before, restart phentermine.  Motivational interviewing performed.  Continue low-carb diet, exercising at [facility].  Follow-up in 1 month, monitor blood pressure.  EKG from 2018 reviewed without any contraindications to starting medication.  Goal is 1 to 2 pounds weight loss per week.  - phentermine (ADIPEX-P) 15 MG capsule; Take 1 capsule (15 mg) by mouth every morning  Dispense: 30 capsule; Refill: 0    4. Encounter for immunization   - PPSV23, IM/SUBQ (2+ YRS) - [id number]       Return in about 1 month (around 3/25/2020) for Medical weight management.    Leonides Juárez MD  [facility]    Documentation was prepared using Dragon voice recognition software. Please excuse typographical errors. Please contact me if documentation is unclear.

## 2020-02-25 NOTE — NURSING NOTE
Prior to immunization administration, verified patients identity using patient s name and date of birth. Please see Immunization Activity for additional information.     Screening Questionnaire for Adult Immunization    Are you sick today?   No   Do you have allergies to medications, food, a vaccine component or latex?   No   Have you ever had a serious reaction after receiving a vaccination?   No   Do you have a long-term health problem with heart, lung, kidney, or metabolic disease (e.g., diabetes), asthma, a blood disorder, no spleen, complement component deficiency, a cochlear implant, or a spinal fluid leak?  Are you on long-term aspirin therapy?   No   Do you have cancer, leukemia, HIV/AIDS, or any other immune system problem?   No   Do you have a parent, brother, or sister with an immune system problem?   No   In the past 3 months, have you taken medications that affect  your immune system, such as prednisone, other steroids, or anticancer drugs; drugs for the treatment of rheumatoid arthritis, Crohn s disease, or psoriasis; or have you had radiation treatments?   No   Have you had a seizure, or a brain or other nervous system problem?   No   During the past year, have you received a transfusion of blood or blood    products, or been given immune (gamma) globulin or antiviral drug?   No   For women: Are you pregnant or is there a chance you could become       pregnant during the next month?   No   Have you received any vaccinations in the past 4 weeks?   No     Immunization questionnaire answers were all negative.        Per orders of Dr. gutierrez, injection of [id number] given by Jay Garcia CMA. Patient instructed to remain in clinic for 15 minutes afterwards, and to report any adverse reaction to me immediately.       Screening performed by Jay Garcia CMA on 2/25/2020 at 1:34 PM.

## 2020-02-27 DIAGNOSIS — G89.4 CHRONIC PAIN ASSOCIATED WITH SIGNIFICANT PSYCHOSOCIAL DYSFUNCTION: ICD-10-CM

## 2020-02-27 DIAGNOSIS — L20.9 ATOPIC DERMATITIS, UNSPECIFIED TYPE: ICD-10-CM

## 2020-02-27 DIAGNOSIS — M79.18 MYOFASCIAL PAIN: ICD-10-CM

## 2020-02-27 RX ORDER — HYDROXYZINE HYDROCHLORIDE 25 MG/1
TABLET, FILM COATED ORAL
Qty: 120 TABLET | Refills: 0 | Status: SHIPPED | OUTPATIENT
Start: 2020-02-27 | End: 2020-03-30

## 2020-02-27 RX ORDER — DULOXETIN HYDROCHLORIDE 60 MG/1
CAPSULE, DELAYED RELEASE ORAL
Qty: 90 CAPSULE | Refills: 3 | Status: SHIPPED | OUTPATIENT
Start: 2020-02-27 | End: 2020-11-25

## 2020-02-27 NOTE — TELEPHONE ENCOUNTER
Cymbalta 60mg  Last Written Prescription Date:  11/13/2018 (Dr. Garza)  Last Fill Quantity: 90,  # refills: 3   Last office visit: 2/25/2020 with prescribing provider:  Dr. Juárez   Future Office Visit:        Hydroxyzine 25mg  Last Written Prescription Date:  1/13/2020  Last Fill Quantity: 120,  # refills: 0   Last office visit: 2/25/2020 with prescribing provider:  Dr. Juárez   Future Office Visit:      Routing refill request to provider for review/approval because:  Unsure about Cymbalta RX.    Shakira Garcia RN

## 2020-03-01 ENCOUNTER — MYC MEDICAL ADVICE (OUTPATIENT)
Dept: FAMILY MEDICINE | Facility: CLINIC | Age: 60
End: 2020-03-01

## 2020-03-02 NOTE — TELEPHONE ENCOUNTER
Please advise on DM meter and testing supplies.  Not on med list and unsure how often pt is to be checking.    Michael Jasso RN, BSN

## 2020-03-02 NOTE — TELEPHONE ENCOUNTER
Patient medication list reviewed, she is not on diabetic regimen requiring insulin and therefore does not have a medically qualified condition to warrant home glucose monitoring.    Best regards,     Leonides Juárez MD

## 2020-03-12 ENCOUNTER — MYC REFILL (OUTPATIENT)
Dept: FAMILY MEDICINE | Facility: CLINIC | Age: 60
End: 2020-03-12

## 2020-03-12 DIAGNOSIS — R60.0 BILATERAL LOWER EXTREMITY EDEMA: ICD-10-CM

## 2020-03-12 RX ORDER — FUROSEMIDE 40 MG
40 TABLET ORAL DAILY
Qty: 90 TABLET | Refills: 0 | OUTPATIENT
Start: 2020-03-12

## 2020-03-17 ENCOUNTER — OFFICE VISIT (OUTPATIENT)
Dept: FAMILY MEDICINE | Facility: CLINIC | Age: 60
End: 2020-03-17
Payer: MEDICARE

## 2020-03-17 DIAGNOSIS — R39.9 LOWER URINARY TRACT SYMPTOMS (LUTS): Primary | ICD-10-CM

## 2020-03-17 DIAGNOSIS — B37.31 YEAST INFECTION OF THE VAGINA: ICD-10-CM

## 2020-03-17 DIAGNOSIS — R81 GLUCOSURIA: ICD-10-CM

## 2020-03-17 LAB
ALBUMIN UR-MCNC: NEGATIVE MG/DL
APPEARANCE UR: CLEAR
BACTERIA #/AREA URNS HPF: ABNORMAL /HPF
BILIRUB UR QL STRIP: NEGATIVE
COLOR UR AUTO: YELLOW
GLUCOSE UR STRIP-MCNC: >=1000 MG/DL
HGB UR QL STRIP: ABNORMAL
KETONES UR STRIP-MCNC: NEGATIVE MG/DL
LEUKOCYTE ESTERASE UR QL STRIP: NEGATIVE
NITRATE UR QL: NEGATIVE
NON-SQ EPI CELLS #/AREA URNS LPF: ABNORMAL /LPF
PH UR STRIP: 7 PH (ref 5–7)
RBC #/AREA URNS AUTO: ABNORMAL /HPF
SOURCE: ABNORMAL
SP GR UR STRIP: 1.02 (ref 1–1.03)
UROBILINOGEN UR STRIP-ACNC: 2 EU/DL (ref 0.2–1)
WBC #/AREA URNS AUTO: ABNORMAL /HPF

## 2020-03-17 PROCEDURE — 81001 URINALYSIS AUTO W/SCOPE: CPT | Performed by: FAMILY MEDICINE

## 2020-03-17 PROCEDURE — 99441 ZZC PHYSICIAN TELEPHONE EVALUATION 5-10 MIN: CPT | Performed by: FAMILY MEDICINE

## 2020-03-17 RX ORDER — FLUCONAZOLE 150 MG/1
150 TABLET ORAL ONCE
Qty: 1 TABLET | Refills: 0 | Status: SHIPPED | OUTPATIENT
Start: 2020-03-17 | End: 2020-06-12

## 2020-03-17 NOTE — PROGRESS NOTES
"Ana Luisa Byers is a 60 year old female who is being evaluated via a telephone visit.      The patient has been notified of following (by MICHAELA Garcia CMA       \"We have found that certain health care needs can be provided without the need for a physical exam.  This service lets us provide the care you need with a short phone conversation.  If a prescription is necessary we can send it directly to your pharmacy.  If lab work is needed we can place an order for that and you can then stop by our lab to have the test done at a later time.    This telephone visit will be conducted via 3 way call with the you (the patient) , the physician/provider, and a me all on the line at the same time.  This allows your physician/provider to have the phone conversation with you while I will be taking notes for your medical record.  We will have full access to your [place] medical record during this entire phone call.    Since this is like an office visit,  will bill your insurance company for this service.  Please check with your medical insurance if this type of telephone/virtual is covered . You may be responsible for the cost of this service if insurance coverage is denied.  The typical cost is $30 (10min), $59(11-20min) and $85 (21-30min)     If during the course of the call the physician/provider feels a telephone visit is not appropriate, you will not be charged for this service\"    Consent has been obtained for this service by care team member: yes.  See the scanned image in the medical record.    S: The history as provided by the patient to the provider during this 3 way call include MD GERALD, Patient    Pertinent parts of the the patient's medical history reviewed and confirmed by the provider included :     Not insulin-dependent diabetes.    Total time of call between patient and provider was 5 minutes     Ladarius regards,     Leonides Juárez MD    ===================================================    I have reviewed the " note as documented above.  This accurately captures the substance of my conversation with the patient,    Additional provider notes    Patient reports for the last couple days she is had increased urinary frequency, urgency without any painful urination.  She is also noticed an odor and a white discharge from her vagina.  No fever, dizziness.        Lab Results   Component Value Date    A1C 7.7 01/22/2020    A1C 7.8 10/15/2019    A1C 7.3 07/22/2019    A1C 7.2 04/16/2019    A1C 6.7 11/13/2018           Assessment & Plan     1. Lower urinary tract symptoms (LUTS)  Acute problem.  Urinalysis without evidence of urinary tract infection is both leukocyte esterase and nitrates are negative, and without pyuria. I have reviewed all of her historical urine cultures, none with evidence of a definitive urinary tract infection, less than 50,000 CFU's of either mixed urogenital dominic,  E. Coli. However, notably she has mal glucosuria.  So yeast infection is possible.  Will empirically treat with fluconazole as per below.  - *UA reflex to Microscopic and Culture ([place] and [facility] (except [place] and [place])  - Urine Microscopic    2. Glucosuria  Has mal glucosuria, advised to keep up oral fluid intake to match urinary output.  Advised if she is feeling dizziness, palpitations or symptoms frankly get worse, she needs to be seen in the clinic as we do not want to precipitate diabetic ketoacidosis.  - fluconazole (DIFLUCAN) 150 MG tablet; Take 1 tablet (150 mg) by mouth once for 1 dose  Dispense: 1 tablet; Refill: 0    3. Yeast infection of the vagina  - fluconazole (DIFLUCAN) 150 MG tablet; Take 1 tablet (150 mg) by mouth once for 1 dose  Dispense: 1 tablet; Refill: 0     Return in about 3 days (around 3/20/2020) for If symptoms do not improve or gets worse..    Leonides Juárez MD  [facility]        Documentation was prepared using Dragon voice recognition software. Please excuse typographical  errors. Please contact me if documentation is unclear.

## 2020-03-29 NOTE — TELEPHONE ENCOUNTER
Controlled Substance Refill Request for phentermine (ADIPEX-P) 15 MG capsule   Problem List Complete:    No     PROVIDER TO CONSIDER COMPLETION OF PROBLEM LIST AND OVERVIEW/CONTROLLED SUBSTANCE AGREEMENT    Last Written Prescription Date:  2/25/2020  Last Fill Quantity: 30 capsule,   # refills: 0    THE MOST RECENT OFFICE VISIT MUST BE WITHIN THE PAST 3 MONTHS. AT LEAST ONE FACE TO FACE VISIT MUST OCCUR EVERY 6 MONTHS. ADDITIONAL VISITS CAN BE VIRTUAL.  (THIS STATEMENT SHOULD BE DELETED.)    Last Office Visit with [facility] primary care provider: 3/17/20, Fran[place]    Future Office visit:     Controlled substance agreement:   Encounter-Level CSA - 11/09/2016:    Controlled Substance Agreement - Scan on 11/16/2016  5:00 PM: CONTROLLED SUBSTANCE AGREEMENT     Patient-Level CSA:    There are no patient-level csa.         Last Urine Drug Screen:   Pain Drug SCR UR W RPTD Meds   Date Value Ref Range Status   11/09/2016   Final    FINAL  (Note)  ====================================================================  TOXASSURE COMP DRUG ANALYSIS,UR  ====================================================================  Test                             Result       Flag       Units  Drug Present and Declared for Prescription Verification   Pregabalin                     PRESENT      EXPECTED   Topiramate                     PRESENT      EXPECTED   Zolpidem                       PRESENT      EXPECTED    Drug Present not Declared for Prescription Verification   Phentermine                    PRESENT      UNEXPECTED   Doxylamine                     PRESENT      UNEXPECTED   Dextromethorphan               PRESENT      UNEXPECTED   Dextrorphan/Levorphanol        PRESENT      UNEXPECTED    Dextrorphan is an expected metabolite of dextromethorphan, an    over-the-counter or prescription cough suppressant. Dextrorphan    cannot be distinguished from the scheduled prescription    medication levorphanol by the method used for  analysis.    ====================================================================  Test                      Result    Flag   Units      Ref Range   Creatinine              31               mg/dL      >=20  ====================================================================  Declared Medications:  The flagging and interpretation on this report are based on the  following declared medications.  Unexpected results may arise from  inaccuracies in the declared medications.    **Note: The testing scope of this panel includes these medications:    Pregabalin (Lyrica)  Topiramate (Topamax)    **Note: The testing scope of this panel does not include small to  moderate amounts of these reported medications:    Zolpidem  ====================================================================  For clinical consultation, please call [phone].  ====================================================================  Analysis performed by [employer] [place] [address]     , No results found for: COMDAT, No results found for: THC13, PCP13, COC13, MAMP13, OPI13, AMP13, BZO13, TCA13, MTD13, BAR13, OXY13, PPX13, BUP13     Processing:  Staff will hand deliver Rx to on-site pharmacy     [email or web address]       checked in past 3 months?  No, route to RN

## 2020-03-30 DIAGNOSIS — L20.9 ATOPIC DERMATITIS, UNSPECIFIED TYPE: ICD-10-CM

## 2020-03-30 RX ORDER — PHENTERMINE HYDROCHLORIDE 15 MG/1
15 CAPSULE ORAL EVERY MORNING
Qty: 30 CAPSULE | Refills: 0 | Status: SHIPPED | OUTPATIENT
Start: 2020-03-30 | End: 2020-04-08

## 2020-03-30 RX ORDER — HYDROXYZINE HYDROCHLORIDE 25 MG/1
TABLET, FILM COATED ORAL
Qty: 120 TABLET | Refills: 3 | Status: SHIPPED | OUTPATIENT
Start: 2020-03-30 | End: 2020-10-16

## 2020-03-30 NOTE — TELEPHONE ENCOUNTER
"hydrOXYzine (ATARAX) 25 MG tablet   Last Written Prescription Date:  02/27/2020  Last Fill Quantity: 120,  # refills: 0   Last office visit: 3/17/2020 with prescribing provider:  Lewis     Future Office Visit:      Requested Prescriptions   Pending Prescriptions Disp Refills     hydrOXYzine (ATARAX) 25 MG tablet 120 tablet 0       Antihistamines Protocol Passed - 3/30/2020  9:10 AM        Passed - Recent (12 mo) or future (30 days) visit within the authorizing provider's specialty     Patient has had an office visit with the authorizing provider or a provider within the authorizing providers department within the previous 12 mos or has a future within next 30 days. See \"Patient Info\" tab in inbasket, or \"Choose Columns\" in Meds & Orders section of the refill encounter.              Passed - Patient is age 3 or older     Apply age and/or weight-based dosing for peds patients age 3 and older.    Forward request to provider for patients under the age of 3.          Passed - Medication is active on med list             "

## 2020-03-30 NOTE — TELEPHONE ENCOUNTER
[place] reviewed:  reviewed- no concerns    [place]  [email or web address]

## 2020-03-31 ENCOUNTER — DOCUMENTATION ONLY (OUTPATIENT)
Dept: FAMILY MEDICINE | Facility: CLINIC | Age: 60
End: 2020-03-31

## 2020-03-31 ENCOUNTER — E-VISIT (OUTPATIENT)
Dept: FAMILY MEDICINE | Facility: CLINIC | Age: 60
End: 2020-03-31
Payer: MEDICARE

## 2020-03-31 DIAGNOSIS — M54.50 ACUTE LEFT-SIDED LOW BACK PAIN WITHOUT SCIATICA: Primary | ICD-10-CM

## 2020-03-31 PROCEDURE — 99421 OL DIG E/M SVC 5-10 MIN: CPT | Performed by: FAMILY MEDICINE

## 2020-03-31 RX ORDER — METHOCARBAMOL 500 MG/1
1000 TABLET, FILM COATED ORAL 3 TIMES DAILY PRN
Qty: 20 TABLET | Refills: 3 | Status: SHIPPED | OUTPATIENT
Start: 2020-03-31 | End: 2020-07-28

## 2020-03-31 RX ORDER — TRAMADOL HYDROCHLORIDE 50 MG/1
50 TABLET ORAL EVERY 6 HOURS PRN
Qty: 10 TABLET | Refills: 0 | Status: SHIPPED | OUTPATIENT
Start: 2020-03-31 | End: 2020-07-21 | Stop reason: ALTCHOICE

## 2020-03-31 NOTE — TELEPHONE ENCOUNTER
Provider E-Visit time total (minutes): 5 min    History of fibromyalgia, muscle spasms and chronic pain syndrome.  Acute back pain without inciting event.  Seen by me couple weeks ago with concerns of UTI, UA not reflective of UTI.  Continues to low back pain refractory to Advil.      1. Acute left-sided low back pain without sciatica  Acute problem.  Trial Robaxin and tramadol for pain management.  Symptoms refractory, will advise patient to get a lumbar x-ray.  - methocarbamol (ROBAXIN) 500 MG tablet; Take 2 tablets (1,000 mg) by mouth 3 times daily as needed for muscle spasms  Dispense: 20 tablet; Refill: 3  - traMADol (ULTRAM) 50 MG tablet; Take 1 tablet (50 mg) by mouth every 6 hours as needed for severe pain  Dispense: 10 tablet; Refill: 0         Return in about 3 days (around 4/3/2020) for If symptoms do not improve or gets worse..    Leonides Juárez MD  [facility]    Documentation was prepared using Dragon voice recognition software. Please excuse typographical errors. Please contact me if documentation is unclear.

## 2020-03-31 NOTE — PROGRESS NOTES
Chart reviewed by Ambulatory Quality and Data team    Please abstract the following data from this visit with this patient into the appropriate field in Epic:    Tests that can be patient reported without a hard copy:        Other Tests found in the patient's chart through Chart Review/Care Everywhere:    Eye exam with ophthalmology on this date: 1/14/19 Eye Exam is negative for Retinopathy.     Note to Abstraction: If this section is blank, no results were found via Chart Review/Care Everywhere.

## 2020-03-31 NOTE — PATIENT INSTRUCTIONS
I have sent you a prescription of muscle relaxants and stronger pain medications to help you get over your back pain.  This is a difficult time with the coronavirus and we are trying to limit patient visits.  If your symptoms do not improve in the next 3 days with these measures, I recommend you contact us and let us know and we will discuss further management.      Thank you for choosing us for your care. I have placed an order for a prescription so that you can start treatment. View your full visit summary for details by clicking on the link below. Your pharmacist will able to address any questions you may have about the medication.      If you're not feeling better within 2-3 weeks please schedule an appointment.  You can schedule an appointment right here in [facility], or call [phone]  If the visit is for the same symptoms as your e-visit, we'll refund the cost of your e-visit if seen within seven days.    Caring for Your Back    You are not alone.    Low back pain is very common. Nearly half of all adults will have low back pain in any given year. The good news is that back pain is rarely a danger to your health. Most people can manage their back pain on their own. About half of people start feeling better within two weeks. In 9 out of 10 cases, low back pain goes away or no longer limits daily activity within 6 weeks.     Your outlook is good!     Your symptoms tell us that your low back pain is most likely not a danger to you. Most of the time we will not know the exact cause of low back pain, even if you see a doctor or have an MRI. However, treatment can still work without knowing the cause of the pain. Less than 1 in 100 people need surgery for their back pain.     What can I do about my low back pain?     There are three basic things you can do to ease low back pain and help it go away.     Use heat or cold packs.    Take medicine as directed.    Use positions, movements and exercises.    Using heat or  cold packs:    Try cold packs or gentle heat to ease your pain.  Use whichever gives you the most relief. Apply the cold pack or heat for 15 minutes at a time, as often as needed.    Taking medicine:    If taking over-the-counter medicine:    Take ibuprofen (Advil, Motrin) 600 mg three times a day as needed for pain.  OR    Take Aleve (naproxen) 220 to 440 mg two times a day as needed for pain. If your doctor prescribed a muscle relaxant (cyclobenzaprine 10 mg.):    Take   to 1 tablet at bedtime.    Do not drive when taking this medicine. This drug may make you sleepy.     Using positions, movements and exercises:    Research tells us that moving your joints and muscles can help you recover from back pain. Such activity should be simple and gentle. Use the positions below as well as walking to help relieve your pain. Try taking a short walk every 3 to 4 hours during the day. Walk for a few minutes inside your home or take longer walks outside, on a treadmill or at a mall. Slowly increase the amount of time you walk. Expect discomfort when you begin, but it should lessen as your back starts to heal. When your back feels better, walk daily to keep your back and body healthy.    Finding a position that is comfortable:    When your back pain is new, certain positions will ease your pain. Gently try each of the positions below until you find one that is helpful. Once you find a position of comfort, use it as often as you like when you are resting. You will recover faster if you combine rest with activity.    * Lie on your back with your legs bent. You can do this by placing a pillow under your knees or lie on the floor and rest your lower legs on the seat of a chair.  * Lie on your side with your knees bent and place a pillow between your knees.    Lie on your stomach over pillows.       When should I call my doctor?    Your back pain should improve over the first couple of weeks. As it improves, you should be able to  return to your normal activities.  But call your doctor if:      You have a sudden change in your ability to control your bladder or bowels.    You begin to feel tingling in your groin or legs.    The pain spreads down your leg and into your foot.    Your toes, feet or leg muscles begin to feel weak.    You feel generally unwell or sick.    Your pain gets worse.    If you are deaf or hard of hearing, please let us know. We provide many free services including sign language interpreters, oral interpreters, TTYs, telephone amplifiers, note takers and written materials.    For informational purposes only. Not to replace the advice of your health care provider. Copyright   2013 [facility]. All rights reserved. [employer] 349274 - 04/13.

## 2020-04-01 ENCOUNTER — MYC MEDICAL ADVICE (OUTPATIENT)
Dept: FAMILY MEDICINE | Facility: CLINIC | Age: 60
End: 2020-04-01

## 2020-04-01 NOTE — TELEPHONE ENCOUNTER
Patient has uncontrolled back pain, had a recent E visit with me was given a prescription of tramadol and Flexeril.  Pain is refractory.  History of chronic low back pain, please see MRI dated 11-1-2019.  Recommend patient schedule an in person visit for further assessment.    Best regards,     Leonides Juárez MD

## 2020-04-08 ENCOUNTER — MYC MEDICAL ADVICE (OUTPATIENT)
Dept: NURSING | Facility: CLINIC | Age: 60
End: 2020-04-08

## 2020-04-08 RX ORDER — PHENTERMINE HYDROCHLORIDE 15 MG/1
15 CAPSULE ORAL EVERY MORNING
Qty: 30 CAPSULE | Refills: 0 | Status: SHIPPED | OUTPATIENT
Start: 2020-04-08 | End: 2020-07-21

## 2020-04-08 NOTE — TELEPHONE ENCOUNTER
This was sent to [facility] on 3/30/2020 but now [facility] is requesting. Mychart sent to verify pharmacy change.   did not show this was filled    Michael Jasso RN, BSN

## 2020-04-08 NOTE — TELEPHONE ENCOUNTER
Pt states [facility].     Please sent in script to correct pharmacy    Michael Jasso RN, BSN

## 2020-04-15 ENCOUNTER — MYC REFILL (OUTPATIENT)
Dept: FAMILY MEDICINE | Facility: CLINIC | Age: 60
End: 2020-04-15

## 2020-04-15 DIAGNOSIS — Z91.030 BEE STING ALLERGY: ICD-10-CM

## 2020-04-16 RX ORDER — EPINEPHRINE 0.3 MG/.3ML
0.3 INJECTION SUBCUTANEOUS
Qty: 1 ML | Refills: 1 | Status: SHIPPED | OUTPATIENT
Start: 2020-04-16 | End: 2023-10-11

## 2020-04-16 NOTE — TELEPHONE ENCOUNTER
Prescription approved per [facility] [place] [place] Refill Protocol.  For bee allergy     Shira Hamm RN

## 2020-05-04 ENCOUNTER — TELEPHONE (OUTPATIENT)
Dept: FAMILY MEDICINE | Facility: CLINIC | Age: 60
End: 2020-05-04

## 2020-05-04 DIAGNOSIS — M62.830 BACK MUSCLE SPASM: Primary | ICD-10-CM

## 2020-05-04 RX ORDER — CYCLOBENZAPRINE HCL 10 MG
10 TABLET ORAL 3 TIMES DAILY PRN
Qty: 90 TABLET | Refills: 0 | Status: SHIPPED | OUTPATIENT
Start: 2020-05-04 | End: 2020-11-25

## 2020-05-04 NOTE — TELEPHONE ENCOUNTER
Received notice from pharmacy that methocarbamol is not covered and the alternatives are tizanidine  Or cyclobenzaprine.    Please advise if these alternatives are appropriate or if a PA needs to be done.    Michael Jasso RN, BSN

## 2020-05-04 NOTE — TELEPHONE ENCOUNTER
I can send in flexeril for her. Can you please just let her know the robaxin isn't covered and I've sent the new Rx please?    Thanks    Leonides Juárez MD

## 2020-05-29 ENCOUNTER — MYC MEDICAL ADVICE (OUTPATIENT)
Dept: FAMILY MEDICINE | Facility: CLINIC | Age: 60
End: 2020-05-29

## 2020-05-29 NOTE — TELEPHONE ENCOUNTER
I think a video visit would be best. Can we get contact her to see if she would like to schedule something?    Thanks,  Leonides Juárez MD

## 2020-06-01 ENCOUNTER — E-VISIT (OUTPATIENT)
Dept: FAMILY MEDICINE | Facility: CLINIC | Age: 60
End: 2020-06-01
Payer: MEDICARE

## 2020-06-01 DIAGNOSIS — B37.31 YEAST INFECTION OF THE VAGINA: ICD-10-CM

## 2020-06-01 DIAGNOSIS — R23.8 SKIN IRRITATION: Primary | ICD-10-CM

## 2020-06-01 PROCEDURE — 99421 OL DIG E/M SVC 5-10 MIN: CPT | Performed by: FAMILY MEDICINE

## 2020-06-01 RX ORDER — NYSTATIN 100000 [USP'U]/G
POWDER TOPICAL 2 TIMES DAILY
Qty: 60 G | Refills: 0 | Status: SHIPPED | OUTPATIENT
Start: 2020-06-01 | End: 2020-06-12

## 2020-06-01 RX ORDER — FLUCONAZOLE 150 MG/1
150 TABLET ORAL ONCE
Qty: 1 TABLET | Refills: 0 | Status: SHIPPED | OUTPATIENT
Start: 2020-06-01 | End: 2020-06-12

## 2020-06-01 NOTE — TELEPHONE ENCOUNTER
Provider E-Visit time total (minutes): 10 min      1. Skin irritation  - nystatin (MYCOSTATIN) 897735 UNIT/GM external powder; Apply topically 2 times daily for 7 days  Dispense: 60 g; Refill: 0    2. Yeast infection of the vagina  - fluconazole (DIFLUCAN) 150 MG tablet; Take 1 tablet (150 mg) by mouth once for 1 dose  Dispense: 1 tablet; Refill: 0           Return in about 3 days (around 6/4/2020) for If symptoms do not improve or gets worse..    Leonides Juárez MD  [facility]

## 2020-06-01 NOTE — PATIENT INSTRUCTIONS
Please keep the skin irritation area dry/clean as possible especially when hot out.  Moisture makes the situation worse.  Recommend applying the nystatin powder twice daily after cleaning the area.    The yeast infection can be resolved to 1 tablet of fluconazole.  This is been sent for you.

## 2020-06-04 DIAGNOSIS — G47.09 OTHER INSOMNIA: ICD-10-CM

## 2020-06-04 RX ORDER — ZOLPIDEM TARTRATE 10 MG/1
TABLET ORAL
Qty: 30 TABLET | Refills: 2 | Status: SHIPPED | OUTPATIENT
Start: 2020-06-04 | End: 2020-07-23

## 2020-06-04 RX ORDER — ZOLPIDEM TARTRATE 10 MG/1
TABLET ORAL
Qty: 30 TABLET | Refills: 2 | Status: SHIPPED | OUTPATIENT
Start: 2020-06-04 | End: 2020-06-04

## 2020-06-04 NOTE — TELEPHONE ENCOUNTER
[place] reviewed:  reviewed- no concerns    [place]  [email or web address]    Controlled Substance Refill Request for ambien  Problem List Complete:    No     PROVIDER TO CONSIDER COMPLETION OF PROBLEM LIST AND OVERVIEW/CONTROLLED SUBSTANCE AGREEMENT    Last Written Prescription Date:  2/25/2020  Last Fill Quantity: 30,   # refills: 2        Last Office Visit with [facility] primary care provider: 6/1/2020 Evisit    Future Office visit:     Controlled substance agreement:   Encounter-Level CSA - 11/09/2016:    Controlled Substance Agreement - Scan on 11/16/2016  5:00 PM: CONTROLLED SUBSTANCE AGREEMENT     Patient-Level CSA:    There are no patient-level csa.         Last Urine Drug Screen:   Pain Drug SCR UR W RPTD Meds   Date Value Ref Range Status   11/09/2016   Final    FINAL  (Note)  ====================================================================  TOXAS[facility]RE COMP DRUG ANALYSIS,UR  ====================================================================  Test                             Result       Flag       Units  Drug Present and Declared for Prescription Verification   Pregabalin                     PRESENT      EXPECTED   Topiramate                     PRESENT      EXPECTED   Zolpidem                       PRESENT      EXPECTED    Drug Present not Declared for Prescription Verification   Phentermine                    PRESENT      UNEXPECTED   Doxylamine                     PRESENT      UNEXPECTED   Dextromethorphan               PRESENT      UNEXPECTED   Dextrorphan/Levorphanol        PRESENT      UNEXPECTED    Dextrorphan is an expected metabolite of dextromethorphan, an    over-the-counter or prescription cough suppressant. Dextrorphan    cannot be distinguished from the scheduled prescription    medication levorphanol by the method used for analysis.    ====================================================================  Test                      Result    Flag   Units       Ref Range   Creatinine              31               mg/dL      >=20  ====================================================================  Declared Medications:  The flagging and interpretation on this report are based on the  following declared medications.  Unexpected results may arise from  inaccuracies in the declared medications.    **Note: The testing scope of this panel includes these medications:    Pregabalin (Lyrica)  Topiramate (Topamax)    **Note: The testing scope of this panel does not include small to  moderate amounts of these reported medications:    Zolpidem  ====================================================================  For clinical consultation, please call [phone].  ====================================================================  Analysis performed by [employer] [place] [address]     , No results found for: COMDAT, No results found for: THC13, PCP13, COC13, MAMP13, OPI13, AMP13, BZO13, TCA13, MTD13, BAR13, OXY13, PPX13, BUP13     Processing:  Rx to be electronically transmitted to pharmacy by provider      [email or web address]       checked in past 3 months?  Yes 6/4/2020

## 2020-06-04 NOTE — TELEPHONE ENCOUNTER
[facility] calling they are out of stock of the Ambien at the [facility] sent too but they do have at the other [facility] in     Pharmacist cx current RX so new one can be sent to the pharmacy that has med in stock     Please re send RX    Shira Hamm, YEISON

## 2020-06-09 DIAGNOSIS — E11.42 TYPE 2 DIABETES MELLITUS WITH DIABETIC POLYNEUROPATHY, WITHOUT LONG-TERM CURRENT USE OF INSULIN (H): ICD-10-CM

## 2020-06-09 DIAGNOSIS — R60.0 BILATERAL LOWER EXTREMITY EDEMA: ICD-10-CM

## 2020-06-09 RX ORDER — GLIPIZIDE 10 MG/1
10 TABLET, FILM COATED, EXTENDED RELEASE ORAL 2 TIMES DAILY
Qty: 60 TABLET | Refills: 0 | Status: SHIPPED | OUTPATIENT
Start: 2020-06-09 | End: 2020-06-12

## 2020-06-09 RX ORDER — FUROSEMIDE 40 MG
40 TABLET ORAL DAILY
Qty: 30 TABLET | Refills: 0 | OUTPATIENT
Start: 2020-06-09

## 2020-06-09 NOTE — TELEPHONE ENCOUNTER
Medication is being filled for 1 time refill only due to:  Patient needs to be seen because needs appt in July for DM.     Mychart sent  Michael Jasso RN, BSN

## 2020-06-09 NOTE — TELEPHONE ENCOUNTER
Routing refill request to provider for review/approval because:  Labs out of range:  Potassium  Michael Jasso RN, BSN

## 2020-06-09 NOTE — TELEPHONE ENCOUNTER
Needs appointment to review lasix medication necessity and labs as her kidney function needs to be closely monitored. Last refill was a patient request and limited supply given.    Thank you,  Leonides gutierrez MD

## 2020-06-12 ENCOUNTER — E-VISIT (OUTPATIENT)
Dept: FAMILY MEDICINE | Facility: CLINIC | Age: 60
End: 2020-06-12
Payer: MEDICARE

## 2020-06-12 DIAGNOSIS — K21.00 GASTROESOPHAGEAL REFLUX DISEASE WITH ESOPHAGITIS: Primary | ICD-10-CM

## 2020-06-12 DIAGNOSIS — E11.42 TYPE 2 DIABETES MELLITUS WITH DIABETIC POLYNEUROPATHY, WITHOUT LONG-TERM CURRENT USE OF INSULIN (H): ICD-10-CM

## 2020-06-12 DIAGNOSIS — R60.0 BILATERAL LOWER EXTREMITY EDEMA: ICD-10-CM

## 2020-06-12 PROCEDURE — 99421 OL DIG E/M SVC 5-10 MIN: CPT | Performed by: FAMILY MEDICINE

## 2020-06-12 RX ORDER — FUROSEMIDE 40 MG
40 TABLET ORAL DAILY
Qty: 90 TABLET | Refills: 1 | Status: SHIPPED | OUTPATIENT
Start: 2020-06-12 | End: 2020-12-30

## 2020-06-12 RX ORDER — OMEPRAZOLE 40 MG/1
40 CAPSULE, DELAYED RELEASE ORAL DAILY
Qty: 90 CAPSULE | Refills: 1 | Status: SHIPPED | OUTPATIENT
Start: 2020-06-12 | End: 2020-11-10

## 2020-06-12 RX ORDER — GLIPIZIDE 10 MG/1
10 TABLET, FILM COATED, EXTENDED RELEASE ORAL 2 TIMES DAILY
Qty: 180 TABLET | Refills: 1 | Status: SHIPPED | OUTPATIENT
Start: 2020-06-12 | End: 2020-11-05

## 2020-06-12 NOTE — TELEPHONE ENCOUNTER
Provider E-Visit time total (minutes): 10     CBC RESULTS:   Recent Labs   Lab Test 08/22/18  0824   WBC 5.2   RBC 4.32   HGB 12.8   HCT 39.4   MCV 91   MCH 29.6   MCHC 32.5   RDW 12.4          Last Comprehensive Metabolic Panel:  Sodium   Date Value Ref Range Status   07/22/2019 138 133 - 144 mmol/L Final     Potassium   Date Value Ref Range Status   07/22/2019 3.2 (L) 3.4 - 5.3 mmol/L Final     Comment:     Specimen slightly hemolyzed, potassium may be falsely elevated     Chloride   Date Value Ref Range Status   07/22/2019 101 94 - 109 mmol/L Final     Carbon Dioxide   Date Value Ref Range Status   07/22/2019 26 20 - 32 mmol/L Final     Anion Gap   Date Value Ref Range Status   07/22/2019 11 3 - 14 mmol/L Final     Glucose   Date Value Ref Range Status   07/22/2019 133 (H) 70 - 99 mg/dL Final     Comment:     Fasting specimen     Urea Nitrogen   Date Value Ref Range Status   07/22/2019 12 7 - 30 mg/dL Final     Creatinine   Date Value Ref Range Status   07/22/2019 0.97 0.52 - 1.04 mg/dL Final     GFR Estimate   Date Value Ref Range Status   07/22/2019 64 >60 mL/min/[1.73_m2] Final     Comment:     Non  GFR Calc  Starting 12/18/2018, serum creatinine based estimated GFR (eGFR) will be   calculated using the Chronic Kidney Disease Epidemiology Collaboration   (CKD-EPI) equation.       Calcium   Date Value Ref Range Status   07/22/2019 9.2 8.5 - 10.1 mg/dL Final       Lab Results   Component Value Date    A1C 7.7 01/22/2020    A1C 7.8 10/15/2019    A1C 7.3 07/22/2019    A1C 7.2 04/16/2019    A1C 6.7 11/13/2018     @SECTIONASSWhite [facility] HospitalMENTANDPLANBEGIN@    1. Bilateral lower extremity edema  Was chronic therapy for patient when she transferred care to me.  We will continue chronic medical management however need interval electrolyte and kidney function monitoring.  - furosemide (LASIX) 40 MG tablet; Take 1 tablet (40 mg) by mouth daily  Dispense: 90 tablet; Refill: 1  - **Basic metabolic panel FUTURE  anytime; Future    2. Type 2 diabetes mellitus with diabetic polyneuropathy, without long-term current use of insulin (H)  Refilled.  Last A1c controlled, due for recheck of labs.  - glipiZIDE (GLUCOTROL XL) 10 MG 24 hr tablet; Take 1 tablet (10 mg) by mouth 2 times daily  Dispense: 180 tablet; Refill: 1  - **Basic metabolic panel FUTURE anytime; Future  - Albumin Random Urine Quantitative with Creat Ratio; Future  - A1C    3. Gastroesophageal reflux disease with esophagitis  Refilled.  - omeprazole (PRILOSEC) 40 MG DR capsule; Take 1 capsule (40 mg) by mouth daily  Dispense: 90 capsule; Refill: 1         Return in about 1 week (around 6/19/2020) for Lab Work.    Leonides Juárez MD  [facility]

## 2020-06-12 NOTE — PATIENT INSTRUCTIONS
Thank you for choosing us for your care. I have placed an order for a prescription so that you can start treatment. View your full visit summary for details by clicking on the link below. Your pharmacist will able to address any questions you may have about the medication.     If you're not feeling better within 5-7 days, please schedule an appointment.  You can schedule an appointment right here in [facility], or call [phone]  If the visit is for the same symptoms as your e-visit, we'll refund the cost of your e-visit if seen within seven days.    Thank you for choosing us for your care. Given your symptoms, I would like you to do a lab-only visit to determine what is causing them.  I have placed the orders.  Please schedule an appointment with the lab right here in [facility], or call [phone].  I will let you know when the results are back and next steps to take.

## 2020-06-18 DIAGNOSIS — E11.42 TYPE 2 DIABETES MELLITUS WITH DIABETIC POLYNEUROPATHY, WITHOUT LONG-TERM CURRENT USE OF INSULIN (H): ICD-10-CM

## 2020-06-18 DIAGNOSIS — R60.0 BILATERAL LOWER EXTREMITY EDEMA: ICD-10-CM

## 2020-06-18 LAB
ANION GAP SERPL CALCULATED.3IONS-SCNC: 4 MMOL/L (ref 3–14)
BUN SERPL-MCNC: 10 MG/DL (ref 7–30)
CALCIUM SERPL-MCNC: 9.2 MG/DL (ref 8.5–10.1)
CHLORIDE SERPL-SCNC: 100 MMOL/L (ref 94–109)
CO2 SERPL-SCNC: 32 MMOL/L (ref 20–32)
CREAT SERPL-MCNC: 1.01 MG/DL (ref 0.52–1.04)
CREAT UR-MCNC: 270 MG/DL
GFR SERPL CREATININE-BSD FRML MDRD: 60 ML/MIN/{1.73_M2}
GLUCOSE SERPL-MCNC: 192 MG/DL (ref 70–99)
HBA1C MFR BLD: 9.5 % (ref 0–5.6)
MICROALBUMIN UR-MCNC: 72 MG/L
MICROALBUMIN/CREAT UR: 26.74 MG/G CR (ref 0–25)
POTASSIUM SERPL-SCNC: 3.5 MMOL/L (ref 3.4–5.3)
SODIUM SERPL-SCNC: 136 MMOL/L (ref 133–144)

## 2020-06-18 PROCEDURE — 80048 BASIC METABOLIC PNL TOTAL CA: CPT | Performed by: FAMILY MEDICINE

## 2020-06-18 PROCEDURE — 36415 COLL VENOUS BLD VENIPUNCTURE: CPT | Performed by: FAMILY MEDICINE

## 2020-06-18 PROCEDURE — 83036 HEMOGLOBIN GLYCOSYLATED A1C: CPT | Performed by: FAMILY MEDICINE

## 2020-06-18 PROCEDURE — 82043 UR ALBUMIN QUANTITATIVE: CPT | Performed by: FAMILY MEDICINE

## 2020-07-08 DIAGNOSIS — G47.09 OTHER INSOMNIA: ICD-10-CM

## 2020-07-09 RX ORDER — ZOLPIDEM TARTRATE 10 MG/1
TABLET ORAL
Qty: 30 TABLET | Refills: 2 | OUTPATIENT
Start: 2020-07-09

## 2020-07-09 NOTE — TELEPHONE ENCOUNTER
Per  pt should have 2 additional refills available from the 6/4/2020 script    Michael Jasso RN, BSN

## 2020-07-21 ENCOUNTER — OFFICE VISIT (OUTPATIENT)
Dept: FAMILY MEDICINE | Facility: CLINIC | Age: 60
End: 2020-07-21
Payer: MEDICARE

## 2020-07-21 VITALS
SYSTOLIC BLOOD PRESSURE: 134 MMHG | WEIGHT: 274 LBS | BODY MASS INDEX: 45.65 KG/M2 | HEART RATE: 90 BPM | RESPIRATION RATE: 14 BRPM | TEMPERATURE: 98.3 F | DIASTOLIC BLOOD PRESSURE: 88 MMHG | HEIGHT: 65 IN

## 2020-07-21 DIAGNOSIS — M54.42 ACUTE BILATERAL LOW BACK PAIN WITH BILATERAL SCIATICA: ICD-10-CM

## 2020-07-21 DIAGNOSIS — M54.41 ACUTE BILATERAL LOW BACK PAIN WITH BILATERAL SCIATICA: ICD-10-CM

## 2020-07-21 DIAGNOSIS — G47.09 OTHER INSOMNIA: ICD-10-CM

## 2020-07-21 PROCEDURE — 99214 OFFICE O/P EST MOD 30 MIN: CPT | Mod: 25 | Performed by: FAMILY MEDICINE

## 2020-07-21 PROCEDURE — 96372 THER/PROPH/DIAG INJ SC/IM: CPT | Performed by: FAMILY MEDICINE

## 2020-07-21 RX ORDER — PHENTERMINE HYDROCHLORIDE 15 MG/1
15 CAPSULE ORAL EVERY MORNING
Qty: 30 CAPSULE | Refills: 2 | Status: SHIPPED | OUTPATIENT
Start: 2020-07-21 | End: 2020-11-25

## 2020-07-21 RX ORDER — KETOROLAC TROMETHAMINE 30 MG/ML
60 INJECTION, SOLUTION INTRAMUSCULAR; INTRAVENOUS ONCE
Status: COMPLETED | OUTPATIENT
Start: 2020-07-21 | End: 2020-07-21

## 2020-07-21 RX ORDER — HYDROCODONE BITARTRATE AND ACETAMINOPHEN 5; 325 MG/1; MG/1
1 TABLET ORAL EVERY 6 HOURS PRN
Qty: 10 TABLET | Refills: 0 | Status: SHIPPED | OUTPATIENT
Start: 2020-07-21 | End: 2020-07-24

## 2020-07-21 RX ADMIN — KETOROLAC TROMETHAMINE 60 MG: 30 INJECTION, SOLUTION INTRAMUSCULAR; INTRAVENOUS at 13:42

## 2020-07-21 ASSESSMENT — ANXIETY QUESTIONNAIRES
2. NOT BEING ABLE TO STOP OR CONTROL WORRYING: MORE THAN HALF THE DAYS
1. FEELING NERVOUS, ANXIOUS, OR ON EDGE: SEVERAL DAYS
3. WORRYING TOO MUCH ABOUT DIFFERENT THINGS: MORE THAN HALF THE DAYS
GAD7 TOTAL SCORE: 13
5. BEING SO RESTLESS THAT IT IS HARD TO SIT STILL: MORE THAN HALF THE DAYS
6. BECOMING EASILY ANNOYED OR IRRITABLE: MORE THAN HALF THE DAYS
7. FEELING AFRAID AS IF SOMETHING AWFUL MIGHT HAPPEN: MORE THAN HALF THE DAYS
IF YOU CHECKED OFF ANY PROBLEMS ON THIS QUESTIONNAIRE, HOW DIFFICULT HAVE THESE PROBLEMS MADE IT FOR YOU TO DO YOUR WORK, TAKE CARE OF THINGS AT HOME, OR GET ALONG WITH OTHER PEOPLE: SOMEWHAT DIFFICULT

## 2020-07-21 ASSESSMENT — ENCOUNTER SYMPTOMS
FEVER: 0
DIFFICULTY URINATING: 0
COLOR CHANGE: 0
UNEXPECTED WEIGHT CHANGE: 0
BACK PAIN: 1
NUMBNESS: 1
WEAKNESS: 0

## 2020-07-21 ASSESSMENT — PATIENT HEALTH QUESTIONNAIRE - PHQ9
SUM OF ALL RESPONSES TO PHQ QUESTIONS 1-9: 11
5. POOR APPETITE OR OVEREATING: MORE THAN HALF THE DAYS

## 2020-07-21 ASSESSMENT — MIFFLIN-ST. JEOR: SCORE: 1813.74

## 2020-07-21 NOTE — PROGRESS NOTES
Subjective     Ana Luisa Byers is a 60 year old female who presents to clinic today for the following health issues:    HPI   Patient is a pleasant 60-year-old female who presents to clinic for interval visit regarding supervised weight management.  She has been on 15 mg of phentermine daily and has been tolerating this medication well without reported side effects of dizziness, headache, palpitations or chest pain.  Continues to eat lean proteins including chicken breast and has been increasing exercise by walking.  However, in the last 3 days she is indoor significant back pain which has prevented her from exercising.  She has been helping her  who has recently been diagnosed with Jevon's gangrene and requires a lot of assistance around the house.  Subsequently her lower back has been a lot more painful.  She endorses achy localized pain that worsens with activity with occasional numbness and tingling radiating down both legs, left greater than right.  No incontinence of bowel or bladder.  No falls.  She is able to ambulate independently.  In the past she has been referred to orthopedic surgery however was subsequently lost to follow-up due to coronavirus.  She was previously prescribed tramadol for her back pain which did not help.  Her pain is also refractory to over-the-counter Tylenol and ibuprofen.    Reviewed and updated as needed this visit by Provider         Review of Systems   Constitutional: Negative for fever and unexpected weight change.   Genitourinary: Negative for difficulty urinating.   Musculoskeletal: Positive for back pain.   Skin: Negative for color change.   Neurological: Positive for numbness. Negative for weakness.      Medications updated and reviewed.  Past, family and surgical history is updated and reviewed in the record.  Past Medical History:   Diagnosis Date     Arthritis     left shoulder and back     Chronic pain     fibromalgia      CKD (chronic kidney disease), stage  "III (H)      Fibromyalgia      Gastro-oesophageal reflux disease      HTN, goal below 140/90      Hyperlipidemia LDL goal <100      Hypothyroidism      Major depression     chronic kidney disease-stage 3     Peripheral neuropathy Feb 2015    + callus. diabetic shoes rx done     PONV (postoperative nausea and vomiting)      Rheumatoid arthritis of foot (H)      Tongue abnormality      Type 2 diabetes, HbA1C goal < 8% (H)     pre-diabetic            Objective    /88 (BP Location: Right arm, Patient Position: Sitting, Cuff Size: Adult Large)   Pulse 90   Temp 98.3  F (36.8  C) (Oral)   Resp 14   Ht 1.651 m (5' 5\")   Wt 124.3 kg (274 lb)   LMP 02/13/2009   Breastfeeding No   BMI 45.60 kg/m    Body mass index is 45.6 kg/m .     Vital Signs 2/7/2020 2/25/2020 7/21/2020   Weight (LB) 285 lb 281 lb 274 lb       Physical Exam  Constitutional:       Appearance: She is not ill-appearing.      Comments: Obese habitus.   Musculoskeletal:      Comments: Tender to palpation of the lower paraspinal muscles, and over SI joints bilaterally.  Unable to tolerate additional exam due to pain.   Neurological:      General: No focal deficit present.   Psychiatric:         Mood and Affect: Mood normal.         Behavior: Behavior normal.         Thought Content: Thought content normal.         Judgment: Judgment normal.            Diagnostic Test Results:  Labs reviewed in Epic    11-1-2019  [facility] MRI spine              Assessment & Plan     1. BMI 45.0-49.9, adult (H)  Weight loss of 11 pounds in 5 months.  Unable to tolerate topiramate and subsequently unable to tolerate qsymia.  We discussed that phentermine is currently not a long-term FDA approved however she is tolerating well with normal blood pressures and does not seem to have side effects, so we had a risk-benefit discussion and decided to continue current medical management.  Recheck in 3 months.  - phentermine (ADIPEX-P) 15 MG capsule; Take 1 capsule (15 mg) by mouth " every morning  Dispense: 30 capsule; Refill: 2    2. Acute bilateral low back pain with bilateral sciatica  Has multi-lumbar degenerative changes and was previously followed by orthopedics however was subsequently lost to follow-up in light of coronavirus.  Gave contact information back to orthopedics.  Toradol was given in clinic you have severe pain with good improvement in exam findings without neurological compromise to warrant additional emergent imaging at this time.  We will give a limited supply of Norco for acute pain symptoms are refractory to tramadol and over-the-counter Tylenol and NSAIDs.  - ketorolac (TORADOL) injection 60 mg  - HYDROcodone-acetaminophen (NORCO) 5-325 MG tablet; Take 1 tablet by mouth every 6 hours as needed for severe pain  Dispense: 10 tablet; Refill: 0     Return in about 3 days (around 7/24/2020) for If symptoms do not improve or gets worse..    Leonides Juárez MD  [facility]    Documentation was prepared using Dragon voice recognition software. Please excuse typographical errors. Please contact me if documentation is unclear.

## 2020-07-21 NOTE — PATIENT INSTRUCTIONS
Please call orthopedic: [phone].    Patient Education     Relieving Back Pain  Back pain is a common problem. You can strain back muscles by lifting too much weight or just by moving the wrong way. Back strain can be uncomfortable, even painful. And it can take weeks or months to improve. To help yourself feel better and prevent future back strains, try these tips.  Important: Don't give aspirin to children or teens without first discussing it with your child's healthcare provider.  Ice    Ice reduces muscle pain and swelling. It helps most during the first 24 to 48 hours after an injury.    Wrap an ice pack or a bag of frozen peas in a thin towel. Never put ice directly on your skin.    Place the ice where your back hurts the most.    Don t ice for more than 20 minutes at a time.    You can use ice several times a day.  Medicines  Over-the-counter pain relievers include acetaminophen and anti-inflammatory medicines, which includes aspirin, naproxen, or ibuprofen. They can help ease discomfort. Some also reduce swelling.    Tell your healthcare provider about any medicines you are already taking.    Take medicines only as directed.  Manipulation and massage  Having manipulation by an osteopathic doctor or chiropractor may be helpful. Getting a massage also may help.   Heat  After the first 48 hours, heat can relax sore muscles and improve blood flow.    Try a warm bath or shower. Or use a heating pad set on low. To prevent a burn, keep a cloth between you and the heating pad.    Don t use a heating pad for more than 15 minutes at a time. Never sleep on a heating pad.  Date Last Reviewed: 6/1/2018 2000-2019 [employer]. [address] [place] [address], [place] [address]. All rights reserved. This information is not intended as a substitute for professional medical care. Always follow your healthcare professional's instructions.

## 2020-07-21 NOTE — NURSING NOTE
Clinic Administered Medication Documentation      Injectable Medication Documentation    Patient was given Ketorolac Tromethamine (Toradol). Prior to medication administration, verified patients identity using patient s name and date of birth. Please see MAR and medication order for additional information. Patient instructed to remain in clinic for 15 minutes.      Was entire vial of medication used? Yes  Vial/Syringe: Single dose vial  Expiration Date:  5/31/2021  Was this medication supplied by the patient? No, right  Thigh on  7/21/2020 at 140pm, pt waited in clinic for 20 minutes for any reaction.Jay Garcia CMA

## 2020-07-22 ENCOUNTER — MYC MEDICAL ADVICE (OUTPATIENT)
Dept: FAMILY MEDICINE | Facility: CLINIC | Age: 60
End: 2020-07-22

## 2020-07-22 DIAGNOSIS — G47.09 OTHER INSOMNIA: ICD-10-CM

## 2020-07-22 ASSESSMENT — ANXIETY QUESTIONNAIRES: GAD7 TOTAL SCORE: 13

## 2020-07-22 NOTE — TELEPHONE ENCOUNTER
Mychart sent to pt    This was sent to the other  [facility] as the [facility] was out of stock in June.  Unsure if they have received their stock or not.    Michael Jasso RN, BSN

## 2020-07-23 ENCOUNTER — E-VISIT (OUTPATIENT)
Dept: FAMILY MEDICINE | Facility: CLINIC | Age: 60
End: 2020-07-23
Payer: MEDICARE

## 2020-07-23 DIAGNOSIS — M54.41 ACUTE BILATERAL LOW BACK PAIN WITH BILATERAL SCIATICA: Primary | ICD-10-CM

## 2020-07-23 DIAGNOSIS — M54.42 ACUTE BILATERAL LOW BACK PAIN WITH BILATERAL SCIATICA: Primary | ICD-10-CM

## 2020-07-23 PROCEDURE — 99421 OL DIG E/M SVC 5-10 MIN: CPT | Performed by: FAMILY MEDICINE

## 2020-07-23 RX ORDER — HYDROCODONE BITARTRATE AND ACETAMINOPHEN 7.5; 325 MG/1; MG/1
1 TABLET ORAL EVERY 6 HOURS PRN
Qty: 10 TABLET | Refills: 0 | Status: SHIPPED | OUTPATIENT
Start: 2020-07-24 | End: 2020-07-27

## 2020-07-23 RX ORDER — ZOLPIDEM TARTRATE 10 MG/1
TABLET ORAL
Qty: 30 TABLET | Refills: 2 | OUTPATIENT
Start: 2020-07-23

## 2020-07-23 RX ORDER — ZOLPIDEM TARTRATE 10 MG/1
TABLET ORAL
Qty: 30 TABLET | Refills: 2 | Status: SHIPPED | OUTPATIENT
Start: 2020-07-23 | End: 2020-11-04

## 2020-07-23 NOTE — PATIENT INSTRUCTIONS
Thank you for choosing us for your care. I have placed an order for a prescription so that you can start treatment. View your full visit summary for details by clicking on the link below. Your pharmacist will able to address any questions you may have about the medication.      If you're not feeling better within 2-3 weeks please schedule an appointment.  You can schedule an appointment right here in [place], or call [phone]  If the visit is for the same symptoms as your e-visit, we'll refund the cost of your e-visit if seen within seven days.    Caring for Your Back    You are not alone.    Low back pain is very common. Nearly half of all adults will have low back pain in any given year. The good news is that back pain is rarely a danger to your health. Most people can manage their back pain on their own. About half of people start feeling better within two weeks. In 9 out of 10 cases, low back pain goes away or no longer limits daily activity within 6 weeks.     Your outlook is good!     Your symptoms tell us that your low back pain is most likely not a danger to you. Most of the time we will not know the exact cause of low back pain, even if you see a doctor or have an MRI. However, treatment can still work without knowing the cause of the pain. Less than 1 in 100 people need surgery for their back pain.     What can I do about my low back pain?     There are three basic things you can do to ease low back pain and help it go away.     Use heat or cold packs.    Take medicine as directed.    Use positions, movements and exercises.    Using heat or cold packs:    Try cold packs or gentle heat to ease your pain.  Use whichever gives you the most relief. Apply the cold pack or heat for 15 minutes at a time, as often as needed.    Taking medicine:    If taking over-the-counter medicine:    Take ibuprofen (Advil, Motrin) 600 mg three times a day as needed for pain.  OR    Take Aleve (naproxen) 220 to 440 mg two  times a day as needed for pain. If your doctor prescribed a muscle relaxant (cyclobenzaprine 10 mg.):    Take   to 1 tablet at bedtime.    Do not drive when taking this medicine. This drug may make you sleepy.     Using positions, movements and exercises:    Research tells us that moving your joints and muscles can help you recover from back pain. Such activity should be simple and gentle. Use the positions below as well as walking to help relieve your pain. Try taking a short walk every 3 to 4 hours during the day. Walk for a few minutes inside your home or take longer walks outside, on a treadmill or at a mall. Slowly increase the amount of time you walk. Expect discomfort when you begin, but it should lessen as your back starts to heal. When your back feels better, walk daily to keep your back and body healthy.    Finding a position that is comfortable:    When your back pain is new, certain positions will ease your pain. Gently try each of the positions below until you find one that is helpful. Once you find a position of comfort, use it as often as you like when you are resting. You will recover faster if you combine rest with activity.    * Lie on your back with your legs bent. You can do this by placing a pillow under your knees or lie on the floor and rest your lower legs on the seat of a chair.  * Lie on your side with your knees bent and place a pillow between your knees.    Lie on your stomach over pillows.       When should I call my doctor?    Your back pain should improve over the first couple of weeks. As it improves, you should be able to return to your normal activities.  But call your doctor if:      You have a sudden change in your ability to control your bladder or bowels.    You begin to feel tingling in your groin or legs.    The pain spreads down your leg and into your foot.    Your toes, feet or leg muscles begin to feel weak.    You feel generally unwell or sick.    Your pain gets  worse.    If you are deaf or hard of hearing, please let us know. We provide many free services including sign language interpreters, oral interpreters, TTYs, telephone amplifiers, note takers and written materials.    For informational purposes only. Not to replace the advice of your health care provider. Copyright   2013 [facility]. All rights reserved. [employer] [id number] - 04/13.

## 2020-07-23 NOTE — TELEPHONE ENCOUNTER
Pt read message but hasn't responded.  Closing encounter as she has refills at another [facility]    Michael Jasso RN, BSN

## 2020-07-27 ENCOUNTER — OFFICE VISIT (OUTPATIENT)
Dept: NEUROSURGERY | Facility: CLINIC | Age: 60
End: 2020-07-27
Attending: NEUROLOGICAL SURGERY
Payer: MEDICARE

## 2020-07-27 ENCOUNTER — HOSPITAL ENCOUNTER (OUTPATIENT)
Facility: CLINIC | Age: 60
End: 2020-07-27
Attending: NEUROLOGICAL SURGERY | Admitting: NEUROLOGICAL SURGERY
Payer: MEDICARE

## 2020-07-27 VITALS
HEART RATE: 92 BPM | SYSTOLIC BLOOD PRESSURE: 148 MMHG | TEMPERATURE: 98.4 F | DIASTOLIC BLOOD PRESSURE: 82 MMHG | OXYGEN SATURATION: 98 %

## 2020-07-27 DIAGNOSIS — M54.41 ACUTE BILATERAL LOW BACK PAIN WITH BILATERAL SCIATICA: ICD-10-CM

## 2020-07-27 DIAGNOSIS — M54.16 SPINAL STENOSIS OF LUMBAR REGION WITH RADICULOPATHY: Primary | ICD-10-CM

## 2020-07-27 DIAGNOSIS — M54.42 ACUTE BILATERAL LOW BACK PAIN WITH BILATERAL SCIATICA: ICD-10-CM

## 2020-07-27 DIAGNOSIS — M48.061 SPINAL STENOSIS OF LUMBAR REGION WITH RADICULOPATHY: Primary | ICD-10-CM

## 2020-07-27 DIAGNOSIS — Z11.59 ENCOUNTER FOR SCREENING FOR OTHER VIRAL DISEASES: Primary | ICD-10-CM

## 2020-07-27 PROCEDURE — 99213 OFFICE O/P EST LOW 20 MIN: CPT | Performed by: NEUROLOGICAL SURGERY

## 2020-07-27 RX ORDER — HYDROCODONE BITARTRATE AND ACETAMINOPHEN 7.5; 325 MG/1; MG/1
1 TABLET ORAL EVERY 6 HOURS PRN
Qty: 20 TABLET | Refills: 0 | Status: SHIPPED | OUTPATIENT
Start: 2020-07-27 | End: 2020-11-02

## 2020-07-27 ASSESSMENT — PAIN SCALES - GENERAL: PAINLEVEL: SEVERE PAIN (7)

## 2020-07-27 NOTE — NURSING NOTE
Pre-operative Education    Education included but not limited to:  - Pre-operative physical with primary care physician within 30 days of surgical date.   - Pre-operative clearance (cardiology, hematology, etc).   - Discontinue Aspirin, NSAIDs, Diclofenac x 7 days prior to surgical date.   -May try tylenol for pain 1000 mg three times per day for pain  -you may resume taking NSAIDs 7 days post op   -Patient is not a smoker  -Forms to be completed: none per patient  -Surgical risks: blood clots in the leg or lung, problems urinating, nerve damage, drainage from the incision, infection,stiffness  -Preparation timeline  - When to start NPO           -Special bathing procedure.Patient received Hibiclens and showering instruction sheet.   Hospital stay:    Checking in    The surgery itself     Recovery room    - Transition to the hospital room where you will begin your recovery  - Managing pain   - Likely same day procedure with discharge home day of surgery, may stay for 23 hour observation hospitalization for monitoring or 1 night hospitalization.  - Post operative incisional pain x 1-2 weeks which will require pain medications and muscle relaxants.   -Do NOT drive or drink alcohol while taking narcotic pain medication.  -Post operative incision care-Keep your incision clean and dry at all times. OK to remove dressing on postop day 2. OK to shower on postop day 3 and allow water to run over incision, pat dry after shower. No bathing, swimming or submerging in water. Call immediately or come to ED if any drainage occurs, or you develop new pain. Watch for signs of infection: redness, swelling, warmth, drainage, and fever of 101 degrees or higher. Notify clinic.   - Post operative activity limitations: 6-8 weeks, no lifting > 10 pounds, no bending, twisting, or overhead reaching.  -Ok to walk as tolerated, avoid bed rest and prolonged sitting.  -No contact sports until after follow up visit  -No high impact activities  such as; running/jogging, snowmobile or 4 piper riding or any other recreational vehicles.  - Post op follow up appointments: 2 weeks for incision check with RN and 6 weeks with AUSTIN and 3 months with Dr Alfonso. Please call to schedule follow up appointment at [phone].   - Spine Education information and printout was also given to the patient for further review.    Patient verbalized understanding of above instructions. All questions were answered to the best of my ability and the patient's satisfaction. Patient advised to call with any additional questions or concerns.  Cadence Dawson RN

## 2020-07-27 NOTE — PATIENT INSTRUCTIONS
-Patient Next Steps:      Order placed for epidural steroid injection  o The steroid can take 10-14 days to reach max effect  o Please call our clinic if symptoms persist after this timeframe.  o You can call [facility] to schedule your injection: [phone]      Please call us if you have any further questions or concerns.      [facility]   Phone: [phone]  Fax: [phone]                  Patient Instructions    Surgery scheduled at [facility] for Left Lumbar 4-5 minimally invasive bilateral decompression with Dr. Alfonso     Pre-Operative    Surgical risks: blood clots in the leg or lung, problems urinating, nerve damage, drainage from the incision, infection,stiffness    Pre-operative physical with primary care physician within 30 days of surgical date.     Likely same day procedure with discharge home day of surgery, may stay for 23 hour observation hospitalization for monitoring or 1 night hospital stay.       Shower procedure  o Please shower with antimicrobial soap the night before surgery and morning of surgery. Please refer to showering instruction sheet in folder.    Eating/Drinking  o Stop all solid foods 8 hours before surgery.  o Keep drinking clear liquids until 4 hours before surgery  - Clear liquids include water, clear juice, black coffee, or clear tea without milk, Gatorade, clear soda.     Medications  o Hold Aspirin, NSAIDs (Advil/Ibuprofen, Indocin, Naproxen,Nuprin,Relafen/Nabumetone, Diclofenac,Meloxicam, Aleve, Celebrex) x 7 days prior to surgical date  o You can take Tylenol (Acetaminophen) for pain, 1000 mg  - Do not exceed 3,000 mg per day   o Any other medications prescribed, please discuss with your primary care provider at your pre-operative physical     Pain Management    You will have some post-operative incisional pain which may require pain medications and muscle relaxants. You will receive medication upon discharge.    You  may resume taking NSAIDs (ex. Ibuprofen, aleve, naproxen) immediately post-op    Do NOT drive while taking narcotic pain medication    Do NOT drink alcohol while using any pain medication    You can utilize ice as needed (no longer than 20 minutes at one time)    Incision Care    No submerging incision in water such as pools, hot tubs, baths for at least 8 weeks or until incision is healed    It is okay to shower, just pat the incision dry     Remove dressing as instructed upon discharge    Watch for signs of infection  o Redness, swelling, warmth, drainage, and fever of 101 degrees or higher  o Notify clinic [phone].    Activity Restrictions    For the first 6-8 weeks, no lifting > 10 pounds, limited bending, twisting, or overhead reaching.    Take stairs in moderation     Ok to walk as tolerated, take short frequent walks. You may gradually increase the distance as tolerated.     Avoid bed rest and prolonged sitting for longer than 30 minutes (change positions frequently while awake)    No contact sports until after follow up visit    No high impact activities such as; running/jogging, snowmobile or 4 piper riding or any other recreational vehicles    Please call the clinic if you develop any of the following symptoms:  o Swelling and/or warmth in one or both legs  o Pain or tenderness in your leg, ankle, foot, or arm   o Red or discolored skin     Post-Op Follow Up Appointments    2 week incision check with RN    6 week post op follow up visit with Physician Assistant    3 months post op with Dr. Alfonso     Please call to schedule follow up appointment at [phone]    Resources    If you are currently employed, you will need to be off work for 2-4 weeks for post op recovery and healing.    Please fax any FMLA/short term disability paperwork to [phone]    You may call our clinic when you'd like to return to work and we can provide a work letter    To allow staff adequate time to complete paperwork,  please send as soon as possible

## 2020-07-27 NOTE — PROGRESS NOTES
It was a pleasure to see Ana Luisa Byers today in Neurosurgery Clinic. She is a 60 year old female who was previously seen in November 2019.  She returns today for follow-up.  Her symptoms are similar but have really become bilateral now actually worse on the left than the right.  Otherwise they have a similar characteristic of radiating down both legs and what sounds like an L5 distribution.    She has not been able to maintain her pool therapy because of the pandemic.  She is currently taking Flexeril, Vicodin, ibuprofen for pain without significant relief.    Vitals:    07/27/20 0801   BP: (!) 148/82   Pulse: 92   Temp: 98.4  F (36.9  C)   TempSrc: Oral   SpO2: 98%     There is no height or weight on file to calculate BMI.  Severe Pain (7)    Awake alert and oriented.  Well-healed midline lumbar incision without pain to palpation.    Bilateral lower extremity strength 5 out of 5 in all muscle groups except limited by pain.    Imaging: MRI from November demonstrates bilateral lateral recess stenosis at L4-5 as well as evidence of her previous discectomy.  Imaging was reviewed with the patient shown to the patient in clinic today.    Assessment: L4-5 stenosis with radiculopathy.    Plan: We discussed a minimally invasive bilateral decompression at L4-5 I think this would be a reasonable course of action and would likely help her feel better.  In the short-term we will also a send her for epidural steroid injection to see if this improves her symptoms.  We discussed the risk benefits and alternatives to surgery and the patient understands and wishes to proceed.

## 2020-07-27 NOTE — LETTER
7/27/2020         RE: Ana Luisa Byers  [address]  [address]  [place] [address]        Dear Colleague,    Thank you for referring your patient, Ana Luisa Byers, to the [facility]. Please see a copy of my visit note below.    It was a pleasure to see Ana Luisa Byers today in Neurosurgery Clinic. She is a 60 year old female who was previously seen in November 2019.  She returns today for follow-up.  Her symptoms are similar but have really become bilateral now actually worse on the left than the right.  Otherwise they have a similar characteristic of radiating down both legs and what sounds like an L5 distribution.    She has not been able to maintain her pool therapy because of the pandemic.  She is currently taking Flexeril, Vicodin, ibuprofen for pain without significant relief.    Vitals:    07/27/20 0801   BP: (!) 148/82   Pulse: 92   Temp: 98.4  F (36.9  C)   TempSrc: Oral   SpO2: 98%     There is no height or weight on file to calculate BMI.  Severe Pain (7)    Awake alert and oriented.  Well-healed midline lumbar incision without pain to palpation.    Bilateral lower extremity strength 5 out of 5 in all muscle groups except limited by pain.    Imaging: MRI from November demonstrates bilateral lateral recess stenosis at L4-5 as well as evidence of her previous discectomy.  Imaging was reviewed with the patient shown to the patient in clinic today.    Assessment: L4-5 stenosis with radiculopathy.    Plan: We discussed a minimally invasive bilateral decompression at L4-5 I think this would be a reasonable course of action and would likely help her feel better.  In the short-term we will also a send her for epidural steroid injection to see if this improves her symptoms.  We discussed the risk benefits and alternatives to surgery and the patient understands and wishes to proceed.        Again, thank you for allowing me to participate in the care of your patient.         Sincerely,        Yash Alfonso MD

## 2020-07-28 ENCOUNTER — E-VISIT (OUTPATIENT)
Dept: FAMILY MEDICINE | Facility: CLINIC | Age: 60
End: 2020-07-28
Payer: MEDICARE

## 2020-07-28 DIAGNOSIS — M54.50 ACUTE LEFT-SIDED LOW BACK PAIN WITHOUT SCIATICA: ICD-10-CM

## 2020-07-28 PROCEDURE — 99207 ZZC NON-BILLABLE SERV PER CHARTING: CPT | Performed by: FAMILY MEDICINE

## 2020-07-28 RX ORDER — METHOCARBAMOL 500 MG/1
1000 TABLET, FILM COATED ORAL 3 TIMES DAILY PRN
Qty: 90 TABLET | Refills: 1 | Status: SHIPPED | OUTPATIENT
Start: 2020-07-28 | End: 2020-11-02

## 2020-07-28 NOTE — PATIENT INSTRUCTIONS
Thank you for choosing us for your care. I have placed an order for a prescription so that you can start treatment. View your full visit summary for details by clicking on the link below. Your pharmacist will able to address any questions you may have about the medication.     If you're not feeling better within 5-7 days, please schedule an appointment.  You can schedule an appointment right here in [place], or call [phone]  If the visit is for the same symptoms as your e-visit, we'll refund the cost of your e-visit if seen within seven days.

## 2020-08-07 ENCOUNTER — E-VISIT (OUTPATIENT)
Dept: FAMILY MEDICINE | Facility: CLINIC | Age: 60
End: 2020-08-07
Payer: MEDICARE

## 2020-08-07 DIAGNOSIS — J01.90 ACUTE SINUSITIS WITH SYMPTOMS > 10 DAYS: Primary | ICD-10-CM

## 2020-08-07 PROCEDURE — 99421 OL DIG E/M SVC 5-10 MIN: CPT | Performed by: FAMILY MEDICINE

## 2020-08-07 RX ORDER — DOXYCYCLINE HYCLATE 100 MG
100 TABLET ORAL 2 TIMES DAILY
Qty: 20 TABLET | Refills: 0 | Status: SHIPPED | OUTPATIENT
Start: 2020-08-07 | End: 2020-11-02

## 2020-08-07 NOTE — PATIENT INSTRUCTIONS
Thank you for choosing us for your care. I have placed an order for a prescription so that you can start treatment. View your full visit summary for details by clicking on the link below. Your pharmacist will able to address any questions you may have about the medication.     If you're not feeling better within 5-7 days, please schedule an appointment.  You can schedule an appointment right here in [place], or call [phone]  If the visit is for the same symptoms as your e-visit, we'll refund the cost of your e-visit if seen within seven days.      When to Use Antibiotics   Antibiotics are medicines used to treat infections caused by bacteria. They don t work for illnesses caused by viruses or an allergic reaction. In fact, taking antibiotics for reasons other than a bacterial infection can cause problems. For example, you may have side effects from the medicine. And if you really need an antibiotic, it may not work well.                                                                                                                                              When antibiotics won t help  Your healthcare provider won t usually prescribe antibiotics for the following conditions. You can help by not asking for them if you have:     A cold. This type of illness is caused by a virus. It can cause a runny nose, stuffed-up nose, sneezing, coughing, headache, mild body aches, and low fever. A cold gets better on its own in a few days to a week.    The flu (influenza). This is a respiratory illness caused by a virus. The flu usually goes away on its own in a week or so. It can cause fever, body aches, sore throat, and fatigue.    Bronchitis. This is an infection in the lungs most often caused by a virus. You may have coughing, phlegm, body aches, and a low fever. A common type of bronchitis is known as a chest cold (acute bronchitis). This often happens after you have a respiratory infection like a common cold.  Bronchitis can take weeks to go away, but antibiotics usually don t help.    Most sore throats. Sore throats are most often caused by viruses. Your throat may feel scratchy or achy, and it may hurt to swallow. You may also have a low fever and body aches. A sore throat usually gets better in a few days.    Most ear infections. An ear infection may be caused by a virus or bacteria. It causes pain in the ear. Antibiotics usually don t help, and the infection goes away on its own.    Most sinus infections (sinusitis). This kind of infection causes sinus pain and swelling, and a runny nose. In most cases, sinusitis goes away on its own, and antibiotics don t make recovery quicker.    Allergic rhinitis. This is a set of symptoms caused by an allergic reaction. You may have sneezing, a runny nose, itchy or watery eyes, or a sore throat. Allergies are not treated with antibiotics.    Low fever. A mild fever that s less than 100.4 F (38 C) most likely doesn t need treatment with antibiotics.   When antibiotics can help   Antibiotics can be used to treat:                                                       Strep throat. This is a throat infectioncaused by a certain type of bacteria. Symptoms of strep throat include a sore throat, white patches on the tonsils, red spots on the roof of the mouth, fever, body aches, and nausea and vomiting.    Urinary tract infection (UTI). This is a bacterial infection of the bladder and the tube that takes urine out of the body. It can cause burning pain and urine that s cloudy or tinted with blood. UTIs are very common. Antibiotics usually help treat these infections.    Some ear infections. In some cases, a healthcare provider may prescribe antibiotics for an ear infection. You may need a test to show what s causing the ear infection.    Some sinus infections. In some cases, yourhealthcare provider may give you antibiotics. He or she may first need to make sure your symptoms aren t caused  by a virus, fungus, allergies, or air pollutants such as smoke.   Your doctor may also recommend antibiotics if you have a condition that can affect your immune system, such as diabetes or cancer.   Self-care at home   If your infection can t be treated with antibiotics, you can take other steps to feel better. Try the remedies below. In general:     Rest and sleep as much as needed.    Drink water and other clear fluids.    Don t smoke, and avoid smoke from other people.    Use over-the-counter medicine such as acetaminophen to ease pain or fever, as directed by your healthcare provider.   To treat sinus pain or nasal congestion:     Put a warm, moist washcloth on your face where you feel sinus pain or pressure.    Use a nasal spray with medicine or saline, as directed by your healthcare provider.    Breathe in steam from a hot shower.    Use a humidifier or cool mist vaporizer.   To quiet a cough:     Use a humidifier or cool mist vaporizer.    Breathe in steam from a hot shower.    Use cough lozenges.   To sooth a sore throat:     Suck on ice chips, popsicles, or lozenges.    Use a sore throat spray.    Use a humidifier or cool mist vaporizer.    Gargle with saltwater.    Drink warm liquids.   To ease ear pain:     Hold a warm, moist washcloth on the ear for 10 minutes at a time.  Date Last Reviewed: 9/1/2016 2000-2019 [employer]. [address] [place] [address]. All rights reserved. This information is not intended as a substitute for professional medical care. Always follow your healthcare professional's instructions.

## 2020-08-26 ENCOUNTER — E-VISIT (OUTPATIENT)
Dept: FAMILY MEDICINE | Facility: CLINIC | Age: 60
End: 2020-08-26

## 2020-08-26 ENCOUNTER — TELEPHONE (OUTPATIENT)
Dept: FAMILY MEDICINE | Facility: CLINIC | Age: 60
End: 2020-08-26

## 2020-08-26 NOTE — TELEPHONE ENCOUNTER
Would need to see pictures at a minimum but with that history doing a video visit or clinic visit would be ideal.

## 2020-08-27 ENCOUNTER — OFFICE VISIT (OUTPATIENT)
Dept: FAMILY MEDICINE | Facility: CLINIC | Age: 60
End: 2020-08-27
Payer: MEDICARE

## 2020-08-27 VITALS
SYSTOLIC BLOOD PRESSURE: 148 MMHG | HEART RATE: 92 BPM | OXYGEN SATURATION: 96 % | WEIGHT: 275.7 LBS | TEMPERATURE: 98.8 F | DIASTOLIC BLOOD PRESSURE: 80 MMHG | BODY MASS INDEX: 45.94 KG/M2 | HEIGHT: 65 IN

## 2020-08-27 DIAGNOSIS — B37.0 THRUSH: Primary | ICD-10-CM

## 2020-08-27 DIAGNOSIS — B02.9 HERPES ZOSTER WITHOUT COMPLICATION: ICD-10-CM

## 2020-08-27 PROCEDURE — 99213 OFFICE O/P EST LOW 20 MIN: CPT | Performed by: PHYSICIAN ASSISTANT

## 2020-08-27 RX ORDER — FLUCONAZOLE 150 MG/1
150 TABLET ORAL DAILY
Qty: 3 TABLET | Refills: 0 | Status: SHIPPED | OUTPATIENT
Start: 2020-08-27 | End: 2020-08-30

## 2020-08-27 ASSESSMENT — MIFFLIN-ST. JEOR: SCORE: 1821.45

## 2020-08-27 ASSESSMENT — PATIENT HEALTH QUESTIONNAIRE - PHQ9
10. IF YOU CHECKED OFF ANY PROBLEMS, HOW DIFFICULT HAVE THESE PROBLEMS MADE IT FOR YOU TO DO YOUR WORK, TAKE CARE OF THINGS AT HOME, OR GET ALONG WITH OTHER PEOPLE: SOMEWHAT DIFFICULT
SUM OF ALL RESPONSES TO PHQ QUESTIONS 1-9: 16
SUM OF ALL RESPONSES TO PHQ QUESTIONS 1-9: 16

## 2020-08-27 NOTE — PROGRESS NOTES
"Subjective     Ana Luisa Byers is a 60 year old female who presents to clinic today for the following health issues:    HPI   Broke with blisters on left side of abdomen 3 weeks ago.  Painful but are healing now     Also, she has additional complaints of has  Thick coating on tongue. And mouth tingles   Has had thrush before many times. .  Rash  Onset/Duration:3 wks    Description  Location: stomach and left breast  Character: red and blotchy   Itching: severe  Intensity:  moderate  Progression of Symptoms:  worsening  Accompanying signs and symptoms:   Fever: no  Body aches or joint pain: no  Sore throat symptoms: no  Recent cold symptoms: no  History:           Previous episodes of similar rash: get yeast infection  New exposures:  None  Recent travel: no  Exposure to similar rash: no  Precipitating or alleviating factors:   Therapies tried and outcome: none        Review of Systems   Constitutional, HEENT, cardiovascular, pulmonary, gi and gu systems are negative, except as otherwise noted.      Objective    LMP 02/13/2009   There is no height or weight on file to calculate BMI.  Physical Exam   GENERAL: healthy, alert and no distress  HENT: normal cephalic/atraumatic, ear canals and TM's normal, nose and mouth without ulcers or lesions, oropharynx clear, oral mucous membranes moist and tongue has thick white coating   SKIN:  Healing cluster of blisters over left breast and left upper abdomen.             Assessment & Plan     Thrush    - fluconazole (DIFLUCAN) 150 MG tablet; Take 1 tablet (150 mg) by mouth daily for 3 days    Herpes zoster without complication  Healing.  Reassurances given. Recommend shingles vaccine in 6 months        BMI:   Estimated body mass index is 45.88 kg/m  as calculated from the following:    Height as of this encounter: 1.651 m (5' 5\").    Weight as of this encounter: 125.1 kg (275 lb 11.2 oz).               No follow-ups on file.    Ramona Ann Aaseby-Aguilera, PA-C  [facility] " [place]      Answers for HPI/ROS submitted by the patient on 8/27/2020   Chronic problems general questions HPI Form  How many servings of fruits and vegetables do you eat daily?: 2-3  On average, how many sweetened beverages do you drink each day (Examples: soda, juice, sweet tea, etc.  Do NOT count diet or artificially sweetened beverages)?: 3  How many minutes a day do you exercise enough to make your heart beat faster?: 20 to 29  How many days a week do you exercise enough to make your heart beat faster?: 6  How many days per week do you miss taking your medication?: 0  If you checked off any problems, how difficult have these problems made it for you to do your work, take care of things at home, or get along with other people?: Somewhat difficult  PHQ9 TOTAL SCORE: 16

## 2020-08-28 ASSESSMENT — PATIENT HEALTH QUESTIONNAIRE - PHQ9: SUM OF ALL RESPONSES TO PHQ QUESTIONS 1-9: 16

## 2020-09-23 ENCOUNTER — E-VISIT (OUTPATIENT)
Dept: FAMILY MEDICINE | Facility: CLINIC | Age: 60
End: 2020-09-23
Payer: MEDICARE
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
## 2020-09-23 PROCEDURE — 99421 OL DIG E/M SVC 5-10 MIN: CPT | Performed by: FAMILY MEDICINE

## 2020-09-23 RX ORDER — SULFAMETHOXAZOLE/TRIMETHOPRIM 800-160 MG
1 TABLET ORAL 2 TIMES DAILY
Qty: 14 TABLET | Refills: 0 | Status: SHIPPED | OUTPATIENT
Start: 2020-09-23 | End: 2020-09-30

## 2020-09-23 NOTE — PATIENT INSTRUCTIONS
Thank you for choosing us for your care. I have placed an order for a prescription so that you can start treatment. View your full visit summary for details by clicking on the link below. Your pharmacist will able to address any questions you may have about the medication.     If you're not feeling better within 5-7 days, please schedule an appointment.  You can schedule an appointment right here in [facility], or call [phone]  If the visit is for the same symptoms as your e-visit, we'll refund the cost of your e-visit if seen within seven days.       Patient Education     Cat Bite    A cat bite can cause a wound deep enough to break the skin. In such cases, the wound is cleaned and then sometimes closed. If the wound is closed it is usually not closed completely. This is so that fluid can drain if the wound becomes infected. Often the wound is left open to heal. In addition to wound care, a tetanus shot may be given, if needed.  Home care    Wash your hands well with soap and warm water before and after caring for the wound. This helps lower the risk of infection.    Care for the wound as directed. If a dressing was applied to the wound, be sure to change it as directed.    If the wound bleeds, place a clean, soft cloth on the wound. Then firmly apply pressure until the bleeding stops. This may take up to 5 minutes. Don't release the pressure and look at the wound during this time.    Always get medical attention for cat bites on the hand. They are highly likely to become infected.    Most wounds heal within 10 days. But an infection can occur even with proper treatment. So be sure to check the wound daily for signs of infection (see below).    Antibiotics may be prescribed. These help prevent or treat infection. If you re given antibiotics, take them as directed. Also be sure to complete the medicines.  Rabies prevention  Rabies is a virus that can be carried in certain animals. These can include domestic  animals such as cats and dogs. Pets fully vaccinated against rabies (2 shots) are at very low risk of infection. But because human rabies is almost always fatal, any biting pet should be confined for 10 days as an extra precaution. In general, if there is a risk for rabies, the following steps may need to be taken:    If someone s pet cat has bitten you, it should be kept in a secure area for the next 10 days to watch for signs of illness. If the pet owner won t allow this, contact your local animal control center. If the cat becomes ill or dies during that time, contact your local animal control center at once so the animal may be tested for rabies. If the cat stays healthy for the next 10 days, there is no danger of rabies in the animal or you.    If a stray cat bit you, contact your local animal control center. They can give information on capture, quarantine, and animal rabies testing.    If you can t find the animal that bit you in the next 2 days, and if rabies exists in your area, you may need to receive the rabies vaccine series. Call your healthcare provider right away. Or return to the emergency department promptly.    All animal bites should be reported to the local animal control center. If you were not given a form to fill out, you can report this yourself.  Follow-up care  Follow up with your healthcare provider, or as directed.  When to seek medical advice  Call your healthcare provider right away if any of these occur:    Signs of infection:  ? Spreading redness or warmth from the wound  ? Increased pain or swelling  ? Fever of 100.4 F (38 C) or higher, or as directed by your healthcare provider  ? Colored fluid or pus draining from the wound  ? Enlarged lymph nodes above the area that was bitten, such as lymph nodes in the armpit if you were bitten on the hand or arm. This may be a sign of cat-scratch disease (cat-scratch fever).    Signs of rabies infection:  ? Headache  ? Confusion  ? Strange  behavior  ? Increased salivating or drooling  ? Seizure    Decreased ability to move any body part near the bite area    Bleeding that can't be stopped after 5 minutes of firm pressure  Date Last Reviewed: 5/1/2018 2000-2019 [employer]. [address] [place] [address], [place] [address]. All rights reserved. This information is not intended as a substitute for professional medical care. Always follow your healthcare professional's instructions.

## 2020-09-25 ENCOUNTER — OFFICE VISIT (OUTPATIENT)
Dept: URGENT CARE | Facility: URGENT CARE | Age: 60
End: 2020-09-25
Payer: MEDICARE

## 2020-09-25 VITALS
DIASTOLIC BLOOD PRESSURE: 82 MMHG | TEMPERATURE: 98.4 F | WEIGHT: 275 LBS | BODY MASS INDEX: 45.76 KG/M2 | RESPIRATION RATE: 18 BRPM | SYSTOLIC BLOOD PRESSURE: 136 MMHG | OXYGEN SATURATION: 98 % | HEART RATE: 80 BPM

## 2020-09-25 DIAGNOSIS — H00.012 HORDEOLUM EXTERNUM OF RIGHT LOWER EYELID: Primary | ICD-10-CM

## 2020-09-25 PROCEDURE — 99213 OFFICE O/P EST LOW 20 MIN: CPT | Performed by: PHYSICIAN ASSISTANT

## 2020-09-25 NOTE — PROGRESS NOTES
SUBJECTIVE:  Ana Luisa is a 60 year old female who presents to urgent care with 1 day of right lower eyelid swelling and pain.  She is had some crusting but no significant discharge.  Feels irritated but not necessarily like is a foreign body.  No photophobia.  No vision changes, blurry vision.  She feels otherwise well with no headaches, lightheadedness or dizziness, fever, chills, URI symptoms, nausea or vomiting.    Chief Complaint   Patient presents with     Urgent Care     Eye Problem     Right eye swelling started yesterday and has a painful bump      ROS: See HPI    Current Outpatient Medications   Medication     aspirin 81 MG chewable tablet     atorvastatin (LIPITOR) 40 MG tablet     betamethasone dipropionate (DIPROSONE) 0.05 % external cream     buPROPion (WELLBUTRIN XL) 150 MG 24 hr tablet     cyclobenzaprine (FLEXERIL) 10 MG tablet     doxycycline hyclate (VIBRA-TABS) 100 MG tablet     DULoxetine (CYMBALTA) 60 MG capsule     EPINEPHrine (ANY BX GENERIC EQUIV) 0.3 MG/0.3ML injection 2-pack     furosemide (LASIX) 40 MG tablet     glipiZIDE (GLUCOTROL XL) 10 MG 24 hr tablet     HYDROcodone-acetaminophen (NORCO) 7.5-325 MG per tablet     hydrOXYzine (ATARAX) 25 MG tablet     losartan (COZAAR) 25 MG tablet     methocarbamol (ROBAXIN) 500 MG tablet     omeprazole (PRILOSEC) 40 MG DR capsule     order for DME     phentermine (ADIPEX-P) 15 MG capsule     sulfamethoxazole-trimethoprim (BACTRIM DS) 800-160 MG tablet     vitamin D3 (CHOLECALCIFEROL) 2000 units (50 mcg) tablet     zolpidem (AMBIEN) 10 MG tablet     No current facility-administered medications for this visit.       Patient Active Problem List   Diagnosis     Mixed hyperlipidemia     Chronic pain associated with significant psychosocial dysfunction     Osteoarthritis     S/P shoulder replacement     Cluster headaches     Fibromyalgia     Plantar fasciitis     Other insomnia     HTN, goal below 140/90     Gastroesophageal reflux disease without  esophagitis     Restless legs syndrome (RLS)     CKD (chronic kidney disease) stage 2, GFR 60-89 ml/min     HSV-1 infection     Left lumbar radiculitis     Acquired hypothyroidism     Type 2 diabetes mellitus with diabetic polyneuropathy, without long-term current use of insulin (H)     Bilateral lower extremity edema     Benign essential hypertension     Anxiety     Severe episode of recurrent major depressive disorder, without psychotic features (H)     BMI 45.0-49.9, adult (H)     Spinal stenosis of lumbar region with radiculopathy        Past Medical History:   Diagnosis Date     Arthritis     left shoulder and back     Chronic pain     fibromalgia      CKD (chronic kidney disease), stage III (H)      Fibromyalgia      Gastro-oesophageal reflux disease      HTN, goal below 140/90      Hyperlipidemia LDL goal <100      Hypothyroidism      Major depression     chronic kidney disease-stage 3     Peripheral neuropathy Feb 2015    + callus. diabetic shoes rx done     PONV (postoperative nausea and vomiting)      Rheumatoid arthritis of foot (H)      Tongue abnormality      Type 2 diabetes, HbA1C goal < 8% (H)     pre-diabetic     Past Surgical History:   Procedure Laterality Date     ARTHROPLASTY SHOULDER  7/25/2013    Procedure: right ARTHROPLASTY SHOULDER;  RIGHT TOTAL SHOULDER ARTHROPLASTY (TORNIER AFFINITY SHOULDER SYSTEM)^;  Surgeon: Dung Morales MD;  Location:  OR     BACK SURGERY  2011    L45 laminectomy     C APPENDECTOMY  age 19     C LIGATE FALLOPIAN TUBE,POSTPARTUM  1982    Tubal Ligation     ELBOW WRIST HAND FINGER ORTHOSIS, RIGID, WITHOUT JOINTS, MAY INCLUDE SOFT  5/2007    put in [place]     ESOPHAGOSCOPY, GASTROSCOPY, DUODENOSCOPY (EGD), COMBINED  9/23/2015    Dr. Thomas [facility]     ESOPHAGOSCOPY, GASTROSCOPY, DUODENOSCOPY (EGD), COMBINED N/A 9/23/2015    Procedure: COMBINED ESOPHAGOSCOPY, GASTROSCOPY, DUODENOSCOPY (EGD), BIOPSY SINGLE OR MULTIPLE;  Surgeon: Jose Thomas MD;  Location:  GI      ESOPHAGOSCOPY, GASTROSCOPY, DUODENOSCOPY (EGD), COMBINED  02/04/2020    Dr. Thomas [facility]     ESOPHAGOSCOPY, GASTROSCOPY, DUODENOSCOPY (EGD), COMBINED N/A 2/4/2020    Procedure: ESOPHAGOGASTRODUODENOSCOPY (EGD);  Surgeon: Jose Thomas MD;  Location: [facility] GI     HCL PAP SMEAR  6/2008    WNL     ORTHOPEDIC SURGERY      left shoulder scoped and plate left elbow     SURGICAL HISTORY OF -   2009    ulnar nerve release, carpal tunnel- left     Family History   Problem Relation Age of Onset     C.A.D. Mother      Neurologic Disorder Mother         lupus     Depression Mother      Family History Negative Father      Diabetes Maternal Aunt      Colon Cancer No family hx of      Social History     Tobacco Use     Smoking status: Never Smoker     Smokeless tobacco: Never Used   Substance Use Topics     Alcohol use: No        OBJECTIVE:  /82   Pulse 80   Temp 98.4  F (36.9  C)   Resp 18   Wt 124.7 kg (275 lb)   LMP 02/13/2009   SpO2 98%   BMI 45.76 kg/m       GENERAL APPEARANCE: healthy, alert and no distress     EYES: EOMI, - PERRL, no photophobia, no nystagmus, right lower eyelid erythema and swelling on the lateral aspect.  Focal tenderness.  No punctum noted.  No discharge, conjunctiva clear     HENT: NCAT     MS: extremities normal- no gross deformities noted, no evidence of inflammation in joints, FROM in all extremities.     SKIN: Left forearm has cat puncture bite with surrounding erythema     NEURO: Normal strength and tone, sensory exam grossly normal, mentation intact and speech normal     PSYCH: mentation appears normal. and affect normal/bright      ASSESSMENT/PLAN:  Ana Luisa was seen today for urgent care and eye problem.    Diagnoses and all orders for this visit:    Hordeolum externum of right lower eyelid    Symptomatic cares discussed.  Warm soaks and lubricant eyedrops recommended.  Discussed signs and symptoms of infection that would warrant return to clinic.  If no improvement within 2  weeks refer to ophthalmology for possible surgical removal    The plan of care was discussed with the patient. They understand and agree with the course of treatment prescribed. A printed summary was given including instructions and medications.    Yash Tapia PA-C

## 2020-09-25 NOTE — PATIENT INSTRUCTIONS
Patient Education     Sty (or Stye)  A sty is an infection of the oil gland of the eyelid. It may develop into a small pocket of pus (an abscess). This can cause pain, redness, and swelling. In early stages, a sty is treated with antibiotic cream, eye drops, or a small towel soaked in warm water (a warm compress). More severe cases may need to be opened and drained by a healthcare provider.  Home care    Eye drops or ointment are usually prescribed to treat the infection. Use these as directed.     Artificial tears may also be used to lubricate the eye and make it more comfortable. You can buy these over the counter without a prescription. Talk with your healthcare provider before using any over-the-counter treatment for a sty.    Apply a warm, damp towel to the affected eye for at least 5 minutes, 3 to 4 times a day for a week. Warm compresses open the pores and speed the healing. But if the compresses are too hot, they may burn your eyelid.    Sometimes the sty will drain with this treatment alone. If this happens, keep using the antibiotic until all the redness and swelling are gone.    Wash your hands before and after touching the infected eyelid to avoid spreading the infection.    Don t squeeze or try to break open the sty.  Follow-up care  Follow up with your healthcare provider, or as advised.   When to seek medical advice  Call your healthcare provider right away if any of these occur:    Increase in swelling or redness around the eyelid after 48 to 72 hours    Increase in eye pain or the eyelid blisters    Increase in warmth--the eyelid feels hot    Drainage of blood or thick pus from the sty    Blister on the eyelid    Inability to open the eyelid due to swelling    Fever of 100.4 F (38 C) or above, or as directed by your provider    Vision changes    Headache or stiff neck    The sty comes back  Date Last Reviewed: 8/1/2017 2000-2019 [employer]. [address] [facility], [place]  [address]. All rights reserved. This information is not intended as a substitute for professional medical care. Always follow your healthcare professional's instructions.

## 2020-10-16 ENCOUNTER — MYC REFILL (OUTPATIENT)
Dept: FAMILY MEDICINE | Facility: CLINIC | Age: 60
End: 2020-10-16

## 2020-10-16 DIAGNOSIS — L20.9 ATOPIC DERMATITIS, UNSPECIFIED TYPE: ICD-10-CM

## 2020-10-16 RX ORDER — HYDROXYZINE HYDROCHLORIDE 25 MG/1
TABLET, FILM COATED ORAL
Qty: 120 TABLET | Refills: 3 | Status: SHIPPED | OUTPATIENT
Start: 2020-10-16 | End: 2024-01-14

## 2020-10-16 NOTE — TELEPHONE ENCOUNTER
Prescription approved per [facility] Refill Protocol.  Michael Jasso RN, BSN

## 2020-11-02 ENCOUNTER — ANCILLARY PROCEDURE (OUTPATIENT)
Dept: GENERAL RADIOLOGY | Facility: CLINIC | Age: 60
End: 2020-11-02
Attending: PODIATRIST
Payer: MEDICARE

## 2020-11-02 ENCOUNTER — OFFICE VISIT (OUTPATIENT)
Dept: PODIATRY | Facility: CLINIC | Age: 60
End: 2020-11-02
Payer: MEDICARE

## 2020-11-02 VITALS
HEIGHT: 65 IN | SYSTOLIC BLOOD PRESSURE: 146 MMHG | WEIGHT: 275 LBS | DIASTOLIC BLOOD PRESSURE: 90 MMHG | BODY MASS INDEX: 45.82 KG/M2

## 2020-11-02 DIAGNOSIS — M79.672 INTRACTABLE LEFT HEEL PAIN: Primary | ICD-10-CM

## 2020-11-02 DIAGNOSIS — M79.672 INTRACTABLE LEFT HEEL PAIN: ICD-10-CM

## 2020-11-02 PROCEDURE — 99204 OFFICE O/P NEW MOD 45 MIN: CPT | Performed by: PODIATRIST

## 2020-11-02 PROCEDURE — 73650 X-RAY EXAM OF HEEL: CPT | Mod: LT | Performed by: RADIOLOGY

## 2020-11-02 RX ORDER — IBUPROFEN 800 MG/1
800 TABLET, FILM COATED ORAL EVERY 8 HOURS PRN
Qty: 21 TABLET | Refills: 0 | Status: SHIPPED | OUTPATIENT
Start: 2020-11-02 | End: 2020-11-09

## 2020-11-02 ASSESSMENT — MIFFLIN-ST. JEOR: SCORE: 1818.27

## 2020-11-02 NOTE — PROGRESS NOTES
"Foot & Ankle Surgery  November 2, 2020    CC: \"left heel\"    I was asked to see Ana Luisa REGI Byers regarding the chief complaint by:  self    HPI:  Pt is a 60 year old female who presents with above complaint.  Left heel pain x \"60\".  \"find out what's wrong\".  No injury.  Very painful with any pressure on the heel.  \"I do have a little bit of neuropathy\" in her feet and bilateral upper extremities.  Underwent bilateral ulnar nerve decompression, states she has to have one redone.  No lower back issues.  No redness, swelling, bruising.  Burning, deep ache, shooting pain, 10/10 \"all the time\", worse with \"walking\" and weight bearing.  \"stay off\" foot for treatment, has not helped    ROS:   Pos for CC.  The patient denies current nausea, vomiting, chills, fevers, belly pain, calf pain, chest pain or SOB.  Complete remainder of ROS is otherwise neg.    VITALS:    Vitals:    11/02/20 1524   BP: (!) 146/90   Weight: 124.7 kg (275 lb)   Height: 1.651 m (5' 5\")       PMH:    Past Medical History:   Diagnosis Date     Arthritis     left shoulder and back     Chronic pain     fibromalgia      CKD (chronic kidney disease), stage III      Fibromyalgia      Gastro-oesophageal reflux disease      HTN, goal below 140/90      Hyperlipidemia LDL goal <100      Hypothyroidism      Major depression     chronic kidney disease-stage 3     Peripheral neuropathy Feb 2015    + callus. diabetic shoes rx done     PONV (postoperative nausea and vomiting)      Rheumatoid arthritis of foot (H)      Tongue abnormality      Type 2 diabetes, HbA1C goal < 8% (H)     pre-diabetic       SXHX:    Past Surgical History:   Procedure Laterality Date     ARTHROPLASTY SHOULDER  7/25/2013    Procedure: right ARTHROPLASTY SHOULDER;  RIGHT TOTAL SHOULDER ARTHROPLASTY (TORNIER AFFINITY SHOULDER SYSTEM)^;  Surgeon: Dung Morales MD;  Location: [facility] OR     BACK SURGERY  2011    L45 laminectomy     C APPENDECTOMY  age 19     C LIGATE FALLOPIAN TUBE,POSTPARTUM  " 1982    Tubal Ligation     ELBOW WRIST HAND FINGER ORTHOSIS, RIGID, WITHOUT JOINTS, MAY INCLUDE SOFT  5/2007    put in [place]     ESOPHAGOSCOPY, GASTROSCOPY, DUODENOSCOPY (EGD), COMBINED  9/23/2015    Dr. Thomas [facility]     ESOPHAGOSCOPY, GASTROSCOPY, DUODENOSCOPY (EGD), COMBINED N/A 9/23/2015    Procedure: COMBINED ESOPHAGOSCOPY, GASTROSCOPY, DUODENOSCOPY (EGD), BIOPSY SINGLE OR MULTIPLE;  Surgeon: Jose Thomas MD;  Location:  GI     ESOPHAGOSCOPY, GASTROSCOPY, DUODENOSCOPY (EGD), COMBINED  02/04/2020    Dr. William GALVAN     ESOPHAGOSCOPY, GASTROSCOPY, DUODENOSCOPY (EGD), COMBINED N/A 2/4/2020    Procedure: ESOPHAGOGASTRODUODENOSCOPY (EGD);  Surgeon: Jose Thomas MD;  Location:  GI     HCL PAP SMEAR  6/2008    WNL     ORTHOPEDIC SURGERY      left shoulder scoped and plate left elbow     SURGICAL HISTORY OF -   2009    ulnar nerve release, carpal tunnel- left        MEDS:    Current Outpatient Medications   Medication     aspirin 81 MG chewable tablet     atorvastatin (LIPITOR) 40 MG tablet     betamethasone dipropionate (DIPROSONE) 0.05 % external cream     buPROPion (WELLBUTRIN XL) 150 MG 24 hr tablet     cyclobenzaprine (FLEXERIL) 10 MG tablet     doxycycline hyclate (VIBRA-TABS) 100 MG tablet     DULoxetine (CYMBALTA) 60 MG capsule     EPINEPHrine (ANY BX GENERIC EQUIV) 0.3 MG/0.3ML injection 2-pack     furosemide (LASIX) 40 MG tablet     glipiZIDE (GLUCOTROL XL) 10 MG 24 hr tablet     HYDROcodone-acetaminophen (NORCO) 7.5-325 MG per tablet     hydrOXYzine (ATARAX) 25 MG tablet     losartan (COZAAR) 25 MG tablet     methocarbamol (ROBAXIN) 500 MG tablet     omeprazole (PRILOSEC) 40 MG DR capsule     order for DME     phentermine (ADIPEX-P) 15 MG capsule     vitamin D3 (CHOLECALCIFEROL) 2000 units (50 mcg) tablet     zolpidem (AMBIEN) 10 MG tablet     No current facility-administered medications for this visit.        ALL:     Allergies   Allergen Reactions     Penicillins Anaphylaxis     Augmentin  [Amoxicillin-Pot Clavulanate] GI Disturbance     Bee Swelling     Gabapentin Other (See Comments)     Mood Swings     Lisinopril Rash     Metformin Itching     Topiramate Other (See Comments)     numbness       FMH:    Family History   Problem Relation Age of Onset     C.A.D. Mother      Neurologic Disorder Mother         lupus     Depression Mother      Family History Negative Father      Diabetes Maternal Aunt      Colon Cancer No family hx of        SocHx:    Social History     Socioeconomic History     Marital status:      Spouse name: Not on file     Number of children: 4     Years of education: Not on file     Highest education level: Not on file   Occupational History     Employer: UNEMPLOYED   Social Needs     Financial resource strain: Not on file     Food insecurity     Worry: Not on file     Inability: Not on file     Transportation needs     Medical: Not on file     Non-medical: Not on file   Tobacco Use     Smoking status: Never Smoker     Smokeless tobacco: Never Used   Substance and Sexual Activity     Alcohol use: No     Drug use: No     Sexual activity: Not Currently     Birth control/protection: Surgical   Lifestyle     Physical activity     Days per week: Not on file     Minutes per session: Not on file     Stress: Not on file   Relationships     Social connections     Talks on phone: Not on file     Gets together: Not on file     Attends Christianity service: Not on file     Active member of club or organization: Not on file     Attends meetings of clubs or organizations: Not on file     Relationship status: Not on file     Intimate partner violence     Fear of current or ex partner: Not on file     Emotionally abused: Not on file     Physically abused: Not on file     Forced sexual activity: Not on file   Other Topics Concern     Parent/sibling w/ CABG, MI or angioplasty before 65F 55M? Yes     Comment: mother  of congested heart failure -  at age 60   Social History Narrative      Not on file           EXAMINATION:  Gen:   No apparent distress  Neuro:   A&Ox3, no deficits  Psych:    Answering questions appropriately for age and situation with normal affect  Head:    NCAT  Eye:    Visual scanning without deficit  Ear:    Response to auditory stimuli wnl  Lung:    Non-labored breathing on RA noted  Abd:    NTND per patient report  Lymph:    Neg for pitting/non-pitting edema BLE  Vasc:    Pulses palpable, CFT minimally delayed  Neuro:    Light touch sensation intact to all sensory nerve distributions without paresthesias  Derm:    Neg for nodules, lesions or ulcerations  MSK:    Left lower extremity - very tender with compression of the calcaneal tuber, and with pressure on the plantar heel.  Soft tissue mass lateral ankle along peroneals, asymptomatic.  Tender posterior heel @ Achilles insertion, tendon otherwise unremarkable today  Calf:    Neg for redness, swelling or tenderness      Imagin. Normal calcaneus. No abnormal lucency, sclerosis, or periosteal  reaction. Normal subtalar and calcaneocuboid joint. Normal soft  tissues.    Assessment:  60 year old female with left lower extremity heel pain concerning for calcaneal stress reaction, possible intraosseous cyst, and Achilles tendonitis       Plan:  Discussed etiologies, anatomy and options  1.  left lower extremity heel pain concerning for calcaneal stress reaction, possible intraosseous cyst, and Achilles tendonitis  -personally reviewed imaging.  Questionable cyst within calcaneus, and sclerotic line through central calcaneus, although this appears to be more structural rather than pathological.  -WBAT in boot, dispensed with instructions  -Rx for ibuprofen 800mg TID x 7 days  -RICE/tylenol prn based on pain  -call in 3 weeks if no better, will order MRI    Regarding the CAM walker, the following proper-usage instructions were discussed and dispensed:  1.  Do not sleep with the CAM boot on; 2.  Do not wear during long-distance  travel, ie long car rides, plane trips(they were instructed not to drive with it if the involved foot is required to operate a vehicle); 3.  The patient was encouraged to remove the boot throughout the day when off their feet;  4.  They are to have the boot on when ambulating, regardless of WB status      Follow up:  3 weeks or sooner with acute issues      Patient's medical history was reviewed today      Baldemar Hansen DPM FACFAS FACFAOM  Podiatric Foot & Ankle Surgeon  [facility]  [phone]

## 2020-11-05 DIAGNOSIS — E11.42 TYPE 2 DIABETES MELLITUS WITH DIABETIC POLYNEUROPATHY, WITHOUT LONG-TERM CURRENT USE OF INSULIN (H): ICD-10-CM

## 2020-11-05 RX ORDER — GLIPIZIDE 10 MG/1
10 TABLET, FILM COATED, EXTENDED RELEASE ORAL 2 TIMES DAILY
Qty: 180 TABLET | Refills: 0 | Status: SHIPPED | OUTPATIENT
Start: 2020-11-05 | End: 2021-02-26

## 2020-11-05 NOTE — TELEPHONE ENCOUNTER
Prescription approved per [facility] Refill Protocol.  Pt has an appt with PCP in 2 weeks  Michael Jasso RN, BSN

## 2020-11-10 ENCOUNTER — MYC REFILL (OUTPATIENT)
Dept: FAMILY MEDICINE | Facility: CLINIC | Age: 60
End: 2020-11-10

## 2020-11-10 DIAGNOSIS — K21.00 GASTROESOPHAGEAL REFLUX DISEASE WITH ESOPHAGITIS: ICD-10-CM

## 2020-11-10 RX ORDER — OMEPRAZOLE 40 MG/1
40 CAPSULE, DELAYED RELEASE ORAL DAILY
Qty: 90 CAPSULE | Refills: 2 | Status: SHIPPED | OUTPATIENT
Start: 2020-11-10 | End: 2021-02-17

## 2020-11-10 NOTE — TELEPHONE ENCOUNTER
Prescription approved per [facility] Refill Protocol.  Michael Jasso RN, BSN

## 2020-11-10 NOTE — TELEPHONE ENCOUNTER
Received fax request from   [id number] - [place], [place] - [address] [address]  Phone: [phone] Fax: [phone]    pharmacy requesting refill(s) for ibuprofen (ADVIL/MOTRIN) 800 MG tablet    Last refilled on 11/2/20    Refill was authorized by Dr. Hansen with 1 refill. Faxed back to [facility] at [phone].    Tatiana ADAMS CMA

## 2020-11-13 ENCOUNTER — MYC MEDICAL ADVICE (OUTPATIENT)
Dept: PODIATRY | Facility: CLINIC | Age: 60
End: 2020-11-13

## 2020-11-13 DIAGNOSIS — M79.672 INTRACTABLE LEFT HEEL PAIN: Primary | ICD-10-CM

## 2020-11-13 NOTE — TELEPHONE ENCOUNTER
Please see [employer] message.  Plan from 11/2/20 office visit :    Plan:  Discussed etiologies, anatomy and options  1.  left lower extremity heel pain concerning for calcaneal stress reaction, possible intraosseous cyst, and Achilles tendonitis  -personally reviewed imaging.  Questionable cyst within calcaneus, and sclerotic line through central calcaneus, although this appears to be more structural rather than pathological.  -WBAT in boot, dispensed with instructions  -Rx for ibuprofen 800mg TID x 7 days  -RICE/tylenol prn based on pain  -call in 3 weeks if no better, will order MRI    Phone call to patient. She states burning comes and goes but has increased in frequency and intensity. It occurs with both bearing weight and non weight bearing. She is wearing the boot as directed and elevating.  States Ibuprofen and Tylenol have been ineffective for pain. She is asking about getting an MRI.   She states she needs an open sided MRI due to claustrophobia and has had MRI's done at [facility] in [place] in the past. Will discuss with provider and get back with her.     Please advise if MRI can be ordered.     IZABELLA Lopez RN

## 2020-11-16 RX ORDER — LORAZEPAM 1 MG/1
TABLET ORAL
Qty: 2 TABLET | Refills: 0 | Status: SHIPPED | OUTPATIENT
Start: 2020-11-16 | End: 2020-11-25

## 2020-11-16 NOTE — TELEPHONE ENCOUNTER
Order for MRI left ankle printed, will fax to [facility] in [place]. Rx for ativan sent to [facility] in [place] on [address].  I will call the patient with the MRI results.    Baldemar Hansen DPM FACFAS FACFAOM  Podiatric Foot & Ankle Surgeon  [facility]  [phone]

## 2020-11-16 NOTE — TELEPHONE ENCOUNTER
MRI left ankle faxed to [facility] at [phone]. Patient notified via [employer].    Tatiana ADAMS CMA

## 2020-11-18 ENCOUNTER — TRANSFERRED RECORDS (OUTPATIENT)
Dept: HEALTH INFORMATION MANAGEMENT | Facility: CLINIC | Age: 60
End: 2020-11-18

## 2020-11-23 ENCOUNTER — OFFICE VISIT (OUTPATIENT)
Dept: PODIATRY | Facility: CLINIC | Age: 60
End: 2020-11-23
Payer: MEDICARE

## 2020-11-23 VITALS
HEIGHT: 65 IN | SYSTOLIC BLOOD PRESSURE: 140 MMHG | BODY MASS INDEX: 46.48 KG/M2 | DIASTOLIC BLOOD PRESSURE: 90 MMHG | WEIGHT: 279 LBS

## 2020-11-23 DIAGNOSIS — M79.672 INTRACTABLE LEFT HEEL PAIN: Primary | ICD-10-CM

## 2020-11-23 PROCEDURE — 99213 OFFICE O/P EST LOW 20 MIN: CPT | Performed by: PODIATRIST

## 2020-11-23 RX ORDER — IBUPROFEN 800 MG/1
TABLET, FILM COATED ORAL
Qty: 30 TABLET | Refills: 0 | Status: SHIPPED | OUTPATIENT
Start: 2020-11-23 | End: 2021-01-14

## 2020-11-23 ASSESSMENT — MIFFLIN-ST. JEOR: SCORE: 1836.42

## 2020-11-23 NOTE — PROGRESS NOTES
"Foot & Ankle Surgery   November 23, 2020    S:  Pt is seen today for evaluation of left heel pain.  Previous exam consistent with calcaneal stress reaction.  Boot, Rx NSAID, RICE, was not noticing improvement.  An MRI was ordered at [facility], which was done approx 5 days ago.  .    Vitals:    11/23/20 1308   BP: (!) 140/90   Weight: 126.6 kg (279 lb)   Height: 1.651 m (5' 5\")   '      ROS - Pos for CC.  Patient denies current nausea, vomiting, chills, fevers, belly pain, calf pain, chest pain or SOB.  Complete remainder of ROS it otherwise neg.      PE:  Gen:   No apparent distress  Eye:    Visual scanning without deficit  Ear:    Response to auditory stimuli wnl  Lung:    Non-labored breathing on RA noted  Abd:    NTND per patient report  Lymph:    Neg for pitting/non-pitting edema BLE  Vasc:    Pulses palpable, CFT minimally delayed  Neuro:    Light touch sensation intact to all sensory nerve distributions without paresthesias  Derm:    Neg for nodules, lesions or ulcerations  MSK:    Left lower extremity - continued pain with compression of the calcaneal tuber.  No pain along peroneal tendons or at peroneal tubercle on lateral calcaneus.  Minimal Achilles insertion pain.    Calf:    Neg for redness, swelling or tenderness      Imaging:  MRI @ [facility] - IMPRESSION:   1. Focal edema-like signal within the plantar aspect of the calcaneus at the attachments of the central and lateral cords of the plantar fascia is most consistent with osseous stress injury in the appropriate clinical setting. Slight intermediate signal within and borderline thickening of the adjacent portion of the plantar fascia are findings that may reflect early changes of plantar fasciitis.  2. Mild peroneus longus tendinopathy between the distal fibular tip and calcaneal peroneal tubercle.  3. No ligamentous or osteochondral pathology of the left ankle.    Assessment:  60 year old female with left lower extremity calcaneal stress reaction with early " plantar fasciitis       Plan:  Discussed etiologies, anatomy and options  1.  Left lower extremity calcaneal stress reaction with early plantar fasciitis  -personally reviewed imaging results with patient.  No peroneal pain noted  -RICE/tylenol prn  -Rx for ibuprofen 800mg; no tapered steroid as patient is diabetic  -advised NWB to minimize strain on calcaneus, in hopes of resolving inflammation.  She has a walker at home.  Recommend continuing walking boot with current instructions, even though she'll be NWB/minimal WB  -follow up 4 weeks for recheck    Follow up:  4 weeks or sooner with acute issues           Baldemar Hansen DPM FACFAS FACFAOM  Podiatric Foot & Ankle Surgeon  [facility]  [phone]

## 2020-11-23 NOTE — PROGRESS NOTES
Pre-Visit Planning   Next 5 appointments (look out 90 days)    Nov 23, 2020  1:15 PM  Return Visit with Baldemar Hansen DPM  [facility] ([facility]) [address]  [address]  [facility] [address]  [phone]        Appointment Notes for this encounter: Reviewed - ZG.  f/u blood pressure, very bad pain in my left heel,,,, and  a yeast infection under my right breast    Pt declines flu vaccine -   Eye exam scheduled for Feb 20211    Questionnaires Reviewed/Assigned    Spoke to patient via phone. Are there any additional questions or concerns you'd like to review with your provider during your visit? No    Patient does not have additional questions or concerns.      Visit is not preventive.    Meds  Is there anything on your medication list that needs to be updated? Yes: Pt is not taking Wellbutrin or flexeril, she did NOT take the lorazepam.   The Duloxetine has only been filled for 60 mg and in the past she had been taking 90 mg daily     Current Outpatient Medications   Medication     aspirin 81 MG chewable tablet     atorvastatin (LIPITOR) 40 MG tablet     betamethasone dipropionate (DIPROSONE) 0.05 % external cream     buPROPion (WELLBUTRIN XL) 150 MG 24 hr tablet     cyclobenzaprine (FLEXERIL) 10 MG tablet     DULoxetine (CYMBALTA) 60 MG capsule     EPINEPHrine (ANY BX GENERIC EQUIV) 0.3 MG/0.3ML injection 2-pack     furosemide (LASIX) 40 MG tablet     glipiZIDE (GLUCOTROL XL) 10 MG 24 hr tablet     hydrOXYzine (ATARAX) 25 MG tablet     ibuprofen (ADVIL/MOTRIN) 800 MG tablet     LORazepam (ATIVAN) 1 MG tablet     losartan (COZAAR) 25 MG tablet     omeprazole (PRILOSEC) 40 MG DR capsule     order for DME     phentermine (ADIPEX-P) 15 MG capsule     zolpidem (AMBIEN) 10 MG tablet     No current facility-administered medications for this visit.      Which pharmacy do you prefer to use for medications during this visit if needed?     Do you need  "refills on any of your medications? No    Health Maintenance Due   Topic Date Due     ANNUAL REVIEW OF HM ORDERS  1960     ZOSTER IMMUNIZATION (1 of 2) 01/31/2010     MEDICARE ANNUAL WELLNESS VISIT  05/14/2016     URINE DRUG SCREEN  12/05/2018     MAMMO SCREENING  09/23/2019     EYE EXAM  01/14/2020     DIABETIC FOOT EXAM  04/16/2020     LIPID  07/22/2020     COLORECTAL CANCER SCREENING  07/24/2020     INFLUENZA VACCINE (1) 09/01/2020     A1C  09/18/2020     Patient is due for:  mammogram, eye exam, preventive care visit and colonoscopy   discuss at visit     MyChart  Patient is active on [facility].    Call Summary  \"Thank you for your time today.      Shira Hamm RN, [facility] clinic   Phone [phone]   Fax [phone]            "

## 2020-11-23 NOTE — PATIENT INSTRUCTIONS
Thank you for choosing [facility] Podiat[facility] / Foot & Ankle Surgery!    DR. ELAINE'S CLINIC LOCATIONS:   [address]   [place] [address]                        [place] [address]   [phone]      SET UP SURGERY: [phone]   APPOINTMENTS: [phone]   BILLING QUESTIONS: [phone]   FAX NUMBER: [phone]       Follow up: 4 weeks        Ana Luisa to follow up with Primary Care provider regarding elevated blood pressure. (if equal or above 140/90)

## 2020-11-25 ENCOUNTER — OFFICE VISIT (OUTPATIENT)
Dept: FAMILY MEDICINE | Facility: CLINIC | Age: 60
End: 2020-11-25
Payer: MEDICARE

## 2020-11-25 VITALS
SYSTOLIC BLOOD PRESSURE: 144 MMHG | DIASTOLIC BLOOD PRESSURE: 90 MMHG | TEMPERATURE: 97.6 F | HEIGHT: 65 IN | RESPIRATION RATE: 16 BRPM | BODY MASS INDEX: 46.15 KG/M2 | HEART RATE: 88 BPM | WEIGHT: 277 LBS

## 2020-11-25 DIAGNOSIS — I10 HTN, GOAL BELOW 140/90: ICD-10-CM

## 2020-11-25 DIAGNOSIS — E11.42 TYPE 2 DIABETES MELLITUS WITH DIABETIC POLYNEUROPATHY, WITHOUT LONG-TERM CURRENT USE OF INSULIN (H): ICD-10-CM

## 2020-11-25 DIAGNOSIS — E78.5 HYPERLIPIDEMIA LDL GOAL <100: ICD-10-CM

## 2020-11-25 DIAGNOSIS — M79.18 MYOFASCIAL PAIN: ICD-10-CM

## 2020-11-25 DIAGNOSIS — G89.4 CHRONIC PAIN SYNDROME: ICD-10-CM

## 2020-11-25 DIAGNOSIS — Z13.220 SCREENING FOR HYPERLIPIDEMIA: ICD-10-CM

## 2020-11-25 DIAGNOSIS — M72.2 PLANTAR FASCIITIS: Primary | ICD-10-CM

## 2020-11-25 LAB — HBA1C MFR BLD: 7.8 % (ref 0–5.6)

## 2020-11-25 PROCEDURE — 80061 LIPID PANEL: CPT | Performed by: FAMILY MEDICINE

## 2020-11-25 PROCEDURE — 83036 HEMOGLOBIN GLYCOSYLATED A1C: CPT | Performed by: FAMILY MEDICINE

## 2020-11-25 PROCEDURE — 36415 COLL VENOUS BLD VENIPUNCTURE: CPT | Performed by: FAMILY MEDICINE

## 2020-11-25 PROCEDURE — 99214 OFFICE O/P EST MOD 30 MIN: CPT | Performed by: FAMILY MEDICINE

## 2020-11-25 RX ORDER — DULOXETIN HYDROCHLORIDE 60 MG/1
60 CAPSULE, DELAYED RELEASE ORAL DAILY
Qty: 90 CAPSULE | Refills: 3 | Status: SHIPPED | OUTPATIENT
Start: 2020-11-25 | End: 2023-10-11

## 2020-11-25 RX ORDER — ATORVASTATIN CALCIUM 40 MG/1
40 TABLET, FILM COATED ORAL DAILY
Qty: 90 TABLET | Refills: 3 | Status: SHIPPED | OUTPATIENT
Start: 2020-11-25 | End: 2021-03-24

## 2020-11-25 RX ORDER — LOSARTAN POTASSIUM 25 MG/1
25 TABLET ORAL DAILY
Qty: 90 TABLET | Refills: 3 | Status: SHIPPED | OUTPATIENT
Start: 2020-11-25 | End: 2024-01-14

## 2020-11-25 ASSESSMENT — MIFFLIN-ST. JEOR: SCORE: 1827.34

## 2020-11-25 ASSESSMENT — ENCOUNTER SYMPTOMS
WOUND: 0
COLOR CHANGE: 0
NUMBNESS: 1

## 2020-11-25 NOTE — PROGRESS NOTES
Subjective     Ana Luisa Byers is a 60 year old female who presents to clinic today for the following health issues:    History of Present Illness       Hypertension: She presents for follow up of hypertension.  She does not check blood pressure  regularly outside of the clinic. Outside blood pressures have been over 140/90. She follows a low salt diet.     She eats 2-3 servings of fruits and vegetables daily.She consumes 0 sweetened beverage(s) daily.She exercises with enough effort to increase her heart rate 10 to 19 minutes per day.  She exercises with enough effort to increase her heart rate 5 days per week. She is missing 1 dose(s) of medications per week.  She is not taking prescribed medications regularly due to remembering to take.             Patient is my very pleasant 60-year-old female with history of diabetes, hypertension and pain syndrome who presents to clinic with a few concerns.    Has noticed left heel pain, follows with podiatry.  Subsequent MRI with suggestions of plantar fasciitis.  Was placed in a boot.  Per patient, has not started any home plantar fasciitis treatment exercises.  Pain not controlled with ibuprofen, which also exacerbates her acid reflux disorder.  Additionally, she does find Cymbalta somewhat helpful however ran out the medication is requesting a refill.  In the past was even on 90 mg Cymbalta daily which did not seem to have any improvement over 60 mg/day.  Additionally, did not tolerate Neurontin as it caused her mental health side effects.  She has not tried Lyrica and would be agreeable to try if her symptoms do not improve with home plantar fasciitis stretching exercises.    Additionally ran out of her cholesterol medication, she is requesting a refill.    Had a fungal rash underneath her breast which she self resolved by using leftover nystatin powder.  No symptoms of this today.    She is agreeable to get her routine labs caught up.    Dates that her blood pressure  used to run slightly high and wonders if she needs an increase of her losartan.  Has not purchased a home blood pressure cuff as it was $50.    Declined flu shot.    Review of Systems   Skin: Negative for color change and wound.   Neurological: Positive for numbness.        Past Medical History:   Diagnosis Date     Arthritis     left shoulder and back     Chronic pain     fibromalgia      CKD (chronic kidney disease), stage III      Fibromyalgia      Gastro-oesophageal reflux disease      HTN, goal below 140/90      Hyperlipidemia LDL goal <100      Hypothyroidism      Major depression     chronic kidney disease-stage 3     Peripheral neuropathy Feb 2015    + callus. diabetic shoes rx done     PONV (postoperative nausea and vomiting)      Rheumatoid arthritis of foot (H)      Tongue abnormality      Type 2 diabetes, HbA1C goal < 8% (H)     pre-diabetic       Past Surgical History:   Procedure Laterality Date     ARTHROPLASTY SHOULDER  7/25/2013    Procedure: right ARTHROPLASTY SHOULDER;  RIGHT TOTAL SHOULDER ARTHROPLASTY (TORNIER AFFINITY SHOULDER SYSTEM)^;  Surgeon: Dung Morales MD;  Location: [facility] OR     BACK SURGERY  2011    L45 laminectomy     C APPENDECTOMY  age 19     C LIGATE FALLOPIAN TUBE,POSTPARTUM  1982    Tubal Ligation     ELBOW WRIST HAND FINGER ORTHOSIS, RIGID, WITHOUT JOINTS, MAY INCLUDE SOFT  5/2007    put in [place]     ESOPHAGOSCOPY, GASTROSCOPY, DUODENOSCOPY (EGD), COMBINED  9/23/2015    Dr. William AMBROSIO     ESOPHAGOSCOPY, GASTROSCOPY, DUODENOSCOPY (EGD), COMBINED N/A 9/23/2015    Procedure: COMBINED ESOPHAGOSCOPY, GASTROSCOPY, DUODENOSCOPY (EGD), BIOPSY SINGLE OR MULTIPLE;  Surgeon: Jose Thomas MD;  Location: [facility]     ESOPHAGOSCOPY, GASTROSCOPY, DUODENOSCOPY (EGD), COMBINED  02/04/2020    Dr. William AMBROSIO     ESOPHAGOSCOPY, GASTROSCOPY, DUODENOSCOPY (EGD), COMBINED N/A 2/4/2020    Procedure: ESOPHAGOGASTRODUODENOSCOPY (EGD);  Surgeon: Jose Thomas MD;  Location: [facility]     HCL  "PAP SMEAR  6/2008    WNL     ORTHOPEDIC SURGERY      left shoulder scoped and plate left elbow     SURGICAL HISTORY OF -   2009    ulnar nerve release, carpal tunnel- left       Family History   Problem Relation Age of Onset     C.A.D. Mother      Neurologic Disorder Mother         lupus     Depression Mother      Family History Negative Father      Diabetes Maternal Aunt      Colon Cancer No family hx of        Social History     Tobacco Use     Smoking status: Never Smoker     Smokeless tobacco: Never Used   Substance Use Topics     Alcohol use: No           Objective    BP (!) 144/90 (BP Location: Right arm, Patient Position: Chair, Cuff Size: Adult Large)   Pulse 88   Temp 97.6  F (36.4  C) (Oral)   Resp 16   Ht 1.651 m (5' 5\")   Wt 125.6 kg (277 lb)   LMP 02/13/2009   Breastfeeding No   BMI 46.10 kg/m    Body mass index is 46.1 kg/m .  Physical Exam  Constitutional:       Appearance: She is not ill-appearing.   Cardiovascular:      Rate and Rhythm: Normal rate and regular rhythm.   Pulmonary:      Effort: Pulmonary effort is normal.   Musculoskeletal:      Comments: Tenderness to palpation with extension of her left toes with direct pressure just distal to the left calcaneus   Skin:     Comments: No skin breakdown on left foot.   Psychiatric:         Mood and Affect: Mood normal.         Behavior: Behavior normal.            Last Comprehensive Metabolic Panel:  Sodium   Date Value Ref Range Status   06/18/2020 136 133 - 144 mmol/L Final     Potassium   Date Value Ref Range Status   06/18/2020 3.5 3.4 - 5.3 mmol/L Final     Chloride   Date Value Ref Range Status   06/18/2020 100 94 - 109 mmol/L Final     Carbon Dioxide   Date Value Ref Range Status   06/18/2020 32 20 - 32 mmol/L Final     Anion Gap   Date Value Ref Range Status   06/18/2020 4 3 - 14 mmol/L Final     Glucose   Date Value Ref Range Status   06/18/2020 192 (H) 70 - 99 mg/dL Final     Urea Nitrogen   Date Value Ref Range Status   06/18/2020 " 10 7 - 30 mg/dL Final     Creatinine   Date Value Ref Range Status   06/18/2020 1.01 0.52 - 1.04 mg/dL Final     GFR Estimate   Date Value Ref Range Status   06/18/2020 60 (L) >60 mL/min/[1.73_m2] Final     Comment:     Non  GFR Calc  Starting 12/18/2018, serum creatinine based estimated GFR (eGFR) will be   calculated using the Chronic Kidney Disease Epidemiology Collaboration   (CKD-EPI) equation.       Calcium   Date Value Ref Range Status   06/18/2020 9.2 8.5 - 10.1 mg/dL Final     Hemoglobin A1C   Date Value Ref Range Status   11/25/2020 7.8 (H) 0 - 5.6 % Final     Comment:     Normal <5.7% Prediabetes 5.7-6.4%  Diabetes 6.5% or higher - adopted from ADA   consensus guidelines.         MRI 11-          Assessment & Plan     Plantar fasciitis  Reviewed her MRI, informed patient to start home plantar fasciitis stretching exercises which I demonstrated in clinic.  Continue to monitor for peripheral neuropathy and other potential diabetic complications such as Carroll's neuroma.     Chronic pain syndrome  Continue current medical management  - DULoxetine (CYMBALTA) 60 MG capsule; Take 1 capsule (60 mg) by mouth daily    Type 2 diabetes mellitus with diabetic polyneuropathy, without long-term current use of insulin (H)  Incremental improvement of her A1c.  Goal A1c less than 7.5.  Continue current medical management.  Interval follow-up in 6 months.  - HEMOGLOBIN A1C  - losartan (COZAAR) 25 MG tablet; Take 1 tablet (25 mg) by mouth daily    Screening for hyperlipidemia    Hyperlipidemia LDL goal <100  Recheck lipid panel, continue current medical management.  - Lipid panel reflex to direct LDL Non-fasting  - atorvastatin (LIPITOR) 40 MG tablet; Take 1 tablet (40 mg) by mouth daily    Myofascial pain  Continue current medical management.  - DULoxetine (CYMBALTA) 60 MG capsule; Take 1 capsule (60 mg) by mouth daily    HTN, goal below 140/90  Borderline control, increase losartan from 12.5 mg to  25 mg daily.  Continue to monitor kidney function + electrolytes.  - losartan (COZAAR) 25 MG tablet; Take 1 tablet (25 mg) by mouth daily         Return in about 6 months (around 5/25/2021) for hypertension and diabetes.    Leonides Juárez MD  [facility]

## 2020-11-26 LAB
CHOLEST SERPL-MCNC: 304 MG/DL
HDLC SERPL-MCNC: 45 MG/DL
LDLC SERPL CALC-MCNC: 229 MG/DL
NONHDLC SERPL-MCNC: 259 MG/DL
TRIGL SERPL-MCNC: 150 MG/DL

## 2020-12-06 ENCOUNTER — HEALTH MAINTENANCE LETTER (OUTPATIENT)
Age: 60
End: 2020-12-06

## 2020-12-30 DIAGNOSIS — R60.0 BILATERAL LOWER EXTREMITY EDEMA: ICD-10-CM

## 2020-12-30 RX ORDER — FUROSEMIDE 40 MG
40 TABLET ORAL DAILY
Qty: 90 TABLET | Refills: 1 | Status: SHIPPED | OUTPATIENT
Start: 2020-12-30 | End: 2023-05-09

## 2020-12-30 NOTE — TELEPHONE ENCOUNTER
Routing refill request to provider for review/approval because:  Labs out of range:    Blood pressure under 140/90 in past 12 months        BP Readings from Last 3 Encounters:   11/25/20 (!) 144/90   11/23/20 (!) 140/90   11/02/20 (!) 146/90         Marisabel Rodrigues RN Flex

## 2020-12-31 ENCOUNTER — MYC MEDICAL ADVICE (OUTPATIENT)
Dept: FAMILY MEDICINE | Facility: CLINIC | Age: 60
End: 2020-12-31

## 2020-12-31 ENCOUNTER — E-VISIT (OUTPATIENT)
Dept: FAMILY MEDICINE | Facility: CLINIC | Age: 60
End: 2020-12-31
Payer: MEDICARE

## 2020-12-31 DIAGNOSIS — J01.90 ACUTE SINUSITIS WITH SYMPTOMS > 10 DAYS: Primary | ICD-10-CM

## 2020-12-31 PROCEDURE — 99421 OL DIG E/M SVC 5-10 MIN: CPT | Performed by: FAMILY MEDICINE

## 2020-12-31 RX ORDER — LEVOFLOXACIN 500 MG/1
500 TABLET, FILM COATED ORAL DAILY
Qty: 7 TABLET | Refills: 0 | Status: SHIPPED | OUTPATIENT
Start: 2020-12-31 | End: 2021-01-07

## 2020-12-31 NOTE — TELEPHONE ENCOUNTER
Pt call transferred to RN. Pt states she is attempting to return call to Shira at [phone].   RN reviewed pt [employer] messages below with pt.  Pt is requesting antibiotic. RN advised per message below that pt was instructed to call for appointment if home care was ineffective and requesting antibiotic.  RN offered to transfer pt to scheduling to obtain an appointment.  Pt states she prefers to submit and Evisit and will do so right away.    DESIREE EstradaN, RN

## 2020-12-31 NOTE — PATIENT INSTRUCTIONS
Deareymundo Byers    After reviewing your responses, I've been able to diagnose you with?a sinus infection caused by bacteria.?     Based on your responses and diagnosis, I have prescribed levaquin to treat your symptoms. I have sent this to your pharmacy.?     It is also important to stay well hydrated, get lots of rest and take over-the-counter decongestants,?tylenol?or ibuprofen if you?are able to?take those medications per your primary care provider to help relieve discomfort.?     It is important that you take?all of?your prescribed medication even if your symptoms are improving after a few doses.? Taking?all of?your medicine helps prevent the symptoms from returning.?     If your symptoms worsen, you develop severe headache, vomiting, high fever (>102), or are not improving in 7 days, please contact your primary care provider for an appointment or visit any of our convenient Walk-in Care or Urgent Care Centers to be seen which can be found on our website?here.?     Thanks again for choosing?us?as your health care partner,?   ?  Leonides Juárez MD?     Sinusitis (Antibiotic Treatment)    The sinuses are air-filled spaces within the bones of the face. They connect to the inside of the nose. Sinusitis is an inflammation of the tissue that lines the sinuses. Sinusitis can occur during a cold. It can also happen due to allergies to pollens and other particles in the air. Sinusitis can cause symptoms of sinus congestion and a feeling of fullness. A sinus infection causes fever, headache, and facial pain. There is often green or yellow fluid draining from the nose or into the back of the throat (post-nasal drip). You have been given antibiotics to treat this condition.   Home care    Take the full course of antibiotics as instructed. Don't stop taking them, even when you feel better.    Drink plenty of water, hot tea, and other liquids as directed by the healthcare provider. This may help thin nasal mucus. It  also may help your sinuses drain fluids.    Heat may help soothe painful areas of your face. Use a towel soaked in hot water. Or,  the shower and direct the warm spray onto your face. Using a vaporizer along with a menthol rub at night may also help soothe symptoms.     An expectorant with guaifenesin may help thin nasal mucus and help your sinuses drain fluids. Talk with your provider or pharmacists before taking an over-the-counter (OTC) medicine if you have any questions about it or its side effects..    You can use an OTC decongestant, unless a similar medicine was prescribed to you. Nasal sprays work the fastest. Use one that contains phenylephrine or oxymetazoline. First blow your nose gently. Then use the spray. Don't use these medicines more often than directed on the label. If you do, your symptoms may get worse. You may also take pills that contain pseudoephedrine. Don t use products that combine multiple medicines. This is because side effects may be increased. Read labels. You can also ask the pharmacist for help. (People with high blood pressure should not use decongestants. They can raise blood pressure.) Talk with your provider or pharmacist if you have any questions about the medicine..    OTC antihistamines may help if allergies contributed to your sinusitis. Talk with your provider or pharmacist if you have any questions about the medicine..    Don't use nasal rinses or irrigation during an acute sinus infection, unless your healthcare provider tells you to. Rinsing may spread the infection to other areas in your sinuses.    Use acetaminophen or ibuprofen to control pain, unless another pain medicine was prescribed to you. If you have chronic liver or kidney disease or ever had a stomach ulcer, talk with your healthcare provider before using these medicines. Never give aspirin to anyone under age 18 who is ill with a fever. It may cause severe liver damage.    Don't smoke. This can make  symptoms worse.    Follow-up care  Follow up with your healthcare provider, or as advised.   When to seek medical advice  Call your healthcare provider if any of these occur:     Facial pain or headache that gets worse    Stiff neck    Unusual drowsiness or confusion    Swelling of your forehead or eyelids    Symptoms don't go away in 10 days    Vision problems, such as blurred or double vision    Fever of 100.4 F (38 C) or higher, or as directed by your healthcare provider  Call 911  Call 911 if any of these occur:     Seizure    Trouble breathing    Feeling dizzy or faint    Fingernails, skin or lips look blue, purple , or gray  Prevention  Here are steps you can take to help prevent an infection:     Keep good hand washing habits.    Don t have close contact with people who have sore throats, colds, or other upper respiratory infections.    Don t smoke, and stay away from secondhand smoke.    Stay up to date with of your vaccines.  [employer] last reviewed this educational content on 12/1/2019 2000-2020 [employer]. [address] [place] [address], [place] [address]. All rights reserved. This information is not intended as a substitute for professional medical care. Always follow your healthcare professional's instructions.

## 2021-01-13 NOTE — PROGRESS NOTES
Pre-Visit Planning     Appointment Notes for this encounter:   Reviewed - ZG.  i think i have an uncontrolable  picking issue.. i have sores on my belly,face and my scalp i have tryed wearing gloves to bed. but i find that im picking all the time    Questionnaires Reviewed/Assigned  No additional questionnaires are needed  Patient preferred phone number: [phone]    [employer] message sent to Ana Luisa to help facilitate completion of PVP.     YEISON España  [facility]

## 2021-01-14 ENCOUNTER — OFFICE VISIT (OUTPATIENT)
Dept: FAMILY MEDICINE | Facility: CLINIC | Age: 61
End: 2021-01-14
Payer: MEDICARE

## 2021-01-14 VITALS
BODY MASS INDEX: 46.81 KG/M2 | DIASTOLIC BLOOD PRESSURE: 86 MMHG | HEART RATE: 92 BPM | WEIGHT: 281.3 LBS | RESPIRATION RATE: 20 BRPM | SYSTOLIC BLOOD PRESSURE: 146 MMHG | OXYGEN SATURATION: 96 % | TEMPERATURE: 97.4 F

## 2021-01-14 DIAGNOSIS — I10 BENIGN ESSENTIAL HYPERTENSION: ICD-10-CM

## 2021-01-14 DIAGNOSIS — F42.4 SKIN-PICKING DISORDER: Primary | ICD-10-CM

## 2021-01-14 DIAGNOSIS — Z12.31 ENCOUNTER FOR SCREENING MAMMOGRAM FOR MALIGNANT NEOPLASM OF BREAST: ICD-10-CM

## 2021-01-14 PROCEDURE — 99214 OFFICE O/P EST MOD 30 MIN: CPT | Performed by: FAMILY MEDICINE

## 2021-01-14 RX ORDER — ARIPIPRAZOLE 5 MG/1
5 TABLET ORAL DAILY
Qty: 30 TABLET | Refills: 2 | Status: SHIPPED | OUTPATIENT
Start: 2021-01-14 | End: 2021-02-01

## 2021-01-14 RX ORDER — PHENTERMINE HYDROCHLORIDE 15 MG/1
CAPSULE ORAL
COMMUNITY
Start: 2021-01-04 | End: 2021-01-14

## 2021-01-14 ASSESSMENT — ENCOUNTER SYMPTOMS
PALPITATIONS: 0
NERVOUS/ANXIOUS: 0
DECREASED CONCENTRATION: 0

## 2021-01-14 NOTE — PROGRESS NOTES
Assessment & Plan     Skin-picking disorder  Likely a chronic problem.  Negative evaluation today for other psychosis, not acutely manic.  Denies any illicit substance use including amphetamines or cocaine.  Careful medication reconciliation and the only stimulant-based medication she is on now is Cymbalta, would not recommend any further stimulants until we get this behavior controlled.  Previously had evaluation by psych however did not have good therapeutic alliance, does have a history of moderate major depression.  Recommend she starts Abilify for the skin picking behavior, continue conservative measures such as trimming her nails.  I did have a discussion about starting a stronger medication such as Zyprexa or Seroquel however given her BMI of 46 and other comorbid conditions such as diabetes mellitus, hyperlipidemia, will try to use the gentlest of the atypical antipsychotics to avoid potential side effects and increase therapy as needed.  Start topical bacitracin as per the existing wounds, they were assessed and do not have any indications for oral antibiotics at this time as there is no obvious cellulitis.  Will patient have continue monitoring her urge-like symptoms and message me in my chart in a week to see how she is doing.  Otherwise follow-up in 1 to 3 months for recheck.  - ARIPiprazole (ABILIFY) 5 MG tablet; Take 1 tablet (5 mg) by mouth daily  - bacitracin-neomycin-polymyxin b-hydrocortisone 1 % external ointment; Apply topically 2 times daily as needed    Encounter for screening mammogram for malignant neoplasm of breast  - MA Screen Bilateral w/Jimenez; Future    Essential HTN;  Uncontrolled.  Will discuss increasing her losartan from 25 mg to 50 mg at the next interval visit with patient.          Return in about 1 month (around 2/14/2021) for If symptoms do not improve or gets worse..    Leonides Juárez MD  [facility]    Carmen Orosco is a 60 year old who presents  to clinic today for the following health issues     HPI       Concern - Patient is picking at sores on stomach and head.  Onset: 2 weeks ago, however she believes it may have been years.  The last 2 weeks has noticed more sores  Description: Sores are itching and painful to the touch.  Intensity: severe  Progression of Symptoms:  worsening  Accompanying Signs & Symptoms: no  Previous history of similar problem: No  Precipitating factors:        Worsened by: No  Alleviating factors:        Improved by: No  Therapies tried and outcome: None    No new stressors in her life.  Recently did start talking to her older brother which they had a long-term falling out before but she admits this is a good thing.    Patient has a diabetic eye exam scheduled on 02/03/2021.  Agreeable to get screening mammography, denies any nipple discharge, new lumps.      Review of Systems   Cardiovascular: Negative for palpitations.   Psychiatric/Behavioral: Positive for self-injury. Negative for decreased concentration and suicidal ideas. The patient is not nervous/anxious.             Objective    BP (!) 146/86 (Cuff Size: Adult Large)   Pulse 92   Temp 97.4  F (36.3  C) (Oral)   Resp 20   Wt 127.6 kg (281 lb 4.8 oz)   LMP 02/13/2009   SpO2 96%   BMI 46.81 kg/m    Body mass index is 46.81 kg/m .  Physical Exam  Vitals signs reviewed.   Eyes:      Comments: Bilateral pupils are 4 mm equal and responsive and reactive; with accommodation.   Cardiovascular:      Rate and Rhythm: Normal rate and regular rhythm.   Pulmonary:      Effort: Pulmonary effort is normal.      Breath sounds: Normal breath sounds.   Skin:     Comments: Numerous hypopigmented patches of varying sizes on all of her extremities abdomen, remainder of her trunk.  There are a couple of newer lesions on the left lower abdomen that is now clean and dry however has a circular base of erythema that is nontender and no increase of warmth.     Neurological:      General: No  focal deficit present.

## 2021-01-19 ENCOUNTER — MYC REFILL (OUTPATIENT)
Dept: FAMILY MEDICINE | Facility: CLINIC | Age: 61
End: 2021-01-19

## 2021-01-19 DIAGNOSIS — E11.9 TYPE 2 DIABETES MELLITUS WITHOUT COMPLICATION, WITHOUT LONG-TERM CURRENT USE OF INSULIN (H): ICD-10-CM

## 2021-01-27 ENCOUNTER — TELEPHONE (OUTPATIENT)
Dept: FAMILY MEDICINE | Facility: CLINIC | Age: 61
End: 2021-01-27

## 2021-01-27 ENCOUNTER — MYC MEDICAL ADVICE (OUTPATIENT)
Dept: FAMILY MEDICINE | Facility: CLINIC | Age: 61
End: 2021-01-27

## 2021-01-27 DIAGNOSIS — F42.4 SKIN-PICKING DISORDER: ICD-10-CM

## 2021-01-27 DIAGNOSIS — E11.42 TYPE 2 DIABETES MELLITUS WITH DIABETIC POLYNEUROPATHY, WITHOUT LONG-TERM CURRENT USE OF INSULIN (H): Primary | ICD-10-CM

## 2021-01-27 NOTE — TELEPHONE ENCOUNTER
Routing refill request to provider for review/approval because:  Has not been ordered since 2016.     LOV note 1/14/2021: skin picking  Will patient have continue monitoring her urge-like symptoms and message me in my chart in a week to see how she is doing.    See patient mychart message as she states Abilify is not working.       Marisabel Rodrigues RN Flex

## 2021-01-27 NOTE — TELEPHONE ENCOUNTER
Per phone encounter from 1/27/2021 patient [employer] message sent and faxed orders to pharmacy at this time.     Marisabel Rodrigues RN Flex

## 2021-01-27 NOTE — TELEPHONE ENCOUNTER
Fax received from [facility]. Information filled out regarding diabetic supplies however script sig is written test blood sugar as directed. Per chart review I don't see documentation past 2017 --at that time she was directed to check twice daily.     Dr. Juárez please review and advise. Form at RN desk will need fax and provider sig.     Thank you,  Patria Palumbo R.N.

## 2021-01-27 NOTE — TELEPHONE ENCOUNTER
I tried sending her diabetic supplies however there was an error and it printed.  Can you please try and resend them?  In addition, I recommend she double up her dosage of Abilify from 5 mg to 10 mg.  Topical antibiotics and her bellybutton and it is not getting better after 3 days or significantly any worse, needs to be seen in person.    Best regards,     Leonides Juárez MD

## 2021-02-01 RX ORDER — ARIPIPRAZOLE 10 MG/1
10 TABLET ORAL DAILY
Qty: 30 TABLET | Refills: 2 | Status: SHIPPED | OUTPATIENT
Start: 2021-02-01 | End: 2021-05-10

## 2021-02-01 NOTE — TELEPHONE ENCOUNTER
Fax received from pharmacy stating needing new rx for increased dose of aripiprazole from 5 mg to 10 mg as per Dr Juárez note below. Rx sent    Michelle CALDERON RN, BSN

## 2021-02-03 ENCOUNTER — TRANSFERRED RECORDS (OUTPATIENT)
Dept: HEALTH INFORMATION MANAGEMENT | Facility: CLINIC | Age: 61
End: 2021-02-03

## 2021-02-03 LAB — RETINOPATHY: NEGATIVE

## 2021-02-04 ENCOUNTER — TELEPHONE (OUTPATIENT)
Dept: FAMILY MEDICINE | Facility: CLINIC | Age: 61
End: 2021-02-04

## 2021-02-04 ENCOUNTER — MYC MEDICAL ADVICE (OUTPATIENT)
Dept: FAMILY MEDICINE | Facility: CLINIC | Age: 61
End: 2021-02-04

## 2021-02-04 DIAGNOSIS — G47.09 OTHER INSOMNIA: ICD-10-CM

## 2021-02-04 NOTE — TELEPHONE ENCOUNTER
I will send a Robert Applebaum MD message to explain that it is okay to take it in the morning.      Leonides Juárez MD

## 2021-02-04 NOTE — TELEPHONE ENCOUNTER
Call placed to Ana Luisa to offer next visit as MTM. Message left for Ana Luisa to return call to me. Would like to make upcoming visit in May a co-visit. Patria Palumbo R.N.

## 2021-02-08 ENCOUNTER — MYC MEDICAL ADVICE (OUTPATIENT)
Dept: FAMILY MEDICINE | Facility: CLINIC | Age: 61
End: 2021-02-08

## 2021-02-08 RX ORDER — ZOLPIDEM TARTRATE 10 MG/1
TABLET ORAL
Qty: 30 TABLET | Refills: 2 | Status: SHIPPED | OUTPATIENT
Start: 2021-02-08 | End: 2021-05-02

## 2021-02-08 NOTE — TELEPHONE ENCOUNTER
MyChart letter sent advising patient of MTM services with co-visit or stand alone. Will await for reply. Patria Palumbo R.N.

## 2021-02-09 NOTE — LETTER
February 16, 2021      Ana Luisa Byers  [address]    [facility] [address]        Dear Ana Luisa,     We received a refill request from your pharmacy for Ibuprofen 800mg. We are unsure if you actually asked for this refill or if the pharmacy put it on automated refill. If you are wanting it refilled, please contact our office. If so, you will need to follow up for an appointment to be re-evaluated in the clinic.     Sincerely,        Baldemar Hansen DPM, JORDANM

## 2021-02-09 NOTE — TELEPHONE ENCOUNTER
Received refill request from [facility], for Ibuprofen 800mg.     IZABELLA Lopez RN

## 2021-02-11 ENCOUNTER — APPOINTMENT (OUTPATIENT)
Dept: URGENT CARE | Facility: CLINIC | Age: 61
End: 2021-02-11
Payer: MEDICARE

## 2021-02-11 NOTE — TELEPHONE ENCOUNTER
Last office visit plan of  11/23/20:    Rx for ibuprofen 800mg; no tapered steroid as patient is diabetic  -advised NWB to minimize strain on calcaneus, in hopes of resolving inflammation.  She has a walker at home.  Recommend continuing walking boot with current instructions, even though she'll be NWB/minimal WB  -follow up 4 weeks for recheck    Consent to communicate on file.   Left voicemail asking for a return call regarding refill request for ibuprofen.     IZABELLA Lopez RN

## 2021-02-12 NOTE — TELEPHONE ENCOUNTER
Left 2nd message asking patient to return call regarding refill request for ibuprofen.   Will need to inform she needs to schedule an appointment to be reevaluated.     IZABELLA Lopez RN

## 2021-02-16 RX ORDER — IBUPROFEN 800 MG/1
800 TABLET, FILM COATED ORAL EVERY 8 HOURS PRN
OUTPATIENT
Start: 2021-02-16

## 2021-02-16 ASSESSMENT — PATIENT HEALTH QUESTIONNAIRE - PHQ9
SUM OF ALL RESPONSES TO PHQ QUESTIONS 1-9: 18
SUM OF ALL RESPONSES TO PHQ QUESTIONS 1-9: 18
10. IF YOU CHECKED OFF ANY PROBLEMS, HOW DIFFICULT HAVE THESE PROBLEMS MADE IT FOR YOU TO DO YOUR WORK, TAKE CARE OF THINGS AT HOME, OR GET ALONG WITH OTHER PEOPLE: VERY DIFFICULT

## 2021-02-16 NOTE — PROGRESS NOTES
"Pre-Visit Planning   Next 5 appointments (look out 90 days)    Feb 17, 2021  Arrive by 8:40 AM  Office Visit with Leonides Juárez MD  [facility] ([facility] ) [address] [facility] [address] [phone]            Appointment Notes for this encounter:   Reviewed - zg. acid reflux     Questionnaires Reviewed/Assigned  No additional questionnaires are needed        Patient preferred phone number: [phone]      Spoke to patient via phone. Patient does not have additional questions or concerns.        Visit is not preventive.    Patient is established.    Patient reminded of date and time.  Chief complaint confirmed.    Health Maintenance Due   Topic Date Due     ZOSTER IMMUNIZATION (1 of 2) 01/31/2010     MEDICARE ANNUAL WELLNESS VISIT  05/14/2016     MAMMO SCREENING  09/23/2019     COLORECTAL CANCER SCREENING  07/24/2020     ADVANCE CARE PLANNING  01/05/2021     A1C  02/25/2021     PHQ-9  02/27/2021     [employer]  Patient is active on [employer].    Questionnaire Review   Offered information on completing questionnaires via [employer].    Call Summary  \"Thank you for your time today.  If anything comes up before your appointment, please feel free to contact us at [phone].\"    "

## 2021-02-16 NOTE — TELEPHONE ENCOUNTER
Left 3rd message asking for a return call.   Letter sent. Closing encounter.     IZABELLA Lopez RN

## 2021-02-17 ENCOUNTER — OFFICE VISIT (OUTPATIENT)
Dept: FAMILY MEDICINE | Facility: CLINIC | Age: 61
End: 2021-02-17
Payer: MEDICARE

## 2021-02-17 ENCOUNTER — MYC MEDICAL ADVICE (OUTPATIENT)
Dept: FAMILY MEDICINE | Facility: CLINIC | Age: 61
End: 2021-02-17

## 2021-02-17 VITALS
DIASTOLIC BLOOD PRESSURE: 88 MMHG | BODY MASS INDEX: 46.65 KG/M2 | TEMPERATURE: 98.5 F | HEART RATE: 98 BPM | WEIGHT: 280 LBS | SYSTOLIC BLOOD PRESSURE: 126 MMHG | HEIGHT: 65 IN | OXYGEN SATURATION: 99 % | RESPIRATION RATE: 18 BRPM

## 2021-02-17 DIAGNOSIS — F42.4 SKIN-PICKING DISORDER: ICD-10-CM

## 2021-02-17 DIAGNOSIS — G43.901 MIGRAINE WITH STATUS MIGRAINOSUS, NOT INTRACTABLE, UNSPECIFIED MIGRAINE TYPE: ICD-10-CM

## 2021-02-17 DIAGNOSIS — R13.10 DYSPHAGIA, UNSPECIFIED TYPE: ICD-10-CM

## 2021-02-17 DIAGNOSIS — E11.9 TYPE 2 DIABETES MELLITUS WITHOUT COMPLICATION, UNSPECIFIED WHETHER LONG TERM INSULIN USE (H): ICD-10-CM

## 2021-02-17 DIAGNOSIS — R68.81 EARLY SATIETY: ICD-10-CM

## 2021-02-17 DIAGNOSIS — K21.9 GASTROESOPHAGEAL REFLUX DISEASE, UNSPECIFIED WHETHER ESOPHAGITIS PRESENT: Primary | ICD-10-CM

## 2021-02-17 LAB — HBA1C MFR BLD: 9.2 % (ref 0–5.6)

## 2021-02-17 PROCEDURE — 99214 OFFICE O/P EST MOD 30 MIN: CPT | Performed by: FAMILY MEDICINE

## 2021-02-17 PROCEDURE — 36415 COLL VENOUS BLD VENIPUNCTURE: CPT | Performed by: FAMILY MEDICINE

## 2021-02-17 PROCEDURE — 83036 HEMOGLOBIN GLYCOSYLATED A1C: CPT | Performed by: FAMILY MEDICINE

## 2021-02-17 RX ORDER — SUMATRIPTAN 25 MG/1
25 TABLET, FILM COATED ORAL
Qty: 15 TABLET | Refills: 3 | Status: SHIPPED | OUTPATIENT
Start: 2021-02-17 | End: 2021-05-11

## 2021-02-17 ASSESSMENT — PATIENT HEALTH QUESTIONNAIRE - PHQ9: SUM OF ALL RESPONSES TO PHQ QUESTIONS 1-9: 18

## 2021-02-17 ASSESSMENT — ENCOUNTER SYMPTOMS
TROUBLE SWALLOWING: 1
HEARTBURN: 1
ABDOMINAL DISTENTION: 1

## 2021-02-17 ASSESSMENT — MIFFLIN-ST. JEOR: SCORE: 1835.95

## 2021-02-17 NOTE — TELEPHONE ENCOUNTER
Attempted to contact Ana Luisa via [employer] x2, and phone x2. New [employer] message just sent, patient has read previous but not replied. Patria Palumbo R.N.

## 2021-02-17 NOTE — PROGRESS NOTES
Assessment & Plan     Gastroesophageal reflux disease, unspecified whether esophagitis present  Uncontrolled.  Refill resent to retry Nexium, prescription sent over via [facility].  She had an upper endoscopy on 2-4-2020 which showed a normal esophagus, stomach and duodenum.  Reportedly has undergone esophageal dilation with temporary improvement.  Her GI symptoms now as listed per below has me concerned for potentially developing gastroparesis; possibly secondary to neuropathy from her underlying diabetes.  Will order gastric emptying study for further characterization, also will order SLP referral with barium swallow for further elucidation of dysphagia.    Dysphagia, unspecified type  - Speech Therapy Referral; Future  - XR Video Swallow with SLP or OT - Order with Speech Therapy Referral; Future    Type 2 diabetes mellitus without complication, unspecified whether long term insulin use (H)  - Hemoglobin A1c    Early satiety  - NM Gastric Emptying; Future    Migraine with status migrainosus, not intractable, unspecified migraine type  New problem. No red flag symptoms. Start Imitrex.    - SUMAtriptan (IMITREX) 25 MG tablet; Take 1 tablet (25 mg) by mouth at onset of headache for migraine May repeat in 2 hours. Max 8 tablets/24 hours.    Skin-picking disorder  Clinically improved.  Continue Abilify.    Depression Screening Follow Up    PHQ 2/16/2021   PHQ-9 Total Score 18   Q9: Thoughts of better off dead/self-harm past 2 weeks More than half the days   F/U: Thoughts of suicide or self-harm No   F/U: Safety concerns No       Return in about 1 month (around 3/17/2021) for medicare annual wellness visit, virtual is okay. .    Leonides Juárez MD  [facility]    Carmen Orosco is a 61 year old who presents for the following health issues     History of Present Illness       Back Pain:  She presents for follow up of back pain. Patient's back pain is a chronic problem.  Location of back pain:   "Right lower back, left lower back, right middle of back, left middle of back, right upper back, left upper back, right side of neck, left side of neck and right shoulder  Description of back pain: burning and other  Back pain spreads: right buttocks, left buttocks, right thigh, left thigh, right knee, left knee, left foot, right shoulder, left shoulder, right side of neck and left side of neck    Since patient first noticed back pain, pain is: unchanged  Does back pain interfere with her job:  Not applicable      CKD: She is not using over the counter pain medicine.     Hypertension: She presents for follow up of hypertension.  She does not check blood pressure  regularly outside of the clinic. Outside blood pressures have been over 140/90. She does not follow a low salt diet.     Migraines:   Since the patient's last clinic visit, headaches are: no change  The patient is getting headaches:  2  She is able to do normal daily activities when she has a migraine.  The patient is taking the following rescue/relief medications:  No rescue/relief medications   Patient states \"I get no relief\" from the rescue/relief medications.   The patient is taking the following medications to prevent migraines:  No medications to prevent migraines  In the past 4 weeks, the patient has gone to an Urgent Care or Emergency Room 2 times times due to headaches.    She eats 0-1 servings of fruits and vegetables daily.She consumes 2 sweetened beverage(s) daily.She exercises with enough effort to increase her heart rate 10 to 19 minutes per day.  She is missing 1 dose(s) of medications per week.  Answers for HPI/ROS submitted by the patient on 2/16/2021   Chronic problems general questions HPI Form  If you checked off any problems, how difficult have these problems made it for you to do your work, take care of things at home, or get along with other people?: Very difficult  PHQ9 TOTAL SCORE: 18       GERD/Heartburn  Onset/Duration: " "chronic  Description: acid reflux for many years  Intensity: moderate  Progression of Symptoms: worsening  Accompanying Signs & Symptoms:  Does it feel like food gets stuck or trouble swallowing: YES  Nausea: no  Vomiting (bloody?): no  Abdominal Pain: yes- crampy  Black-Tarry stools: no  Bloody stools: no  History:  Previous similar episodes: YES  Previous ulcers: no  Precipitating factors:   Caffeine use: no  Alcohol use: no  NSAID/Aspirin use: no  Tobacco use: no  Worse with no particular food or drink.  Alleviating factors: None  Therapies tried and outcome:             Lifestyle changes: quit drinking caffiene            Medications: Omeprazole (Prilosec) and Nexium      Feels solid food gets stuck.      Review of Systems   HENT: Positive for trouble swallowing.    Gastrointestinal: Positive for abdominal distention and heartburn.            Objective    /88 (Cuff Size: Adult Large)   Pulse 98   Temp 98.5  F (36.9  C) (Oral)   Resp 18   Ht 1.651 m (5' 5\")   Wt 127 kg (280 lb)   LMP 02/13/2009   SpO2 99%   BMI 46.59 kg/m    Body mass index is 46.59 kg/m .  Physical Exam  Vitals signs reviewed.   Constitutional:       Appearance: She is not ill-appearing.   Cardiovascular:      Rate and Rhythm: Normal rate.   Pulmonary:      Effort: No respiratory distress.   Skin:     Comments: Previous skin picking sites have now healed over well.            Hemoglobin A1C   Date Value Ref Range Status   11/25/2020 7.8 (H) 0 - 5.6 % Final     Comment:     Normal <5.7% Prediabetes 5.7-6.4%  Diabetes 6.5% or higher - adopted from ADA   consensus guidelines.       "

## 2021-02-17 NOTE — PATIENT INSTRUCTIONS
Patient Education     Dysphagia (Adult)    Dysphagia is trouble swallowing. If you have dysphagia, you may have symptoms that include:    Choking or coughing when you eat or drink    Food getting stuck    Drooling    Inability to swallow    Pain behind the breastbone after swallowing    Vomiting after you eat or drink    Aspirating (inhaling into the lungs) foods or liquids when you swallow  The main causes of dysphagia are problems that affect the mouth, throat or tongue. Dysphagia may be caused by a problem with the esophagus (tube that carries food from the mouth to the stomach). These include blockage, swelling, or irritation from acid reflux or injury. An infection of the esophagus or an allergic reaction in the esophagus can also cause dysphagia. Problems in the brain, such as a stroke or Parkinson's disease, can affect the muscles that coordinate swallowing.  Dysphagia is treated by treating the cause. Your healthcare provider may evaluate you using X-ray, special esophagus monitors, a fiber optic evaluation of swallowing, or an upper endoscopy, which uses a thin, lighted tube sent through the mouth to the esophagus. You may be given medicine to reduce stomach acid or control muscle spasms. If the problem doesn't go away, a procedure can be done to widen (dilate) the esophagus. If you have muscle or nerve problems, you may be advised to see a speech or occupational therapist. He or she may give you exercises and instructions to help make eating safer. If you have an infection or allergic condition, your healthcare provide will prescribe medicine for it.  Home care  To help ease the symptoms of dysphagia:    Take any medicine you ve been given exactly as directed. Ask for liquid medicines if you need them.    To make eating easier:  ? Eat slowly.   ? Eat in a relaxed setting.  ? Don t talk while you eat.  ? Take small bites. Chew slowly and completely before you swallow.  ? Sit upright during and after meals.  Chewing food releases enzymes in your mouth that start the digestive process. Chew soft foods at least 5 to10 times. Chew more dense food (meats and vegetables) up to 30 times before swallowing. Count the number of times you chew until you get a sense of how soft the food needs to be before swallowing.  ? Don't eat dry bread products or meat fibers.  ? Puree solid foods if needed. Thicken liquids with milk, juice, broth, gravy, or starch to make them easier to swallow.  ? Ask your healthcare provider if a liquid diet may be better for you.  Follow-up care  Follow up with your healthcare provider or as directed. Your healthcare provider can give you information about tests you may need.   When to seek medical advice  Call your healthcare provider right away for any of the following:    Inability to keep down food or liquid    Symptoms that get worse quickly    Coughing that won't stop    Continuing to lose weight    Fever of 100.4 F (38 C) or higher, or as directed by your healthcare provider    Other symptoms as indicated by your healthcare provider  Call 911  Call 911 for any of the following:    Trouble breathing    Inability to talk    Drooling, inability to control secretions    Loss of consciousness  Jackelin last reviewed this educational content on 3/1/2018    3179-2716 [employer]. [address] [place] [address], [place] [address]. All rights reserved. This information is not intended as a substitute for professional medical care. Always follow your healthcare professional's instructions.

## 2021-02-19 ENCOUNTER — MYC MEDICAL ADVICE (OUTPATIENT)
Dept: FAMILY MEDICINE | Facility: CLINIC | Age: 61
End: 2021-02-19

## 2021-02-19 ENCOUNTER — TELEPHONE (OUTPATIENT)
Dept: FAMILY MEDICINE | Facility: CLINIC | Age: 61
End: 2021-02-19

## 2021-02-19 NOTE — TELEPHONE ENCOUNTER
Reason for Call:  Medication or medication refill:    Do you use a [facility]?  Name of the pharmacy and phone number for the current request:  [facility] in [place]     Name of the medication requested: esomeprazole (NEXIUM)    Other request: [facility] in [place]   Can we leave a detailed message on this number? YES    Phone number patient can be reached at: Cell number on file:    Telephone Information:   Mobile [phone]       Best Time: any    Call taken on 2/19/2021 at 8:01 AM by Nallely Conley

## 2021-02-19 NOTE — TELEPHONE ENCOUNTER
This request is denied: Rx was sent to pharmacy on 2/17/21 for 60 tablets with 11 refills. [employer] message sent to patient.

## 2021-02-22 NOTE — TELEPHONE ENCOUNTER
Karma message was read, no response. Since multiple attempts have been made will close encounter regarding MTM  [employer].  If Ana Luisa decides she is interested this is still something we can set her up with.     Patria Palumbo R.N.

## 2021-02-24 NOTE — TELEPHONE ENCOUNTER
Another fax received from [employer], form was partially completed. Finished filling out form, reviewed with Dr. Juárez and faxed back to [employer] at [phone].     Per review with Dr. Juárez he will most likely discontinue Ana Luisa checking blood sugar at next visit but wants to discuss in detail with her first as this was initially ordered by another provider. For now patient can continue to check blood sugar TID as was previously doing per Dr. Juárez.     Patria Palumbo R.N.

## 2021-02-24 NOTE — TELEPHONE ENCOUNTER
Will close encounter see alternate encounters on this subject for further details. Patient has not responded after 4 outreaches. MTM can be scheduled if Ana Luisa wishes to do so. Patria Palumbo R.N.

## 2021-02-25 ENCOUNTER — MYC MEDICAL ADVICE (OUTPATIENT)
Dept: FAMILY MEDICINE | Facility: CLINIC | Age: 61
End: 2021-02-25

## 2021-02-25 ENCOUNTER — OFFICE VISIT (OUTPATIENT)
Dept: URGENT CARE | Facility: URGENT CARE | Age: 61
End: 2021-02-25
Payer: MEDICARE

## 2021-02-25 VITALS
RESPIRATION RATE: 20 BRPM | TEMPERATURE: 97.7 F | OXYGEN SATURATION: 98 % | DIASTOLIC BLOOD PRESSURE: 85 MMHG | SYSTOLIC BLOOD PRESSURE: 157 MMHG | WEIGHT: 278 LBS | HEIGHT: 65 IN | HEART RATE: 100 BPM | BODY MASS INDEX: 46.32 KG/M2

## 2021-02-25 DIAGNOSIS — I10 BENIGN ESSENTIAL HYPERTENSION: ICD-10-CM

## 2021-02-25 DIAGNOSIS — J01.90 ACUTE SINUSITIS WITH SYMPTOMS > 10 DAYS: Primary | ICD-10-CM

## 2021-02-25 PROCEDURE — 99214 OFFICE O/P EST MOD 30 MIN: CPT | Performed by: PHYSICIAN ASSISTANT

## 2021-02-25 RX ORDER — DOXYCYCLINE HYCLATE 100 MG
100 TABLET ORAL 2 TIMES DAILY
Qty: 14 TABLET | Refills: 0 | Status: SHIPPED | OUTPATIENT
Start: 2021-02-25 | End: 2021-03-24

## 2021-02-25 ASSESSMENT — MIFFLIN-ST. JEOR: SCORE: 1826.88

## 2021-02-25 NOTE — PATIENT INSTRUCTIONS
Follow up with primary care in next few weeks for blood pressure recheck.    1.  Take antibiotic according to instructions, with food to prevent stomach upset. If you are prone to stomach upset with antibiotics, I recommend adding a probiotic to this regimen.  Culturelle is a trusted brand.  2.  I recommend using Mucinex to help thin mucus secretions.   3.  Take Tylenol 650mg every 4 hours by mouth for pain/fever.  Do not exceed 4000mg of acetaminophen from any source in a 24 hour period.    4.  Follow-up if you not having any improvement in your symptoms over the next 5 days.    Sinusitis  The sinuses are air-filled spaces within the bones of the face. They connect to the inside of the nose. Sinusitis is an inflammation of the tissue that lines the sinuses. Sinusitis can occur during a cold. It can also happen due to allergies to pollens and other particles in the air. Sinusitis can cause symptoms of sinus congestion and a feeling of fullness. A sinus infection causes fever, headache, and facial pain. There is often green or yellow fluid draining from the nose or into the back of the throat (post-nasal drip). You have been given antibiotics to treat this condition.  Home care    Take the full course of antibiotics as instructed. Do not stop taking them, even when you feel better.    Drink plenty of water, hot tea, and other liquids. This may help thin nasal mucus. It also may help your sinuses drain fluids.    Heat may help soothe painful areas of your face. Use a towel soaked in hot water. Or,  the shower and direct the warm spray onto your face. Using a vaporizer along with a menthol rub at night may also help soothe symptoms.     An expectorant with guaifenesin may help thin nasal mucus and help your sinuses drain fluids.    You can use an over-the-counter decongestant, unless a similar medicine was prescribed to you. Nasal sprays work the fastest. Use one that contains phenylephrine or oxymetazoline.  First blow your nose gently. Then use the spray. Do not use these medicines more often than directed on the label. If you do, your symptoms may get worse. You may also take pills that contain pseudoephedrine. Don t use products that combine multiple medicines. This is because side effects may be increased. Read labels. You can also ask the pharmacist for help. (People with high blood pressure should not use decongestants. They can raise blood pressure.)    Over-the-counter antihistamines may help if allergies contributed to your sinusitis.      Do not use nasal rinses or irrigation during an acute sinus infection, unless your healthcare provider tells you to. Rinsing may spread the infection to other areas in your sinuses.    Use acetaminophen or ibuprofen to control pain, unless another pain medicine was prescribed to you. If you have chronic liver or kidney disease or ever had a stomach ulcer, talk with your healthcare provider before using these medicines. (Aspirin should never be taken by anyone under age 18 who is ill with a fever. It may cause severe liver damage.)    Don't smoke. This can make symptoms worse.  Follow-up care  Follow up with your healthcare provider or our staff if you are better in 1 week.  When to seek medical advice  Call your healthcare provider if any of these occur:    Facial pain or headache that gets worse    Stiff neck    Unusual drowsiness or confusion    Swelling of your forehead or eyelids    Vision problems, such as blurred or double vision    Fever of 100.4 F (38 C) or higher, or as directed by your healthcare provider    Seizure    Breathing problems    Symptoms don't go away in 10 days  Prevention  Here are steps you can take to help prevent an infection:    Keep good hand washing habits.    Don t have close contact with people who have sore throats, colds, or other upper respiratory infections.    Don t smoke, and stay away from secondhand smoke.    Stay up to date with of  your vaccines.  Date Last Reviewed: 11/1/2017 2000-2017 [employer]. [address] [place] [address], [place] [address]. All rights reserved. This information is not intended as a substitute for professional medical care. Always follow your healthcare professional's instructions.

## 2021-02-25 NOTE — PROGRESS NOTES
Assessment/Plan:    Given sinus tenderness and prolonged symptoms, suspect acute bacterial sinusitis; will treat with antibiotics. Also advised Mucinex; she does not tolerate Flonase.   BP elevated today, hasn't been at goal for many recent visits. Advised f/u with PCP.      PPE worn during exam: face shield, surgical mask, & gloves.  See patient instructions below.  At the end of the encounter, I discussed results, diagnosis, medications. Discussed red flags for immediate return to clinic/ER, as well as indications for follow up if no improvement. Patient understood and agreed to plan. Patient was stable for discharge.      ICD-10-CM    1. Acute sinusitis with symptoms > 10 days  J01.90 doxycycline hyclate (VIBRA-TABS) 100 MG tablet   2. Benign essential hypertension  I10          Return in about 2 weeks (around 3/11/2021) for BP Recheck.    DEO Regalado, PA[facility]    ------------------------------------------------------------------------------------------------------------------------------------------------------------------------  HPI:  Ana Luisa Byers is a 61 year old female with history of type 2 diabetes, CKD, chronic pain, & fibromyalgia who presents for evaluation of facial pressure & nasal congestion onset 3 weeks ago. No treatments tried. Patient reports no fever/chills, myalgias, cough, sore throat, loss of sense of taste or smell, headache, chest pain, shortness of breath, abdominal pain, nausea, vomiting, diarrhea, rash, or any other symptoms. No known sick contacts/COVID exposure.     Last A1c was 9.2% on 2/17/21.  GFR was 60 on 6/18/20.  She is taking her BP medication.    Past Medical History:   Diagnosis Date     Arthritis     left shoulder and back     Chronic pain     fibromalgia      CKD (chronic kidney disease), stage III      Fibromyalgia      Gastro-oesophageal reflux disease      HTN, goal below 140/90      Hyperlipidemia LDL goal <100       "Hypothyroidism      Major depression     chronic kidney disease-stage 3     Peripheral neuropathy Feb 2015    + callus. diabetic shoes rx done     PONV (postoperative nausea and vomiting)      Rheumatoid arthritis of foot (H)      Tongue abnormality      Type 2 diabetes, HbA1C goal < 8% (H)     pre-diabetic       Vitals:    02/25/21 1115   BP: (!) 157/85   Pulse: 100   Resp: 20   Temp: 97.7  F (36.5  C)   TempSrc: Oral   SpO2: 98%   Weight: 126.1 kg (278 lb)   Height: 1.651 m (5' 5\")       Physical Exam  Vitals signs and nursing note reviewed.   HENT:      Right Ear: Tympanic membrane and external ear normal.      Left Ear: Tympanic membrane and external ear normal.      Nose:      Right Sinus: Maxillary sinus tenderness present.      Left Sinus: Maxillary sinus tenderness present.      Mouth/Throat:      Mouth: Mucous membranes are moist.      Pharynx: Oropharynx is clear.   Cardiovascular:      Rate and Rhythm: Normal rate and regular rhythm.      Heart sounds: Normal heart sounds.   Pulmonary:      Effort: Pulmonary effort is normal.      Breath sounds: Normal breath sounds.   Neurological:      Mental Status: She is alert.         Labs/Imaging:  No results found. However, due to the size of the patient record, not all encounters were searched. Please check Results Review for a complete set of results.      Patient Instructions   Follow up with primary care in next few weeks for blood pressure recheck.    1.  Take antibiotic according to instructions, with food to prevent stomach upset. If you are prone to stomach upset with antibiotics, I recommend adding a probiotic to this regimen.  Culturelle is a trusted brand.  2.  I recommend using Mucinex to help thin mucus secretions.  Adding a nasal steroid spray such as Flonase can also be helpful with clearing sinus congestion.  3.  Take Tylenol 650mg every 4 hours by mouth for pain/fever.  Do not exceed 4000mg of acetaminophen from any source in a 24 hour period.  "   4.  Follow-up if you not having any improvement in your symptoms over the next 5 days.    Sinusitis  The sinuses are air-filled spaces within the bones of the face. They connect to the inside of the nose. Sinusitis is an inflammation of the tissue that lines the sinuses. Sinusitis can occur during a cold. It can also happen due to allergies to pollens and other particles in the air. Sinusitis can cause symptoms of sinus congestion and a feeling of fullness. A sinus infection causes fever, headache, and facial pain. There is often green or yellow fluid draining from the nose or into the back of the throat (post-nasal drip). You have been given antibiotics to treat this condition.  Home care    Take the full course of antibiotics as instructed. Do not stop taking them, even when you feel better.    Drink plenty of water, hot tea, and other liquids. This may help thin nasal mucus. It also may help your sinuses drain fluids.    Heat may help soothe painful areas of your face. Use a towel soaked in hot water. Or,  the shower and direct the warm spray onto your face. Using a vaporizer along with a menthol rub at night may also help soothe symptoms.     An expectorant with guaifenesin may help thin nasal mucus and help your sinuses drain fluids.    You can use an over-the-counter decongestant, unless a similar medicine was prescribed to you. Nasal sprays work the fastest. Use one that contains phenylephrine or oxymetazoline. First blow your nose gently. Then use the spray. Do not use these medicines more often than directed on the label. If you do, your symptoms may get worse. You may also take pills that contain pseudoephedrine. Don t use products that combine multiple medicines. This is because side effects may be increased. Read labels. You can also ask the pharmacist for help. (People with high blood pressure should not use decongestants. They can raise blood pressure.)    Over-the-counter antihistamines may  help if allergies contributed to your sinusitis.      Do not use nasal rinses or irrigation during an acute sinus infection, unless your healthcare provider tells you to. Rinsing may spread the infection to other areas in your sinuses.    Use acetaminophen or ibuprofen to control pain, unless another pain medicine was prescribed to you. If you have chronic liver or kidney disease or ever had a stomach ulcer, talk with your healthcare provider before using these medicines. (Aspirin should never be taken by anyone under age 18 who is ill with a fever. It may cause severe liver damage.)    Don't smoke. This can make symptoms worse.  Follow-up care  Follow up with your healthcare provider or our staff if you are better in 1 week.  When to seek medical advice  Call your healthcare provider if any of these occur:    Facial pain or headache that gets worse    Stiff neck    Unusual drowsiness or confusion    Swelling of your forehead or eyelids    Vision problems, such as blurred or double vision    Fever of 100.4 F (38 C) or higher, or as directed by your healthcare provider    Seizure    Breathing problems    Symptoms don't go away in 10 days  Prevention  Here are steps you can take to help prevent an infection:    Keep good hand washing habits.    Don t have close contact with people who have sore throats, colds, or other upper respiratory infections.    Don t smoke, and stay away from secondhand smoke.    Stay up to date with of your vaccines.  Date Last Reviewed: 11/1/2017 2000-2017 [employer]. [address] [place] [address]. All rights reserved. This information is not intended as a substitute for professional medical care. Always follow your healthcare professional's instructions.

## 2021-02-25 NOTE — TELEPHONE ENCOUNTER
Message has been read but reply not received, has also been called x 2 and calls not returned. Will close encounter. Patient can still be scheduled for MTM if she decides to do this anytime. Patria Palumbo R.N.

## 2021-02-26 DIAGNOSIS — E11.42 TYPE 2 DIABETES MELLITUS WITH DIABETIC POLYNEUROPATHY, WITHOUT LONG-TERM CURRENT USE OF INSULIN (H): ICD-10-CM

## 2021-02-26 DIAGNOSIS — R60.0 BILATERAL LOWER EXTREMITY EDEMA: ICD-10-CM

## 2021-02-26 RX ORDER — GLIPIZIDE 10 MG/1
10 TABLET, FILM COATED, EXTENDED RELEASE ORAL 2 TIMES DAILY
Qty: 180 TABLET | Refills: 0 | Status: SHIPPED | OUTPATIENT
Start: 2021-02-26 | End: 2021-03-04 | Stop reason: ALTCHOICE

## 2021-02-26 RX ORDER — FUROSEMIDE 40 MG
40 TABLET ORAL DAILY
Qty: 90 TABLET | Refills: 1 | OUTPATIENT
Start: 2021-02-26

## 2021-02-26 NOTE — TELEPHONE ENCOUNTER
Should have refills E-Prescribing Status: Receipt confirmed by pharmacy (12/30/2020 12:16 PM CST)

## 2021-03-02 ENCOUNTER — HOSPITAL ENCOUNTER (OUTPATIENT)
Dept: NUCLEAR MEDICINE | Facility: CLINIC | Age: 61
Setting detail: NUCLEAR MEDICINE
Discharge: HOME | End: 2021-03-02
Attending: FAMILY MEDICINE | Admitting: FAMILY MEDICINE
Payer: MEDICARE

## 2021-03-02 DIAGNOSIS — R68.81 EARLY SATIETY: ICD-10-CM

## 2021-03-02 PROCEDURE — A9541 TC99M SULFUR COLLOID: HCPCS | Performed by: FAMILY MEDICINE

## 2021-03-02 PROCEDURE — 343N000001 HC RX 343: Performed by: FAMILY MEDICINE

## 2021-03-02 PROCEDURE — G1004 CDSM NDSC: HCPCS

## 2021-03-02 RX ADMIN — Medication 2 MCI.: at 08:16

## 2021-03-03 ENCOUNTER — TELEPHONE (OUTPATIENT)
Dept: FAMILY MEDICINE | Facility: CLINIC | Age: 61
End: 2021-03-03

## 2021-03-03 NOTE — PROGRESS NOTES
Next 5 appointments (look out 90 days)    Mar 04, 2021  9:50 AM  (Arrive by 9:30 AM)  Office Visit with Leonides Juárez MD  [facility] ([facility] ) [address] [facility] [address] [phone]   May 11, 2021 10:30 AM  (Arrive by 10:15 AM)  Office Visit with Leonides Juárez MD  [facility] ([facility] ) [address] [facility] [address] [phone]        Appointment Notes for this encounter:   DM. Hyperglycemia    Questionnaires Reviewed/Assigned  No additional questionnaires are needed      Patient preferred phone number: [phone]      Spoke to patient via phone. Are there any additional questions or concerns you'd like to review with your provider during your visit? No    Patient does not have additional questions or concerns.        Visit is not preventive.    Meds  Is there anything on your medication list that needs to be updated? No    Current Outpatient Medications   Medication     ARIPiprazole (ABILIFY) 10 MG tablet     aspirin 81 MG chewable tablet     atorvastatin (LIPITOR) 40 MG tablet     bacitracin-neomycin-polymyxin b-hydrocortisone 1 % external ointment     blood glucose (NO BRAND SPECIFIED) test strip     blood glucose monitoring (NO BRAND SPECIFIED) meter device kit     doxycycline hyclate (VIBRA-TABS) 100 MG tablet     DULoxetine (CYMBALTA) 60 MG capsule     EPINEPHrine (ANY BX GENERIC EQUIV) 0.3 MG/0.3ML injection 2-pack     esomeprazole (NEXIUM) 20 MG DR capsule     furosemide (LASIX) 40 MG tablet     glipiZIDE (GLUCOTROL XL) 10 MG 24 hr tablet     hydrOXYzine (ATARAX) 25 MG tablet     losartan (COZAAR) 25 MG tablet     order for DME     SUMAtriptan (IMITREX) 25 MG tablet     zolpidem (AMBIEN) 10 MG tablet     No current facility-administered medications for this visit.      Which pharmacy do you prefer to use for medications during this visit if needed? [facility]  [place] except uses [facility] for reflux medication     Do you need refills on any of your medications? No    Health Maintenance Due   Topic Date Due     ZOSTER IMMUNIZATION (1 of 2) 01/31/2010     MEDICARE ANNUAL WELLNESS VISIT  05/14/2016     MAMMO SCREENING  09/23/2019     COLORECTAL CANCER SCREENING  07/24/2020     ADVANCE CARE PLANNING  01/05/2021     Patient is due for:  mammogram and preventive care visit  Has upcoming appointment    MyChart  Patient is active on [employer].    Questionnaire Review     PVP vicky España RN  [facility] Liaison

## 2021-03-03 NOTE — TELEPHONE ENCOUNTER
Patient did not show for appointment this morning. Appointment was rescheduled, I informed her I will meet her after appointment, she reports she has no other concerns for tomorrows appointment . Patria Palumbo R.N.

## 2021-03-04 ENCOUNTER — OFFICE VISIT (OUTPATIENT)
Dept: FAMILY MEDICINE | Facility: CLINIC | Age: 61
End: 2021-03-04
Payer: MEDICARE

## 2021-03-04 VITALS
RESPIRATION RATE: 20 BRPM | OXYGEN SATURATION: 95 % | HEIGHT: 64 IN | WEIGHT: 279 LBS | BODY MASS INDEX: 47.63 KG/M2 | HEART RATE: 92 BPM | TEMPERATURE: 97.7 F | DIASTOLIC BLOOD PRESSURE: 80 MMHG | SYSTOLIC BLOOD PRESSURE: 122 MMHG

## 2021-03-04 DIAGNOSIS — Z12.11 COLON CANCER SCREENING: ICD-10-CM

## 2021-03-04 DIAGNOSIS — Z86.39 HISTORY OF INSULIN DEPENDENT DIABETES MELLITUS: Primary | ICD-10-CM

## 2021-03-04 DIAGNOSIS — N18.31 TYPE 2 DIABETES MELLITUS WITH STAGE 3A CHRONIC KIDNEY DISEASE, WITHOUT LONG-TERM CURRENT USE OF INSULIN (H): Primary | ICD-10-CM

## 2021-03-04 DIAGNOSIS — E11.22 TYPE 2 DIABETES MELLITUS WITH STAGE 3A CHRONIC KIDNEY DISEASE, WITHOUT LONG-TERM CURRENT USE OF INSULIN (H): Primary | ICD-10-CM

## 2021-03-04 PROCEDURE — 99213 OFFICE O/P EST LOW 20 MIN: CPT | Performed by: FAMILY MEDICINE

## 2021-03-04 ASSESSMENT — MIFFLIN-ST. JEOR: SCORE: 1815.54

## 2021-03-04 ASSESSMENT — ENCOUNTER SYMPTOMS
DYSURIA: 0
POLYDIPSIA: 1
POLYPHAGIA: 1

## 2021-03-04 ASSESSMENT — PATIENT HEALTH QUESTIONNAIRE - PHQ9
5. POOR APPETITE OR OVEREATING: MORE THAN HALF THE DAYS
SUM OF ALL RESPONSES TO PHQ QUESTIONS 1-9: 13

## 2021-03-04 ASSESSMENT — ANXIETY QUESTIONNAIRES
1. FEELING NERVOUS, ANXIOUS, OR ON EDGE: MORE THAN HALF THE DAYS
GAD7 TOTAL SCORE: 12
7. FEELING AFRAID AS IF SOMETHING AWFUL MIGHT HAPPEN: NOT AT ALL
6. BECOMING EASILY ANNOYED OR IRRITABLE: SEVERAL DAYS
IF YOU CHECKED OFF ANY PROBLEMS ON THIS QUESTIONNAIRE, HOW DIFFICULT HAVE THESE PROBLEMS MADE IT FOR YOU TO DO YOUR WORK, TAKE CARE OF THINGS AT HOME, OR GET ALONG WITH OTHER PEOPLE: SOMEWHAT DIFFICULT
2. NOT BEING ABLE TO STOP OR CONTROL WORRYING: MORE THAN HALF THE DAYS
3. WORRYING TOO MUCH ABOUT DIFFERENT THINGS: MORE THAN HALF THE DAYS
5. BEING SO RESTLESS THAT IT IS HARD TO SIT STILL: NEARLY EVERY DAY

## 2021-03-04 NOTE — PATIENT INSTRUCTIONS
Patient Education     Diabetes: Understanding Carbohydrates, Fats, and Protein  Food is a source of fuel and nourishment for your body. It s also a source of pleasure. Having diabetes doesn t mean you have to eat special foods or give up desserts. Instead, your dietitian can show you how to plan meals to suit your body. To start, learn how different foods affect blood sugar.  Carbohydrates  Carbohydrates (carbs) are the main source of fuel for the body. They raise blood sugar. Many people think carbohydrates are only in pasta or bread. But carbohydrates are in many kinds of foods. Carbs include:    Sugars.These are naturally in foods such as fruit, milk, honey, and molasses. Sugars can also be added to many foods. They may be added to cereals and yogurt to candy and desserts. Sugars raise blood sugar.    Starches. These are in bread, cereals, pasta, and dried beans. They re found in corn, peas, potatoes, yam, acorn squash, and butternut squash. Starches raise blood sugar.     Fiber. This is in foods such as vegetables, fruits, beans, and whole grains. Unlike other carbs, fiber isn t digested or absorbed. So it doesn t raise blood sugar. In fact, fiber can help keep blood sugar from rising too fast. It helps keep blood cholesterol at a healthy level.  Did you know?  Even though carbohydrates raise blood sugar, it s best to have some in every meal. They are an important part of a healthy diet.  Fat  Fat is an energy source that can be stored until needed. Fat does not raise blood sugar. But it can raise blood cholesterol. This increases the risk of heart disease. Fat is high in calories. Eating too many calories can cause weight gain. Not all types of fat are the same.  More healthy:    Monounsaturated fats. These are mostly found in vegetable oils, such as olive, canola, and peanut oils. They are found in avocados and some nuts. Monounsaturated fats are healthy for your heart. That s because they lower LDL  "(unhealthy) cholesterol.    Polyunsaturated fats.These are mostly found in vegetable oils, such as corn, safflower, and soybean oils. They are found in some seeds, nuts, and fish. Polyunsaturated fats lower LDL (unhealthy) cholesterol. So, choosing them instead of saturated fats is healthy for your heart. Certain unsaturated fats can help lower triglycerides.   Less healthy:    Saturated fats.These are found in animal products, such as meat, poultry, whole milk, lard, and butter. Saturated fats raise LDL cholesterol.They are not healthy for your heart.    Hydrogenated oilsand trans fats. These are formed when vegetable oils are processed into solid fats. They are found in many processed foods. Hydrogenated oils and trans fats raise LDL (\"bad\") cholesterol and lower HDL (\"good\") cholesterol. They are not healthy for your heart.  Protein  Protein helps the body build and repair muscle and other tissue. Protein has little or no effect on blood sugar. But many foods that have protein also have saturated fat. By choosing low-fat protein sources, you can get the benefits of protein without the extra fat:    Plant protein. This is found in dry beans and peas, nuts, and soy products, such as tofu and soymilk. These foods tend to have no cholesterol.Most are low in saturated fat.    Animal protein. This is found in fish, poultry, meat, cheese, milk, and eggs. These foods have cholesterol.They can be high in saturated fat. Aim for lean, lower-fat choices. Don't eat fried foods.  [employer] last reviewed this educational content on 4/1/2019 2000-2020 [employer]. All rights reserved. This information is not intended as a substitute for professional medical care. Always follow your healthcare professional's instructions.           "

## 2021-03-04 NOTE — PROGRESS NOTES
I met face to face with Ana Luisa and introduced PAL role and contact information given. We also discussed her starting of insulin. Her  had taken insulin so she reports to me she very familiar with it. I gave her some [employer] demand hand outs and briefly reviewed them including; Long term complications of diabetes, exercise to manage diabetes, taking medications for diabetes, and Insulin where to inject. We also discussed that pharmacy staff should be able to resourceful,  and [facility] pharmacy staff has made some educational videos for patients which should be available on the website. FIT test obtained from [facility].     Patria Palumbo R.N.

## 2021-03-04 NOTE — TELEPHONE ENCOUNTER
Patient called stating that she was seen by Dr. Juárez today for a diabetes appointment. She received refills of her insulin today but did not receive refills of her insulin needles. Patient does not have any pen needles. Patient is currently at [facility] and would like this ordered ASAP. Informed patient that she may need to wait until tomorrow for this to be filled.  Eula River RN on 3/4/2021 at 4:26 PM

## 2021-03-04 NOTE — PROGRESS NOTES
Assessment & Plan     Type 2 diabetes mellitus with stage 3a chronic kidney disease, without long-term current use of insulin (H)  Uncontrolled.  Having symptomatic hypoglycemia.  Not tolerate Glucophage.  Discontinue glipizide, start slowing therapy with Lantus 10 units nightly.  Insulin protocol given to patient to increase by 2 units every other night if fasting morning sugars above 125.  Do not give more than 20 units nightly Lantus until rechecking with physician.  Continue fasting morning glucose checks daily.  MTM referral in place.  Introduced patient to YEISON Esp[place].  No microalbuminuria.  Is on statin therapy for ASCVD 12%.  Interval follow-up in 3 months for diabetes.  - insulin glargine (LANTUS PEN) 100 UNIT/ML pen  Dispense: 9 mL; Refill: 3  - AMBULATORY ADULT DIABETES EDUCATOR REFERRAL    Colon cancer screening  - Fecal colorectal cancer screen (FIT)        Return for Diabetes.    Leonides Juárez MD  [facility] GISSELLE Orosco is a 61 year old who presents for the following health issues     HPI       Diabetes Follow-up    How often are you checking your blood sugar? Four or more times daily  Blood sugar testing frequency justification:  Frequent Hyperglycemia  What time of day are you checking your blood sugars (select all that apply)?  Before and after meals, At bedtime and Morning  Have you had any blood sugars above 200?  Yes  Have you had any blood sugars below 70?  No    What symptoms do you notice when your blood sugar is low?  Shaky, Dizzy, Weak and difficulty talking (blood sugar of 92)    What concerns do you have today about your diabetes? Blood sugar is often over 200     Do you have any of these symptoms? (Select all that apply)  Numbness in feet, Burning in feet, Excessive thirst, Blurry vision and Weight gain      BP Readings from Last 2 Encounters:   03/04/21 122/80   02/25/21 (!) 157/85     Hemoglobin A1C (%)   Date Value   02/17/2021 9.2 (H)   11/25/2020 7.8  "(H)     LDL Cholesterol Calculated (mg/dL)   Date Value   11/25/2020 229 (H)   07/22/2019 101 (H)           How many servings of fruits and vegetables do you eat daily?  2-3    On average, how many sweetened beverages do you drink each day (Examples: soda, juice, sweet tea, etc.  Do NOT count diet or artificially sweetened beverages)?   0    How many days per week do you exercise enough to make your heart beat faster? 3 or less    How many minutes a day do you exercise enough to make your heart beat faster? 10 - 19  How many days per week do you miss taking your medication? 1    What makes it hard for you to take your medications?  remembering to take        Review of Systems   Endocrine: Positive for polydipsia, polyphagia and polyuria.   Genitourinary: Negative for dysuria.            Objective    /80 (BP Location: Right arm, Patient Position: Sitting, Cuff Size: Adult Large)   Pulse 92   Temp 97.7  F (36.5  C) (Oral)   Resp 20   Ht 1.626 m (5' 4\")   Wt 126.6 kg (279 lb)   LMP 02/13/2009   SpO2 95%   BMI 47.89 kg/m    Body mass index is 47.89 kg/m .  Physical Exam  Vitals signs reviewed.   Skin:     Comments: No skin breakdown at base of feet.    Neurological:      Mental Status: She is alert.           Hemoglobin A1C   Date Value Ref Range Status   02/17/2021 9.2 (H) 0 - 5.6 % Final     Comment:     Normal <5.7% Prediabetes 5.7-6.4%  Diabetes 6.5% or higher - adopted from ADA   consensus guidelines.  Reviewed: OK with previous       Last Comprehensive Metabolic Panel:  Sodium   Date Value Ref Range Status   06/18/2020 136 133 - 144 mmol/L Final     Potassium   Date Value Ref Range Status   06/18/2020 3.5 3.4 - 5.3 mmol/L Final     Chloride   Date Value Ref Range Status   06/18/2020 100 94 - 109 mmol/L Final     Carbon Dioxide   Date Value Ref Range Status   06/18/2020 32 20 - 32 mmol/L Final     Anion Gap   Date Value Ref Range Status   06/18/2020 4 3 - 14 mmol/L Final     Glucose   Date Value Ref " Range Status   06/18/2020 192 (H) 70 - 99 mg/dL Final     Urea Nitrogen   Date Value Ref Range Status   06/18/2020 10 7 - 30 mg/dL Final     Creatinine   Date Value Ref Range Status   06/18/2020 1.01 0.52 - 1.04 mg/dL Final     GFR Estimate   Date Value Ref Range Status   06/18/2020 60 (L) >60 mL/min/[1.73_m2] Final     Comment:     Non  GFR Calc  Starting 12/18/2018, serum creatinine based estimated GFR (eGFR) will be   calculated using the Chronic Kidney Disease Epidemiology Collaboration   (CKD-EPI) equation.       Calcium   Date Value Ref Range Status   06/18/2020 9.2 8.5 - 10.1 mg/dL Final     Lab Results   Component Value Date    MICROL 72 06/18/2020     The 10-year ASCVD risk score (Homa WILKINS Jr., et al., 2013) is: 12.8%    Values used to calculate the score:      Age: 61 years      Sex: Female      Is Non- : No      Diabetic: Yes      Tobacco smoker: No      Systolic Blood Pressure: 122 mmHg      Is BP treated: Yes      HDL Cholesterol: 45 mg/dL      Total Cholesterol: 304 mg/dL

## 2021-03-05 ASSESSMENT — ANXIETY QUESTIONNAIRES: GAD7 TOTAL SCORE: 12

## 2021-03-05 NOTE — TELEPHONE ENCOUNTER
Called and checked with patient to confirm that she received her insulin needles. Patient states that she has already gotten them filled.     Eula River RN on 3/5/2021 at 10:34 AM

## 2021-03-08 ENCOUNTER — MYC MEDICAL ADVICE (OUTPATIENT)
Dept: FAMILY MEDICINE | Facility: CLINIC | Age: 61
End: 2021-03-08

## 2021-03-08 DIAGNOSIS — Z12.11 COLON CANCER SCREENING: ICD-10-CM

## 2021-03-08 PROCEDURE — 82274 ASSAY TEST FOR BLOOD FECAL: CPT | Performed by: FAMILY MEDICINE

## 2021-03-08 NOTE — TELEPHONE ENCOUNTER
Neil message sent to get more information on if Ana Luisa is following insulin instructions because if following as directed she is still able to continue to increase her Lantus. Patria Palumbo R.N.

## 2021-03-08 NOTE — TELEPHONE ENCOUNTER
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 See wooju messages for details. She will continue to increase as directed and will start 14 unit(s) today of Lantus.     Please advise if there are any further instructions.     Thank you,  Patria Palumbo R.N.

## 2021-03-09 LAB — HEMOCCULT STL QL IA: NEGATIVE

## 2021-03-09 NOTE — TELEPHONE ENCOUNTER
Reviewed.  What is her fasting morning glucose at now?  We will stick with original plan.    Best regards,     Leonides Juárez MD

## 2021-03-10 NOTE — TELEPHONE ENCOUNTER
Based on these readings, just have her start TOMORROW at 20 units Lantus nightly.    Thank you,    Leonides Juárez MD

## 2021-03-10 NOTE — TELEPHONE ENCOUNTER
Dr. Juárez please clarify.     Ana Luisa is going up every other day on insulin, as she was instructed to increase insulin by 2 units every other day. She said was going to start 14 units 2 days ago (3/8/21), and should start 16 units today.     Do you want her to continue to increase every other day as previously noted, or go right to 20 units?     Thank you,  Patria Palumbo R.N.

## 2021-03-10 NOTE — TELEPHONE ENCOUNTER
Quality 130: Documentation Of Current Medications In The Medical Record: Current Medications Documented Reviewed. She will likely need to be on 20 units at bedtime and then will follow up her at her appointment coming up in a couple weeks.    Best regards,     Leonides Juárez MD     Quality 131: Pain Assessment And Follow-Up: Pain assessment using a standardized tool is documented as negative, no follow-up plan required Quality 47: Advance Care Plan: Advance Care Planning discussed and documented in the medical record; patient did not wish or was not able to name a surrogate decision maker or provide an advance care plan. Quality 110: Preventive Care And Screening: Influenza Immunization: Influenza Immunization previously received during influenza season Quality 111:Pneumonia Vaccination Status For Older Adults: Pneumococcal Vaccination not Administered or Previously Received, Reason not Otherwise Specified Detail Level: Detailed Quality 226: Preventive Care And Screening: Tobacco Use: Screening And Cessation Intervention: Patient screened for tobacco and never smoked

## 2021-03-10 NOTE — TELEPHONE ENCOUNTER
[employer] message sent to Ana Luisa, requested recent blood sugar readings. Patria Palumbo R.N.

## 2021-03-11 ENCOUNTER — MYC MEDICAL ADVICE (OUTPATIENT)
Dept: OTHER | Age: 61
End: 2021-03-11

## 2021-03-11 ENCOUNTER — MYC MEDICAL ADVICE (OUTPATIENT)
Dept: FAMILY MEDICINE | Facility: CLINIC | Age: 61
End: 2021-03-11

## 2021-03-11 NOTE — TELEPHONE ENCOUNTER
Please inform patient to only check fasting morning glucose until we have her next visit.    Thanks    Leonides Juárez MD

## 2021-03-11 NOTE — TELEPHONE ENCOUNTER
Also--please note below message. If Hattie ordered please sent to [facility] --not Andre Palumbo R.N.

## 2021-03-11 NOTE — TELEPHONE ENCOUNTER
Ana Luisa gave me a call and I gave her scheduling number for diabetic educator and she is going to schedule.     I changed her follow up appointment date as she had a conflict and made face to face per Ana Luisa's request. Change is only 2 days difference.     We also discussed as per [employer] message just sent, she would like a Hattie continuous blood glucose monitor. Will route to Dr. Juárez to see if that is something he would want to order. Ana Luisa says her fingers are getting sore from lancing despite alternating fingers. Ana Luisa reports she is testing 3-5 times a day. I informed her she may be able to test less. Dr. Juárez please verify how often you would like her to test as I don't see in Epic.     Thank you,  Patria Palumbo R.N.

## 2021-03-11 NOTE — TELEPHONE ENCOUNTER
Call placed to Ana Luisa. No answer, message not left. [employer] message sent. Patria Palumbo R.N.

## 2021-03-11 NOTE — TELEPHONE ENCOUNTER
Fax received from [employer] needing Rx signature for diabetic supplies, specifically test strips and lancets. Form filled out and faxed to [employer] at [phone].    Advised testing has changed to every day, and will be re-evaluated at next appointment. Patria Palumbo R.N.

## 2021-03-17 ENCOUNTER — TELEPHONE (OUTPATIENT)
Dept: FAMILY MEDICINE | Facility: CLINIC | Age: 61
End: 2021-03-17

## 2021-03-17 NOTE — TELEPHONE ENCOUNTER
Call placed to Ana Luisa to check where blood sugar readings have been running, if she has any questions or concerns, and to offer scheduling for COVID vaccine.     Ana Luisa says her fasting blood sugars are running 151-191 on 20 unit(s) lantus at bedtime. She has been on this dose since 3/11/21.     She is leaving tomorrow for [place]. Is not interested in COVID vaccine at this time. I told her to think about it and she can let me know when she returns.     Advised Ana Luisa I will let  know where her sugars are running and will contact her if he would like to make any changes. She said MyChart or phone call is okay to communicate.     Routing to Dr. Juárez to advise of fasting blood sugars. Ana Luisa said if dosage is increased she will need Rx s sent as use will be more than prescribed currently and would then run out sooner.     Patria Palumbo R.N.

## 2021-03-22 ENCOUNTER — OFFICE VISIT (OUTPATIENT)
Dept: URGENT CARE | Facility: URGENT CARE | Age: 61
End: 2021-03-22
Payer: MEDICARE

## 2021-03-22 ENCOUNTER — MYC MEDICAL ADVICE (OUTPATIENT)
Dept: FAMILY MEDICINE | Facility: CLINIC | Age: 61
End: 2021-03-22

## 2021-03-22 VITALS
HEART RATE: 91 BPM | RESPIRATION RATE: 24 BRPM | DIASTOLIC BLOOD PRESSURE: 88 MMHG | SYSTOLIC BLOOD PRESSURE: 141 MMHG | TEMPERATURE: 97.8 F | OXYGEN SATURATION: 95 %

## 2021-03-22 DIAGNOSIS — S90.821A BLISTER OF RIGHT FOOT, INITIAL ENCOUNTER: Primary | ICD-10-CM

## 2021-03-22 PROCEDURE — 99213 OFFICE O/P EST LOW 20 MIN: CPT | Performed by: PHYSICIAN ASSISTANT

## 2021-03-22 RX ORDER — HYDROCODONE BITARTRATE AND ACETAMINOPHEN 5; 325 MG/1; MG/1
1 TABLET ORAL EVERY 6 HOURS PRN
Qty: 6 TABLET | Refills: 0 | Status: SHIPPED | OUTPATIENT
Start: 2021-03-22 | End: 2021-03-25

## 2021-03-22 NOTE — PROGRESS NOTES
URGENT CARE VISIT:    SUBJECTIVE:   Chief Complaint   Patient presents with     Urgent Care     WOUND CARE     Blisters on right foot inbetween toes-Noticed last Wednesday-no drainage      Ana Luisa Byers is a 61 year old female who presents with a chief complaint of right foot painful blisters. Denies drainage or swelling..  Symptoms began 5 day(s) ago, are moderate and sudden onset  No known injury. Wears diabetic shoes. Recently got new ones but has not worn them.  Pain exacerbated by weight-bearing Relieved by rest.  She treated it initially with ice, Tylenol and Ibuprofen. This is the first time this type of injury has occurred to this patient.     PMH:   Past Medical History:   Diagnosis Date     Arthritis     left shoulder and back     Chronic pain     fibromalgia      CKD (chronic kidney disease), stage III      Fibromyalgia      Gastro-oesophageal reflux disease      HTN, goal below 140/90      Hyperlipidemia LDL goal <100      Hypothyroidism      Major depression     chronic kidney disease-stage 3     Peripheral neuropathy Feb 2015    + callus. diabetic shoes rx done     PONV (postoperative nausea and vomiting)      Rheumatoid arthritis of foot (H)      Tongue abnormality      Type 2 diabetes, HbA1C goal < 8% (H)     pre-diabetic     Allergies: Penicillins, Augmentin [amoxicillin-pot clavulanate], Bee, Gabapentin, Lisinopril, Metformin, and Topiramate   Medications:   Current Outpatient Medications   Medication Sig Dispense Refill     ARIPiprazole (ABILIFY) 10 MG tablet Take 1 tablet (10 mg) by mouth daily 30 tablet 2     aspirin 81 MG chewable tablet Take 1 tablet (81 mg) by mouth daily 108 tablet 3     blood glucose (NO BRAND SPECIFIED) test strip Use to test blood sugar as directed. 300 strip 3     blood glucose monitoring (NO BRAND SPECIFIED) meter device kit Use to test blood sugar as directed. 1 kit 0     DULoxetine (CYMBALTA) 60 MG capsule Take 1 capsule (60 mg) by mouth daily 90 capsule 3      EPINEPHrine (ANY BX GENERIC EQUIV) 0.3 MG/0.3ML injection 2-pack Inject 0.3 mLs (0.3 mg) into the muscle once as needed for anaphylaxis 1 mL 1     esomeprazole (NEXIUM) 20 MG DR capsule Take 2 capsules (40 mg) by mouth every morning (before breakfast) Take 30-60 minutes before eating. 60 capsule 11     furosemide (LASIX) 40 MG tablet Take 1 tablet (40 mg) by mouth daily 90 tablet 1     HYDROcodone-acetaminophen (NORCO) 5-325 MG tablet Take 1 tablet by mouth every 6 hours as needed for severe pain 6 tablet 0     hydrOXYzine (ATARAX) 25 MG tablet TAKE 1 TO 2 TABLETS(25 TO 50 MG) BY MOUTH TWICE DAILY AS NEEDED FOR ITCHING 120 tablet 3     insulin glargine (LANTUS PEN) 100 UNIT/ML pen Start 10U lantus at bedtime. Increase by 2U every other day if fasted morning gluocse >125. Do NOT exceed 20U until speaking to the doctor. 9 mL 3     insulin pen needle (32G X 4 MM) 32G X 4 MM miscellaneous Use 1 pen needles daily or as directed. 100 each 2     losartan (COZAAR) 25 MG tablet Take 1 tablet (25 mg) by mouth daily 90 tablet 3     order for DME Equipment being ordered: Diabetic Shoes 1 Units 0     zolpidem (AMBIEN) 10 MG tablet TAKE 1 TABLET(10 MG) BY MOUTH EVERY NIGHT AS NEEDED FOR SLEEP 30 tablet 2     atorvastatin (LIPITOR) 40 MG tablet Take 1 tablet (40 mg) by mouth daily (Patient not taking: Reported on 3/22/2021) 90 tablet 3     bacitracin-neomycin-polymyxin b-hydrocortisone 1 % external ointment Apply topically 2 times daily as needed (Patient not taking: Reported on 3/22/2021) 30 g 1     doxycycline hyclate (VIBRA-TABS) 100 MG tablet Take 1 tablet (100 mg) by mouth 2 times daily (Patient not taking: Reported on 3/22/2021) 14 tablet 0     SUMAtriptan (IMITREX) 25 MG tablet Take 1 tablet (25 mg) by mouth at onset of headache for migraine May repeat in 2 hours. Max 8 tablets/24 hours. (Patient not taking: Reported on 3/22/2021) 15 tablet 3     Social History:   Social History     Tobacco Use     Smoking status: Never  Smoker     Smokeless tobacco: Never Used   Substance Use Topics     Alcohol use: No       ROS:  Review of systems negative except as stated above.    OBJECTIVE:  BP (!) 141/88   Pulse 91   Temp 97.8  F (36.6  C) (Tympanic)   Resp 24   LMP 02/13/2009   SpO2 95%   Breastfeeding No   GENERAL APPEARANCE: healthy, alert and no distress  MUSCULOSKELETAL: exam of right foot reveals marked ttp over medial plantar aspect. FROM.   EXTREMITIES: peripheral pulses normal  SKIN: a few scattered papules with black center over medial plantar aspect of right foot and in interdigital 1st and 2nd toe space. No surrounding erythema.   NEURO: sensation intact    ASSESSMENT:    ICD-10-CM    1. Blister of right foot, initial encounter  S90.821A HYDROcodone-acetaminophen (NORCO) 5-325 MG tablet       PLAN:  Patient Instructions   DDx includes pressure blisters or plantar warts. Conservative measures discussed including rest, ice, and over-the-counter analgesics (Tylenol or Ibuprofen) as needed. Take medication as prescribed for short term use. Wear new diabetic shoes. See podiatry or dermatology if symptoms do not improve in 7 days. Seek emergency care if you develop severe pain/swelling, inability to move extremity, skin paleness, or weakness.     Patient verbalized understanding and is agreeable to plan. The patient was discharged ambulatory and in stable condition.    Bri Holcomb PA-C on 3/22/2021 at 11:03 AM

## 2021-03-22 NOTE — PROGRESS NOTES
Pre-Visit Planning   Next 5 appointments (look out 90 days)    Mar 26, 2021  7:00 AM  (Arrive by 6:50 AM)  Office Visit with Leonides Juárez MD  [facility] ([facility] ) [address] [address]  [phone]   Mar 30, 2021  9:00 AM  Telephone Visit with [facility] DI[facility]ETIC ED [facility]OURCE  [facility] [place] ([facility] ) [address] [place] [address]  [phone]   May 11, 2021 10:30 AM  (Arrive by 10:15 AM)  Office Visit with Leonides Juárez MD  [facility] ([facility] ) [address] [address]  [phone]        Appointment Notes for this encounter:   i have blisters under my toes and the ball of my right foot and they are very pain full    Questionnaires Reviewed/AssignedNo additional questionnaires are needed  Patient preferred phone number: [phone]    [email or web address] message sent to Ana Luisa for assist with completion of pre-visit planning.   YEISON España  [facility]--Patient Advocate Liaison

## 2021-03-22 NOTE — PATIENT INSTRUCTIONS
DDx includes pressure blisters or plantar warts. Conservative measures discussed including rest, ice, and over-the-counter analgesics (Tylenol or Ibuprofen) as needed. Take medication as prescribed for short term use. Wear new diabetic shoes. See podiatry or dermatology if symptoms do not improve in 7 days. Seek emergency care if you develop severe pain/swelling, inability to move extremity, skin paleness, or weakness.

## 2021-03-24 ENCOUNTER — OFFICE VISIT (OUTPATIENT)
Dept: FAMILY MEDICINE | Facility: CLINIC | Age: 61
End: 2021-03-24
Payer: MEDICARE

## 2021-03-24 VITALS
RESPIRATION RATE: 16 BRPM | BODY MASS INDEX: 47.63 KG/M2 | HEART RATE: 74 BPM | WEIGHT: 279 LBS | HEIGHT: 64 IN | TEMPERATURE: 98.3 F | SYSTOLIC BLOOD PRESSURE: 128 MMHG | OXYGEN SATURATION: 96 % | DIASTOLIC BLOOD PRESSURE: 86 MMHG

## 2021-03-24 DIAGNOSIS — B02.9 HERPES ZOSTER WITHOUT COMPLICATION: Primary | ICD-10-CM

## 2021-03-24 DIAGNOSIS — E11.22 TYPE 2 DIABETES MELLITUS WITH STAGE 3A CHRONIC KIDNEY DISEASE, WITHOUT LONG-TERM CURRENT USE OF INSULIN (H): ICD-10-CM

## 2021-03-24 DIAGNOSIS — N18.31 TYPE 2 DIABETES MELLITUS WITH STAGE 3A CHRONIC KIDNEY DISEASE, WITHOUT LONG-TERM CURRENT USE OF INSULIN (H): ICD-10-CM

## 2021-03-24 PROCEDURE — 99213 OFFICE O/P EST LOW 20 MIN: CPT | Performed by: FAMILY MEDICINE

## 2021-03-24 RX ORDER — ATORVASTATIN CALCIUM 40 MG/1
TABLET, FILM COATED ORAL
COMMUNITY
Start: 2020-11-25 | End: 2024-01-14

## 2021-03-24 RX ORDER — ACYCLOVIR 800 MG/1
800 TABLET ORAL
Qty: 35 TABLET | Refills: 0 | Status: SHIPPED | OUTPATIENT
Start: 2021-03-24 | End: 2022-02-14

## 2021-03-24 RX ORDER — DOXYCYCLINE 100 MG/1
100 CAPSULE ORAL 2 TIMES DAILY
Qty: 14 CAPSULE | Refills: 0 | Status: SHIPPED | OUTPATIENT
Start: 2021-03-24 | End: 2021-03-31

## 2021-03-24 RX ORDER — HYDROCODONE BITARTRATE AND ACETAMINOPHEN 5; 325 MG/1; MG/1
1 TABLET ORAL EVERY 6 HOURS PRN
Qty: 10 TABLET | Refills: 0 | Status: SHIPPED | OUTPATIENT
Start: 2021-03-24 | End: 2021-03-27

## 2021-03-24 ASSESSMENT — MIFFLIN-ST. JEOR: SCORE: 1815.54

## 2021-03-24 NOTE — PATIENT INSTRUCTIONS
Patient Education     Shingles  Shingles is a viral infection caused by the same virus that causes chicken pox. Anyone who has had chicken pox may get shingles later in life. The virus stays in the body, but remains asleep (dormant). Shingles often occurs in older persons or persons with lowered immunity. But it can affect anyone at any age.  Shingles starts as a tingling patch of skin on one side of the body. Small, painful blisters may then appear. The rash rarely spreads to other parts of the body.  Exposure to shingles can't cause shingles. However, it can cause chicken pox in anyone who has not had chicken pox or has not been vaccinated. The contagious period ends when all blisters have crusted over, generally 1 to 2 weeks after the illness starts.  After the blisters heal, the affected skin may be sensitive or painful for weeks or months, gradually resolving over time. But, sometimes this can last longer and be permanent (called postherpetic neuralgia.)  Shingles vaccines are available. Vaccination can help prevent shingles or make it less painful. It is generally recommended for adults older than 50, even if you've had singles in the past. Talk with your healthcare provider about when to get vaccinated and which vaccine is best for you.  Home care    Medicines may be prescribed to help relieve pain. Take these medicines as directed. Ask your healthcare provider or pharmacist before using over-the-counter medicines for helping treat pain and itching.    In certain cases, antiviral medicines may be prescribed to reduce pain, shorten the illness, and prevent neuralgia. Take these medicines as directed.    Compresses made from a solution of cool water mixed with cornstarch or baking soda may help relieve pain and itching.     Gently wash skin daily with soap and water to help prevent infection. Be certain to rinse off all of the soap, which can be irritating.    Trim fingernails and try not to scratch. Scratching  the sores may leave scars.    Stay home from work or school until all blisters have formed a crust and you are no longer contagious.  Follow-up care  Follow up with your healthcare provider, or as directed.  When to seek medical advice    Fever of 100.4 F (38 C) or higher, or as directed by your healthcare provider    Affected skin is on the face or neck and any of the following occur:  ? Headache  ? Eye pain  ? Changes in vision  ? Sores near the eye  ? Weakness of facial muscles    Blisters occurring on new areas of the body    Pain, redness, or swelling of a joint    Signs of skin infection: colored drainage from the sores, warmth, increasing redness, fever, or increasing pain  [employer] last reviewed this educational content on 4/1/2018 2000-2020 [employer]. All rights reserved. This information is not intended as a substitute for professional medical care. Always follow your healthcare professional's instructions.

## 2021-03-24 NOTE — PROGRESS NOTES
Assessment & Plan     Herpes zoster without complication  New problem.  She took 2-week of symptoms however is having new breakouts on her feet.  Recently, reports has been more swollen.  We discussed starting acyclovir and patient is agreeable, no dose change with CKD 3.  Additionally recommend she starts taking doxycycline she has high risk for potential cellulitis given her uncontrolled diabetes.  Cannot tolerate Neurontin, limited prescription of Norco given for severe pain.  Is to report back in a couple days how she is improving, consider dermatology or podiatry referral in future.  - acyclovir (ZOVIRAX) 800 MG tablet  Dispense: 35 tablet; Refill: 0  - doxycycline hyclate (VIBRAMYCIN) 100 MG capsule  Dispense: 14 capsule; Refill: 0  - HYDROcodone-acetaminophen (NORCO) 5-325 MG tablet  Dispense: 10 tablet; Refill: 0    Type 2 diabetes mellitus with stage 3a chronic kidney disease, without long-term current use of insulin (H)  Improving, symptoms have resolved some.  Currently on Lantus 20 units nightly, advised to increase Lantus to 22 units nightly starting tonight.  Insulin protocol given to patient to increase by 2 units every other night if fasting morning sugars above 125.  Do not give more than 32 units nightly Lantus until rechecking with physician.        Return in about 3 days (around 3/27/2021) for If symptoms do not improve or gets worse..    Leonides Juárez MD  [facility] ManorDAREK Orosco is a 61 year old who presents for the following health issues     HPI     Diabetes Follow-up    How often are you checking your blood sugar? One time daily  What time of day are you checking your blood sugars (select all that apply)?  Before meals  Have you had any blood sugars above 200?  Yes   Have you had any blood sugars below 70?  No    What symptoms do you notice when your blood sugar is low?  None    What concerns do you have today about your diabetes? Blood sugar is often  "over 200     Do you have any of these symptoms? (Select all that apply)  Numbness in feet, Burning in feet and Redness, sores, or blisters on feet    2 weeks of a blisters and pain on her right foot, has been getting worse.  Feels a severe burning pain throughout her entire foot, very painful to touch.  No fevers.        BP Readings from Last 2 Encounters:   03/24/21 128/86   03/22/21 (!) 141/88     Hemoglobin A1C (%)   Date Value   02/17/2021 9.2 (H)   11/25/2020 7.8 (H)     LDL Cholesterol Calculated (mg/dL)   Date Value   11/25/2020 229 (H)   07/22/2019 101 (H)                 How many servings of fruits and vegetables do you eat daily?  0-1    On average, how many sweetened beverages do you drink each day (Examples: soda, juice, sweet tea, etc.  Do NOT count diet or artificially sweetened beverages)?   0    How many days per week do you exercise enough to make your heart beat faster? 3 or less    How many minutes a day do you exercise enough to make your heart beat faster? 9 or less    How many days per week do you miss taking your medication? 0        Review of Systems         Objective    /86 (Cuff Size: Adult Large)   Pulse 74   Temp 98.3  F (36.8  C) (Oral)   Resp 16   Ht 1.626 m (5' 4\")   Wt 126.6 kg (279 lb)   LMP 02/13/2009   SpO2 96%   BMI 47.89 kg/m    Body mass index is 47.89 kg/m .  Physical Exam  Skin:     Comments: A cluster of vesicles under her right great toe                      Last Comprehensive Metabolic Panel:  Sodium   Date Value Ref Range Status   06/18/2020 136 133 - 144 mmol/L Final     Potassium   Date Value Ref Range Status   06/18/2020 3.5 3.4 - 5.3 mmol/L Final     Chloride   Date Value Ref Range Status   06/18/2020 100 94 - 109 mmol/L Final     Carbon Dioxide   Date Value Ref Range Status   06/18/2020 32 20 - 32 mmol/L Final     Anion Gap   Date Value Ref Range Status   06/18/2020 4 3 - 14 mmol/L Final     Glucose   Date Value Ref Range Status   06/18/2020 192 (H) 70 - " 99 mg/dL Final     Urea Nitrogen   Date Value Ref Range Status   06/18/2020 10 7 - 30 mg/dL Final     Creatinine   Date Value Ref Range Status   06/18/2020 1.01 0.52 - 1.04 mg/dL Final     GFR Estimate   Date Value Ref Range Status   06/18/2020 60 (L) >60 mL/min/[1.73_m2] Final     Comment:     Non  GFR Calc  Starting 12/18/2018, serum creatinine based estimated GFR (eGFR) will be   calculated using the Chronic Kidney Disease Epidemiology Collaboration   (CKD-EPI) equation.       Calcium   Date Value Ref Range Status   06/18/2020 9.2 8.5 - 10.1 mg/dL Final     Bilirubin Total   Date Value Ref Range Status   07/05/2016 0.4 0.2 - 1.3 mg/dL Final     Alkaline Phosphatase   Date Value Ref Range Status   07/05/2016 100 40 - 150 U/L Final     ALT   Date Value Ref Range Status   07/05/2016 37 0 - 50 U/L Final     AST   Date Value Ref Range Status   07/05/2016 25 0 - 45 U/L Final

## 2021-03-25 ENCOUNTER — MYC MEDICAL ADVICE (OUTPATIENT)
Dept: FAMILY MEDICINE | Facility: CLINIC | Age: 61
End: 2021-03-25

## 2021-03-25 NOTE — TELEPHONE ENCOUNTER
I met with Ana Luisa during appointment yesterday 3/24/21. Appointment scheduled for tomorrow not needed per Dr. Juárez, canceled.     Ana Luisa is going to contact RN tomorrow with how things are going with her Blood sugars (Lantus increased from 20 units to 22 units nightly) also has further titration instructions pasted below; and to advise on how doing with recent diagnosis of shingles. I did advise her I will not be in the office but my colleagues will be able to assist her.     Patria Palumbo R.N.        Type 2 diabetes mellitus with stage 3a chronic kidney disease, without long-term current use of insulin (H)  Improving, symptoms have resolved some.  Currently on Lantus 20 units nightly, advised to increase Lantus to 22 units nightly starting tonight.  Insulin protocol given to patient to increase by 2 units every other night if fasting morning sugars above 125.  Do not give more than 32 units nightly Lantus until rechecking with physician.

## 2021-03-25 NOTE — TELEPHONE ENCOUNTER
Continue current medical management, recommend she sends another [employer] message tomorrow to let us know how things are doing and another one early next week.  Is the pain medication helping?  Otherwise for the other medications, it will take time for the medication to work.    Thank you,    Leonides Juárez MD

## 2021-03-26 NOTE — TELEPHONE ENCOUNTER
Patient states the pain management is not working.     See mychart for details.     Mraisabel Rodrigues RN Flex

## 2021-03-26 NOTE — TELEPHONE ENCOUNTER
Have patient double up on her Norco dose 5 mg to 10 mg every 6 hours as needed for pain.    Thank you,    Leonides Juárez MD

## 2021-03-30 ENCOUNTER — PATIENT OUTREACH (OUTPATIENT)
Dept: EDUCATION SERVICES | Facility: CLINIC | Age: 61
End: 2021-03-30

## 2021-03-30 NOTE — PROGRESS NOTES
Unable to complete visit today. Reached patient at our call time. She asked for a call back in 10 minutes. Called back 10 minutes later and got voicemail. Left message with scheduling number to call back. Called back a 3rd time and got vm again.   Will send to scheduling to try and reschedule her.     Blanca Burnett RDN, [facility], [facility]

## 2021-03-31 ENCOUNTER — MYC MEDICAL ADVICE (OUTPATIENT)
Dept: OTHER | Age: 61
End: 2021-03-31

## 2021-03-31 DIAGNOSIS — M79.671 RIGHT FOOT PAIN: ICD-10-CM

## 2021-03-31 RX ORDER — HYDROCODONE BITARTRATE AND ACETAMINOPHEN 10; 325 MG/1; MG/1
1 TABLET ORAL EVERY 4 HOURS PRN
Qty: 18 TABLET | Refills: 0 | Status: SHIPPED | OUTPATIENT
Start: 2021-03-31 | End: 2021-04-09

## 2021-03-31 RX ORDER — CAPSAICIN 0.025 %
1 CREAM (GRAM) TOPICAL 3 TIMES DAILY PRN
Qty: 60 G | Refills: 0 | Status: SHIPPED | OUTPATIENT
Start: 2021-03-31 | End: 2023-01-31

## 2021-03-31 NOTE — TELEPHONE ENCOUNTER
They are getting better, I saw the pictures.    Continue current pain management, does she need a refill?  Otherwise we can also try capsaicin, again I wish we could use Neurontin however she is allergic to it.    EMILIA

## 2021-03-31 NOTE — TELEPHONE ENCOUNTER
Prescription sent for capsaicin, he would like me to send the prescription electronically for the Norco. I handed over to the nurse.    EMILIA

## 2021-03-31 NOTE — TELEPHONE ENCOUNTER
[employer] message sent to Ana Luisa, and reply received.   Will route to Dr. Juárez for review and any further management advice.     Patria Palumbo R.N.

## 2021-04-04 ENCOUNTER — MYC MEDICAL ADVICE (OUTPATIENT)
Dept: OTHER | Age: 61
End: 2021-04-04

## 2021-04-05 ENCOUNTER — MYC MEDICAL ADVICE (OUTPATIENT)
Dept: OTHER | Age: 61
End: 2021-04-05

## 2021-04-05 NOTE — TELEPHONE ENCOUNTER
Pt. Picked up paper copy of prescription today 4/7/21    Pita Cook  Patient Representative      Medication was faxed to [id number] [place] on 3/30/21. I called Ana Luisa and pharmacy. I spoke to Juanita, patient picked up Capsaicin but they need electronic or paper copy for Norco.     Will see if Ana Luisa can pick this up today.   Patria Palumbo R.N.

## 2021-04-15 ENCOUNTER — MYC MEDICAL ADVICE (OUTPATIENT)
Dept: FAMILY MEDICINE | Facility: CLINIC | Age: 61
End: 2021-04-15

## 2021-04-15 NOTE — TELEPHONE ENCOUNTER
Routing to  PAL 3,    See [employer] message and photo attached, appears to be follow-up from 4/9 mychart encounter    Terry Thompson RN

## 2021-04-15 NOTE — TELEPHONE ENCOUNTER
Please see [employer] message and attachments from Ana Luisa. Dr. Juárez do you have any further recommendations for Ana Luisa? I had discussed with her on 3/31/21 note that pain and rash can last as long as 6 weeks.     Please advise.    Thank you,  Patria Palubmo R.N.

## 2021-04-15 NOTE — TELEPHONE ENCOUNTER
Below noted, can you advise on how you would like to treat pain. Last Rx was sent for Norco 18 tablets on 4/9/21.    Thank you,  Patria Palumbo R.N.

## 2021-04-19 ENCOUNTER — TELEPHONE (OUTPATIENT)
Dept: FAMILY MEDICINE | Facility: CLINIC | Age: 61
End: 2021-04-19

## 2021-04-19 DIAGNOSIS — N95.1 MENOPAUSAL SYNDROME (HOT FLASHES): Primary | ICD-10-CM

## 2021-04-19 NOTE — TELEPHONE ENCOUNTER
Call placed to Ana Luisa to see if she would like me to schedule her for a COVID vaccine and to see how blood sugars are running.     Ana Luisa advised me that the numbness (from shingles) is going from big toe the adjacent toe. Says she got some aloe cream with lidocaine in it, and that provides some relief.     She also mentions she is having hot flashes that wake her up and make her nauseated most of the day. Says has been getting these for a long time, but now it is getting much worse. Says has not had a period for 5 years and has not seen OB for over 6 years.     Sugars are running--120-121 and 140-149. Fasting sugars have not been in 130's, and she reports it is about 50% in 120's and 50% 140's. Is now taking 30 units Lantus (epic RX needs update) Per review of note from 3/22 Ana Luisa can go up to 32 units--she thought it was 30. She will increase that tonight.     From 3/24 progress notes:  Type 2 diabetes mellitus with stage 3a chronic kidney disease, without long-term current use of insulin (H)  Improving, symptoms have resolved some.  Currently on Lantus 20 units nightly, advised to increase Lantus to 22 units nightly starting tonight.  Insulin protocol given to patient to increase by 2 units every other night if fasting morning sugars above 125.  Do not give more than 32 units nightly Lantus until rechecking     Wednesday she is [facility]     Dr. Juárez please advise on above if any concerns or further treatments, or recommendations.     Patria Palumbo R.N.

## 2021-04-21 ENCOUNTER — IMMUNIZATION (OUTPATIENT)
Dept: NURSING | Facility: CLINIC | Age: 61
End: 2021-04-21
Payer: MEDICARE

## 2021-04-21 PROCEDURE — 0001A PR COVID VAC PFIZER DIL RECON 30 MCG/0.3 ML IM: CPT

## 2021-04-21 PROCEDURE — 91300 PR COVID VAC PFIZER DIL RECON 30 MCG/0.3 ML IM: CPT

## 2021-05-01 DIAGNOSIS — G47.09 OTHER INSOMNIA: ICD-10-CM

## 2021-05-02 RX ORDER — ZOLPIDEM TARTRATE 10 MG/1
TABLET ORAL
Qty: 30 TABLET | Refills: 2 | Status: SHIPPED | OUTPATIENT
Start: 2021-05-02 | End: 2021-08-10

## 2021-05-04 ENCOUNTER — MYC MEDICAL ADVICE (OUTPATIENT)
Dept: OTHER | Age: 61
End: 2021-05-04

## 2021-05-04 ENCOUNTER — MYC MEDICAL ADVICE (OUTPATIENT)
Dept: FAMILY MEDICINE | Facility: CLINIC | Age: 61
End: 2021-05-04

## 2021-05-04 DIAGNOSIS — N18.31 TYPE 2 DIABETES MELLITUS WITH STAGE 3A CHRONIC KIDNEY DISEASE, WITHOUT LONG-TERM CURRENT USE OF INSULIN (H): ICD-10-CM

## 2021-05-04 DIAGNOSIS — K21.9 GASTROESOPHAGEAL REFLUX DISEASE WITHOUT ESOPHAGITIS: Primary | ICD-10-CM

## 2021-05-04 DIAGNOSIS — E11.22 TYPE 2 DIABETES MELLITUS WITH STAGE 3A CHRONIC KIDNEY DISEASE, WITHOUT LONG-TERM CURRENT USE OF INSULIN (H): ICD-10-CM

## 2021-05-05 DIAGNOSIS — M79.671 RIGHT FOOT PAIN: ICD-10-CM

## 2021-05-05 RX ORDER — ESOMEPRAZOLE MAGNESIUM 40 MG/1
40 CAPSULE, DELAYED RELEASE ORAL
Qty: 90 CAPSULE | Refills: 3 | Status: SHIPPED | OUTPATIENT
Start: 2021-05-05 | End: 2023-10-11

## 2021-05-05 RX ORDER — HYDROCODONE BITARTRATE AND ACETAMINOPHEN 10; 325 MG/1; MG/1
1 TABLET ORAL EVERY 6 HOURS PRN
Qty: 12 TABLET | Refills: 0 | Status: SHIPPED | OUTPATIENT
Start: 2021-05-05 | End: 2021-05-11

## 2021-05-05 NOTE — TELEPHONE ENCOUNTER
Pt is getting esomeprazole 40 mg from [facility] but according to our records this has not been sent to them in the past and her profile shows 20 mg caps taking 2.  Please advise on pended refill as unsure where she is getting the 40 mg capsules from     Michael Jasso RN, BSN

## 2021-05-06 NOTE — PROGRESS NOTES
Pre-Visit Planning   Next 5 appointments (look out 90 days)    May 11, 2021 10:30 AM  (Arrive by 10:15 AM)  Office Visit with Leonides Juárez MD  [facility] ([facility] ) [address] [facility] [id number] [phone]   May 18, 2021 10:30 AM  Office Visit with Sally Tiwari MD  [facility] ([facility] ) [address] [facility] [address] [phone]        Appointment Notes for this encounter:   f/u HTN and Diabetes    Questionnaires Reviewed/Assigned  No additional questionnaires are needed    Patient preferred phone number: [phone]  PVP started , orders pended.

## 2021-05-06 NOTE — TELEPHONE ENCOUNTER
Routing to Dr. Juárez as patient needs refill on insulin--please review for quantity and sig as this has changed as patient has increased insulin. See below from Keegan for most recent information.      antonia taking 32 units and my suger,s are running around 140 to 166 ughh! this is so frustraiting  for me and im eating right   thank you keegan    Per progress note 3/24/21:     Type 2 diabetes mellitus with stage 3a chronic kidney disease, without long-term current use of insulin (H)  Improving, symptoms have resolved some.  Currently on Lantus 20 units nightly, advised to increase Lantus to 22 units nightly starting tonight.  Insulin protocol given to patient to increase by 2 units every other night if fasting morning sugars above 125.  Do not give more than 32 units nightly Lantus until rechecking with physician      Dr. Juárez please review, Rx pended.   Patria Palumbo R.N.

## 2021-05-06 NOTE — TELEPHONE ENCOUNTER
Ana Luisa has appointment set up for next week. [employer] message sent. Will await reply.     Patria Palumbo R.N.

## 2021-05-06 NOTE — TELEPHONE ENCOUNTER
Patient increased to 34 units Lantus nightly.  Continue morning glucose checks.  Please make sure she has a diabetes check 3 months since her last.    EMILIA

## 2021-05-09 DIAGNOSIS — F42.4 SKIN-PICKING DISORDER: ICD-10-CM

## 2021-05-10 RX ORDER — ARIPIPRAZOLE 10 MG/1
10 TABLET ORAL DAILY
Qty: 30 TABLET | Refills: 2 | Status: SHIPPED | OUTPATIENT
Start: 2021-05-10 | End: 2021-08-11

## 2021-05-10 NOTE — TELEPHONE ENCOUNTER
Routing refill request to provider for review/approval because:  Labs not current:  Cbc  Michael Jasso RN, BSN

## 2021-05-11 ENCOUNTER — OFFICE VISIT (OUTPATIENT)
Dept: FAMILY MEDICINE | Facility: CLINIC | Age: 61
End: 2021-05-11
Payer: MEDICARE

## 2021-05-11 VITALS
HEIGHT: 64 IN | SYSTOLIC BLOOD PRESSURE: 124 MMHG | WEIGHT: 272.5 LBS | DIASTOLIC BLOOD PRESSURE: 82 MMHG | TEMPERATURE: 96.2 F | OXYGEN SATURATION: 96 % | HEART RATE: 84 BPM | BODY MASS INDEX: 46.52 KG/M2 | RESPIRATION RATE: 20 BRPM

## 2021-05-11 DIAGNOSIS — Z02.89 PAIN MANAGEMENT CONTRACT AGREEMENT: ICD-10-CM

## 2021-05-11 DIAGNOSIS — Z23 NEED FOR SHINGLES VACCINE: ICD-10-CM

## 2021-05-11 DIAGNOSIS — M79.671 RIGHT FOOT PAIN: ICD-10-CM

## 2021-05-11 DIAGNOSIS — G89.4 CHRONIC PAIN SYNDROME: ICD-10-CM

## 2021-05-11 DIAGNOSIS — N18.31 TYPE 2 DIABETES MELLITUS WITH STAGE 3A CHRONIC KIDNEY DISEASE, WITHOUT LONG-TERM CURRENT USE OF INSULIN (H): Primary | ICD-10-CM

## 2021-05-11 DIAGNOSIS — E11.22 TYPE 2 DIABETES MELLITUS WITH STAGE 3A CHRONIC KIDNEY DISEASE, WITHOUT LONG-TERM CURRENT USE OF INSULIN (H): Primary | ICD-10-CM

## 2021-05-11 LAB — HBA1C MFR BLD: 7.9 % (ref 0–5.6)

## 2021-05-11 PROCEDURE — 99214 OFFICE O/P EST MOD 30 MIN: CPT | Performed by: FAMILY MEDICINE

## 2021-05-11 PROCEDURE — 36415 COLL VENOUS BLD VENIPUNCTURE: CPT | Performed by: FAMILY MEDICINE

## 2021-05-11 PROCEDURE — 80307 DRUG TEST PRSMV CHEM ANLYZR: CPT | Performed by: FAMILY MEDICINE

## 2021-05-11 PROCEDURE — 83036 HEMOGLOBIN GLYCOSYLATED A1C: CPT | Performed by: FAMILY MEDICINE

## 2021-05-11 RX ORDER — ZOSTER VACCINE RECOMBINANT, ADJUVANTED 50 MCG/0.5
1 KIT INTRAMUSCULAR ONCE
Qty: 0.5 ML | Refills: 0 | Status: SHIPPED | OUTPATIENT
Start: 2021-05-11 | End: 2021-05-11

## 2021-05-11 RX ORDER — HYDROCODONE BITARTRATE AND ACETAMINOPHEN 10; 325 MG/1; MG/1
1 TABLET ORAL EVERY 6 HOURS PRN
Qty: 30 TABLET | Refills: 0 | Status: SHIPPED | OUTPATIENT
Start: 2021-05-11 | End: 2022-02-14

## 2021-05-11 ASSESSMENT — ENCOUNTER SYMPTOMS
NUMBNESS: 1
PALPITATIONS: 0
SHORTNESS OF BREATH: 0
PARESTHESIAS: 1

## 2021-05-11 ASSESSMENT — MIFFLIN-ST. JEOR: SCORE: 1786.05

## 2021-05-11 ASSESSMENT — PATIENT HEALTH QUESTIONNAIRE - PHQ9
SUM OF ALL RESPONSES TO PHQ QUESTIONS 1-9: 15
10. IF YOU CHECKED OFF ANY PROBLEMS, HOW DIFFICULT HAVE THESE PROBLEMS MADE IT FOR YOU TO DO YOUR WORK, TAKE CARE OF THINGS AT HOME, OR GET ALONG WITH OTHER PEOPLE: SOMEWHAT DIFFICULT
SUM OF ALL RESPONSES TO PHQ QUESTIONS 1-9: 15

## 2021-05-11 NOTE — PROGRESS NOTES
Assessment & Plan     Type 2 diabetes mellitus with stage 3a chronic kidney disease, without long-term current use of insulin (H)  Improving control.  Goal A1c less than 7.5, A1c 7.9 today.  Increase Lantus from 34 units nightly to 36 units nightly, continue titrating by 2 units increase every other day with morning fasting glucose greater than 125.  We will recheck with her in 3 months, consider starting a GLP-1 receptor agonist such as Trulicity or Saxenda for both weight management and concomitant management of diabetes.  Continue losartan.  - HEMOGLOBIN A1C  - insulin glargine (LANTUS PEN) 100 UNIT/ML pen  Dispense: 9 mL; Refill: 5    Pain management contract agreement  CSA updated.  - [id number] - Urine Drug Confirmation Panel (Comprehensive)    Right foot pain  Second to postherpetic neuralgia, unable to tolerate Neurontin.  Symptoms refractory to capsaicin.  Currently managed with Cymbalta for chronic pain.  Okay to continue Norco.  PDMP reviewed, appropriate.  UDS collected.  PHQ and BEATRIZ are updated.   - HYDROcodone-acetaminophen (NORCO)  MG per tablet  Dispense: 30 tablet; Refill: 0    Chronic pain syndrome   - [id number] - Urine Drug Confirmation Panel (Comprehensive)    Need for shingles vaccine  Patient will get the first series of her shingles vaccine 2 weeks after completing her final Covid vaccination.  - zoster vaccine recombinant adjuvanted (SHINGRIX) injection  Dispense: 0.5 mL; Refill: 0      Return in about 3 months (around 8/11/2021) for diabetes visit .    Leonides Juárez MD  [facility]   Ana Luisa is a 61 year old who presents for the following health issues     History of Present Illness       She eats 2-3 servings of fruits and vegetables daily.She consumes 2 sweetened beverage(s) daily.She exercises with enough effort to increase her heart rate 30 to 60 minutes per day.  She exercises with enough effort to increase her heart rate 7 days per week. She is  missing 1 dose(s) of medications per week.  She is not taking prescribed medications regularly due to side effects.       Diabetes Follow-up    How often are you checking your blood sugar? One time daily  What time of day are you checking your blood sugars (select all that apply)?  Before meals  Have you had any blood sugars above 200?  No  Have you had any blood sugars below 70?  No    What symptoms do you notice when your blood sugar is low?  None and Not applicable    What concerns do you have today about your diabetes?  Other: blood sugar is around 140     Do you have any of these symptoms? (Select all that apply)  Numbness in feet              Hyperlipidemia Follow-Up      Are you regularly taking any medication or supplement to lower your cholesterol?   No      Hypertension Follow-up      Do you check your blood pressure regularly outside of the clinic? No     Are you following a low salt diet? Yes    Are your blood pressures ever more than 140 on the top number (systolic) OR more   than 90 on the bottom number (diastolic), for example 140/90? No    BP Readings from Last 2 Encounters:   05/11/21 124/82   03/24/21 128/86     Hemoglobin A1C (%)   Date Value   05/11/2021 7.9 (H)   02/17/2021 9.2 (H)     LDL Cholesterol Calculated (mg/dL)   Date Value   11/25/2020 229 (H)   07/22/2019 101 (H)       BEATRIZ-7 SCORE 10/15/2019 7/21/2020 3/4/2021   Total Score - - -   Total Score 7 (mild anxiety) - -   Total Score 7 13 12       PHQ 2/16/2021 3/4/2021 5/11/2021   PHQ-9 Total Score 18 13 15   Q9: Thoughts of better off dead/self-harm past 2 weeks More than half the days Not at all Not at all   F/U: Thoughts of suicide or self-harm No - -   F/U: Safety concerns No - -         Review of Systems   Respiratory: Negative for shortness of breath.    Cardiovascular: Negative for palpitations.   Neurological: Positive for numbness and paresthesias.            Objective    /82   Pulse 84   Temp 96.2  F (35.7  C) (Tympanic)   " Resp 20   Ht 1.626 m (5' 4\")   Wt 123.6 kg (272 lb 8 oz)   LMP 02/13/2009   SpO2 96%   BMI 46.77 kg/m    Body mass index is 46.77 kg/m .  Physical Exam  Cardiovascular:      Rate and Rhythm: Normal rate and regular rhythm.   Pulmonary:      Effort: Pulmonary effort is normal.      Breath sounds: Normal breath sounds.   Musculoskeletal:        Feet:    Skin:     Comments: Right foot over the previous herpetic lesions is very tender to palpation.  There is no erythema or edema.   Psychiatric:         Mood and Affect: Mood normal.         Behavior: Behavior normal.            Results for orders placed or performed in visit on 05/11/21 (from the past 24 hour(s))   HEMOGLOBIN A1C   Result Value Ref Range    Hemoglobin A1C 7.9 (H) 0 - 5.6 %     *Note: Due to a large number of results and/or encounters for the requested time period, some results have not been displayed. A complete set of results can be found in Results Review.               "

## 2021-05-11 NOTE — LETTER
Opioid / Opioid Plus Controlled Substance Agreement    This is an agreement between you and your provider about the safe and appropriate use of controlled substance/opioids prescribed by your care team. Controlled substances are medicines that can cause physical and mental dependence (abuse).    There are strict laws about having and using these medicines. We here at [facility] are committing to working with you in your efforts to get better. To support you in this work, we ll help you schedule regular office appointments for medicine refills. If we must cancel or change your appointment for any reason, we ll make sure you have enough medicine to last until your next appointment.     As a Provider, I will:    Listen carefully to your concerns and treat you with respect.     Recommend a treatment plan that I believe is in your best interest. This plan may involve therapies other than opioid pain medication.     Talk with you often about the possible benefits, and the risk of harm of any medicine that we prescribe for you.     Provide a plan on how to taper (discontinue or go off) using this medicine if the decision is made to stop its use.    As a Patient, I understand that opioid(s):     Are a controlled substance prescribed by my care team to help me function or work and manage my condition(s).     Are strong medicines and can cause serious side effects such as:    Drowsiness, which can seriously affect my driving ability    A lower breathing rate, enough to cause death    Harm to my thinking ability     Depression     Abuse of and addiction to this medicine    Need to be taken exactly as prescribed. Combining opioids with certain medicines or chemicals (such as illegal drugs, sedatives, sleeping pills, and benzodiazepines) can be dangerous or even fatal. If I stop opioids suddenly, I may have severe withdrawal symptoms.    Do not work for all types of pain nor for all patients. If they re not helpful, I may  be asked to stop them.      The risks, benefits and side effects of these medicine(s) were explained to me. I agree that:  1. I will take part in other treatments as advised by my care team. This may be psychiatry or counseling, physical therapy, behavioral therapy, group treatment or a referral to a specialist.     2. I will keep all my appointments. I understand that this is part of the monitoring of opioids. My care team may require an office visit for EVERY opioid/controlled substance refill. If I miss appointments or don t follow instructions, my care team may stop my medicine.    3. I will take my medicines as prescribed. I will not change the dose or schedule unless my care team tells me to. There will be no refills if I run out early.     4. I may be asked to come to the clinic and complete a urine drug test or complete a pill count at any time. If I don t give a urine sample or participate in a pill count, the care team may stop my medicine.    5. I will only receive prescriptions from this clinic for chronic pain. If I am treated by another provider for acute pain issues, I will tell them that I am taking opioid pain medication for chronic pain and that I have a treatment agreement with this provider. I will inform my [facility] care team within one business day if I am given a prescription for any pain medication by another healthcare provider. My [facility] care team can contact other providers and pharmacists about my use of any medicines.    6. It is up to me to make sure that I don t run out of my medicines on weekends or holidays. If my care team is willing to refill my opioid prescription without a visit, I must request refills only during office hours. Refills may take up to 3 business days to process. I will use one pharmacy to fill all my opioid and other controlled substance prescriptions. I will notify the clinic about any changes to my insurance or medication  availability.    7. I am responsible for my prescriptions. If the medicine/prescription is lost, stolen or destroyed, it will not be replaced. I also agree not to share controlled substance medicines with anyone.    8. I am aware I should not use any illegal or recreational drugs. I agree not to drink alcohol unless my care team says I can.       9. If I enroll in the [place] [facility] program, I will tell my care team prior to my next refill.     10. I will tell my care team right away if I become pregnant, have a new medical problem treated outside of my regular clinic, or have a change in my medications.    11. I understand that this medicine can affect my thinking, judgment and reaction time. Alcohol and drugs affect the brain and body, which can affect the safety of my driving. Being under the influence of alcohol or drugs can affect my decision-making, behaviors, personal safety, and the safety of others. Driving while impaired (DWI) can occur if a person is driving, operating, or in physical control of a car, motorcycle, boat, snowmobile, ATV, motorbike, off-road vehicle, or any other motor vehicle ([place] Statute 169A.20). I understand the risk if I choose to drive or operate any vehicle or machinery.    I understand that if I do not follow any of the conditions above, my prescriptions or treatment may be stopped or changed.          Opioids  What You Need to Know    What are opioids?   Opioids are pain medicines that must be prescribed by a doctor. They are also known as narcotics.     Examples are:   1. morphine (MS Contin, Pennie)  2. oxycodone (Oxycontin)  3. oxycodone and acetaminophen (Percocet)  4. hydrocodone and acetaminophen (Vicodin, Norco)   5. fentanyl patch (Duragesic)   6. hydromorphone (Dilaudid)   7. methadone  8. codeine (Tylenol #3)     What do opioids do well?   Opioids are best for severe short-term pain such as after a surgery or injury. They may work well for cancer pain. They may  help some people with long-lasting (chronic) pain.     What do opioids NOT do well?   Opioids never get rid of pain entirely, and they don t work well for most patients with chronic pain. Opioids don t reduce swelling, one of the causes of pain.                                    Other ways to manage chronic pain and improve function include:       Treat the health problem that may be causing pain    Anti-inflammation medicines, which reduce swelling and tenderness, such as ibuprofen (Advil, Motrin) or naproxen (Aleve)    Acetaminophen (Tylenol)    Antidepressants and anti-seizure medicines, especially for nerve pain    Topical treatments such as patches or creams    Injections or nerve blocks    Chiropractic or osteopathic treatment    Acupuncture, massage, deep breathing, meditation, visual imagery, aromatherapy    Use heat or ice at the pain site    Physical therapy     Exercise    Stop smoking    Take part in therapy       Risks and side effects     Talk to your doctor before you start or decide to keep taking opioids. Possible side effects include:      Lowering your breathing rate enough to cause death    Overdose, including death, especially if taking higher than prescribed doses    Worse depression symptoms; less pleasure in things you usually enjoy    Feeling tired or sluggish    Slower thoughts or cloudy thinking    Being more sensitive to pain over time; pain is harder to control    Trouble sleeping or restless sleep    Changes in hormone levels (for example, less testosterone)    Changes in sex drive or ability to have sex    Constipation    Unsafe driving    Itching and sweating    Dizziness    Nausea, throwing up and dry mouth    What else should I know about opioids?    Opioids may lead to dependence, tolerance, or addiction.      Dependence means that if you stop or reduce the medicine too quickly, you will have withdrawal symptoms. These include loose poop (diarrhea), jitters, flu-like symptoms,  nervousness and tremors. Dependence is not the same as addiction.                       Tolerance means needing higher doses over time to get the same effect. This may increase the chance of serious side effects.      Addiction is when people improperly use a substance that harms their body, their mind or their relations with others. Use of opiates can cause a relapse of addiction if you have a history of drug or alcohol abuse.      People who have used opioids for a long time may have a lower quality of life, worse depression, higher levels of pain and more visits to doctors.    You can overdose on opioids. Take these steps to lower your risk of overdose:    1. Recognize the signs:  Signs of overdose include decrease or loss of consciousness (blackout), slowed breathing, trouble waking up and blue lips. If someone is worried about overdose, they should call 911.    2. Talk to your doctor about Narcan (naloxone).   If you are at risk for overdose, you may be given a prescription for Narcan. This medicine very quickly reverses the effects of opioids.   If you overdose, a friend or family member can give you Narcan while waiting for the ambulance. They need to know the signs of overdose and how to give Narcan.     3. Don't use alcohol or street drugs.   Taking them with opioids can cause death.    4. Do not take any of these medicines unless your doctor says it s OK. Taking these with opioids can cause death:    Benzodiazepines, such as lorazepam (Ativan), alprazolam (Xanax) or diazepam (Valium)    Muscle relaxers, such as cyclobenzaprine (Flexeril)    Sleeping pills like zolpidem (Ambien)     Other opioids      How to keep you and other people safe while taking opioids:    1. Never share your opioids with others.  Opioid medicines are regulated by the Drug Enforcement Agency ([place]). Selling or sharing medications is a criminal act.    2. Be sure to store opioids in a secure place, locked up if possible. Young children  can easily swallow them and overdose.    3. When you are traveling with your medicines, keep them in the original bottles. If you use a pill box, be sure you also carry a copy of your medicine list from your clinic or pharmacy.    4. Safe disposal of opioids    Most pharmacies have places to get rid of medicine, called disposal kiosks. Medicine disposal options are also available in every [facility]. Search your county and  medication disposal  to find more options. You can find more details at:  [email or web address]     I agree that my provider, clinic care team, and pharmacy may work with any city, state or federal law enforcement agency that investigates the misuse, sale, or other diversion of my controlled medicine. I will allow my provider to discuss my care with, or share a copy of, this agreement with any other treating provider, pharmacy or emergency room where I receive care.    I have read this agreement and have asked questions about anything I did not understand.    _______________________________________________________  Patient Signature - Ana Luisa Byers _____________________                   Date     _______________________________________________________  Provider Signature - Leonides Juárez MD   _____________________                   Date     _______________________________________________________  Witness Signature (required if provider not present while patient signing)   _____________________                   Date

## 2021-05-12 ENCOUNTER — IMMUNIZATION (OUTPATIENT)
Dept: NURSING | Facility: CLINIC | Age: 61
End: 2021-05-12
Attending: INTERNAL MEDICINE
Payer: MEDICARE

## 2021-05-12 PROCEDURE — 91300 PR COVID VAC PFIZER DIL RECON 30 MCG/0.3 ML IM: CPT

## 2021-05-12 PROCEDURE — 0002A PR COVID VAC PFIZER DIL RECON 30 MCG/0.3 ML IM: CPT

## 2021-05-12 NOTE — TELEPHONE ENCOUNTER
Patient was seen in clinic yesterday (5/11/21), new insulin sliding scale given. Ana Luisa has follow up appointment scheduled in August.     Patria Palumbo R.N.

## 2021-05-13 LAB
CREAT UR-MCNC: 197 MG/DL
RPT COMMENT: NORMAL

## 2021-05-19 ENCOUNTER — OFFICE VISIT (OUTPATIENT)
Dept: OBGYN | Facility: CLINIC | Age: 61
End: 2021-05-19
Payer: MEDICARE

## 2021-05-19 VITALS — WEIGHT: 275 LBS | BODY MASS INDEX: 47.2 KG/M2 | SYSTOLIC BLOOD PRESSURE: 132 MMHG | DIASTOLIC BLOOD PRESSURE: 74 MMHG

## 2021-05-19 DIAGNOSIS — Z12.31 ENCOUNTER FOR SCREENING MAMMOGRAM FOR BREAST CANCER: ICD-10-CM

## 2021-05-19 DIAGNOSIS — N95.1 MENOPAUSAL SYNDROME (HOT FLASHES): Primary | ICD-10-CM

## 2021-05-19 DIAGNOSIS — Z78.0 POST-MENOPAUSAL: ICD-10-CM

## 2021-05-19 DIAGNOSIS — N39.3 FEMALE STRESS INCONTINENCE: ICD-10-CM

## 2021-05-19 DIAGNOSIS — Z12.4 SCREENING FOR CERVICAL CANCER: ICD-10-CM

## 2021-05-19 DIAGNOSIS — Z78.0 MENOPAUSE: ICD-10-CM

## 2021-05-19 LAB
ALBUMIN UR-MCNC: ABNORMAL MG/DL
APPEARANCE UR: CLEAR
BACTERIA #/AREA URNS HPF: ABNORMAL /HPF
BILIRUB UR QL STRIP: NEGATIVE
COLOR UR AUTO: YELLOW
GLUCOSE UR STRIP-MCNC: 250 MG/DL
HGB UR QL STRIP: NEGATIVE
KETONES UR STRIP-MCNC: NEGATIVE MG/DL
LEUKOCYTE ESTERASE UR QL STRIP: NEGATIVE
NITRATE UR QL: NEGATIVE
NON-SQ EPI CELLS #/AREA URNS LPF: ABNORMAL /LPF
PH UR STRIP: 5 PH (ref 5–7)
RBC #/AREA URNS AUTO: ABNORMAL /HPF
SOURCE: ABNORMAL
SP GR UR STRIP: >1.03 (ref 1–1.03)
UROBILINOGEN UR STRIP-ACNC: 2 EU/DL (ref 0.2–1)
WBC #/AREA URNS AUTO: ABNORMAL /HPF

## 2021-05-19 PROCEDURE — 99203 OFFICE O/P NEW LOW 30 MIN: CPT | Performed by: ADVANCED PRACTICE MIDWIFE

## 2021-05-19 PROCEDURE — 81001 URINALYSIS AUTO W/SCOPE: CPT | Performed by: ADVANCED PRACTICE MIDWIFE

## 2021-05-19 PROCEDURE — 87624 HPV HI-RISK TYP POOLED RSLT: CPT | Performed by: ADVANCED PRACTICE MIDWIFE

## 2021-05-19 PROCEDURE — G0145 SCR C/V CYTO,THINLAYER,RESCR: HCPCS | Performed by: ADVANCED PRACTICE MIDWIFE

## 2021-05-19 RX ORDER — ESTRADIOL 0.05 MG/D
1 PATCH TRANSDERMAL WEEKLY
Qty: 4 PATCH | Refills: 1 | Status: SHIPPED | OUTPATIENT
Start: 2021-05-19 | End: 2021-10-01

## 2021-05-19 RX ORDER — OMEPRAZOLE 40 MG/1
40 CAPSULE, DELAYED RELEASE ORAL DAILY
COMMUNITY
Start: 2021-05-13 | End: 2022-02-14

## 2021-05-19 NOTE — PATIENT INSTRUCTIONS
Patient Education     Hormone Therapy for Women  Hormone therapy (HT) increases the levels of the hormones estrogen and progesterone in your body. This can help reduce symptoms of menopause. HT may also help prevent osteoporosis in some women. But HT may increase risk for certain conditions, including blood clots, gallstones, heart disease, and stroke.   How to take hormones  To get the best results, always take your hormones exactly as directed. Hormones can be taken in any of these ways:     Pills containing estrogen, and sometimes other hormones, are taken as often as every day. This is the most common form of hormone therapy.    A patch, spray, or gel releases estrogen into the bloodstream through the skin. There is also a patch that contains estrogen and progesterone. The patch can be worn on your hip. Most patches are changed once or twice a week.    Vaginal ring containing estrogen    Cream used inside the vagina releases estrogen at that spot (locally). Only a very small amount gets into the bloodstream. For this reason, vaginal creams can treat vaginal atrophy and dryness, but aren't used to treat hot flashes. The creams don't greatly raise your risk for heart attack or stroke. But if you use these more than twice a week in other than very small doses, larger amounts of estrogen do get into the bloodstream. This may affect the uterus, if you still have one.  Using estrogen for long periods without a progestin raises the risk for cancer of the uterus.   Follow-up visits  Have regular visits with a healthcare provider. These visits are a way to fine-tune your therapy. You can also be checked for any problems that might require you to stop HT.     When to call your healthcare provider  Call your healthcare provider if you have any of the following symptoms:     Unexpected vaginal bleeding    A breast lump, or breast tenderness that doesn t go away    Severe headaches    Aching muscles in your back or  legs    Sudden pain in your legs or chest    Shortness of breath  Jackelin last reviewed this educational content on 2/1/2020 2000-2021 [employer]. All rights reserved. This information is not intended as a substitute for professional medical care. Always follow your healthcare professional's instructions.

## 2021-05-19 NOTE — PROGRESS NOTES
"  SUBJECTIVE:                                                   Ana Luisa Byers is a 61 year old female who presents to clinic today for the following health issue(s):  Patient presents with:  Gyn Exam: c/o hot flashes and night sweats x2yrs    Additional information: has a primary care provider she sees for chronic health concerns.   Ana Luisa presents for cervical cancer screening and for discussion about HT due to persistant hot flashes and night sweats despite trying to OTC menopausal medications for 6 months at a time.  She does not drink, or smoke. She does not schedule physical activity. She does have HTN and DM on insulin. She reports trying to manage her conditions as her partner passed a few years ago from poor control.   She also has stress incontinence which is new over the last few months. She has issues with coughing and sneezing and when \" she has to go she has to go.\" Denies s/s of UTI. Reports many UTI as a younger person. She is using liners and has some skin irrigation. She does sleep without them.      Patient's last menstrual period was 2009..     Patient is not sexually active, .  Using none for contraception.    reports that she has never smoked. She has never used smokeless tobacco.    STD testing offered?  Declined    Health maintenance updated:  yes    Today's PHQ-2 Score:   PHQ-2 (  [employer]) 2021   Q1: Little interest or pleasure in doing things 2   Q2: Feeling down, depressed or hopeless 2   PHQ-2 Score 4   Q1: Little interest or pleasure in doing things More than half the days   Q2: Feeling down, depressed or hopeless More than half the days   PHQ-2 Score 4     Today's PHQ-9 Score:   PHQ-9 SCORE 2021   PHQ-9 Total Score -   PHQ-9 Total Score MyChart 15 (Moderately severe depression)   PHQ-9 Total Score 15     Today's BEATRIZ-7 Score:   BEATRIZ-7 SCORE 3/4/2021   Total Score -   Total Score -   Total Score 12       Problem list and histories reviewed & adjusted, as " indicated.  Additional history: as documented.    Patient Active Problem List   Diagnosis     Mixed hyperlipidemia     Chronic pain associated with significant psychosocial dysfunction     Osteoarthritis     S/P shoulder replacement     Cluster headaches     Fibromyalgia     Plantar fasciitis     Other insomnia     HTN, goal below 140/90     Gastroesophageal reflux disease without esophagitis     Restless legs syndrome (RLS)     CKD (chronic kidney disease) stage 2, GFR 60-89 ml/min     HSV-1 infection     Left lumbar radiculitis     Acquired hypothyroidism     Type 2 diabetes mellitus with diabetic polyneuropathy, without long-term current use of insulin (H)     Bilateral lower extremity edema     Benign essential hypertension     Anxiety     Severe episode of recurrent major depressive disorder, without psychotic features (H)     BMI 45.0-49.9, adult (H)     Spinal stenosis of lumbar region with radiculopathy     Past Surgical History:   Procedure Laterality Date     ARTHROPLASTY SHOULDER  7/25/2013    Procedure: right ARTHROPLASTY SHOULDER;  RIGHT TOTAL SHOULDER ARTHROPLASTY (TORNIER AFFINITY SHOULDER SYSTEM)^;  Surgeon: Dung Morales MD;  Location:  OR     BACK SURGERY  2011    L45 laminectomy     C APPENDECTOMY  age 19     C LIGATE FALLOPIAN TUBE,POSTPARTUM  1982    Tubal Ligation     ELBOW WRIST HAND FINGER ORTHOSIS, RIGID, WITHOUT JOINTS, MAY INCLUDE SOFT  5/2007    put in [place]     ESOPHAGOSCOPY, GASTROSCOPY, DUODENOSCOPY (EGD), COMBINED  9/23/2015    Dr. William AMBROSIO     ESOPHAGOSCOPY, GASTROSCOPY, DUODENOSCOPY (EGD), COMBINED N/A 9/23/2015    Procedure: COMBINED ESOPHAGOSCOPY, GASTROSCOPY, DUODENOSCOPY (EGD), BIOPSY SINGLE OR MULTIPLE;  Surgeon: Jose Thomas MD;  Location:  GI     ESOPHAGOSCOPY, GASTROSCOPY, DUODENOSCOPY (EGD), COMBINED  02/04/2020    Dr. William AMBROSIO     ESOPHAGOSCOPY, GASTROSCOPY, DUODENOSCOPY (EGD), COMBINED N/A 2/4/2020    Procedure: ESOPHAGOGASTRODUODENOSCOPY (EGD);   Surgeon: Jose Thomas MD;  Location: [facility]     HCL PAP SMEAR  6/2008    WNL     ORTHOPEDIC SURGERY      left shoulder scoped and plate left elbow     SURGICAL HISTORY OF -   2009    ulnar nerve release, carpal tunnel- left      Social History     Tobacco Use     Smoking status: Never Smoker     Smokeless tobacco: Never Used   Substance Use Topics     Alcohol use: No      Problem (# of Occurrences) Relation (Name,Age of Onset)    C.A.D. (1) Mother    Depression (1) Mother    Diabetes (1) Maternal Aunt    Family History Negative (1) Father    Neurologic Disorder (1) Mother: lupus       Negative family history of: Colon Cancer            Current Outpatient Medications   Medication Sig     ARIPiprazole (ABILIFY) 10 MG tablet Take 1 tablet (10 mg) by mouth daily     aspirin 81 MG chewable tablet Take 1 tablet (81 mg) by mouth daily     atorvastatin (LIPITOR) 40 MG tablet      blood glucose (NO BRAND SPECIFIED) test strip Use to test blood sugar as directed.     blood glucose monitoring (NO BRAND SPECIFIED) meter device kit Use to test blood sugar as directed.     capsaicin (ZOSTRIX) 0.025 % external cream Apply 1 g topically 3 times daily as needed (pain)     DULoxetine (CYMBALTA) 60 MG capsule Take 1 capsule (60 mg) by mouth daily     EPINEPHrine (ANY BX GENERIC EQUIV) 0.3 MG/0.3ML injection 2-pack Inject 0.3 mLs (0.3 mg) into the muscle once as needed for anaphylaxis     esomeprazole (NEXIUM) 40 MG DR capsule Take 1 capsule (40 mg) by mouth every morning (before breakfast) Take 30-60 minutes before eating.     estradiol (CLIMARA) 0.05 MG/24HR weekly patch Place 1 patch onto the skin once a week     furosemide (LASIX) 40 MG tablet Take 1 tablet (40 mg) by mouth daily     HYDROcodone-acetaminophen (NORCO)  MG per tablet Take 1 tablet by mouth every 6 hours as needed for severe pain     hydrOXYzine (ATARAX) 25 MG tablet TAKE 1 TO 2 TABLETS(25 TO 50 MG) BY MOUTH TWICE DAILY AS NEEDED FOR ITCHING     insulin  glargine (LANTUS PEN) 100 UNIT/ML pen 36U Lantus at bedtime, increase by 2U every other day if fasted morning glucose > 125. Do NOT exceed 46U until speaking with a doctor.     insulin pen needle (32G X 4 MM) 32G X 4 MM miscellaneous Use 1 pen needles daily or as directed.     losartan (COZAAR) 25 MG tablet Take 1 tablet (25 mg) by mouth daily     order for DME Equipment being ordered: Diabetic Shoes     zolpidem (AMBIEN) 10 MG tablet TAKE 1 TABLET(10 MG) BY MOUTH EVERY NIGHT AS NEEDED FOR SLEEP     acyclovir (ZOVIRAX) 800 MG tablet Take 1 tablet (800 mg) by mouth 5 times daily for 7 days     No current facility-administered medications for this visit.      Allergies   Allergen Reactions     Penicillins Anaphylaxis     Augmentin [Amoxicillin-Pot Clavulanate] GI Disturbance     Bee Swelling     Gabapentin Other (See Comments)     Mood Swings     Lisinopril Rash     Metformin Itching     Topiramate Other (See Comments)     numbness       ROS:  12 point review of systems negative other than symptoms noted below or in the HPI.  No urinary frequency or dysuria, bladder or kidney problems      OBJECTIVE:     /74 (BP Location: Right arm, Cuff Size: Adult Large)   Wt 124.7 kg (275 lb)   LMP 02/13/2009   BMI 47.20 kg/m    Body mass index is 47.2 kg/m .    Exam:  Constitutional:  Appearance: Well nourished, well developed alert, in no acute distress  Skin: General Inspection:  No rashes present, no lesions present, no areas of discoloration.  Pelvic Exam:  External Genitalia:     Outer labia with excoriation and irritation present.  Thin liquid discharge noted, likely urine.  Vagina:     Normal vaginal vault without central , ATROPHIC  Bladder:     Nontender to palpation  Urethra:   Urethral Body:  Urethra palpation normal, urethra structural support normal   Urethral Meatus:  No erythema or lesions present  Cervix:     Appearance healthy, no lesions present, no bleeding present  Uterus:     Nontender to palpation,  no masses present  Adnexa:     No adnexal tenderness present, no adnexal masses present  Perineum:     Perineum within normal limits, no evidence of trauma, no rashes or skin lesions present      In-Clinic Test Results:  Results for orders placed or performed in visit on 05/19/21 (from the past 24 hour(s))   UA with Microscopic reflex to Culture    Specimen: Midstream Urine   Result Value Ref Range    Color Urine Yellow     Appearance Urine Clear     Glucose Urine 250 (A) NEG^Negative mg/dL    Bilirubin Urine Negative NEG^Negative    Ketones Urine Negative NEG^Negative mg/dL    Specific Gravity Urine >1.030 1.003 - 1.035    pH Urine 5.0 5.0 - 7.0 pH    Protein Albumin Urine Trace (A) NEG^Negative mg/dL    Urobilinogen Urine 2.0 (H) 0.2 - 1.0 EU/dL    Nitrite Urine Negative NEG^Negative    Blood Urine Negative NEG^Negative    Leukocyte Esterase Urine Negative NEG^Negative    Source Midstream Urine     WBC Urine 5-10 (A) OTO5^0 - 5 /HPF    RBC Urine O - 2 OTO2^O - 2 /HPF    Squamous Epithelial /LPF Urine Many (A) FEW^Few /LPF    Bacteria Urine Many (A) NEG^Negative /HPF     *Note: Due to a large number of results and/or encounters for the requested time period, some results have not been displayed. A complete set of results can be found in Results Review.       ASSESSMENT/PLAN:                                                        ICD-10-CM    1. Menopausal syndrome (hot flashes)  N95.1 estradiol (CLIMARA) 0.05 MG/24HR weekly patch   2. Post-menopausal  Z78.0    3. Screening for cervical cancer  Z12.4    4. Female stress incontinence  N39.3 UA with Microscopic reflex to Culture   5. Encounter for screening mammogram for breast cancer  Z12.31 *MA Screening Digital Bilateral     1. Using MenPro she has risk of 9.1 which is moderate for 10 years. We reviewed the risk and benefits and she would like to try HT. She declined trial of selective serotonin reuptake inhibitor and cannot use Neurontin. She has an intact uterus  so needs estrogen and progesterone. Will start with .05 transderamal patch. Will see her back in four weeks by telephone for follow up.     3. If pap and HPV is neg can choose to stop having cervical cancer screening.     4. Declines referral for urogyn. Recommend barrier cream to help protect skin. Encouraged Kegel exercises.     5. Due for mammo- ordered.       Patient Instructions   Patient Education     Hormone Therapy for Women  Hormone therapy (HT) increases the levels of the hormones estrogen and progesterone in your body. This can help reduce symptoms of menopause. HT may also help prevent osteoporosis in some women. But HT may increase risk for certain conditions, including blood clots, gallstones, heart disease, and stroke.   How to take hormones  To get the best results, always take your hormones exactly as directed. Hormones can be taken in any of these ways:     Pills containing estrogen, and sometimes other hormones, are taken as often as every day. This is the most common form of hormone therapy.    A patch, spray, or gel releases estrogen into the bloodstream through the skin. There is also a patch that contains estrogen and progesterone. The patch can be worn on your hip. Most patches are changed once or twice a week.    Vaginal ring containing estrogen    Cream used inside the vagina releases estrogen at that spot (locally). Only a very small amount gets into the bloodstream. For this reason, vaginal creams can treat vaginal atrophy and dryness, but aren't used to treat hot flashes. The creams don't greatly raise your risk for heart attack or stroke. But if you use these more than twice a week in other than very small doses, larger amounts of estrogen do get into the bloodstream. This may affect the uterus, if you still have one.  Using estrogen for long periods without a progestin raises the risk for cancer of the uterus.   Follow-up visits  Have regular visits with a healthcare provider. These  visits are a way to fine-tune your therapy. You can also be checked for any problems that might require you to stop HT.     When to call your healthcare provider  Call your healthcare provider if you have any of the following symptoms:     Unexpected vaginal bleeding    A breast lump, or breast tenderness that doesn t go away    Severe headaches    Aching muscles in your back or legs    Sudden pain in your legs or chest    Shortness of breath  [employer] last reviewed this educational content on 2/1/2020 2000-2021 [employer]. All rights reserved. This information is not intended as a substitute for professional medical care. Always follow your healthcare professional's instructions.             Megan Roger CNM  [facility]

## 2021-05-19 NOTE — NURSING NOTE
"Chief Complaint   Patient presents with     Gyn Exam     c/o hot flashes and night sweats x2yrs       Initial /74 (BP Location: Right arm, Cuff Size: Adult Large)   Wt 124.7 kg (275 lb)   LMP 2009   BMI 47.20 kg/m   Estimated body mass index is 47.2 kg/m  as calculated from the following:    Height as of 21: 1.626 m (5' 4\").    Weight as of this encounter: 124.7 kg (275 lb).  BP completed using cuff size: large    Questioned patient about current smoking habits.  Pt. has never smoked.          The following HM Due: NONE  Bella Bansal CMA    "

## 2021-05-25 LAB
COPATH REPORT: NORMAL
PAP: NORMAL

## 2021-06-16 ENCOUNTER — MYC MEDICAL ADVICE (OUTPATIENT)
Dept: FAMILY MEDICINE | Facility: CLINIC | Age: 61
End: 2021-06-16

## 2021-06-16 NOTE — TELEPHONE ENCOUNTER
Created letter per provider ok. Printed and sent via [employer]. [employer] message sent to patient.     Torrey GAYTAN RN

## 2021-06-16 NOTE — TELEPHONE ENCOUNTER
[employer] message marked as read by patient at 12:30 PM on 6/16/2021.     Torrey GAYTAN RN

## 2021-06-16 NOTE — LETTER
June 16, 2021      Ana Luisa Byers  [id number]    [place] [address]        To Whom It May Concern,      Ana Luisa is under our care for anxiety.  She finds great comfort in her cat and should be allowed to have it with her in her apartment.     Please contact our clinic for questions or concerns.        Sincerely,        Raoul Felix MD

## 2021-06-30 ENCOUNTER — OFFICE VISIT (OUTPATIENT)
Dept: FAMILY MEDICINE | Facility: CLINIC | Age: 61
End: 2021-06-30
Payer: MEDICARE

## 2021-06-30 VITALS
WEIGHT: 272.8 LBS | HEIGHT: 64 IN | OXYGEN SATURATION: 98 % | HEART RATE: 84 BPM | BODY MASS INDEX: 46.57 KG/M2 | RESPIRATION RATE: 18 BRPM | SYSTOLIC BLOOD PRESSURE: 128 MMHG | TEMPERATURE: 97.7 F | DIASTOLIC BLOOD PRESSURE: 80 MMHG

## 2021-06-30 DIAGNOSIS — G89.29 CHRONIC MIDLINE LOW BACK PAIN WITH BILATERAL SCIATICA: Primary | ICD-10-CM

## 2021-06-30 DIAGNOSIS — M54.41 CHRONIC MIDLINE LOW BACK PAIN WITH BILATERAL SCIATICA: Primary | ICD-10-CM

## 2021-06-30 DIAGNOSIS — M54.42 CHRONIC MIDLINE LOW BACK PAIN WITH BILATERAL SCIATICA: Primary | ICD-10-CM

## 2021-06-30 DIAGNOSIS — N18.31 STAGE 3A CHRONIC KIDNEY DISEASE (H): ICD-10-CM

## 2021-06-30 LAB
ANION GAP SERPL CALCULATED.3IONS-SCNC: 6 MMOL/L (ref 3–14)
BUN SERPL-MCNC: 16 MG/DL (ref 7–30)
CALCIUM SERPL-MCNC: 9.8 MG/DL (ref 8.5–10.1)
CHLORIDE SERPL-SCNC: 107 MMOL/L (ref 94–109)
CO2 SERPL-SCNC: 24 MMOL/L (ref 20–32)
CREAT SERPL-MCNC: 1.01 MG/DL (ref 0.52–1.04)
GFR SERPL CREATININE-BSD FRML MDRD: 60 ML/MIN/{1.73_M2}
GLUCOSE SERPL-MCNC: 131 MG/DL (ref 70–99)
POTASSIUM SERPL-SCNC: 4.2 MMOL/L (ref 3.4–5.3)
SODIUM SERPL-SCNC: 137 MMOL/L (ref 133–144)

## 2021-06-30 PROCEDURE — 36415 COLL VENOUS BLD VENIPUNCTURE: CPT | Performed by: FAMILY MEDICINE

## 2021-06-30 PROCEDURE — 80048 BASIC METABOLIC PNL TOTAL CA: CPT | Performed by: FAMILY MEDICINE

## 2021-06-30 PROCEDURE — 99214 OFFICE O/P EST MOD 30 MIN: CPT | Performed by: FAMILY MEDICINE

## 2021-06-30 ASSESSMENT — MIFFLIN-ST. JEOR: SCORE: 1787.41

## 2021-06-30 ASSESSMENT — ENCOUNTER SYMPTOMS
NUMBNESS: 1
BACK PAIN: 1
FEVER: 0

## 2021-06-30 NOTE — PROGRESS NOTES
Assessment & Plan     Chronic midline low back pain with bilateral sciatica  Worsening of chronic condition inciting injury.  No red flag neurological symptoms.  History of right hemilaminectomy and subtotal medial facetectomy in the L4-L5 region.   We talked about several different treatment options including medical management and potentially surgery.  We are going to start with pool physical therapy and a consult back to orthopedics, would like to go back to [facility] orthopedics as she has had a good experience with them in the past.  Patient declined pain medication.  - Spine Referral  - PHYSICAL THERAPY REFERRAL  - UA with Microscopic reflex to Culture    Stage 3a chronic kidney disease  Continue to monitor  - Albumin Random Urine Quantitative with Creat Ratio  - **Basic metabolic panel FUTURE anytime      Return in about 3 days (around 7/3/2021) for mammogram, already scheduled.    Leonides Juárez MD  [facility]o is a 61 year old who presents for the following health issues     History of Present Illness       Back Pain:  She presents for follow up of back pain. Patient's back pain is a chronic problem.  Location of back pain:  Right lower back, left lower back, right buttock, left buttock, right hip and left hip  Description of back pain: burning, sharp, shooting and stabbing  Back pain spreads: right foot, left foot and right side of neck    Since patient first noticed back pain, pain is: always present, but gets better and worse  Does back pain interfere with her job:  Not applicable      She eats 4 or more servings of fruits and vegetables daily.She consumes 1 sweetened beverage(s) daily.She exercises with enough effort to increase her heart rate 30 to 60 minutes per day.  She exercises with enough effort to increase her heart rate 3 or less days per week.   She is taking medications regularly.             Review of Systems   Constitutional: Negative for  "fever.   Musculoskeletal: Positive for back pain.   Neurological: Positive for numbness.            Objective    /80 (BP Location: Right arm, Patient Position: Chair, Cuff Size: Adult Large)   Pulse 84   Temp 97.7  F (36.5  C) (Oral)   Resp 18   Ht 1.626 m (5' 4\")   Wt 123.7 kg (272 lb 12.8 oz)   LMP 02/13/2009   SpO2 98%   BMI 46.83 kg/m    Body mass index is 46.83 kg/m .  Physical Exam  Cardiovascular:      Rate and Rhythm: Normal rate and regular rhythm.   Pulmonary:      Effort: Pulmonary effort is normal.      Breath sounds: Normal breath sounds.   Musculoskeletal:      Comments: Midline spinous process tenderness over the lower lumbar and sacral spine.   Neurological:      Sensory: No sensory deficit.      Motor: No weakness.      Gait: Gait abnormal (Antalgic).      Deep Tendon Reflexes: Reflexes normal.        Last Comprehensive Metabolic Panel:  Sodium   Date Value Ref Range Status   06/18/2020 136 133 - 144 mmol/L Final     Potassium   Date Value Ref Range Status   06/18/2020 3.5 3.4 - 5.3 mmol/L Final     Chloride   Date Value Ref Range Status   06/18/2020 100 94 - 109 mmol/L Final     Carbon Dioxide   Date Value Ref Range Status   06/18/2020 32 20 - 32 mmol/L Final     Anion Gap   Date Value Ref Range Status   06/18/2020 4 3 - 14 mmol/L Final     Glucose   Date Value Ref Range Status   06/18/2020 192 (H) 70 - 99 mg/dL Final     Urea Nitrogen   Date Value Ref Range Status   06/18/2020 10 7 - 30 mg/dL Final     Creatinine   Date Value Ref Range Status   06/18/2020 1.01 0.52 - 1.04 mg/dL Final     GFR Estimate   Date Value Ref Range Status   06/18/2020 60 (L) >60 mL/min/[1.73_m2] Final     Comment:     Non  GFR Calc  Starting 12/18/2018, serum creatinine based estimated GFR (eGFR) will be   calculated using the Chronic Kidney Disease Epidemiology Collaboration   (CKD-EPI) equation.       Calcium   Date Value Ref Range Status   06/18/2020 9.2 8.5 - 10.1 mg/dL Final           MRI " 11-1-2019            Answers for HPI/ROS submitted by the patient on 6/30/2021   Chronic problems general questions HPI Form  If you checked off any problems, how difficult have these problems made it for you to do your work, take care of things at home, or get along with other people?: Somewhat difficult  PHQ9 TOTAL SCORE: 13

## 2021-07-01 ASSESSMENT — PATIENT HEALTH QUESTIONNAIRE - PHQ9: SUM OF ALL RESPONSES TO PHQ QUESTIONS 1-9: 13

## 2021-07-13 ENCOUNTER — TELEPHONE (OUTPATIENT)
Dept: FAMILY MEDICINE | Facility: CLINIC | Age: 61
End: 2021-07-13

## 2021-07-13 NOTE — TELEPHONE ENCOUNTER
Reason for call:  Form   Our goal is to have forms completed within 72 hours, however some forms may require a visit or additional information.     Who is the form from? Patient  Where did the form come from? form was faxed in  What clinic location was the form placed at? [place]  Where was the form placed? Dr. Juárez's Box/Folder  What number is listed as a contact on the form? Patient    Phone call message - patient request for a letter, form or note:     Date needed: within one week  Please call the patient when completed  Has the patient signed a consent form for release of information? Not Applicable    Additional comments:     Type of letter, form or note: disability    Phone number to reach patient:  Cell number on file:    Telephone Information:   Mobile [phone]       Best Time: ANY    Can we leave a detailed message on this number?  YES    Travel screening: Not Applicable

## 2021-07-13 NOTE — TELEPHONE ENCOUNTER
Message sent to patient asking if needs to  or be faxed, kept copy to send to [employer]     Shannan Bellamy/

## 2021-07-30 NOTE — PROGRESS NOTES
[employer] message sent to assist in completion of PVP    Pre-Visit Planning   Next 5 appointments (look out 90 days)    Aug 11, 2021  4:00 PM  (Arrive by 3:40 PM)  Office Visit with Leonides Juárez MD  [facility] [address] [facility] [address] [phone]        Appointment Notes for this encounter:   dm f/u    Questionnaires Reviewed/Assigned  No additional questionnaires are needed      Patient preferred phone number: [phone]    Orders pended. MARGAUX

## 2021-08-05 ASSESSMENT — PATIENT HEALTH QUESTIONNAIRE - PHQ9
SUM OF ALL RESPONSES TO PHQ QUESTIONS 1-9: 12
10. IF YOU CHECKED OFF ANY PROBLEMS, HOW DIFFICULT HAVE THESE PROBLEMS MADE IT FOR YOU TO DO YOUR WORK, TAKE CARE OF THINGS AT HOME, OR GET ALONG WITH OTHER PEOPLE: VERY DIFFICULT
SUM OF ALL RESPONSES TO PHQ QUESTIONS 1-9: 12

## 2021-08-06 ENCOUNTER — MYC MEDICAL ADVICE (OUTPATIENT)
Dept: FAMILY MEDICINE | Facility: CLINIC | Age: 61
End: 2021-08-06

## 2021-08-06 ASSESSMENT — PATIENT HEALTH QUESTIONNAIRE - PHQ9: SUM OF ALL RESPONSES TO PHQ QUESTIONS 1-9: 12

## 2021-08-10 DIAGNOSIS — G47.09 OTHER INSOMNIA: ICD-10-CM

## 2021-08-10 RX ORDER — ZOLPIDEM TARTRATE 10 MG/1
TABLET ORAL
Qty: 30 TABLET | Refills: 2 | Status: SHIPPED | OUTPATIENT
Start: 2021-08-10 | End: 2021-11-05

## 2021-08-10 NOTE — TELEPHONE ENCOUNTER
Pt left VM for RN stating that she has been out of ambien for 2 days and needs a refill.    Routing to [facility] refill.   Genet Lerner RN

## 2021-08-10 NOTE — TELEPHONE ENCOUNTER
Routing refill request to provider for review/approval because:  Drug not on the [facility] refill protocol   Genet Lerner RN

## 2021-08-11 ENCOUNTER — OFFICE VISIT (OUTPATIENT)
Dept: FAMILY MEDICINE | Facility: CLINIC | Age: 61
End: 2021-08-11
Payer: MEDICARE

## 2021-08-11 VITALS
HEIGHT: 64 IN | HEART RATE: 85 BPM | SYSTOLIC BLOOD PRESSURE: 136 MMHG | DIASTOLIC BLOOD PRESSURE: 80 MMHG | RESPIRATION RATE: 12 BRPM | TEMPERATURE: 98.5 F | OXYGEN SATURATION: 96 % | WEIGHT: 267 LBS | BODY MASS INDEX: 45.58 KG/M2

## 2021-08-11 DIAGNOSIS — Z12.31 ENCOUNTER FOR SCREENING MAMMOGRAM FOR BREAST CANCER: ICD-10-CM

## 2021-08-11 DIAGNOSIS — F42.4 SKIN-PICKING DISORDER: ICD-10-CM

## 2021-08-11 DIAGNOSIS — Z79.4 TYPE 2 DIABETES MELLITUS WITH DIABETIC POLYNEUROPATHY, WITH LONG-TERM CURRENT USE OF INSULIN (H): Primary | ICD-10-CM

## 2021-08-11 DIAGNOSIS — E11.42 TYPE 2 DIABETES MELLITUS WITH DIABETIC POLYNEUROPATHY, WITH LONG-TERM CURRENT USE OF INSULIN (H): Primary | ICD-10-CM

## 2021-08-11 LAB — HBA1C MFR BLD: 6.6 % (ref 0–5.6)

## 2021-08-11 PROCEDURE — 82043 UR ALBUMIN QUANTITATIVE: CPT | Performed by: FAMILY MEDICINE

## 2021-08-11 PROCEDURE — 90750 HZV VACC RECOMBINANT IM: CPT | Performed by: FAMILY MEDICINE

## 2021-08-11 PROCEDURE — 90471 IMMUNIZATION ADMIN: CPT | Performed by: FAMILY MEDICINE

## 2021-08-11 PROCEDURE — 99214 OFFICE O/P EST MOD 30 MIN: CPT | Mod: 25 | Performed by: FAMILY MEDICINE

## 2021-08-11 PROCEDURE — 83036 HEMOGLOBIN GLYCOSYLATED A1C: CPT | Performed by: FAMILY MEDICINE

## 2021-08-11 PROCEDURE — 36415 COLL VENOUS BLD VENIPUNCTURE: CPT | Performed by: FAMILY MEDICINE

## 2021-08-11 RX ORDER — ARIPIPRAZOLE 10 MG/1
10 TABLET ORAL DAILY
Qty: 90 TABLET | Refills: 1 | Status: SHIPPED | OUTPATIENT
Start: 2021-08-11 | End: 2022-02-14

## 2021-08-11 ASSESSMENT — ENCOUNTER SYMPTOMS
FEVER: 0
WOUND: 0

## 2021-08-11 ASSESSMENT — MIFFLIN-ST. JEOR: SCORE: 1761.1

## 2021-08-11 NOTE — NURSING NOTE
Prior to immunization administration, verified patients identity using patient s name and date of birth. Please see Immunization Activity for additional information.     Screening Questionnaire for Adult Immunization    Are you sick today?   No   Do you have allergies to medications, food, a vaccine component or latex?   No   Have you ever had a serious reaction after receiving a vaccination?   No   Do you have a long-term health problem with heart, lung, kidney, or metabolic disease (e.g., diabetes), asthma, a blood disorder, no spleen, complement component deficiency, a cochlear implant, or a spinal fluid leak?  Are you on long-term aspirin therapy?   No   Do you have cancer, leukemia, HIV/AIDS, or any other immune system problem?   No   Do you have a parent, brother, or sister with an immune system problem?   No   In the past 3 months, have you taken medications that affect  your immune system, such as prednisone, other steroids, or anticancer drugs; drugs for the treatment of rheumatoid arthritis, Crohn s disease, or psoriasis; or have you had radiation treatments?   No   Have you had a seizure, or a brain or other nervous system problem?   No   During the past year, have you received a transfusion of blood or blood    products, or been given immune (gamma) globulin or antiviral drug?   No   For women: Are you pregnant or is there a chance you could become       pregnant during the next month?   No   Have you received any vaccinations in the past 4 weeks?   No     Immunization questionnaire answers were all negative.        Per orders of Dr. gutierrez, injection of #1 shingrix given by Jay Garcia CMA. Patient instructed to remain in clinic for 15 minutes afterwards, and to report any adverse reaction to me immediately.       Screening performed by Jay Garcia CMA on 8/11/2021 at 4:44 PM.

## 2021-08-11 NOTE — PROGRESS NOTES
Assessment & Plan     Type 2 diabetes mellitus with diabetic polyneuropathy, with long-term current use of insulin (H)  Congratulated patient on her diabetes control, A1c 6.6 today.  Continue Lantus.  Goal A1c less than 7.0.  Recheck A1c in 3 months.  Formal diabetes visit in 6 months.  - **A1C FUTURE 3mo  - HEMOGLOBIN A1C  - Albumin Random Urine Quantitative with Creat Ratio  - Albumin Random Urine Quantitative with Creat Ratio    Skin-picking disorder  Refilled.  - ARIPiprazole (ABILIFY) 10 MG tablet  Dispense: 90 tablet; Refill: 1    Encounter for screening mammogram for breast cancer  - MA Screen Bilateral w/Jimenez          Return in about 6 months (around 2/11/2022) for diabetes.    Leonides Juárez MD  [facility] GISSELLE Orosco is a 61 year old who presents for the following health issues     History of Present Illness       Diabetes:   She presents for follow up of diabetes.  She is checking home blood glucose one time daily. She checks blood glucose before meals.  Blood glucose is never over 200 and never under 70. When her blood glucose is low, the patient is asymptomatic for confusion, blurred vision, lethargy and reports not feeling dizzy, shaky, or weak.  She is concerned about other. She is having numbness in feet and burning in feet.         She eats 2-3 servings of fruits and vegetables daily.She consumes 1 sweetened beverage(s) daily.She exercises with enough effort to increase her heart rate 20 to 29 minutes per day.    She is taking medications regularly.     Patient presents for interval diabetes follow-up.  Doing well, morning fasted glucose between 120 and 140.  No hypoglycemic episodes or glucose less than 70.m     No new numbness weakness or tingling.     Agreeable to get shingles vaccine today.    Requesting refill of Abilify.    Review of Systems   Constitutional: Negative for fever.   Skin: Negative for wound.            Objective    /80   Pulse 85    "Temp 98.5  F (36.9  C) (Tympanic)   Resp 12   Ht 1.626 m (5' 4\")   Wt 121.1 kg (267 lb)   LMP 02/13/2009   SpO2 96%   BMI 45.83 kg/m    Body mass index is 45.83 kg/m .  Physical Exam  Cardiovascular:      Rate and Rhythm: Normal rate and regular rhythm.   Pulmonary:      Effort: Pulmonary effort is normal.      Breath sounds: Normal breath sounds.            Results for orders placed or performed in visit on 08/11/21 (from the past 24 hour(s))   HEMOGLOBIN A1C   Result Value Ref Range    Hemoglobin A1C 6.6 (H) 0.0 - 5.6 %     *Note: Due to a large number of results and/or encounters for the requested time period, some results have not been displayed. A complete set of results can be found in Results Review.             Answers for HPI/ROS submitted by the patient on 8/5/2021  If you checked off any problems, how difficult have these problems made it for you to do your work, take care of things at home, or get along with other people?: Very difficult  PHQ9 TOTAL SCORE: 12      "

## 2021-08-12 LAB
CREAT UR-MCNC: 296 MG/DL
MICROALBUMIN UR-MCNC: 44 MG/L
MICROALBUMIN/CREAT UR: 14.86 MG/G CR (ref 0–25)

## 2021-08-23 ENCOUNTER — MYC MEDICAL ADVICE (OUTPATIENT)
Dept: OBGYN | Facility: CLINIC | Age: 61
End: 2021-08-23

## 2021-08-23 DIAGNOSIS — Z78.0 MENOPAUSE: Primary | ICD-10-CM

## 2021-08-23 DIAGNOSIS — R23.8 SKIN IRRITATION: ICD-10-CM

## 2021-08-23 NOTE — TELEPHONE ENCOUNTER
Routing refill request to provider for review/approval because:  Drug not active on patient's medication list    Torrey GAYTAN RN

## 2021-08-24 RX ORDER — NYSTATIN 100000 [USP'U]/G
POWDER TOPICAL 2 TIMES DAILY
Qty: 60 G | Refills: 0 | Status: SHIPPED | OUTPATIENT
Start: 2021-08-24 | End: 2022-02-14

## 2021-08-31 RX ORDER — ESTROGEN,CON/M-PROGEST ACET 0.45-1.5MG
1 TABLET ORAL DAILY
Qty: 90 TABLET | Refills: 0 | Status: SHIPPED | OUTPATIENT
Start: 2021-08-31 | End: 2021-10-01

## 2021-09-08 DIAGNOSIS — Z78.0 MENOPAUSE: Primary | ICD-10-CM

## 2021-09-08 RX ORDER — NORETHINDRONE ACETATE AND ETHINYL ESTRADIOL .5; 2.5 MG/1; UG/1
1 TABLET ORAL DAILY
Qty: 30 TABLET | Refills: 0 | Status: SHIPPED | OUTPATIENT
Start: 2021-09-08 | End: 2021-10-01

## 2021-09-25 ENCOUNTER — HEALTH MAINTENANCE LETTER (OUTPATIENT)
Age: 61
End: 2021-09-25

## 2021-10-01 ENCOUNTER — E-VISIT (OUTPATIENT)
Dept: FAMILY MEDICINE | Facility: CLINIC | Age: 61
End: 2021-10-01
Payer: MEDICARE

## 2021-10-01 DIAGNOSIS — N39.0 ACUTE UTI (URINARY TRACT INFECTION): Primary | ICD-10-CM

## 2021-10-01 PROCEDURE — 99421 OL DIG E/M SVC 5-10 MIN: CPT | Performed by: FAMILY MEDICINE

## 2021-10-01 RX ORDER — SULFAMETHOXAZOLE/TRIMETHOPRIM 800-160 MG
1 TABLET ORAL 2 TIMES DAILY
Qty: 6 TABLET | Refills: 0 | Status: SHIPPED | OUTPATIENT
Start: 2021-10-01 | End: 2021-10-04

## 2021-10-01 NOTE — PATIENT INSTRUCTIONS
Dear Ana Luisa Byers    Please provide a urine sample BEFORE starting antibiotics, you can stop by any [facility].     After reviewing your responses, I've been able to diagnose you with a urinary tract infection, which is a common infection of the bladder with bacteria.  This is not a sexually transmitted infection, though urinating immediately after intercourse can help prevent infections.  Drinking lots of fluids is also helpful to clear your current infection and prevent the next one.      I have sent a prescription for antibiotics to your pharmacy to treat this infection.    It is important that you take all of your prescribed medication even if your symptoms are improving after a few doses.  Taking all of your medicine helps prevent the symptoms from returning.     If your symptoms worsen, you develop pain in your back or stomach, develop fevers, or are not improving in 5 days, please contact your primary care provider for an appointment or visit any of our convenient Walk-in or Urgent Care Centers to be seen, which can be found on our website here.    Thanks again for choosing us as your health care partner,    Leonides Juárez MD  Dear Ana Luisa Byers,     After reviewing your responses, I would like you to come in for a urine test to make sure we treat you correctly. This urine test is to evaluate you for a possible urinary tract infection, and should be scheduled for today or tomorrow. Schedule a Lab Only appointment here.     Lab appointments are not available at most locations on the weekends. If no Lab Only appointment is available, you should be seen in any of our convenient Walk-in or Urgent Care Centers, which can be found on our website here.     You will receive instructions with your results in [place] once they are available.     If your symptoms worsen, you develop pain in your back or stomach, develop fevers, or are not improving in 5 days, please contact your primary care provider  for an appointment or visit a Walk-in or Urgent Care Center to be seen.     Thanks again for choosing us as your health care partner,     Leonides Juárez MD    Urinary Tract Infections in Women  Urinary tract infections (UTIs) are most often caused by bacteria. These bacteria enter the urinary tract. The bacteria may come from inside the body. Or they may travel from the skin outside the rectum or vagina into the urethra. Female anatomy makes it easy for bacteria from the bowel to enter a woman s urinary tract, which is the most common source of UTI. This means women develop UTIs more often than men. Pain in or around the urinary tract is a common UTI symptom. But the only way to know for sure if you have a UTI for the healthcare provider to test your urine. The two tests that may be done are the urinalysis and urine culture.     Types of UTIs    Cystitis. A bladder infection (cystitis) is the most common UTI in women. You may have urgent or frequent need to pee. You may also have pain, burning when you pee, and bloody urine.    Urethritis. This is an inflamed urethra, which is the tube that carries urine from the bladder to outside the body. You may have lower stomach or back pain. You may also have urgent or frequent need to pee.    Pyelonephritis. This is a kidney infection. If not treated, it can be serious and damage your kidneys. In severe cases, you may need to stay in the hospital. You may have a fever and lower back pain.    Medicines to treat a UTI  Most UTIs are treated with antibiotics. These kill the bacteria. The length of time you need to take them depends on the type of infection. It may be as short as 3 days. If you have repeated UTIs, you may need a low-dose antibiotic for several months. Take antibiotics exactly as directed. Don t stop taking them until all of the medicine is gone. If you stop taking the antibiotic too soon, the infection may not go away. You may also develop a resistance to the  antibiotic. This can make it much harder to treat.   Lifestyle changes to treat and prevent UTIs   The lifestyle changes below will help get rid of your UTI. They may also help prevent future UTIs.     Drink plenty of fluids. This includes water, juice, or other caffeine-free drinks. Fluids help flush bacteria out of your body.    Empty your bladder. Always empty your bladder when you feel the urge to pee. And always pee before going to sleep. Urine that stays in your bladder can lead to infection. Try to pee before and after sex as well.    Practice good personal hygiene. Wipe yourself from front to back after using the toilet. This helps keep bacteria from getting into the urethra.    Use condoms during sex. These help prevent UTIs caused by sexually transmitted bacteria. Also don't use spermicides during sex. These can increase the risk for UTIs. Choose other forms of birth control instead. For women who tend to get UTIs after sex, a low-dose of a preventive antibiotic may be used. Be sure to discuss this option with your healthcare provider.    Follow up with your healthcare provider as directed. He or she may test to make sure the infection has cleared. If needed, more treatment may be started.  [employer] last reviewed this educational content on 7/1/2019 2000-2021 [employer]. All rights reserved. This information is not intended as a substitute for professional medical care. Always follow your healthcare professional's instructions.

## 2021-10-08 ENCOUNTER — MYC MEDICAL ADVICE (OUTPATIENT)
Dept: FAMILY MEDICINE | Facility: CLINIC | Age: 61
End: 2021-10-08

## 2021-10-08 DIAGNOSIS — N18.31 TYPE 2 DIABETES MELLITUS WITH STAGE 3A CHRONIC KIDNEY DISEASE, WITHOUT LONG-TERM CURRENT USE OF INSULIN (H): ICD-10-CM

## 2021-10-08 DIAGNOSIS — E11.22 TYPE 2 DIABETES MELLITUS WITH STAGE 3A CHRONIC KIDNEY DISEASE, WITHOUT LONG-TERM CURRENT USE OF INSULIN (H): ICD-10-CM

## 2021-10-08 DIAGNOSIS — Z86.39 HISTORY OF INSULIN DEPENDENT DIABETES MELLITUS: ICD-10-CM

## 2021-10-10 ENCOUNTER — MYC MEDICAL ADVICE (OUTPATIENT)
Dept: FAMILY MEDICINE | Facility: CLINIC | Age: 61
End: 2021-10-10

## 2021-10-11 NOTE — TELEPHONE ENCOUNTER
Please see pt's my chart message,     Pt requesting Lantus changed to Basaglar due to insurance denying coverage.     Writer called pharmacy to change rx per ok per pcp, rx note to pharmacy from pcp.     Called pt to make aware of change  Also recommended pt to call and schedule mammo appt. Pt greed and understood.     No concerns.     Leona PATEL RN

## 2021-10-19 ENCOUNTER — TELEPHONE (OUTPATIENT)
Dept: MIDWIFE SERVICES | Facility: CLINIC | Age: 61
End: 2021-10-19
Payer: MEDICARE

## 2021-10-19 NOTE — TELEPHONE ENCOUNTER
----- Message from Megan Roger CNM sent at 10/19/2021  1:00 PM CDT -----  Regarding: visit  Hello.  I am hoping you might reach out to Ana Luisa and try to get her schedule with Janine Arrington CNM when she is here. This visit would be ok by telephone or video for menopause medication follow up.   I tried to reach out today but only able to leave voice mail.   Thank you.  Megan

## 2021-11-20 ENCOUNTER — HEALTH MAINTENANCE LETTER (OUTPATIENT)
Age: 61
End: 2021-11-20

## 2021-12-21 ENCOUNTER — E-VISIT (OUTPATIENT)
Dept: URGENT CARE | Facility: CLINIC | Age: 61
End: 2021-12-21
Payer: MEDICARE

## 2021-12-21 DIAGNOSIS — J01.90 ACUTE SINUSITIS WITH SYMPTOMS > 10 DAYS: Primary | ICD-10-CM

## 2021-12-21 PROCEDURE — 99421 OL DIG E/M SVC 5-10 MIN: CPT | Performed by: EMERGENCY MEDICINE

## 2021-12-21 RX ORDER — DOXYCYCLINE HYCLATE 100 MG
100 TABLET ORAL 2 TIMES DAILY
Qty: 14 TABLET | Refills: 0 | Status: SHIPPED | OUTPATIENT
Start: 2021-12-21 | End: 2021-12-28

## 2021-12-21 NOTE — PATIENT INSTRUCTIONS
Deareymundo Byers    After reviewing your responses, I've been able to diagnose you with?a sinus infection caused by bacteria.?     Based on your responses and diagnosis, I have prescribed doxycycline to treat your symptoms. I have sent this to your pharmacy.?     It is also important to stay well hydrated, get lots of rest and take over-the-counter decongestants,?tylenol?or ibuprofen if you?are able to?take those medications per your primary care provider to help relieve discomfort.?     It is important that you take?all of?your prescribed medication even if your symptoms are improving after a few doses.? Taking?all of?your medicine helps prevent the symptoms from returning.?     If your symptoms worsen, you develop severe headache, vomiting, high fever (>102), or are not improving in 7 days, please contact your primary care provider for an appointment or visit any of our convenient Walk-in Care or Urgent Care Centers to be seen which can be found on our website?here.?     Thanks again for choosing?us?as your health care partner,?   ?  Vidal Franklin MD?

## 2022-01-20 ENCOUNTER — IMMUNIZATION (OUTPATIENT)
Dept: FAMILY MEDICINE | Facility: CLINIC | Age: 62
End: 2022-01-20
Payer: MEDICARE

## 2022-01-20 PROCEDURE — 91305 COVID-19,PF,PFIZER (12+ YRS): CPT

## 2022-01-20 PROCEDURE — 0054A COVID-19,PF,PFIZER (12+ YRS): CPT

## 2022-02-14 ENCOUNTER — OFFICE VISIT (OUTPATIENT)
Dept: FAMILY MEDICINE | Facility: CLINIC | Age: 62
End: 2022-02-14
Payer: MEDICARE

## 2022-02-14 VITALS
DIASTOLIC BLOOD PRESSURE: 92 MMHG | HEART RATE: 80 BPM | TEMPERATURE: 97.9 F | HEIGHT: 64 IN | OXYGEN SATURATION: 97 % | WEIGHT: 271 LBS | BODY MASS INDEX: 46.26 KG/M2 | RESPIRATION RATE: 16 BRPM | SYSTOLIC BLOOD PRESSURE: 140 MMHG

## 2022-02-14 DIAGNOSIS — N18.31 TYPE 2 DIABETES MELLITUS WITH STAGE 3A CHRONIC KIDNEY DISEASE, WITHOUT LONG-TERM CURRENT USE OF INSULIN (H): ICD-10-CM

## 2022-02-14 DIAGNOSIS — E11.22 TYPE 2 DIABETES MELLITUS WITH STAGE 3A CHRONIC KIDNEY DISEASE, WITHOUT LONG-TERM CURRENT USE OF INSULIN (H): ICD-10-CM

## 2022-02-14 DIAGNOSIS — F42.4 SKIN-PICKING DISORDER: ICD-10-CM

## 2022-02-14 DIAGNOSIS — Z12.11 SCREEN FOR COLON CANCER: ICD-10-CM

## 2022-02-14 DIAGNOSIS — E11.42 TYPE 2 DIABETES MELLITUS WITH DIABETIC POLYNEUROPATHY, WITHOUT LONG-TERM CURRENT USE OF INSULIN (H): Primary | ICD-10-CM

## 2022-02-14 DIAGNOSIS — G47.09 OTHER INSOMNIA: ICD-10-CM

## 2022-02-14 DIAGNOSIS — K21.9 GASTROESOPHAGEAL REFLUX DISEASE WITHOUT ESOPHAGITIS: ICD-10-CM

## 2022-02-14 DIAGNOSIS — Z13.220 SCREENING FOR HYPERLIPIDEMIA: ICD-10-CM

## 2022-02-14 LAB — HBA1C MFR BLD: 6.3 % (ref 0–5.6)

## 2022-02-14 PROCEDURE — 90750 HZV VACC RECOMBINANT IM: CPT | Performed by: FAMILY MEDICINE

## 2022-02-14 PROCEDURE — 99207 PR FOOT EXAM NO CHARGE: CPT | Performed by: FAMILY MEDICINE

## 2022-02-14 PROCEDURE — 90471 IMMUNIZATION ADMIN: CPT | Performed by: FAMILY MEDICINE

## 2022-02-14 PROCEDURE — 83036 HEMOGLOBIN GLYCOSYLATED A1C: CPT | Performed by: FAMILY MEDICINE

## 2022-02-14 PROCEDURE — 99214 OFFICE O/P EST MOD 30 MIN: CPT | Mod: 25 | Performed by: FAMILY MEDICINE

## 2022-02-14 PROCEDURE — 36415 COLL VENOUS BLD VENIPUNCTURE: CPT | Performed by: FAMILY MEDICINE

## 2022-02-14 PROCEDURE — 80061 LIPID PANEL: CPT | Performed by: FAMILY MEDICINE

## 2022-02-14 RX ORDER — ZOLPIDEM TARTRATE 10 MG/1
TABLET ORAL
Qty: 30 TABLET | Refills: 5 | Status: SHIPPED | OUTPATIENT
Start: 2022-02-14 | End: 2022-09-06

## 2022-02-14 RX ORDER — OMEPRAZOLE 40 MG/1
40 CAPSULE, DELAYED RELEASE ORAL DAILY
Qty: 90 CAPSULE | Refills: 3 | Status: SHIPPED | OUTPATIENT
Start: 2022-02-14 | End: 2023-02-20

## 2022-02-14 RX ORDER — ARIPIPRAZOLE 10 MG/1
10 TABLET ORAL DAILY
Qty: 90 TABLET | Refills: 1 | Status: SHIPPED | OUTPATIENT
Start: 2022-02-14 | End: 2024-01-14

## 2022-02-14 ASSESSMENT — MIFFLIN-ST. JEOR: SCORE: 1774.25

## 2022-02-14 ASSESSMENT — ENCOUNTER SYMPTOMS
COLOR CHANGE: 0
WEAKNESS: 0
FEVER: 0
NUMBNESS: 1
UNEXPECTED WEIGHT CHANGE: 0

## 2022-02-14 ASSESSMENT — PATIENT HEALTH QUESTIONNAIRE - PHQ9
SUM OF ALL RESPONSES TO PHQ QUESTIONS 1-9: 11
10. IF YOU CHECKED OFF ANY PROBLEMS, HOW DIFFICULT HAVE THESE PROBLEMS MADE IT FOR YOU TO DO YOUR WORK, TAKE CARE OF THINGS AT HOME, OR GET ALONG WITH OTHER PEOPLE: SOMEWHAT DIFFICULT
SUM OF ALL RESPONSES TO PHQ QUESTIONS 1-9: 11

## 2022-02-14 NOTE — PROGRESS NOTES
Assessment & Plan     Type 2 diabetes mellitus with diabetic polyneuropathy, without long-term current use of insulin (H)  Well-controlled diabetic, latest A1c 6.3.  Continue current medical management.  - HEMOGLOBIN A1C  - HEMOGLOBIN A1C  - Miscellaneous Order for DME - ONLY FOR DME    Type 2 diabetes mellitus with stage 3a chronic kidney disease, without long-term current use of insulin (H)  - insulin glargine (LANTUS PEN) 100 UNIT/ML pen  Dispense: 9 mL; Refill: 5      Screening for hyperlipidemia  - Lipid panel reflex to direct LDL Fasting    Screen for colon cancer  - Fecal colorectal cancer screen FIT - Future (S+30)  - Fecal colorectal cancer screen FIT - Future (S+30)    Other insomnia  Controlled, continue current medical management.  Monitor for daytime drowsiness and falls.  - zolpidem (AMBIEN) 10 MG tablet  Dispense: 30 tablet; Refill: 5    Gastroesophageal reflux disease without esophagitis  Continue current medical management.  - omeprazole (PRILOSEC) 40 MG DR capsule  Dispense: 90 capsule; Refill: 3    Skin-picking disorder  Controlled, continue meds.  - ARIPiprazole (ABILIFY) 10 MG tablet  Dispense: 90 tablet; Refill: 1        Return in about 6 months (around 8/14/2022) for diabetes.    Leonides Juárez MD  [facility]   Ana Luisa is a 62 year old who presents for the following health issues     History of Present Illness       Diabetes:   She presents for follow up of diabetes.  She is checking home blood glucose two times daily. She checks blood glucose before and after meals.  Blood glucose is never over 200 and never under 70. She is aware of hypoglycemia symptoms including shakiness and blurred vision. She has no concerns regarding her diabetes at this time.  She is having numbness in feet, burning in feet and weight gain. The patient has not had a diabetic eye exam in the last 12 months.         She eats 4 or more servings of fruits and vegetables daily.She  "consumes 2 sweetened beverage(s) daily.She exercises with enough effort to increase her heart rate 20 to 29 minutes per day.  She exercises with enough effort to increase her heart rate 4 days per week.   She is taking medications regularly.       Patient pleasant 62-year-old female presents for interval follow-up regarding insulin-dependent diabetes mellitus.  Has not noticed any new symptoms.    Currently on 32 units of Lantus as monotherapy.  Morning glucoses have been controlled.  No new numbness weakness or tingling.  Has her diabetic eye exam scheduled for next month.  Requesting prescription for diabetes shoes.    Also would like to continue her Ambien which she finds helpful for her sleep.  No daytime drowsiness.  No falls.    Heartburn symptoms are controlled, currently on omeprazole.  No fevers or unintentional weight loss.        Review of Systems   Constitutional: Negative for fever and unexpected weight change.   Skin: Negative for color change.   Neurological: Positive for numbness. Negative for weakness.            Objective    BP (!) 140/92 (Cuff Size: Adult Large)   Pulse 80   Temp 97.9  F (36.6  C) (Oral)   Resp 16   Ht 1.626 m (5' 4\")   Wt 122.9 kg (271 lb)   LMP 02/13/2009   SpO2 97%   BMI 46.52 kg/m    Body mass index is 46.52 kg/m .  Physical Exam  Vitals reviewed.   Constitutional:       Appearance: She is not ill-appearing.   Cardiovascular:      Rate and Rhythm: Normal rate and regular rhythm.   Pulmonary:      Effort: Pulmonary effort is normal.      Breath sounds: Normal breath sounds.   Skin:     Comments: Callus formation visible on both heels and on the lateral borders of her feet    No fissuring, open wounds or drainage     Neurological:      Comments: Accurately identified all monofilament testing bilaterally.        Last Comprehensive Metabolic Panel:  Sodium   Date Value Ref Range Status   06/30/2021 137 133 - 144 mmol/L Final     Potassium   Date Value Ref Range Status "   06/30/2021 4.2 3.4 - 5.3 mmol/L Final     Chloride   Date Value Ref Range Status   06/30/2021 107 94 - 109 mmol/L Final     Carbon Dioxide   Date Value Ref Range Status   06/30/2021 24 20 - 32 mmol/L Final     Anion Gap   Date Value Ref Range Status   06/30/2021 6 3 - 14 mmol/L Final     Glucose   Date Value Ref Range Status   06/30/2021 131 (H) 70 - 99 mg/dL Final     Urea Nitrogen   Date Value Ref Range Status   06/30/2021 16 7 - 30 mg/dL Final     Creatinine   Date Value Ref Range Status   06/30/2021 1.01 0.52 - 1.04 mg/dL Final     GFR Estimate   Date Value Ref Range Status   06/30/2021 60 (L) >60 mL/min/[1.73_m2] Final     Comment:     Non  GFR Calc  Starting 12/18/2018, serum creatinine based estimated GFR (eGFR) will be   calculated using the Chronic Kidney Disease Epidemiology Collaboration   (CKD-EPI) equation.       Calcium   Date Value Ref Range Status   06/30/2021 9.8 8.5 - 10.1 mg/dL Final         Results for orders placed or performed in visit on 02/14/22 (from the past 24 hour(s))   HEMOGLOBIN A1C   Result Value Ref Range    Hemoglobin A1C 6.3 (H) 0.0 - 5.6 %     *Note: Due to a large number of results and/or encounters for the requested time period, some results have not been displayed. A complete set of results can be found in Results Review.     Recent Labs   Lab Test 11/25/20  1029 07/22/19  0743 04/13/16  0805 03/16/15  0849 04/10/14  1037   CHOL 304* 172   < > 175 177   HDL 45* 53   < > 51 46*   * 101*   < > 97 106   TRIG 150* 88   < > 137 126   CHOLHDLRATIO  --   --   --  3.4 3.9    < > = values in this interval not displayed.     The 10-year ASCVD risk score (Homa WILKINS Jr., et al., 2013) is: 17.7%    Values used to calculate the score:      Age: 62 years      Sex: Female      Is Non- : No      Diabetic: Yes      Tobacco smoker: No      Systolic Blood Pressure: 140 mmHg      Is BP treated: Yes      HDL Cholesterol: 45 mg/dL      Total Cholesterol:  304 mg/dL                Answers for HPI/ROS submitted by the patient on 2/14/2022  If you checked off any problems, how difficult have these problems made it for you to do your work, take care of things at home, or get along with other people?: Somewhat difficult  PHQ9 TOTAL SCORE: 11

## 2022-02-14 NOTE — NURSING NOTE
Prior to immunization administration, verified patients identity using patient s name and date of birth. Please see Immunization Activity for additional information.     Screening Questionnaire for Adult Immunization    Are you sick today?   No   Do you have allergies to medications, food, a vaccine component or latex?   No   Have you ever had a serious reaction after receiving a vaccination?   No   Do you have a long-term health problem with heart, lung, kidney, or metabolic disease (e.g., diabetes), asthma, a blood disorder, no spleen, complement component deficiency, a cochlear implant, or a spinal fluid leak?  Are you on long-term aspirin therapy?   Yes   Do you have cancer, leukemia, HIV/AIDS, or any other immune system problem?   No   Do you have a parent, brother, or sister with an immune system problem?   Yes   In the past 3 months, have you taken medications that affect  your immune system, such as prednisone, other steroids, or anticancer drugs; drugs for the treatment of rheumatoid arthritis, Crohn s disease, or psoriasis; or have you had radiation treatments?   No   Have you had a seizure, or a brain or other nervous system problem?   No   During the past year, have you received a transfusion of blood or blood    products, or been given immune (gamma) globulin or antiviral drug?   No   For women: Are you pregnant or is there a chance you could become       pregnant during the next month?   No   Have you received any vaccinations in the past 4 weeks?   No     Immunization questionnaire was positive for at least one answer.  Notified Dr. Juárez.        Per orders of Dr. Juárez, injection of Shingrix given by Marquita Ramirez CMA. Patient instructed to remain in clinic for 15 minutes afterwards, and to report any adverse reaction to me immediately.       Screening performed by Marquita Ramirez CMA on 2/14/2022 at 10:54 AM.

## 2022-02-15 ENCOUNTER — TELEPHONE (OUTPATIENT)
Dept: FAMILY MEDICINE | Facility: CLINIC | Age: 62
End: 2022-02-15
Payer: MEDICARE

## 2022-02-15 LAB
CHOLEST SERPL-MCNC: 322 MG/DL
FASTING STATUS PATIENT QL REPORTED: YES
HDLC SERPL-MCNC: 57 MG/DL
LDLC SERPL CALC-MCNC: 243 MG/DL
NONHDLC SERPL-MCNC: 265 MG/DL
TRIGL SERPL-MCNC: 109 MG/DL

## 2022-02-15 ASSESSMENT — PATIENT HEALTH QUESTIONNAIRE - PHQ9: SUM OF ALL RESPONSES TO PHQ QUESTIONS 1-9: 11

## 2022-02-15 NOTE — TELEPHONE ENCOUNTER
Reason for Call:  Form, our goal is to have forms completed with 72 hours, however, some forms may require a visit or additional information.    Type of letter, form or note:  medical    Who is the form from?: [employer] (if other please explain)    Where did the form come from: form was faxed in    What clinic location was the form placed at?: [facility]     Where the form was placed: Dr. Juárez Box/Folder    What number is listed as a contact on the form?: [phone]       Additional comments: Please fill out/sign/date. Fax back to [phone]    Call taken on 2/15/2022 at 3:44 PM by Alice Hoang MA

## 2022-03-10 ENCOUNTER — TRANSFERRED RECORDS (OUTPATIENT)
Dept: HEALTH INFORMATION MANAGEMENT | Facility: CLINIC | Age: 62
End: 2022-03-10
Payer: MEDICARE

## 2022-03-10 LAB — RETINOPATHY: NEGATIVE

## 2022-03-12 ENCOUNTER — HEALTH MAINTENANCE LETTER (OUTPATIENT)
Age: 62
End: 2022-03-12

## 2022-03-24 NOTE — PATIENT INSTRUCTIONS
Thank you for choosing [facility]!    DR. ELAINE'S CLINIC LOCATIONS:   [address] [facility] [address]   [place] [address]                        [place], [place] [id number] [phone]      SET UP SURGERY: [phone]   APPOINTMENTS: [phone]   BILLING QUESTIONS: [phone]   FAX NUMBER: [phone]       Follow up: 3 weeks    Please read through the following handouts and if you have any questions, please feel free to call us or send a [facility] message!      [employer] / CAM WALKING BOOT INSTRUCTIONS  - Do NOT drive with CAM walker on. This is due to safety and legal issues.   - Do NOT wear the CAM walker on long car/train rides or on an airplane.  - Remove the CAM walker several times a day and do ankle range of motion (ROM) exercises/wiggle toes.  - It is recommended that a thick-soled shoe be worn on the other foot to offset any created leg length issue.   - The boot does not have to be worn at night.   - There is an increased risk of developing a blood clot with lower extremity immobilization. ROM exercises and knee-high compression (tenso /ACE wrap) is recommended to lower that risk.   - You should seek medical attention if you experience calf swelling and/or pain, chest pain, or shortness of breath.     Ana Luisa to follow up with Primary Care provider regarding elevated blood pressure. (if equal or above 140/90)     Detail Level: Zone Initiate Treatment: -\\n- CeraVe Foot Cream: Apply to feet daily Initiate Treatment: -\\n- Vinegar soaks: Mix 3 parts water and 1 part apple cider vinegar and soak feet daily\\n- CeraVe Foot Cream: Apply to feet daily

## 2022-05-07 ENCOUNTER — HEALTH MAINTENANCE LETTER (OUTPATIENT)
Age: 62
End: 2022-05-07

## 2022-07-02 ENCOUNTER — HEALTH MAINTENANCE LETTER (OUTPATIENT)
Age: 62
End: 2022-07-02

## 2022-07-14 ENCOUNTER — MYC MEDICAL ADVICE (OUTPATIENT)
Dept: FAMILY MEDICINE | Facility: CLINIC | Age: 62
End: 2022-07-14

## 2022-07-14 DIAGNOSIS — E11.22 TYPE 2 DIABETES MELLITUS WITH STAGE 3A CHRONIC KIDNEY DISEASE, WITHOUT LONG-TERM CURRENT USE OF INSULIN (H): ICD-10-CM

## 2022-07-14 DIAGNOSIS — M79.671 RIGHT FOOT PAIN: ICD-10-CM

## 2022-07-14 DIAGNOSIS — N18.31 TYPE 2 DIABETES MELLITUS WITH STAGE 3A CHRONIC KIDNEY DISEASE, WITHOUT LONG-TERM CURRENT USE OF INSULIN (H): ICD-10-CM

## 2022-07-14 NOTE — TELEPHONE ENCOUNTER
Pt calls,    S-(situation): wants update message sent to [facility]    B-(background): see [employer] message, pt informs average sugars ranging 200-250 am and pm past month, also neuropathy in feet seems worse, has 6 month DM appointment next month, taking 40 units of lantus, no other DM medications, has never seen CDE    A-(assessment): DM follow up concerns    R-(recommendations): aware [facility] not in clinic today or tomorrow, routed to covering provider, please advise, could we get A1C now? E visit now?    Route to inform pt of plan  Telephone Information:   Mobile [phone]     Namrata Phipps RN, BSN  [facility]

## 2022-07-14 NOTE — TELEPHONE ENCOUNTER
Attempted to call pt regarding MyChart message, LVMTCB. [employer] message also sent.     Lorie ADAMS RN

## 2022-07-15 RX ORDER — CAPSAICIN 0.025 %
1 CREAM (GRAM) TOPICAL 3 TIMES DAILY PRN
Qty: 60 G | Refills: 0 | OUTPATIENT
Start: 2022-07-15

## 2022-07-19 DIAGNOSIS — Z86.39 HISTORY OF INSULIN DEPENDENT DIABETES MELLITUS: ICD-10-CM

## 2022-08-17 ENCOUNTER — OFFICE VISIT (OUTPATIENT)
Dept: FAMILY MEDICINE | Facility: CLINIC | Age: 62
End: 2022-08-17
Payer: MEDICARE

## 2022-08-17 VITALS
BODY MASS INDEX: 46.3 KG/M2 | WEIGHT: 271.2 LBS | TEMPERATURE: 98.4 F | RESPIRATION RATE: 18 BRPM | DIASTOLIC BLOOD PRESSURE: 90 MMHG | HEART RATE: 86 BPM | SYSTOLIC BLOOD PRESSURE: 140 MMHG | HEIGHT: 64 IN | OXYGEN SATURATION: 96 %

## 2022-08-17 DIAGNOSIS — N18.31 TYPE 2 DIABETES MELLITUS WITH STAGE 3A CHRONIC KIDNEY DISEASE, WITHOUT LONG-TERM CURRENT USE OF INSULIN (H): Primary | ICD-10-CM

## 2022-08-17 DIAGNOSIS — Z12.11 COLON CANCER SCREENING: ICD-10-CM

## 2022-08-17 DIAGNOSIS — M79.2 NEUROPATHIC PAIN: ICD-10-CM

## 2022-08-17 DIAGNOSIS — E11.22 TYPE 2 DIABETES MELLITUS WITH STAGE 3A CHRONIC KIDNEY DISEASE, WITHOUT LONG-TERM CURRENT USE OF INSULIN (H): Primary | ICD-10-CM

## 2022-08-17 DIAGNOSIS — Z12.31 ENCOUNTER FOR SCREENING MAMMOGRAM FOR BREAST CANCER: ICD-10-CM

## 2022-08-17 LAB
HBA1C MFR BLD: 7 % (ref 0–5.6)
HGB BLD-MCNC: 13.7 G/DL (ref 11.7–15.7)

## 2022-08-17 PROCEDURE — 83036 HEMOGLOBIN GLYCOSYLATED A1C: CPT | Performed by: FAMILY MEDICINE

## 2022-08-17 PROCEDURE — 85018 HEMOGLOBIN: CPT | Performed by: FAMILY MEDICINE

## 2022-08-17 PROCEDURE — 80048 BASIC METABOLIC PNL TOTAL CA: CPT | Performed by: FAMILY MEDICINE

## 2022-08-17 PROCEDURE — 99214 OFFICE O/P EST MOD 30 MIN: CPT | Performed by: FAMILY MEDICINE

## 2022-08-17 PROCEDURE — 82043 UR ALBUMIN QUANTITATIVE: CPT | Performed by: FAMILY MEDICINE

## 2022-08-17 PROCEDURE — 36415 COLL VENOUS BLD VENIPUNCTURE: CPT | Performed by: FAMILY MEDICINE

## 2022-08-17 RX ORDER — PREGABALIN 25 MG/1
25 CAPSULE ORAL 2 TIMES DAILY PRN
Qty: 60 CAPSULE | Refills: 3 | Status: SHIPPED | OUTPATIENT
Start: 2022-08-17 | End: 2023-01-31

## 2022-08-17 SDOH — ECONOMIC STABILITY: FOOD INSECURITY: WITHIN THE PAST 12 MONTHS, YOU WORRIED THAT YOUR FOOD WOULD RUN OUT BEFORE YOU GOT MONEY TO BUY MORE.: SOMETIMES TRUE

## 2022-08-17 SDOH — ECONOMIC STABILITY: TRANSPORTATION INSECURITY
IN THE PAST 12 MONTHS, HAS THE LACK OF TRANSPORTATION KEPT YOU FROM MEDICAL APPOINTMENTS OR FROM GETTING MEDICATIONS?: YES

## 2022-08-17 SDOH — HEALTH STABILITY: PHYSICAL HEALTH: ON AVERAGE, HOW MANY MINUTES DO YOU ENGAGE IN EXERCISE AT THIS LEVEL?: 20 MIN

## 2022-08-17 SDOH — ECONOMIC STABILITY: INCOME INSECURITY: IN THE LAST 12 MONTHS, WAS THERE A TIME WHEN YOU WERE NOT ABLE TO PAY THE MORTGAGE OR RENT ON TIME?: NO

## 2022-08-17 SDOH — ECONOMIC STABILITY: FOOD INSECURITY: WITHIN THE PAST 12 MONTHS, THE FOOD YOU BOUGHT JUST DIDN'T LAST AND YOU DIDN'T HAVE MONEY TO GET MORE.: NEVER TRUE

## 2022-08-17 SDOH — HEALTH STABILITY: PHYSICAL HEALTH: ON AVERAGE, HOW MANY DAYS PER WEEK DO YOU ENGAGE IN MODERATE TO STRENUOUS EXERCISE (LIKE A BRISK WALK)?: 2 DAYS

## 2022-08-17 SDOH — ECONOMIC STABILITY: TRANSPORTATION INSECURITY
IN THE PAST 12 MONTHS, HAS LACK OF TRANSPORTATION KEPT YOU FROM MEETINGS, WORK, OR FROM GETTING THINGS NEEDED FOR DAILY LIVING?: YES

## 2022-08-17 SDOH — ECONOMIC STABILITY: INCOME INSECURITY: HOW HARD IS IT FOR YOU TO PAY FOR THE VERY BASICS LIKE FOOD, HOUSING, MEDICAL CARE, AND HEATING?: SOMEWHAT HARD

## 2022-08-17 ASSESSMENT — SOCIAL DETERMINANTS OF HEALTH (SDOH)
HOW OFTEN DO YOU ATTEND CHURCH OR RELIGIOUS SERVICES?: NEVER
HOW OFTEN DO YOU GET TOGETHER WITH FRIENDS OR RELATIVES?: TWICE A WEEK
DO YOU BELONG TO ANY CLUBS OR ORGANIZATIONS SUCH AS CHURCH GROUPS UNIONS, FRATERNAL OR ATHLETIC GROUPS, OR SCHOOL GROUPS?: NO
IN A TYPICAL WEEK, HOW MANY TIMES DO YOU TALK ON THE PHONE WITH FAMILY, FRIENDS, OR NEIGHBORS?: PATIENT DECLINED
ARE YOU MARRIED, WIDOWED, DIVORCED, SEPARATED, NEVER MARRIED, OR LIVING WITH A PARTNER?: LIVING WITH PARTNER

## 2022-08-17 ASSESSMENT — LIFESTYLE VARIABLES
AUDIT-C TOTAL SCORE: 0
HOW OFTEN DO YOU HAVE SIX OR MORE DRINKS ON ONE OCCASION: NEVER
SKIP TO QUESTIONS 9-10: 1
HOW OFTEN DO YOU HAVE A DRINK CONTAINING ALCOHOL: NEVER
HOW MANY STANDARD DRINKS CONTAINING ALCOHOL DO YOU HAVE ON A TYPICAL DAY: PATIENT DOES NOT DRINK

## 2022-08-17 ASSESSMENT — ACTIVITIES OF DAILY LIVING (ADL): CURRENT_FUNCTION: NO ASSISTANCE NEEDED

## 2022-08-17 ASSESSMENT — ENCOUNTER SYMPTOMS
FEVER: 0
NAUSEA: 0
DYSURIA: 0
ARTHRALGIAS: 0
JOINT SWELLING: 0
PARESTHESIAS: 0
HEMATURIA: 0
HEMATOCHEZIA: 0
DIARRHEA: 0
BREAST MASS: 0
SORE THROAT: 0
NERVOUS/ANXIOUS: 1
HEARTBURN: 0
EYE PAIN: 0
ABDOMINAL PAIN: 0
FREQUENCY: 1
HEADACHES: 0
CONSTIPATION: 0
SHORTNESS OF BREATH: 0
CHILLS: 0
PALPITATIONS: 0
WEAKNESS: 0
DIZZINESS: 0
MYALGIAS: 1
COUGH: 0

## 2022-08-17 ASSESSMENT — PATIENT HEALTH QUESTIONNAIRE - PHQ9
SUM OF ALL RESPONSES TO PHQ QUESTIONS 1-9: 14
10. IF YOU CHECKED OFF ANY PROBLEMS, HOW DIFFICULT HAVE THESE PROBLEMS MADE IT FOR YOU TO DO YOUR WORK, TAKE CARE OF THINGS AT HOME, OR GET ALONG WITH OTHER PEOPLE: SOMEWHAT DIFFICULT
SUM OF ALL RESPONSES TO PHQ QUESTIONS 1-9: 14

## 2022-08-17 NOTE — PROGRESS NOTES
"  Assessment & Plan     Type 2 diabetes mellitus with stage 3a chronic kidney disease, without long-term current use of insulin (H)  Controlled, A1c 7.0 today.  Continue current medical management, check labs as per below.  - Albumin Random Urine Quantitative with Creat Ratio  - Basic metabolic panel  (Ca, Cl, CO2, Creat, Gluc, K, Na, BUN)  - Hemoglobin A1c  - Hemoglobin  - Albumin Random Urine Quantitative with Creat Ratio  - Basic metabolic panel  (Ca, Cl, CO2, Creat, Gluc, K, Na, BUN)  - Hemoglobin A1c  - Hemoglobin    Neuropathic pain  Start Lyrica for neuropathic pain, monitor for mood changes.  - pregabalin (LYRICA) 25 MG capsule  Dispense: 60 capsule; Refill: 3    Colon cancer screening  - Fecal colorectal cancer screen (FIT)  - Fecal colorectal cancer screen (FIT)    Encounter for screening mammogram for breast cancer  - *MA Screening Digital Bilateral        Return in about 6 months (around 2/17/2023) for diabetes.    Leonides Juárez MD  [facility]    Carmen Orosco is a 62 year old, presenting for the following health issues:  Diabetes      Healthy Habits:     In general, how would you rate your overall health?  Good    Frequency of exercise:  2-3 days/week    Duration of exercise:  15-30 minutes    Do you usually eat at least 4 servings of fruit and vegetables a day, include whole grains    & fiber and avoid regularly eating high fat or \"junk\" foods?  No    Taking medications regularly:  Yes    Medication side effects:  Not applicable    Ability to successfully perform activities of daily living:  No assistance needed    Home Safety:  No safety concerns identified    Hearing Impairment:  No hearing concerns    In the past 6 months, have you been bothered by leaking of urine? Yes    In general, how would you rate your overall mental or emotional health?  Good      PHQ-2 Total Score: 4    Additional concerns today:  No       Diabetes Follow-up    How often are you checking your " blood sugar? Two times daily  Blood sugar testing frequency justification:  Uncontrolled diabetes  What time of day are you checking your blood sugars (select all that apply)?  Before and after meals  Have you had any blood sugars above 200?  Yes   Have you had any blood sugars below 70?  No    What symptoms do you notice when your blood sugar is low?  None and Not applicable    What concerns do you have today about your diabetes? None and Blood sugar is often over 200     Do you have any of these symptoms? (Select all that apply)  Numbness in feet and Burning in feet      BP Readings from Last 2 Encounters:   08/17/22 (!) 140/90   02/14/22 (!) 140/92     Hemoglobin A1C (%)   Date Value   02/14/2022 6.3 (H)   08/11/2021 6.6 (H)   05/11/2021 7.9 (H)   02/17/2021 9.2 (H)     LDL Cholesterol Calculated (mg/dL)   Date Value   02/14/2022 243 (H)   11/25/2020 229 (H)   07/22/2019 101 (H)         Review of Systems   Constitutional: Negative for chills and fever.   HENT: Negative for congestion, ear pain, hearing loss and sore throat.    Eyes: Negative for pain and visual disturbance.   Respiratory: Negative for cough and shortness of breath.    Cardiovascular: Negative for chest pain, palpitations and peripheral edema.   Gastrointestinal: Negative for abdominal pain, constipation, diarrhea, heartburn, hematochezia and nausea.   Breasts:  Negative for tenderness, breast mass and discharge.   Genitourinary: Positive for frequency and urgency. Negative for dysuria, genital sores, hematuria, pelvic pain, vaginal bleeding and vaginal discharge.   Musculoskeletal: Positive for myalgias. Negative for arthralgias and joint swelling.   Skin: Positive for rash.   Neurological: Negative for dizziness, weakness, headaches and paresthesias.   Psychiatric/Behavioral: Negative for mood changes. The patient is nervous/anxious.             Objective    BP (!) 140/90 (BP Location: Right arm, Patient Position: Sitting, Cuff Size: Adult  "Large)   Pulse 86   Temp 98.4  F (36.9  C) (Oral)   Resp 18   Ht 1.626 m (5' 4\")   Wt 123 kg (271 lb 3.2 oz)   LMP 02/13/2009   SpO2 96%   BMI 46.55 kg/m    Body mass index is 46.55 kg/m .  Physical Exam  Cardiovascular:      Rate and Rhythm: Normal rate and regular rhythm.   Pulmonary:      Effort: Pulmonary effort is normal.      Breath sounds: Normal breath sounds.   Skin:     Comments: No skin breakdown on feet.                        Hemoglobin A1C   Date Value Ref Range Status   08/17/2022 7.0 (H) 0.0 - 5.6 % Final     Comment:     Normal <5.7%   Prediabetes 5.7-6.4%    Diabetes 6.5% or higher     Note: Adopted from ADA consensus guidelines.   05/11/2021 7.9 (H) 0 - 5.6 % Final     Comment:     Normal <5.7% Prediabetes 5.7-6.4%  Diabetes 6.5% or higher - adopted from ADA   consensus guidelines.  Results confirmed by repeat test           .  ..  Answers for HPI/ROS submitted by the patient on 8/17/2022  If you checked off any problems, how difficult have these problems made it for you to do your work, take care of things at home, or get along with other people?: Somewhat difficult  PHQ9 TOTAL SCORE: 14      "

## 2022-08-18 LAB
ANION GAP SERPL CALCULATED.3IONS-SCNC: 6 MMOL/L (ref 3–14)
BUN SERPL-MCNC: 13 MG/DL (ref 7–30)
CALCIUM SERPL-MCNC: 9.8 MG/DL (ref 8.5–10.1)
CHLORIDE BLD-SCNC: 107 MMOL/L (ref 94–109)
CO2 SERPL-SCNC: 25 MMOL/L (ref 20–32)
CREAT SERPL-MCNC: 0.82 MG/DL (ref 0.52–1.04)
CREAT UR-MCNC: 179 MG/DL
GFR SERPL CREATININE-BSD FRML MDRD: 80 ML/MIN/1.73M2
GLUCOSE BLD-MCNC: 109 MG/DL (ref 70–99)
MICROALBUMIN UR-MCNC: 62 MG/L
MICROALBUMIN/CREAT UR: 34.64 MG/G CR (ref 0–25)
POTASSIUM BLD-SCNC: 4.3 MMOL/L (ref 3.4–5.3)
SODIUM SERPL-SCNC: 138 MMOL/L (ref 133–144)

## 2022-09-03 DIAGNOSIS — G47.09 OTHER INSOMNIA: ICD-10-CM

## 2022-09-06 ENCOUNTER — MYC MEDICAL ADVICE (OUTPATIENT)
Dept: FAMILY MEDICINE | Facility: CLINIC | Age: 62
End: 2022-09-06

## 2022-09-06 DIAGNOSIS — N18.31 TYPE 2 DIABETES MELLITUS WITH STAGE 3A CHRONIC KIDNEY DISEASE, WITHOUT LONG-TERM CURRENT USE OF INSULIN (H): ICD-10-CM

## 2022-09-06 DIAGNOSIS — E11.22 TYPE 2 DIABETES MELLITUS WITH STAGE 3A CHRONIC KIDNEY DISEASE, WITHOUT LONG-TERM CURRENT USE OF INSULIN (H): ICD-10-CM

## 2022-09-06 DIAGNOSIS — F42.4 SKIN-PICKING DISORDER: ICD-10-CM

## 2022-09-06 RX ORDER — ZOLPIDEM TARTRATE 10 MG/1
TABLET ORAL
Qty: 30 TABLET | Refills: 5 | Status: SHIPPED | OUTPATIENT
Start: 2022-09-06 | End: 2023-02-28

## 2022-09-06 NOTE — TELEPHONE ENCOUNTER
Routing refill request to provider for review/approval because:  Drug not on the [facility] refill protocol     Lorie ADAMS RN

## 2022-09-12 NOTE — TELEPHONE ENCOUNTER
Per chart review:     zolpidem (AMBIEN) 10 MG tablet 30 tablet 5 2022  No   Sig: TAKE 1 TABLET(10 MG) BY MOUTH EVERY NIGHT AS NEEDED FOR SLEEP   Sent to pharmacy as: Zolpidem Tartrate 10 MG Oral Tablet (AMBIEN)   Class: E-Prescribe   Order: [id number]   E-Prescribing Status: Receipt confirmed by pharmacy (2022 12:34 PM CDT)     insulin glargine (LANTUS PEN) 100 UNIT/ML pen 9 mL 5 2022  No   SiU Lantus at bedtime, increase by 2U every other day if fasted morning glucose > 125. Do NOT exceed 46U until speaking with a doctor.   Sent to pharmacy as: Insulin Glargine 100 UNIT/ML Subcutaneous Solution Pen-injector (LANTUS PEN)   Class: E-Prescribe   Notes to Pharmacy: If Lantus is not covered by insurance, may substitute Basaglar or Semglee or other insulin glargine product per insurance preference at same dose and frequency.     Order: [id number]   E-Prescribing Status: Receipt confirmed by pharmacy (2022 10:45 AM CST)         Lorie ADAMS RN

## 2023-01-06 ENCOUNTER — OFFICE VISIT (OUTPATIENT)
Dept: URGENT CARE | Facility: URGENT CARE | Age: 63
End: 2023-01-06
Payer: MEDICARE

## 2023-01-06 VITALS
TEMPERATURE: 96.2 F | DIASTOLIC BLOOD PRESSURE: 87 MMHG | OXYGEN SATURATION: 98 % | SYSTOLIC BLOOD PRESSURE: 145 MMHG | HEART RATE: 80 BPM

## 2023-01-06 DIAGNOSIS — E11.42 TYPE 2 DIABETES MELLITUS WITH DIABETIC POLYNEUROPATHY, WITHOUT LONG-TERM CURRENT USE OF INSULIN (H): ICD-10-CM

## 2023-01-06 DIAGNOSIS — L23.9 ALLERGIC DERMATITIS: Primary | ICD-10-CM

## 2023-01-06 DIAGNOSIS — I10 HTN, GOAL BELOW 140/90: ICD-10-CM

## 2023-01-06 PROCEDURE — 99214 OFFICE O/P EST MOD 30 MIN: CPT | Performed by: PHYSICIAN ASSISTANT

## 2023-01-06 RX ORDER — CETIRIZINE HYDROCHLORIDE 10 MG/1
10 TABLET ORAL DAILY
Qty: 30 TABLET | Refills: 0 | Status: SHIPPED | OUTPATIENT
Start: 2023-01-06

## 2023-01-06 RX ORDER — PREDNISONE 20 MG/1
20 TABLET ORAL DAILY
Qty: 26 TABLET | Refills: 0 | Status: SHIPPED | OUTPATIENT
Start: 2023-01-06 | End: 2023-01-31

## 2023-01-06 RX ORDER — CLOBETASOL PROPIONATE 0.5 MG/G
CREAM TOPICAL 2 TIMES DAILY
Qty: 60 G | Refills: 1 | Status: SHIPPED | OUTPATIENT
Start: 2023-01-06 | End: 2024-02-01

## 2023-01-06 ASSESSMENT — ENCOUNTER SYMPTOMS
TROUBLE SWALLOWING: 0
FEVER: 0
CHEST TIGHTNESS: 0
WHEEZING: 0
SHORTNESS OF BREATH: 0
SORE THROAT: 0

## 2023-01-06 NOTE — PROGRESS NOTES
Assessment & Plan:        ICD-10-CM    1. Allergic dermatitis  L23.9 cetirizine (ZYRTEC) 10 MG tablet     clobetasol (TEMOVATE) 0.05 % external cream     predniSONE (DELTASONE) 20 MG tablet      2. HTN, goal below 140/90  I10 Blood Pressure Monitoring (COMFORT TOUCH BP CUFF/LARGE) MISC      3. Type 2 diabetes mellitus with diabetic polyneuropathy, without long-term current use of insulin (H)  E11.42 Blood Pressure Monitoring (COMFORT TOUCH BP CUFF/LARGE) MISC            Plan/Clinical Decision Makin) Patient with scaly, erythemaotus macular patches on antecubital fossa bilateral, bilateral torso, back.  Eczematous looking rash. Can take Zyrtec daily to help with itching, steroid cream. Since rash widespread on back will prescribe course of prednisone for rash. Patient has dermatology appointment set up for follow up. Reviewed med side effects.     2) Elevated BP/HTN.   She states is is always elevated. Doesn't check at home.   Discussed monitoring at home with home BP cuff and bring in results to PCP visit for evaluation.   Not at goal.     3) Diabetic.   Discussed effects of prednisone on blood sugars, monitor blood sugars.   Work on healthy diet, low carb and sugars.       Return if symptoms worsen or fail to improve, for in 5-7 days.     At the end of the encounter, I discussed results, diagnosis, medications. Discussed red flags for immediate return to clinic/ER, as well as indications for follow up if no improvement. Patient understood and agreed to plan. Patient was stable for discharge.        Annamarie Hayden PA-C on 2023 at 10:35 AM          Subjective:     HPI:    Ana Luisa is a 62 year old female who presents to clinic today for the following health issues:  Chief Complaint   Patient presents with     Urgent Care     Rash on back,front side of both breast, both sides and both arms which started a month ago and has not gotten better.      HPI    Patient complains of rash for a month. Very itchy.    No new products, no travel, no exposure to anything.   Hasn't had this before. Tried OTC cortisone and Benadryl. Cereve lotion.     History obtained from the patient.    Review of Systems   Constitutional: Negative for fever.   HENT: Negative for sore throat and trouble swallowing.    Respiratory: Negative for chest tightness, shortness of breath and wheezing.          Patient Active Problem List   Diagnosis     Mixed hyperlipidemia     Chronic pain associated with significant psychosocial dysfunction     Osteoarthritis     S/P shoulder replacement     Cluster headaches     Fibromyalgia     Plantar fasciitis     Other insomnia     HTN, goal below 140/90     Gastroesophageal reflux disease without esophagitis     Restless legs syndrome (RLS)     CKD (chronic kidney disease) stage 2, GFR 60-89 ml/min     HSV-1 infection     Left lumbar radiculitis     Acquired hypothyroidism     Type 2 diabetes mellitus with diabetic polyneuropathy, without long-term current use of insulin (H)     Bilateral lower extremity edema     Benign essential hypertension     Anxiety     Severe episode of recurrent major depressive disorder, without psychotic features (H)     BMI 45.0-49.9, adult (H)     Spinal stenosis of lumbar region with radiculopathy     Menopause        Past Medical History:   Diagnosis Date     Arthritis     left shoulder and back     Chronic pain     fibromalgia      CKD (chronic kidney disease), stage III (H)      Fibromyalgia      Gastro-oesophageal reflux disease      HTN, goal below 140/90      Hyperlipidemia LDL goal <100      Hypothyroidism      Major depression     chronic kidney disease-stage 3     Peripheral neuropathy Feb 2015    + callus. diabetic shoes rx done     PONV (postoperative nausea and vomiting)      Rheumatoid arthritis of foot (H)      Tongue abnormality      Type 2 diabetes, HbA1C goal < 8% (H)     pre-diabetic       Social History     Tobacco Use     Smoking status: Never     Smokeless  tobacco: Never   Substance Use Topics     Alcohol use: No             Objective:     Vitals:    01/06/23 1009   BP: (!) 145/87   BP Location: Right arm   Patient Position: Sitting   Cuff Size: Adult Large   Pulse: 80   Temp: (!) 96.2  F (35.7  C)   TempSrc: Tympanic   SpO2: 98%         Physical Exam   EXAM:   Pleasant, alert, appropriate appearance. NAD.  Head Exam: Normocephalic, atraumatic.  Eye Exam:  non icteric/injection.    OroPharynx Exam:  Moist mucous membranes. No erythema, pharynx without exudate or hypertrophy.  Neck/Thyroid Exam:  No LAD.    Chest/Respiratory Exam: CTAB.  Skin: scaly, erythematous macular rash on back, sides of torso, antecubital fossa.   eczematous appearing.       Results:  No results found for any visits on 01/06/23.

## 2023-01-07 ENCOUNTER — HEALTH MAINTENANCE LETTER (OUTPATIENT)
Age: 63
End: 2023-01-07

## 2023-01-16 DIAGNOSIS — E11.22 TYPE 2 DIABETES MELLITUS WITH STAGE 3A CHRONIC KIDNEY DISEASE, WITHOUT LONG-TERM CURRENT USE OF INSULIN (H): ICD-10-CM

## 2023-01-16 DIAGNOSIS — N18.31 TYPE 2 DIABETES MELLITUS WITH STAGE 3A CHRONIC KIDNEY DISEASE, WITHOUT LONG-TERM CURRENT USE OF INSULIN (H): ICD-10-CM

## 2023-01-31 ENCOUNTER — OFFICE VISIT (OUTPATIENT)
Dept: FAMILY MEDICINE | Facility: CLINIC | Age: 63
End: 2023-01-31
Payer: MEDICARE

## 2023-01-31 VITALS
SYSTOLIC BLOOD PRESSURE: 138 MMHG | TEMPERATURE: 97.9 F | OXYGEN SATURATION: 97 % | HEART RATE: 79 BPM | DIASTOLIC BLOOD PRESSURE: 86 MMHG | RESPIRATION RATE: 16 BRPM

## 2023-01-31 DIAGNOSIS — R21 RASH AND NONSPECIFIC SKIN ERUPTION: ICD-10-CM

## 2023-01-31 DIAGNOSIS — Z12.11 SCREEN FOR COLON CANCER: ICD-10-CM

## 2023-01-31 DIAGNOSIS — Z13.220 SCREENING FOR HYPERLIPIDEMIA: ICD-10-CM

## 2023-01-31 DIAGNOSIS — E11.42 TYPE 2 DIABETES MELLITUS WITH DIABETIC POLYNEUROPATHY, WITHOUT LONG-TERM CURRENT USE OF INSULIN (H): Primary | ICD-10-CM

## 2023-01-31 LAB
CHOLEST SERPL-MCNC: 275 MG/DL
HBA1C MFR BLD: 8.2 % (ref 0–5.6)
HDLC SERPL-MCNC: 48 MG/DL
LDLC SERPL CALC-MCNC: 193 MG/DL
NONHDLC SERPL-MCNC: 227 MG/DL
TRIGL SERPL-MCNC: 171 MG/DL

## 2023-01-31 PROCEDURE — 36415 COLL VENOUS BLD VENIPUNCTURE: CPT | Performed by: FAMILY MEDICINE

## 2023-01-31 PROCEDURE — 99214 OFFICE O/P EST MOD 30 MIN: CPT | Mod: 25 | Performed by: FAMILY MEDICINE

## 2023-01-31 PROCEDURE — 90715 TDAP VACCINE 7 YRS/> IM: CPT | Performed by: FAMILY MEDICINE

## 2023-01-31 PROCEDURE — 80061 LIPID PANEL: CPT | Performed by: FAMILY MEDICINE

## 2023-01-31 PROCEDURE — 90471 IMMUNIZATION ADMIN: CPT | Performed by: FAMILY MEDICINE

## 2023-01-31 PROCEDURE — 83036 HEMOGLOBIN GLYCOSYLATED A1C: CPT | Performed by: FAMILY MEDICINE

## 2023-01-31 RX ORDER — DULAGLUTIDE 0.75 MG/.5ML
0.75 INJECTION, SOLUTION SUBCUTANEOUS
Qty: 2 ML | Refills: 3 | Status: SHIPPED | OUTPATIENT
Start: 2023-01-31 | End: 2023-06-16

## 2023-01-31 ASSESSMENT — ENCOUNTER SYMPTOMS
WEAKNESS: 0
NUMBNESS: 1

## 2023-01-31 NOTE — PROGRESS NOTES
Assessment & Plan     Type 2 diabetes mellitus with diabetic polyneuropathy, without long-term current use of insulin (H)  Uncontrolled, likely due to lifestyle changes during the holidays and recent iatrogenic corticosteroid use causing hyperglycemia.  A1c 8.2 today.  Increase Lantus from 40 units to 48 units, start Trulicity.  Monitor for GI upset.  Recommend patient checks her feet daily.  Follow-up in 3 months for recheck.  Low threshold for adjusting antihypertensive regimen however I would like her to be off of corticosteroids for at least a couple months prior to making that adjustment.  - HEMOGLOBIN A1C  - Lipid panel reflex to direct LDL Non-fasting  - HEMOGLOBIN A1C  - Lipid panel reflex to direct LDL Non-fasting  - dulaglutide (TRULICITY) 0.75 MG/0.5ML pen  Dispense: 2 mL; Refill: 3    Screening for hyperlipidemia  - Lipid panel reflex to direct LDL Non-fasting  - Lipid panel reflex to direct LDL Non-fasting    Screen for colon cancer  - Fecal colorectal cancer screen FIT - Future (S+30)    Rash and nonspecific skin eruption  Resolved.  Continue monitor.          Return in about 3 months (around 4/30/2023) for diabetes.    Leonides Juárez MD  [facility] GISSELLE Orosco is a 63 year old, presenting for the following health issues:    Derm Problem (Rash of the left arm and abdomen and back) and Diabetes      HPI     Rash  Onset/Duration: 1 month  Description  Location: Left arm and abdomen and back  Character: blotchy, burning, red  Itching: severe  Intensity:  severe  Progression of Symptoms:  improving  Accompanying signs and symptoms:   Fever: No  Body aches or joint pain: No  Sore throat symptoms: No  Recent cold symptoms: No  History:           Previous episodes of similar rash: None  New exposures:  None  Recent travel: No  Exposure to similar rash: No  Precipitating or alleviating factors: none  Therapies tried and outcome: hydrocortisone cream -  effective and  Zyrtec/cetirizine -  effective    Diabetes Follow-up    How often are you checking your blood sugar? Two times daily  Blood sugar testing frequency justification:  Uncontrolled diabetes  What time of day are you checking your blood sugars (select all that apply)?  At bedtime and Morning  Have you had any blood sugars above 200?  Yes 419  Have you had any blood sugars below 70?  No    What symptoms do you notice when your blood sugar is low?  None    What concerns do you have today about your diabetes? None and Blood sugar is often over 200     Do you have any of these symptoms? (Select all that apply)  No numbness or tingling in feet.  No redness, sores or blisters on feet.  No complaints of excessive thirst.  No reports of blurry vision.  No significant changes to weight.      BP Readings from Last 2 Encounters:   01/31/23 (!) 148/92   01/06/23 (!) 145/87     Hemoglobin A1C (%)   Date Value   08/17/2022 7.0 (H)   02/14/2022 6.3 (H)   05/11/2021 7.9 (H)   02/17/2021 9.2 (H)     LDL Cholesterol Calculated (mg/dL)   Date Value   02/14/2022 243 (H)   11/25/2020 229 (H)   07/22/2019 101 (H)       Patient is a pleasant 63-year-old female presents for interval type 2 insulin-dependent diabetes check in urgent care follow-up for rash was placed on 20 mg of prednisone which caused elevated blood morning sugars in the 400s.      Review of Systems   Eyes: Positive for visual disturbance.   Neurological: Positive for numbness. Negative for weakness.            Objective    BP (!) 148/92 (Cuff Size: Adult Large)   Pulse 79   Temp 97.9  F (36.6  C) (Oral)   Resp 16   LMP 02/13/2009   SpO2 97%   There is no height or weight on file to calculate BMI.  Physical Exam  Vitals reviewed.   Constitutional:       Appearance: Normal appearance.   Cardiovascular:      Rate and Rhythm: Normal rate and regular rhythm.   Pulmonary:      Effort: Pulmonary effort is normal.      Breath sounds: Normal breath sounds.   Skin:     General:  Skin is warm.      Findings: No lesion.            Results for orders placed or performed in visit on 01/31/23 (from the past 24 hour(s))   HEMOGLOBIN A1C   Result Value Ref Range    Hemoglobin A1C 8.2 (H) 0.0 - 5.6 %    Narrative    2nd results 8.2     *Note: Due to a large number of results and/or encounters for the requested time period, some results have not been displayed. A complete set of results can be found in Results Review.

## 2023-02-11 ENCOUNTER — OFFICE VISIT (OUTPATIENT)
Dept: URGENT CARE | Facility: URGENT CARE | Age: 63
End: 2023-02-11
Payer: MEDICARE

## 2023-02-11 VITALS
HEART RATE: 86 BPM | RESPIRATION RATE: 14 BRPM | OXYGEN SATURATION: 98 % | TEMPERATURE: 98.2 F | DIASTOLIC BLOOD PRESSURE: 85 MMHG | SYSTOLIC BLOOD PRESSURE: 138 MMHG

## 2023-02-11 DIAGNOSIS — J01.10 ACUTE NON-RECURRENT FRONTAL SINUSITIS: Primary | ICD-10-CM

## 2023-02-11 PROCEDURE — 99213 OFFICE O/P EST LOW 20 MIN: CPT | Performed by: PHYSICIAN ASSISTANT

## 2023-02-11 RX ORDER — DOXYCYCLINE 100 MG/1
100 CAPSULE ORAL 2 TIMES DAILY
Qty: 14 CAPSULE | Refills: 0 | Status: SHIPPED | OUTPATIENT
Start: 2023-02-11 | End: 2023-02-18

## 2023-02-11 NOTE — PROGRESS NOTES
"Assessment & Plan     1. Acute non-recurrent frontal sinusitis    - doxycycline hyclate (VIBRAMYCIN) 100 MG capsule; Take 1 capsule (100 mg) by mouth 2 times daily for 7 days  Dispense: 14 capsule; Refill: 0    Continue vaseline to nose.   Follow up if not improving over the next week.                 [facility] GISSELLE Orosco is a 63 year old female who presents to clinic today for the following health issues:  Chief Complaint   Patient presents with     Ear Problem     Right ear pain, sinus pain in eyes and a lot of bloody noses, and throat is sore     HPI    Here for right ear pain starting 9 days ago. Facial pain for the same amount of time. Sore throat. Cannot do saline mist for nose despite bloody noses. Has done some petrolatum jelly. No ringing in ear or discharge. No fatigue but body aches. History of sinus infections several times per year \"my whole life\".           Review of Systems        Objective    /85   Pulse 86   Temp 98.2  F (36.8  C)   Resp 14   LMP 02/13/2009   SpO2 98%   Physical Exam  Vitals and nursing note reviewed.   Constitutional:       General: She is not in acute distress.     Appearance: She is well-developed. She is not diaphoretic.   HENT:      Head: Normocephalic and atraumatic.        Right Ear: Tympanic membrane and external ear normal.      Left Ear: Tympanic membrane and external ear normal.   Eyes:      Pupils: Pupils are equal, round, and reactive to light.   Pulmonary:      Effort: Pulmonary effort is normal. No respiratory distress.      Breath sounds: Normal breath sounds.   Musculoskeletal:      Cervical back: Normal range of motion and neck supple.   Lymphadenopathy:      Cervical: No cervical adenopathy.   Skin:     General: Skin is warm and dry.   Neurological:      Mental Status: She is alert.      Cranial Nerves: No cranial nerve deficit.                    "

## 2023-02-28 ENCOUNTER — DOCUMENTATION ONLY (OUTPATIENT)
Dept: FAMILY MEDICINE | Facility: CLINIC | Age: 63
End: 2023-02-28
Payer: MEDICARE

## 2023-02-28 DIAGNOSIS — E11.42 TYPE 2 DIABETES MELLITUS WITH DIABETIC POLYNEUROPATHY, UNSPECIFIED WHETHER LONG TERM INSULIN USE (H): Primary | ICD-10-CM

## 2023-03-13 ENCOUNTER — E-VISIT (OUTPATIENT)
Dept: URGENT CARE | Facility: CLINIC | Age: 63
End: 2023-03-13
Payer: MEDICARE

## 2023-03-13 DIAGNOSIS — J01.90 ACUTE BACTERIAL SINUSITIS: Primary | ICD-10-CM

## 2023-03-13 DIAGNOSIS — B96.89 ACUTE BACTERIAL SINUSITIS: Primary | ICD-10-CM

## 2023-03-13 PROCEDURE — 99207 PR NO BILLABLE SERVICE THIS VISIT: CPT | Performed by: NURSE PRACTITIONER

## 2023-03-13 RX ORDER — DOXYCYCLINE HYCLATE 100 MG
100 TABLET ORAL 2 TIMES DAILY
Qty: 14 TABLET | Refills: 0 | Status: SHIPPED | OUTPATIENT
Start: 2023-03-13 | End: 2023-03-20

## 2023-03-13 NOTE — PATIENT INSTRUCTIONS
Sinusitis (Antibiotic Treatment)    The sinuses are air-filled spaces within the bones of the face. They connect to the inside of the nose. Sinusitis is an inflammation of the tissue that lines the sinuses. Sinusitis can occur during a cold. It can also happen due to allergies to pollens and other particles in the air. Sinusitis can cause symptoms of sinus congestion and a feeling of fullness. A sinus infection causes fever, headache, and facial pain. There is often green or yellow fluid draining from the nose or into the back of the throat (post-nasal drip). You have been given antibiotics to treat this condition.   Home care    Take the full course of antibiotics as instructed. Don't stop taking them, even when you feel better.    Drink plenty of water, hot tea, and other liquids as directed by the healthcare provider. This may help thin nasal mucus. It also may help your sinuses drain fluids.    Heat may help soothe painful areas of your face. Use a towel soaked in hot water. Or,  the shower and direct the warm spray onto your face. Using a vaporizer along with a menthol rub at night may also help soothe symptoms.     An expectorant with guaifenesin may help thin nasal mucus and help your sinuses drain fluids. Talk with your provider or pharmacists before taking an over-the-counter (OTC) medicine if you have any questions about it or its side effects..    You can use an OTC decongestant, unless a similar medicine was prescribed to you. Nasal sprays work the fastest. Use one that contains phenylephrine or oxymetazoline. First blow your nose gently. Then use the spray. Don't use these medicines more often than directed on the label. If you do, your symptoms may get worse. You may also take pills that contain pseudoephedrine. Don t use products that combine multiple medicines. This is because side effects may be increased. Read labels. You can also ask the pharmacist for help. (People with high blood  pressure should not use decongestants. They can raise blood pressure.) Talk with your provider or pharmacist if you have any questions about the medicine..    OTC antihistamines may help if allergies contributed to your sinusitis. Talk with your provider or pharmacist if you have any questions about the medicine..    Don't use nasal rinses or irrigation during an acute sinus infection, unless your healthcare provider tells you to. Rinsing may spread the infection to other areas in your sinuses.    Use acetaminophen or ibuprofen to control pain, unless another pain medicine was prescribed to you. If you have chronic liver or kidney disease or ever had a stomach ulcer, talk with your healthcare provider before using these medicines. Never give aspirin to anyone under age 18 who is ill with a fever. It may cause severe liver damage.    Don't smoke. This can make symptoms worse.    Follow-up care  Follow up with your healthcare provider, or as advised.   When to seek medical advice  Call your healthcare provider if any of these occur:     Facial pain or headache that gets worse    Stiff neck    Unusual drowsiness or confusion    Swelling of your forehead or eyelids    Symptoms don't go away in 10 days    Vision problems, such as blurred or double vision    Fever of 100.4 F (38 C) or higher, or as directed by your healthcare provider  Call 911  Call 911 if any of these occur:     Seizure    Trouble breathing    Feeling dizzy or faint    Fingernails, skin or lips look blue, purple , or gray  Prevention  Here are steps you can take to help prevent an infection:     Keep good hand washing habits.    Don t have close contact with people who have sore throats, colds, or other upper respiratory infections.    Don t smoke, and stay away from secondhand smoke.    Stay up to date with of your vaccines.  [employer] last reviewed this educational content on 12/1/2019 2000-2021 [employer]. All rights reserved. This  information is not intended as a substitute for professional medical care. Always follow your healthcare professional's instructions.        Dear Ana Luisa Byers    After reviewing your responses, I've been able to diagnose you with Acute bacterial sinusitis.      Based on your responses and diagnosis, I have prescribed   Orders Placed This Encounter     doxycycline hyclate (VIBRA-TABS) 100 MG tablet    to treat your symptoms. I have sent this to your pharmacy.?     It is also important to stay well hydrated, get lots of rest and take over-the-counter decongestants,?tylenol?or ibuprofen if you?are able to?take those medications per your primary care provider to help relieve discomfort.?     It is important that you take?all of?your prescribed medication even if your symptoms are improving after a few doses.? Taking?all of?your medicine helps prevent the symptoms from returning.?     If your symptoms worsen, you develop severe headache, vomiting, high fever (>102), or are not improving in 7 days, please contact your primary care provider for an appointment or visit any of our convenient Walk-in Care or Urgent Care Centers to be seen which can be found on our website?here.?     Thanks again for choosing?us?as your health care partner,?   ?  NORMA Morocho CNP?

## 2023-04-03 ENCOUNTER — ANCILLARY PROCEDURE (OUTPATIENT)
Dept: MAMMOGRAPHY | Facility: CLINIC | Age: 63
End: 2023-04-03
Attending: FAMILY MEDICINE
Payer: MEDICARE

## 2023-04-03 ENCOUNTER — ANCILLARY ORDERS (OUTPATIENT)
Dept: FAMILY MEDICINE | Facility: CLINIC | Age: 63
End: 2023-04-03

## 2023-04-03 DIAGNOSIS — N18.31 TYPE 2 DIABETES MELLITUS WITH STAGE 3A CHRONIC KIDNEY DISEASE, WITHOUT LONG-TERM CURRENT USE OF INSULIN (H): ICD-10-CM

## 2023-04-03 DIAGNOSIS — Z12.31 ENCOUNTER FOR SCREENING MAMMOGRAM FOR BREAST CANCER: ICD-10-CM

## 2023-04-03 DIAGNOSIS — E11.22 TYPE 2 DIABETES MELLITUS WITH STAGE 3A CHRONIC KIDNEY DISEASE, WITHOUT LONG-TERM CURRENT USE OF INSULIN (H): ICD-10-CM

## 2023-04-03 PROCEDURE — 77063 BREAST TOMOSYNTHESIS BI: CPT | Mod: TC | Performed by: RADIOLOGY

## 2023-04-03 PROCEDURE — 77067 SCR MAMMO BI INCL CAD: CPT | Mod: TC | Performed by: RADIOLOGY

## 2023-04-10 ENCOUNTER — OFFICE VISIT (OUTPATIENT)
Dept: URGENT CARE | Facility: URGENT CARE | Age: 63
End: 2023-04-10
Payer: MEDICARE

## 2023-04-10 VITALS
HEART RATE: 95 BPM | RESPIRATION RATE: 20 BRPM | SYSTOLIC BLOOD PRESSURE: 149 MMHG | DIASTOLIC BLOOD PRESSURE: 87 MMHG | TEMPERATURE: 97.2 F | OXYGEN SATURATION: 95 %

## 2023-04-10 DIAGNOSIS — H57.89 IRRITATION OF RIGHT EYE: Primary | ICD-10-CM

## 2023-04-10 DIAGNOSIS — H01.132 ECZEMATOUS DERMATITIS OF UPPER AND LOWER EYELID OF RIGHT EYE: ICD-10-CM

## 2023-04-10 DIAGNOSIS — H01.131 ECZEMATOUS DERMATITIS OF UPPER AND LOWER EYELID OF RIGHT EYE: ICD-10-CM

## 2023-04-10 DIAGNOSIS — E11.42 TYPE 2 DIABETES MELLITUS WITH DIABETIC POLYNEUROPATHY, WITHOUT LONG-TERM CURRENT USE OF INSULIN (H): ICD-10-CM

## 2023-04-10 PROCEDURE — 99213 OFFICE O/P EST LOW 20 MIN: CPT | Performed by: FAMILY MEDICINE

## 2023-04-10 NOTE — PROGRESS NOTES
Chief Complaint   Patient presents with     Urgent Care     Eye Problem     Right eye soreness-feels like something in eye     Ana Luisa was seen today for urgent care and eye problem.    Diagnoses and all orders for this visit:    Irritation of right eye    Eczematous dermatitis of upper and lower eyelid of right eye    Type 2 diabetes mellitus with diabetic polyneuropathy, without long-term current use of insulin (H)      Ana Luisa Byers is a 63 year old female who presents for evaluation of a right eye .  This appears consistent with a dermatitis.  A broad differential was considered including infection associated with rash,cellulitis, atopic dermatitis, allergic dermatitis, contact dermatitis, chemical dermatitis,scabies, environmental, etc.  I am favoring atopic dermatitis at this time given time frame and locations on the body.  Outpatient regimen as noted above.  See primary this week for recheck.   Recommend patient to use topical Cortaid ointment very thin film once a day taking care does not go inside the eye and also other times use Cetaphil or CeraVe to help with the increasing moisture retention in the area.  As there is no redness or sign of cellulitis and not concerned about any bacterial infection at this point but with her history of diabetes patient was informed about the signs of infection and to closely watch for any change in the color of the skin, pain or any worsening symptoms.  As there is no vision changes at this point no further testing is needed at this point.      History of Present Illness:  Ana Luisa Byers is a 63 year old female with history of well-controlled diabetes mellitus who presents complaining of mild right eye lid SORENESS feeling like something in the eye for 3 day(s).   Onset/timing: sudden.    Associated Signs and Symptoms: none  Treatment measures tried include: none  Contact wearer : No she denies in any change in any cosmetic  product    Past Medical History:    Diagnosis Date     Arthritis     left shoulder and back     Chronic pain     fibromalgia      CKD (chronic kidney disease), stage III (H)      Fibromyalgia      Gastro-oesophageal reflux disease      HTN, goal below 140/90      Hyperlipidemia LDL goal <100      Hypothyroidism      Major depression     chronic kidney disease-stage 3     Peripheral neuropathy Feb 2015    + callus. diabetic shoes rx done     PONV (postoperative nausea and vomiting)      Rheumatoid arthritis of foot (H)      Tongue abnormality      Type 2 diabetes, HbA1C goal < 8% (H)     pre-diabetic     Current Outpatient Medications   Medication Sig Dispense Refill     ARIPiprazole (ABILIFY) 10 MG tablet Take 1 tablet (10 mg) by mouth daily 90 tablet 1     aspirin 81 MG chewable tablet Take 1 tablet (81 mg) by mouth daily 108 tablet 3     atorvastatin (LIPITOR) 40 MG tablet        clobetasol (TEMOVATE) 0.05 % external cream Apply topically 2 times daily 60 g 1     dulaglutide (TRULICITY) 0.75 MG/0.5ML pen Inject 0.75 mg Subcutaneous every 7 days 2 mL 3     DULoxetine (CYMBALTA) 60 MG capsule Take 1 capsule (60 mg) by mouth daily 90 capsule 3     EPINEPHrine (ANY BX GENERIC EQUIV) 0.3 MG/0.3ML injection 2-pack Inject 0.3 mLs (0.3 mg) into the muscle once as needed for anaphylaxis 1 mL 1     esomeprazole (NEXIUM) 40 MG DR capsule Take 1 capsule (40 mg) by mouth every morning (before breakfast) Take 30-60 minutes before eating. 90 capsule 3     furosemide (LASIX) 40 MG tablet Take 1 tablet (40 mg) by mouth daily 90 tablet 1     hydrOXYzine (ATARAX) 25 MG tablet TAKE 1 TO 2 TABLETS(25 TO 50 MG) BY MOUTH TWICE DAILY AS NEEDED FOR ITCHING 120 tablet 3     insulin pen needle (32G X 4 MM) 32G X 4 MM miscellaneous Use 1 pen needles daily or as directed. 100 each 2     losartan (COZAAR) 25 MG tablet Take 1 tablet (25 mg) by mouth daily 90 tablet 3     omeprazole (PRILOSEC) 40 MG DR capsule Take 1 capsule (40 mg) by mouth daily 90 capsule 0     zolpidem  (AMBIEN) 10 MG tablet TAKE 1 TABLET(10 MG) BY MOUTH EVERY NIGHT AS NEEDED FOR SLEEP 30 tablet 5     blood glucose (NO BRAND SPECIFIED) test strip Use to test blood sugar as directed. 300 strip 3     blood glucose monitoring (NO BRAND SPECIFIED) meter device kit Use to test blood sugar as directed. 1 kit 0     Blood Pressure Monitoring (COMFORT TOUCH BP CUFF/LARGE) MISC 1 Units daily 1 each 0     cetirizine (ZYRTEC) 10 MG tablet Take 1 tablet (10 mg) by mouth daily 30 tablet 0     insulin glargine (LANTUS PEN) 100 UNIT/ML pen 36U Lantus at bedtime, increase by 2U every other day if fasted morning glucose > 125. Do NOT exceed 46U until speaking with a doctor. 9 mL 1     order for DME Equipment being ordered: Diabetic Shoes 1 Units 0        ROS:  Review of systems negative except as stated above.    OBJECTIVE:  BP (!) 149/87   Pulse 95   Temp 97.2  F (36.2  C) (Tympanic)   Resp 20   LMP 02/13/2009   SpO2 95%   General: no acute distress  Eye exam: right eye yolanda conjunctiva, cornea, pupil, except right eye  There is definite inflammation and some dryness noted in the both upper and lower eyelid  left eye normal lid, conjunctiva, cornea, pupil Ears: normal canals, TMs bilaterally, normal TM mobility  Nose: NORMAL - no drainage, turbinates normal in size.  Neck: supple, non-tender, free range of motion, no adenopathy  Heart: NORMAL - regular rate and rhythm without murmur.      Alessandra bAbott MD

## 2023-04-11 ENCOUNTER — MEDICAL CORRESPONDENCE (OUTPATIENT)
Dept: HEALTH INFORMATION MANAGEMENT | Facility: CLINIC | Age: 63
End: 2023-04-11
Payer: MEDICARE

## 2023-04-25 ENCOUNTER — MYC MEDICAL ADVICE (OUTPATIENT)
Dept: FAMILY MEDICINE | Facility: CLINIC | Age: 63
End: 2023-04-25
Payer: MEDICARE

## 2023-04-25 NOTE — TELEPHONE ENCOUNTER
See message and photos, patient thinks rash started when she started Trulicity.     Patria Palumbo R.N.

## 2023-04-25 NOTE — TELEPHONE ENCOUNTER
< 1% associated rash w/ trulicity, can we squeeze her in for a double book this week? Face to face only.    Dr Juárez

## 2023-04-26 ENCOUNTER — OFFICE VISIT (OUTPATIENT)
Dept: FAMILY MEDICINE | Facility: CLINIC | Age: 63
End: 2023-04-26
Payer: MEDICARE

## 2023-04-26 VITALS
WEIGHT: 270 LBS | DIASTOLIC BLOOD PRESSURE: 84 MMHG | SYSTOLIC BLOOD PRESSURE: 122 MMHG | HEART RATE: 95 BPM | RESPIRATION RATE: 22 BRPM | BODY MASS INDEX: 46.1 KG/M2 | OXYGEN SATURATION: 96 % | HEIGHT: 64 IN | TEMPERATURE: 98.2 F

## 2023-04-26 DIAGNOSIS — L30.9 DERMATITIS: ICD-10-CM

## 2023-04-26 DIAGNOSIS — E11.42 TYPE 2 DIABETES MELLITUS WITH DIABETIC POLYNEUROPATHY, WITHOUT LONG-TERM CURRENT USE OF INSULIN (H): Primary | ICD-10-CM

## 2023-04-26 DIAGNOSIS — Z12.11 COLON CANCER SCREENING: ICD-10-CM

## 2023-04-26 PROBLEM — F33.2 SEVERE EPISODE OF RECURRENT MAJOR DEPRESSIVE DISORDER, WITHOUT PSYCHOTIC FEATURES (H): Status: RESOLVED | Noted: 2018-06-21 | Resolved: 2023-04-26

## 2023-04-26 PROBLEM — N18.31 TYPE 2 DIABETES MELLITUS WITH STAGE 3A CHRONIC KIDNEY DISEASE, WITHOUT LONG-TERM CURRENT USE OF INSULIN (H): Status: ACTIVE | Noted: 2017-04-11

## 2023-04-26 PROBLEM — E11.22 TYPE 2 DIABETES MELLITUS WITH STAGE 3A CHRONIC KIDNEY DISEASE, WITHOUT LONG-TERM CURRENT USE OF INSULIN (H): Status: ACTIVE | Noted: 2017-04-11

## 2023-04-26 LAB — HBA1C MFR BLD: 6.5 % (ref 0–5.6)

## 2023-04-26 PROCEDURE — 99214 OFFICE O/P EST MOD 30 MIN: CPT | Performed by: FAMILY MEDICINE

## 2023-04-26 PROCEDURE — 36415 COLL VENOUS BLD VENIPUNCTURE: CPT | Performed by: FAMILY MEDICINE

## 2023-04-26 PROCEDURE — 83036 HEMOGLOBIN GLYCOSYLATED A1C: CPT | Performed by: FAMILY MEDICINE

## 2023-04-26 RX ORDER — TRIAMCINOLONE ACETONIDE 1 MG/G
CREAM TOPICAL 2 TIMES DAILY
Qty: 80 G | Refills: 0 | Status: SHIPPED | OUTPATIENT
Start: 2023-04-26 | End: 2023-05-06

## 2023-04-26 RX ORDER — DOXYCYCLINE 100 MG/1
100 CAPSULE ORAL 2 TIMES DAILY
Qty: 20 CAPSULE | Refills: 0 | Status: SHIPPED | OUTPATIENT
Start: 2023-04-26 | End: 2023-10-11

## 2023-04-26 ASSESSMENT — ENCOUNTER SYMPTOMS: FEVER: 0

## 2023-04-26 NOTE — PROGRESS NOTES
Assessment & Plan     Type 2 diabetes mellitus with diabetic polyneuropathy, without long-term current use of insulin (H)  Patient's had significant improvement of A1c after starting Trulicity, A1c 6.5 today however she has potentially a drug reaction with a systemic rash.  She is going to try holding off for couple weeks and additional treatment as per below to see if it clears up.  If the rash restarts, recommend switching to alternative GLP-1's  - Adult Eye  Referral  - HEMOGLOBIN A1C  - HEMOGLOBIN A1C    BMI 45.0-49.9, adult (H)    Colon cancer screening  - Fecal colorectal cancer screen FIT - Future (S+30)  - Fecal colorectal cancer screen FIT - Future (S+30)    Dermatitis  We will treat Kenalog for the topical pruritus, she is also has some significant excoriation with localized infections, recommend starting doxycycline.  - doxycycline hyclate (VIBRAMYCIN) 100 MG capsule  Dispense: 20 capsule; Refill: 0  - triamcinolone (KENALOG) 0.1 % external cream  Dispense: 80 g; Refill: 0        Leonides Juárez MD  [facility]    Carmen Orosco is a 63 year old, presenting for the following health issues:  Derm Problem          4/26/2023     3:06 PM   Additional Questions   Roomed by MENG Rey LPN   Accompanied by Boyfriend         4/26/2023     3:06 PM   Patient Reported Additional Medications   Patient reports taking the following new medications none     History of Present Illness       Reason for visit:  Rash  Symptom onset:  3-4 weeks ago  Symptoms include:  Itchy rash back, bottom & legs.  Symptom intensity:  Severe  Symptom progression:  Worsening  Had these symptoms before:  Yes  Has tried/received treatment for these symptoms:  Yes  Previous treatment was successful:  Yes  Prior treatment description:  Steroids (caused incresed sugares to 400), antibiotics (helped) and cream (2nd cream helped)  What makes it worse:  Worse with hot flashes.  What makes it better:   "Nothing.    She eats 0-1 servings of fruits and vegetables daily.She consumes 0 sweetened beverage(s) daily.She exercises with enough effort to increase her heart rate 20 to 29 minutes per day.  She exercises with enough effort to increase her heart rate 3 or less days per week.   She is taking medications regularly.       Patient is a 63-year-old female with history of type 2 diabetes who presents for interval follow-up in addition has concerns of a new rash after starting Trulicity.  Started on her legs has now moved up to her buttocks and back, extremely itchy.       Review of Systems   Constitutional: Negative for fever.            Objective    /84   Pulse 95   Temp 98.2  F (36.8  C) (Oral)   Resp 22   Ht 1.626 m (5' 4\")   Wt 122.5 kg (270 lb)   LMP 02/13/2009   SpO2 96%   Breastfeeding No   BMI 46.35 kg/m    Body mass index is 46.35 kg/m .  Physical Exam  Cardiovascular:      Rate and Rhythm: Normal rate and regular rhythm.   Pulmonary:      Effort: Pulmonary effort is normal.      Breath sounds: Normal breath sounds.                          Results for orders placed or performed in visit on 04/26/23 (from the past 24 hour(s))   HEMOGLOBIN A1C   Result Value Ref Range    Hemoglobin A1C 6.5 (H) 0.0 - 5.6 %     *Note: Due to a large number of results and/or encounters for the requested time period, some results have not been displayed. A complete set of results can be found in Results Review.                   "

## 2023-05-07 ENCOUNTER — MYC MEDICAL ADVICE (OUTPATIENT)
Dept: FAMILY MEDICINE | Facility: CLINIC | Age: 63
End: 2023-05-07
Payer: MEDICARE

## 2023-05-07 DIAGNOSIS — R60.0 BILATERAL LOWER EXTREMITY EDEMA: ICD-10-CM

## 2023-05-07 DIAGNOSIS — L30.9 DERMATITIS: Primary | ICD-10-CM

## 2023-05-09 RX ORDER — FUROSEMIDE 40 MG
40 TABLET ORAL DAILY
Qty: 90 TABLET | Refills: 1 | Status: SHIPPED | OUTPATIENT
Start: 2023-05-09 | End: 2023-10-25

## 2023-05-09 NOTE — TELEPHONE ENCOUNTER
Pt request derm referral for Dr Patel  Refill needed for lasix but break in therapy. Last prescribed in 2020

## 2023-05-16 DIAGNOSIS — N18.31 TYPE 2 DIABETES MELLITUS WITH STAGE 3A CHRONIC KIDNEY DISEASE, WITHOUT LONG-TERM CURRENT USE OF INSULIN (H): ICD-10-CM

## 2023-05-16 DIAGNOSIS — K21.9 GASTROESOPHAGEAL REFLUX DISEASE WITHOUT ESOPHAGITIS: ICD-10-CM

## 2023-05-16 DIAGNOSIS — E11.22 TYPE 2 DIABETES MELLITUS WITH STAGE 3A CHRONIC KIDNEY DISEASE, WITHOUT LONG-TERM CURRENT USE OF INSULIN (H): ICD-10-CM

## 2023-05-16 RX ORDER — OMEPRAZOLE 40 MG/1
40 CAPSULE, DELAYED RELEASE ORAL DAILY
Qty: 90 CAPSULE | Refills: 3 | Status: SHIPPED | OUTPATIENT
Start: 2023-05-16 | End: 2024-01-03

## 2023-05-22 ENCOUNTER — TELEPHONE (OUTPATIENT)
Dept: FAMILY MEDICINE | Facility: CLINIC | Age: 63
End: 2023-05-22
Payer: MEDICARE

## 2023-05-22 NOTE — TELEPHONE ENCOUNTER
Summary:    Patient is due/failing the following:   PHQ9    Reviewed:    [] CARE EVERYWHERE  [] LAST OV NOTE   [] FYI TAB  [] MYCHART ACTIVE?  [] LAST PANEL ENCOUNTER  [] FUTURE APPTS  [] IMMUNIZATIONS  [] Media Tab            Action needed:   Patient needs to do PHQ9.    Type of outreach:    Sent MyChart message.                                                                               Maddie Coffey/CASSI  [place]---[facility]

## 2023-05-31 NOTE — PROGRESS NOTES
"  SUBJECTIVE:   Ana Luisa Byers is a 58 year old female who presents to clinic today for the following health issues:      Infected animal bite on lelft hand x 7 days, bit by HER cat, swelling/discharge and pain and not getting better. Applied some Peroxide   Has pickers dermatitis and is doctoring with derm for this.  DM 2 and trying to lose weight.   Had been on phentermine for 6 months and stopped due to insurance issues.       ROS:  General, neuro, sleep, psych, musculoskeletal system otherwise negative.     /78 (BP Location: Right arm, Patient Position: Chair, Cuff Size: Adult Large)  Pulse 76  Temp 98  F (36.7  C) (Oral)  Resp 16  Ht 5' 4.5\" (1.638 m)  Wt 278 lb (126.1 kg)  LMP 02/13/2009  SpO2 97%  BMI 46.98 kg/m2    GENERAL: morbidy obese, alert and no distress  EYES: Eyes grossly normal to inspection, PERRL and conjunctivae and sclerae normal  HENT: ear canals and TM's normal, nose and mouth without ulcers or lesions  NECK: no adenopathy, no asymmetry, masses, or scars and thyroid normal to palpation  RESP: lungs clear to auscultation - no rales, rhonchi or wheezes  CV: regular rate and rhythm, normal S1 S2, no S3 or S4, no murmur, click or rub, no peripheral edema and peripheral pulses strong  MS: no gross musculoskeletal defects noted, no edema  SKIN: pickers papules, UE bilaterally with single puncture of L hand  NEURO: Normal strength and tone, mentation intact and speech normal  PSYCH: mentation appears normal, affect normal/bright    ASSESSMENT:  1. Cat bite of hand, left, initial encounter  Doxy   See back prn    2. BMI 45.0-49.9, adult (H)  Restart this med   One month   See pcp for follow up    Discussed risks of med, and need for follow up.    - phentermine 30 MG capsule; Take 1 capsule (30 mg) by mouth every morning  Dispense: 30 capsule; Refill: 0     Greater than 25 minutes spent with patient and family discussing risks and benefits and management with possible outcomes regarding " no lesions,  no deformities,  no traumatic injuries, chest wall non-tender,  no masses present, breathing is unlabored without accessory muscle use, normal breath sounds this issue with greater than 50% in counseling for medical decision making and coordination of care.

## 2023-06-02 ENCOUNTER — HEALTH MAINTENANCE LETTER (OUTPATIENT)
Age: 63
End: 2023-06-02

## 2023-06-15 DIAGNOSIS — E11.42 TYPE 2 DIABETES MELLITUS WITH DIABETIC POLYNEUROPATHY, WITHOUT LONG-TERM CURRENT USE OF INSULIN (H): ICD-10-CM

## 2023-06-16 RX ORDER — DULAGLUTIDE 0.75 MG/.5ML
0.75 INJECTION, SOLUTION SUBCUTANEOUS
Qty: 2 ML | Refills: 3 | Status: SHIPPED | OUTPATIENT
Start: 2023-06-16 | End: 2023-10-23

## 2023-07-17 DIAGNOSIS — Z86.39 HISTORY OF INSULIN DEPENDENT DIABETES MELLITUS: ICD-10-CM

## 2023-07-29 ENCOUNTER — ANCILLARY PROCEDURE (OUTPATIENT)
Dept: GENERAL RADIOLOGY | Facility: CLINIC | Age: 63
End: 2023-07-29
Attending: PHYSICIAN ASSISTANT
Payer: MEDICARE

## 2023-07-29 ENCOUNTER — OFFICE VISIT (OUTPATIENT)
Dept: URGENT CARE | Facility: URGENT CARE | Age: 63
End: 2023-07-29
Payer: MEDICARE

## 2023-07-29 VITALS
DIASTOLIC BLOOD PRESSURE: 87 MMHG | SYSTOLIC BLOOD PRESSURE: 133 MMHG | TEMPERATURE: 97.6 F | HEART RATE: 90 BPM | OXYGEN SATURATION: 95 %

## 2023-07-29 DIAGNOSIS — M25.531 RIGHT WRIST PAIN: Primary | ICD-10-CM

## 2023-07-29 DIAGNOSIS — M25.531 RIGHT WRIST PAIN: ICD-10-CM

## 2023-07-29 PROCEDURE — 99214 OFFICE O/P EST MOD 30 MIN: CPT | Performed by: PHYSICIAN ASSISTANT

## 2023-07-29 PROCEDURE — 73110 X-RAY EXAM OF WRIST: CPT | Mod: TC | Performed by: RADIOLOGY

## 2023-07-29 NOTE — PROGRESS NOTES
SUBJECTIVE:  Chief Complaint   Patient presents with    Urgent Care     Right wrist maybe fractured while cleaning at home on a step stool about an hr ago. Pt reports burning on her wrist, iced area and no help. No other injuries reported. Some numbness in finger.      Ana Luisa Byers is a 63 year old female presents with a chief complaint of right wrist pain, swelling, and tenderness.  The injury occurred 2 hour(s) ago.   The injury happened while at home. How: as aboveimmediate pain.  The patient complained of moderate pain  and has had decreased ROM.  Pain exacerbated by movement.  Relieved by nothing.  She treated it initially with no therapy. This is the first time this type of injury has occurred to this patient.     Past Medical History:   Diagnosis Date    Arthritis     left shoulder and back    Chronic pain     fibromalgia     CKD (chronic kidney disease), stage III (H)     Fibromyalgia     Gastro-oesophageal reflux disease     HTN, goal below 140/90     Hyperlipidemia LDL goal <100     Hypothyroidism     Major depression     chronic kidney disease-stage 3    Peripheral neuropathy Feb 2015    + callus. diabetic shoes rx done    PONV (postoperative nausea and vomiting)     Rheumatoid arthritis of foot (H)     Tongue abnormality     Type 2 diabetes, HbA1C goal < 8% (H)     pre-diabetic     Current Outpatient Medications   Medication Sig Dispense Refill    ARIPiprazole (ABILIFY) 10 MG tablet Take 1 tablet (10 mg) by mouth daily 90 tablet 1    aspirin 81 MG chewable tablet Take 1 tablet (81 mg) by mouth daily 108 tablet 3    atorvastatin (LIPITOR) 40 MG tablet       cetirizine (ZYRTEC) 10 MG tablet Take 1 tablet (10 mg) by mouth daily 30 tablet 0    dulaglutide (TRULICITY) 0.75 MG/0.5ML pen Inject 0.75 mg Subcutaneous every 7 days 2 mL 3    DULoxetine (CYMBALTA) 60 MG capsule Take 1 capsule (60 mg) by mouth daily 90 capsule 3    esomeprazole (NEXIUM) 40 MG DR capsule Take 1 capsule (40 mg) by mouth every  morning (before breakfast) Take 30-60 minutes before eating. 90 capsule 3    furosemide (LASIX) 40 MG tablet Take 1 tablet (40 mg) by mouth daily 90 tablet 1    hydrOXYzine (ATARAX) 25 MG tablet TAKE 1 TO 2 TABLETS(25 TO 50 MG) BY MOUTH TWICE DAILY AS NEEDED FOR ITCHING 120 tablet 3    insulin glargine (LANTUS PEN) 100 UNIT/ML pen 36U Lantus at bedtime, increase by 2U every other day if fasted morning glucose > 125. Do NOT exceed 46U until speaking with a doctor. 9 mL 3    losartan (COZAAR) 25 MG tablet Take 1 tablet (25 mg) by mouth daily 90 tablet 3    omeprazole (PRILOSEC) 40 MG DR capsule Take 1 capsule (40 mg) by mouth daily 90 capsule 3    zolpidem (AMBIEN) 10 MG tablet TAKE 1 TABLET(10 MG) BY MOUTH EVERY NIGHT AS NEEDED FOR SLEEP 30 tablet 5    blood glucose (NO BRAND SPECIFIED) test strip Use to test blood sugar as directed. 300 strip 3    blood glucose monitoring (NO BRAND SPECIFIED) meter device kit Use to test blood sugar as directed. 1 kit 0    Blood Pressure Monitoring (COMFORT TOUCH BP CUFF/LARGE) MISC 1 Units daily 1 each 0    clobetasol (TEMOVATE) 0.05 % external cream Apply topically 2 times daily (Patient not taking: Reported on 4/26/2023) 60 g 1    doxycycline hyclate (VIBRAMYCIN) 100 MG capsule Take 1 capsule (100 mg) by mouth 2 times daily (Patient not taking: Reported on 7/29/2023) 14 capsule 0    doxycycline hyclate (VIBRAMYCIN) 100 MG capsule Take 1 capsule (100 mg) by mouth 2 times daily (Patient not taking: Reported on 7/29/2023) 20 capsule 0    EPINEPHrine (ANY BX GENERIC EQUIV) 0.3 MG/0.3ML injection 2-pack Inject 0.3 mLs (0.3 mg) into the muscle once as needed for anaphylaxis (Patient not taking: Reported on 7/29/2023) 1 mL 1    insulin pen needle (32G X 4 MM) 32G X 4 MM miscellaneous Use 1 pen needles daily or as directed. 100 each 2    order for DME Equipment being ordered: Diabetic Shoes 1 Units 0     Social History     Tobacco Use    Smoking status: Never    Smokeless tobacco: Never    Substance Use Topics    Alcohol use: No       ROS:  Review of systems negative except as stated above.    EXAM:   /87 (BP Location: Left arm, Patient Position: Sitting, Cuff Size: Adult Regular)   Pulse 90   Temp 97.6  F (36.4  C) (Tympanic)   LMP 02/13/2009   SpO2 95%   Gen: healthy,alert,no distress  Extremity: wrist has swelling, point tenderness , and decreased ROM .   There is not compromise to the distal circulation.  Pulses are +2 and CRT is brisk  GENERAL APPEARANCE: healthy, alert and no distress  CHEST: clear to auscultation  CV: regular rate and rhythm  EXTREMITIES: peripheral pulses normal  SKIN: no suspicious lesions or rashes  NEURO: Normal strength and tone, sensory exam grossly normal, mentation intact and speech normal    X-ray image initially interpreted in clinic by me in order to rule out fracture/dislocation.  No evidence appreciated.      ASSESSMENT:   (M25.531) Right wrist pain  (primary encounter diagnosis)  Comment: No evidence of fracture or dislocation  Plan: XR Wrist Right G/E 3 Views, Wrist/Arm Supplies         Order Wrist Brace; Right; non-thumb spica,         Orthopedic  Referral            Red flags and emergent follow up discussed, and understood by patient  Follow up with PCP if symptoms worsen or fail to improve

## 2023-08-14 DIAGNOSIS — N18.31 TYPE 2 DIABETES MELLITUS WITH STAGE 3A CHRONIC KIDNEY DISEASE, WITHOUT LONG-TERM CURRENT USE OF INSULIN (H): ICD-10-CM

## 2023-08-14 DIAGNOSIS — E11.22 TYPE 2 DIABETES MELLITUS WITH STAGE 3A CHRONIC KIDNEY DISEASE, WITHOUT LONG-TERM CURRENT USE OF INSULIN (H): ICD-10-CM

## 2023-08-28 ENCOUNTER — MYC MEDICAL ADVICE (OUTPATIENT)
Dept: FAMILY MEDICINE | Facility: CLINIC | Age: 63
End: 2023-08-28
Payer: MEDICARE

## 2023-08-29 ENCOUNTER — MYC MEDICAL ADVICE (OUTPATIENT)
Dept: FAMILY MEDICINE | Facility: CLINIC | Age: 63
End: 2023-08-29
Payer: MEDICARE

## 2023-08-29 DIAGNOSIS — L30.9 DERMATITIS: Primary | ICD-10-CM

## 2023-08-29 DIAGNOSIS — G47.09 OTHER INSOMNIA: ICD-10-CM

## 2023-08-29 RX ORDER — DOXYCYCLINE 100 MG/1
100 CAPSULE ORAL 2 TIMES DAILY
Qty: 28 CAPSULE | Refills: 0 | Status: SHIPPED | OUTPATIENT
Start: 2023-08-29 | End: 2023-09-12

## 2023-08-29 RX ORDER — ZOLPIDEM TARTRATE 10 MG/1
TABLET ORAL
Qty: 30 TABLET | Refills: 5 | Status: SHIPPED | OUTPATIENT
Start: 2023-08-29 | End: 2024-02-26

## 2023-09-06 DIAGNOSIS — E11.22 TYPE 2 DIABETES MELLITUS WITH STAGE 3A CHRONIC KIDNEY DISEASE, WITHOUT LONG-TERM CURRENT USE OF INSULIN (H): ICD-10-CM

## 2023-09-06 DIAGNOSIS — N18.31 TYPE 2 DIABETES MELLITUS WITH STAGE 3A CHRONIC KIDNEY DISEASE, WITHOUT LONG-TERM CURRENT USE OF INSULIN (H): ICD-10-CM

## 2023-09-07 ENCOUNTER — E-VISIT (OUTPATIENT)
Dept: FAMILY MEDICINE | Facility: CLINIC | Age: 63
End: 2023-09-07
Payer: MEDICARE

## 2023-09-07 DIAGNOSIS — R21 RASH AND NONSPECIFIC SKIN ERUPTION: Primary | ICD-10-CM

## 2023-09-07 PROCEDURE — 99422 OL DIG E/M SVC 11-20 MIN: CPT | Performed by: FAMILY MEDICINE

## 2023-09-08 RX ORDER — PREDNISONE 10 MG/1
TABLET ORAL
Qty: 20 TABLET | Refills: 0 | Status: SHIPPED | OUTPATIENT
Start: 2023-09-08 | End: 2023-12-13

## 2023-09-08 NOTE — TELEPHONE ENCOUNTER
Provider E-Visit time total (minutes): 15    Widespread pruritus and rash, please see picture attached on MyChart.  Had dermatology visit, was told may be connected to kidneys.  I am assuming they are talking about HSP which is less common in adults.  They are currently checking her kidney function.  At this time, I recommend starting oral prednisone course.  If refractory would suggest punch biopsy      Leonides Juárez MD

## 2023-09-12 ENCOUNTER — E-VISIT (OUTPATIENT)
Dept: FAMILY MEDICINE | Facility: CLINIC | Age: 63
End: 2023-09-12
Payer: MEDICARE

## 2023-09-12 DIAGNOSIS — R07.9 CHEST PAIN, UNSPECIFIED TYPE: Primary | ICD-10-CM

## 2023-09-12 PROCEDURE — 99207 PR NON-BILLABLE SERV PER CHARTING: CPT | Performed by: FAMILY MEDICINE

## 2023-09-12 NOTE — TELEPHONE ENCOUNTER
Provider E-Visit time total (minutes): 0    Can we please call the patient so we can double book her for this week?    EMILIA

## 2023-09-12 NOTE — PATIENT INSTRUCTIONS
Thank you for choosing us for your care. I think an in-clinic visit would be best next steps based on your symptoms. Please schedule a clinic appointment; you won t be charged for this eVisit.      You can schedule an appointment right here in [facility], or call [phone]

## 2023-09-23 ENCOUNTER — HEALTH MAINTENANCE LETTER (OUTPATIENT)
Age: 63
End: 2023-09-23

## 2023-10-02 ENCOUNTER — TELEPHONE (OUTPATIENT)
Dept: FAMILY MEDICINE | Facility: CLINIC | Age: 63
End: 2023-10-02
Payer: MEDICARE

## 2023-10-02 NOTE — TELEPHONE ENCOUNTER
Reason for Call:  Appointment Request    Patient requesting this type of appt:  Follow-Up /Dermatologist recommended going back to pcp regarding current rash (possible kidney issues)     Requested provider: Leonides Juárez    Reason patient unable to be scheduled: Not within requested timeframe    When does patient want to be seen/preferred time:  soon    Comments: pt had to reschedule 10/3 appt due to no transportation. Pt is wondering if Dr Juárez could see her sooner than next available which is 10/30.    Could we send this information to you in [employer] or would you prefer to receive a phone call?:   Patient would like to be contacted via [employer]    Call taken on 10/2/2023 at 8:04 AM by Ritika Duran

## 2023-10-02 NOTE — TELEPHONE ENCOUNTER
She could do a telephone or virtual visit as well, we can order all of her kidney tests and she can come in to the lab at her disclosure.    Thank you,    Leonides Juárez MD

## 2023-10-05 ENCOUNTER — TELEPHONE (OUTPATIENT)
Dept: FAMILY MEDICINE | Facility: CLINIC | Age: 63
End: 2023-10-05
Payer: MEDICARE

## 2023-10-06 NOTE — TELEPHONE ENCOUNTER
Patient Returning Call    Reason for call:  Returning call, writer called  station twice with no answer. Pt is frustrated, stated she will wait for a call back and hung up.    Information relayed to patient:  Will leave message.    Patient has additional questions:  No      Could we send this information to you in [place] or would you prefer to receive a phone call?:   Patient would prefer a phone call   Okay to leave a detailed message?: No at Cell number on file:    Telephone Information:   Mobile [phone]

## 2023-10-11 ENCOUNTER — VIRTUAL VISIT (OUTPATIENT)
Dept: FAMILY MEDICINE | Facility: CLINIC | Age: 63
End: 2023-10-11
Payer: MEDICARE

## 2023-10-11 DIAGNOSIS — E11.22 TYPE 2 DIABETES MELLITUS WITH STAGE 3A CHRONIC KIDNEY DISEASE, WITHOUT LONG-TERM CURRENT USE OF INSULIN (H): ICD-10-CM

## 2023-10-11 DIAGNOSIS — Z12.11 SCREEN FOR COLON CANCER: ICD-10-CM

## 2023-10-11 DIAGNOSIS — N18.31 TYPE 2 DIABETES MELLITUS WITH STAGE 3A CHRONIC KIDNEY DISEASE, WITHOUT LONG-TERM CURRENT USE OF INSULIN (H): ICD-10-CM

## 2023-10-11 DIAGNOSIS — R21 GENERALIZED RASH: Primary | ICD-10-CM

## 2023-10-11 PROCEDURE — 99213 OFFICE O/P EST LOW 20 MIN: CPT | Mod: VID | Performed by: FAMILY MEDICINE

## 2023-10-11 RX ORDER — PREDNISONE 10 MG/1
TABLET ORAL
Qty: 20 TABLET | Refills: 0 | Status: SHIPPED | OUTPATIENT
Start: 2023-10-11 | End: 2023-12-13

## 2023-10-11 ASSESSMENT — ENCOUNTER SYMPTOMS: SHORTNESS OF BREATH: 0

## 2023-10-11 NOTE — PROGRESS NOTES
Ana Luisa is a 63 year old who is being evaluated via a billable video visit.      How would you like to obtain your AVS? MyChart  If the video visit is dropped, the invitation should be resent by: Text to cell phone: [phone]  Will anyone else be joining your video visit? No          Assessment & Plan     Generalized rash  Refractory to oral doxycycline, topical steroids, antihistamines.  She may be having a hypersensitivity rash, limited exam as this was virtual however I currently do not suspect vasculitis, recommend appearing dose of prednisone, if not better recommend dermatology evaluation  - predniSONE (DELTASONE) 10 MG tablet  Dispense: 20 tablet; Refill: 0    Screen for colon cancer  We will discuss at her annual follow-up    Type 2 diabetes mellitus with stage 3a chronic kidney disease, without long-term current use of insulin (H)  Monitor for hyperglycemia while on corticosteroids        Leonides Juárez MD  [facility]    Carmen Orosco is a 63 year old, presenting for the following health issues:  Follow Up        10/11/2023     9:25 AM   Additional Questions   Roomed by Marquita LEONARDO     Rash  Onset/Duration: over a month  Description  Location: body  Character: blotchy, raised, red  Itching: severe  Intensity:  severe  Progression of Symptoms:  worsening  Accompanying signs and symptoms:   Fever: No  Body aches or joint pain: No  Sore throat symptoms: No  Recent cold symptoms: No  History:           Previous episodes of similar rash: yes  New exposures:  None  Recent travel: No  Exposure to similar rash: No  Precipitating or alleviating factors: [place] - helps  Therapies tried and outcome: topical steroid - clobetasol, Zyrtec/cetirizine -  slight improvement, and Atarax/hydroxyzine -  usually effective              Review of Systems   Respiratory:  Negative for shortness of breath.    Skin:  Positive for rash.            Objective           Vitals:  No vitals were  obtained today due to virtual visit.    Physical Exam  Constitutional:       Appearance: She is not ill-appearing.   Pulmonary:      Effort: Pulmonary effort is normal.   Skin:     Comments: Erythematous macules on her arms   Psychiatric:         Mood and Affect: Mood normal.         Thought Content: Thought content normal.                        Video-Visit Details    Type of service:  Video Visit   Video Start Time: 10:00 AM  Video End Time: 10:15 PM    Originating Location (pt. Location): Home    Distant Location (provider location):  On-site  Platform used for Video Visit: [email or web address]

## 2023-10-22 DIAGNOSIS — E11.42 TYPE 2 DIABETES MELLITUS WITH DIABETIC POLYNEUROPATHY, WITHOUT LONG-TERM CURRENT USE OF INSULIN (H): ICD-10-CM

## 2023-10-23 RX ORDER — DULAGLUTIDE 0.75 MG/.5ML
0.75 INJECTION, SOLUTION SUBCUTANEOUS
Qty: 2 ML | Refills: 3 | Status: SHIPPED | OUTPATIENT
Start: 2023-10-23 | End: 2024-03-14

## 2023-10-25 DIAGNOSIS — R60.0 BILATERAL LOWER EXTREMITY EDEMA: ICD-10-CM

## 2023-10-25 RX ORDER — FUROSEMIDE 40 MG
40 TABLET ORAL DAILY
Qty: 90 TABLET | Refills: 1 | Status: SHIPPED | OUTPATIENT
Start: 2023-10-25 | End: 2024-04-21

## 2023-12-13 ENCOUNTER — OFFICE VISIT (OUTPATIENT)
Dept: FAMILY MEDICINE | Facility: CLINIC | Age: 63
End: 2023-12-13
Payer: MEDICARE

## 2023-12-13 VITALS
OXYGEN SATURATION: 97 % | RESPIRATION RATE: 20 BRPM | SYSTOLIC BLOOD PRESSURE: 132 MMHG | HEART RATE: 85 BPM | DIASTOLIC BLOOD PRESSURE: 84 MMHG | BODY MASS INDEX: 46.95 KG/M2 | TEMPERATURE: 97.5 F | HEIGHT: 64 IN | WEIGHT: 275 LBS

## 2023-12-13 DIAGNOSIS — Z29.11 NEED FOR VACCINATION AGAINST RESPIRATORY SYNCYTIAL VIRUS: ICD-10-CM

## 2023-12-13 DIAGNOSIS — Z12.11 SCREEN FOR COLON CANCER: ICD-10-CM

## 2023-12-13 DIAGNOSIS — Z00.00 ENCOUNTER FOR MEDICARE ANNUAL WELLNESS EXAM: Primary | Chronic | ICD-10-CM

## 2023-12-13 DIAGNOSIS — N18.31 TYPE 2 DIABETES MELLITUS WITH STAGE 3A CHRONIC KIDNEY DISEASE, WITHOUT LONG-TERM CURRENT USE OF INSULIN (H): ICD-10-CM

## 2023-12-13 DIAGNOSIS — E11.22 TYPE 2 DIABETES MELLITUS WITH STAGE 3A CHRONIC KIDNEY DISEASE, WITHOUT LONG-TERM CURRENT USE OF INSULIN (H): ICD-10-CM

## 2023-12-13 LAB
ANION GAP SERPL CALCULATED.3IONS-SCNC: 10 MMOL/L (ref 7–15)
BUN SERPL-MCNC: 12 MG/DL (ref 8–23)
CALCIUM SERPL-MCNC: 10 MG/DL (ref 8.8–10.2)
CHLORIDE SERPL-SCNC: 103 MMOL/L (ref 98–107)
CREAT SERPL-MCNC: 0.89 MG/DL (ref 0.51–0.95)
CREAT UR-MCNC: 92.7 MG/DL
DEPRECATED HCO3 PLAS-SCNC: 24 MMOL/L (ref 22–29)
EGFRCR SERPLBLD CKD-EPI 2021: 72 ML/MIN/1.73M2
GLUCOSE SERPL-MCNC: 254 MG/DL (ref 70–99)
HBA1C MFR BLD: 7.8 % (ref 0–5.6)
HGB BLD-MCNC: 12.7 G/DL (ref 11.7–15.7)
MICROALBUMIN UR-MCNC: 23.6 MG/L
MICROALBUMIN/CREAT UR: 25.46 MG/G CR (ref 0–25)
POTASSIUM SERPL-SCNC: 4.4 MMOL/L (ref 3.4–5.3)
SODIUM SERPL-SCNC: 137 MMOL/L (ref 135–145)

## 2023-12-13 PROCEDURE — 85018 HEMOGLOBIN: CPT | Performed by: FAMILY MEDICINE

## 2023-12-13 PROCEDURE — 82043 UR ALBUMIN QUANTITATIVE: CPT | Performed by: FAMILY MEDICINE

## 2023-12-13 PROCEDURE — G0439 PPPS, SUBSEQ VISIT: HCPCS | Performed by: FAMILY MEDICINE

## 2023-12-13 PROCEDURE — 80048 BASIC METABOLIC PNL TOTAL CA: CPT | Performed by: FAMILY MEDICINE

## 2023-12-13 PROCEDURE — 99214 OFFICE O/P EST MOD 30 MIN: CPT | Mod: 25 | Performed by: FAMILY MEDICINE

## 2023-12-13 PROCEDURE — 99207 PR FOOT EXAM NO CHARGE: CPT | Performed by: FAMILY MEDICINE

## 2023-12-13 PROCEDURE — 82570 ASSAY OF URINE CREATININE: CPT | Performed by: FAMILY MEDICINE

## 2023-12-13 PROCEDURE — 83036 HEMOGLOBIN GLYCOSYLATED A1C: CPT | Performed by: FAMILY MEDICINE

## 2023-12-13 PROCEDURE — 82274 ASSAY TEST FOR BLOOD FECAL: CPT | Performed by: FAMILY MEDICINE

## 2023-12-13 PROCEDURE — 36415 COLL VENOUS BLD VENIPUNCTURE: CPT | Performed by: FAMILY MEDICINE

## 2023-12-13 RX ORDER — RESPIRATORY SYNCYTIAL VIRUS VACCINE 120MCG/0.5
0.5 KIT INTRAMUSCULAR ONCE
Qty: 1 EACH | Refills: 0 | Status: CANCELLED | OUTPATIENT
Start: 2023-12-13 | End: 2023-12-13

## 2023-12-13 SDOH — HEALTH STABILITY: PHYSICAL HEALTH: ON AVERAGE, HOW MANY DAYS PER WEEK DO YOU ENGAGE IN MODERATE TO STRENUOUS EXERCISE (LIKE A BRISK WALK)?: 3 DAYS

## 2023-12-13 ASSESSMENT — SOCIAL DETERMINANTS OF HEALTH (SDOH)
HOW OFTEN DO YOU ATTENT MEETINGS OF THE CLUB OR ORGANIZATION YOU BELONG TO?: NEVER
DO YOU BELONG TO ANY CLUBS OR ORGANIZATIONS SUCH AS CHURCH GROUPS UNIONS, FRATERNAL OR ATHLETIC GROUPS, OR SCHOOL GROUPS?: NO
IN A TYPICAL WEEK, HOW MANY TIMES DO YOU TALK ON THE PHONE WITH FAMILY, FRIENDS, OR NEIGHBORS?: MORE THAN THREE TIMES A WEEK
ARE YOU MARRIED, WIDOWED, DIVORCED, SEPARATED, NEVER MARRIED, OR LIVING WITH A PARTNER?: LIVING WITH PARTNER

## 2023-12-13 ASSESSMENT — ENCOUNTER SYMPTOMS
CONSTIPATION: 1
NERVOUS/ANXIOUS: 1
HEMATURIA: 0
FREQUENCY: 0
DIZZINESS: 1
WEAKNESS: 1
SORE THROAT: 0
HEARTBURN: 1
DYSURIA: 0
ABDOMINAL PAIN: 0
EYE PAIN: 0
DIARRHEA: 0
SHORTNESS OF BREATH: 0
NAUSEA: 0
MYALGIAS: 1
PALPITATIONS: 0
HEMATOCHEZIA: 0
COUGH: 0
CHILLS: 0
ARTHRALGIAS: 1
HEADACHES: 0

## 2023-12-13 ASSESSMENT — ACTIVITIES OF DAILY LIVING (ADL): CURRENT_FUNCTION: NO ASSISTANCE NEEDED

## 2023-12-13 ASSESSMENT — LIFESTYLE VARIABLES
HOW OFTEN DO YOU HAVE A DRINK CONTAINING ALCOHOL: NEVER
HOW MANY STANDARD DRINKS CONTAINING ALCOHOL DO YOU HAVE ON A TYPICAL DAY: PATIENT DOES NOT DRINK

## 2023-12-13 NOTE — PROGRESS NOTES
"SUBJECTIVE:   Ana Luisa is a 63 year old, presenting for the following:  Medicare Visit and Diabetes        12/13/2023     9:08 AM   Additional Questions   Roomed by Marquita TAPIA       Are you in the first 12 months of your Medicare coverage?  No    Healthy Habits:     In general, how would you rate your overall health?  Good    Frequency of exercise:  2-3 days/week    Duration of exercise:  15-30 minutes    Do you usually eat at least 4 servings of fruit and vegetables a day, include whole grains    & fiber and avoid regularly eating high fat or \"junk\" foods?  No    Taking medications regularly:  Yes    Medication side effects:  Not applicable    Ability to successfully perform activities of daily living:  No assistance needed    Home Safety:  No safety concerns identified    Hearing Impairment:  Difficulty understanding soft or whispered speech and no hearing concerns    In the past 6 months, have you been bothered by leaking of urine?  No    In general, how would you rate your overall mental or emotional health?  Good    Additional concerns today:  No    Interval diabetes follow-up, adherent to medication.    Today's PHQ-9 Score:       12/13/2023     9:04 AM   PHQ-9 SCORE   PHQ-9 Total Score MyChart 11 (Moderate depression)   PHQ-9 Total Score 11       Have you ever done Advance Care Planning? (For example, a Health Directive, POLST, or a discussion with a medical provider or your loved ones about your wishes): No, advance care planning information given to patient to review.  Patient declined advance care planning discussion at this time.       Fall risk       Cognitive Screening   1) Repeat 3 items (Leader, Season, Table)    2) Clock draw: NORMAL  3) 3 item recall: Recalls 3 objects  Results: 0 items recalled: PROBABLE COGNITIVE IMPAIRMENT, **INFORM PROVIDER**    Mini-CogTM Copyright CHUY Portillo. Licensed by the author for use in [facility]; reprinted with permission (cole@.[facility]). All rights reserved.  "     Do you have sleep apnea, excessive snoring or daytime drowsiness? : yes    Reviewed and updated as needed this visit by clinical staff   Tobacco  Allergies  Meds              Reviewed and updated as needed this visit by Provider     Meds             Social History     Tobacco Use    Smoking status: Never    Smokeless tobacco: Never   Substance Use Topics    Alcohol use: No             12/13/2023     9:16 AM   Alcohol Use   Prescreen: >3 drinks/day or >7 drinks/week? No     Do you have a current opioid prescription? No  Do you use any other controlled substances or medications that are not prescribed by a provider? None          Diabetes Follow-up    How often are you checking your blood sugar? One time daily  What time of day are you checking your blood sugars (select all that apply)?  Before meals  Have you had any blood sugars above 200?  Yes 300  Have you had any blood sugars below 70?  No  What symptoms do you notice when your blood sugar is low?  Shaky, Dizzy, and Weak  What concerns do you have today about your diabetes? Blood sugar is often over 200   Do you have any of these symptoms? (Select all that apply)  Numbness in feet and Burning in feet  Have you had a diabetic eye exam in the last 12 months? No        BP Readings from Last 2 Encounters:   12/13/23 132/84   07/29/23 133/87     Hemoglobin A1C (%)   Date Value   12/13/2023 7.8 (H)   04/26/2023 6.5 (H)   05/11/2021 7.9 (H)   02/17/2021 9.2 (H)     LDL Cholesterol Calculated (mg/dL)   Date Value   01/31/2023 193 (H)   02/14/2022 243 (H)   11/25/2020 229 (H)   07/22/2019 101 (H)           Current providers sharing in care for this patient include:   Patient Care Team:  Leonides Juárez MD as PCP - General (Family Practice)  Leonides Juárez MD as Assigned PCP    The following health maintenance items are reviewed in Epic and correct as of today:  Health Maintenance   Topic Date Due    MEDICARE ANNUAL WELLNESS VISIT  05/14/2016    RSV VACCINE (Pregnancy  & 60+) (1 - 1-dose 60+ series) Never done    COLORECTAL CANCER SCREENING  03/08/2022    EYE EXAM  03/10/2023    BMP  08/17/2023    MICROALBUMIN  08/17/2023    INFLUENZA VACCINE (1) 09/01/2023    COVID-19 Vaccine (4 - 2023-24 season) 09/01/2023    LIPID  01/31/2024    ANNUAL REVIEW OF HM ORDERS  01/31/2024    A1C  03/13/2024    PHQ-9  06/13/2024    DIABETIC FOOT EXAM  12/13/2024    HEMOGLOBIN  12/13/2024    Pneumococcal Vaccine: Pediatrics (0 to 5 Years) and At-Risk Patients (6 to 64 Years) (3 - PPSV23 or PCV20) 02/25/2025    MAMMO SCREENING  04/03/2025    HPV TEST  05/19/2026    PAP  05/19/2026    ADVANCE CARE PLANNING  06/30/2026    DTAP/TDAP/TD IMMUNIZATION (3 - Td or Tdap) 01/31/2033    HEPATITIS C SCREENING  Completed    HIV SCREENING  Completed    URINALYSIS  Completed    ZOSTER IMMUNIZATION  Completed    IPV IMMUNIZATION  Aged Out    HPV IMMUNIZATION  Aged Out    MENINGITIS IMMUNIZATION  Aged Out    RSV MONOCLONAL ANTIBODY  Aged Out    URINE DRUG SCREEN  Discontinued     Labs reviewed in EPIC    Any new diagnosis of family breast, ovarian, or bowel cancer? No    FHS-7:       8/17/2022    12:33 PM 4/3/2023     8:43 AM   Breast CA Risk Assessment (FHS-7)   Did any of your first-degree relatives have breast or ovarian cancer? Unknown No   Did any of your relatives have bilateral breast cancer? Unknown No   Did any man in your family have breast cancer? Unknown No   Did any woman in your family have breast and ovarian cancer? Unknown No   Did any woman in your family have breast cancer before age 50 y? Unknown No   Do you have 2 or more relatives with breast and/or ovarian cancer? Unknown No   Do you have 2 or more relatives with breast and/or bowel cancer? Unknown No         Pertinent mammograms are reviewed under the imaging tab.    Review of Systems   Constitutional:  Negative for chills.   HENT:  Negative for congestion, ear pain, hearing loss and sore throat.    Eyes:  Negative for pain and visual  "disturbance.   Respiratory:  Negative for cough and shortness of breath.    Cardiovascular:  Positive for peripheral edema. Negative for chest pain and palpitations.   Gastrointestinal:  Positive for constipation and heartburn. Negative for abdominal pain, diarrhea, hematochezia and nausea.   Genitourinary:  Negative for dysuria, frequency, genital sores, hematuria and urgency.   Musculoskeletal:  Positive for arthralgias and myalgias.   Skin:  Positive for rash.   Neurological:  Positive for dizziness and weakness. Negative for headaches.   Psychiatric/Behavioral:  Negative for mood changes. The patient is nervous/anxious.          OBJECTIVE:   /84 (Cuff Size: Adult Large)   Pulse 85   Temp 97.5  F (36.4  C) (Oral)   Resp 20   Ht 1.626 m (5' 4\")   Wt 124.7 kg (275 lb)   LMP 02/13/2009   SpO2 97%   BMI 47.20 kg/m   Estimated body mass index is 47.2 kg/m  as calculated from the following:    Height as of this encounter: 1.626 m (5' 4\").    Weight as of this encounter: 124.7 kg (275 lb).  Physical Exam  GENERAL: healthy, alert and no distress  NECK: no adenopathy, no asymmetry, masses, or scars and thyroid normal to palpation  RESP: lungs clear to auscultation - no rales, rhonchi or wheezes  CV: regular rate and rhythm, normal S1 S2, no S3 or S4, no murmur, click or rub, no peripheral edema and peripheral pulses strong  ABDOMEN: soft, nontender, no hepatosplenomegaly, no masses and bowel sounds normal  MS: no gross musculoskeletal defects noted, no edema  SKIN: No lesions on feet    Diagnostic Test Results:  Labs reviewed in Epic  Results for orders placed or performed in visit on 12/13/23 (from the past 24 hour(s))   Hemoglobin A1c   Result Value Ref Range    Hemoglobin A1C 7.8 (H) 0.0 - 5.6 %   Hemoglobin   Result Value Ref Range    Hemoglobin 12.7 11.7 - 15.7 g/dL     *Note: Due to a large number of results and/or encounters for the requested time period, some results have not been displayed. A " "complete set of results can be found in Results Review.       ASSESSMENT / PLAN:   Ana Luisa was seen today for medicare visit and diabetes.    Diagnoses and all orders for this visit:    Encounter for Medicare annual wellness exam    Type 2 diabetes mellitus with stage 3a chronic kidney disease, without long-term current use of insulin (H)  Mildly uncontrolled, continue to monitor, reinforced diet and exercise.  -     Adult Eye  Referral; Future  -     BASIC METABOLIC PANEL; Future  -     Albumin Random Urine Quantitative with Creat Ratio; Future  -     Hemoglobin; Future  -     BASIC METABOLIC PANEL  -     Albumin Random Urine Quantitative with Creat Ratio  -     Hemoglobin  -     FOOT EXAM  -     Hemoglobin A1c; Standing    Need for vaccination against respiratory syncytial virus    Screen for colon cancer  -     Fecal colorectal cancer screen FIT - Future (S+30); Future  -     Fecal colorectal cancer screen FIT - Future (S+30)    Other orders  -     Hemoglobin A1c; Future  -     Cancel: Extra Tube  -     PRIMARY CARE FOLLOW-UP SCHEDULING; Future  -     Hemoglobin A1c        Patient has been advised of split billing requirements and indicates understanding: Yes      COUNSELING:  Reviewed preventive health counseling, as reflected in patient instructions       Regular exercise       Healthy diet/nutrition      BMI:   Estimated body mass index is 47.2 kg/m  as calculated from the following:    Height as of this encounter: 1.626 m (5' 4\").    Weight as of this encounter: 124.7 kg (275 lb).   Weight management plan: Discussed healthy diet and exercise guidelines      She reports that she has never smoked. She has never used smokeless tobacco.      Appropriate preventive services were discussed with this patient, including applicable screening as appropriate for fall prevention, nutrition, physical activity, Tobacco-use cessation, weight loss and cognition.  Checklist reviewing preventive services available has " been given to the patient.    Reviewed patients plan of care and provided an AVS. The Basic Care Plan (routine screening as documented in Health Maintenance) for Ana Luisa meets the Care Plan requirement. This Care Plan has been established and reviewed with the Patient.        Leonides Juárez MD  [facility]    Identified Health Risks:  I have reviewed Opioid Use Disorder and Substance Use Disorder risk factors and made any needed referrals.   Answers submitted by the patient for this visit:  Patient Health Questionnaire (Submitted on 12/13/2023)  If you checked off any problems, how difficult have these problems made it for you to do your work, take care of things at home, or get along with other people?: Somewhat difficult  PHQ9 TOTAL SCORE: 11

## 2023-12-13 NOTE — COMMUNITY RESOURCES LIST (ENGLISH)
12/13/2023   [facility]  N/A  For additional resource needs, please contact your health insurance member services or your primary care team.  Phone: [phone]   Email: N/A   Address: [facility] [address] [place] [address]   Hours: N/A        Transportation       Free or low-cost transportation  1  Hit the Mark. Distance: 4.29 miles      In-Person   [address] [facility] [address] [place] [address]  Language: English  Hours: Mon - Fri 7:00 AM - 5:00 PM  Fees: Free   Phone: (203) 609-9720 Email: [email or web address] Technologies Website: https://FOCUS RESEARCH/     2  [employer] [facility] Circulator Bus Distance: 17.21 miles      In-Person   [address] [place] [address]  Language: English  Hours: Tue 9:00 AM - 2:00 PM  Fees: Self Pay   Phone: (806) 215-8576 Email: info@Skribit Website: http://www.SendUs.org/     Transportation to medical appointments  3  Liberal Mobility Distance: 6.87 miles      In-Person   [address] [place] [address]  Language: Pashto, English, Oromo, [place]  Hours: Mon - Sat 5:00 AM - 9:00 PM  Fees: Insurance, Self Pay   Phone: (662) 186-9965 Email: info@Evocalize Website: http://www.Evocalize/     4  Assisted Transport Distance: 12.91 miles      In-Person   [address] [facility] [place] [address]  Language: English, Occitan  Hours: Mon - Sun Appt. Only  Fees: Self Pay   Phone: (859) 372-6845 Email: jeri@Smartzer Website: http://www.UniversityLyfe.com/          Important Numbers & Websites       [facility]   211 211unitedway.org  Poison Control   (413) 633-4773 [place]poison.[place]  Suicide and Crisis Lifeline   [address] [place], [place] [address]line.org  Childhelp National Child Abuse Hotline   268.270.1993 Childhelphotline.org  National Sexual Assault Hotline   (997) 609-3176 (HOPE) Wooshiin.org  National Runaway Safeline   (685) 105-1124 (RUNAWAY) InvreprunaKeenSkim.org  Pregnancy &  Postpartum Support [place]   Call/text 364-372-1566 Ppsupportmn.org  Substance Abuse National Helpline ([facility]   184-092-HELP (1817) Findtreatment.gov  Emergency Services   919

## 2023-12-13 NOTE — COMMUNITY RESOURCES LIST (ENGLISH)
12/13/2023   [facility]  N/A  For questions about this resource list or additional care needs, please contact your primary care clinic or care manager.  Phone: [phone]   Email: N/A   Address: [address] [place] [address]   Hours: N/A        Transportation       Free or low-cost transportation  1  [employer]goodasnew [employer]. Distance: 4.29 miles      In-Person   [address] [facility] [address] [place] [address]  Language: English  Hours: Mon - Fri 7:00 AM - 5:00 PM  Fees: Free   Phone: (170) 656-8938 Email: [email or web address] Website: https://Hojoki/     2  [employer] [facility] Circulator Bus Distance: 17.21 miles      In-Person   [address] [place] [address]  Language: English  Hours: Tue 9:00 AM - 2:00 PM  Fees: Self Pay   Phone: (472) 648-6807 Email: info@Lending a Helping Hand Website: http://www.Tendril.org/     Transportation to medical appointments  3  [place] Mobility Distance: 6.87 miles      In-Person   [address] [place] [address]  Language: Divehi, English, Oromo, Prydeinig  Hours: Mon - Sat 5:00 AM - 9:00 PM  Fees: Insurance, Self Pay   Phone: (605) 835-1453 Email: info@[employer] Website: http://www.Vivace Semiconductor/     4  Assisted Transport Distance: 12.91 miles      In-Person   [address] [place] [address]  Language: English, Mauritian  Hours: Mon - Sun Appt. Only  Fees: Self Pay   Phone: (395) 320-8926 Email: jeri@True Fit Website: http://www.True Fit/          Important Numbers & Websites       Emergency Services   911  City Services   311  Poison Control   (575) 982-3100  Suicide Prevention Lifeline   (296) 551-6759 (TALK)  Child Abuse Hotline   (896) 468-7283 (4-A-Child)  Sexual Assault Hotline   (563) 165-5878 (HOPE)  National Runaway Safeline   (733) 022-6184 (RUNAWAY)  All-Options Talkline   (279) 496-1981  Substance Abuse Referral   (831)  224-6555 (HELP)

## 2023-12-13 NOTE — COMMUNITY RESOURCES LIST (ENGLISH)
12/13/2023   [facility]  [facility]  For questions about this resource list or additional care needs, please contact your primary care clinic or care manager.  Phone: [phone]   Email: N/A   Address: [address] [place] [address]   Hours: N/A        Transportation       Free or low-cost transportation  1  [employer]. Distance: 4.29 miles      In-Person   [address] [facility] [address] [place] [address]  Language: English  Hours: Mon - Fri 7:00 AM - 5:00 PM  Fees: Free   Phone: (770) 764-2793 Email: [email or web address].IndigoVision Website: https://Aegis Identity Software/     2  [employer] [facility] Circulator Bus Distance: 17.21 miles      In-Person   [address] [place] [address]  Language: English  Hours: Tue 9:00 AM - 2:00 PM  Fees: Self Pay   Phone: (485) 494-6164 Email: info@Onyu Website: http://www.ClaimSync.org/     Transportation to medical appointments  3  [place] Mobility Distance: 6.87 miles      In-Person   [address] [place] [address]  Language: Czech, English, Oromo, Cayman Islander  Hours: Mon - Sat 5:00 AM - 9:00 PM  Fees: Insurance, Self Pay   Phone: (214) 490-1959 Email: info@ExaqtWorld Website: http://www.ExaqtWorld/     4  Assisted Transport Distance: 12.91 miles      In-Person   [address] [place] [address]  Language: English, South Korean  Hours: Mon - Sun Appt. Only  Fees: Self Pay   Phone: (644) 456-8311 Email: jeri@[employer] Website: http://www.Guanya Education Group/          Important Numbers & Websites       Emergency Services   911  City Services   311  Poison Control   (606) 890-6897  Suicide Prevention Lifeline   (711) 871-5449 (TALK)  Child Abuse Hotline   (557) 313-2781 (4-A-Child)  Sexual Assault Hotline   (754) 163-7482 (HOPE)  National Runaway Safeline   (532) 956-5649 (RUNAWAY)  All-Options Talkline   (153) 624-2231  Substance Abuse Referral   (130)  836-9924 (HELP)

## 2023-12-13 NOTE — PATIENT INSTRUCTIONS
Patient Education   Personalized Prevention Plan  You are due for the preventive services outlined below.  Your care team is available to assist you in scheduling these services.  If you have already completed any of these items, please share that information with your care team to update in your medical record.  Health Maintenance Due   Topic Date Due     Annual Wellness Visit  05/14/2016     RSV VACCINE (Pregnancy & 60+) (1 - 1-dose 60+ series) Never done     Colorectal Cancer Screening  03/08/2022     Diabetic Foot Exam  02/14/2023     Eye Exam  03/10/2023     A1C Lab  07/26/2023     Basic Metabolic Panel  08/17/2023     Kidney Microalbumin Urine Test  08/17/2023     Flu Vaccine (1) 09/01/2023     COVID-19 Vaccine (4 - 2023-24 season) 09/01/2023     Hemoglobin  08/17/2023     Learning About Depression Screening  What is depression screening?  Depression screening is a way to see if you have depression symptoms. It may be done by a doctor or counselor. It's often part of a routine checkup. That's because your mental health is just as important as your physical health.  Depression is a mental health condition that affects how you feel, think, and act. You may:  Have less energy.  Lose interest in your daily activities.  Feel sad and grouchy for a long time.  Depression is very common. It affects people of all ages.  Many things can lead to depression. Some people become depressed after they have a stroke or find out they have a major illness like cancer or heart disease. The death of a loved one or a breakup may lead to depression. It can run in families. Most experts believe that a combination of inherited genes and stressful life events can cause it.  What happens during screening?  You may be asked to fill out a form about your depression symptoms. You and the doctor will discuss your answers. The doctor may ask you more questions to learn more about how you think, act, and feel.  What happens after  "screening?  If you have symptoms of depression, your doctor will talk to you about your options.  Doctors usually treat depression with medicines or counseling. Often, combining the two works best. Many people don't get help because they think that they'll get over the depression on their own. But people with depression may not get better unless they get treatment.  The cause of depression is not well understood. There may be many factors involved. But if you have depression, it's not your fault.  A serious symptom of depression is thinking about death or suicide. If you or someone you care about talks about this or about feeling hopeless, get help right away.  It's important to know that depression can be treated. Medicine, counseling, and self-care may help.  Where can you learn more?  Go to https://www.Samba Ads.net/patiented  Enter T185 in the search box to learn more about \"Learning About Depression Screening.\"  Current as of: June 25, 2023               Content Version: 13.8    8023-6748 ilab.   Care instructions adapted under license by your healthcare professional. If you have questions about a medical condition or this instruction, always ask your healthcare professional. ilab disclaims any warranty or liability for your use of this information.         "

## 2023-12-21 LAB — HEMOCCULT STL QL IA: NEGATIVE

## 2023-12-22 DIAGNOSIS — E11.22 TYPE 2 DIABETES MELLITUS WITH STAGE 3A CHRONIC KIDNEY DISEASE, WITHOUT LONG-TERM CURRENT USE OF INSULIN (H): ICD-10-CM

## 2023-12-22 DIAGNOSIS — N18.31 TYPE 2 DIABETES MELLITUS WITH STAGE 3A CHRONIC KIDNEY DISEASE, WITHOUT LONG-TERM CURRENT USE OF INSULIN (H): ICD-10-CM

## 2024-01-03 ENCOUNTER — MYC MEDICAL ADVICE (OUTPATIENT)
Dept: FAMILY MEDICINE | Facility: CLINIC | Age: 64
End: 2024-01-03
Payer: MEDICARE

## 2024-01-03 DIAGNOSIS — K21.9 GASTROESOPHAGEAL REFLUX DISEASE WITHOUT ESOPHAGITIS: ICD-10-CM

## 2024-01-03 RX ORDER — OMEPRAZOLE 40 MG/1
40 CAPSULE, DELAYED RELEASE ORAL 2 TIMES DAILY
Qty: 180 CAPSULE | Refills: 3 | Status: SHIPPED | OUTPATIENT
Start: 2024-01-03 | End: 2024-02-13

## 2024-01-03 NOTE — TELEPHONE ENCOUNTER
Please see mychart and advise. No mention in previous OV note.    Rx pended, please update if appropriate.    Eboni LOPES RN

## 2024-01-14 ENCOUNTER — APPOINTMENT (OUTPATIENT)
Dept: GENERAL RADIOLOGY | Facility: CLINIC | Age: 64
DRG: 321 | End: 2024-01-14
Attending: EMERGENCY MEDICINE
Payer: MEDICARE

## 2024-01-14 ENCOUNTER — HOSPITAL ENCOUNTER (EMERGENCY)
Facility: CLINIC | Age: 64
Discharge: OTHER INSTITUTION | DRG: 321 | End: 2024-01-14
Attending: EMERGENCY MEDICINE | Admitting: EMERGENCY MEDICINE
Payer: MEDICARE

## 2024-01-14 ENCOUNTER — HOSPITAL ENCOUNTER (INPATIENT)
Facility: CLINIC | Age: 64
LOS: 2 days | Discharge: HOME | DRG: 321 | End: 2024-01-16
Attending: INTERNAL MEDICINE | Admitting: INTERNAL MEDICINE
Payer: MEDICARE

## 2024-01-14 ENCOUNTER — APPOINTMENT (OUTPATIENT)
Dept: CT IMAGING | Facility: CLINIC | Age: 64
DRG: 321 | End: 2024-01-14
Attending: EMERGENCY MEDICINE
Payer: MEDICARE

## 2024-01-14 VITALS
SYSTOLIC BLOOD PRESSURE: 151 MMHG | TEMPERATURE: 98.1 F | DIASTOLIC BLOOD PRESSURE: 80 MMHG | HEART RATE: 80 BPM | BODY MASS INDEX: 47.03 KG/M2 | OXYGEN SATURATION: 98 % | WEIGHT: 274 LBS | RESPIRATION RATE: 18 BRPM

## 2024-01-14 DIAGNOSIS — N28.9 RENAL LESION: ICD-10-CM

## 2024-01-14 DIAGNOSIS — E11.9 DIABETES MELLITUS, TYPE 2 (H): ICD-10-CM

## 2024-01-14 DIAGNOSIS — R16.1 SPLENOMEGALY: ICD-10-CM

## 2024-01-14 DIAGNOSIS — I10 HYPERTENSION, UNSPECIFIED TYPE: ICD-10-CM

## 2024-01-14 DIAGNOSIS — R93.2 ABNORMAL CT OF LIVER: ICD-10-CM

## 2024-01-14 DIAGNOSIS — I24.9 ACS (ACUTE CORONARY SYNDROME) (H): ICD-10-CM

## 2024-01-14 DIAGNOSIS — I21.4 NSTEMI (NON-ST ELEVATED MYOCARDIAL INFARCTION) (H): Primary | ICD-10-CM

## 2024-01-14 PROBLEM — I20.0 UNSTABLE ANGINA (H): Status: ACTIVE | Noted: 2024-01-14

## 2024-01-14 LAB
ANION GAP SERPL CALCULATED.3IONS-SCNC: 12 MMOL/L (ref 7–15)
BASOPHILS # BLD AUTO: 0 10E3/UL (ref 0–0.2)
BASOPHILS NFR BLD AUTO: 1 %
BUN SERPL-MCNC: 10.7 MG/DL (ref 8–23)
CALCIUM SERPL-MCNC: 9.3 MG/DL (ref 8.8–10.2)
CHLORIDE SERPL-SCNC: 103 MMOL/L (ref 98–107)
CREAT SERPL-MCNC: 0.79 MG/DL (ref 0.51–0.95)
D DIMER PPP FEU-MCNC: 0.98 UG/ML FEU (ref 0–0.5)
DEPRECATED HCO3 PLAS-SCNC: 22 MMOL/L (ref 22–29)
EGFRCR SERPLBLD CKD-EPI 2021: 84 ML/MIN/1.73M2
EOSINOPHIL # BLD AUTO: 0.2 10E3/UL (ref 0–0.7)
EOSINOPHIL NFR BLD AUTO: 3 %
ERYTHROCYTE [DISTWIDTH] IN BLOOD BY AUTOMATED COUNT: 12.1 % (ref 10–15)
ERYTHROCYTE [DISTWIDTH] IN BLOOD BY AUTOMATED COUNT: 12.2 % (ref 10–15)
GLUCOSE BLDC GLUCOMTR-MCNC: 122 MG/DL (ref 70–99)
GLUCOSE BLDC GLUCOMTR-MCNC: 132 MG/DL (ref 70–99)
GLUCOSE SERPL-MCNC: 169 MG/DL (ref 70–99)
HCT VFR BLD AUTO: 37.4 % (ref 35–47)
HCT VFR BLD AUTO: 42.2 % (ref 35–47)
HGB BLD-MCNC: 12.7 G/DL (ref 11.7–15.7)
HGB BLD-MCNC: 13.8 G/DL (ref 11.7–15.7)
HOLD SPECIMEN: NORMAL
IMM GRANULOCYTES # BLD: 0 10E3/UL
IMM GRANULOCYTES NFR BLD: 0 %
LYMPHOCYTES # BLD AUTO: 2.2 10E3/UL (ref 0.8–5.3)
LYMPHOCYTES NFR BLD AUTO: 39 %
MCH RBC QN AUTO: 29.2 PG (ref 26.5–33)
MCH RBC QN AUTO: 29.9 PG (ref 26.5–33)
MCHC RBC AUTO-ENTMCNC: 32.7 G/DL (ref 31.5–36.5)
MCHC RBC AUTO-ENTMCNC: 34 G/DL (ref 31.5–36.5)
MCV RBC AUTO: 88 FL (ref 78–100)
MCV RBC AUTO: 89 FL (ref 78–100)
MONOCYTES # BLD AUTO: 0.3 10E3/UL (ref 0–1.3)
MONOCYTES NFR BLD AUTO: 6 %
NEUTROPHILS # BLD AUTO: 2.8 10E3/UL (ref 1.6–8.3)
NEUTROPHILS NFR BLD AUTO: 51 %
NRBC # BLD AUTO: 0 10E3/UL
NRBC BLD AUTO-RTO: 0 /100
PLATELET # BLD AUTO: 113 10E3/UL (ref 150–450)
PLATELET # BLD AUTO: 131 10E3/UL (ref 150–450)
POTASSIUM SERPL-SCNC: 4.7 MMOL/L (ref 3.4–5.3)
RBC # BLD AUTO: 4.25 10E6/UL (ref 3.8–5.2)
RBC # BLD AUTO: 4.72 10E6/UL (ref 3.8–5.2)
SODIUM SERPL-SCNC: 137 MMOL/L (ref 135–145)
TROPONIN T SERPL HS-MCNC: 60 NG/L
TROPONIN T SERPL HS-MCNC: 66 NG/L
TROPONIN T SERPL HS-MCNC: 68 NG/L
WBC # BLD AUTO: 4.9 10E3/UL (ref 4–11)
WBC # BLD AUTO: 5.5 10E3/UL (ref 4–11)

## 2024-01-14 PROCEDURE — 250N000013 HC RX MED GY IP 250 OP 250 PS 637: Performed by: INTERNAL MEDICINE

## 2024-01-14 PROCEDURE — 99291 CRITICAL CARE FIRST HOUR: CPT | Mod: 25

## 2024-01-14 PROCEDURE — 250N000009 HC RX 250: Performed by: EMERGENCY MEDICINE

## 2024-01-14 PROCEDURE — 71046 X-RAY EXAM CHEST 2 VIEWS: CPT

## 2024-01-14 PROCEDURE — 250N000011 HC RX IP 250 OP 636: Performed by: INTERNAL MEDICINE

## 2024-01-14 PROCEDURE — 210N000002 HC R&B HEART CARE

## 2024-01-14 PROCEDURE — 250N000012 HC RX MED GY IP 250 OP 636 PS 637: Performed by: INTERNAL MEDICINE

## 2024-01-14 PROCEDURE — 36415 COLL VENOUS BLD VENIPUNCTURE: CPT | Performed by: INTERNAL MEDICINE

## 2024-01-14 PROCEDURE — 96375 TX/PRO/DX INJ NEW DRUG ADDON: CPT

## 2024-01-14 PROCEDURE — 85025 COMPLETE CBC W/AUTO DIFF WBC: CPT | Performed by: EMERGENCY MEDICINE

## 2024-01-14 PROCEDURE — 80048 BASIC METABOLIC PNL TOTAL CA: CPT | Performed by: EMERGENCY MEDICINE

## 2024-01-14 PROCEDURE — 93005 ELECTROCARDIOGRAM TRACING: CPT

## 2024-01-14 PROCEDURE — 84484 ASSAY OF TROPONIN QUANT: CPT | Performed by: INTERNAL MEDICINE

## 2024-01-14 PROCEDURE — 71275 CT ANGIOGRAPHY CHEST: CPT | Mod: ME

## 2024-01-14 PROCEDURE — 85027 COMPLETE CBC AUTOMATED: CPT | Performed by: INTERNAL MEDICINE

## 2024-01-14 PROCEDURE — 96365 THER/PROPH/DIAG IV INF INIT: CPT

## 2024-01-14 PROCEDURE — 85379 FIBRIN DEGRADATION QUANT: CPT | Performed by: EMERGENCY MEDICINE

## 2024-01-14 PROCEDURE — 36415 COLL VENOUS BLD VENIPUNCTURE: CPT | Performed by: EMERGENCY MEDICINE

## 2024-01-14 PROCEDURE — 96366 THER/PROPH/DIAG IV INF ADDON: CPT

## 2024-01-14 PROCEDURE — 250N000011 HC RX IP 250 OP 636: Performed by: EMERGENCY MEDICINE

## 2024-01-14 PROCEDURE — 250N000013 HC RX MED GY IP 250 OP 250 PS 637: Performed by: EMERGENCY MEDICINE

## 2024-01-14 PROCEDURE — 99223 1ST HOSP IP/OBS HIGH 75: CPT | Mod: AI | Performed by: INTERNAL MEDICINE

## 2024-01-14 PROCEDURE — 84484 ASSAY OF TROPONIN QUANT: CPT | Performed by: EMERGENCY MEDICINE

## 2024-01-14 RX ORDER — CETIRIZINE HYDROCHLORIDE 10 MG/1
10 TABLET ORAL DAILY
Status: DISCONTINUED | OUTPATIENT
Start: 2024-01-14 | End: 2024-01-16 | Stop reason: HOSPADM

## 2024-01-14 RX ORDER — BETAMETHASONE DIPROPIONATE 0.5 MG/G
CREAM TOPICAL 2 TIMES DAILY
COMMUNITY
End: 2024-02-01

## 2024-01-14 RX ORDER — METOPROLOL TARTRATE 25 MG/1
25 TABLET, FILM COATED ORAL ONCE
Status: COMPLETED | OUTPATIENT
Start: 2024-01-14 | End: 2024-01-14

## 2024-01-14 RX ORDER — AMOXICILLIN 250 MG
2 CAPSULE ORAL 2 TIMES DAILY PRN
Status: DISCONTINUED | OUTPATIENT
Start: 2024-01-14 | End: 2024-01-16 | Stop reason: HOSPADM

## 2024-01-14 RX ORDER — ASPIRIN 81 MG/1
324 TABLET, CHEWABLE ORAL ONCE
Status: COMPLETED | OUTPATIENT
Start: 2024-01-14 | End: 2024-01-14

## 2024-01-14 RX ORDER — NITROGLYCERIN 0.4 MG/1
0.4 TABLET SUBLINGUAL EVERY 5 MIN PRN
Status: COMPLETED | OUTPATIENT
Start: 2024-01-14 | End: 2024-01-14

## 2024-01-14 RX ORDER — ASPIRIN 81 MG/1
81 TABLET, CHEWABLE ORAL DAILY
Status: DISCONTINUED | OUTPATIENT
Start: 2024-01-15 | End: 2024-01-16 | Stop reason: HOSPADM

## 2024-01-14 RX ORDER — ONDANSETRON 2 MG/ML
4 INJECTION INTRAMUSCULAR; INTRAVENOUS EVERY 6 HOURS PRN
Status: DISCONTINUED | OUTPATIENT
Start: 2024-01-14 | End: 2024-01-16 | Stop reason: HOSPADM

## 2024-01-14 RX ORDER — NITROGLYCERIN 0.4 MG/1
0.4 TABLET SUBLINGUAL EVERY 5 MIN PRN
Status: DISCONTINUED | OUTPATIENT
Start: 2024-01-14 | End: 2024-01-15

## 2024-01-14 RX ORDER — DEXTROSE MONOHYDRATE 25 G/50ML
25-50 INJECTION, SOLUTION INTRAVENOUS
Status: DISCONTINUED | OUTPATIENT
Start: 2024-01-14 | End: 2024-01-16 | Stop reason: HOSPADM

## 2024-01-14 RX ORDER — IOPAMIDOL 755 MG/ML
500 INJECTION, SOLUTION INTRAVASCULAR ONCE
Status: COMPLETED | OUTPATIENT
Start: 2024-01-14 | End: 2024-01-14

## 2024-01-14 RX ORDER — NICOTINE POLACRILEX 4 MG
15-30 LOZENGE BUCCAL
Status: DISCONTINUED | OUTPATIENT
Start: 2024-01-14 | End: 2024-01-16 | Stop reason: HOSPADM

## 2024-01-14 RX ORDER — ZOLPIDEM TARTRATE 5 MG/1
5 TABLET ORAL
Status: DISCONTINUED | OUTPATIENT
Start: 2024-01-14 | End: 2024-01-16 | Stop reason: HOSPADM

## 2024-01-14 RX ORDER — ATORVASTATIN CALCIUM 40 MG/1
40 TABLET, FILM COATED ORAL DAILY
Status: ON HOLD | COMMUNITY
End: 2024-01-16

## 2024-01-14 RX ORDER — LIDOCAINE 40 MG/G
CREAM TOPICAL
Status: DISCONTINUED | OUTPATIENT
Start: 2024-01-14 | End: 2024-01-16 | Stop reason: HOSPADM

## 2024-01-14 RX ORDER — ACETAMINOPHEN 325 MG/1
650 TABLET ORAL EVERY 4 HOURS PRN
Status: DISCONTINUED | OUTPATIENT
Start: 2024-01-14 | End: 2024-01-16 | Stop reason: HOSPADM

## 2024-01-14 RX ORDER — AMOXICILLIN 250 MG
1 CAPSULE ORAL 2 TIMES DAILY PRN
Status: DISCONTINUED | OUTPATIENT
Start: 2024-01-14 | End: 2024-01-16 | Stop reason: HOSPADM

## 2024-01-14 RX ORDER — ONDANSETRON 4 MG/1
4 TABLET, ORALLY DISINTEGRATING ORAL EVERY 6 HOURS PRN
Status: DISCONTINUED | OUTPATIENT
Start: 2024-01-14 | End: 2024-01-16 | Stop reason: HOSPADM

## 2024-01-14 RX ORDER — CALCIUM CARBONATE 500 MG/1
1000 TABLET, CHEWABLE ORAL 4 TIMES DAILY PRN
Status: DISCONTINUED | OUTPATIENT
Start: 2024-01-14 | End: 2024-01-16 | Stop reason: HOSPADM

## 2024-01-14 RX ORDER — ACETAMINOPHEN 650 MG/1
650 SUPPOSITORY RECTAL EVERY 4 HOURS PRN
Status: DISCONTINUED | OUTPATIENT
Start: 2024-01-14 | End: 2024-01-16 | Stop reason: HOSPADM

## 2024-01-14 RX ORDER — ACETAMINOPHEN 500 MG
1000 TABLET ORAL ONCE
Status: COMPLETED | OUTPATIENT
Start: 2024-01-14 | End: 2024-01-14

## 2024-01-14 RX ORDER — PANTOPRAZOLE SODIUM 40 MG/1
40 TABLET, DELAYED RELEASE ORAL
Status: DISCONTINUED | OUTPATIENT
Start: 2024-01-14 | End: 2024-01-16 | Stop reason: HOSPADM

## 2024-01-14 RX ORDER — HEPARIN SODIUM 10000 [USP'U]/100ML
0-5000 INJECTION, SOLUTION INTRAVENOUS CONTINUOUS
Status: DISCONTINUED | OUTPATIENT
Start: 2024-01-14 | End: 2024-01-15

## 2024-01-14 RX ORDER — HEPARIN SODIUM 10000 [USP'U]/100ML
0-5000 INJECTION, SOLUTION INTRAVENOUS CONTINUOUS
Status: DISCONTINUED | OUTPATIENT
Start: 2024-01-14 | End: 2024-01-14 | Stop reason: HOSPADM

## 2024-01-14 RX ORDER — ATORVASTATIN CALCIUM 40 MG/1
40 TABLET, FILM COATED ORAL DAILY
Status: DISCONTINUED | OUTPATIENT
Start: 2024-01-15 | End: 2024-01-16 | Stop reason: HOSPADM

## 2024-01-14 RX ADMIN — METOPROLOL TARTRATE 12.5 MG: 25 TABLET, FILM COATED ORAL at 18:54

## 2024-01-14 RX ADMIN — IOPAMIDOL 76 ML: 755 INJECTION, SOLUTION INTRAVENOUS at 14:26

## 2024-01-14 RX ADMIN — NITROGLYCERIN 0.4 MG: 0.4 TABLET SUBLINGUAL at 12:50

## 2024-01-14 RX ADMIN — ACETAMINOPHEN 1000 MG: 500 TABLET ORAL at 14:13

## 2024-01-14 RX ADMIN — HEPARIN SODIUM 1200 UNITS/HR: 10000 INJECTION, SOLUTION INTRAVENOUS at 18:58

## 2024-01-14 RX ADMIN — ZOLPIDEM TARTRATE 5 MG: 5 TABLET ORAL at 23:12

## 2024-01-14 RX ADMIN — INSULIN GLARGINE 15 UNITS: 100 INJECTION, SOLUTION SUBCUTANEOUS at 23:12

## 2024-01-14 RX ADMIN — SODIUM CHLORIDE 90 ML: 9 INJECTION, SOLUTION INTRAVENOUS at 14:26

## 2024-01-14 RX ADMIN — NITROGLYCERIN 0.4 MG: 0.4 TABLET SUBLINGUAL at 13:10

## 2024-01-14 RX ADMIN — HEPARIN SODIUM 1200 UNITS/HR: 10000 INJECTION, SOLUTION INTRAVENOUS at 14:16

## 2024-01-14 RX ADMIN — NITROGLYCERIN 0.4 MG: 0.4 TABLET SUBLINGUAL at 13:05

## 2024-01-14 RX ADMIN — CETIRIZINE HYDROCHLORIDE 10 MG: 10 TABLET, FILM COATED ORAL at 18:54

## 2024-01-14 RX ADMIN — PANTOPRAZOLE SODIUM 40 MG: 40 TABLET, DELAYED RELEASE ORAL at 18:54

## 2024-01-14 RX ADMIN — ASPIRIN 81 MG CHEWABLE TABLET 324 MG: 81 TABLET CHEWABLE at 12:49

## 2024-01-14 ASSESSMENT — ACTIVITIES OF DAILY LIVING (ADL)
ADLS_ACUITY_SCORE: 35

## 2024-01-14 NOTE — Clinical Note
Max pressure = 12 angelina. Total duration = 24 seconds. Balloon reinflated a second time: Max pressure = 12 angelina. Total duration = 8 seconds.

## 2024-01-14 NOTE — Clinical Note
Max pressure = 10 angelina. Total duration = 36 seconds.     Max pressure = 10 angelina. Total duration = 21 seconds.    Balloon reinflated a second time: Max pressure = 10 angelina. Total duration = 21 seconds.  Balloon reinflated a third time:

## 2024-01-14 NOTE — ED TRIAGE NOTES
C/o midsternal chest pain x 3 days. Worse with ambulation. Pain radiates down left arm. Also c/o SOB. ABCs intact. A/Ox4.

## 2024-01-14 NOTE — Clinical Note
Max pressure = 6 angelina. Total duration = 6 seconds.     Max pressure = 6 angelina. Total duration = 16 seconds.    Balloon reinflated a second time: Max pressure = 6 angelina. Total duration = 16 seconds.  Balloon reinflated a third time:

## 2024-01-14 NOTE — ED PROVIDER NOTES
History     Chief Complaint:  Chest Pain       HPI   Ana Luisa Byers is a 63 year old female with a history of diabetes, hypertension, and hyperlipidemia as well as a significant family history of cardiac disease who presents to the emergency department with intermittent midsternal chest pain for 2 weeks with exertion becoming more persistent over the last 3 days.  She notes the pain is primarily there when she exerts herself and she has associated diaphoresis and shortness of breath.  She notes the pain radiates into her left arm with associated numb feeling.  As soon as she stops exerting herself the pain minimizes to a mild substernal discomfort and the other symptoms resolved.  She currently notes minimal substernal chest discomfort.  She denies leg swelling or pain.  She has not had recent surgery or been immobilized.    Independent Historian:   None - Patient Only    Review of External Notes:   Outside clinic notes reviewed.  Health maintenance visit noted from 12/13/2023 and reviewed.    Medications:    ARIPiprazole (ABILIFY) 10 MG tablet  aspirin 81 MG chewable tablet  atorvastatin (LIPITOR) 40 MG tablet  blood glucose (NO BRAND SPECIFIED) test strip  blood glucose monitoring (NO BRAND SPECIFIED) meter device kit  Blood Pressure Monitoring (COMFORT TOUCH BP CUFF/LARGE) MISC  cetirizine (ZYRTEC) 10 MG tablet  clobetasol (TEMOVATE) 0.05 % external cream  furosemide (LASIX) 40 MG tablet  hydrOXYzine (ATARAX) 25 MG tablet  insulin glargine (LANTUS PEN) 100 UNIT/ML pen  insulin pen needle (32G X 4 MM) 32G X 4 MM miscellaneous  losartan (COZAAR) 25 MG tablet  omeprazole (PRILOSEC) 40 MG DR capsule  order for DME  TRULICITY 0.75 MG/0.5ML pen  zolpidem (AMBIEN) 10 MG tablet        Past Medical History:    Past Medical History:   Diagnosis Date    Arthritis     Chronic pain     CKD (chronic kidney disease), stage III (H)     Fibromyalgia     Gastro-oesophageal reflux disease     HTN, goal below 140/90      Hyperlipidemia LDL goal <100     Hypothyroidism     Major depression     Peripheral neuropathy Feb 2015    PONV (postoperative nausea and vomiting)     Rheumatoid arthritis of foot (H)     Tongue abnormality     Type 2 diabetes, HbA1C goal < 8% (H)        Past Surgical History:    Past Surgical History:   Procedure Laterality Date    ARTHROPLASTY SHOULDER  7/25/2013    Procedure: right ARTHROPLASTY SHOULDER;  RIGHT TOTAL SHOULDER ARTHROPLASTY (TORNIER AFFINITY SHOULDER SYSTEM)^;  Surgeon: Dung Morales MD;  Location:  OR    BACK SURGERY  2011    L45 laminectomy    ELBOW WRIST HAND FINGER ORTHOSIS, RIGID, WITHOUT JOINTS, MAY INCLUDE SOFT  5/2007    put in [place]    ESOPHAGOSCOPY, GASTROSCOPY, DUODENOSCOPY (EGD), COMBINED  9/23/2015    Dr. Thomas [facility]    ESOPHAGOSCOPY, GASTROSCOPY, DUODENOSCOPY (EGD), COMBINED N/A 9/23/2015    Procedure: COMBINED ESOPHAGOSCOPY, GASTROSCOPY, DUODENOSCOPY (EGD), BIOPSY SINGLE OR MULTIPLE;  Surgeon: Jose Thomas MD;  Location:  GI    ESOPHAGOSCOPY, GASTROSCOPY, DUODENOSCOPY (EGD), COMBINED  02/04/2020    Dr. Thomas [facility]    ESOPHAGOSCOPY, GASTROSCOPY, DUODENOSCOPY (EGD), COMBINED N/A 2/4/2020    Procedure: ESOPHAGOGASTRODUODENOSCOPY (EGD);  Surgeon: Jose Thomas MD;  Location:  GI    HCL PAP SMEAR  6/2008    [facility]    ORTHOPEDIC SURGERY      left shoulder scoped and plate left elbow    SURGICAL HISTORY OF -   2009    ulnar nerve release, carpal tunnel- left    ZZC APPENDECTOMY  age 19    ZZC LIGATE FALLOPIAN TUBE,POSTPARTUM  1982    Tubal Ligation        Physical Exam   Patient Vitals for the past 24 hrs:   BP Temp Temp src Pulse Resp SpO2 Weight   01/14/24 1205 -- -- -- -- -- 96 % --   01/14/24 1204 -- -- -- -- -- 96 % --   01/14/24 1203 -- -- -- -- -- 96 % --   01/14/24 1202 -- -- -- -- -- 96 % --   01/14/24 1201 (!) 166/90 -- -- 85 -- 97 % --   01/14/24 1200 (!) 166/90 -- -- 85 -- 96 % --   01/14/24 1132 (!) 163/80 98.1  F (36.7  C) Temporal 94 18 98 % 124.3 kg  (274 lb)        Physical Exam  General: Large adult female sitting upright  Eyes: PERRL, Conjunctive within normal limits  ENT: Moist mucous membranes, oropharynx clear.   CV: Normal S1S2, no murmur, rub or gallop. Regular rate and rhythm  Resp: Clear to auscultation bilaterally, no wheezes, rales or rhonchi. Normal respiratory effort.  GI: Abdomen is soft, nontender and nondistended. No palpable masses. No rebound or guarding.  MSK: No edema. Nontender. Normal active range of motion.  Skin: Warm and dry. No rashes or lesions or ecchymoses on visible skin.  Neuro: Alert and oriented. Responds appropriately to all questions and commands. No focal findings appreciated. Normal muscle tone.  Psych: Normal mood and affect. Pleasant.     Emergency Department Course   ECG    ECG results from 01/14/24   EKG 12 lead     Value    Systolic Blood Pressure     Diastolic Blood Pressure     Ventricular Rate 86    Atrial Rate 86    TN Interval 138    QRS Duration 74        QTc 457    P Axis 10    R AXIS 49    T Axis 69    Interpretation ECG      Sinus rhythm  Normal ECG  No previous ECGs available     EKG 12 lead     Value    Systolic Blood Pressure     Diastolic Blood Pressure     Ventricular Rate 81    Atrial Rate 81    TN Interval 140    QRS Duration 74        QTc 462    P Axis 4    R AXIS 50    T Axis 68    Interpretation ECG      Sinus rhythm  Nonspecific T wave abnormality  Abnormal ECG  When compared with ECG of 14-JAN-2024 11:48, (unconfirmed)  No significant change was found       *Note: Due to a large number of results and/or encounters for the requested time period, some results have not been displayed. A complete set of results can be found in Results Review.       Imaging:  CT Chest Pulmonary Embolism w Contrast   Final Result   IMPRESSION:   1.  No evidence of pulmonary embolism or other acute abnormality in the chest.   2.  Morphologic changes suggestive of chronic liver disease. Splenomegaly. No ascites  in the upper abdomen.   3.  Indeterminate 1.7 cm right renal lesion, could consider further evaluation with ultrasound or contrast-enhanced MRI on a nonemergent basis.      Chest XR,  PA & LAT   Final Result   IMPRESSION: No infiltrate, pleural effusion or pneumothorax. The cardiac and mediastinal silhouettes are normal. Right shoulder arthroplasty.             Laboratory:  Labs Ordered and Resulted from Time of ED Arrival to Time of ED Departure   TROPONIN T, HIGH SENSITIVITY - Abnormal       Result Value    Troponin T, High Sensitivity 60 (*)    BASIC METABOLIC PANEL - Abnormal    Sodium 137      Potassium 4.7      Chloride 103      Carbon Dioxide (CO2) 22      Anion Gap 12      Urea Nitrogen 10.7      Creatinine 0.79      GFR Estimate 84      Calcium 9.3      Glucose 169 (*)    CBC WITH PLATELETS AND DIFFERENTIAL - Abnormal    WBC Count 5.5      RBC Count 4.72      Hemoglobin 13.8      Hematocrit 42.2      MCV 89      MCH 29.2      MCHC 32.7      RDW 12.1      Platelet Count 131 (*)     % Neutrophils 51      % Lymphocytes 39      % Monocytes 6      % Eosinophils 3      % Basophils 1      % Immature Granulocytes 0      NRBCs per 100 WBC 0      Absolute Neutrophils 2.8      Absolute Lymphocytes 2.2      Absolute Monocytes 0.3      Absolute Eosinophils 0.2      Absolute Basophils 0.0      Absolute Immature Granulocytes 0.0      Absolute NRBCs 0.0     D DIMER QUANTITATIVE - Abnormal    D-Dimer Quantitative 0.98 (*)    TROPONIN T, HIGH SENSITIVITY - Abnormal    Troponin T, High Sensitivity 66 (*)         Emergency Department Course & Assessments:    Interventions:  Medications   heparin 25,000 units in 0.45% NaCl 250 mL ANTICOAGULANT infusion ( Intravenous Canceled Entry 1/14/24 1512)   metoprolol tartrate (LOPRESSOR) tablet 25 mg (has no administration in time range)   aspirin (ASA) chewable tablet 324 mg (324 mg Oral $Given 1/14/24 1249)   nitroGLYcerin (NITROSTAT) sublingual tablet 0.4 mg (0.4 mg Sublingual $Given  1/14/24 1310)   acetaminophen (TYLENOL) tablet 1,000 mg (1,000 mg Oral $Given 1/14/24 1413)   heparin ANTICOAGULANT loading dose for  LOW INTENSITY TREATMENT* Give BEFORE starting heparin infusion (6,000 Units Intravenous $Given 1/14/24 1415)   CT scan flush (90 mLs Intravenous $Given 1/14/24 1426)   iopamidol (ISOVUE-370) solution 500 mL (76 mLs Intravenous $Given 1/14/24 1426)        Assessments:  Past medical records, nursing notes, and vitals reviewed.  I performed an exam of the patient and obtained history, as documented above.   I reassessed the patient.  She notes after nitroglycerin her chest pain is nearly gone.  She still has slight chest tightness.  I reassessed the patient discussed findings of today's evaluation as well as recommendations.  She feels comfortable to plan of transfer to [facility] for availability of catheterization lab if necessary.  I reassessed the patient.  At this time she is denying chest pain.  She notes a mild headache after nitroglycerin but declines any further pain medications.      Independent Interpretation (X-rays, CTs, rhythm strip):  I reviewed the patient's CT test.  I do not see evidence of saddle embolism.    Consultations/Discussion of Management or Tests:  I consulted Dr. Beebe of cardiology who felt it prudent to transfer the patient to [facility] as the Cath Lab would be available there emergently tonight if needed.  At this time there is no indication for emergent Cath Lab activation    Social Determinants of Health affecting care:   None    Disposition:  The patient was transferred to [facility] via EMS in stable condition. Dr. White accepted the patient for transfer.     Impression & Plan      Medical Decision Making:  Ana Luisa Byers is a 63-year-old female who presents emergency department with exertional chest pain for 2 weeks worsening over the past 3 days.  Her presentation is concerning for ACS.  She has  no EKG changes that are concerning.  Troponin was slightly elevated.  It did continue to slightly elevated over 2-hour serial test.  Her chest pain was extinguished with nitroglycerin.  She did not require nitroglycerin drip at the time of this dictation.  She was given heparin after discussion of risks and benefits and acceptance by the patient.  There are no direct contraindications.  She was given aspirin.  She had no recurrence of chest pain at this time and is appropriate and stable for transfer to [facility] as there is no cath lab availability at [facility] on the weekends.  At this time there is no indication for urgent activation given improvement in symptoms and stabilization.      Diagnosis:    ICD-10-CM    1. ACS (acute coronary syndrome) (H)  I24.9       2. Splenomegaly  R16.1       3. Renal lesion  N28.9       4. Abnormal CT of liver  R93.2       5. Hypertension, unspecified type  I10            1/14/2024   Teresa Mg MD Jonkman, Tracy Dianne, MD  01/14/24 1516

## 2024-01-14 NOTE — Clinical Note
Max pressure = 14 angelina. Total duration = 22 seconds.     Max pressure = 16 angelina. Total duration = 22 seconds.    Balloon reinflated a second time: Max pressure = 16 angelina. Total duration = 22 seconds.  Balloon reinflated a third time:

## 2024-01-14 NOTE — Clinical Note
Guide across the lesion to left anterior descending. How Severe Is It?: moderate Is This A New Presentation, Or A Follow-Up?: Follow Up Isotretinoin

## 2024-01-14 NOTE — Clinical Note
Max pressure = 18 angelina. Total duration = 20 seconds.     Max pressure = 18 angelina. Total duration = 20 seconds.    Balloon reinflated a second time: Max pressure = 18 angelina. Total duration = 20 seconds.  Balloon reinflated a third time: Max pressure = 18 angelina. Total duration = 20 seconds.  Balloon reinflated a fourth time: Max pressure = 18 angelina. Total duration = 18 seconds.

## 2024-01-14 NOTE — PHARMACY-ADMISSION MEDICATION HISTORY
Admission medication history completed at [facility]. Please see Pharmacy Intern Admission Medication History note from 1/14/2024.

## 2024-01-14 NOTE — Clinical Note
The first balloon was inserted into the circumflex.Max pressure = 4 angelina. Total duration = 25 seconds.     Max pressure = 4 angelina. Total duration = 8 seconds.    Balloon reinflated a second time: Max pressure = 4 angelina. Total duration = 8 seconds.  Balloon reinflated a third time: Max pressure = 4 angelina. Total duration = 6 seconds.  Balloon reinflated a fourth time:

## 2024-01-15 LAB
ACT BLD: 278 SECONDS (ref 74–150)
ACT BLD: 282 SECONDS (ref 74–150)
ACT BLD: 290 SECONDS (ref 74–150)
ACT BLD: 402 SECONDS (ref 74–150)
ALBUMIN SERPL BCG-MCNC: 4.1 G/DL (ref 3.5–5.2)
ALP SERPL-CCNC: 148 U/L (ref 40–150)
ALT SERPL W P-5'-P-CCNC: 40 U/L (ref 0–50)
ANION GAP SERPL CALCULATED.3IONS-SCNC: 11 MMOL/L (ref 7–15)
AST SERPL W P-5'-P-CCNC: 66 U/L (ref 0–45)
ATRIAL RATE - MUSE: 81 BPM
ATRIAL RATE - MUSE: 86 BPM
BILIRUB DIRECT SERPL-MCNC: 0.25 MG/DL (ref 0–0.3)
BILIRUB SERPL-MCNC: 0.8 MG/DL
BUN SERPL-MCNC: 9.3 MG/DL (ref 8–23)
CALCIUM SERPL-MCNC: 9.8 MG/DL (ref 8.8–10.2)
CHLORIDE SERPL-SCNC: 104 MMOL/L (ref 98–107)
CHOLEST SERPL-MCNC: 266 MG/DL
CREAT SERPL-MCNC: 0.81 MG/DL (ref 0.51–0.95)
DEPRECATED HCO3 PLAS-SCNC: 22 MMOL/L (ref 22–29)
DIASTOLIC BLOOD PRESSURE - MUSE: NORMAL MMHG
DIASTOLIC BLOOD PRESSURE - MUSE: NORMAL MMHG
EGFRCR SERPLBLD CKD-EPI 2021: 81 ML/MIN/1.73M2
ERYTHROCYTE [DISTWIDTH] IN BLOOD BY AUTOMATED COUNT: 12.2 % (ref 10–15)
GLUCOSE BLDC GLUCOMTR-MCNC: 153 MG/DL (ref 70–99)
GLUCOSE BLDC GLUCOMTR-MCNC: 158 MG/DL (ref 70–99)
GLUCOSE BLDC GLUCOMTR-MCNC: 162 MG/DL (ref 70–99)
GLUCOSE BLDC GLUCOMTR-MCNC: 192 MG/DL (ref 70–99)
GLUCOSE SERPL-MCNC: 155 MG/DL (ref 70–99)
HCT VFR BLD AUTO: 39.1 % (ref 35–47)
HDLC SERPL-MCNC: 54 MG/DL
HGB BLD-MCNC: 13.2 G/DL (ref 11.7–15.7)
INTERPRETATION ECG - MUSE: NORMAL
INTERPRETATION ECG - MUSE: NORMAL
LDLC SERPL CALC-MCNC: 186 MG/DL
MCH RBC QN AUTO: 29.9 PG (ref 26.5–33)
MCHC RBC AUTO-ENTMCNC: 33.8 G/DL (ref 31.5–36.5)
MCV RBC AUTO: 89 FL (ref 78–100)
NONHDLC SERPL-MCNC: 212 MG/DL
P AXIS - MUSE: 10 DEGREES
P AXIS - MUSE: 4 DEGREES
PLATELET # BLD AUTO: 127 10E3/UL (ref 150–450)
POTASSIUM SERPL-SCNC: 4.2 MMOL/L (ref 3.4–5.3)
PR INTERVAL - MUSE: 138 MS
PR INTERVAL - MUSE: 140 MS
PROT SERPL-MCNC: 7.9 G/DL (ref 6.4–8.3)
QRS DURATION - MUSE: 74 MS
QRS DURATION - MUSE: 74 MS
QT - MUSE: 382 MS
QT - MUSE: 398 MS
QTC - MUSE: 457 MS
QTC - MUSE: 462 MS
R AXIS - MUSE: 49 DEGREES
R AXIS - MUSE: 50 DEGREES
RBC # BLD AUTO: 4.41 10E6/UL (ref 3.8–5.2)
SODIUM SERPL-SCNC: 137 MMOL/L (ref 135–145)
SYSTOLIC BLOOD PRESSURE - MUSE: NORMAL MMHG
SYSTOLIC BLOOD PRESSURE - MUSE: NORMAL MMHG
T AXIS - MUSE: 68 DEGREES
T AXIS - MUSE: 69 DEGREES
TRIGL SERPL-MCNC: 132 MG/DL
TROPONIN T SERPL HS-MCNC: 75 NG/L
UFH PPP CHRO-ACNC: 0.35 IU/ML
UFH PPP CHRO-ACNC: 0.35 IU/ML
VENTRICULAR RATE- MUSE: 81 BPM
VENTRICULAR RATE- MUSE: 86 BPM
WBC # BLD AUTO: 4.6 10E3/UL (ref 4–11)

## 2024-01-15 PROCEDURE — C1887 CATHETER, GUIDING: HCPCS | Performed by: INTERNAL MEDICINE

## 2024-01-15 PROCEDURE — 85027 COMPLETE CBC AUTOMATED: CPT | Performed by: INTERNAL MEDICINE

## 2024-01-15 PROCEDURE — 99152 MOD SED SAME PHYS/QHP 5/>YRS: CPT | Mod: GC | Performed by: INTERNAL MEDICINE

## 2024-01-15 PROCEDURE — 99152 MOD SED SAME PHYS/QHP 5/>YRS: CPT | Performed by: INTERNAL MEDICINE

## 2024-01-15 PROCEDURE — 250N000011 HC RX IP 250 OP 636: Performed by: INTERNAL MEDICINE

## 2024-01-15 PROCEDURE — C1874 STENT, COATED/COV W/DEL SYS: HCPCS | Performed by: INTERNAL MEDICINE

## 2024-01-15 PROCEDURE — 250N000013 HC RX MED GY IP 250 OP 250 PS 637: Performed by: INTERNAL MEDICINE

## 2024-01-15 PROCEDURE — 85520 HEPARIN ASSAY: CPT | Performed by: INTERNAL MEDICINE

## 2024-01-15 PROCEDURE — 250N000009 HC RX 250: Performed by: INTERNAL MEDICINE

## 2024-01-15 PROCEDURE — C1760 CLOSURE DEV, VASC: HCPCS | Performed by: INTERNAL MEDICINE

## 2024-01-15 PROCEDURE — C9600 PERC DRUG-EL COR STENT SING: HCPCS | Mod: LC | Performed by: INTERNAL MEDICINE

## 2024-01-15 PROCEDURE — 027237Z DILATION OF CORONARY ARTERY, THREE ARTERIES WITH FOUR OR MORE DRUG-ELUTING INTRALUMINAL DEVICES, PERCUTANEOUS APPROACH: ICD-10-PCS | Performed by: INTERNAL MEDICINE

## 2024-01-15 PROCEDURE — 84484 ASSAY OF TROPONIN QUANT: CPT | Performed by: INTERNAL MEDICINE

## 2024-01-15 PROCEDURE — C1769 GUIDE WIRE: HCPCS | Performed by: INTERNAL MEDICINE

## 2024-01-15 PROCEDURE — 99232 SBSQ HOSP IP/OBS MODERATE 35: CPT | Performed by: INTERNAL MEDICINE

## 2024-01-15 PROCEDURE — 92929 PR PRQ TRLUML CORONARY BM STENT W/ANGIO ADDL ART/BRNCH: CPT | Mod: LC | Performed by: INTERNAL MEDICINE

## 2024-01-15 PROCEDURE — 99153 MOD SED SAME PHYS/QHP EA: CPT | Performed by: INTERNAL MEDICINE

## 2024-01-15 PROCEDURE — 250N000011 HC RX IP 250 OP 636: Performed by: HOSPITALIST

## 2024-01-15 PROCEDURE — 93454 CORONARY ARTERY ANGIO S&I: CPT | Mod: 26 | Performed by: INTERNAL MEDICINE

## 2024-01-15 PROCEDURE — C9600 PERC DRUG-EL COR STENT SING: HCPCS | Mod: LD

## 2024-01-15 PROCEDURE — 85347 COAGULATION TIME ACTIVATED: CPT

## 2024-01-15 PROCEDURE — 210N000002 HC R&B HEART CARE

## 2024-01-15 PROCEDURE — C9601 PERC DRUG-EL COR STENT BRAN: HCPCS | Performed by: INTERNAL MEDICINE

## 2024-01-15 PROCEDURE — B2111ZZ FLUOROSCOPY OF MULTIPLE CORONARY ARTERIES USING LOW OSMOLAR CONTRAST: ICD-10-PCS | Performed by: INTERNAL MEDICINE

## 2024-01-15 PROCEDURE — 80053 COMPREHEN METABOLIC PANEL: CPT | Performed by: INTERNAL MEDICINE

## 2024-01-15 PROCEDURE — 99223 1ST HOSP IP/OBS HIGH 75: CPT | Mod: 25 | Performed by: INTERNAL MEDICINE

## 2024-01-15 PROCEDURE — 80061 LIPID PANEL: CPT | Performed by: INTERNAL MEDICINE

## 2024-01-15 PROCEDURE — 258N000003 HC RX IP 258 OP 636: Performed by: INTERNAL MEDICINE

## 2024-01-15 PROCEDURE — 272N000001 HC OR GENERAL SUPPLY STERILE: Performed by: INTERNAL MEDICINE

## 2024-01-15 PROCEDURE — C1725 CATH, TRANSLUMIN NON-LASER: HCPCS | Performed by: INTERNAL MEDICINE

## 2024-01-15 PROCEDURE — 92928 PRQ TCAT PLMT NTRAC ST 1 LES: CPT | Mod: LC | Performed by: INTERNAL MEDICINE

## 2024-01-15 PROCEDURE — 93454 CORONARY ARTERY ANGIO S&I: CPT | Performed by: INTERNAL MEDICINE

## 2024-01-15 PROCEDURE — 36415 COLL VENOUS BLD VENIPUNCTURE: CPT | Performed by: INTERNAL MEDICINE

## 2024-01-15 PROCEDURE — 93005 ELECTROCARDIOGRAM TRACING: CPT

## 2024-01-15 DEVICE — CLOSURE ANGIOSEAL 6FR 610130: Type: IMPLANTABLE DEVICE | Status: FUNCTIONAL

## 2024-01-15 DEVICE — STENT COR ONYX FRONTIER 18X3.50MM ONYXNG35018UX: Type: IMPLANTABLE DEVICE | Status: FUNCTIONAL

## 2024-01-15 DEVICE — STENT COR ONYX FRONTIER 18X2.50MM ONYXNG25018UX: Type: IMPLANTABLE DEVICE | Status: FUNCTIONAL

## 2024-01-15 DEVICE — STENT COR ONYX FRONTIER 30X2.50MM ONYXNG25030UX: Type: IMPLANTABLE DEVICE | Status: FUNCTIONAL

## 2024-01-15 DEVICE — STENT COR ONYX FRONTIER 15X2.50MM ONYXNG25015UX: Type: IMPLANTABLE DEVICE | Status: FUNCTIONAL

## 2024-01-15 RX ORDER — ASPIRIN 325 MG
325 TABLET ORAL ONCE
Status: COMPLETED | OUTPATIENT
Start: 2024-01-15 | End: 2024-01-15

## 2024-01-15 RX ORDER — IOPAMIDOL 755 MG/ML
INJECTION, SOLUTION INTRAVASCULAR
Status: DISCONTINUED | OUTPATIENT
Start: 2024-01-15 | End: 2024-01-15 | Stop reason: HOSPADM

## 2024-01-15 RX ORDER — METOPROLOL TARTRATE 1 MG/ML
5 INJECTION, SOLUTION INTRAVENOUS
Status: DISCONTINUED | OUTPATIENT
Start: 2024-01-15 | End: 2024-01-16 | Stop reason: HOSPADM

## 2024-01-15 RX ORDER — SODIUM CHLORIDE 9 MG/ML
INJECTION, SOLUTION INTRAVENOUS CONTINUOUS
Status: DISCONTINUED | OUTPATIENT
Start: 2024-01-15 | End: 2024-01-15 | Stop reason: HOSPADM

## 2024-01-15 RX ORDER — HYDRALAZINE HYDROCHLORIDE 20 MG/ML
INJECTION INTRAMUSCULAR; INTRAVENOUS
Status: DISCONTINUED | OUTPATIENT
Start: 2024-01-15 | End: 2024-01-15 | Stop reason: HOSPADM

## 2024-01-15 RX ORDER — PROCHLORPERAZINE 25 MG
25 SUPPOSITORY, RECTAL RECTAL EVERY 12 HOURS PRN
Status: DISCONTINUED | OUTPATIENT
Start: 2024-01-15 | End: 2024-01-16 | Stop reason: HOSPADM

## 2024-01-15 RX ORDER — NITROGLYCERIN 5 MG/ML
VIAL (ML) INTRAVENOUS
Status: DISCONTINUED | OUTPATIENT
Start: 2024-01-15 | End: 2024-01-15 | Stop reason: HOSPADM

## 2024-01-15 RX ORDER — LIDOCAINE 40 MG/G
CREAM TOPICAL
Status: DISCONTINUED | OUTPATIENT
Start: 2024-01-15 | End: 2024-01-15 | Stop reason: HOSPADM

## 2024-01-15 RX ORDER — NITROGLYCERIN 0.4 MG/1
0.4 TABLET SUBLINGUAL EVERY 5 MIN PRN
Status: DISCONTINUED | OUTPATIENT
Start: 2024-01-15 | End: 2024-01-16 | Stop reason: HOSPADM

## 2024-01-15 RX ORDER — HYDRALAZINE HYDROCHLORIDE 20 MG/ML
10 INJECTION INTRAMUSCULAR; INTRAVENOUS EVERY 4 HOURS PRN
Status: DISCONTINUED | OUTPATIENT
Start: 2024-01-15 | End: 2024-01-16 | Stop reason: HOSPADM

## 2024-01-15 RX ORDER — HEPARIN SODIUM 1000 [USP'U]/ML
INJECTION, SOLUTION INTRAVENOUS; SUBCUTANEOUS
Status: DISCONTINUED | OUTPATIENT
Start: 2024-01-15 | End: 2024-01-15 | Stop reason: HOSPADM

## 2024-01-15 RX ORDER — POTASSIUM CHLORIDE 1500 MG/1
20 TABLET, EXTENDED RELEASE ORAL
Status: DISCONTINUED | OUTPATIENT
Start: 2024-01-15 | End: 2024-01-15 | Stop reason: HOSPADM

## 2024-01-15 RX ORDER — LORAZEPAM 2 MG/ML
0.5 INJECTION INTRAMUSCULAR
Status: DISCONTINUED | OUTPATIENT
Start: 2024-01-15 | End: 2024-01-15 | Stop reason: HOSPADM

## 2024-01-15 RX ORDER — FENTANYL CITRATE 50 UG/ML
INJECTION, SOLUTION INTRAMUSCULAR; INTRAVENOUS
Status: DISCONTINUED | OUTPATIENT
Start: 2024-01-15 | End: 2024-01-15 | Stop reason: HOSPADM

## 2024-01-15 RX ORDER — LORAZEPAM 0.5 MG/1
0.5 TABLET ORAL
Status: DISCONTINUED | OUTPATIENT
Start: 2024-01-15 | End: 2024-01-15 | Stop reason: HOSPADM

## 2024-01-15 RX ORDER — ASPIRIN 81 MG/1
243 TABLET, CHEWABLE ORAL ONCE
Status: COMPLETED | OUTPATIENT
Start: 2024-01-15 | End: 2024-01-15

## 2024-01-15 RX ORDER — PROCHLORPERAZINE MALEATE 5 MG
5 TABLET ORAL EVERY 6 HOURS PRN
Status: DISCONTINUED | OUTPATIENT
Start: 2024-01-15 | End: 2024-01-16 | Stop reason: HOSPADM

## 2024-01-15 RX ADMIN — METOPROLOL TARTRATE 12.5 MG: 25 TABLET, FILM COATED ORAL at 10:12

## 2024-01-15 RX ADMIN — PANTOPRAZOLE SODIUM 40 MG: 40 TABLET, DELAYED RELEASE ORAL at 06:29

## 2024-01-15 RX ADMIN — SODIUM CHLORIDE: 9 INJECTION, SOLUTION INTRAVENOUS at 10:36

## 2024-01-15 RX ADMIN — METOPROLOL TARTRATE 12.5 MG: 25 TABLET, FILM COATED ORAL at 21:55

## 2024-01-15 RX ADMIN — PANTOPRAZOLE SODIUM 40 MG: 40 TABLET, DELAYED RELEASE ORAL at 17:49

## 2024-01-15 RX ADMIN — ASPIRIN 325 MG ORAL TABLET 325 MG: 325 PILL ORAL at 10:36

## 2024-01-15 RX ADMIN — ATORVASTATIN CALCIUM 40 MG: 40 TABLET, FILM COATED ORAL at 10:12

## 2024-01-15 RX ADMIN — PROCHLORPERAZINE EDISYLATE 5 MG: 5 INJECTION INTRAMUSCULAR; INTRAVENOUS at 19:46

## 2024-01-15 RX ADMIN — CETIRIZINE HYDROCHLORIDE 10 MG: 10 TABLET, FILM COATED ORAL at 10:12

## 2024-01-15 RX ADMIN — ONDANSETRON 4 MG: 2 INJECTION INTRAMUSCULAR; INTRAVENOUS at 17:45

## 2024-01-15 ASSESSMENT — ACTIVITIES OF DAILY LIVING (ADL)
ADLS_ACUITY_SCORE: 35
ADLS_ACUITY_SCORE: 22
ADLS_ACUITY_SCORE: 22
ADLS_ACUITY_SCORE: 35
ADLS_ACUITY_SCORE: 22
ADLS_ACUITY_SCORE: 24
ADLS_ACUITY_SCORE: 24
ADLS_ACUITY_SCORE: 35
ADLS_ACUITY_SCORE: 22
ADLS_ACUITY_SCORE: 35
ADLS_ACUITY_SCORE: 35
ADLS_ACUITY_SCORE: 22

## 2024-01-15 NOTE — PLAN OF CARE
Pt stable over night, VSS, no c/o Chest pain or sob.  Tele:SR.  Heparin gtt infusing without problems, no bleeding noted.  Pt is NPO for PH to see her and she will have an Echo today.  No new issues noted, plan of care is on going.

## 2024-01-15 NOTE — PROGRESS NOTES
[facility]    HOSPITALIST PROGRESS NOTE :   --------------------------------------------------    Date of Admission:  1/14/2024    Cumulative Summary: Ana Luisa Byers is a 63 year old female with past medical history significant for essential hypertension, dyslipidemia, diabetes mellitus type 2 on insulin, peripheral neuropathy, fibromyalgia, depression, GERD, CKD stage II and arthritis who presented initially to ED for evaluation of chest pain and was admitted for further evaluation and management on 01/14/2024.  Patient was started on heparin infusion, cardiology consultation has been requested.    Assessment & Plan     NSTEMI/unstable angina  Essential hypertension  Dyslipidemia.    Patient presented with exertional chest pain and shortness of breath which is going on for the last week.  Felt the pain was more associated with chest tightness and pressure and somebody was sitting on her chest.  Initially her chest pain was with exertion but on the day of admission she is noticing chest pain at rest also.  Initial EKG was normal sinus rhythm with nonspecific T wave changes and serial troponin do not have ACS pattern, but her symptoms seems to be persistent with unstable angina.  Patient was given aspirin and nitroglycerin in ER and was a started on heparin infusion.    --Patient care was assumed this morning, patient was seen and examined.  --Continue to monitor patient on telemetry.  --Continue heparin infusion.  --Continue PTA aspirin 81 mg p.o. daily.  --Patient has been started on metoprolol 12.5 mg p.o. twice daily on admission.  --Fasting lipid profile checked this morning, in process  --Continue PTA statin.  --Echocardiogram has been ordered, we will follow-up on the results.  Of note, patient does takes Lasix 40 mg p.o. daily for leg swelling, had remote echocardiogram in 2009 which showed grade 1 diastolic dysfunction with normal EF of 60 to 65% in 2009.  --Patient has been  "evaluated by cardiology and is recommended to undergo coronary angiogram, tentatively this afternoon.  --Will keep patient n.p.o., okay to start patient on moderate carb controlled diet after the angiogram.      Type 2 diabetes mellitus:    --PTA on Lantus 40 units nightly and Trulicity  --Hemoglobin A1c 7.8 on 12/13/2023  --Plan to hold Trulicity for now  --Since she is be n.p.o. after midnight , start her on low-dose of Lantus 15 units nightly, will further adjust tomorrow   --Insulin sliding scale  --Monitor blood sugars; rest insulin doses as indicated  -- Once diet is resumed, plan to start patient on moderate carb controlled diet     GERD  --PTA omeprazole switched to Protonix     CKD stage II   --Creatinine 0.79  -- Monitor BMP     Incidental finding of indeterminate 1.7 cm right renal lesion on CT chest:    -- Could consider further evaluation with ultrasound or contrast-enhanced MRI on a nonemergent basis; plan to have mor discussion with patient regarding it before discharge for follow up      Abnormal CAT scan finding    --CT chest also mentions - morphologic changes suggestive of chronic liver disease. Splenomegaly. No ascites in the upper abdomen. At risk for KITCHEN sec to obesity   --Denies alcohol drinking  --LFT's are in normal range   --Needs to follow-up with her PCP    Clinically Significant Risk Factors Present on Admission                # Drug Induced Platelet Defect: home medication list includes an antiplatelet medication   # Hypertension: Noted on problem list     # DMII: A1C = 7.8 % (Ref range: 0.0 - 5.6 %) within past 6 months    # Severe Obesity: Estimated body mass index is 45.55 kg/m  as calculated from the following:    Height as of this encounter: 1.651 m (5' 5\").    Weight as of this encounter: 124.1 kg (273 lb 11.2 oz).              Diet: NPO per Anesthesia Guidelines for Procedure/Surgery Except for: Meds    Woodard Catheter: Not present  DVT Prophylaxis: Heparin infusion   Code " Status: Full Code    The patient's care was discussed with the Bedside Nurse and Patient.    Medical Decision Making     40 MINUTES SPENT BY ME on the date of service doing chart review, history, exam, documentation & further activities per the note.        Disposition Plan      Expected Discharge Date: 01/16/2024             Entered: Mya Murrieta MD 01/15/2024, 8:38 AM       Mya Murrieta MD, MD, FACP  Text Page (7am - 6pm)    ----------------------------------------------------------------------------------------------------------------------      Interval History     Patient care was assumed this morning, patient was seen and examined.  Patient is sitting on chair, denying any chest discomfort at this time currently n.p.o., aware about plan for tentative angiogram later this afternoon.  Patient did have some questions regarding angiogram procedures, most of the questions were answered but I encouraged her to have further discussion with interventional cardiologist when she goes down for procedure.    -Data reviewed today: I reviewed all new labs and imaging results over the last 24 hours.    I personally reviewed no images or EKG's today.    Physical Exam   Temp: 97.4  F (36.3  C) Temp src: Oral BP: 130/83 Pulse: 79   Resp: 22 SpO2: 95 % O2 Device: None (Room air)    Vitals:    01/14/24 1729 01/15/24 0455   Weight: 125 kg (275 lb 9.2 oz) 124.1 kg (273 lb 11.2 oz)     Vital Signs with Ranges  Temp:  [97.4  F (36.3  C)-98.3  F (36.8  C)] 97.4  F (36.3  C)  Pulse:  [69-94] 79  Resp:  [18-24] 22  BP: (122-171)/() 130/83  SpO2:  [92 %-99 %] 95 %  I/O last 3 completed shifts:  In: 0   Out: 300 [Urine:300]    GENERAL: Alert , awake and oriented. NAD. Conversational, appropriate.   HEENT: Normocephalic. EOMI. No icterus or injection. Nares normal.   LUNGS: Clear to auscultation. No dyspnea at rest.   HEART: Regular rate. Extremities perfused.   ABDOMEN: Soft, nontender, and nondistended. Positive bowel sounds.    EXTREMITIES: No LE edema noted.   NEUROLOGIC: Moves extremities x4 on command. No acute focal neurologic abnormalities noted.     Medications    - MEDICATION INSTRUCTIONS -      - MEDICATION INSTRUCTIONS -      heparin 1,200 Units/hr (01/15/24 0638)    - MEDICATION INSTRUCTIONS -      - MEDICATION INSTRUCTIONS -      ACE/ARB/ARNI NOT PRESCRIBED        aspirin  81 mg Oral Daily    atorvastatin  40 mg Oral Daily    cetirizine  10 mg Oral Daily    insulin aspart  1-7 Units Subcutaneous TID AC    insulin aspart  1-5 Units Subcutaneous At Bedtime    insulin glargine  15 Units Subcutaneous At Bedtime    metoprolol tartrate  12.5 mg Oral BID    pantoprazole  40 mg Oral BID AC    sodium chloride (PF)  3 mL Intracatheter Q8H       Data   Recent Labs   Lab 01/15/24  0755 01/15/24  0240 01/14/24  2203 01/14/24  1901 01/14/24  1812 01/14/24  1146   WBC  --   --   --   --  4.9 5.5   HGB  --   --   --   --  12.7 13.8   MCV  --   --   --   --  88 89   PLT  --   --   --   --  113* 131*   NA  --   --   --   --   --  137   POTASSIUM  --   --   --   --   --  4.7   CHLORIDE  --   --   --   --   --  103   CO2  --   --   --   --   --  22   BUN  --   --   --   --   --  10.7   CR  --   --   --   --   --  0.79   ANIONGAP  --   --   --   --   --  12   GARY  --   --   --   --   --  9.3   * 153* 122*   < >  --  169*    < > = values in this interval not displayed.       Imaging:   Recent Results (from the past 24 hour(s))   Chest XR,  PA & LAT    Narrative    EXAM: XR CHEST 2 VIEWS  LOCATION: [facility]  DATE: 1/14/2024    INDICATION: chest pain  COMPARISON: 05/01/2009      Impression    IMPRESSION: No infiltrate, pleural effusion or pneumothorax. The cardiac and mediastinal silhouettes are normal. Right shoulder arthroplasty.   CT Chest Pulmonary Embolism w Contrast    Narrative    EXAM: CT CHEST PULMONARY EMBOLISM W CONTRAST  LOCATION: [facility]  DATE: 1/14/2024    INDICATION: chest pain,  elevated d dimer  COMPARISON: Same day chest radiograph, CT 05/01/2009  TECHNIQUE: CT chest pulmonary angiogram during arterial phase injection of IV contrast. Multiplanar reformats and MIP reconstructions were performed. Dose reduction techniques were used.   CONTRAST: 76mL Isovue 370    FINDINGS:  ANGIOGRAM CHEST: Pulmonary arteries are normal caliber and negative for pulmonary emboli. Thoracic aorta is negative for dissection. No CT evidence of right heart strain.    LUNGS AND PLEURA: No focal airspace consolidation or pleural effusion. 2 mm right upper lobe pulmonary nodule, stable since at least 2009, no specific follow-up recommended given long-term stability.    MEDIASTINUM/AXILLAE: No suspicious lymphadenopathy.    CORONARY ARTERY CALCIFICATION: Moderate.    UPPER ABDOMEN: Nodular contour of liver with widening of the fissures. Significant enlargement of the caudate. Mild splenomegaly measuring up to 15 cm. No ascites visualized in the upper abdomen. Indeterminate 1.7 cm right renal lesion (series 6 image   352).    MUSCULOSKELETAL: No acute bony abnormality.      Impression    IMPRESSION:  1.  No evidence of pulmonary embolism or other acute abnormality in the chest.  2.  Morphologic changes suggestive of chronic liver disease. Splenomegaly. No ascites in the upper abdomen.  3.  Indeterminate 1.7 cm right renal lesion, could consider further evaluation with ultrasound or contrast-enhanced MRI on a nonemergent basis.

## 2024-01-15 NOTE — PLAN OF CARE
Goal Outcome Evaluation:    Plan Of Care Reviewed With: Patient    Pt A/Ox4 calm and cooperative. Vitals stable and cardiac rhythm remains SR without ectopy. Pt declines CP or discomfort. Heparin gtt continues, awaiting Heparin 10A. Plan for Cardiology consult, will keep pt NPO after midnight.

## 2024-01-15 NOTE — PRE-PROCEDURE
GENERAL PRE-PROCEDURE:   Procedure:  Coronary angiogram, possible intervention  Date/Time:  1/15/2024 2:51 PM    Verbal consent obtained?: Yes    Risks and benefits: Risks, benefits and alternatives were discussed    Consent given by:  Patient  Patient states understanding of procedure being performed: Yes    Patient's understanding of procedure matches consent: Yes    Procedure consent matches procedure scheduled: Yes    Expected level of sedation:  Moderate  Appropriately NPO:  Yes  ASA Class:  1  Mallampati  :  Grade 1- soft palate, uvula, tonsillar pillars, and posterior pharyngeal wall visible  Lungs:  Lungs clear with good breath sounds bilaterally  Heart:  Normal heart sounds and rate  History & Physical reviewed:  History and physical reviewed and no updates needed  Statement of review:  I have reviewed the lab findings, diagnostic data, medications, and the plan for sedation

## 2024-01-15 NOTE — H&P
"[facility]    History and Physical - Hospitalist Service       Date of Admission:  1/14/2024    Assessment & Plan      Ana Luisa Byers is a 63 year old female admitted on 1/14/2024.   Ana Luisa Byers is a 63 year old female with a past medical history of hypertension, dyslipidemia, diabetes mellitus type 2 on insulin, peripheral neuropathy, fibromyalgia, depression, gastroesophageal reflux disease, CKD stage II and arthritis who presented initially to [facility] ER for evaluation of chest pain.    # NSTEMI  # Hypertension  # Dyslipidemia  -Presented with exertional chest pain and shortness of breath for the last a week; describes the pain as \"somebody was sitting on her chest\"; initially the chest pain was with exertion, since yesterday the pain occurred at rest and as well  -EKG was normal sinus rhythm and some nonspecific T wave changes  Serial troponin 60--66--68  -Was given aspirin and 3 nitroglycerin in ER  -Started on heparin drip  -Transferred to [facility]  -Admit inpatient   -Monitor on telemetry  -Serial troponins  -Continue heparin drip  -Continue PTA aspirin 81 mg p.o. daily  -Start metoprolol 12.5 mg p.o. twice daily  -Check fasting lipid profile  -Resume PTA statin  -Echocardiogram  -Takes Lasix 40 mg p.o. daily for leg swelling; had a remote echocardiogram in 2009 which showed some grade 1 diastolic dysfunction but normal EF 60- 65% at that time  -n.p.o. after midnight for possible coronary angiogram tomorrow  -cardiology consult    # Diabetes mellitus type 2  -PTA on Lantus 40 units nightly and Trulicity  -Hemoglobin A1c 7.8 on 12/13/2023  -Plan to hold Trulicity for now  -Since she will be n.p.o. after midnight will start her on low-dose of Lantus 15 units nightly  -Insulin sliding scale  -Monitor blood sugars; rest insulin doses as indicated    # GERD  -PTA omeprazole switched to Protonix    # CKD stage II   -Creatinine 0.79  -Monitor BMP    # Incidental " "finding of indeterminate 1.7 cm right renal lesion on CT chest  - could consider further evaluation with ultrasound or contrast-enhanced MRI on a nonemergent basis; this was not discussed with the patient at the time of the admission    # Abnormal CAT scan finding  -CT chest also mentions - morphologic changes suggestive of chronic liver disease. Splenomegaly. No ascites in the upper abdomen.   -Denies alcohol drinking  -Will check her LFTs  -Needs to follow-up with her PCP        Diet: Combination Diet Moderate Consistent Carb (60 g CHO per Meal) Diet; Low Saturated Fat Na <2400mg Diet, No Caffeine Diet  NPO per Anesthesia Guidelines for Procedure/Surgery Except for: Meds    DVT Prophylaxis: Heparin drip  Woodard Catheter: Not present  Lines: None     Cardiac Monitoring: ACTIVE order. Indication: AMI (NSTEMI/ STEMI) (48 hours)  Code Status: Full Code      Clinically Significant Risk Factors Present on Admission                # Drug Induced Platelet Defect: home medication list includes an antiplatelet medication   # Hypertension: Noted on problem list     # DMII: A1C = 7.8 % (Ref range: 0.0 - 5.6 %) within past 6 months    # Severe Obesity: Estimated body mass index is 45.86 kg/m  as calculated from the following:    Height as of this encounter: 1.651 m (5' 5\").    Weight as of this encounter: 125 kg (275 lb 9.2 oz).              Disposition Plan      Expected Discharge Date: 01/16/2024                  Iram Shah MD  Hospitalist Service  [facility]  Securely message with [employer] (more info)  Text page via [employer] Paging/Directory     ______________________________________________________________________    Chief Complaint   Chest pain    History is obtained from the patient who is a good historian; I discussed with ER attending at [facility], Dr Rosado.    History of Present Illness   Ana Luisa Byers is a 63 year old female with a past medical history of hypertension, " "dyslipidemia, diabetes mellitus type 2 on insulin, peripheral neuropathy, fibromyalgia, depression, gastroesophageal reflux disease, CKD stage III and arthritis who presented initially to [facility] ER for evaluation of chest pain.  The patient states that she started having intermittent episodes of chest pain 1 week ago.  She describes the chest pain as occurring with exertion and walking associated with shortness of breath and numbness in her left arm.  When it was severe as the pain was 10/10 in intensity.  She describes the pain as \"somebody was sitting on her chest' The chest pain was improving with the resting.  She reports that for the last few days the chest pain is worse.  Since yesterday she has more constant chest pain, now occurs even at rest.  She decided to come to ER for further evaluation.  Upon further questioning she reported some associated nausea but no vomiting.  She denies dizziness, no abdominal pain, no diarrhea, no constipation.  She denies recent fevers.  Reports to having some chronic lower extremity edema for which she takes Lasix.  In ER she was seen by Dr. Rosado.  CBC RESULTS:   Recent Labs   Lab Test 01/14/24  1812   WBC 4.9   RBC 4.25   HGB 12.7   HCT 37.4   MCV 88   MCH 29.9   MCHC 34.0   RDW 12.2   *      Last Comprehensive Metabolic Panel:  Lab Results   Component Value Date     01/14/2024    POTASSIUM 4.7 01/14/2024    CHLORIDE 103 01/14/2024    CO2 22 01/14/2024    ANIONGAP 12 01/14/2024     (H) 01/14/2024    BUN 10.7 01/14/2024    CR 0.79 01/14/2024    GFRESTIMATED 84 01/14/2024    GARY 9.3 01/14/2024     Initial troponin 60, repeat troponin 66--68  D-dimer was elevated at 0.98  Chest x-ray-no infiltrates, no pleural effusion  CT chest-   1.  No evidence of pulmonary embolism or other acute abnormality in the chest.  2.  Morphologic changes suggestive of chronic liver disease. Splenomegaly. No ascites in the upper abdomen.  3.  Indeterminate 1.7 cm right renal " lesion, could consider further evaluation with ultrasound or contrast-enhanced MRI on a nonemergent basis.    EKG with normal sinus arrhythmia and some nonspecific ST-T wave abnormalities.    # Was given 3 tablets nitroglycerin sublingual with improvement for chest pain; she was started on heparin drip.  ER attending discussed with Dr. Beebe of cardiology who recommended patient to be transferred to [facility].    The patient after she arrived in CICU; was resting comfortable, reported only mild chest discomfort.      Past Medical History    Past Medical History:   Diagnosis Date    Arthritis     left shoulder and back    Chronic pain     fibromalgia     CKD (chronic kidney disease), stage III (H)     Fibromyalgia     Gastro-oesophageal reflux disease     HTN, goal below 140/90     Hyperlipidemia LDL goal <100     Hypothyroidism     Major depression     chronic kidney disease-stage 3    Peripheral neuropathy Feb 2015    + callus. diabetic shoes rx done    PONV (postoperative nausea and vomiting)     Rheumatoid arthritis of foot (H)     Tongue abnormality     Type 2 diabetes, HbA1C goal < 8% (H)     pre-diabetic       Past Surgical History   Past Surgical History:   Procedure Laterality Date    ARTHROPLASTY SHOULDER  7/25/2013    Procedure: right ARTHROPLASTY SHOULDER;  RIGHT TOTAL SHOULDER ARTHROPLASTY (TORNIER AFFINITY SHOULDER SYSTEM)^;  Surgeon: Dung Morales MD;  Location:  OR    BACK SURGERY  2011    L45 laminectomy    ELBOW WRIST HAND FINGER ORTHOSIS, RIGID, WITHOUT JOINTS, MAY INCLUDE SOFT  5/2007    put in [place]    ESOPHAGOSCOPY, GASTROSCOPY, DUODENOSCOPY (EGD), COMBINED  9/23/2015    Dr. Thomas [facility]    ESOPHAGOSCOPY, GASTROSCOPY, DUODENOSCOPY (EGD), COMBINED N/A 9/23/2015    Procedure: COMBINED ESOPHAGOSCOPY, GASTROSCOPY, DUODENOSCOPY (EGD), BIOPSY SINGLE OR MULTIPLE;  Surgeon: Jose Thomas MD;  Location:  GI    ESOPHAGOSCOPY, GASTROSCOPY, DUODENOSCOPY (EGD), COMBINED   2020    Dr. Thomas [facility]    ESOPHAGOSCOPY, GASTROSCOPY, DUODENOSCOPY (EGD), COMBINED N/A 2020    Procedure: ESOPHAGOGASTRODUODENOSCOPY (EGD);  Surgeon: Jose Thomas MD;  Location: [facility] GI    HCL PAP SMEAR  2008    [facility]    ORTHOPEDIC SURGERY      left shoulder scoped and plate left elbow    SURGICAL HISTORY OF -   2009    ulnar nerve release, carpal tunnel- left    ZZC APPENDECTOMY  age 19    ZZC LIGATE FALLOPIAN TUBE,POSTPARTUM  1982    Tubal Ligation       Prior to Admission Medications   Prior to Admission Medications   Prescriptions Last Dose Informant Patient Reported? Taking?   Blood Pressure Monitoring (COMFORT TOUCH BP CUFF/LARGE) MISC   No No   Si Units daily   TRULICITY 0.75 MG/0.5ML pen Past Month  No Yes   Sig: ADMINISTER 0.75 MG UNDER THE SKIN EVERY 7 DAYS   aspirin 81 MG chewable tablet 2024  Yes Yes   Sig: Take 1 tablet (81 mg) by mouth daily   atorvastatin (LIPITOR) 40 MG tablet 2024  Yes Yes   Sig: Take 40 mg by mouth daily   betamethasone dipropionate (DIPROSONE) 0.05 % external cream 2024 at pm  Yes Yes   Sig: Apply topically 2 times daily  to affected area   blood glucose (NO BRAND SPECIFIED) test strip   No No   Sig: Use to test blood sugar as directed.   blood glucose monitoring (NO BRAND SPECIFIED) meter device kit   No No   Sig: Use to test blood sugar as directed.   cetirizine (ZYRTEC) 10 MG tablet 2024 at am  No Yes   Sig: Take 1 tablet (10 mg) by mouth daily   clobetasol (TEMOVATE) 0.05 % external cream 2024  No Yes   Sig: Apply topically 2 times daily   furosemide (LASIX) 40 MG tablet 2024 at am  No Yes   Sig: Take 1 tablet (40 mg) by mouth daily   insulin glargine (LANTUS PEN) 100 UNIT/ML pen 2024 at pm  No Yes   SiU Lantus at bedtime, increase by 2U every other day if fasted morning glucose > 125. Do NOT exceed 46U until speaking with a doctor.   Patient taking differently: 40U Lantus at bedtime, increase by 2U every other day if  fasted morning glucose > 125. Do NOT exceed 46U until speaking with a doctor.   insulin pen needle (32G X 4 MM) 32G X 4 MM miscellaneous   No No   Sig: Use 1 pen needles daily or as directed.   omeprazole (PRILOSEC) 40 MG DR capsule 1/13/2024 at pm  No Yes   Sig: Take 1 capsule (40 mg) by mouth 2 times daily   order for DME   No No   Sig: Equipment being ordered: Diabetic Shoes   zolpidem (AMBIEN) 10 MG tablet 1/13/2024  No Yes   Sig: TAKE 1 TABLET(10 MG) BY MOUTH EVERY NIGHT AS NEEDED FOR SLEEP      Facility-Administered Medications: None        Review of Systems    The 10 point Review of Systems is negative other than noted in the HPI or here.     Social History   I have reviewed this patient's social history and updated it with pertinent information if needed.  Social History     Tobacco Use    Smoking status: Never    Smokeless tobacco: Never   Vaping Use    Vaping Use: Never used   Substance Use Topics    Alcohol use: No    Drug use: No         Family History   I have reviewed this patient's family history and updated it with pertinent information if needed.  Family History   Problem Relation Age of Onset    C.A.D. Mother     Neurologic Disorder Mother         lupus    Depression Mother     Family History Negative Father     Diabetes Maternal Aunt     Colon Cancer No family hx of          Allergies   Allergies   Allergen Reactions    Penicillins Anaphylaxis    Augmentin [Amoxicillin-Pot Clavulanate] GI Disturbance    Bee Swelling    Gabapentin Other (See Comments)     Mood Swings    Lisinopril Rash    Metformin Itching    Topiramate Other (See Comments)     numbness        Physical Exam   Vital Signs: Temp: 98.3  F (36.8  C) Temp src: Oral BP: 122/71 Pulse: 78   Resp: 20 SpO2: 95 % O2 Device: None (Room air)    Weight: 275 lbs 9.2 oz    General Appearance: Awake/alert/no acute distress  Respiratory: Bilateral air entry, no wheezing, no rales, no crackles  Cardiovascular: S1-S2, RRR, no murmurs, no rubs  GI:  Abdomen is soft, nontender, bowel sounds are present  Skin: No rashes, no cyanosis  Neuro: AAOx3, no focal neurological deficits  Extrem;-trace edema bilateral lower extremities    Medical Decision Making       MANAGEMENT DISCUSSED with the following over the past 24 hours: ER attending, the patient, bedside RN   NOTE(S)/MEDICAL RECORDS REVIEWED over the past 24 hours: ER notes  Tests REVIEWED in the past 24 hours:  - BMP  - CBC  - Ddimer  - Troponin  Tests personally interpreted in the past 24 hours:  - EKG tracing showing as above  - CHEST XRAY showing negative for any acute pathology  - CHEST CT showing as above       Data     I have personally reviewed the following data over the past 24 hrs:    4.9  \   12.7   / 113 (L)     137 103 10.7 /  169 (H)   4.7 22 0.79 \     Trop: 68 (H) BNP: N/A     INR:  N/A PTT:  N/A   D-dimer:  0.98 (H) Fibrinogen:  N/A       Imaging results reviewed over the past 24 hrs:   Recent Results (from the past 24 hour(s))   Chest XR,  PA & LAT    Narrative    EXAM: XR CHEST 2 VIEWS  LOCATION: [facility]  DATE: 1/14/2024    INDICATION: chest pain  COMPARISON: 05/01/2009      Impression    IMPRESSION: No infiltrate, pleural effusion or pneumothorax. The cardiac and mediastinal silhouettes are normal. Right shoulder arthroplasty.   CT Chest Pulmonary Embolism w Contrast    Narrative    EXAM: CT CHEST PULMONARY EMBOLISM W CONTRAST  LOCATION: [facility]  DATE: 1/14/2024    INDICATION: chest pain, elevated d dimer  COMPARISON: Same day chest radiograph, CT 05/01/2009  TECHNIQUE: CT chest pulmonary angiogram during arterial phase injection of IV contrast. Multiplanar reformats and MIP reconstructions were performed. Dose reduction techniques were used.   CONTRAST: 76mL Isovue 370    FINDINGS:  ANGIOGRAM CHEST: Pulmonary arteries are normal caliber and negative for pulmonary emboli. Thoracic aorta is negative for dissection. No CT evidence of right  heart strain.    LUNGS AND PLEURA: No focal airspace consolidation or pleural effusion. 2 mm right upper lobe pulmonary nodule, stable since at least 2009, no specific follow-up recommended given long-term stability.    MEDIASTINUM/AXILLAE: No suspicious lymphadenopathy.    CORONARY ARTERY CALCIFICATION: Moderate.    UPPER ABDOMEN: Nodular contour of liver with widening of the fissures. Significant enlargement of the caudate. Mild splenomegaly measuring up to 15 cm. No ascites visualized in the upper abdomen. Indeterminate 1.7 cm right renal lesion (series 6 image   352).    MUSCULOSKELETAL: No acute bony abnormality.      Impression    IMPRESSION:  1.  No evidence of pulmonary embolism or other acute abnormality in the chest.  2.  Morphologic changes suggestive of chronic liver disease. Splenomegaly. No ascites in the upper abdomen.  3.  Indeterminate 1.7 cm right renal lesion, could consider further evaluation with ultrasound or contrast-enhanced MRI on a nonemergent basis.

## 2024-01-15 NOTE — CONSULTS
[facility]    Cardiology Consultation     Date of Admission:  1/14/2024    Assessment & Plan   Ana Luisa Byers is a 63 year old female who was admitted on 1/14/2024.    Non-ST elevation myocardial infarction  Patient presents with progressive angina at minimal activity.  EKG revealed sinus rhythm without ischemic changes but troponin elevation is consistent with non-STEMI.  Would recommend coronary angiography for revascularization.    Risks and benefits of left heart catheterization, aka coronary angiogram were discussed with the patient in detail. 0.1-0.3% (for diagnostic angio) and 1-2% (for PCI)  risk of stroke, MI, death, emergent bypass grafting, risk of contrast induced allergic reaction, renal dysfunction, vascular complications were discussed. Patient understands and wishes to proceed.    2.  Type 2 diabetes, now insulin-dependent, management per hospitalist    3.  Dyslipidemia, continue statin.  Target LDL of less than 70.    4. GERD, on protonix.      At today's visit, I reviewed the admission EKGs, the chest CT images and findings, labs including CBC, BMP and troponin.  Today's medical decision making is of high complexity.      Juan Miguel Dykes MD, MD    Primary Care Physician   Leonides Juárez    Reason for Consult   Reason for consult: I was asked by hospitalist to evaluate this patient for NSTEMI.    History of Present Illness     Ana Luisa Byers is a 63 year old female with past medical history of hypertension, type 2 diabetes on insulin, peripheral neuropathy who presents with exertional chest discomfort and shortness of breath for last 1 week.    Patient claims that she has been having chest pressure on exertion going to the mailbox and back and any kind of activity including carrying groceries.  Is associate with numbness in her left arm.  The pain intensity was about 10 x 10 and had to rest.  It felt like somebody was sitting on her chest.  Because of this  progressive angina, she came to the emergency room.    Her initial troponin was 66 that increased to 68 and now 75.  With this delta rise, this suggests likely evidence of ischemia and non-ST elevation myocardial infarction.    With her diabetes, she also has diabetic neuropathy and CKD.  She has family history of CAD with her brother having bypass surgery few years ago.  Her mother also had congestive heart failure.  Last A1c was 7.8.    She is not a smoker.      Patient had a CT chest on admission that was negative for PE.  Shows a kidney lesion.  Moderate coronary calcification noted.  Patient has some chronic liver changes on the CT    Past Medical History   Past Medical History:   Diagnosis Date    Arthritis     left shoulder and back    Chronic pain     fibromalgia     CKD (chronic kidney disease), stage III (H)     Fibromyalgia     Gastro-oesophageal reflux disease     HTN, goal below 140/90     Hyperlipidemia LDL goal <100     Hypothyroidism     Major depression     chronic kidney disease-stage 3    Peripheral neuropathy Feb 2015    + callus. diabetic shoes rx done    PONV (postoperative nausea and vomiting)     Rheumatoid arthritis of foot (H)     Tongue abnormality     Type 2 diabetes, HbA1C goal < 8% (H)     pre-diabetic         Past Surgical History   Past Surgical History:   Procedure Laterality Date    ARTHROPLASTY SHOULDER  7/25/2013    Procedure: right ARTHROPLASTY SHOULDER;  RIGHT TOTAL SHOULDER ARTHROPLASTY (TORNIER AFFINITY SHOULDER SYSTEM)^;  Surgeon: Dung Morales MD;  Location: [facility] OR    BACK SURGERY  2011    L45 laminectomy    ELBOW WRIST HAND FINGER ORTHOSIS, RIGID, WITHOUT JOINTS, MAY INCLUDE SOFT  5/2007    put in [place]    ESOPHAGOSCOPY, GASTROSCOPY, DUODENOSCOPY (EGD), COMBINED  9/23/2015    Dr. Thomas [facility]    ESOPHAGOSCOPY, GASTROSCOPY, DUODENOSCOPY (EGD), COMBINED N/A 9/23/2015    Procedure: COMBINED ESOPHAGOSCOPY, GASTROSCOPY, DUODENOSCOPY (EGD), BIOPSY SINGLE OR MULTIPLE;   Surgeon: Jose Thomas MD;  Location:  GI    ESOPHAGOSCOPY, GASTROSCOPY, DUODENOSCOPY (EGD), COMBINED  2020    Dr. Thomas [facility]    ESOPHAGOSCOPY, GASTROSCOPY, DUODENOSCOPY (EGD), COMBINED N/A 2020    Procedure: ESOPHAGOGASTRODUODENOSCOPY (EGD);  Surgeon: Jose Thomas MD;  Location:  GI    HCL PAP SMEAR  2008    WNL    ORTHOPEDIC SURGERY      left shoulder scoped and plate left elbow    SURGICAL HISTORY OF -   2009    ulnar nerve release, carpal tunnel- left    ZZC APPENDECTOMY  age 19    ZZC LIGATE FALLOPIAN TUBE,POSTPARTUM  1982    Tubal Ligation         Prior to Admission Medications   Prior to Admission Medications   Prescriptions Last Dose Informant Patient Reported? Taking?   Blood Pressure Monitoring (COMFORT TOUCH BP CUFF/LARGE) MISC   No No   Si Units daily   TRULICITY 0.75 MG/0.5ML pen Past Month  No Yes   Sig: ADMINISTER 0.75 MG UNDER THE SKIN EVERY 7 DAYS   aspirin 81 MG chewable tablet 2024  Yes Yes   Sig: Take 1 tablet (81 mg) by mouth daily   atorvastatin (LIPITOR) 40 MG tablet 2024  Yes Yes   Sig: Take 40 mg by mouth daily   betamethasone dipropionate (DIPROSONE) 0.05 % external cream 2024 at pm  Yes Yes   Sig: Apply topically 2 times daily  to affected area   blood glucose (NO BRAND SPECIFIED) test strip   No No   Sig: Use to test blood sugar as directed.   blood glucose monitoring (NO BRAND SPECIFIED) meter device kit   No No   Sig: Use to test blood sugar as directed.   cetirizine (ZYRTEC) 10 MG tablet 2024 at am  No Yes   Sig: Take 1 tablet (10 mg) by mouth daily   clobetasol (TEMOVATE) 0.05 % external cream 2024  No Yes   Sig: Apply topically 2 times daily   furosemide (LASIX) 40 MG tablet 2024 at am  No Yes   Sig: Take 1 tablet (40 mg) by mouth daily   insulin glargine (LANTUS PEN) 100 UNIT/ML pen 2024 at pm  No Yes   SiU Lantus at bedtime, increase by 2U every other day if fasted morning glucose > 125. Do NOT exceed 46U  until speaking with a doctor.   Patient taking differently: 40U Lantus at bedtime, increase by 2U every other day if fasted morning glucose > 125. Do NOT exceed 46U until speaking with a doctor.   insulin pen needle (32G X 4 MM) 32G X 4 MM miscellaneous   No No   Sig: Use 1 pen needles daily or as directed.   omeprazole (PRILOSEC) 40 MG DR capsule 1/13/2024 at pm  No Yes   Sig: Take 1 capsule (40 mg) by mouth 2 times daily   order for DME   No No   Sig: Equipment being ordered: Diabetic Shoes   zolpidem (AMBIEN) 10 MG tablet 1/13/2024  No Yes   Sig: TAKE 1 TABLET(10 MG) BY MOUTH EVERY NIGHT AS NEEDED FOR SLEEP      Facility-Administered Medications: None     Current Facility-Administered Medications   Medication Dose Route Frequency    aspirin  81 mg Oral Daily    atorvastatin  40 mg Oral Daily    cetirizine  10 mg Oral Daily    insulin aspart  1-7 Units Subcutaneous TID AC    insulin aspart  1-5 Units Subcutaneous At Bedtime    insulin glargine  15 Units Subcutaneous At Bedtime    metoprolol tartrate  12.5 mg Oral BID    pantoprazole  40 mg Oral BID AC    sodium chloride (PF)  3 mL Intracatheter Q8H     Current Facility-Administered Medications   Medication Last Rate    - MEDICATION INSTRUCTIONS -      - MEDICATION INSTRUCTIONS -      heparin 1,200 Units/hr (01/15/24 0638)    - MEDICATION INSTRUCTIONS -      - MEDICATION INSTRUCTIONS -      ACE/ARB/ARNI NOT PRESCRIBED       Allergies   Allergies   Allergen Reactions    Penicillins Anaphylaxis    Augmentin [Amoxicillin-Pot Clavulanate] GI Disturbance    Bee Swelling    Gabapentin Other (See Comments)     Mood Swings    Lisinopril Rash    Metformin Itching    Topiramate Other (See Comments)     numbness       Social History    reports that she has never smoked. She has never used smokeless tobacco. She reports that she does not drink alcohol and does not use drugs.      Family History   I have reviewed this patient's family history and updated it with pertinent  "information if needed.  Family History   Problem Relation Age of Onset    C.A.D. Mother     Neurologic Disorder Mother         lupus    Depression Mother     Family History Negative Father     Diabetes Maternal Aunt     Colon Cancer No family hx of           Review of Systems   A comprehensive review of system was performed and is negative other than that noted in the HPI or here.     Physical Exam   Vital Signs with Ranges  Temp:  [97.4  F (36.3  C)-98.3  F (36.8  C)] 97.4  F (36.3  C)  Pulse:  [69-94] 79  Resp:  [18-24] 22  BP: (122-171)/() 130/83  SpO2:  [92 %-99 %] 95 %  Wt Readings from Last 4 Encounters:   01/15/24 124.1 kg (273 lb 11.2 oz)   01/14/24 124.3 kg (274 lb)   12/13/23 124.7 kg (275 lb)   04/26/23 122.5 kg (270 lb)     I/O last 3 completed shifts:  In: 0   Out: 300 [Urine:300]      Vitals: /83 (BP Location: Right arm)   Pulse 79   Temp 97.4  F (36.3  C) (Oral)   Resp 22   Ht 1.651 m (5' 5\")   Wt 124.1 kg (273 lb 11.2 oz)   LMP 02/13/2009   SpO2 95%   BMI 45.55 kg/m      Physical Exam:   General - Alert and oriented to time place and person in no acute distress  Eyes - No scleral icterus  HEENT - Neck supple, moist mucous membranes  Cardiovascular -regular S1-S2 with distant heart sounds  Extremities - There is trace edema  Respiratory -clear to auscultate   skin - No pallor or cyanosis  Gastrointestinal - Non tender and non distended without rebound or guarding  Psych - Appropriate affect   Neurological - No gross motor neurological focal deficits    No lab results found in last 7 days.    Invalid input(s): \"TROPONINIES\"    Recent Labs   Lab 01/15/24  0755 01/15/24  0240 01/14/24  2203 01/14/24  1901 01/14/24  1812 01/14/24  1146   WBC  --   --   --   --  4.9 5.5   HGB  --   --   --   --  12.7 13.8   MCV  --   --   --   --  88 89   PLT  --   --   --   --  113* 131*   NA  --   --   --   --   --  137   POTASSIUM  --   --   --   --   --  4.7   CHLORIDE  --   --   --   --   --  103 " "  CO2  --   --   --   --   --  22   BUN  --   --   --   --   --  10.7   CR  --   --   --   --   --  0.79   GFRESTIMATED  --   --   --   --   --  84   ANIONGAP  --   --   --   --   --  12   GARY  --   --   --   --   --  9.3   * 153* 122*   < >  --  169*    < > = values in this interval not displayed.     Recent Labs   Lab Test 01/31/23  1135 02/14/22  1017   CHOL 275* 322*   HDL 48* 57   * 243*   TRIG 171* 109     Recent Labs   Lab 01/14/24  1812 01/14/24  1146   WBC 4.9 5.5   HGB 12.7 13.8   HCT 37.4 42.2   MCV 88 89   * 131*     No results for input(s): \"PH\", \"PHV\", \"PO2\", \"PO2V\", \"SAT\", \"PCO2\", \"PCO2V\", \"HCO3\", \"HCO3V\" in the last 168 hours.  No results for input(s): \"NTBNPI\", \"NTBNP\" in the last 168 hours.  Recent Labs   Lab 01/14/24  1146   DD 0.98*     No results for input(s): \"SED\", \"CRP\" in the last 168 hours.  Recent Labs   Lab 01/14/24  1812 01/14/24  1146   * 131*     No results for input(s): \"TSH\" in the last 168 hours.  No results for input(s): \"COLOR\", \"APPEARANCE\", \"URINEGLC\", \"URINEBILI\", \"URINEKETONE\", \"SG\", \"UBLD\", \"URINEPH\", \"PROTEIN\", \"UROBILINOGEN\", \"NITRITE\", \"LEUKEST\", \"RBCU\", \"WBCU\" in the last 168 hours.    Imaging:  Recent Results (from the past 48 hour(s))   Chest XR,  PA & LAT    Narrative    EXAM: XR CHEST 2 VIEWS  LOCATION: [facility]  DATE: 1/14/2024    INDICATION: chest pain  COMPARISON: 05/01/2009      Impression    IMPRESSION: No infiltrate, pleural effusion or pneumothorax. The cardiac and mediastinal silhouettes are normal. Right shoulder arthroplasty.   CT Chest Pulmonary Embolism w Contrast    Narrative    EXAM: CT CHEST PULMONARY EMBOLISM W CONTRAST  LOCATION: [facility]  DATE: 1/14/2024    INDICATION: chest pain, elevated d dimer  COMPARISON: Same day chest radiograph, CT 05/01/2009  TECHNIQUE: CT chest pulmonary angiogram during arterial phase injection of IV contrast. Multiplanar reformats and MIP " "reconstructions were performed. Dose reduction techniques were used.   CONTRAST: 76mL Isovue 370    FINDINGS:  ANGIOGRAM CHEST: Pulmonary arteries are normal caliber and negative for pulmonary emboli. Thoracic aorta is negative for dissection. No CT evidence of right heart strain.    LUNGS AND PLEURA: No focal airspace consolidation or pleural effusion. 2 mm right upper lobe pulmonary nodule, stable since at least 2009, no specific follow-up recommended given long-term stability.    MEDIASTINUM/AXILLAE: No suspicious lymphadenopathy.    CORONARY ARTERY CALCIFICATION: Moderate.    UPPER ABDOMEN: Nodular contour of liver with widening of the fissures. Significant enlargement of the caudate. Mild splenomegaly measuring up to 15 cm. No ascites visualized in the upper abdomen. Indeterminate 1.7 cm right renal lesion (series 6 image   352).    MUSCULOSKELETAL: No acute bony abnormality.      Impression    IMPRESSION:  1.  No evidence of pulmonary embolism or other acute abnormality in the chest.  2.  Morphologic changes suggestive of chronic liver disease. Splenomegaly. No ascites in the upper abdomen.  3.  Indeterminate 1.7 cm right renal lesion, could consider further evaluation with ultrasound or contrast-enhanced MRI on a nonemergent basis.       Echo:  No results found for this or any previous visit (from the past 4320 hour(s)).    Clinically Significant Risk Factors Present on Admission                # Drug Induced Platelet Defect: home medication list includes an antiplatelet medication   # Hypertension: Noted on problem list     # DMII: A1C = 7.8 % (Ref range: 0.0 - 5.6 %) within past 6 months    # Severe Obesity: Estimated body mass index is 45.55 kg/m  as calculated from the following:    Height as of this encounter: 1.651 m (5' 5\").    Weight as of this encounter: 124.1 kg (273 lb 11.2 oz).           Thrombocytopenia Including Purpuric, HIT, & Other Platelet Defects: Thrombocytopenia, " unspecified

## 2024-01-16 ENCOUNTER — APPOINTMENT (OUTPATIENT)
Dept: CARDIOLOGY | Facility: CLINIC | Age: 64
DRG: 321 | End: 2024-01-16
Attending: INTERNAL MEDICINE
Payer: MEDICARE

## 2024-01-16 ENCOUNTER — APPOINTMENT (OUTPATIENT)
Dept: PHYSICAL THERAPY | Facility: CLINIC | Age: 64
DRG: 321 | End: 2024-01-16
Attending: INTERNAL MEDICINE
Payer: MEDICARE

## 2024-01-16 VITALS
WEIGHT: 272 LBS | TEMPERATURE: 97.8 F | DIASTOLIC BLOOD PRESSURE: 80 MMHG | HEIGHT: 65 IN | SYSTOLIC BLOOD PRESSURE: 150 MMHG | OXYGEN SATURATION: 93 % | RESPIRATION RATE: 25 BRPM | BODY MASS INDEX: 45.32 KG/M2 | HEART RATE: 86 BPM

## 2024-01-16 LAB
ANION GAP SERPL CALCULATED.3IONS-SCNC: 9 MMOL/L (ref 7–15)
ATRIAL RATE - MUSE: 79 BPM
BUN SERPL-MCNC: 10.5 MG/DL (ref 8–23)
CALCIUM SERPL-MCNC: 9.6 MG/DL (ref 8.8–10.2)
CHLORIDE SERPL-SCNC: 102 MMOL/L (ref 98–107)
CREAT SERPL-MCNC: 0.72 MG/DL (ref 0.51–0.95)
DEPRECATED HCO3 PLAS-SCNC: 23 MMOL/L (ref 22–29)
DIASTOLIC BLOOD PRESSURE - MUSE: NORMAL MMHG
EGFRCR SERPLBLD CKD-EPI 2021: >90 ML/MIN/1.73M2
GLUCOSE BLDC GLUCOMTR-MCNC: 150 MG/DL (ref 70–99)
GLUCOSE BLDC GLUCOMTR-MCNC: 162 MG/DL (ref 70–99)
GLUCOSE SERPL-MCNC: 175 MG/DL (ref 70–99)
INTERPRETATION ECG - MUSE: NORMAL
LVEF ECHO: NORMAL
P AXIS - MUSE: 59 DEGREES
POTASSIUM SERPL-SCNC: 4 MMOL/L (ref 3.4–5.3)
PR INTERVAL - MUSE: 172 MS
QRS DURATION - MUSE: 74 MS
QT - MUSE: 418 MS
QTC - MUSE: 479 MS
R AXIS - MUSE: 73 DEGREES
SODIUM SERPL-SCNC: 134 MMOL/L (ref 135–145)
SYSTOLIC BLOOD PRESSURE - MUSE: NORMAL MMHG
T AXIS - MUSE: 16 DEGREES
VENTRICULAR RATE- MUSE: 79 BPM

## 2024-01-16 PROCEDURE — 99232 SBSQ HOSP IP/OBS MODERATE 35: CPT | Mod: FS | Performed by: NURSE PRACTITIONER

## 2024-01-16 PROCEDURE — 80048 BASIC METABOLIC PNL TOTAL CA: CPT | Performed by: INTERNAL MEDICINE

## 2024-01-16 PROCEDURE — 250N000013 HC RX MED GY IP 250 OP 250 PS 637: Performed by: INTERNAL MEDICINE

## 2024-01-16 PROCEDURE — 36415 COLL VENOUS BLD VENIPUNCTURE: CPT | Performed by: INTERNAL MEDICINE

## 2024-01-16 PROCEDURE — 255N000002 HC RX 255 OP 636: Performed by: INTERNAL MEDICINE

## 2024-01-16 PROCEDURE — 999N000208 ECHOCARDIOGRAM COMPLETE

## 2024-01-16 PROCEDURE — 250N000012 HC RX MED GY IP 250 OP 636 PS 637: Performed by: INTERNAL MEDICINE

## 2024-01-16 PROCEDURE — 93306 TTE W/DOPPLER COMPLETE: CPT | Mod: 26 | Performed by: INTERNAL MEDICINE

## 2024-01-16 PROCEDURE — 93010 ELECTROCARDIOGRAM REPORT: CPT | Performed by: INTERNAL MEDICINE

## 2024-01-16 PROCEDURE — 250N000013 HC RX MED GY IP 250 OP 250 PS 637: Performed by: NURSE PRACTITIONER

## 2024-01-16 PROCEDURE — C8929 TTE W OR WO FOL WCON,DOPPLER: HCPCS

## 2024-01-16 PROCEDURE — 97110 THERAPEUTIC EXERCISES: CPT | Mod: GP

## 2024-01-16 PROCEDURE — 99239 HOSP IP/OBS DSCHRG MGMT >30: CPT | Performed by: INTERNAL MEDICINE

## 2024-01-16 PROCEDURE — 93005 ELECTROCARDIOGRAM TRACING: CPT

## 2024-01-16 PROCEDURE — 250N000011 HC RX IP 250 OP 636: Performed by: INTERNAL MEDICINE

## 2024-01-16 PROCEDURE — 97161 PT EVAL LOW COMPLEX 20 MIN: CPT | Mod: GP

## 2024-01-16 PROCEDURE — 97530 THERAPEUTIC ACTIVITIES: CPT | Mod: GP

## 2024-01-16 RX ORDER — ASPIRIN 81 MG/1
81 TABLET ORAL DAILY
Qty: 90 TABLET | Refills: 0 | Status: SHIPPED | OUTPATIENT
Start: 2024-01-16 | End: 2024-05-15

## 2024-01-16 RX ORDER — NITROGLYCERIN 0.4 MG/1
TABLET SUBLINGUAL
Qty: 30 TABLET | Refills: 0 | Status: SHIPPED | OUTPATIENT
Start: 2024-01-16

## 2024-01-16 RX ORDER — METOPROLOL SUCCINATE 25 MG/1
25 TABLET, EXTENDED RELEASE ORAL DAILY
Status: DISCONTINUED | OUTPATIENT
Start: 2024-01-17 | End: 2024-01-16 | Stop reason: HOSPADM

## 2024-01-16 RX ORDER — METOPROLOL SUCCINATE 25 MG/1
25 TABLET, EXTENDED RELEASE ORAL DAILY
Qty: 90 TABLET | Refills: 0 | Status: SHIPPED | OUTPATIENT
Start: 2024-01-17 | End: 2024-02-13

## 2024-01-16 RX ORDER — LOSARTAN POTASSIUM 25 MG/1
25 TABLET ORAL DAILY
Status: DISCONTINUED | OUTPATIENT
Start: 2024-01-16 | End: 2024-01-16 | Stop reason: HOSPADM

## 2024-01-16 RX ORDER — ATORVASTATIN CALCIUM 40 MG/1
40 TABLET, FILM COATED ORAL DAILY
Qty: 30 TABLET | Refills: 0 | Status: SHIPPED | OUTPATIENT
Start: 2024-01-16 | End: 2024-02-01

## 2024-01-16 RX ORDER — LOSARTAN POTASSIUM 25 MG/1
25 TABLET ORAL DAILY
Qty: 30 TABLET | Refills: 0 | Status: SHIPPED | OUTPATIENT
Start: 2024-01-17 | End: 2024-02-09

## 2024-01-16 RX ADMIN — ASPIRIN 81 MG CHEWABLE TABLET 81 MG: 81 TABLET CHEWABLE at 09:21

## 2024-01-16 RX ADMIN — PANTOPRAZOLE SODIUM 40 MG: 40 TABLET, DELAYED RELEASE ORAL at 06:35

## 2024-01-16 RX ADMIN — CETIRIZINE HYDROCHLORIDE 10 MG: 10 TABLET, FILM COATED ORAL at 09:22

## 2024-01-16 RX ADMIN — METOPROLOL TARTRATE 12.5 MG: 25 TABLET, FILM COATED ORAL at 09:21

## 2024-01-16 RX ADMIN — ACETAMINOPHEN 650 MG: 325 TABLET, FILM COATED ORAL at 09:26

## 2024-01-16 RX ADMIN — HUMAN ALBUMIN MICROSPHERES AND PERFLUTREN 3 ML: 10; .22 INJECTION, SOLUTION INTRAVENOUS at 09:09

## 2024-01-16 RX ADMIN — LOSARTAN POTASSIUM 25 MG: 25 TABLET, FILM COATED ORAL at 11:27

## 2024-01-16 RX ADMIN — ATORVASTATIN CALCIUM 40 MG: 40 TABLET, FILM COATED ORAL at 09:22

## 2024-01-16 RX ADMIN — INSULIN ASPART 1 UNITS: 100 INJECTION, SOLUTION INTRAVENOUS; SUBCUTANEOUS at 10:59

## 2024-01-16 RX ADMIN — TICAGRELOR 90 MG: 90 TABLET ORAL at 06:35

## 2024-01-16 RX ADMIN — ACETAMINOPHEN 650 MG: 325 TABLET, FILM COATED ORAL at 00:20

## 2024-01-16 RX ADMIN — ONDANSETRON 4 MG: 4 TABLET, ORALLY DISINTEGRATING ORAL at 05:25

## 2024-01-16 ASSESSMENT — ACTIVITIES OF DAILY LIVING (ADL)
ADLS_ACUITY_SCORE: 26
ADLS_ACUITY_SCORE: 24
ADLS_ACUITY_SCORE: 26
ADLS_ACUITY_SCORE: 26
ADLS_ACUITY_SCORE: 24

## 2024-01-16 NOTE — PROGRESS NOTES
[facility]  Cardiology Progress Note  Date of Service: 01/16/2024  Date of Admission: 1/14/2024  Primary Cardiologist: Dr. Jania Stanton    Ana Luisa Byers is a 63 year old female with past medical history of hypertension, type 2 diabetes on insulin, peripheral neuropathy who presents with exertional chest discomfort and shortness of breath for last 1 week.     Interval January 16, 2024    No concerns this morning.  RFA site stable without bleeding or hematoma.  She states she is feeling great and anxious to be discharged home today.  Note, BP remains uncontrolled and she mentions that for a long time now she has noticed AM headaches when her BP is elevated.  She is not on a PTA regimen.    Asssessment:    Non-ST elevation myocardial infarction   S/p coronary angiogram with 2V CAD - treated with PCI to culprit pLAD (ELAINE x2), non-culprit OM2 (ELAINE x1), and left Cx (ELAINE x1).  TTE 01/16/24 with EF 55-60% and small area of mild apical hypokinesis.  On DAPT, ASA+Brilinta  On Lopressor, Lipitor.  Type 2 diabetes, now insulin-dependent  Hypertension, uncontrolled, not on PTA regimen.  Dyslipidemia, continue statin.   --> Target LDL of less than 70.  GERD, on protonix.  _____________________________________________________________    Plan:   Continue DAPT (ASA + Brilinta) uninterrupted x1-year, then lifelong ASA.  Inpatient cardiac rehab, followed by outpatient enrollment.  Start Losartan 25 mg/d, up-titrate to desired BP response.  Change Lopressor to Toprol XL 25 mg/d.  Continue Atorvastatin 40 mg/d; titrate up pending FLP recheck in clinic.  Outpatient cardiology follow up will be arranged.  Ok to discharge to home today from a cardiac standpoint.  _____________________________________________________________    Plan of care and the majority of MDM wwas formulated under direction of Dr. Dykes.    Thank you for the opportunity to participate in the care of Ms. Ana Luisa Byers.  Please  feel free to reach out with any additional questions.    NORMA Aguirre, CNP   Nurse Practitioner  [facility]  Pager: [phone]  Phone: [phone]  Text Page (8am - 5pm, M-F)    Physical Exam   Temp: 97.8  F (36.6  C) Temp src: Oral BP: (!) 150/80 (Post CR) Pulse: 86   Resp: 25 SpO2: 93 % O2 Device: None (Room air)      Vitals:    01/14/24 1729 01/15/24 0455 01/16/24 0422   Weight: 125 kg (275 lb 9.2 oz) 124.1 kg (273 lb 11.2 oz) 123.4 kg (272 lb)     I/O last 3 completed shifts:  In: 895 [P.O.:220; I.V.:675]  Out: 1425 [Urine:1425]    Constitutional: Appears stated age, well nourished, NAD.  Neck: Supple. Carotid pulses full and equal.   Respiratory: Non-labored. Lungs CTAB.  Cardiovascular: RRR, normal S1 and S2. No M/G/R.  Vascular: Peripheral pulses intact and symmetric bilaterally  GI: Soft, non-distended, non-tender, bowel sounds present equally.  Skin: Warm and dry.   Neurologic: No gross focal deficits. Alert, awake, and oriented to all spheres.  Psychiatric: Affect appropriate. Mentation normal.    Data   Recent Labs   Lab 01/16/24  0813 01/16/24  0553 01/16/24  0201 01/15/24  1749 01/15/24  1111 01/14/24  1901 01/14/24  1812 01/14/24  1146   WBC  --   --   --   --  4.6  --  4.9 5.5   HGB  --   --   --   --  13.2  --  12.7 13.8   MCV  --   --   --   --  89  --  88 89   PLT  --   --   --   --  127*  --  113* 131*   NA  --  134*  --   --  137  --   --  137   POTASSIUM  --  4.0  --   --  4.2  --   --  4.7   CHLORIDE  --  102  --   --  104  --   --  103   CO2  --  23  --   --  22  --   --  22   BUN  --  10.5  --   --  9.3  --   --  10.7   CR  --  0.72  --   --  0.81  --   --  0.79   ANIONGAP  --  9  --   --  11  --   --  12   GARY  --  9.6  --   --  9.8  --   --  9.3   * 175* 150*   < > 155*   < >  --  169*   ALBUMIN  --   --   --   --  4.1  --   --   --    PROTTOTAL  --   --   --   --  7.9  --   --   --    BILITOTAL  --   --   --   --  0.8  --   --   --    ALKPHOS  --    --   --   --  148  --   --   --    ALT  --   --   --   --  40  --   --   --    AST  --   --   --   --  66*  --   --   --     < > = values in this interval not displayed.       Recent Labs   Lab 01/15/24  1111 01/14/24  1812 01/14/24  1146   WBC 4.6 4.9 5.5   HGB 13.2 12.7 13.8   HCT 39.1 37.4 42.2   MCV 89 88 89   * 113* 131*     Recent Labs   Lab 01/16/24  0813 01/16/24  0553 01/16/24  0201 01/15/24  1749 01/15/24  1111 01/14/24  1901 01/14/24  1146   NA  --  134*  --   --  137  --  137   POTASSIUM  --  4.0  --   --  4.2  --  4.7   CHLORIDE  --  102  --   --  104  --  103   CO2  --  23  --   --  22  --  22   ANIONGAP  --  9  --   --  11  --  12   * 175* 150*   < > 155*   < > 169*   BUN  --  10.5  --   --  9.3  --  10.7   CR  --  0.72  --   --  0.81  --  0.79   GFRESTIMATED  --  >90  --   --  81  --  84   GARY  --  9.6  --   --  9.8  --  9.3    < > = values in this interval not displayed.        Patient Active Problem List   Diagnosis    Mixed hyperlipidemia    Chronic pain associated with significant psychosocial dysfunction    Osteoarthritis    S/P shoulder replacement    Cluster headaches    Fibromyalgia    Plantar fasciitis    Other insomnia    HTN, goal below 140/90    Gastroesophageal reflux disease without esophagitis    Restless legs syndrome (RLS)    CKD (chronic kidney disease) stage 2, GFR 60-89 ml/min    HSV-1 infection    Left lumbar radiculitis    Acquired hypothyroidism    Type 2 diabetes mellitus with stage 3a chronic kidney disease, without long-term current use of insulin (H)    Bilateral lower extremity edema    Benign essential hypertension    Anxiety    BMI 45.0-49.9, adult (H)    Spinal stenosis of lumbar region with radiculopathy    Menopause    Unstable angina (H)     Medications    - MEDICATION INSTRUCTIONS -      - MEDICATION INSTRUCTIONS -      - MEDICATION INSTRUCTIONS -      - MEDICATION INSTRUCTIONS -      Percutaneous Coronary Intervention orders placed (this is  information for BPA alerting)      ACE/ARB/ARNI NOT PRESCRIBED      reason aspirin not prescribed (intentional)      BETA BLOCKER NOT PRESCRIBED      STATIN NOT PRESCRIBED        aspirin  81 mg Oral Daily    atorvastatin  40 mg Oral Daily    cetirizine  10 mg Oral Daily    insulin aspart  1-7 Units Subcutaneous TID AC    insulin aspart  1-5 Units Subcutaneous At Bedtime    insulin glargine  15 Units Subcutaneous At Bedtime    losartan  25 mg Oral Daily    [START ON 1/17/2024] metoprolol succinate ER  25 mg Oral Daily    pantoprazole  40 mg Oral BID AC    sodium chloride (PF)  3 mL Intracatheter Q8H    ticagrelor  90 mg Oral BID       Data   Last 24 hours labs personally reviewed.  Echo: No results found for this or any previous visit (from the past 4320 hour(s)).

## 2024-01-16 NOTE — PLAN OF CARE
A&O x 4. VSS. RA. Tele: SR. Tylenol x 1 for headache. Denies n/v. R) groin site CDI, no hematoma/bleeding. CMS intact. Ambulated in halls, tolerated well. Blood glucose checks, S/S for coverage.     Pt discharged to home with spouse. Discharge instructions, card for stents & discharge medications given to pt. PIV removed without incident. Questions were answered, no new concerns reported. Pt escorted to door six via wheelchair and hospital staff.

## 2024-01-16 NOTE — PROGRESS NOTES
01/16/24 1000   Appointment Info   Signing Clinician's Name / Credentials (PT) Nadine Hawkins PT   Living Environment   People in Home spouse   Current Living Arrangements house   Home Accessibility no concerns   Transportation Anticipated car, drives self   Self-Care   Usual Activity Tolerance good   Current Activity Tolerance good   Regular Exercise No   Equipment Currently Used at Home none   Fall history within last six months no   General Information   Onset of Illness/Injury or Date of Surgery 01/14/24   Referring Physician Dr. Murrieta   Patient/Family Therapy Goals Statement (PT) To go home   Pertinent History of Current Problem (include personal factors and/or comorbidities that impact the POC) Pt is a 63 year old female admitted with STEMI s/p PCI.   Existing Precautions/Restrictions cardiac   Cognition   Affect/Mental Status (Cognition) WFL   Orientation Status (Cognition) oriented x 4   Follows Commands (Cognition) WFL   Pain Assessment   Patient Currently in Pain No   Range of Motion (ROM)   Range of Motion ROM is WFL   Strength (Manual Muscle Testing)   Strength (Manual Muscle Testing) strength is WFL   Bed Mobility   Bed Mobility no deficits identified   Transfers   Transfers no deficits identified   Gait/Stairs (Locomotion)   [place] Level (Gait) independent   Balance   Balance Comments Good   Clinical Impression   Criteria for Skilled Therapeutic Intervention Yes, treatment indicated   PT Diagnosis (PT) Impaired endurance   Influenced by the following impairments Decreased endurance   Functional limitations due to impairments Difficulty with long distance ambulation   Clinical Presentation (PT Evaluation Complexity) stable   Clinical Presentation Rationale VSS, pain controlled   Clinical Decision Making (Complexity) low complexity   Planned Therapy Interventions (PT) patient/family education;progressive activity/exercise;risk factor education;home program guidelines   Risk & Benefits of therapy  have been explained evaluation/treatment results reviewed;care plan/treatment goals reviewed;risks/benefits reviewed;current/potential barriers reviewed;participants voiced agreement with care plan;participants included;patient   PT Total Evaluation Time   PT Chris, Low Complexity Minutes (94691) 10   PT Discharge Planning   PT Plan Trial treadmill, review education   PT Discharge Recommendation (DC Rec) home with outpatient cardiac rehab   PT Rationale for DC Rec Pt is independent with mobility and safe to discharge home. Pt will benefit from outpatient CR to further increase activity tolerance and for cardiac education.   PT Brief overview of current status Independent   Total Session Time   Total Session Time (sum of timed and untimed services) 10

## 2024-01-16 NOTE — DISCHARGE SUMMARY
"[facility]  Hospitalist Discharge Summary      Date of Admission:  1/14/2024  Date of Discharge:  1/16/2024  Discharging Provider: Mya Murrieta MD  Discharge Service: Hospitalist Service    Discharge Diagnoses   NSTEMI  Two-vessel obstructive CAD including severe restenosis of the proximal LAD  S/p PCI to proximal LAD, OM 2 branch and proximal left circumflex  Essential hypertension  Dyslipidemia.  Type 2 diabetes mellitus, insulin-dependent.  GERD.  CKD stage II.  Incidental finding of right renal cyst  Possible chronic liver disease, incidental finding.  Chronic bilateral lower extremity edema    Clinically Significant Risk Factors     # DMII: A1C = 7.8 % (Ref range: 0.0 - 5.6 %) within past 6 months  # Severe Obesity: Estimated body mass index is 45.26 kg/m  as calculated from the following:    Height as of this encounter: 1.651 m (5' 5\").    Weight as of this encounter: 123.4 kg (272 lb).       Follow-ups Needed After Discharge   Follow-up Appointments     Follow-up and recommended labs and tests       Follow up with primary care provider, Leonides Juárez, within 7 days for   hospital follow- up.  The following labs/tests are recommended: BMP.    Follow-up with cardiology as recommended  Follow-up with cardiac rehab as recommended.            Unresulted Labs Ordered in the Past 30 Days of this Admission       No orders found from 12/15/2023 to 1/15/2024.            Discharge Disposition   Discharged to home  Condition at discharge: Stable    Hospital Course     Cumulative Summary: Ana Luisa Byers is a 63 year old female with past medical history significant for essential hypertension, dyslipidemia, diabetes mellitus type 2 on insulin, peripheral neuropathy, fibromyalgia, depression, GERD, CKD stage II and arthritis who presented initially to ED for evaluation of chest pain and was admitted for further evaluation and management on 01/14/2024.  Patient was started on heparin infusion, " cardiology consultation was requested.  Here are further details regarding her current hospitalization     Assessment & Plan    NSTEMI/unstable angina  Two-vessel obstructive CAD including severe restenosis of the proximal LAD  S/p PCI to proximal LAD, OM 2 branch and proximal left circumflex  Essential hypertension  Dyslipidemia.     Patient presented with exertional chest pain and shortness of breath which is going on for the last week.  Felt the pain was more associated with chest tightness and pressure and somebody was sitting on her chest.  Initially her chest pain was with exertion but on the day of admission she is noticing chest pain at rest also.  Initial EKG was normal sinus rhythm with nonspecific T wave changes and serial troponin do not have ACS pattern, but her symptoms seems to be persistent with unstable angina.  Patient was given aspirin and nitroglycerin in ER and was a started on heparin infusion.  Patient underwent coronary angiogram on 01/15/24, patient was found to have two-vessel obstructive CAD identified on coronary angiogram including severe stenosis of the proximal LAD.   She underwent successful PCI to the culprit proximal LAD lesion including deployment of overlapping 2.5 X 30 mm and 2.5 X 15 mm frontier Price ELAINE  Patient also underwent successful PCI of the principal OM 2 branch and known culprit proximal left circumflex.(Total of 4 ELAINE placement)      -- Patient  was seen and examined, doing well , has been seen by cardiology and cardiac rehab this morning   -- Off heparin infusion.  -- Patient is recommended to stay on aspirin 81 mg p.o. daily and Brilinta 90 mg p.o. twice daily at least for 1 year. ( 30 day script given )  -- Discharge on Metoprolol Succinate 25 mg po daily ( Script given on discharge )   -- Fasting lipid panel checked on 01/15, results reviewed, showing cholesterol of 266, HDL of 54, LDL of main 86 and non-HDL cholesterol of 212, started and discharging atorvastatin  40 mg po daily , patient was not taking statin before   -- Echocardiogram reviewed , showed normal LV systolic function, EF is 55 to 60%, there is small area of mild apical hypokinesis.  -- start Patient on Losartan 25 mg po daily , 30 day script given   -- Patient will be continued on her home Lasix on discharge   -- Recheck BMP in one week after the discharge.  -- Cardiac rehab     Type 2 diabetes mellitus:     --PTA on Lantus 40 units nightly and Trulicity  --Hemoglobin A1c 7.8 on 12/13/2023  --During the hospitalization, PTA Trulicity was held.,  Starting back on discharge  -- Patient will benefit from which to Ozempic or Mounjaro in an outpatient setting as it would be helpful with weight loss and probably benefit from possible KITCHEN also, see below.     GERD  -- Continue PTA omeprazole on discharge.    CKD stage II   --Creatinine 0.72 this morning, stable     Incidental finding of indeterminate 1.7 cm right renal lesion on CT chest:     -- Could consider further evaluation with ultrasound or contrast-enhanced MRI on a nonemergent basis in out patient setting  -- Discussed with patient regarding following up with PCP       Abnormal CAT scan finding     --CT chest also mentions - morphologic changes suggestive of chronic liver disease. Splenomegaly. No ascites in the upper abdomen. At risk for KITCHEN sec to obesity   --Denies alcohol drinking  --LFT's are in normal range   --Needs to follow-up with her PCP, discussed with patient     Patient was seen and examined on the day of discharge ,she is feeling well, does not have any complaints , I did review the discharge medications and instructions with the patient and plan for her to follow up with the PCP after the hospitalization .patient was in agreement , she is discharged in stable condition to her home with outpatient Cardiac Rehab      Consultations This Hospital Stay   CARDIAC REHAB IP CONSULT  CARDIOLOGY IP CONSULT  PHARMACY IP CONSULT  PHARMACY IP  CONSULT  SMOKING CESSATION PROGRAM IP CONSULT    Code Status   Full Code    Time Spent on this Encounter   I, Mya Murrieta MD, personally saw the patient today and spent greater than 30 minutes discharging this patient.     Mya Murrieta MD  [facility]  [address] [place] [address] [address], [address]  [place] [address]  Phone: [phone]  ______________________________________________________________________    Physical Exam   Vital Signs: Temp: 97.8  F (36.6  C) Temp src: Oral BP: (!) 150/80 (Post CR) Pulse: 86   Resp: 25 SpO2: 93 % O2 Device: None (Room air)    Weight: 272 lbs 0 oz      Physical Exam  Vitals and nursing note reviewed.   Constitutional:       Appearance: She is well-developed.   HENT:      Head: Normocephalic and atraumatic.   Eyes:      Pupils: Pupils are equal, round, and reactive to light.   Neck:      Thyroid: No thyromegaly.   Cardiovascular:      Rate and Rhythm: Normal rate and regular rhythm.      Heart sounds: Normal heart sounds.   Pulmonary:      Effort: Pulmonary effort is normal. No respiratory distress.      Breath sounds: Normal breath sounds.   Abdominal:      General: Bowel sounds are normal. There is no distension.      Palpations: Abdomen is soft.   Musculoskeletal:         General: No tenderness. Normal range of motion.      Cervical back: Normal range of motion and neck supple.   Skin:     General: Skin is warm and dry.   Neurological:      Mental Status: She is alert and oriented to person, place, and time.   Psychiatric:         Behavior: Behavior normal.           Primary Care Physician   Leonides Juáerz    Discharge Orders      Lipid Profile     Follow-Up with Cardiology [place]      Cardiac Rehab Referral      Brief Discharge Instructions    Do NOT stop your aspirin or platelet inhibitor unless directed by your Cardiologist.  These medications help to prevent platelets in your blood from sticking together and forming a clot.  Examples of these medications are:   Ticagrelor (Brilinta), Clopidigrel (Plavix), Prasugrel (Effient)     When to call - Contact the Heart Clinic    You may experience symptoms that require follow-up before your scheduled appointment. Contact the Heart Clinic if you develop: Fever over 100.4o Fahrenheit, that lasts more than one day; Redness, heat, or pus at the puncture site; Change in color or temperature in your groin or leg.     When to call - Reasons to Call 911    If your groin starts to bleed or begins to swell suddenly after leaving the hospital, lie flat and apply firm pressure just above the puncture site for 15 minutes.  If bleeding continues, call 9-1-1.     Precautions - Lifting    NO lifting of more than 10 pounds for at least 3 days.  If you usually lift 50 pounds or more daily, talk with your Cardiologist.     Precautions - Household Activities    Avoid any hard work or tiring activities.  NO physical activity such as mowing the lawn, raking, vacuuming, changing sheets on your bed, snow shoveling, or using a .     Precautions - Active Sports Activities    Avoid any tiring sports activities.  This includes, yard work, jogging, biking, bowling, swimming, tennis or golf, and sexual activity.     Precautions - Elective Dental Work    NO elective dental work for 6 weeks after receiving a stent.     Comfort and Pain Management - Pain after Surgery    Pain after surgery is normal and expected.  Your leg may be sore or stiff for a few days, and your pain will improve with time. You may take Tylenol or a pain medicine recommended by your Cardiologist.     Comfort and Pain Management - Bruising after Surgery    Bruising around the groin area is normal.  It may take 2-3 weeks for this to go away.  It is normal for the bruised area to turn green and/or yellow as it is healing.  A small lump may also be present and may last 2-3 months.     Activity - Daily Walking    During the day get up and walk around every 2 hours.     Activity -  Light Household Activities    Light household activities are ok.     Activity - Elevate Legs    Elevate legs in between all activities.     Activity - Cardiac Rehab    You are encouraged to enroll in an Outpatient Cardiac Rehab program after discharge from the hospital.  Our Cardiac Rehab staff may visit briefly with you while you're in the hospital.  If they miss you, someone will contact you after you are home.     Return to Driving    Driving is NOT permitted for 24 hours after surgery     Return to work    You may return to work after 72 hours if you are feeling well and your job does not involve heavy lifting.     Dressing Removal    You may take off the dressing on your groin the day after your procedure.     Incision Care    Keep the incision area dry and clean.  You do not need to use a bandage on your incision.     Shower / Bathing    It is ok to shower with regular soap. Pat dry, do not rub. No tub bath for 3 days. No swimming in a pool or hot tub immersion for 1 week     Reason aspirin not prescribed from this order set    Defer to inpatient providers     Reason Lipid Lowering Medications not prescribed from this order set    Defer to inpatient providers     Reason for your hospital stay    You were admitted to the hospital secondary to NSTEMI, you have undergone coronary angiogram and full stents placement.  You were also noticed to be hypertensive and your cholesterol was high during the hospitalization your medications have been adjusted.     Follow-up and recommended labs and tests     Follow up with primary care provider, Leonides Juárez, within 7 days for hospital follow- up.  The following labs/tests are recommended: BMP.    Follow-up with cardiology as recommended  Follow-up with cardiac rehab as recommended.     Activity    Your activity upon discharge: activity as tolerated and no driving for today     Discharge Instructions    You were admitted to the hospital secondary to an NSTEMI, you have  undergone coronary angiogram and 40 stents placement.  You will be continued on baby aspirin, prescription is given for enteric-coated aspirin.  Who will also be taking Brilinta 90 mg p.o. twice daily, new cardiologist will probably continue Brilinta for 1 year this medication helps to keep your distance open, please make sure you stay compliant and do not miss doses of Brilinta to prevent in-stent thrombosis.  You are also started on Toprol-XL 25 mg twice daily to take every day.  You are also started on losartan 25 mg p.o. daily.  You can continue to take your Lasix 40 mg p.o. daily if needed then your cardiologist or primary care physician can decrease the dose of Lasix in the coming days while they monitor your kidney functions closely.  As discussed with you that you were noticed to have some signs of fatty liver disease on the CAT scan done when you presented to the hospital, please review with your primary care physician you will benefit from weight loss.  Discussed with your primary care physician if you are good candidate for Ozempic.  You were also incidentally noticed to have right renal lesion on CAT scan, you will benefit from outpatient follow-up with ultrasound or renal MRI by your primary care physician.  Please review those findings with PCP on your follow-up.     Full Code     Diet    Follow this diet upon discharge: Orders Placed This Encounter      Low Saturated Fat Na <2400 mg         Significant Results and Procedures   Results for orders placed or performed during the hospital encounter of 24   Echocardiogram Complete     Value    LVEF  55-60%    Narrative    [id number]  [id number]  [id number]  [id number]^CORNEJO^ANABEL     [facility]  Echocardiography Laboratory  [address] [place] [address]     Name: JONATHAN MASON  MRN: [id number]  : 1960  Study Date: 2024 08:48 AM  Age: 63 yrs  Gender: Female  Patient Location: [facility]  Reason For Study: Chest Pain,  Chest Tightness, Chest Pressure  Ordering Physician: ANABEL CORNEJO  Referring Physician: NADIYA GALVAN  Performed By: [facility]     BSA: 2.3 m2  Height: 65 in  Weight: 273 lb  HR: 80  BP: 156/86 mmHg  ______________________________________________________________________________  Procedure  Complete Echo Adult.  ______________________________________________________________________________  Interpretation Summary     Left ventricular systolic function is normal.  The visual ejection fraction is 55-60%.  Small area of mild apical hypokinesis.  The study was technically difficult. There is no comparison study available.  ______________________________________________________________________________  Left Ventricle  The left ventricle is normal in size. There is normal left ventricular wall  thickness. Left ventricular systolic function is normal. The visual ejection  fraction is 55-60%. Left ventricular diastolic function is indeterminate.  Small area of mild apical hypokinesis.     Right Ventricle  The right ventricle is normal size. The right ventricular systolic function is  normal.     Atria  Normal left atrial size. Right atrial size is normal.     Mitral Valve  There is trace mitral regurgitation.     Tricuspid Valve  There is trace tricuspid regurgitation. Right ventricular systolic pressure  could not be approximated due to inadequate tricuspid regurgitation. IVC  diameter <2.1 cm collapsing >50% with sniff suggests a normal RA pressure of 3  mmHg.     Aortic Valve  The aortic valve is not well visualized. No aortic regurgitation is present.  No aortic stenosis is present.     Pulmonic Valve  The pulmonic valve is not well seen, but is grossly normal.     Vessels  The aortic root is normal size.     Pericardium  There is no pericardial effusion.     Rhythm  Sinus rhythm was noted.  ______________________________________________________________________________  MMode/2D Measurements & Calculations  IVSd: 1.1  cm     LVIDd: 4.0 cm  LVIDs: 2.9 cm  LVPWd: 0.76 cm  FS: 26.2 %  LV mass(C)d: 117.3 grams  LV mass(C)dI: 52.0 grams/m2  Ao root diam: 3.3 cm  asc Aorta Diam: 3.3 cm  LVOT diam: 2.1 cm  LVOT area: 3.6 cm2  Ao root diam index Ht(cm/m): 2.0  Ao root diam index BSA (cm/m2): 1.5  Asc Ao diam index BSA (cm/m2): 1.5  Asc Ao diam index Ht(cm/m): 2.0  RWT: 0.38     Doppler Measurements & Calculations  MV E max khari: 91.3 cm/sec  MV A max khari: 120.0 cm/sec  MV E/A: 0.76  MV dec time: 0.21 sec  Ao V2 max: 121.0 cm/sec  Ao max P.0 mmHg  Ao V2 mean: 81.4 cm/sec  Ao mean PG: 3.0 mmHg  Ao V2 VTI: 24.3 cm  FAUSTINA(I,D): 2.8 cm2  FAUSTINA(V,D): 2.9 cm2     LV V1 max PG: 3.7 mmHg  LV V1 max: 96.8 cm/sec  LV V1 VTI: 19.0 cm  SV(LVOT): 68.7 ml  SI(LVOT): 30.4 ml/m2  PA V2 max: 124.9 cm/sec  PA max P.2 mmHg  PA acc time: 0.12 sec  AV Khari Ratio (DI): 0.80  FAUSTINA Index (cm2/m2): 1.3  E/E' av.6  Lateral E/e': 11.2  Medial E/e': 12.0  RV S Khari: 13.7 cm/sec     ______________________________________________________________________________  Report approved by: Nj Cunningham 2024 10:33 AM         Cardiac Catheterization    Narrative    1.  Two-vessel obstructive CAD identified on coronary angiography   including severe stenosis of the proximal LAD.  This represents the likely   culprit lesion for the patient's acute MI.  Significant stenosis was also   observed in the proximal portion of the principal OM 2 branch as well as   the proximal portion of the left circumflex.  Otherwise, moderate disease   observed in the more distal portion of the OM 2 branch as well as in the   small caliber, distal LAD.  2.  Successful PCI to the culprit proximal LAD lesion including deployment   of overlapping 2.5 x 30 mm and 2.5 x 15 mm frontier Price ELAINE  3.  Successful PCI to the nonculprit principal OM 2 branch including   deployment of a 2.5 x 18 mm Price frontier ELAINE  4.  Successful PCI to the nonculprit proximal left circumflex including    deployment of a 3.5 x 18 mm [employer] ELAINE       *Note: Due to a large number of results and/or encounters for the requested time period, some results have not been displayed. A complete set of results can be found in Results Review.       Discharge Medications   Current Discharge Medication List        START taking these medications    Details   aspirin 81 MG EC tablet Take 1 tablet (81 mg) by mouth daily  Qty: 90 tablet, Refills: 0    Comments: Future refills by PCP Dr. Leonides Juárez with phone number [phone].  Associated Diagnoses: NSTEMI (non-ST elevated myocardial infarction) (H)      losartan (COZAAR) 25 MG tablet Take 1 tablet (25 mg) by mouth daily  Qty: 30 tablet, Refills: 0    Comments: Future refills by PCP Dr. Leonides Juárez with phone number [phone].  Associated Diagnoses: NSTEMI (non-ST elevated myocardial infarction) (H)      metoprolol succinate ER (TOPROL XL) 25 MG 24 hr tablet Take 1 tablet (25 mg) by mouth daily  Qty: 90 tablet, Refills: 0    Comments: Future refills by PCP Dr. Leonides Juárez with phone number [phone].  Associated Diagnoses: NSTEMI (non-ST elevated myocardial infarction) (H)      nitroGLYcerin (NITROSTAT) 0.4 MG sublingual tablet For chest pain place 1 tablet under the tongue every 5 minutes for 3 doses. If symptoms persist 5 minutes after 1st dose call 911.  Qty: 30 tablet, Refills: 0    Comments: Future refills by PCP Dr. Leonides Juárez with phone number [phone].  Associated Diagnoses: NSTEMI (non-ST elevated myocardial infarction) (H)      ticagrelor (BRILINTA) 90 MG tablet Take 1 tablet (90 mg) by mouth 2 times daily  Qty: 60 tablet, Refills: 0    Comments: Future refills by PCP Dr. Leonides Juárez with phone number [phone].  Associated Diagnoses: NSTEMI (non-ST elevated myocardial infarction) (H)           CONTINUE these medications which have CHANGED    Details   atorvastatin (LIPITOR) 40 MG tablet Take 1 tablet (40 mg) by mouth daily  Qty: 30 tablet,  Refills: 0    Comments: Future refills by PCP Dr. Leonides Juárez with phone number [phone].  Associated Diagnoses: NSTEMI (non-ST elevated myocardial infarction) (H)           CONTINUE these medications which have NOT CHANGED    Details   betamethasone dipropionate (DIPROSONE) 0.05 % external cream Apply topically 2 times daily  to affected area      cetirizine (ZYRTEC) 10 MG tablet Take 1 tablet (10 mg) by mouth daily  Qty: 30 tablet, Refills: 0    Associated Diagnoses: Allergic dermatitis      clobetasol (TEMOVATE) 0.05 % external cream Apply topically 2 times daily  Qty: 60 g, Refills: 1    Associated Diagnoses: Allergic dermatitis      furosemide (LASIX) 40 MG tablet Take 1 tablet (40 mg) by mouth daily  Qty: 90 tablet, Refills: 1    Associated Diagnoses: Bilateral lower extremity edema      insulin glargine (LANTUS PEN) 100 UNIT/ML pen 36U Lantus at bedtime, increase by 2U every other day if fasted morning glucose > 125. Do NOT exceed 46U until speaking with a doctor.  Qty: 9 mL, Refills: 4    Comments: If Lantus is not covered by insurance, may substitute Basaglar or Semglee or other insulin glargine product per insurance preference at same dose and frequency.    Associated Diagnoses: Type 2 diabetes mellitus with stage 3a chronic kidney disease, without long-term current use of insulin (H)      omeprazole (PRILOSEC) 40 MG DR capsule Take 1 capsule (40 mg) by mouth 2 times daily  Qty: 180 capsule, Refills: 3    Associated Diagnoses: Gastroesophageal reflux disease without esophagitis      TRULICITY 0.75 MG/0.5ML pen ADMINISTER 0.75 MG UNDER THE SKIN EVERY 7 DAYS  Qty: 2 mL, Refills: 3    Associated Diagnoses: Type 2 diabetes mellitus with diabetic polyneuropathy, without long-term current use of insulin (H)      zolpidem (AMBIEN) 10 MG tablet TAKE 1 TABLET(10 MG) BY MOUTH EVERY NIGHT AS NEEDED FOR SLEEP  Qty: 30 tablet, Refills: 5    Associated Diagnoses: Other insomnia      blood glucose (NO BRAND  SPECIFIED) test strip Use to test blood sugar as directed.  Qty: 300 strip, Refills: 3    Associated Diagnoses: Type 2 diabetes mellitus with diabetic polyneuropathy, without long-term current use of insulin (H)      blood glucose monitoring (NO BRAND SPECIFIED) meter device kit Use to test blood sugar as directed.  Qty: 1 kit, Refills: 0    Associated Diagnoses: Type 2 diabetes mellitus with diabetic polyneuropathy, without long-term current use of insulin (H)      Blood Pressure Monitoring (COMFORT TOUCH BP CUFF/LARGE) MISC 1 Units daily  Qty: 1 each, Refills: 0    Associated Diagnoses: HTN, goal below 140/90; Type 2 diabetes mellitus with diabetic polyneuropathy, without long-term current use of insulin (H)      insulin pen needle (32G X 4 MM) 32G X 4 MM miscellaneous Use 1 pen needles daily or as directed.  Qty: 100 each, Refills: 2    Associated Diagnoses: History of insulin dependent diabetes mellitus      order for DME Equipment being ordered: Diabetic Shoes  Qty: 1 Units, Refills: 0    Associated Diagnoses: Type 2 diabetes mellitus without complication, without long-term current use of insulin (H)           STOP taking these medications       aspirin 81 MG chewable tablet Comments:   Reason for Stopping:             Allergies   Allergies   Allergen Reactions    Penicillins Anaphylaxis    Augmentin [Amoxicillin-Pot Clavulanate] GI Disturbance    Bee Swelling    Gabapentin Other (See Comments)     Mood Swings    Lisinopril Rash    Metformin Itching    Topiramate Other (See Comments)     numbness

## 2024-01-16 NOTE — PLAN OF CARE
Physical Therapy Discharge Summary    Reason for therapy discharge:    Discharged to home with outpatient therapy.    Progress towards therapy goal(s). See goals on Care Plan in [facility] electronic health record for goal details.  Goals partially met.  Barriers to achieving goals:   discharge on same date as initial evaluation.    Therapy recommendation(s):    Continued therapy is recommended.  Rationale/Recommendations:  For further cardiac education and endurance training.

## 2024-01-16 NOTE — PLAN OF CARE
Goal Outcome Evaluation:      Plan of Care Reviewed With: patient    Overall Patient Progress: improvingOverall Patient Progress: improving    [facility] Nursing [facility]    Patient Information  Name: Ana Luisa Byers  Age: 63 year old    Admission Information  Date: 1/14/2024   Reason:Unstable angina (H) [I20.0]     Assessment  Orientation/Neuro: Alert and Oriented x4  Cardiac/Tele: NSR  Resp: YANEZ   GI/:  No nausea, vomiting, abdominal pain, or diarrhea  Pt does have constipation - no BM since 1/12 but declines PRNs until after procedure/bedrest  Purewick in place while on bedrest   CMS: WDL   Mobility: ind prior to cath lab, bedrest until 1930  Pain: chronic back pain - declines interventions - only painful when in bed - relief when in chair   Diet: Orders Placed This Encounter      Low Saturated Fat Na <2400 mg    Vital Signs  B/P: 146/77, T: 97.5, P: 79, R: 20, O2: 95% on RA      Plan  Angio today - 4 stents, Brilinta loaded, R groin site WDL,     Valerie Mathur RN

## 2024-01-16 NOTE — PLAN OF CARE
Neuro: A&Ox4  Tele/Cardiac: SR. PCI x4 on 1/15/24  Resp: YANEZ, RA  Activity: SBA  Pain: headache associated with nausea reported, relieved with tylenol/ nausea medications  Drips/IV: SL  GI/: nausea intermittent overnight. Antiemetics given x3 overnight, ginger ale given, cold cloth, and rest  Skin: eczema spots scattered, groin rash    Diet: Diet: Low Saturated Fat Na <2400 mg     Test/Procedures: n/a  Plan: discharge pending progress.

## 2024-01-17 ENCOUNTER — PATIENT OUTREACH (OUTPATIENT)
Dept: FAMILY MEDICINE | Facility: CLINIC | Age: 64
End: 2024-01-17
Payer: MEDICARE

## 2024-01-17 ENCOUNTER — TELEPHONE (OUTPATIENT)
Dept: FAMILY MEDICINE | Facility: CLINIC | Age: 64
End: 2024-01-17
Payer: MEDICARE

## 2024-01-17 ENCOUNTER — TELEPHONE (OUTPATIENT)
Dept: CARDIOLOGY | Facility: CLINIC | Age: 64
End: 2024-01-17
Payer: MEDICARE

## 2024-01-17 DIAGNOSIS — I21.4 NSTEMI (NON-ST ELEVATED MYOCARDIAL INFARCTION) (H): ICD-10-CM

## 2024-01-17 DIAGNOSIS — I20.0 UNSTABLE ANGINA (H): Primary | ICD-10-CM

## 2024-01-17 LAB
ATRIAL RATE - MUSE: 76 BPM
DIASTOLIC BLOOD PRESSURE - MUSE: NORMAL MMHG
INTERPRETATION ECG - MUSE: NORMAL
P AXIS - MUSE: -13 DEGREES
PR INTERVAL - MUSE: 156 MS
QRS DURATION - MUSE: 74 MS
QT - MUSE: 414 MS
QTC - MUSE: 465 MS
R AXIS - MUSE: 53 DEGREES
SYSTOLIC BLOOD PRESSURE - MUSE: NORMAL MMHG
T AXIS - MUSE: 56 DEGREES
VENTRICULAR RATE- MUSE: 76 BPM

## 2024-01-17 NOTE — TELEPHONE ENCOUNTER
See hosp follow up notes     There are no appointments with PCP     Kiya Salmon RN, BSN  [facility]

## 2024-01-17 NOTE — TELEPHONE ENCOUNTER
"Date of Admission:  1/14/2024  Date of Discharge:  1/16/2024  Discharging Provider: Mya Murrieta MD  Discharge Service: Hospitalist Service        Discharge Diagnoses  NSTEMI  Two-vessel obstructive CAD including severe restenosis of the proximal LAD  S/p PCI to proximal LAD, OM 2 branch and proximal left circumflex  Essential hypertension  Dyslipidemia.  Type 2 diabetes mellitus, insulin-dependent.  GERD.  CKD stage II.  Incidental finding of right renal cyst  Possible chronic liver disease, incidental finding.  Chronic bilateral lower extremity edema           Clinically Significant Risk Factors      # DMII: A1C = 7.8 % (Ref range: 0.0 - 5.6 %) within past 6 months  # Severe Obesity: Estimated body mass index is 45.26 kg/m  as calculated from the following:    Height as of this encounter: 1.651 m (5' 5\").    Weight as of this encounter: 123.4 kg (272 lb).             Follow-ups Needed After Discharge  Follow-up Appointments     Follow-up and recommended labs and tests       Follow up with primary care provider, Leonides Juárez, within 7 days for   hospital follow- up.  The following labs/tests are recommended: BMP.    Follow-up with cardiology as recommended  Follow-up with cardiac rehab as recommended.       "

## 2024-01-17 NOTE — TELEPHONE ENCOUNTER
Patient was admitted to [facility] on 1/14/24 with chest discomfort and an abnormal serial serum troponin. She was diagnosed with NSTEMI.    PMH: hypertension, type 2 diabetes on insulin, peripheral neuropathy.    1/16/24: Echo showed EF of 55-60% and small area of mild apical hypokinesis.     1/15/24: Coronary angiogram via RFA resulted in:    1.  Two-vessel obstructive CAD identified on coronary angiography including severe stenosis of the proximal LAD. This represents the likely culprit         lesion for the patient's acute MI. Significant stenosis was also observed in the proximal portion of the principal OM 2 branch as well as the proximal         portion of the left circumflex. Otherwise, moderate disease observed in the more distal portion of the OM 2 branch as well as in the small caliber,             distal LAD.  2.  Successful PCI to the culprit proximal LAD lesion including deployment of overlapping 2.5 x 30 mm and 2.5 x 15 mm frontier Price ELAINE.  3.  Successful PCI to the non culprit principal OM 2 branch including deployment of a 2.5 x 18 mm Price frontier ELAINE.  4.  Successful PCI to the non culprit proximal left circumflex including deployment of a 3.5 x 18 mm Price frontier ELAINE.    Pt was started on ASA, Cozaar, Metoprolol, NTG, Brilinta at time of discharge.    Pt is scheduled for labs on 2/12/24 at 0930, and an OV on 2/13/24 at 0855 with AUSTIN Ingris Stebbing at our [place] Office.    Cardiac rehab is scheduled on 1/24/24 at 0645 in [place].    Writer attempted to call pt for a cardiology post discharge phone call, but no answer. VM left to return my call. ALBINO Bernal RN.

## 2024-01-17 NOTE — TELEPHONE ENCOUNTER
"Called #   Telephone Information:   Mobile [phone]     ED/Discharge Protocol    \"Hi, my name is Kiya Salmon RN, a registered nurse, and I am calling on behalf of Dr. Juárez's office at [place].  I am calling to follow up and see how things are going for you after your recent visit.\"    \"I see that you were in the (ER/UC/IP) on 1/14/2024.    How are you doing now that you are home?\" I am much better     Is patient experiencing symptoms that may require a hospital visit?  None     Discharge Instructions    \"Let's review your discharge instructions.  What is/are the follow-up recommendations?  Pt. Response: I need to see my PCP     \"Were you instructed to make a follow-up appointment?\"  Pt. Response: Yes.  Has appointment been made?   Yes      \"When you see the provider, I would recommend that you bring your discharge instructions with you.    Medications    \"How many new medications are you on since your hospitalization/ED visit?\"    2 or more - Epic MTM referral needed  \"How many of your current medicines changed (dose, timing, name, etc.) while you were in the hospital/ED visit?\"   2 or more - Epic MTM referral needed  \"Do you have questions about your medications?\"   No  \"Were you newly diagnosed with heart failure, COPD, diabetes or did you have a heart attack?\"   Yes - Care Coordination Referral needed  For patients on insulin: \"Did you start on insulin in the hospital or did you have your insulin dose changed?\"   No  Post Discharge Medication Reconciliation Status: discharge medications reconciled and changed, per note/orders.    Was MTM referral placed (*Make sure to put transitions as reason for referral)?   No    Call Summary    \"Do you have any questions or concerns about your condition or care plan at the moment?\"    No  Triage nurse advice given: if anything changes please call the clinic   RN offered to schedule with a different provider pt stated she wants her PCP - Rn advised that she can " "do that but if things change to please call the clinic or any red flag symptoms to go to the ER     Patient stated an understanding and agreed with plan.      Patient was in ER 1 in the past year (assess appropriateness of ER visits.)      \"If you have questions or things don't continue to improve, we encourage you contact us through the main clinic number,  [phone].  Even if the clinic is not open, triage nurses are available 24/7 to help you.     We would like you to know that our clinic has extended hours (provide information).  We also have urgent care (provide details on closest location and hours/contact info)\"      \"Thank you for your time and take care!\"           "

## 2024-01-17 NOTE — TELEPHONE ENCOUNTER
Reason for Call:  Appointment Request    Patient requesting this type of appt:  Hospital/ED Follow-Up     Requested provider: Leonides Juárez    Reason patient unable to be scheduled: Not within requested timeframe    When does patient want to be seen/preferred time: 3-7 days    Comments: F/U - [facility] - 1/16 - heart attack    Pt has an appt for 2/1 but was told to be seen by PCP within 7 days - hoping to be worked in if possible.    Could we send this information to you in [employer] or would you prefer to receive a phone call?:   Patient would prefer a phone call   Okay to leave a detailed message?: Yes at Cell number on file:    Telephone Information:   Mobile [phone]       Call taken on 1/17/2024 at 8:07 AM by FIDELINA RIBEIRO

## 2024-01-18 ENCOUNTER — PATIENT OUTREACH (OUTPATIENT)
Dept: CARE COORDINATION | Facility: CLINIC | Age: 64
End: 2024-01-18
Payer: MEDICARE

## 2024-01-18 NOTE — PROGRESS NOTES
Clinic Care Coordination Contact  [facility]: Post-Discharge Note  SITUATION                                                      Admission:    Admission Date: 01/14/24   Reason for Admission: Unstable angina  Discharge:   Discharge Date: 01/16/24  Discharge Diagnosis: NSTEMI    BACKGROUND                                                      Per hospital discharge summary and inpatient provider notes:  Cumulative Summary: Ana Luisa Byers is a 63 year old female with past medical history significant for essential hypertension, dyslipidemia, diabetes mellitus type 2 on insulin, peripheral neuropathy, fibromyalgia, depression, GERD, CKD stage II and arthritis who presented initially to ED for evaluation of chest pain and was admitted for further evaluation and management on 01/14/2024.  Patient was started on heparin infusion, cardiology consultation was requested.  Here are further details regarding her current hospitalization     Assessment & Plan     NSTEMI/unstable angina  Two-vessel obstructive CAD including severe restenosis of the proximal LAD  S/p PCI to proximal LAD, OM 2 branch and proximal left circumflex  Essential hypertension  Dyslipidemia.     Patient presented with exertional chest pain and shortness of breath which is going on for the last week.  Felt the pain was more associated with chest tightness and pressure and somebody was sitting on her chest.  Initially her chest pain was with exertion but on the day of admission she is noticing chest pain at rest also.  Initial EKG was normal sinus rhythm with nonspecific T wave changes and serial troponin do not have ACS pattern, but her symptoms seems to be persistent with unstable angina.  Patient was given aspirin and nitroglycerin in ER and was a started on heparin infusion.  Patient underwent coronary angiogram on 01/15/24, patient was found to have two-vessel obstructive CAD identified on coronary angiogram including severe stenosis of the  proximal LAD.   She underwent successful PCI to the culprit proximal LAD lesion including deployment of overlapping 2.5 X 30 mm and 2.5 X 15 mm frontier [place] ELAINE  Patient also underwent successful PCI of the principal OM 2 branch and known culprit proximal left circumflex.(Total of 4 ELAINE placement)      -- Patient  was seen and examined, doing well , has been seen by cardiology and cardiac rehab this morning   -- Off heparin infusion.  -- Patient is recommended to stay on aspirin 81 mg p.o. daily and Brilinta 90 mg p.o. twice daily at least for 1 year. ( 30 day script given )  -- Discharge on Metoprolol Succinate 25 mg po daily ( Script given on discharge )   -- Fasting lipid panel checked on 01/15, results reviewed, showing cholesterol of 266, HDL of 54, LDL of main 86 and non-HDL cholesterol of 212, started and discharging atorvastatin 40 mg po daily , patient was not taking statin before   -- Echocardiogram reviewed , showed normal LV systolic function, EF is 55 to 60%, there is small area of mild apical hypokinesis.  -- start Patient on Losartan 25 mg po daily , 30 day script given   -- Patient will be continued on her home Lasix on discharge   -- Recheck BMP in one week after the discharge.  -- Cardiac rehab     Type 2 diabetes mellitus:     --PTA on Lantus 40 units nightly and Trulicity  --Hemoglobin A1c 7.8 on 12/13/2023  --During the hospitalization, PTA Trulicity was held.,  Starting back on discharge  -- Patient will benefit from which to Ozempic or Mounjaro in an outpatient setting as it would be helpful with weight loss and probably benefit from possible KITCHEN also, see below.     GERD  -- Continue PTA omeprazole on discharge.     CKD stage II   --Creatinine 0.72 this morning, stable     Incidental finding of indeterminate 1.7 cm right renal lesion on CT chest:     -- Could consider further evaluation with ultrasound or contrast-enhanced MRI on a nonemergent basis in out patient setting  -- Discussed with  patient regarding following up with PCP       Abnormal CAT scan finding     --CT chest also mentions - morphologic changes suggestive of chronic liver disease. Splenomegaly. No ascites in the upper abdomen. At risk for KITCHEN sec to obesity   --Denies alcohol drinking  --LFT's are in normal range   --Needs to follow-up with her PCP, discussed with patient      Patient was seen and examined on the day of discharge ,she is feeling well, does not have any complaints , I did review the discharge medications and instructions with the patient and plan for her to follow up with the PCP after the hospitalization .patient was in agreement , she is discharged in stable condition to her home with outpatient Cardiac Rehab      ASSESSMENT       Discharge Assessment  How are you doing now that you are home?: Much better. Have to go slow and easy.  How are your symptoms? (Red Flag symptoms escalate to triage hotline per guidelines): Improved  Do you feel your condition is stable enough to be safe at home until your provider visit?: Yes  Does the patient have their discharge instructions? : Yes  Does the patient have questions regarding their discharge instructions? : No  Were you started on any new medications or were there changes to any of your previous medications? : Yes  Does the patient have all of their medications?: Yes  Do you have questions regarding any of your medications? : No  Do you have all of your needed medical supplies or equipment (DME)?  (i.e. oxygen tank, CPAP, cane, etc.): Yes  Discharge follow-up appointment scheduled within 14 calendar days? : Yes  Discharge Follow Up Appointment Date: 02/01/24  Discharge Follow Up Appointment Scheduled with?: Primary Care Provider    Post-op ([facility] CTA Only)  If the patient had a surgery or procedure, do they have any questions for a nurse?: No    Post-op (Clinicians Only)  Did the patient have surgery or a procedure: No  Fever: No  Chills: No  Eating & Drinking: eating and  drinking without complaints/concerns  PO Intake: low fat diet (Low sodium)  Bowel Function: constipation  Date of last BM: 01/13/24 (Taking stool softener and laxative)  Urinary Status: voiding without complaint/concerns    PLAN                                                      Outpatient Plan:   Follow up with primary care provider, Leonides Juárez, within 7 days for hospital follow- up.  The following labs/tests are recommended: BMP.    Follow-up with cardiology as recommended  Follow-up with cardiac rehab as recommended.     RN CC contacted patient for post-hospital follow up and to introduce Care Coordination. Full assessment not indicated as patient declined to have Care Coordination support at this time. He/she was agreeable to receiving an introduction letter with additional information on Care Coordination via [employer]. Verified patient address in chart is valid.     RN CC will send patient introduction letter via [employer].     RN CC printed introduction letter with additional information on Care Coordination while in clinic on 1/18/24 and mailed to patient via [employer].     Future Appointments   Date Time Provider Department Center   1/24/2024  7:00 AM 3, [facility] Card[facility] [place] [facility]   2/1/2024  1:30 PM Leonides Juárez MD [facility]   2/12/2024  9:30 AM RU LAB [facility] [place] [facility]   2/13/2024  9:00 AM Ingris Patel PA-C [facility]   3/13/2024  8:15 AM LV LAB [facility]   6/13/2024  9:00 AM Leonides Juárez MD [facility]     For any urgent concerns, please contact our 24 hour nurse triage line: [phone] ([phone])       Heike Lock RN, BSN, CPHN, [facility] Ambulatory Care Management  [facility]  Phone: [phone]  Email: [email or web address]

## 2024-01-18 NOTE — LETTER
[facility] CARE COORDINATION  [id number]  [place] [address]    January 18, 2024    Ana Luisa Byers  [address]   [place] [address]      Dear Ana Luisa,    I am a clinic care coordinator who works with Leonides Juárez MD with the [facility]. I wanted to introduce myself and provide you with my contact information for you to be able to call me with any questions or concerns. I wanted to thank you for spending the time to talk with me.  Below is a description of clinic care coordination and how I can further assist you.       The clinic care coordination team is made up of a registered nurse, , financial resource worker and community health worker who understand the health care system. The goal of clinic care coordination is to help you manage your health and improve access to the health care system. Our team works alongside your provider to assist you in determining your health and social needs. We can help you obtain health care and community resources, providing you with necessary information and education. We can work with you through any barriers and develop a care plan that helps coordinate and strengthen the communication between you and your care team.  Our services are voluntary and are offered without charge to you personally.    Please feel free to contact me with any questions or concerns regarding care coordination and what we can offer.      We are focused on providing you with the highest-quality healthcare experience possible.    Sincerely,     Heike Lock RN, BSN, CPHN, CCM  [facility] Ambulatory Care Management  [facility]  Phone: [phone]  Email: [email or web address]    Enclosed: Additional information on Care Coordination.     WHAT IS CARE COORDINATION?     [facility] Care Coordination supports patients and families dealing with chronic or complex health conditions, developmental  issues, and social service needs. This service is available to patients of all ages, from babies to seniors. When you re facing a difficult decision about caring for yourself or someone you love, we can help you understand your options. We identify and refer you to community resources that help with financial, legal, mental health, transportation, and other issues. We also help with your medical and related education needs.     IS CARE COORDINATION RIGHT FOR ME?      Discuss a referral to Care Coordination with your primary care provider or care team member.     HOW CAN I CONNECT WITH CARE COORDINATION?      Contact your clinic.  Speak with your doctor or clinic staff.  Discuss care coordination with hospital staff before discharge.    MEET YOUR CARE COORDINATION TEAM     Registered Nurse Care Coordinator  Provides education on medications, disease management, and new diagnoses.  Addresses concerns about medical conditions and connects you to resources.  Develops patient-centered goals and communicates with patient s care team.     Social Work Care Coordinator  Provides education on emotional wellbeing.  Connects you to a variety of community-based resources.  Communicates with care team and community partners.     Community Health Worker  Identifies health and social barriers and connects you with community resources.  Develops nonclinical patient and family centered goals.  Enhances communication between patients and care teams.     Financial Resource Worker  Assists patients with applying for health [place] ([place], [place], [place]).  Helps patients with applying for county benefits.  Connects patients with [facility]

## 2024-01-19 ENCOUNTER — TELEPHONE (OUTPATIENT)
Dept: FAMILY MEDICINE | Facility: CLINIC | Age: 64
End: 2024-01-19
Payer: MEDICARE

## 2024-01-19 NOTE — TELEPHONE ENCOUNTER
MTM referral from: [facility] visit (referral by provider)    MTM referral outreach attempt #2 on January 19, 2024 at 12:53 PM      Outcome: Patient not reachable after several attempts, will route to MTM Pharmacist/Provider as an FYI.  [facility] scheduling number is .  Thank you for the referral.    Use  vbc for the carrier/Plan on the flowsheet      [employer] Message Sent    Nae Thorpe - [facility]

## 2024-01-24 ENCOUNTER — HOSPITAL ENCOUNTER (OUTPATIENT)
Dept: CARDIAC REHAB | Facility: CLINIC | Age: 64
Discharge: HOME | End: 2024-01-24
Attending: INTERNAL MEDICINE
Payer: MEDICARE

## 2024-01-24 DIAGNOSIS — I21.4 NSTEMI (NON-ST ELEVATED MYOCARDIAL INFARCTION) (H): ICD-10-CM

## 2024-01-24 PROCEDURE — 93797 PHYS/QHP OP CAR RHAB WO ECG: CPT

## 2024-01-24 PROCEDURE — 93798 PHYS/QHP OP CAR RHAB W/ECG: CPT

## 2024-01-26 ENCOUNTER — HOSPITAL ENCOUNTER (OUTPATIENT)
Dept: CARDIAC REHAB | Facility: CLINIC | Age: 64
Discharge: HOME | End: 2024-01-26
Attending: INTERNAL MEDICINE
Payer: MEDICARE

## 2024-01-26 PROCEDURE — 93798 PHYS/QHP OP CAR RHAB W/ECG: CPT | Performed by: REHABILITATION PRACTITIONER

## 2024-01-26 NOTE — TELEPHONE ENCOUNTER
Second attempt at trying to contact pt, but again, no answer. VM left to return my call. ALBINO Bernal RN.

## 2024-01-29 ENCOUNTER — TRANSFERRED RECORDS (OUTPATIENT)
Dept: HEALTH INFORMATION MANAGEMENT | Facility: CLINIC | Age: 64
End: 2024-01-29
Payer: MEDICARE

## 2024-01-29 NOTE — TELEPHONE ENCOUNTER
Writer returned pt's  phone message.    Called patient to discuss any post hospital d/c questions, review medication changes, and confirm f/u appts. Patient denied any questions regarding new medications or changes to PTA medications.     RN confirmed with patient that she was d/c with an adequate supply of the antiplatelet Brilinta, and reminded of importance of taking without interruption.     Pt has an Rx for PRN SL Nitroglycerin.     Patient denied any SOB, chest pain or light headedness.    RFA cardiac cath site is without bleeding, swelling, redness or signs of infection.     RN confirmed with patient that she is scheduled for labs on 2/12/24 at 0930, and an OV on 2/13/24 at 0855 with TRINIDAD Ingris Patel at our [place] [facility].     States she has already started cardiac rehab.    Patient advised to call clinic with any cardiac related questions or concerns prior to this trinidad't. Patient verbalized understanding and agreed with plan. ALBINO Bernal RN.

## 2024-01-30 ENCOUNTER — HOSPITAL ENCOUNTER (OUTPATIENT)
Dept: CARDIAC REHAB | Facility: CLINIC | Age: 64
Discharge: HOME | End: 2024-01-30
Attending: INTERNAL MEDICINE
Payer: MEDICARE

## 2024-01-30 PROCEDURE — 93798 PHYS/QHP OP CAR RHAB W/ECG: CPT

## 2024-01-31 ASSESSMENT — PATIENT HEALTH QUESTIONNAIRE - PHQ9
SUM OF ALL RESPONSES TO PHQ QUESTIONS 1-9: 5
SUM OF ALL RESPONSES TO PHQ QUESTIONS 1-9: 5
10. IF YOU CHECKED OFF ANY PROBLEMS, HOW DIFFICULT HAVE THESE PROBLEMS MADE IT FOR YOU TO DO YOUR WORK, TAKE CARE OF THINGS AT HOME, OR GET ALONG WITH OTHER PEOPLE: SOMEWHAT DIFFICULT

## 2024-02-01 ENCOUNTER — OFFICE VISIT (OUTPATIENT)
Dept: FAMILY MEDICINE | Facility: CLINIC | Age: 64
End: 2024-02-01
Payer: MEDICARE

## 2024-02-01 VITALS
DIASTOLIC BLOOD PRESSURE: 80 MMHG | HEART RATE: 75 BPM | WEIGHT: 266 LBS | OXYGEN SATURATION: 95 % | RESPIRATION RATE: 16 BRPM | SYSTOLIC BLOOD PRESSURE: 124 MMHG | BODY MASS INDEX: 44.32 KG/M2 | HEIGHT: 65 IN | TEMPERATURE: 98 F

## 2024-02-01 DIAGNOSIS — E66.01 CLASS 3 SEVERE OBESITY DUE TO EXCESS CALORIES WITH SERIOUS COMORBIDITY AND BODY MASS INDEX (BMI) OF 40.0 TO 44.9 IN ADULT (H): ICD-10-CM

## 2024-02-01 DIAGNOSIS — E66.813 CLASS 3 SEVERE OBESITY DUE TO EXCESS CALORIES WITH SERIOUS COMORBIDITY AND BODY MASS INDEX (BMI) OF 40.0 TO 44.9 IN ADULT (H): ICD-10-CM

## 2024-02-01 DIAGNOSIS — N18.31 TYPE 2 DIABETES MELLITUS WITH STAGE 3A CHRONIC KIDNEY DISEASE, WITHOUT LONG-TERM CURRENT USE OF INSULIN (H): ICD-10-CM

## 2024-02-01 DIAGNOSIS — E11.22 TYPE 2 DIABETES MELLITUS WITH STAGE 3A CHRONIC KIDNEY DISEASE, WITHOUT LONG-TERM CURRENT USE OF INSULIN (H): ICD-10-CM

## 2024-02-01 DIAGNOSIS — I21.4 NSTEMI (NON-ST ELEVATED MYOCARDIAL INFARCTION) (H): Primary | ICD-10-CM

## 2024-02-01 PROCEDURE — 99495 TRANSJ CARE MGMT MOD F2F 14D: CPT | Performed by: FAMILY MEDICINE

## 2024-02-01 RX ORDER — ATORVASTATIN CALCIUM 40 MG/1
40 TABLET, FILM COATED ORAL DAILY
Qty: 90 TABLET | Refills: 3 | Status: SHIPPED | OUTPATIENT
Start: 2024-02-01 | End: 2024-05-15

## 2024-02-01 NOTE — PROGRESS NOTES
Assessment & Plan     NSTEMI (non-ST elevated myocardial infarction) (H)  Continue current medical management, continues cardiac rehab.  Lipitor refilled.  Currently on Brilinta and aspirin, will continue for 1 year and then stop Brilinta and continue aspirin monotherapy indefinitely  - atorvastatin (LIPITOR) 40 MG tablet  Dispense: 90 tablet; Refill: 3    Type 2 diabetes mellitus with stage 3a chronic kidney disease, without long-term current use of insulin (H)  Controlled.  Continue current medical management    Class 3 severe obesity due to excess calories with serious comorbidity and body mass index (BMI) of 40.0 to 44.9 in adult (H)  Diet and exercise reviewed      MED REC REQUIRED  Post Medication Reconciliation Status:  Discharge medications reconciled, continue medications without change      Carmen Orosco is a 64 year old, presenting for the following health issues:  Hospital F/U        2/1/2024     1:05 PM   Additional Questions   Roomed by Gabino Cho   Accompanied by Boy friend     Via the Health Maintenance questionnaire, the patient has reported the following services have been completed -Eye Exam, this information has been sent to the abstraction team.  HPI         1/18/2024     9:28 AM   Post Discharge Outreach   Admission Date 1/14/2024   Reason for Admission Unstable angina   Discharge Date 1/16/2024   Discharge Diagnosis NSTEMI   How are you doing now that you are home? Much better. Have to go slow and easy.   How are your symptoms? (Red Flag symptoms escalate to triage hotline per guidelines) Improved   Do you feel your condition is stable enough to be safe at home until your provider visit? Yes   Does the patient have their discharge instructions?  Yes   Does the patient have questions regarding their discharge instructions?  No   Were you started on any new medications or were there changes to any of your previous medications?  Yes   Does the patient have all of their medications? Yes  "  Do you have questions regarding any of your medications?  No   Do you have all of your needed medical supplies or equipment (DME)?  (i.e. oxygen tank, CPAP, cane, etc.) Yes   Discharge follow-up appointment scheduled within 14 calendar days?  Yes   Discharge Follow Up Appointment Date 2/1/2024   Discharge Follow Up Appointment Scheduled with? Primary Care Provider     Hospital Follow-up Visit:    Hospital/Nursing Home/IP Rehab Facility: [facility]  Date of Admission: 1-14-24  Date of Discharge: 1-16-24  Reason(s) for Admission: Unstable Angina      Was your hospitalization related to COVID-19? No   Problems taking medications regularly:  None  Medication changes since discharge: None  Problems adhering to non-medication therapy:  cardio rehab x 3 days a week    Summary of hospitalization:  [facility] discharge summary reviewed  Diagnostic Tests/Treatments reviewed.  Follow up needed: none  Other Healthcare Providers Involved in Patient s Care:         None  Update since discharge: improved.     Plan of care communicated with patient     Patient is doing well following her NSTEMI.  Accompanied by .  Currently at cardiac rehab, doing well, no chest pain or shortness of breath.  Denies depression.            Objective    /80 (BP Location: Right arm, Patient Position: Chair, Cuff Size: Adult Large)   Pulse 75   Temp 98  F (36.7  C) (Oral)   Resp 16   Ht 1.651 m (5' 5\")   Wt 120.7 kg (266 lb)   LMP 02/13/2009   SpO2 95%   Breastfeeding No   BMI 44.26 kg/m    Body mass index is 44.26 kg/m .  Physical Exam  Vitals reviewed.   Constitutional:       Appearance: She is not ill-appearing.   Cardiovascular:      Rate and Rhythm: Normal rate and regular rhythm.   Pulmonary:      Effort: Pulmonary effort is normal.      Breath sounds: Normal breath sounds.   Abdominal:      General: Abdomen is flat.      Palpations: Abdomen is soft.   Neurological:      Mental " Status: She is alert.   Psychiatric:         Thought Content: Thought content normal.        Lab Results   Component Value Date    A1C 7.8 12/13/2023    A1C 6.5 04/26/2023    A1C 8.2 01/31/2023    A1C 7.0 08/17/2022    A1C 6.3 02/14/2022    A1C 7.9 05/11/2021    A1C 9.2 02/17/2021    A1C 7.8 11/25/2020    A1C 9.5 06/18/2020    A1C 7.7 01/22/2020             Signed Electronically by: Leonides Juárez MD    Answers submitted by the patient for this visit:  Patient Health Questionnaire (Submitted on 1/31/2024)  If you checked off any problems, how difficult have these problems made it for you to do your work, take care of things at home, or get along with other people?: Somewhat difficult  PHQ9 TOTAL SCORE: 5

## 2024-02-02 ENCOUNTER — HOSPITAL ENCOUNTER (OUTPATIENT)
Dept: CARDIAC REHAB | Facility: CLINIC | Age: 64
Discharge: HOME | End: 2024-02-02
Attending: INTERNAL MEDICINE
Payer: MEDICARE

## 2024-02-02 PROCEDURE — 93798 PHYS/QHP OP CAR RHAB W/ECG: CPT

## 2024-02-05 ENCOUNTER — HOSPITAL ENCOUNTER (OUTPATIENT)
Dept: CARDIAC REHAB | Facility: CLINIC | Age: 64
Discharge: HOME | End: 2024-02-05
Attending: INTERNAL MEDICINE
Payer: MEDICARE

## 2024-02-05 ENCOUNTER — MYC MEDICAL ADVICE (OUTPATIENT)
Dept: FAMILY MEDICINE | Facility: CLINIC | Age: 64
End: 2024-02-05
Payer: MEDICARE

## 2024-02-05 DIAGNOSIS — Z79.4 TYPE 2 DIABETES MELLITUS WITH HYPERGLYCEMIA, WITH LONG-TERM CURRENT USE OF INSULIN (H): Primary | ICD-10-CM

## 2024-02-05 DIAGNOSIS — E11.65 TYPE 2 DIABETES MELLITUS WITH HYPERGLYCEMIA, WITH LONG-TERM CURRENT USE OF INSULIN (H): Primary | ICD-10-CM

## 2024-02-05 PROCEDURE — 93798 PHYS/QHP OP CAR RHAB W/ECG: CPT | Performed by: REHABILITATION PRACTITIONER

## 2024-02-05 NOTE — TELEPHONE ENCOUNTER
Please see mychart and advise.    Referral pended however, additional documentation needed. Please see referral for additional information.    Eboni LOPES RN

## 2024-02-09 ENCOUNTER — MYC MEDICAL ADVICE (OUTPATIENT)
Dept: FAMILY MEDICINE | Facility: CLINIC | Age: 64
End: 2024-02-09
Payer: MEDICARE

## 2024-02-09 DIAGNOSIS — I21.4 NSTEMI (NON-ST ELEVATED MYOCARDIAL INFARCTION) (H): ICD-10-CM

## 2024-02-09 RX ORDER — LOSARTAN POTASSIUM 25 MG/1
25 TABLET ORAL DAILY
Qty: 90 TABLET | Refills: 1 | Status: SHIPPED | OUTPATIENT
Start: 2024-02-09 | End: 2024-08-05

## 2024-02-09 NOTE — TELEPHONE ENCOUNTER
Please review and advise. Previously given in hospital.    Medications/pharmacy pended for review.    Eboni LOPES RN

## 2024-02-12 ENCOUNTER — LAB (OUTPATIENT)
Dept: LAB | Facility: CLINIC | Age: 64
End: 2024-02-12
Payer: MEDICARE

## 2024-02-12 DIAGNOSIS — I21.4 NSTEMI (NON-ST ELEVATED MYOCARDIAL INFARCTION) (H): ICD-10-CM

## 2024-02-12 LAB
CHOLEST SERPL-MCNC: 136 MG/DL
HDLC SERPL-MCNC: 50 MG/DL
LDLC SERPL CALC-MCNC: 69 MG/DL
NONHDLC SERPL-MCNC: 86 MG/DL
TRIGL SERPL-MCNC: 85 MG/DL

## 2024-02-12 PROCEDURE — 80061 LIPID PANEL: CPT | Performed by: NURSE PRACTITIONER

## 2024-02-12 PROCEDURE — 36415 COLL VENOUS BLD VENIPUNCTURE: CPT | Performed by: NURSE PRACTITIONER

## 2024-02-13 ENCOUNTER — OFFICE VISIT (OUTPATIENT)
Dept: CARDIOLOGY | Facility: CLINIC | Age: 64
End: 2024-02-13
Payer: MEDICARE

## 2024-02-13 VITALS
HEIGHT: 65 IN | SYSTOLIC BLOOD PRESSURE: 118 MMHG | HEART RATE: 78 BPM | DIASTOLIC BLOOD PRESSURE: 72 MMHG | BODY MASS INDEX: 45.02 KG/M2 | WEIGHT: 270.2 LBS | OXYGEN SATURATION: 95 %

## 2024-02-13 DIAGNOSIS — E78.5 HYPERLIPIDEMIA LDL GOAL <70: ICD-10-CM

## 2024-02-13 DIAGNOSIS — I21.4 NSTEMI (NON-ST ELEVATED MYOCARDIAL INFARCTION) (H): ICD-10-CM

## 2024-02-13 DIAGNOSIS — K21.9 GASTROESOPHAGEAL REFLUX DISEASE WITHOUT ESOPHAGITIS: Primary | ICD-10-CM

## 2024-02-13 PROCEDURE — 99204 OFFICE O/P NEW MOD 45 MIN: CPT | Performed by: INTERNAL MEDICINE

## 2024-02-13 RX ORDER — METOPROLOL SUCCINATE 25 MG/1
25 TABLET, EXTENDED RELEASE ORAL DAILY
Qty: 90 TABLET | Refills: 3 | Status: SHIPPED | OUTPATIENT
Start: 2024-02-13

## 2024-02-13 RX ORDER — CLOPIDOGREL BISULFATE 75 MG/1
75 TABLET ORAL DAILY
Qty: 93 TABLET | Refills: 3 | Status: SHIPPED | OUTPATIENT
Start: 2024-02-13

## 2024-02-13 RX ORDER — PANTOPRAZOLE SODIUM 40 MG/1
40 TABLET, DELAYED RELEASE ORAL DAILY
Qty: 90 TABLET | Refills: 3 | Status: SHIPPED | OUTPATIENT
Start: 2024-02-13

## 2024-02-13 NOTE — LETTER
"2/13/2024    Leonides Juárez MD  [address]  [facility] [address]    RE: Ana Luisa Byers       Dear Colleague,     I had the pleasure of seeing Ana Luisa Byers in the [facility].  Cardiology Clinic Progress Note  Ana Luisa Byers MRN# [id number]   YOB: 1960 Age: 64 year old   Primary Cardiologist: pt requesting  cardiologist Reason for visit: hospital follow-up             Assessment and Plan:   Ana Luisa Byers is a very pleasant 64 year old female who is here today for hospital follow-up.      1.  Coronary artery disease, presenting with NSTEMI 1/2024.  Coronary angiogram 1/15/2024 revealed obstructive CAD treated with PCI to culprit pLAD (ELAINE x2), nonculprit OM2 (ELAINE x 1), and LCX (ELAINE x 1).  On DAPT with ASA + Brilinta. Transition Brilinta >> Plavix.  2.  Hypertension, BP well-controlled on current regimen  3.  Dyslipidemia, on atorvastatin 40 mg once daily. LDL 69 2/2024.  4.  Diabetes mellitus type 2, insulin-dependent.  Hemoglobin A1c 7.8% 12/2023.  5.  GERD, transition omeprazole >> pantoprazole.     Changes today:   Transition Brilinta to Plavix.  The first day, patient will take 300 mg of Plavix.  Each day thereafter, she will take 75 mg once daily  Discontinue omeprazole given interaction with Plavix.  Start pantoprazole 40 mg once daily.    Ms. Byers has been feeling well from a cardiac perspective since discharge from the hospital.  Denies symptoms concerning for angina or heart failure.  Her only complaint has been \"random\" episodes of shortness of breath that are bothersome.  Will trial transitioning from Brilinta to Plavix as I am concerned the SOB may be a medication side effect.  Given the interaction of omeprazole and Plavix, will need to transition her to pantoprazole.  Unfortunately, her insurance will not cover cardiac rehab.  She has a gym at home and has been continuing the cardiac rehab exercises on her own.  I congratulated her on this and encouraged " her to continue.    Patient's only other complaint has been an isolated episode of leg cramps (something she would struggle with intermittently prior to statin initiation). FLP completed 1 month after atorvastatin initiation reveals improvement in LDL from 186>> 69.  I was considering uptitrating atorvastatin to 80 mg once daily in order to drive LDL closer to 50.  However, recommend ongoing monitoring of leg cramps/discomfort with plan to repeat FLP in 2 months.  If LDL remains above goal and leg cramps have resolved, will uptitrate at that time.    Follow up in 3 months with labs and echocardiogram prior.     Ingris Patel PA-C  [facility] - [facility]  Pager: [phone]          History of Presenting Illness:    Ana Luisa Byers is a very pleasant 64 year old female with a history of hypertension, diabetes mellitus type 2, insulin-dependent, and peripheral neuropathy.    Patient was in her usual state of health until 1/2024 when she developed exertional chest discomfort and shortness of breath.  Given worsening symptoms, she presented to [facility] 1/14/2024 for evaluation.  ECG on admission without evidence of ischemia.  Troponin elevated.  He was ultimately recommended to proceed with coronary angiogram +/- PCI.  This was completed 1/15/2024 and revealed two-vessel obstructive CAD treated with PCI to culprit pLAD (ELAINE x2), nonculprit OM2 (ELAINE x 1), and LCX (ELAINE x 1).  Echocardiogram revealed LVEF 55 to 60% with a small area of mild apical hypokinesis, normal RV size and function, no hemodynamically significant valvular disease.  Her medications were optimized and she was ultimately discharged in stable condition 1/16/2024.    Patient is here today for hospital follow-up.  Ms. Crane has been feeling well since discharge.  Denies chest pain, palpitations, lightheadedness, dizziness, near-syncope or syncope.  No orthopnea, PND or LE edema.  No bleeding concerns on DAPT.  She has  "struggled with intermittent episodes of \"random shortness of breath\".  These have occurred when bending over or occasionally at rest.  She has never experienced anything like this before.  The only other complaint she mention today was an isolated episode of leg cramps this morning.  Prior to statin initiation, she would struggle with intermittent leg cramps.    Taking her medications daily as prescribed.  Blood pressure 118/72 and heart rate 78 in clinic today.  Unfortunately, patient reports that her insurance does not cover cardiac rehab.  They covered 1 week, so she went for this.  Since, she has continued the cardiac rehab exercises at home-15 minutes on the treadmill, 20 minutes on the bike.  No cardiac symptoms with exertion.  She has made drastic lifestyle changes since discharge from the hospital, mostly surrounding her diet.  Now, consuming only lean meats including chicken and fish as well as freshly prepared fruits.  No longer consuming processed foods or soda.  No alcohol or tobacco use.      Social History       Social History     Socioeconomic History    Marital status:      Spouse name: Not on file    Number of children: 4    Years of education: Not on file    Highest education level: Not on file   Occupational History     Employer: UNEMPLOYED   Tobacco Use    Smoking status: Never    Smokeless tobacco: Never   Vaping Use    Vaping Use: Never used   Substance and Sexual Activity    Alcohol use: No    Drug use: No    Sexual activity: Not Currently     Birth control/protection: Surgical   Other Topics Concern    Parent/sibling w/ CABG, MI or angioplasty before 65F 55M? Yes     Comment: mother  of congested heart failure -  at age 60   Social History Narrative    Not on file     Social Determinants of Health     Financial Resource Strain: Low Risk  (2024)    Financial Resource Strain     Within the past 12 months, have you or your family members you live with been unable to get " utilities (heat, electricity) when it was really needed?: No   Food Insecurity: Low Risk  (1/31/2024)    Food Insecurity     Within the past 12 months, did you worry that your food would run out before you got money to buy more?: No     Within the past 12 months, did the food you bought just not last and you didn t have money to get more?: No   Transportation Needs: Low Risk  (1/31/2024)    Transportation Needs     Within the past 12 months, has lack of transportation kept you from medical appointments, getting your medicines, non-medical meetings or appointments, work, or from getting things that you need?: No   Recent Concern: Transportation Needs - High Risk (12/13/2023)    Transportation Needs     Within the past 12 months, has lack of transportation kept you from medical appointments, getting your medicines, non-medical meetings or appointments, work, or from getting things that you need?: Yes   Physical Activity: Unknown (12/13/2023)    Exercise Vital Sign     Days of Exercise per Week: 3 days     Minutes of Exercise per Session: Not on file   Stress: Stress Concern Present (12/13/2023)    [employer] - Occupational Stress Questionnaire     Feeling of Stress : To some extent   Social Connections: Unknown (12/13/2023)    Social Connection and Isolation Panel [NHANES]     Frequency of Communication with Friends and Family: More than three times a week     Frequency of Social Gatherings with Friends and Family: Not on file     Attends Episcopal Services: Not on file     Active Member of Clubs or Organizations: No     Attends Club or Organization Meetings: Never     Marital Status: Living with partner   Interpersonal Safety: Low Risk  (12/13/2023)    Interpersonal Safety     Do you feel physically and emotionally safe where you currently live?: Yes     Within the past 12 months, have you been hit, slapped, kicked or otherwise physically hurt by someone?: No     Within the past 12  "months, have you been humiliated or emotionally abused in other ways by your partner or ex-partner?: No   Housing Stability: Low Risk  (1/31/2024)    Housing Stability     Do you have housing? : Yes     Are you worried about losing your housing?: No            Review of Systems:   Please see HPI         Physical Exam:   Vitals: /72 (BP Location: Right arm, Patient Position: Sitting, Cuff Size: Adult Large)   Pulse 78   Ht 1.651 m (5' 5\")   Wt 122.6 kg (270 lb 3.2 oz)   LMP 02/13/2009   SpO2 95%   BMI 44.96 kg/m     Wt Readings from Last 4 Encounters:   02/01/24 120.7 kg (266 lb)   01/16/24 123.4 kg (272 lb)   01/14/24 124.3 kg (274 lb)   12/13/23 124.7 kg (275 lb)     GEN: well nourished, in no acute distress.  HEENT:  Pupils equal, round. Sclerae nonicteric.   NECK: Supple, no masses appreciated. Unable to accurately assess JVP secondary to body habitus.   C/V:  Regular rate and rhythm, no murmur, rub or gallop.    RESP: Respirations are unlabored. Clear to auscultation bilaterally without wheezing, rales, or rhonchi.  GI: Abdomen soft, nontender.  EXTREM: no LE edema.  NEURO: Alert and oriented, cooperative.  SKIN: Warm and dry.        Data:   LIPID RESULTS:  Lab Results   Component Value Date    CHOL 136 02/12/2024    CHOL 304 (H) 11/25/2020    HDL 50 02/12/2024    HDL 45 (L) 11/25/2020    LDL 69 02/12/2024     (H) 11/25/2020    TRIG 85 02/12/2024    TRIG 150 (H) 11/25/2020    CHOLHDLRATIO 3.4 03/16/2015     LIVER ENZYME RESULTS:  Lab Results   Component Value Date    AST 66 (H) 01/15/2024    AST 25 07/05/2016    ALT 40 01/15/2024    ALT 37 07/05/2016     CBC RESULTS:  Lab Results   Component Value Date    WBC 4.6 01/15/2024    WBC 5.2 08/22/2018    RBC 4.41 01/15/2024    RBC 4.32 08/22/2018    HGB 13.2 01/15/2024    HGB 12.8 08/22/2018    HCT 39.1 01/15/2024    HCT 39.4 08/22/2018    MCV 89 01/15/2024    MCV 91 08/22/2018    MCH 29.9 01/15/2024    MCH 29.6 08/22/2018    MCHC 33.8 01/15/2024    " "MCHC 32.5 08/22/2018    RDW 12.2 01/15/2024    RDW 12.4 08/22/2018     (L) 01/15/2024     08/22/2018     BMP RESULTS:  Lab Results   Component Value Date     (L) 01/16/2024     06/30/2021    POTASSIUM 4.0 01/16/2024    POTASSIUM 4.3 08/17/2022    POTASSIUM 4.2 06/30/2021    CHLORIDE 102 01/16/2024    CHLORIDE 107 08/17/2022    CHLORIDE 107 06/30/2021    CO2 23 01/16/2024    CO2 25 08/17/2022    CO2 24 06/30/2021    ANIONGAP 9 01/16/2024    ANIONGAP 6 08/17/2022    ANIONGAP 6 06/30/2021     (H) 01/16/2024     (H) 01/16/2024     (H) 08/17/2022     (H) 06/30/2021    BUN 10.5 01/16/2024    BUN 13 08/17/2022    BUN 16 06/30/2021    CR 0.72 01/16/2024    CR 1.01 06/30/2021    GFRESTIMATED >90 01/16/2024    GFRESTIMATED 60 (L) 06/30/2021    GFRESTBLACK 69 06/30/2021    GARY 9.6 01/16/2024    GARY 9.8 06/30/2021      A1C RESULTS:  Lab Results   Component Value Date    A1C 7.8 (H) 12/13/2023    A1C 7.9 (H) 05/11/2021     INR RESULTS:  No results found for: \"INR\"         Medications     Current Outpatient Medications   Medication Sig Dispense Refill    aspirin 81 MG EC tablet Take 1 tablet (81 mg) by mouth daily 90 tablet 0    atorvastatin (LIPITOR) 40 MG tablet Take 1 tablet (40 mg) by mouth daily 90 tablet 3    blood glucose (NO BRAND SPECIFIED) test strip Use to test blood sugar as directed. 300 strip 3    blood glucose monitoring (NO BRAND SPECIFIED) meter device kit Use to test blood sugar as directed. 1 kit 0    Blood Pressure Monitoring (COMFORT TOUCH BP CUFF/LARGE) MISC 1 Units daily 1 each 0    cetirizine (ZYRTEC) 10 MG tablet Take 1 tablet (10 mg) by mouth daily 30 tablet 0    furosemide (LASIX) 40 MG tablet Take 1 tablet (40 mg) by mouth daily 90 tablet 1    insulin glargine (LANTUS PEN) 100 UNIT/ML pen 36U Lantus at bedtime, increase by 2U every other day if fasted morning glucose > 125. Do NOT exceed 46U until speaking with a doctor. (Patient taking differently: " 40U Lantus at bedtime, increase by 2U every other day if fasted morning glucose > 125. Do NOT exceed 46U until speaking with a doctor.) 9 mL 4    insulin pen needle (32G X 4 MM) 32G X 4 MM miscellaneous Use 1 pen needles daily or as directed. 100 each 2    losartan (COZAAR) 25 MG tablet Take 1 tablet (25 mg) by mouth daily 90 tablet 1    metoprolol succinate ER (TOPROL XL) 25 MG 24 hr tablet Take 1 tablet (25 mg) by mouth daily 90 tablet 0    nitroGLYcerin (NITROSTAT) 0.4 MG sublingual tablet For chest pain place 1 tablet under the tongue every 5 minutes for 3 doses. If symptoms persist 5 minutes after 1st dose call 911. 30 tablet 0    omeprazole (PRILOSEC) 40 MG DR capsule Take 1 capsule (40 mg) by mouth 2 times daily 180 capsule 3    order for DME Equipment being ordered: Diabetic Shoes 1 Units 0    ticagrelor (BRILINTA) 90 MG tablet Take 1 tablet (90 mg) by mouth 2 times daily 60 tablet 3    TRULICITY 0.75 MG/0.5ML pen ADMINISTER 0.75 MG UNDER THE SKIN EVERY 7 DAYS 2 mL 3    zolpidem (AMBIEN) 10 MG tablet TAKE 1 TABLET(10 MG) BY MOUTH EVERY NIGHT AS NEEDED FOR SLEEP 30 tablet 5          Past Medical History     Past Medical History:   Diagnosis Date    Arthritis     left shoulder and back    Chronic pain     fibromalgia     CKD (chronic kidney disease), stage III (H)     Fibromyalgia     Gastro-oesophageal reflux disease     HTN, goal below 140/90     Hyperlipidemia LDL goal <100     Hypothyroidism     Major depression     chronic kidney disease-stage 3    Peripheral neuropathy Feb 2015    + callus. diabetic shoes rx done    PONV (postoperative nausea and vomiting)     Rheumatoid arthritis of foot (H)     Tongue abnormality     Type 2 diabetes, HbA1C goal < 8% (H)     pre-diabetic     Past Surgical History:   Procedure Laterality Date    ARTHROPLASTY SHOULDER  7/25/2013    Procedure: right ARTHROPLASTY SHOULDER;  RIGHT TOTAL SHOULDER ARTHROPLASTY (TORNIER AFFINITY SHOULDER SYSTEM)^;  Surgeon: Dung Morales  MD;  Location:  OR    BACK SURGERY  2011    L45 laminectomy    CV CORONARY ANGIOGRAM N/A 1/15/2024    Procedure: Coronary Angiogram;  Surgeon: Dung Quintero MD;  Location:  HEART CARDIAC CATH LAB    CV PCI N/A 1/15/2024    Procedure: Percutaneous Coronary Intervention;  Surgeon: Dung Quintero MD;  Location:  HEART CARDIAC CATH LAB    ELBOW WRIST HAND FINGER ORTHOSIS, RIGID, WITHOUT JOINTS, MAY INCLUDE SOFT  5/2007    put in [place]    ESOPHAGOSCOPY, GASTROSCOPY, DUODENOSCOPY (EGD), COMBINED  9/23/2015    Dr. William AMBROSIO    ESOPHAGOSCOPY, GASTROSCOPY, DUODENOSCOPY (EGD), COMBINED N/A 9/23/2015    Procedure: COMBINED ESOPHAGOSCOPY, GASTROSCOPY, DUODENOSCOPY (EGD), BIOPSY SINGLE OR MULTIPLE;  Surgeon: Jose Thomas MD;  Location:  GI    ESOPHAGOSCOPY, GASTROSCOPY, DUODENOSCOPY (EGD), COMBINED  02/04/2020    Dr. William AMBROSIO    ESOPHAGOSCOPY, GASTROSCOPY, DUODENOSCOPY (EGD), COMBINED N/A 2/4/2020    Procedure: ESOPHAGOGASTRODUODENOSCOPY (EGD);  Surgeon: Jose Thomas MD;  Location:  GI    HCL PAP SMEAR  6/2008    [facility]    ORTHOPEDIC SURGERY      left shoulder scoped and plate left elbow    SURGICAL HISTORY OF -   2009    ulnar nerve release, carpal tunnel- left    ZZC APPENDECTOMY  age 19    ZZC LIGATE FALLOPIAN TUBE,POSTPARTUM  1982    Tubal Ligation     Family History   Problem Relation Age of Onset    C.A.D. Mother     Neurologic Disorder Mother         lupus    Depression Mother     Family History Negative Father     Diabetes Maternal Aunt     Colon Cancer No family hx of             Allergies   Penicillins, Augmentin [amoxicillin-pot clavulanate], Bee, Gabapentin, Lisinopril, Metformin, and Topiramate      35 minutes spent on the date of the encounter doing chart review, history and exam, documentation and further activities as noted above    Ingris Patel PA-C  [facility] - [facility]  Pager: [phone]      Thank you for allowing me to participate in the care of your  patient.      Sincerely,     TIMOTHY Gibbons [facility]  cc:   Taty Dawn, NORMA CNP  [address]  [place] [address]

## 2024-02-13 NOTE — PATIENT INSTRUCTIONS
Call the nurse for any questions or concerns at [phone]     Plan:  1. Medication changes:     Stop taking Brilinta and start Plavix. The first day you take Plavix, take 4 tablets (300 mg). Each day thereafter, you will take 1 tablet (75 mg) once daily.     Stop omeprazole. Start pantoprazole 40 mg once daily.    2. Keep an eye on your leg cramps. If these become frequent or recurrent, please call.     3. Follow up in 3 months with echocardiogram and labs prior.    -Scheduling phone number: [phone]    It was great seeing you today!    Ingris Patel PA-C  Physician Assistant  [facility] - [facility]

## 2024-02-13 NOTE — PROGRESS NOTES
"Cardiology Clinic Progress Note  Ana Luisa Byers MRN# [id number]   YOB: 1960 Age: 64 year old   Primary Cardiologist: pt requesting  cardiologist Reason for visit: hospital follow-up             Assessment and Plan:   Ana Luisa Byers is a very pleasant 64 year old female who is here today for hospital follow-up.      1.  Coronary artery disease, presenting with NSTEMI 1/2024.  Coronary angiogram 1/15/2024 revealed obstructive CAD treated with PCI to culprit pLAD (ELAINE x2), nonculprit OM2 (ELAINE x 1), and LCX (ELAINE x 1).  On DAPT with ASA + Brilinta. Transition Brilinta >> Plavix.  2.  Hypertension, BP well-controlled on current regimen  3.  Dyslipidemia, on atorvastatin 40 mg once daily. LDL 69 2/2024.  4.  Diabetes mellitus type 2, insulin-dependent.  Hemoglobin A1c 7.8% 12/2023.  5.  GERD, transition omeprazole >> pantoprazole.     Changes today:   Transition Brilinta to Plavix.  The first day, patient will take 300 mg of Plavix.  Each day thereafter, she will take 75 mg once daily  Discontinue omeprazole given interaction with Plavix.  Start pantoprazole 40 mg once daily.    Ms. Byers has been feeling well from a cardiac perspective since discharge from the hospital.  Denies symptoms concerning for angina or heart failure.  Her only complaint has been \"random\" episodes of shortness of breath that are bothersome.  Will trial transitioning from Brilinta to Plavix as I am concerned the SOB may be a medication side effect.  Given the interaction of omeprazole and Plavix, will need to transition her to pantoprazole.  Unfortunately, her insurance will not cover cardiac rehab.  She has a gym at home and has been continuing the cardiac rehab exercises on her own.  I congratulated her on this and encouraged her to continue.    Patient's only other complaint has been an isolated episode of leg cramps (something she would struggle with intermittently prior to statin initiation). FLP completed 1 month after " "atorvastatin initiation reveals improvement in LDL from 186>> 69.  I was considering uptitrating atorvastatin to 80 mg once daily in order to drive LDL closer to 50.  However, recommend ongoing monitoring of leg cramps/discomfort with plan to repeat FLP in 2 months.  If LDL remains above goal and leg cramps have resolved, will uptitrate at that time.    Follow up in 3 months with labs and echocardiogram prior.     Ingris Patel PA-C  [facility]  Pager: [phone]          History of Presenting Illness:    Ana Luisa Byers is a very pleasant 64 year old female with a history of hypertension, diabetes mellitus type 2, insulin-dependent, and peripheral neuropathy.    Patient was in her usual state of health until 1/2024 when she developed exertional chest discomfort and shortness of breath.  Given worsening symptoms, she presented to [facility]/14/20[facility] for evaluation.  ECG on admission without evidence of ischemia.  Troponin elevated.  He was ultimately recommended to proceed with coronary angiogram +/- PCI.  This was completed 1/15/2024 and revealed two-vessel obstructive CAD treated with PCI to culprit pLAD (ELAINE x2), nonculprit OM2 (ELAINE x 1), and LCX (ELAINE x 1).  Echocardiogram revealed LVEF 55 to 60% with a small area of mild apical hypokinesis, normal RV size and function, no hemodynamically significant valvular disease.  Her medications were optimized and she was ultimately discharged in stable condition 1/16/2024.    Patient is here today for hospital follow-up.  Ms. Crane has been feeling well since discharge.  Denies chest pain, palpitations, lightheadedness, dizziness, near-syncope or syncope.  No orthopnea, PND or LE edema.  No bleeding concerns on DAPT.  She has struggled with intermittent episodes of \"random shortness of breath\".  These have occurred when bending over or occasionally at rest.  She has never experienced anything like this before.  The only other " complaint she mention today was an isolated episode of leg cramps this morning.  Prior to statin initiation, she would struggle with intermittent leg cramps.    Taking her medications daily as prescribed.  Blood pressure 118/72 and heart rate 78 in clinic today.  Unfortunately, patient reports that her insurance does not cover cardiac rehab.  They covered 1 week, so she went for this.  Since, she has continued the cardiac rehab exercises at home-15 minutes on the treadmill, 20 minutes on the bike.  No cardiac symptoms with exertion.  She has made drastic lifestyle changes since discharge from the hospital, mostly surrounding her diet.  Now, consuming only lean meats including chicken and fish as well as freshly prepared fruits.  No longer consuming processed foods or soda.  No alcohol or tobacco use.      Social History       Social History     Socioeconomic History    Marital status:      Spouse name: Not on file    Number of children: 4    Years of education: Not on file    Highest education level: Not on file   Occupational History     Employer: UNEMPLOYED   Tobacco Use    Smoking status: Never    Smokeless tobacco: Never   Vaping Use    Vaping Use: Never used   Substance and Sexual Activity    Alcohol use: No    Drug use: No    Sexual activity: Not Currently     Birth control/protection: Surgical   Other Topics Concern    Parent/sibling w/ CABG, MI or angioplasty before 65F 55M? Yes     Comment: mother  of congested heart failure -  at age 60   Social History Narrative    Not on file     Social Determinants of Health     Financial Resource Strain: Low Risk  (2024)    Financial Resource Strain     Within the past 12 months, have you or your family members you live with been unable to get utilities (heat, electricity) when it was really needed?: No   Food Insecurity: Low Risk  (2024)    Food Insecurity     Within the past 12 months, did you worry that your food would run out before you  got money to buy more?: No     Within the past 12 months, did the food you bought just not last and you didn t have money to get more?: No   Transportation Needs: Low Risk  (1/31/2024)    Transportation Needs     Within the past 12 months, has lack of transportation kept you from medical appointments, getting your medicines, non-medical meetings or appointments, work, or from getting things that you need?: No   Recent Concern: Transportation Needs - High Risk (12/13/2023)    Transportation Needs     Within the past 12 months, has lack of transportation kept you from medical appointments, getting your medicines, non-medical meetings or appointments, work, or from getting things that you need?: Yes   Physical Activity: Unknown (12/13/2023)    Exercise Vital Sign     Days of Exercise per Week: 3 days     Minutes of Exercise per Session: Not on file   Stress: Stress Concern Present (12/13/2023)    [place] [employer] - Occupational Stress Questionnaire     Feeling of Stress : To some extent   Social Connections: Unknown (12/13/2023)    Social Connection and Isolation Panel [NHANES]     Frequency of Communication with Friends and Family: More than three times a week     Frequency of Social Gatherings with Friends and Family: Not on file     Attends [employer] Services: Not on file     Active Member of Clubs or Organizations: No     Attends Club or Organization Meetings: Never     Marital Status: Living with partner   Interpersonal Safety: Low Risk  (12/13/2023)    Interpersonal Safety     Do you feel physically and emotionally safe where you currently live?: Yes     Within the past 12 months, have you been hit, slapped, kicked or otherwise physically hurt by someone?: No     Within the past 12 months, have you been humiliated or emotionally abused in other ways by your partner or ex-partner?: No   Housing Stability: Low Risk  (1/31/2024)    Housing Stability     Do you have housing? : Yes     Are you  "worried about losing your housing?: No            Review of Systems:   Please see HPI         Physical Exam:   Vitals: /72 (BP Location: Right arm, Patient Position: Sitting, Cuff Size: Adult Large)   Pulse 78   Ht 1.651 m (5' 5\")   Wt 122.6 kg (270 lb 3.2 oz)   LMP 02/13/2009   SpO2 95%   BMI 44.96 kg/m     Wt Readings from Last 4 Encounters:   02/01/24 120.7 kg (266 lb)   01/16/24 123.4 kg (272 lb)   01/14/24 124.3 kg (274 lb)   12/13/23 124.7 kg (275 lb)     GEN: well nourished, in no acute distress.  HEENT:  Pupils equal, round. Sclerae nonicteric.   NECK: Supple, no masses appreciated. Unable to accurately assess JVP secondary to body habitus.   C/V:  Regular rate and rhythm, no murmur, rub or gallop.    RESP: Respirations are unlabored. Clear to auscultation bilaterally without wheezing, rales, or rhonchi.  GI: Abdomen soft, nontender.  EXTREM: no LE edema.  NEURO: Alert and oriented, cooperative.  SKIN: Warm and dry.        Data:   LIPID RESULTS:  Lab Results   Component Value Date    CHOL 136 02/12/2024    CHOL 304 (H) 11/25/2020    HDL 50 02/12/2024    HDL 45 (L) 11/25/2020    LDL 69 02/12/2024     (H) 11/25/2020    TRIG 85 02/12/2024    TRIG 150 (H) 11/25/2020    CHOLHDLRATIO 3.4 03/16/2015     LIVER ENZYME RESULTS:  Lab Results   Component Value Date    AST 66 (H) 01/15/2024    AST 25 07/05/2016    ALT 40 01/15/2024    ALT 37 07/05/2016     CBC RESULTS:  Lab Results   Component Value Date    WBC 4.6 01/15/2024    WBC 5.2 08/22/2018    RBC 4.41 01/15/2024    RBC 4.32 08/22/2018    HGB 13.2 01/15/2024    HGB 12.8 08/22/2018    HCT 39.1 01/15/2024    HCT 39.4 08/22/2018    MCV 89 01/15/2024    MCV 91 08/22/2018    MCH 29.9 01/15/2024    MCH 29.6 08/22/2018    MCHC 33.8 01/15/2024    MCHC 32.5 08/22/2018    RDW 12.2 01/15/2024    RDW 12.4 08/22/2018     (L) 01/15/2024     08/22/2018     BMP RESULTS:  Lab Results   Component Value Date     (L) 01/16/2024     " "06/30/2021    POTASSIUM 4.0 01/16/2024    POTASSIUM 4.3 08/17/2022    POTASSIUM 4.2 06/30/2021    CHLORIDE 102 01/16/2024    CHLORIDE 107 08/17/2022    CHLORIDE 107 06/30/2021    CO2 23 01/16/2024    CO2 25 08/17/2022    CO2 24 06/30/2021    ANIONGAP 9 01/16/2024    ANIONGAP 6 08/17/2022    ANIONGAP 6 06/30/2021     (H) 01/16/2024     (H) 01/16/2024     (H) 08/17/2022     (H) 06/30/2021    BUN 10.5 01/16/2024    BUN 13 08/17/2022    BUN 16 06/30/2021    CR 0.72 01/16/2024    CR 1.01 06/30/2021    GFRESTIMATED >90 01/16/2024    GFRESTIMATED 60 (L) 06/30/2021    GFRESTBLACK 69 06/30/2021    GARY 9.6 01/16/2024    GARY 9.8 06/30/2021      A1C RESULTS:  Lab Results   Component Value Date    A1C 7.8 (H) 12/13/2023    A1C 7.9 (H) 05/11/2021     INR RESULTS:  No results found for: \"INR\"         Medications     Current Outpatient Medications   Medication Sig Dispense Refill    aspirin 81 MG EC tablet Take 1 tablet (81 mg) by mouth daily 90 tablet 0    atorvastatin (LIPITOR) 40 MG tablet Take 1 tablet (40 mg) by mouth daily 90 tablet 3    blood glucose (NO BRAND SPECIFIED) test strip Use to test blood sugar as directed. 300 strip 3    blood glucose monitoring (NO BRAND SPECIFIED) meter device kit Use to test blood sugar as directed. 1 kit 0    Blood Pressure Monitoring (COMFORT TOUCH BP CUFF/LARGE) MISC 1 Units daily 1 each 0    cetirizine (ZYRTEC) 10 MG tablet Take 1 tablet (10 mg) by mouth daily 30 tablet 0    furosemide (LASIX) 40 MG tablet Take 1 tablet (40 mg) by mouth daily 90 tablet 1    insulin glargine (LANTUS PEN) 100 UNIT/ML pen 36U Lantus at bedtime, increase by 2U every other day if fasted morning glucose > 125. Do NOT exceed 46U until speaking with a doctor. (Patient taking differently: 40U Lantus at bedtime, increase by 2U every other day if fasted morning glucose > 125. Do NOT exceed 46U until speaking with a doctor.) 9 mL 4    insulin pen needle (32G X 4 MM) 32G X 4 MM miscellaneous " Use 1 pen needles daily or as directed. 100 each 2    losartan (COZAAR) 25 MG tablet Take 1 tablet (25 mg) by mouth daily 90 tablet 1    metoprolol succinate ER (TOPROL XL) 25 MG 24 hr tablet Take 1 tablet (25 mg) by mouth daily 90 tablet 0    nitroGLYcerin (NITROSTAT) 0.4 MG sublingual tablet For chest pain place 1 tablet under the tongue every 5 minutes for 3 doses. If symptoms persist 5 minutes after 1st dose call 911. 30 tablet 0    omeprazole (PRILOSEC) 40 MG DR capsule Take 1 capsule (40 mg) by mouth 2 times daily 180 capsule 3    order for DME Equipment being ordered: Diabetic Shoes 1 Units 0    ticagrelor (BRILINTA) 90 MG tablet Take 1 tablet (90 mg) by mouth 2 times daily 60 tablet 3    TRULICITY 0.75 MG/0.5ML pen ADMINISTER 0.75 MG UNDER THE SKIN EVERY 7 DAYS 2 mL 3    zolpidem (AMBIEN) 10 MG tablet TAKE 1 TABLET(10 MG) BY MOUTH EVERY NIGHT AS NEEDED FOR SLEEP 30 tablet 5          Past Medical History     Past Medical History:   Diagnosis Date    Arthritis     left shoulder and back    Chronic pain     fibromalgia     CKD (chronic kidney disease), stage III (H)     Fibromyalgia     Gastro-oesophageal reflux disease     HTN, goal below 140/90     Hyperlipidemia LDL goal <100     Hypothyroidism     Major depression     chronic kidney disease-stage 3    Peripheral neuropathy Feb 2015    + callus. diabetic shoes rx done    PONV (postoperative nausea and vomiting)     Rheumatoid arthritis of foot (H)     Tongue abnormality     Type 2 diabetes, HbA1C goal < 8% (H)     pre-diabetic     Past Surgical History:   Procedure Laterality Date    ARTHROPLASTY SHOULDER  7/25/2013    Procedure: right ARTHROPLASTY SHOULDER;  RIGHT TOTAL SHOULDER ARTHROPLASTY (TORNIER AFFINITY SHOULDER SYSTEM)^;  Surgeon: Dung Morales MD;  Location:  OR    BACK SURGERY  2011    L45 laminectomy    CV CORONARY ANGIOGRAM N/A 1/15/2024    Procedure: Coronary Angiogram;  Surgeon: Dung Quintero MD;  Location:  HEART CARDIAC  CATH LAB    CV PCI N/A 1/15/2024    Procedure: Percutaneous Coronary Intervention;  Surgeon: Dung Quintero MD;  Location:  HEART CARDIAC CATH LAB    ELBOW WRIST HAND FINGER ORTHOSIS, RIGID, WITHOUT JOINTS, MAY INCLUDE SOFT  5/2007    put in [place]    ESOPHAGOSCOPY, GASTROSCOPY, DUODENOSCOPY (EGD), COMBINED  9/23/2015    Dr. William AMBROSIO    ESOPHAGOSCOPY, GASTROSCOPY, DUODENOSCOPY (EGD), COMBINED N/A 9/23/2015    Procedure: COMBINED ESOPHAGOSCOPY, GASTROSCOPY, DUODENOSCOPY (EGD), BIOPSY SINGLE OR MULTIPLE;  Surgeon: Jose Thomas MD;  Location:  GI    ESOPHAGOSCOPY, GASTROSCOPY, DUODENOSCOPY (EGD), COMBINED  02/04/2020    Dr. William AMBROSIO    ESOPHAGOSCOPY, GASTROSCOPY, DUODENOSCOPY (EGD), COMBINED N/A 2/4/2020    Procedure: ESOPHAGOGASTRODUODENOSCOPY (EGD);  Surgeon: Jose Thomas MD;  Location:  GI    HCL PAP SMEAR  6/2008    [facility]    ORTHOPEDIC SURGERY      left shoulder scoped and plate left elbow    SURGICAL HISTORY OF -   2009    ulnar nerve release, carpal tunnel- left    ZZC APPENDECTOMY  age 19    ZZC LIGATE FALLOPIAN TUBE,POSTPARTUM  1982    Tubal Ligation     Family History   Problem Relation Age of Onset    C.A.D. Mother     Neurologic Disorder Mother         lupus    Depression Mother     Family History Negative Father     Diabetes Maternal Aunt     Colon Cancer No family hx of             Allergies   Penicillins, Augmentin [amoxicillin-pot clavulanate], Bee, Gabapentin, Lisinopril, Metformin, and Topiramate      35 minutes spent on the date of the encounter doing chart review, history and exam, documentation and further activities as noted above    TIMOTHY Gibbons [facility]  Pager: [phone]

## 2024-02-26 DIAGNOSIS — G47.09 OTHER INSOMNIA: ICD-10-CM

## 2024-02-26 RX ORDER — ZOLPIDEM TARTRATE 10 MG/1
TABLET ORAL
Qty: 30 TABLET | Refills: 5 | Status: SHIPPED | OUTPATIENT
Start: 2024-02-26 | End: 2024-08-16

## 2024-02-27 ENCOUNTER — DOCUMENTATION ONLY (OUTPATIENT)
Dept: FAMILY MEDICINE | Facility: CLINIC | Age: 64
End: 2024-02-27

## 2024-02-27 NOTE — PROGRESS NOTES
Medicare requires that the MD/DO treating the patient for Diabetes answers all of the questions and signs the pended order for the patient to qualify for Diabetic shoes. Once the order is completed, please route the encounter back to sender.    Thank you for your help with this,  Marina Lugo

## 2024-02-29 ENCOUNTER — TELEPHONE (OUTPATIENT)
Dept: FAMILY MEDICINE | Facility: CLINIC | Age: 64
End: 2024-02-29
Payer: MEDICARE

## 2024-02-29 ENCOUNTER — DOCUMENTATION ONLY (OUTPATIENT)
Dept: FAMILY MEDICINE | Facility: CLINIC | Age: 64
End: 2024-02-29

## 2024-02-29 NOTE — TELEPHONE ENCOUNTER
Please find out what they are looking for. We have faxed an DME order 2 times now to the number they gave us. What are they in need of to get patient her shoes     Shannan Bellamy/

## 2024-02-29 NOTE — TELEPHONE ENCOUNTER
General Call    Contacts         Type Contact Phone/Fax    02/29/2024 08:42 AM CST Phone (Incoming) KAYLA [phone]     [facility]          Reason for Call: speak to care team or provider    What are your questions or concerns:  regarind form for shoes for patient    Date of last appointment with provider:     Could we send this information to you [email or web address] or would you prefer to receive a phone call?:   direct phone number to Kayla okay to leave detailed message

## 2024-02-29 NOTE — TELEPHONE ENCOUNTER
Writer calls Marina, she states   [facility] Statement of Certifying Physician - FOR ORTHOTICS USE ONLY: Patient Communication    Question 4 is marked NO. It should be marked yes.  Marina is going to route another form to clinic

## 2024-03-13 ENCOUNTER — MYC MEDICAL ADVICE (OUTPATIENT)
Dept: FAMILY MEDICINE | Facility: CLINIC | Age: 64
End: 2024-03-13

## 2024-03-13 ENCOUNTER — LAB (OUTPATIENT)
Dept: LAB | Facility: CLINIC | Age: 64
End: 2024-03-13
Payer: MEDICARE

## 2024-03-13 DIAGNOSIS — E11.22 TYPE 2 DIABETES MELLITUS WITH STAGE 3A CHRONIC KIDNEY DISEASE, WITHOUT LONG-TERM CURRENT USE OF INSULIN (H): ICD-10-CM

## 2024-03-13 DIAGNOSIS — N18.31 TYPE 2 DIABETES MELLITUS WITH STAGE 3A CHRONIC KIDNEY DISEASE, WITHOUT LONG-TERM CURRENT USE OF INSULIN (H): ICD-10-CM

## 2024-03-13 LAB — HBA1C MFR BLD: 7.8 % (ref 0–5.6)

## 2024-03-13 PROCEDURE — 83036 HEMOGLOBIN GLYCOSYLATED A1C: CPT

## 2024-03-13 PROCEDURE — 36415 COLL VENOUS BLD VENIPUNCTURE: CPT

## 2024-03-13 NOTE — TELEPHONE ENCOUNTER
See patient message--please send new Rx if indicated as will run out sooner with increased dosage. Ana Luisa has other questions regarding administration dosages.     Patria Palumbo R.N.

## 2024-03-14 DIAGNOSIS — E11.42 TYPE 2 DIABETES MELLITUS WITH DIABETIC POLYNEUROPATHY, WITHOUT LONG-TERM CURRENT USE OF INSULIN (H): ICD-10-CM

## 2024-03-14 RX ORDER — DULAGLUTIDE 0.75 MG/.5ML
0.75 INJECTION, SOLUTION SUBCUTANEOUS
Qty: 2 ML | Refills: 2 | Status: SHIPPED | OUTPATIENT
Start: 2024-03-14 | End: 2024-07-02

## 2024-03-21 ENCOUNTER — E-VISIT (OUTPATIENT)
Dept: FAMILY MEDICINE | Facility: CLINIC | Age: 64
End: 2024-03-21
Payer: MEDICARE

## 2024-03-21 DIAGNOSIS — J01.90 ACUTE SINUSITIS WITH SYMPTOMS > 10 DAYS: Primary | ICD-10-CM

## 2024-03-21 PROCEDURE — 99421 OL DIG E/M SVC 5-10 MIN: CPT | Performed by: FAMILY MEDICINE

## 2024-03-22 RX ORDER — DOXYCYCLINE HYCLATE 100 MG
100 TABLET ORAL 2 TIMES DAILY
Qty: 14 TABLET | Refills: 0 | Status: SHIPPED | OUTPATIENT
Start: 2024-03-22 | End: 2024-03-29

## 2024-03-22 NOTE — PATIENT INSTRUCTIONS
Acute Sinusitis: Care Instructions  Overview     Acute sinusitis is an inflammation of the mucous membranes inside the nose and sinuses. Sinuses are the hollow spaces in your skull around the eyes and nose. Acute sinusitis often follows a cold. Acute sinusitis causes thick, discolored mucus that drains from the nose or down the back of the throat. It also can cause pain and pressure in your head and face along with a stuffy or blocked nose.  In most cases, sinusitis gets better on its own in 1 to 2 weeks. But some mild symptoms may last for several weeks. Sometimes antibiotics are needed if there is a bacterial infection.  Follow-up care is a key part of your treatment and safety. Be sure to make and go to all appointments, and call your doctor if you are having problems. It's also a good idea to know your test results and keep a list of the medicines you take.  How can you care for yourself at home?  Use saline (saltwater) nasal washes. This can help keep your nasal passages open and wash out mucus and allergens.  You can buy saline nose washes at a grocery store or drugstore. Follow the instructions on the package.  You can make your own at home. Add 1 teaspoon of non-iodized salt and 1 teaspoon of baking soda to 2 cups of distilled or boiled and cooled water. Fill a squeeze bottle or a nasal cleansing pot (such as a neti pot) with the nasal wash. Then put the tip into your nostril, and lean over the sink. With your mouth open, gently squirt the liquid. Repeat on the other side.  Try a decongestant nasal spray like oxymetazoline (Afrin). Do not use it for more than 3 days in a row. Using it for more than 3 days can make your congestion worse.  If needed, take an over-the-counter pain medicine, such as acetaminophen (Tylenol), ibuprofen (Advil, Motrin), or naproxen (Aleve). Read and follow all instructions on the label.  If the doctor prescribed antibiotics, take them as directed. Do not stop taking them just  "because you feel better. You need to take the full course of antibiotics.  Be careful when taking over-the-counter cold or flu medicines and Tylenol at the same time. Many of these medicines have acetaminophen, which is Tylenol. Read the labels to make sure that you are not taking more than the recommended dose. Too much acetaminophen (Tylenol) can be harmful.  Try a steroid nasal spray. It may help with your symptoms.  Breathe warm, moist air. You can use a steamy shower, a hot bath, or a sink filled with hot water. Avoid cold, dry air. Using a humidifier in your home may help. Follow the directions for cleaning the machine.  When should you call for help?   Call your doctor now or seek immediate medical care if:    You have new or worse swelling, redness, or pain in your face or around one or both of your eyes.     You have double vision or a change in your vision.     You have a high fever.     You have a severe headache and a stiff neck.     You have mental changes, such as feeling confused or much less alert.   Watch closely for changes in your health, and be sure to contact your doctor if:    You are not getting better as expected.   Where can you learn more?  Go to [email or web address]://[email or web address]/patiented  Enter I933 in the search box to learn more about \"Acute Sinusitis: Care Instructions.\"  Current as of: September 27, 2023               Content Version: 14.0    7134-7803 [employer].   Care instructions adapted under license by your healthcare professional. If you have questions about a medical condition or this instruction, always ask your healthcare professional. [employer] disclaims any warranty or liability for your use of this information.      Dear Ana Luisa Byers    After reviewing your responses, I've been able to diagnose you with Acute sinusitis with symptoms > 10 days.      Based on your responses and diagnosis, I have prescribed   Orders Placed This Encounter "   Medications     doxycycline hyclate (VIBRA-TABS) 100 MG tablet     Sig: Take 1 tablet (100 mg) by mouth 2 times daily for 7 days     Dispense:  14 tablet     Refill:  0     May substitute any formulation of 100mg doxycycline available and best covered by insurance      to treat your symptoms. I have sent this to your pharmacy.?     It is also important to stay well hydrated, get lots of rest and take over-the-counter decongestants,?tylenol?or ibuprofen if you?are able to?take those medications per your primary care provider to help relieve discomfort.?     It is important that you take?all of?your prescribed medication even if your symptoms are improving after a few doses.? Taking?all of?your medicine helps prevent the symptoms from returning.?     If your symptoms worsen, you develop severe headache, vomiting, high fever (>102), or are not improving in 7 days, please contact your primary care provider for an appointment or visit any of our convenient Walk-in Care or Urgent Care Centers to be seen which can be found on our website?here.?     Thanks again for choosing?us?as your health care partner,?   ?  Leonides Juárez MD?

## 2024-04-21 DIAGNOSIS — R60.0 BILATERAL LOWER EXTREMITY EDEMA: ICD-10-CM

## 2024-04-22 RX ORDER — FUROSEMIDE 40 MG
40 TABLET ORAL DAILY
Qty: 90 TABLET | Refills: 1 | Status: SHIPPED | OUTPATIENT
Start: 2024-04-22

## 2024-05-07 ENCOUNTER — OFFICE VISIT (OUTPATIENT)
Dept: FAMILY MEDICINE | Facility: CLINIC | Age: 64
End: 2024-05-07
Payer: MEDICARE

## 2024-05-07 VITALS
HEIGHT: 64 IN | HEART RATE: 80 BPM | RESPIRATION RATE: 20 BRPM | BODY MASS INDEX: 46.06 KG/M2 | OXYGEN SATURATION: 95 % | SYSTOLIC BLOOD PRESSURE: 116 MMHG | WEIGHT: 269.8 LBS | TEMPERATURE: 97.8 F | DIASTOLIC BLOOD PRESSURE: 77 MMHG

## 2024-05-07 DIAGNOSIS — Z79.4 INSULIN DEPENDENT TYPE 2 DIABETES MELLITUS (H): Primary | ICD-10-CM

## 2024-05-07 DIAGNOSIS — E11.9 INSULIN DEPENDENT TYPE 2 DIABETES MELLITUS (H): Primary | ICD-10-CM

## 2024-05-07 PROCEDURE — 99213 OFFICE O/P EST LOW 20 MIN: CPT | Performed by: FAMILY MEDICINE

## 2024-05-07 RX ORDER — PREGABALIN 25 MG/1
25 CAPSULE ORAL 2 TIMES DAILY
Qty: 60 CAPSULE | Refills: 5 | Status: SHIPPED | OUTPATIENT
Start: 2024-05-07

## 2024-05-07 RX ORDER — RESPIRATORY SYNCYTIAL VIRUS VACCINE 120MCG/0.5
0.5 KIT INTRAMUSCULAR ONCE
Qty: 1 EACH | Refills: 0 | Status: CANCELLED | OUTPATIENT
Start: 2024-05-07 | End: 2024-05-07

## 2024-05-07 RX ORDER — DULAGLUTIDE 1.5 MG/.5ML
1.5 INJECTION, SOLUTION SUBCUTANEOUS
Qty: 2 ML | Refills: 0 | Status: SHIPPED | OUTPATIENT
Start: 2024-05-07 | End: 2024-07-03

## 2024-05-07 ASSESSMENT — PAIN SCALES - GENERAL: PAINLEVEL: NO PAIN (0)

## 2024-05-07 NOTE — PROGRESS NOTES
Assessment & Plan     Insulin dependent type 2 diabetes mellitus (H)  Overall controlled, A1c 7.8, I do recommend we trial dose increase of Trulicity, she will increase her months to 3 mg every week.  She is also having some symptoms suggestive of neuropathic pain, recommend starting Lyrica 25 mg twice daily.  Otherwise A1c check in 3 months and follow-up with me in 6 months.  - dulaglutide (TRULICITY) 1.5 MG/0.5ML pen  Dispense: 2 mL; Refill: 0  - Dulaglutide 3 MG/0.5ML SOPN  Dispense: 2 mL; Refill: 1  - pregabalin (LYRICA) 25 MG capsule  Dispense: 60 capsule; Refill: 5      Subjective   Ana Luisa is a 64 year old, presenting for the following health issues:  RECHECK (Follow up DM)        5/7/2024     8:36 AM   Additional Questions   Roomed by Cory   Accompanied by self     History of Present Illness       CKD: She is not using over the counter pain medicine.     Diabetes:   She presents for follow up of diabetes.  She is checking home blood glucose two times daily.   She checks blood glucose before meals and at bedtime.  Blood glucose is sometimes over 200 and never under 70. She is aware of hypoglycemia symptoms including blurred vision.   She is concerned about blood sugar frequently over 200.   She is having numbness in feet, burning in feet and blurry vision.            Hyperlipidemia:  She presents for follow up of hyperlipidemia.   She is taking medication to lower cholesterol. She is not having myalgia or other side effects to statin medications.    Hypertension: She presents for follow up of hypertension.  She does check blood pressure  regularly outside of the clinic. Outside blood pressures have been over 140/90. She follows a low salt diet.     Vascular Disease:  She presents for follow up of vascular disease.      She takes daily aspirin.    She eats 2-3 servings of fruits and vegetables daily.She consumes 1 sweetened beverage(s) daily.She exercises with enough effort to increase her heart rate 9  "or less minutes per day.  She exercises with enough effort to increase her heart rate 3 or less days per week.   She is taking medications regularly.     Interval diabetes follow-up, overall she is doing well having no difficulty with Trulicity continue to insulin regimen.  She does endorse more burning tingling of her toes, no injury she does check her feet.                Objective    /77 (BP Location: Right arm, Patient Position: Sitting, Cuff Size: Adult Large)   Pulse 80   Temp 97.8  F (36.6  C) (Oral)   Resp 20   Ht 1.632 m (5' 4.25\")   Wt 122.4 kg (269 lb 12.8 oz)   LMP 02/13/2009   SpO2 95%   BMI 45.95 kg/m    Body mass index is 45.95 kg/m .  Physical Exam  Cardiovascular:      Rate and Rhythm: Normal rate and regular rhythm.   Pulmonary:      Effort: Pulmonary effort is normal.      Breath sounds: Normal breath sounds.   Skin:     Findings: No lesion.            Hemoglobin A1C   Date Value Ref Range Status   03/13/2024 7.8 (H) 0.0 - 5.6 % Final   05/11/2021 7.9 (H) 0 - 5.6 % Final     Comment:     Normal <5.7% Prediabetes 5.7-6.4%  Diabetes 6.5% or higher - adopted from ADA   consensus guidelines.  Results confirmed by repeat test                 Signed Electronically by: Leonides Juárez MD    "

## 2024-05-08 ENCOUNTER — TELEPHONE (OUTPATIENT)
Dept: FAMILY MEDICINE | Facility: CLINIC | Age: 64
End: 2024-05-08
Payer: MEDICARE

## 2024-05-08 NOTE — TELEPHONE ENCOUNTER
Prior Authorization Retail Medication Request    Medication/Dose: pregabalin (LYRICA) 25 MG capsule  Diagnosis and ICD code (if different than what is on RX):    New/renewal/insurance change PA/secondary ins. PA:  Previously Tried and Failed: insulin glargine (LANTUS PEN) 100 UNIT/ML pen    Rationale:      Insurance   Primary: [employer]  Insurance ID:  [id number]    Secondary (if applicable):  Insurance ID:      Pharmacy Information (if different than what is on RX)  Name:    Phone:    Fax:      Vidal RICHEY

## 2024-05-14 ENCOUNTER — HOSPITAL ENCOUNTER (OUTPATIENT)
Dept: CARDIOLOGY | Facility: CLINIC | Age: 64
Discharge: HOME | End: 2024-05-14
Attending: INTERNAL MEDICINE | Admitting: INTERNAL MEDICINE
Payer: MEDICARE

## 2024-05-14 ENCOUNTER — LAB (OUTPATIENT)
Dept: LAB | Facility: CLINIC | Age: 64
End: 2024-05-14
Payer: MEDICARE

## 2024-05-14 ENCOUNTER — TELEPHONE (OUTPATIENT)
Dept: FAMILY MEDICINE | Facility: CLINIC | Age: 64
End: 2024-05-14

## 2024-05-14 DIAGNOSIS — Z79.4 INSULIN DEPENDENT TYPE 2 DIABETES MELLITUS (H): Primary | ICD-10-CM

## 2024-05-14 DIAGNOSIS — E78.5 HYPERLIPIDEMIA LDL GOAL <70: ICD-10-CM

## 2024-05-14 DIAGNOSIS — E11.9 INSULIN DEPENDENT TYPE 2 DIABETES MELLITUS (H): Primary | ICD-10-CM

## 2024-05-14 DIAGNOSIS — I21.4 NSTEMI (NON-ST ELEVATED MYOCARDIAL INFARCTION) (H): ICD-10-CM

## 2024-05-14 LAB
ALT SERPL W P-5'-P-CCNC: 28 U/L (ref 0–50)
CHOLEST SERPL-MCNC: 141 MG/DL
FASTING STATUS PATIENT QL REPORTED: YES
HDLC SERPL-MCNC: 49 MG/DL
LDLC SERPL CALC-MCNC: 76 MG/DL
LVEF ECHO: NORMAL
NONHDLC SERPL-MCNC: 92 MG/DL
TRIGL SERPL-MCNC: 80 MG/DL

## 2024-05-14 PROCEDURE — 93306 TTE W/DOPPLER COMPLETE: CPT | Mod: 26 | Performed by: INTERNAL MEDICINE

## 2024-05-14 PROCEDURE — 80061 LIPID PANEL: CPT | Performed by: INTERNAL MEDICINE

## 2024-05-14 PROCEDURE — 36415 COLL VENOUS BLD VENIPUNCTURE: CPT | Performed by: INTERNAL MEDICINE

## 2024-05-14 PROCEDURE — 255N000002 HC RX 255 OP 636: Performed by: INTERNAL MEDICINE

## 2024-05-14 PROCEDURE — 999N000208 ECHOCARDIOGRAM COMPLETE

## 2024-05-14 PROCEDURE — C8929 TTE W OR WO FOL WCON,DOPPLER: HCPCS

## 2024-05-14 PROCEDURE — 84460 ALANINE AMINO (ALT) (SGPT): CPT | Performed by: INTERNAL MEDICINE

## 2024-05-14 RX ADMIN — HUMAN ALBUMIN MICROSPHERES AND PERFLUTREN 2 ML: 10; .22 INJECTION, SOLUTION INTRAVENOUS at 09:01

## 2024-05-14 NOTE — TELEPHONE ENCOUNTER
Prior Authorization Retail Medication Request    Medication/Dose: Dulaglutide 3 MG/0.5ML SOPN2  Diagnosis and ICD code (if different than what is on RX):    New/renewal/insurance change PA/secondary ins. PA:  Previously Tried and Failed:    Rationale:  increase dosage    Insurance   Primary: [employer]  Insurance ID:  [id number]    Secondary (if applicable):  Insurance ID:      Pharmacy Information (if different than what is on RX)  Name:    Phone:    Fax:      Vidal RICHEY

## 2024-05-15 ENCOUNTER — OFFICE VISIT (OUTPATIENT)
Dept: CARDIOLOGY | Facility: CLINIC | Age: 64
End: 2024-05-15
Attending: INTERNAL MEDICINE
Payer: MEDICARE

## 2024-05-15 VITALS
DIASTOLIC BLOOD PRESSURE: 60 MMHG | BODY MASS INDEX: 46.26 KG/M2 | OXYGEN SATURATION: 97 % | HEART RATE: 86 BPM | SYSTOLIC BLOOD PRESSURE: 126 MMHG | WEIGHT: 271 LBS | HEIGHT: 64 IN

## 2024-05-15 DIAGNOSIS — R60.9 DEPENDENT EDEMA: ICD-10-CM

## 2024-05-15 DIAGNOSIS — E88.810 METABOLIC SYNDROME X: ICD-10-CM

## 2024-05-15 DIAGNOSIS — N18.31 TYPE 2 DIABETES MELLITUS WITH STAGE 3A CHRONIC KIDNEY DISEASE, WITHOUT LONG-TERM CURRENT USE OF INSULIN (H): ICD-10-CM

## 2024-05-15 DIAGNOSIS — R60.0 BILATERAL LOWER EXTREMITY EDEMA: ICD-10-CM

## 2024-05-15 DIAGNOSIS — I10 HTN, GOAL BELOW 140/90: ICD-10-CM

## 2024-05-15 DIAGNOSIS — E78.01 FAMILIAL HYPERCHOLESTEROLEMIA: ICD-10-CM

## 2024-05-15 DIAGNOSIS — E11.22 TYPE 2 DIABETES MELLITUS WITH STAGE 3A CHRONIC KIDNEY DISEASE, WITHOUT LONG-TERM CURRENT USE OF INSULIN (H): ICD-10-CM

## 2024-05-15 DIAGNOSIS — I10 BENIGN ESSENTIAL HYPERTENSION: ICD-10-CM

## 2024-05-15 DIAGNOSIS — E66.01 MORBID OBESITY (H): ICD-10-CM

## 2024-05-15 DIAGNOSIS — I21.4 NSTEMI (NON-ST ELEVATED MYOCARDIAL INFARCTION) (H): Primary | ICD-10-CM

## 2024-05-15 DIAGNOSIS — I25.10 CORONARY ARTERY DISEASE INVOLVING NATIVE CORONARY ARTERY OF NATIVE HEART WITHOUT ANGINA PECTORIS: ICD-10-CM

## 2024-05-15 PROCEDURE — 99215 OFFICE O/P EST HI 40 MIN: CPT | Performed by: INTERNAL MEDICINE

## 2024-05-15 PROCEDURE — G2211 COMPLEX E/M VISIT ADD ON: HCPCS | Performed by: INTERNAL MEDICINE

## 2024-05-15 RX ORDER — ATORVASTATIN CALCIUM 80 MG/1
80 TABLET, FILM COATED ORAL DAILY
Qty: 90 TABLET | Refills: 4 | Status: SHIPPED | OUTPATIENT
Start: 2024-05-15

## 2024-05-15 NOTE — LETTER
5/15/2024    Leonides Juárez MD  [address]  [facility] [id number]    RE: Ana Luisa Byers       Dear Colleague,     I had the pleasure of seeing Ana Luisa Byers in the [facility].  HPI and Plan:   Ana Luisa is a very nice 64-year-old woman who meeting for the first time today in follow-up from her January non-ST segment elevation myocardial infarction.    Ana Luisa has a family history of mother and father dying of congestive heart failure in her 60s.  She has a brother 4 years older who has had coronary bypass grafting x 4.  Ana Luisa is a lifelong non-smoker does not drink alcohol.  She does have diabetes mellitus hypertension and hypercholesterolemia.      Noncardiac issues include fibromyalgia, GERD and morbid obesity.    In January she underwent stenting of her proximal left anterior descending artery, proximal circumflex coronary artery and second obtuse marginal.  She had mild to moderate other disease that was thought to be best treated medically.      She initially had shortness of breath with Brilinta which resolved with change to Plavix.    Since her heart attack she has changed her diet.  She got rid of her deep fryer.  She is eating less red meat.  She states she still does not like vegetables but is doing fairly well with fruit.  She walks every day and gets on her treadmill at her apartment complex for 1 hour 3 times a week.  None of this causes her any problems.  She has not been able to lose any weight    Follow-up echocardiogram demonstrates ejection fraction of 55 to 60% without focal wall motion abnormalities or valvular pathology    Ana Luisa has no chest, arm, neck, jaw or shoulder discomfort.  No dyspnea on exertion, orthopnea, or PND.  No palpitations, lightheadedness, dizziness, syncope, or near syncope.  No peripheral edema.    Assessment and plan Ana Luisa has no symptoms to suggest ischemia, heart failure or significant arrhythmia    Blood pressure is very well-controlled  today    Looking at her cholesterol profile her initial LDL was 240 consistent with probable familial hypercholesterolemia.  I told her she needs to have her children checked because this is a genetic issue.  She has had a very nice response to 40 mg of atorvastatin although not to goal aiming for LDL of 55 I will increase her atorvastatin to 80 mg and recheck a fasting lipid profile and ALT in 1 month.  If not to goal we will add Zetia    I have congratulated her on her current exercise regiment encouraged her to make sure she is getting 30 minutes of aerobic activity 5 times a week.  I have also asked her to add resistance activity twice a week.    We talked about the importance of eating a predominately plant-based diet even cutting back on chicken.    We talked about the importance of weight loss.    I have told her she can stop her aspirin in July as she will be 6 months out.    I will have her follow-up on her 1 year anniversary of her procedure at which time we can consider backing off on her beta-blocker    Thank you for allow me to participate in this patient's care.  Sincerely,                               Luis Spain MD [facility]      The longitudinal plan of care for the diagnosis(es)/condition(s) as documented were addressed during this visit. Due to the added complexity in care, I will continue to support Ana Luisa in the subsequent management and with ongoing continuity of care.       Today's clinic visit entailed:  Review of the result(s) of each unique test - coronary angiogram, echocardiogram, lab  Ordering of each unique test  Prescription drug management  40 minutes spent by me on the date of the encounter doing chart review, history and exam, documentation and further activities per the note  Provider  Link to [facility] Help Grid     The level of medical decision making during this visit was of moderate complexity.      Orders Placed This Encounter   Procedures    Lipid Profile    ALT    Basic  metabolic panel    Lipid Profile    ALT    Follow-Up with Cardiology [place]       Orders Placed This Encounter   Medications    atorvastatin (LIPITOR) 80 MG tablet     Sig: Take 1 tablet (80 mg) by mouth daily     Dispense:  90 tablet     Refill:  4       Medications Discontinued During This Encounter   Medication Reason    aspirin 81 MG EC tablet     atorvastatin (LIPITOR) 40 MG tablet          Encounter Diagnoses   Name Primary?    NSTEMI (non-ST elevated myocardial infarction) (H) Yes    Coronary artery disease involving native coronary artery of native heart without angina pectoris     Familial hypercholesterolemia     HTN, goal below 140/90     Type 2 diabetes mellitus with stage 3a chronic kidney disease, without long-term current use of insulin (H)     Bilateral lower extremity edema     Benign essential hypertension     Dependent edema     Metabolic syndrome X     Morbid obesity (H)        CURRENT MEDICATIONS:  Current Outpatient Medications   Medication Sig Dispense Refill    atorvastatin (LIPITOR) 80 MG tablet Take 1 tablet (80 mg) by mouth daily 90 tablet 4    blood glucose (NO BRAND SPECIFIED) test strip Use to test blood sugar as directed. 300 strip 3    blood glucose monitoring (NO BRAND SPECIFIED) meter device kit Use to test blood sugar as directed. 1 kit 0    Blood Pressure Monitoring (COMFORT TOUCH BP CUFF/LARGE) MISC 1 Units daily 1 each 0    cetirizine (ZYRTEC) 10 MG tablet Take 1 tablet (10 mg) by mouth daily 30 tablet 0    clopidogrel (PLAVIX) 75 MG tablet Take 1 tablet (75 mg) by mouth daily The first day you take Plavix, take 4 tablets (300 mg). Each day thereafter, you will take 1 tablet (75 mg) once daily. 93 tablet 3    dulaglutide (TRULICITY) 0.75 MG/0.5ML pen Inject 0.75 mg Subcutaneous every 7 days 2 mL 2    dulaglutide (TRULICITY) 1.5 MG/0.5ML pen Inject 1.5 mg Subcutaneous every 7 days 2 mL 0    [START ON 6/7/2024] Dulaglutide 3 MG/0.5ML SOPN Inject 3 mg Subcutaneous once a week 2 mL 1     furosemide (LASIX) 40 MG tablet Take 1 tablet (40 mg) by mouth daily 90 tablet 1    insulin glargine (LANTUS PEN) 100 UNIT/ML pen Inject 44 Units Subcutaneous at bedtime 9 mL 4    insulin pen needle (32G X 4 MM) 32G X 4 MM miscellaneous Use 1 pen needles daily or as directed. 100 each 2    losartan (COZAAR) 25 MG tablet Take 1 tablet (25 mg) by mouth daily 90 tablet 1    metoprolol succinate ER (TOPROL XL) 25 MG 24 hr tablet Take 1 tablet (25 mg) by mouth daily 90 tablet 3    nitroGLYcerin (NITROSTAT) 0.4 MG sublingual tablet For chest pain place 1 tablet under the tongue every 5 minutes for 3 doses. If symptoms persist 5 minutes after 1st dose call 911. 30 tablet 0    order for DME Equipment being ordered: Diabetic Shoes 1 Units 0    pantoprazole (PROTONIX) 40 MG EC tablet Take 1 tablet (40 mg) by mouth daily 90 tablet 3    pregabalin (LYRICA) 25 MG capsule Take 1 capsule (25 mg) by mouth 2 times daily 60 capsule 5    zolpidem (AMBIEN) 10 MG tablet TAKE 1 TABLET(10 MG) BY MOUTH EVERY NIGHT AS NEEDED FOR SLEEP 30 tablet 5       ALLERGIES     Allergies   Allergen Reactions    Penicillins Anaphylaxis    Augmentin [Amoxicillin-Pot Clavulanate] GI Disturbance    Bee Swelling    Gabapentin Other (See Comments)     Mood Swings    Lisinopril Rash    Metformin Itching    Topiramate Other (See Comments)     numbness       PAST MEDICAL HISTORY:  Past Medical History:   Diagnosis Date    Arthritis     left shoulder and back    Chronic pain     fibromalgia     CKD (chronic kidney disease), stage III (H)     Fibromyalgia     Gastro-oesophageal reflux disease     HTN, goal below 140/90     Hyperlipidemia LDL goal <100     Hypothyroidism     Major depression     chronic kidney disease-stage 3    Peripheral neuropathy Feb 2015    + callus. diabetic shoes rx done    PONV (postoperative nausea and vomiting)     Rheumatoid arthritis of foot (H)     Tongue abnormality     Type 2 diabetes, HbA1C goal < 8% (H)     pre-diabetic        PAST SURGICAL HISTORY:  Past Surgical History:   Procedure Laterality Date    ARTHROPLASTY SHOULDER  7/25/2013    Procedure: right ARTHROPLASTY SHOULDER;  RIGHT TOTAL SHOULDER ARTHROPLASTY (TORNIER AFFINITY SHOULDER SYSTEM)^;  Surgeon: Dung Morales MD;  Location:  OR    BACK SURGERY  2011    L45 laminectomy    CV CORONARY ANGIOGRAM N/A 1/15/2024    Procedure: Coronary Angiogram;  Surgeon: Dung Quintero MD;  Location:  HEART CARDIAC CATH LAB    CV PCI N/A 1/15/2024    Procedure: Percutaneous Coronary Intervention;  Surgeon: Dung Quintero MD;  Location:  HEART CARDIAC CATH LAB    ELBOW WRIST HAND FINGER ORTHOSIS, RIGID, WITHOUT JOINTS, MAY INCLUDE SOFT  5/2007    put in [place]    ESOPHAGOSCOPY, GASTROSCOPY, DUODENOSCOPY (EGD), COMBINED  9/23/2015    Dr. William AMBROSIO    ESOPHAGOSCOPY, GASTROSCOPY, DUODENOSCOPY (EGD), COMBINED N/A 9/23/2015    Procedure: COMBINED ESOPHAGOSCOPY, GASTROSCOPY, DUODENOSCOPY (EGD), BIOPSY SINGLE OR MULTIPLE;  Surgeon: Jose Thomas MD;  Location:  GI    ESOPHAGOSCOPY, GASTROSCOPY, DUODENOSCOPY (EGD), COMBINED  02/04/2020    Dr. William AMBROSIO    ESOPHAGOSCOPY, GASTROSCOPY, DUODENOSCOPY (EGD), COMBINED N/A 2/4/2020    Procedure: ESOPHAGOGASTRODUODENOSCOPY (EGD);  Surgeon: Jose Thomas MD;  Location:  GI    HCL PAP SMEAR  6/2008    WNL    ORTHOPEDIC SURGERY      left shoulder scoped and plate left elbow    SURGICAL HISTORY OF -   2009    ulnar nerve release, carpal tunnel- left    ZZC APPENDECTOMY  age 19    ZZC LIGATE FALLOPIAN TUBE,POSTPARTUM  1982    Tubal Ligation       FAMILY HISTORY:  Family History   Problem Relation Age of Onset    C.A.D. Mother     Neurologic Disorder Mother         lupus    Depression Mother     Family History Negative Father     Diabetes Maternal Aunt     Colon Cancer No family hx of        SOCIAL HISTORY:  Social History     Socioeconomic History    Marital status:      Spouse name: None    Number of  children: 4    Years of education: None    Highest education level: None   Occupational History     Employer: UNEMPLOYED   Tobacco Use    Smoking status: Never    Smokeless tobacco: Never   Vaping Use    Vaping status: Never Used   Substance and Sexual Activity    Alcohol use: No    Drug use: No    Sexual activity: Not Currently     Birth control/protection: Surgical   Other Topics Concern    Parent/sibling w/ CABG, MI or angioplasty before 65F 55M? Yes     Comment: mother  of congested heart failure -  at age 60     Social Determinants of Health     Financial Resource Strain: Low Risk  (2024)    Financial Resource Strain     Within the past 12 months, have you or your family members you live with been unable to get utilities (heat, electricity) when it was really needed?: No   Food Insecurity: Low Risk  (2024)    Food Insecurity     Within the past 12 months, did you worry that your food would run out before you got money to buy more?: No     Within the past 12 months, did the food you bought just not last and you didn t have money to get more?: No   Transportation Needs: Low Risk  (2024)    Transportation Needs     Within the past 12 months, has lack of transportation kept you from medical appointments, getting your medicines, non-medical meetings or appointments, work, or from getting things that you need?: No   Recent Concern: Transportation Needs - High Risk (2023)    Transportation Needs     Within the past 12 months, has lack of transportation kept you from medical appointments, getting your medicines, non-medical meetings or appointments, work, or from getting things that you need?: Yes   Physical Activity: Unknown (2023)    Exercise Vital Sign     Days of Exercise per Week: 3 days   Stress: Stress Concern Present (2023)    [employer] [place] [employer] - Occupational Stress Questionnaire     Feeling of Stress : To some extent   Social Connections: Unknown  "(12/13/2023)    Social Connection and Isolation Panel [NHANES]     Frequency of Communication with Friends and Family: More than three times a week     Active Member of Clubs or Organizations: No     Attends Club or Organization Meetings: Never     Marital Status: Living with partner   Interpersonal Safety: Low Risk  (12/13/2023)    Interpersonal Safety     Do you feel physically and emotionally safe where you currently live?: Yes     Within the past 12 months, have you been hit, slapped, kicked or otherwise physically hurt by someone?: No     Within the past 12 months, have you been humiliated or emotionally abused in other ways by your partner or ex-partner?: No   Housing Stability: Low Risk  (1/31/2024)    Housing Stability     Do you have housing? : Yes     Are you worried about losing your housing?: No       Review of Systems:  Skin:  not assessed       Eyes:  not assessed      ENT:  not assessed      Respiratory:  Negative       Cardiovascular:    Positive for;dizziness Dizziness with quick head movements  Gastroenterology: not assessed      Genitourinary:  not assessed      Musculoskeletal:  not assessed   Cramps in calves  Neurologic:  not assessed      Psychiatric:  not assessed      Heme/Lymph/Imm:  not assessed      Endocrine:  not assessed        Physical Exam:  Vitals: /60 (BP Location: Right arm, Patient Position: Sitting, Cuff Size: Adult Large)   Pulse 86   Ht 1.626 m (5' 4\")   Wt 122.9 kg (271 lb)   LMP 02/13/2009   SpO2 97%   BMI 46.52 kg/m      Constitutional:  cooperative, alert and oriented, well developed, well nourished, in no acute distress morbidly obese      Skin:  warm and dry to the touch, no apparent skin lesions or masses noted          Head:  normocephalic, no masses or lesions        Eyes:  pupils equal and round, conjunctivae and lids unremarkable, sclera white, no xanthalasma, EOMS intact, no nystagmus        Lymph:      ENT:  no pallor or cyanosis, dentition good    "     Neck:  no carotid bruit        Respiratory:  normal breath sounds, clear to auscultation, normal A-P diameter, normal symmetry, normal respiratory excursion, no use of accessory muscles         Cardiac: regular rhythm;no murmurs, gallops or rubs detected                pulses full and equal                                        GI:    obese      Extremities and Muscular Skeletal:  no spinal abnormalities noted;normal muscle strength and tone   bilateral LE edema;trace          Neurological:  no gross motor deficits        Psych:  affect appropriate, oriented to time, person and place        CC  Ingris Patel, PA-C  [address] [place] [address]                  Thank you for allowing me to participate in the care of your patient.      Sincerely,     Luis Spain MD     [facility]

## 2024-05-15 NOTE — PROGRESS NOTES
HPI and Plan:   Ana Luisa is a very nice 64-year-old woman who meeting for the first time today in follow-up from her January non-ST segment elevation myocardial infarction.    Ana Luisa has a family history of mother and father dying of congestive heart failure in her 60s.  She has a brother 4 years older who has had coronary bypass grafting x 4.  Ana Luisa is a lifelong non-smoker does not drink alcohol.  She does have diabetes mellitus hypertension and hypercholesterolemia.      Noncardiac issues include fibromyalgia, GERD and morbid obesity.    In January she underwent stenting of her proximal left anterior descending artery, proximal circumflex coronary artery and second obtuse marginal.  She had mild to moderate other disease that was thought to be best treated medically.      She initially had shortness of breath with Brilinta which resolved with change to Plavix.    Since her heart attack she has changed her diet.  She got rid of her deep fryer.  She is eating less red meat.  She states she still does not like vegetables but is doing fairly well with fruit.  She walks every day and gets on her treadmill at her apartment complex for 1 hour 3 times a week.  None of this causes her any problems.  She has not been able to lose any weight    Follow-up echocardiogram demonstrates ejection fraction of 55 to 60% without focal wall motion abnormalities or valvular pathology    Ana Luisa has no chest, arm, neck, jaw or shoulder discomfort.  No dyspnea on exertion, orthopnea, or PND.  No palpitations, lightheadedness, dizziness, syncope, or near syncope.  No peripheral edema.    Assessment and plan Ana Luisa has no symptoms to suggest ischemia, heart failure or significant arrhythmia    Blood pressure is very well-controlled today    Looking at her cholesterol profile her initial LDL was 240 consistent with probable familial hypercholesterolemia.  I told her she needs to have her children checked because this is a genetic issue.   She has had a very nice response to 40 mg of atorvastatin although not to goal aiming for LDL of 55 I will increase her atorvastatin to 80 mg and recheck a fasting lipid profile and ALT in 1 month.  If not to goal we will add Zetia    I have congratulated her on her current exercise regiment encouraged her to make sure she is getting 30 minutes of aerobic activity 5 times a week.  I have also asked her to add resistance activity twice a week.    We talked about the importance of eating a predominately plant-based diet even cutting back on chicken.    We talked about the importance of weight loss.    I have told her she can stop her aspirin in July as she will be 6 months out.    I will have her follow-up on her 1 year anniversary of her procedure at which time we can consider backing off on her beta-blocker    Thank you for allow me to participate in this patient's care.  Sincerely,                               Luis Spain MD [facility]      The longitudinal plan of care for the diagnosis(es)/condition(s) as documented were addressed during this visit. Due to the added complexity in care, I will continue to support Ana Luisa in the subsequent management and with ongoing continuity of care.       Today's clinic visit entailed:  Review of the result(s) of each unique test - coronary angiogram, echocardiogram, lab  Ordering of each unique test  Prescription drug management  40 minutes spent by me on the date of the encounter doing chart review, history and exam, documentation and further activities per the note  Provider  Link to [facility] Help Grid     The level of medical decision making during this visit was of moderate complexity.      Orders Placed This Encounter   Procedures    Lipid Profile    ALT    Basic metabolic panel    Lipid Profile    ALT    Follow-Up with Cardiology [place]       Orders Placed This Encounter   Medications    atorvastatin (LIPITOR) 80 MG tablet     Sig: Take 1 tablet (80 mg) by mouth daily      Dispense:  90 tablet     Refill:  4       Medications Discontinued During This Encounter   Medication Reason    aspirin 81 MG EC tablet     atorvastatin (LIPITOR) 40 MG tablet          Encounter Diagnoses   Name Primary?    NSTEMI (non-ST elevated myocardial infarction) (H) Yes    Coronary artery disease involving native coronary artery of native heart without angina pectoris     Familial hypercholesterolemia     HTN, goal below 140/90     Type 2 diabetes mellitus with stage 3a chronic kidney disease, without long-term current use of insulin (H)     Bilateral lower extremity edema     Benign essential hypertension     Dependent edema     Metabolic syndrome X     Morbid obesity (H)        CURRENT MEDICATIONS:  Current Outpatient Medications   Medication Sig Dispense Refill    atorvastatin (LIPITOR) 80 MG tablet Take 1 tablet (80 mg) by mouth daily 90 tablet 4    blood glucose (NO BRAND SPECIFIED) test strip Use to test blood sugar as directed. 300 strip 3    blood glucose monitoring (NO BRAND SPECIFIED) meter device kit Use to test blood sugar as directed. 1 kit 0    Blood Pressure Monitoring (COMFORT TOUCH BP CUFF/LARGE) MISC 1 Units daily 1 each 0    cetirizine (ZYRTEC) 10 MG tablet Take 1 tablet (10 mg) by mouth daily 30 tablet 0    clopidogrel (PLAVIX) 75 MG tablet Take 1 tablet (75 mg) by mouth daily The first day you take Plavix, take 4 tablets (300 mg). Each day thereafter, you will take 1 tablet (75 mg) once daily. 93 tablet 3    dulaglutide (TRULICITY) 0.75 MG/0.5ML pen Inject 0.75 mg Subcutaneous every 7 days 2 mL 2    dulaglutide (TRULICITY) 1.5 MG/0.5ML pen Inject 1.5 mg Subcutaneous every 7 days 2 mL 0    [START ON 6/7/2024] Dulaglutide 3 MG/0.5ML SOPN Inject 3 mg Subcutaneous once a week 2 mL 1    furosemide (LASIX) 40 MG tablet Take 1 tablet (40 mg) by mouth daily 90 tablet 1    insulin glargine (LANTUS PEN) 100 UNIT/ML pen Inject 44 Units Subcutaneous at bedtime 9 mL 4    insulin pen needle (32G X 4  MM) 32G X 4 MM miscellaneous Use 1 pen needles daily or as directed. 100 each 2    losartan (COZAAR) 25 MG tablet Take 1 tablet (25 mg) by mouth daily 90 tablet 1    metoprolol succinate ER (TOPROL XL) 25 MG 24 hr tablet Take 1 tablet (25 mg) by mouth daily 90 tablet 3    nitroGLYcerin (NITROSTAT) 0.4 MG sublingual tablet For chest pain place 1 tablet under the tongue every 5 minutes for 3 doses. If symptoms persist 5 minutes after 1st dose call 911. 30 tablet 0    order for DME Equipment being ordered: Diabetic Shoes 1 Units 0    pantoprazole (PROTONIX) 40 MG EC tablet Take 1 tablet (40 mg) by mouth daily 90 tablet 3    pregabalin (LYRICA) 25 MG capsule Take 1 capsule (25 mg) by mouth 2 times daily 60 capsule 5    zolpidem (AMBIEN) 10 MG tablet TAKE 1 TABLET(10 MG) BY MOUTH EVERY NIGHT AS NEEDED FOR SLEEP 30 tablet 5       ALLERGIES     Allergies   Allergen Reactions    Penicillins Anaphylaxis    Augmentin [Amoxicillin-Pot Clavulanate] GI Disturbance    Bee Swelling    Gabapentin Other (See Comments)     Mood Swings    Lisinopril Rash    Metformin Itching    Topiramate Other (See Comments)     numbness       PAST MEDICAL HISTORY:  Past Medical History:   Diagnosis Date    Arthritis     left shoulder and back    Chronic pain     fibromalgia     CKD (chronic kidney disease), stage III (H)     Fibromyalgia     Gastro-oesophageal reflux disease     HTN, goal below 140/90     Hyperlipidemia LDL goal <100     Hypothyroidism     Major depression     chronic kidney disease-stage 3    Peripheral neuropathy Feb 2015    + callus. diabetic shoes rx done    PONV (postoperative nausea and vomiting)     Rheumatoid arthritis of foot (H)     Tongue abnormality     Type 2 diabetes, HbA1C goal < 8% (H)     pre-diabetic       PAST SURGICAL HISTORY:  Past Surgical History:   Procedure Laterality Date    ARTHROPLASTY SHOULDER  7/25/2013    Procedure: right ARTHROPLASTY SHOULDER;  RIGHT TOTAL SHOULDER ARTHROPLASTY (TORNIER AFFINITY  SHOULDER SYSTEM)^;  Surgeon: Dung Morales MD;  Location:  OR    BACK SURGERY  2011    L45 laminectomy    CV CORONARY ANGIOGRAM N/A 1/15/2024    Procedure: Coronary Angiogram;  Surgeon: Dung Quintero MD;  Location:  HEART CARDIAC CATH LAB    CV PCI N/A 1/15/2024    Procedure: Percutaneous Coronary Intervention;  Surgeon: Dung Quintero MD;  Location:  HEART CARDIAC CATH LAB    ELBOW WRIST HAND FINGER ORTHOSIS, RIGID, WITHOUT JOINTS, MAY INCLUDE SOFT  5/2007    put in [place]    ESOPHAGOSCOPY, GASTROSCOPY, DUODENOSCOPY (EGD), COMBINED  9/23/2015    Dr. Thomas [facility]    ESOPHAGOSCOPY, GASTROSCOPY, DUODENOSCOPY (EGD), COMBINED N/A 9/23/2015    Procedure: COMBINED ESOPHAGOSCOPY, GASTROSCOPY, DUODENOSCOPY (EGD), BIOPSY SINGLE OR MULTIPLE;  Surgeon: Jose Thomas MD;  Location:  GI    ESOPHAGOSCOPY, GASTROSCOPY, DUODENOSCOPY (EGD), COMBINED  02/04/2020    Dr. Thomas [facility]    ESOPHAGOSCOPY, GASTROSCOPY, DUODENOSCOPY (EGD), COMBINED N/A 2/4/2020    Procedure: ESOPHAGOGASTRODUODENOSCOPY (EGD);  Surgeon: Jose Thomas MD;  Location:  GI    HCL PAP SMEAR  6/2008    WNL    ORTHOPEDIC SURGERY      left shoulder scoped and plate left elbow    SURGICAL HISTORY OF -   2009    ulnar nerve release, carpal tunnel- left    ZZC APPENDECTOMY  age 19    ZZC LIGATE FALLOPIAN TUBE,POSTPARTUM  1982    Tubal Ligation       FAMILY HISTORY:  Family History   Problem Relation Age of Onset    C.A.D. Mother     Neurologic Disorder Mother         lupus    Depression Mother     Family History Negative Father     Diabetes Maternal Aunt     Colon Cancer No family hx of        SOCIAL HISTORY:  Social History     Socioeconomic History    Marital status:      Spouse name: None    Number of children: 4    Years of education: None    Highest education level: None   Occupational History     Employer: UNEMPLOYED   Tobacco Use    Smoking status: Never    Smokeless tobacco: Never   Vaping Use    Vaping status:  Never Used   Substance and Sexual Activity    Alcohol use: No    Drug use: No    Sexual activity: Not Currently     Birth control/protection: Surgical   Other Topics Concern    Parent/sibling w/ CABG, MI or angioplasty before 65F 55M? Yes     Comment: mother  of congested heart failure -  at age 60     Social Determinants of Health     Financial Resource Strain: Low Risk  (2024)    Financial Resource Strain     Within the past 12 months, have you or your family members you live with been unable to get utilities (heat, electricity) when it was really needed?: No   Food Insecurity: Low Risk  (2024)    Food Insecurity     Within the past 12 months, did you worry that your food would run out before you got money to buy more?: No     Within the past 12 months, did the food you bought just not last and you didn t have money to get more?: No   Transportation Needs: Low Risk  (2024)    Transportation Needs     Within the past 12 months, has lack of transportation kept you from medical appointments, getting your medicines, non-medical meetings or appointments, work, or from getting things that you need?: No   Recent Concern: Transportation Needs - High Risk (2023)    Transportation Needs     Within the past 12 months, has lack of transportation kept you from medical appointments, getting your medicines, non-medical meetings or appointments, work, or from getting things that you need?: Yes   Physical Activity: Unknown (2023)    Exercise Vital Sign     Days of Exercise per Week: 3 days   Stress: Stress Concern Present (2023)    [employer] [place] [employer] - Occupational Stress Questionnaire     Feeling of Stress : To some extent   Social Connections: Unknown (2023)    Social Connection and Isolation Panel [NHANES]     Frequency of Communication with Friends and Family: More than three times a week     Active Member of Clubs or Organizations: No     Attends Club or  "Organization Meetings: Never     Marital Status: Living with partner   Interpersonal Safety: Low Risk  (12/13/2023)    Interpersonal Safety     Do you feel physically and emotionally safe where you currently live?: Yes     Within the past 12 months, have you been hit, slapped, kicked or otherwise physically hurt by someone?: No     Within the past 12 months, have you been humiliated or emotionally abused in other ways by your partner or ex-partner?: No   Housing Stability: Low Risk  (1/31/2024)    Housing Stability     Do you have housing? : Yes     Are you worried about losing your housing?: No       Review of Systems:  Skin:  not assessed       Eyes:  not assessed      ENT:  not assessed      Respiratory:  Negative       Cardiovascular:    Positive for;dizziness Dizziness with quick head movements  Gastroenterology: not assessed      Genitourinary:  not assessed      Musculoskeletal:  not assessed   Cramps in calves  Neurologic:  not assessed      Psychiatric:  not assessed      Heme/Lymph/Imm:  not assessed      Endocrine:  not assessed        Physical Exam:  Vitals: /60 (BP Location: Right arm, Patient Position: Sitting, Cuff Size: Adult Large)   Pulse 86   Ht 1.626 m (5' 4\")   Wt 122.9 kg (271 lb)   LMP 02/13/2009   SpO2 97%   BMI 46.52 kg/m      Constitutional:  cooperative, alert and oriented, well developed, well nourished, in no acute distress morbidly obese      Skin:  warm and dry to the touch, no apparent skin lesions or masses noted          Head:  normocephalic, no masses or lesions        Eyes:  pupils equal and round, conjunctivae and lids unremarkable, sclera white, no xanthalasma, EOMS intact, no nystagmus        Lymph:      ENT:  no pallor or cyanosis, dentition good        Neck:  no carotid bruit        Respiratory:  normal breath sounds, clear to auscultation, normal A-P diameter, normal symmetry, normal respiratory excursion, no use of accessory muscles         Cardiac: regular " rhythm;no murmurs, gallops or rubs detected                pulses full and equal                                        GI:    obese      Extremities and Muscular Skeletal:  no spinal abnormalities noted;normal muscle strength and tone   bilateral LE edema;trace          Neurological:  no gross motor deficits        Psych:  affect appropriate, oriented to time, person and place        CC  Ingris Patel PA-C  [address] [place] [address]

## 2024-05-16 DIAGNOSIS — Z86.39 HISTORY OF INSULIN DEPENDENT DIABETES MELLITUS: ICD-10-CM

## 2024-05-22 NOTE — TELEPHONE ENCOUNTER
Retail Pharmacy Prior Authorization Team   Phone: [phone]    PA Initiation    Medication: PREGABALIN 25 MG PO CAPS  Insurance Company: Silver Script Part D - Phone [phone] Fax [phone]  [id number] [place], [place] - [address]  Filling Pharmacy Phone: [phone]  Filling Pharmacy Fax:    Start Date: 5/22/2024    Insurance already has an approval on file.  **Instructed pharmacy to notify patient when script is ready to /ship.**   if they have not done so already

## 2024-05-24 NOTE — TELEPHONE ENCOUNTER
Prior Authorization Not Needed per Insurance    Medication: TRULICITY 3 MG/0.5ML SC SOPN  Insurance Company: Silver Script Part D - Phone [phone] Fax [phone]  Expected CoPay: $    Pharmacy Filling the Rx: [id number] [place], [place] - [address]  Pharmacy Notified: Not yet  Patient Notified: No    Insurance will cover brand name Trulicity.  If okay to use brand name, please send prescription to pharmacy on file.    Thank you,     Fidel Ghosh [facility]  Pharmacy Clinic [facility]  [email or web address]   Phone: [phone]  Fax: [phone]

## 2024-05-29 RX ORDER — DULAGLUTIDE 3 MG/.5ML
3 INJECTION, SOLUTION SUBCUTANEOUS WEEKLY
Qty: 2 ML | Refills: 2 | Status: SHIPPED | OUTPATIENT
Start: 2024-05-29 | End: 2024-07-03

## 2024-06-11 DIAGNOSIS — E11.22 TYPE 2 DIABETES MELLITUS WITH STAGE 3A CHRONIC KIDNEY DISEASE, WITHOUT LONG-TERM CURRENT USE OF INSULIN (H): ICD-10-CM

## 2024-06-11 DIAGNOSIS — N18.31 TYPE 2 DIABETES MELLITUS WITH STAGE 3A CHRONIC KIDNEY DISEASE, WITHOUT LONG-TERM CURRENT USE OF INSULIN (H): ICD-10-CM

## 2024-06-29 ENCOUNTER — HEALTH MAINTENANCE LETTER (OUTPATIENT)
Age: 64
End: 2024-06-29

## 2024-07-02 ENCOUNTER — MYC REFILL (OUTPATIENT)
Dept: FAMILY MEDICINE | Facility: CLINIC | Age: 64
End: 2024-07-02
Payer: MEDICARE

## 2024-07-02 DIAGNOSIS — N18.31 TYPE 2 DIABETES MELLITUS WITH STAGE 3A CHRONIC KIDNEY DISEASE, WITHOUT LONG-TERM CURRENT USE OF INSULIN (H): ICD-10-CM

## 2024-07-02 DIAGNOSIS — E11.42 TYPE 2 DIABETES MELLITUS WITH DIABETIC POLYNEUROPATHY, WITHOUT LONG-TERM CURRENT USE OF INSULIN (H): ICD-10-CM

## 2024-07-02 DIAGNOSIS — E11.22 TYPE 2 DIABETES MELLITUS WITH STAGE 3A CHRONIC KIDNEY DISEASE, WITHOUT LONG-TERM CURRENT USE OF INSULIN (H): ICD-10-CM

## 2024-07-03 ENCOUNTER — MYC MEDICAL ADVICE (OUTPATIENT)
Dept: FAMILY MEDICINE | Facility: CLINIC | Age: 64
End: 2024-07-03
Payer: MEDICARE

## 2024-07-03 RX ORDER — DULAGLUTIDE 0.75 MG/.5ML
0.75 INJECTION, SOLUTION SUBCUTANEOUS
Qty: 2 ML | Refills: 2 | Status: SHIPPED | OUTPATIENT
Start: 2024-07-03 | End: 2024-08-28

## 2024-07-03 NOTE — TELEPHONE ENCOUNTER
Writer called patient's pharmacy. Patient was able to  partial fill of 5 pens for her Glargine and picked up 0.75 mg of Trulicity.  Patient also has a prescription of Trulicity 3 mg, pharmacy is wondering if she is to be on both.  Writer called patient, she is only taking the 0.75 mg of Trulicity. Chart updated pharmacy updated Dr. Juárez updated.

## 2024-07-03 NOTE — TELEPHONE ENCOUNTER
Refills in process, RN to check this afternoon if able to fill and return message to Ana Luisa. Patria Palumbo R.N.

## 2024-07-03 NOTE — TELEPHONE ENCOUNTER
Tried to call pharmacy - sat on hold waiting for 20 minutes, please call pharmacy and see what the hold up is on patients medications     Shannan Bellamy/

## 2024-07-23 ENCOUNTER — TELEPHONE (OUTPATIENT)
Dept: FAMILY MEDICINE | Facility: CLINIC | Age: 64
End: 2024-07-23
Payer: MEDICARE

## 2024-07-23 NOTE — TELEPHONE ENCOUNTER
Prior Authorization Retail Medication Request    Medication/Dose: Zolpidem 10 mg tablets  ICD code (if different than what is on RX):  Previously Tried and Failed:  Rationale:    Insurance Name:    unknown   Insurance ID:  [id number]    Pharmacy Information (if different than what is on RX)  Name: [facility]  Phone:  [phone]    Please include previous medications tried and failed.  Please ask insurance for medications on formulary.

## 2024-07-26 NOTE — TELEPHONE ENCOUNTER
Central Prior Authorization Team   Phone: [phone]    PA Initiation    Medication: zolpidem (AMBIEN) 10 MG tablet  Insurance Company: Silver Script Part D - Phone [phone] Fax [phone]  Pharmacy Filling the Rx: [id number] [place], [place] - [address]  Filling Pharmacy Phone: [phone]  Filling Pharmacy Fax:    Start Date: 7/26/2024\

## 2024-07-29 ENCOUNTER — APPOINTMENT (OUTPATIENT)
Dept: CT IMAGING | Facility: CLINIC | Age: 64
End: 2024-07-29
Attending: EMERGENCY MEDICINE
Payer: MEDICARE

## 2024-07-29 ENCOUNTER — HOSPITAL ENCOUNTER (EMERGENCY)
Facility: CLINIC | Age: 64
Discharge: HOME | End: 2024-07-29
Attending: EMERGENCY MEDICINE | Admitting: EMERGENCY MEDICINE
Payer: MEDICARE

## 2024-07-29 VITALS
TEMPERATURE: 97 F | BODY MASS INDEX: 45.95 KG/M2 | HEIGHT: 65 IN | RESPIRATION RATE: 18 BRPM | SYSTOLIC BLOOD PRESSURE: 155 MMHG | OXYGEN SATURATION: 98 % | HEART RATE: 71 BPM | WEIGHT: 275.8 LBS | DIASTOLIC BLOOD PRESSURE: 87 MMHG

## 2024-07-29 DIAGNOSIS — R51.9 NONINTRACTABLE HEADACHE, UNSPECIFIED CHRONICITY PATTERN, UNSPECIFIED HEADACHE TYPE: ICD-10-CM

## 2024-07-29 DIAGNOSIS — D64.9 ANEMIA, UNSPECIFIED TYPE: ICD-10-CM

## 2024-07-29 DIAGNOSIS — R42 DIZZINESS: ICD-10-CM

## 2024-07-29 DIAGNOSIS — D72.819 LEUKOPENIA, UNSPECIFIED TYPE: ICD-10-CM

## 2024-07-29 LAB
ANION GAP SERPL CALCULATED.3IONS-SCNC: 12 MMOL/L (ref 7–15)
BASOPHILS # BLD AUTO: 0 10E3/UL (ref 0–0.2)
BASOPHILS NFR BLD AUTO: 1 %
BUN SERPL-MCNC: 10.8 MG/DL (ref 8–23)
CALCIUM SERPL-MCNC: 9.1 MG/DL (ref 8.8–10.4)
CHLORIDE SERPL-SCNC: 106 MMOL/L (ref 98–107)
CREAT SERPL-MCNC: 0.99 MG/DL (ref 0.51–0.95)
EGFRCR SERPLBLD CKD-EPI 2021: 63 ML/MIN/1.73M2
EOSINOPHIL # BLD AUTO: 0.1 10E3/UL (ref 0–0.7)
EOSINOPHIL NFR BLD AUTO: 2 %
ERYTHROCYTE [DISTWIDTH] IN BLOOD BY AUTOMATED COUNT: 13.5 % (ref 10–15)
GLUCOSE SERPL-MCNC: 236 MG/DL (ref 70–99)
HCO3 SERPL-SCNC: 21 MMOL/L (ref 22–29)
HCT VFR BLD AUTO: 34.4 % (ref 35–47)
HGB BLD-MCNC: 11.3 G/DL (ref 11.7–15.7)
HOLD SPECIMEN: NORMAL
IMM GRANULOCYTES # BLD: 0 10E3/UL
IMM GRANULOCYTES NFR BLD: 0 %
LYMPHOCYTES # BLD AUTO: 1.3 10E3/UL (ref 0.8–5.3)
LYMPHOCYTES NFR BLD AUTO: 37 %
MCH RBC QN AUTO: 29.1 PG (ref 26.5–33)
MCHC RBC AUTO-ENTMCNC: 32.8 G/DL (ref 31.5–36.5)
MCV RBC AUTO: 89 FL (ref 78–100)
MONOCYTES # BLD AUTO: 0.2 10E3/UL (ref 0–1.3)
MONOCYTES NFR BLD AUTO: 6 %
NEUTROPHILS # BLD AUTO: 1.9 10E3/UL (ref 1.6–8.3)
NEUTROPHILS NFR BLD AUTO: 54 %
NRBC # BLD AUTO: 0 10E3/UL
NRBC BLD AUTO-RTO: 0 /100
PLATELET # BLD AUTO: 98 10E3/UL (ref 150–450)
POTASSIUM SERPL-SCNC: 4.4 MMOL/L (ref 3.4–5.3)
RBC # BLD AUTO: 3.88 10E6/UL (ref 3.8–5.2)
SODIUM SERPL-SCNC: 139 MMOL/L (ref 135–145)
TROPONIN T SERPL HS-MCNC: <6 NG/L
WBC # BLD AUTO: 3.6 10E3/UL (ref 4–11)

## 2024-07-29 PROCEDURE — 84484 ASSAY OF TROPONIN QUANT: CPT | Performed by: EMERGENCY MEDICINE

## 2024-07-29 PROCEDURE — 250N000013 HC RX MED GY IP 250 OP 250 PS 637: Performed by: EMERGENCY MEDICINE

## 2024-07-29 PROCEDURE — 85025 COMPLETE CBC W/AUTO DIFF WBC: CPT | Performed by: EMERGENCY MEDICINE

## 2024-07-29 PROCEDURE — 36415 COLL VENOUS BLD VENIPUNCTURE: CPT | Performed by: STUDENT IN AN ORGANIZED HEALTH CARE EDUCATION/TRAINING PROGRAM

## 2024-07-29 PROCEDURE — 82374 ASSAY BLOOD CARBON DIOXIDE: CPT | Performed by: EMERGENCY MEDICINE

## 2024-07-29 PROCEDURE — 70450 CT HEAD/BRAIN W/O DYE: CPT | Mod: MF

## 2024-07-29 PROCEDURE — 99285 EMERGENCY DEPT VISIT HI MDM: CPT | Mod: 25

## 2024-07-29 PROCEDURE — 93005 ELECTROCARDIOGRAM TRACING: CPT | Mod: RTG

## 2024-07-29 RX ORDER — MECLIZINE HYDROCHLORIDE 25 MG/1
25 TABLET ORAL ONCE
Status: COMPLETED | OUTPATIENT
Start: 2024-07-29 | End: 2024-07-29

## 2024-07-29 RX ORDER — ACETAMINOPHEN 500 MG
1000 TABLET ORAL ONCE
Status: COMPLETED | OUTPATIENT
Start: 2024-07-29 | End: 2024-07-29

## 2024-07-29 RX ORDER — MECLIZINE HYDROCHLORIDE 25 MG/1
25 TABLET ORAL 3 TIMES DAILY PRN
Qty: 30 TABLET | Refills: 0 | Status: SHIPPED | OUTPATIENT
Start: 2024-07-29

## 2024-07-29 RX ADMIN — ACETAMINOPHEN 1000 MG: 500 TABLET, FILM COATED ORAL at 19:24

## 2024-07-29 RX ADMIN — MECLIZINE HYDROCHLORIDE 25 MG: 25 TABLET ORAL at 19:24

## 2024-07-29 ASSESSMENT — ACTIVITIES OF DAILY LIVING (ADL)
ADLS_ACUITY_SCORE: 34
ADLS_ACUITY_SCORE: 36
ADLS_ACUITY_SCORE: 36

## 2024-07-29 ASSESSMENT — COLUMBIA-SUICIDE SEVERITY RATING SCALE - C-SSRS
6. HAVE YOU EVER DONE ANYTHING, STARTED TO DO ANYTHING, OR PREPARED TO DO ANYTHING TO END YOUR LIFE?: NO
1. IN THE PAST MONTH, HAVE YOU WISHED YOU WERE DEAD OR WISHED YOU COULD GO TO SLEEP AND NOT WAKE UP?: NO
2. HAVE YOU ACTUALLY HAD ANY THOUGHTS OF KILLING YOURSELF IN THE PAST MONTH?: NO

## 2024-07-29 NOTE — ED TRIAGE NOTES
Pt states that she had a headache that started about 1000 this morning.  She states it came on slowly but that this is one of the worst headaches she has ever had.  She states that the room began to spin and she noticed that she was quite dizzy.  She states that her SBP at home was in the 150's.  Here her BP is 182/90.  She states the dizziness has gotten slightly better.  She denies any weakness, changes in memory and does not have a facial droop.      Pt states she had a heart attack 3 months ago with 4 stents placed.      Triage Assessment (Adult)       Row Name 07/29/24 1811          Triage Assessment    Airway WDL WDL        Respiratory WDL    Respiratory WDL WDL        Cardiac WDL    Cardiac WDL WDL

## 2024-07-30 ENCOUNTER — NURSE TRIAGE (OUTPATIENT)
Dept: FAMILY MEDICINE | Facility: CLINIC | Age: 64
End: 2024-07-30
Payer: MEDICARE

## 2024-07-30 ENCOUNTER — PATIENT OUTREACH (OUTPATIENT)
Dept: FAMILY MEDICINE | Facility: CLINIC | Age: 64
End: 2024-07-30
Payer: MEDICARE

## 2024-07-30 LAB
ATRIAL RATE - MUSE: 84 BPM
DIASTOLIC BLOOD PRESSURE - MUSE: NORMAL MMHG
INTERPRETATION ECG - MUSE: NORMAL
P AXIS - MUSE: -2 DEGREES
PR INTERVAL - MUSE: 140 MS
QRS DURATION - MUSE: 72 MS
QT - MUSE: 388 MS
QTC - MUSE: 458 MS
R AXIS - MUSE: 55 DEGREES
SYSTOLIC BLOOD PRESSURE - MUSE: NORMAL MMHG
T AXIS - MUSE: 42 DEGREES
VENTRICULAR RATE- MUSE: 84 BPM

## 2024-07-30 NOTE — DISCHARGE INSTRUCTIONS
Suspect that your dizziness are caused by peripheral vertigo such as BPPV but please follow-up with your primary doctor and return if worsening.  You can also follow-up with your primary doctor for anemia and leukopenia

## 2024-07-30 NOTE — TELEPHONE ENCOUNTER
Writer calls patient. She is in agreement with plan, scheduled BP follow up.  Appointments in Next Year      Jul 31, 2024 1:30 PM  MA Visit with SONJA DUARTE/LPN  [facility] ([facility] ) [phone]

## 2024-07-30 NOTE — TELEPHONE ENCOUNTER
Pt has spoken with triage regarding ER visit and BP issues. No outreach needed.    Eboni LOPES RN

## 2024-07-30 NOTE — TELEPHONE ENCOUNTER
Prior Authorization Approval    Authorization Effective Date: 1/1/2024  Authorization Expiration Date: 7/30/2025  Medication: zolpidem (AMBIEN) 10 MG tablet  Reference #:     Insurance Company: Silver Script Part D - Phone [phone] Fax [phone]  Which Pharmacy is filling the prescription (Not needed for infusion/clinic administered): [id number] [place], [place] - [address] [facility]  Pharmacy Notified: Yes  Patient Notified: Instructed pharmacy to notify patient when script is ready to /ship.

## 2024-07-30 NOTE — ED PROVIDER NOTES
"  Emergency Department Note      History of Present Illness     Chief Complaint   Headache and Dizziness      HPI   Ana Luisa Byers is a 64 year old female with a history of type 2 diabetes, hypertension who presents with headache, dizziness. Around 1000 today the patient gradually developed diffuse, 6/10 headache and room spinning dizziness that is worse when turning her head and alleviated by sitting. She states she has had headaches in the past but never with dizziness. She denies any trauma, vision changes, speech changes, weakness, fever, cough, neck pain, vomiting, abdominal pain, chest pain, shortness of breath, ear pain.  She was not doing anything in particular when the headache began    Independent Historian   None    Review of External Notes   I reviewed PCP note from may 2024    Past Medical History     Medical History and Problem List   Arthritis   Chronic pain   CKD  Fibromyalgia   GERD  Hypertension   Hypothyroidism   Hyperlipidemia   Depression   Peripheral neuropathy   Rheumatoid arthritis   Type 2 diabetes     Medications   Lipitor   Plavix   Trulicity   Lasix   Lantus   Cozaar   Toprol   Nitrostat   Protonix  Ambien     Surgical History   Arthroplasty shoulder   Laminectomy   Coronary angiogram   EGD x 3  Shoulder surgery   Appendectomy     Physical Exam     Patient Vitals for the past 24 hrs:   BP Temp Temp src Pulse Resp SpO2 Height Weight   07/29/24 2129 (!) 155/87 -- -- 71 18 98 % -- --   07/29/24 1820 (!) 182/90 97  F (36.1  C) Temporal 84 20 96 % 1.651 m (5' 5\") 125.1 kg (275 lb 12.7 oz)     Physical Exam  GENERAL: Awake, alert. TM's clear.  CARDIOVASCULAR: Regular rate and rhythm  LUNGS: Clear bilaterally, no wheezes rales or rhonchi  ABDOMEN: Soft, nontender, no rebound or guarding  EXTREMITIES: No peripheral edema  NEUROLOGICAL: Patient is awake, face is symmetric, tongue is midline, extraocular muscles intact, no dysarthria, no dysmetria on finger-to-nose, 5 out of 5 strength throughout " and sensation is normal. Normal gait. No tenderness over temporal arteries, no neck pain, no sinus pain. Normal rapid alternating movements in heel to shin.     Diagnostics     Lab Results   Labs Ordered and Resulted from Time of ED Arrival to Time of ED Departure   BASIC METABOLIC PANEL - Abnormal       Result Value    Sodium 139      Potassium 4.4      Chloride 106      Carbon Dioxide (CO2) 21 (*)     Anion Gap 12      Urea Nitrogen 10.8      Creatinine 0.99 (*)     GFR Estimate 63      Calcium 9.1      Glucose 236 (*)    CBC WITH PLATELETS AND DIFFERENTIAL - Abnormal    WBC Count 3.6 (*)     RBC Count 3.88      Hemoglobin 11.3 (*)     Hematocrit 34.4 (*)     MCV 89      MCH 29.1      MCHC 32.8      RDW 13.5      Platelet Count 98 (*)     % Neutrophils 54      % Lymphocytes 37      % Monocytes 6      % Eosinophils 2      % Basophils 1      % Immature Granulocytes 0      NRBCs per 100 WBC 0      Absolute Neutrophils 1.9      Absolute Lymphocytes 1.3      Absolute Monocytes 0.2      Absolute Eosinophils 0.1      Absolute Basophils 0.0      Absolute Immature Granulocytes 0.0      Absolute NRBCs 0.0     TROPONIN T, HIGH SENSITIVITY - Normal    Troponin T, High Sensitivity <6         Imaging   Head CT w/o contrast   Final Result   IMPRESSION:   1.  No acute intracranial process.        EKG   None     Independent Interpretation   None    ED Course      Medications Administered   Medications   acetaminophen (TYLENOL) tablet 1,000 mg (1,000 mg Oral $Given 7/29/24 1924)   meclizine (ANTIVERT) tablet 25 mg (25 mg Oral $Given 7/29/24 1924)       Procedures   Procedures     Discussion of Management   None    ED Course   ED Course as of 07/30/24 0254   Mon Jul 29, 2024 1912 I initially assessed the patient and obtained the above history and physical exam.       Optional/Additional Documentation  None    Medical Decision Making / Diagnosis     CMS Diagnoses: None    MIPS       None    OFELIA   Ana Luisa Byers is a 64 year old  female who presents emergency department for evaluation of a gradual onset headache and room spinning dizziness that is worse when moving her head.  She was initially recorded as its hypertensive, repeat blood pressure improved, her neurological exam is nonfocal, her headache is improving and very mild currently, and her dizziness is triggered by head movements making peripheral etiology of vertigo most likely.  Given her age and new type of headache however we did perform screening head CT which was negative for intracranial hemorrhage.  Patient's symptoms resolved with meclizine, she has a normal gait, she is feeling much better and headache resolved as well and labs showed some incidental findings of mild hyperglycemia but she is a diabetic and some leukopenia and anemia and she was notified of this as well    Disposition   The patient was discharged.     Diagnosis     ICD-10-CM    1. Nonintractable headache, unspecified chronicity pattern, unspecified headache type  R51.9       2. Dizziness  R42       3. Anemia, unspecified type  D64.9       4. Leukopenia, unspecified type  D72.819            Discharge Medications   Discharge Medication List as of 7/29/2024  9:23 PM        START taking these medications    Details   meclizine (ANTIVERT) 25 MG tablet Take 1 tablet (25 mg) by mouth 3 times daily as needed for dizziness, Disp-30 tablet, R-0, E-Prescribe           Scribe Disclosure:  I, Paxton Fletcher, am serving as a scribe at 9:15 PM on 7/29/2024 to document services personally performed by Keily Muniz MD based on my observations and the provider's statements to me.        Keily Muniz MD  07/30/24 0254

## 2024-07-30 NOTE — TELEPHONE ENCOUNTER
Recommend scheduling MA only BP recheck in office this week.  Once I have the numbers we can make some suggestions on adjusting meds if necessary.    EMILIA

## 2024-07-30 NOTE — TELEPHONE ENCOUNTER
Nurse Triage SBAR    Is this a 2nd Level Triage? YES, LICENSED PRACTITIONER REVIEW IS REQUIRED    Situation: patient reports elevated blood pressure.     Background: Patient was in emergency room for high blood pressure 188/90 yesterday. No med changes.    Assessment: Patient took blood pressure this am with reading of 161/92 and again with 164/94. Patient is taking Losartan 25mg, no missed doses.     Patient reports she was given meclizine yesterday for dizziness and tylenol for headache. Patient reports dizziness and headache resolved, but she is concerned about her blood pressure is still elevated.     Patient reports she uses at home wrist blood pressure machine and normal readings at home have been 130's/140's and 80's for diastolic. Patient reports she checks blood pressure daily.     Patient denies headache, shortness of breath, weakness, chest pain or blurred vision.     Protocol Recommended Disposition:   SEE IN OFFICE WITHIN 3 DAYS:     Recommendation: Patient has appointment with primary care provider on 08/22/2024. Patient wondering if primary care provider can send new prescription or advise on dose of current blood pressure meds. Patient doesn't have further refills on her Losartan.     Routed to provider    Does the patient meet one of the following criteria for ADS visit consideration? 16+ years old, with an [facility] PCP     TIP  Providers, please consider if this condition is appropriate for management at one of our Acute and Diagnostic Services sites.     If patient is a good candidate, please use dotphrase <dot>triageresponse and select Refer to ADS to document.      Reason for Disposition   Systolic BP >= 160 OR Diastolic >= 100    Additional Information   Negative: Sounds like a life-threatening emergency to the triager   Negative: Pregnant > 20 weeks or postpartum (< 6 weeks after delivery) and new hand or face swelling   Negative: Pregnant > 20 weeks and BP > 140/90   Negative: Systolic BP >= 160  "OR Diastolic >= 100, and any cardiac or neurologic symptoms (e.g., chest pain, difficulty breathing, unsteady gait, blurred vision)   Negative: Patient sounds very sick or weak to the triager   Negative: BP Systolic BP >= 140 OR Diastolic >= 90 and postpartum (from 0 to 6 weeks after delivery)   Negative: Systolic BP >= 180 OR Diastolic >= 110, and missed most recent dose of blood pressure medication   Negative: Systolic BP >= 180 OR Diastolic >= 110   Negative: Patient wants to be seen   Negative: Ran out of BP medications   Negative: Taking BP medications and feels is having side effects (e.g., impotence, cough, dizziness)    Answer Assessment - Initial Assessment Questions  1. BLOOD PRESSURE: \"What is the blood pressure?\" \"Did you take at least two measurements 5 minutes apart?\"      161/92 and again with 164/94    2. ONSET: \"When did you take your blood pressure?\"      This morning.    3. HOW: \"How did you obtain the blood pressure?\" (e.g., visiting nurse, automatic home BP monitor)      Wrist at home blood pressure machine.    4. HISTORY: \"Do you have a history of high blood pressure?\"      Yes.    5. MEDICATIONS: \"Are you taking any medications for blood pressure?\" \"Have you missed any doses recently?\"      Losartan 25mg, no missed doses.    6. OTHER SYMPTOMS: \"Do you have any symptoms?\" (e.g., headache, chest pain, blurred vision, difficulty breathing, weakness)      None currently.    7. PREGNANCY: \"Is there any chance you are pregnant?\" \"When was your last menstrual period?\"      N/A    Protocols used: High Blood Pressure-A-OH    "

## 2024-07-31 ENCOUNTER — ALLIED HEALTH/NURSE VISIT (OUTPATIENT)
Dept: FAMILY MEDICINE | Facility: CLINIC | Age: 64
End: 2024-07-31
Payer: MEDICARE

## 2024-07-31 VITALS — HEART RATE: 68 BPM | DIASTOLIC BLOOD PRESSURE: 74 MMHG | SYSTOLIC BLOOD PRESSURE: 148 MMHG | OXYGEN SATURATION: 93 %

## 2024-07-31 DIAGNOSIS — I10 HIGH BLOOD PRESSURE: Primary | ICD-10-CM

## 2024-07-31 PROCEDURE — 99207 PR NO CHARGE NURSE ONLY: CPT

## 2024-07-31 NOTE — PROGRESS NOTES
Ana Luisa Byers is a 64 year old year old patient who comes in today for a Blood Pressure check because of ongoing blood pressure monitoring.  Vital Signs as repeated by YEISON SNYDER  Patient is taking medication as prescribed  Patient is tolerating medications well.  Patient is monitoring Blood Pressure at home.  Average readings if yes are range from 130's/80's to 160's/90.  Current complaints: headaches  Disposition:  patient to continue with the same medication and will forward to PCP per his request.

## 2024-07-31 NOTE — PROGRESS NOTES
Ana Luisa Byers is a 64 year old patient who comes in today for a Blood Pressure check.  Initial BP:  BP (!) 150/92 (BP Location: Right arm, Patient Position: Sitting, Cuff Size: Adult Large)   LMP 02/13/2009        Disposition: BP elevated.  Triage RN notified, patient asked to wait.    Jael Mccarty, CMA

## 2024-08-03 DIAGNOSIS — I21.4 NSTEMI (NON-ST ELEVATED MYOCARDIAL INFARCTION) (H): ICD-10-CM

## 2024-08-05 RX ORDER — LOSARTAN POTASSIUM 25 MG/1
25 TABLET ORAL DAILY
Qty: 90 TABLET | Refills: 3 | Status: SHIPPED | OUTPATIENT
Start: 2024-08-05

## 2024-08-16 ENCOUNTER — MYC REFILL (OUTPATIENT)
Dept: FAMILY MEDICINE | Facility: CLINIC | Age: 64
End: 2024-08-16
Payer: MEDICARE

## 2024-08-16 DIAGNOSIS — G47.09 OTHER INSOMNIA: ICD-10-CM

## 2024-08-16 RX ORDER — ZOLPIDEM TARTRATE 10 MG/1
TABLET ORAL
Qty: 30 TABLET | Refills: 5 | Status: SHIPPED | OUTPATIENT
Start: 2024-08-16

## 2024-08-20 ENCOUNTER — MYC MEDICAL ADVICE (OUTPATIENT)
Dept: FAMILY MEDICINE | Facility: CLINIC | Age: 64
End: 2024-08-20
Payer: MEDICARE

## 2024-08-20 DIAGNOSIS — T78.40XS ALLERGIC REACTION, SEQUELA: Primary | ICD-10-CM

## 2024-08-20 RX ORDER — EPINEPHRINE 0.3 MG/.3ML
0.3 INJECTION SUBCUTANEOUS PRN
Qty: 2 EACH | Refills: 1 | Status: SHIPPED | OUTPATIENT
Start: 2024-08-20

## 2024-08-20 NOTE — TELEPHONE ENCOUNTER
Routing refill request to provider for review/approval because:  Drug not active on patient's medication list    Shira Hamm RN

## 2024-08-28 ENCOUNTER — OFFICE VISIT (OUTPATIENT)
Dept: FAMILY MEDICINE | Facility: CLINIC | Age: 64
End: 2024-08-28
Payer: MEDICARE

## 2024-08-28 VITALS
SYSTOLIC BLOOD PRESSURE: 112 MMHG | BODY MASS INDEX: 44.15 KG/M2 | DIASTOLIC BLOOD PRESSURE: 70 MMHG | WEIGHT: 265 LBS | HEART RATE: 69 BPM | RESPIRATION RATE: 18 BRPM | HEIGHT: 65 IN | TEMPERATURE: 97.7 F | OXYGEN SATURATION: 99 %

## 2024-08-28 DIAGNOSIS — E11.22 TYPE 2 DIABETES MELLITUS WITH STAGE 3A CHRONIC KIDNEY DISEASE, WITHOUT LONG-TERM CURRENT USE OF INSULIN (H): Primary | ICD-10-CM

## 2024-08-28 DIAGNOSIS — N18.31 TYPE 2 DIABETES MELLITUS WITH STAGE 3A CHRONIC KIDNEY DISEASE, WITHOUT LONG-TERM CURRENT USE OF INSULIN (H): Primary | ICD-10-CM

## 2024-08-28 DIAGNOSIS — G43.809 OTHER MIGRAINE WITHOUT STATUS MIGRAINOSUS, NOT INTRACTABLE: ICD-10-CM

## 2024-08-28 LAB
HBA1C MFR BLD: 7.4 % (ref 0–5.6)
HOLD SPECIMEN: NORMAL

## 2024-08-28 PROCEDURE — 83036 HEMOGLOBIN GLYCOSYLATED A1C: CPT | Performed by: FAMILY MEDICINE

## 2024-08-28 PROCEDURE — 99214 OFFICE O/P EST MOD 30 MIN: CPT | Performed by: FAMILY MEDICINE

## 2024-08-28 PROCEDURE — 36415 COLL VENOUS BLD VENIPUNCTURE: CPT | Performed by: FAMILY MEDICINE

## 2024-08-28 RX ORDER — SUMATRIPTAN 25 MG/1
25 TABLET, FILM COATED ORAL
Qty: 12 TABLET | Refills: 0 | Status: SHIPPED | OUTPATIENT
Start: 2024-08-28 | End: 2024-08-29

## 2024-08-28 ASSESSMENT — PAIN SCALES - GENERAL: PAINLEVEL: SEVERE PAIN (6)

## 2024-08-28 NOTE — PROGRESS NOTES
Assessment & Plan     Type 2 diabetes mellitus with stage 3a chronic kidney disease, without long-term current use of insulin (H)  Controlled, A1c 7.4.  Recommend patient start GLP-1 pending insurance authorization, if not covered consider switching to Zepbound.  Otherwise no dosage change.  - HEMOGLOBIN A1C  - HEMOGLOBIN A1C  - tirzepatide (MOUNJARO) 2.5 MG/0.5ML pen  Dispense: 2 mL; Refill: 1    Other migraine without status migrainosus, not intractable  ER records reviewed, consider other differentials including vestibular migraines.  Negative CT head.  At the next event could trial Imitrex in combination of meclizine.  - SUMAtriptan (IMITREX) 25 MG tablet  Dispense: 12 tablet; Refill: 0      Carmen Orosco is a 64 year old, presenting for the following health issues:  Diabetes (Here today for her Diabetes Check.  Non-Fasting. Foot Exam- left foot callus, right one has a small blister under her middle toe. [place] the [place] this [place] and really struggled with her feet.  thinks she walks more on her toes vs her foot. Is struggling to get Trulicity from the pharmacy due to stock. )        8/28/2024    12:49 PM   Additional Questions   Roomed by Jamila Mccarty CMA   Accompanied by Sig Other- Ed     History of Present Illness       Diabetes:   She presents for follow up of diabetes.  She is checking home blood glucose one time daily.   She checks blood glucose before meals.  Blood glucose is sometimes over 200 and never under 70.  When her blood glucose is low, the patient is asymptomatic for confusion, blurred vision, lethargy and reports not feeling dizzy, shaky, or weak.  She is concerned about blood sugar frequently over 200.   She is having numbness in feet, burning in feet and weight gain.            Hyperlipidemia:  She presents for follow up of hyperlipidemia.   She is taking medication to lower cholesterol. She is not having myalgia or other side effects to statin medications.    Hypertension: She  "presents for follow up of hypertension.  She does check blood pressure  regularly outside of the clinic. Outside blood pressures have been over 140/90. She follows a low salt diet.     She eats 2-3 servings of fruits and vegetables daily.She consumes 0 sweetened beverage(s) daily.She exercises with enough effort to increase her heart rate 9 or less minutes per day.  She exercises with enough effort to increase her heart rate 5 days per week.   She is taking medications regularly.       Medication Followup of Diabetes    Taking Medication as prescribed: yes, although is struggling to get the Trulicity  Side Effects:  None  Medication Helping Symptoms:  yes            Patient presents for ER follow-up was seen in [facility] ER on 7-, she had intractable headache with vertigo sensations.  She also noticed elevated blood pressure at that time, her  had brought her to the ED for further evaluation.      Objective    /70 (BP Location: Right arm, Patient Position: Sitting, Cuff Size: Adult Large)   Pulse 69   Temp 97.7  F (36.5  C) (Oral)   Resp 18   Ht 1.651 m (5' 5\")   Wt 120.2 kg (265 lb)   LMP 02/13/2009   SpO2 99%   BMI 44.10 kg/m    Body mass index is 44.1 kg/m .  Physical Exam  Cardiovascular:      Rate and Rhythm: Normal rate and regular rhythm.   Pulmonary:      Effort: Pulmonary effort is normal.      Breath sounds: Normal breath sounds.   Psychiatric:         Mood and Affect: Mood normal.         Behavior: Behavior normal.                    Signed Electronically by: Leonides Juárez MD    "

## 2024-08-29 ENCOUNTER — MYC MEDICAL ADVICE (OUTPATIENT)
Dept: FAMILY MEDICINE | Facility: CLINIC | Age: 64
End: 2024-08-29
Payer: MEDICARE

## 2024-08-29 DIAGNOSIS — G43.809 OTHER MIGRAINE WITHOUT STATUS MIGRAINOSUS, NOT INTRACTABLE: ICD-10-CM

## 2024-08-29 NOTE — TELEPHONE ENCOUNTER
Please see mychart regarding if pt should still use insulin while taking mounjaro.    Eboni LOPES RN, BSN

## 2024-08-30 RX ORDER — SUMATRIPTAN 25 MG/1
25 TABLET, FILM COATED ORAL
Qty: 12 TABLET | Refills: 0 | Status: SHIPPED | OUTPATIENT
Start: 2024-08-30

## 2024-08-30 NOTE — TELEPHONE ENCOUNTER
NOTE: Drug not covered.   ALTERNATIVE:  RIZATRIPTANTABMG, RIZATRIIPTANTABMGODT, ELETRIPTANTABMG, NARATRIPTANTABMG

## 2024-10-19 DIAGNOSIS — E11.22 TYPE 2 DIABETES MELLITUS WITH STAGE 3A CHRONIC KIDNEY DISEASE, WITHOUT LONG-TERM CURRENT USE OF INSULIN (H): ICD-10-CM

## 2024-10-19 DIAGNOSIS — N18.31 TYPE 2 DIABETES MELLITUS WITH STAGE 3A CHRONIC KIDNEY DISEASE, WITHOUT LONG-TERM CURRENT USE OF INSULIN (H): ICD-10-CM

## 2024-10-21 RX ORDER — TIRZEPATIDE 2.5 MG/.5ML
INJECTION, SOLUTION SUBCUTANEOUS
Qty: 2 ML | OUTPATIENT
Start: 2024-10-21

## 2024-11-03 DIAGNOSIS — R60.0 BILATERAL LOWER EXTREMITY EDEMA: ICD-10-CM

## 2024-11-04 RX ORDER — FUROSEMIDE 40 MG/1
40 TABLET ORAL DAILY
Qty: 90 TABLET | Refills: 2 | Status: SHIPPED | OUTPATIENT
Start: 2024-11-04

## 2024-11-13 NOTE — PHARMACY-ADMISSION MEDICATION HISTORY
Pharmacy Intern Admission Medication History    Admission medication history is complete. The information provided in this note is only as accurate as the sources available at the time of the update.    Information Source(s): Patient and CareEverywhere/SureScripts via in-person    Changes made to PTA medication list:  Added: Betamethasone cream  Deleted: Aripiprazole, Hydroxyzine  Changed: Insulin glargine: From 36 units at bedtime to 40 units at bedtime.     Allergies reviewed with patient and updates made in EHR: yes    Medication History Completed By: Jim Mike 1/14/2024 3:01 PM    Prior to Admission medications    Medication Sig Last Dose Taking? Auth Provider Long Term End Date   aspirin 81 MG chewable tablet Take 1 tablet (81 mg) by mouth daily 1/14/2024 at am Yes Mari Cook APRN CNP     atorvastatin (LIPITOR) 40 MG tablet Take 40 mg by mouth daily 1/14/2024 at am Yes Unknown, Entered By History No    betamethasone dipropionate (DIPROSONE) 0.05 % external cream Apply topically 2 times daily  to affected area 1/13/2024 at pm Yes Unknown, Entered By History     blood glucose (NO BRAND SPECIFIED) test strip Use to test blood sugar as directed.  Yes Leonides Juárez MD     blood glucose monitoring (NO BRAND SPECIFIED) meter device kit Use to test blood sugar as directed.  Yes Leonides Juárez MD     Blood Pressure Monitoring (COMFORT TOUCH BP CUFF/LARGE) MISC 1 Units daily  Yes Annamarie Hayden PA-C     cetirizine (ZYRTEC) 10 MG tablet Take 1 tablet (10 mg) by mouth daily 1/13/2024 at am Yes Annamarie Hayden PA-C     clobetasol (TEMOVATE) 0.05 % external cream Apply topically 2 times daily 1/13/2024 at am Yes Annamarie Hayden PA-C     furosemide (LASIX) 40 MG tablet Take 1 tablet (40 mg) by mouth daily 1/13/2024 at am Yes Leonides Juárez MD Yes    insulin glargine (LANTUS PEN) 100 UNIT/ML pen 36U Lantus at bedtime, increase by 2U every other day if fasted morning glucose > 125. Do NOT exceed 46U until  speaking with a doctor.  Patient taking differently: 40U Lantus at bedtime, increase by 2U every other day if fasted morning glucose > 125. Do NOT exceed 46U until speaking with a doctor. 1/13/2024 at pm Yes Leonides Juárez MD Yes    insulin pen needle (32G X 4 MM) 32G X 4 MM miscellaneous Use 1 pen needles daily or as directed.  Yes Leonides Juárez MD     omeprazole (PRILOSEC) 40 MG DR capsule Take 1 capsule (40 mg) by mouth 2 times daily 1/13/2024 at pm Yes Leonides Juárez MD     TRULICITY 0.75 MG/0.5ML pen ADMINISTER 0.75 MG UNDER THE SKIN EVERY 7 DAYS Past Month Yes Leonides Juárez MD     zolpidem (AMBIEN) 10 MG tablet TAKE 1 TABLET(10 MG) BY MOUTH EVERY NIGHT AS NEEDED FOR SLEEP 1/13/2024 at pm Yes Leonides Juárez MD Yes    order for DME Equipment being ordered: Diabetic Shoes   Leonides Juárez MD        done

## 2024-12-01 ENCOUNTER — E-VISIT (OUTPATIENT)
Dept: FAMILY MEDICINE | Facility: CLINIC | Age: 64
End: 2024-12-01
Payer: MEDICARE

## 2024-12-01 DIAGNOSIS — G44.89 HEADACHE SYNDROME: Primary | ICD-10-CM

## 2024-12-01 DIAGNOSIS — J01.90 ACUTE SINUSITIS WITH SYMPTOMS > 10 DAYS: Primary | ICD-10-CM

## 2024-12-01 PROCEDURE — 99207 PR NON-BILLABLE SERV PER CHARTING: CPT | Performed by: FAMILY MEDICINE

## 2024-12-02 RX ORDER — CEFDINIR 300 MG/1
600 CAPSULE ORAL DAILY
Qty: 14 CAPSULE | Refills: 0 | Status: SHIPPED | OUTPATIENT
Start: 2024-12-02 | End: 2024-12-09

## 2024-12-02 NOTE — PATIENT INSTRUCTIONS
Vimal Orosco.    I am covering for Dr. Juárez today.  I hope you feel better soon with the antibiotic treatment I prescribed!  Let me know if you are not feeling better yet in a week, sooner if feeling worse.    Acute Sinusitis: Care Instructions  Overview     Acute sinusitis is an inflammation of the mucous membranes inside the nose and sinuses. Sinuses are the hollow spaces in your skull around the eyes and nose. Acute sinusitis often follows a cold. Acute sinusitis causes thick, discolored mucus that drains from the nose or down the back of the throat. It also can cause pain and pressure in your head and face along with a stuffy or blocked nose.  In most cases, sinusitis gets better on its own in 1 to 2 weeks. But some mild symptoms may last for several weeks. Sometimes antibiotics are needed if there is a bacterial infection.  Follow-up care is a key part of your treatment and safety. Be sure to make and go to all appointments, and call your doctor if you are having problems. It's also a good idea to know your test results and keep a list of the medicines you take.  How can you care for yourself at home?  Use saline (saltwater) nasal washes. This can help keep your nasal passages open and wash out mucus and allergens.  You can buy saline nose washes at a grocery store or drugstore. Follow the instructions on the package.  You can make your own at home. Add 1 teaspoon of non-iodized salt and 1 teaspoon of baking soda to 2 cups of distilled or boiled and cooled water. Fill a squeeze bottle or a nasal cleansing pot (such as a neti pot) with the nasal wash. Then put the tip into your nostril, and lean over the sink. With your mouth open, gently squirt the liquid. Repeat on the other side.  Try a decongestant nasal spray like oxymetazoline (Afrin). Do not use it for more than 3 days in a row. Using it for more than 3 days can make your congestion worse.  If needed, take an over-the-counter pain medicine, such as  "acetaminophen (Tylenol), ibuprofen (Advil, Motrin), or naproxen (Aleve). Read and follow all instructions on the label.  If the doctor prescribed antibiotics, take them as directed. Do not stop taking them just because you feel better. You need to take the full course of antibiotics.  Be careful when taking over-the-counter cold or flu medicines and Tylenol at the same time. Many of these medicines have acetaminophen, which is Tylenol. Read the labels to make sure that you are not taking more than the recommended dose. Too much acetaminophen (Tylenol) can be harmful.  Try a steroid nasal spray. It may help with your symptoms.  Breathe warm, moist air. You can use a steamy shower, a hot bath, or a sink filled with hot water. Avoid cold, dry air. Using a humidifier in your home may help. Follow the directions for cleaning the machine.  When should you call for help?   Call your doctor now or seek immediate medical care if:    You have new or worse swelling, redness, or pain in your face or around one or both of your eyes.     You have double vision or a change in your vision.     You have a high fever.     You have a severe headache and a stiff neck.     You have mental changes, such as feeling confused or much less alert.   Watch closely for changes in your health, and be sure to contact your doctor if:    You are not getting better as expected.   Where can you learn more?  Go to https://www.enrich-in.net/patiented  Enter I933 in the search box to learn more about \"Acute Sinusitis: Care Instructions.\"  Current as of: September 27, 2023  Content Version: 14.2 2024 [facility] Smartdate.   Care instructions adapted under license by your healthcare professional. If you have questions about a medical condition or this instruction, always ask your healthcare professional. [employer] disclaims any warranty or liability for your use of this information.    Dear Ana Luisa Byers    After reviewing your " responses, I've been able to diagnose you with Acute sinusitis with symptoms > 10 days.      Based on your responses and diagnosis, I have prescribed   Orders Placed This Encounter   Medications    cefdinir (OMNICEF) 300 MG capsule     Sig: Take 2 capsules (600 mg) by mouth daily for 7 days.     Dispense:  14 capsule     Refill:  0      to treat your symptoms. I have sent this to your pharmacy.?     It is also important to stay well hydrated, get lots of rest and take over-the-counter decongestants,?tylenol?or ibuprofen if you?are able to?take those medications per your primary care provider to help relieve discomfort.?     It is important that you take?all of?your prescribed medication even if your symptoms are improving after a few doses.? Taking?all of?your medicine helps prevent the symptoms from returning.?     If your symptoms worsen, you develop severe headache, vomiting, high fever (>102), or are not improving in 7 days, please contact your primary care provider for an appointment or visit any of our convenient Walk-in Care or Urgent Care Centers to be seen which can be found on our website?here.?     Thanks again for choosing?us?as your health care partner,?   ?  Terry Burden, DO?

## 2024-12-10 ENCOUNTER — E-VISIT (OUTPATIENT)
Dept: URGENT CARE | Facility: CLINIC | Age: 64
End: 2024-12-10
Payer: MEDICARE

## 2024-12-10 DIAGNOSIS — N30.00 ACUTE CYSTITIS WITHOUT HEMATURIA: Primary | ICD-10-CM

## 2024-12-10 RX ORDER — NITROFURANTOIN 25; 75 MG/1; MG/1
100 CAPSULE ORAL 2 TIMES DAILY
Qty: 10 CAPSULE | Refills: 0 | Status: SHIPPED | OUTPATIENT
Start: 2024-12-10 | End: 2024-12-15

## 2024-12-10 RX ORDER — PHENAZOPYRIDINE HYDROCHLORIDE 200 MG/1
200 TABLET, FILM COATED ORAL 3 TIMES DAILY PRN
Qty: 6 TABLET | Refills: 0 | Status: SHIPPED | OUTPATIENT
Start: 2024-12-10

## 2024-12-11 RX ORDER — CELECOXIB 100 MG/1
100 CAPSULE ORAL 2 TIMES DAILY WITH MEALS
Qty: 30 CAPSULE | Refills: 0 | Status: SHIPPED | OUTPATIENT
Start: 2024-12-11

## 2024-12-11 NOTE — PATIENT INSTRUCTIONS
Thank you for choosing us for your care. I have placed an order for a prescription so that you can start treatment. View your full visit summary for details by clicking on the link below. Your pharmacist will able to address any questions you may have about the medication.     If you're not feeling better within 5-7 days, please schedule an appointment.  You can schedule an appointment right here in [facility], or call [phone]  If the visit is for the same symptoms as your eVisit, we'll refund the cost of your eVisit if seen within seven days.

## 2024-12-15 DIAGNOSIS — Z79.4 TYPE 2 DIABETES MELLITUS WITHOUT COMPLICATION, WITH LONG-TERM CURRENT USE OF INSULIN (H): ICD-10-CM

## 2024-12-15 DIAGNOSIS — E11.9 TYPE 2 DIABETES MELLITUS WITHOUT COMPLICATION, WITH LONG-TERM CURRENT USE OF INSULIN (H): ICD-10-CM

## 2024-12-16 RX ORDER — TIRZEPATIDE 5 MG/.5ML
5 INJECTION, SOLUTION SUBCUTANEOUS
Qty: 2 ML | Refills: 1 | Status: SHIPPED | OUTPATIENT
Start: 2024-12-16

## 2025-01-01 SDOH — HEALTH STABILITY: PHYSICAL HEALTH: ON AVERAGE, HOW MANY MINUTES DO YOU ENGAGE IN EXERCISE AT THIS LEVEL?: 30 MIN

## 2025-01-01 SDOH — HEALTH STABILITY: PHYSICAL HEALTH: ON AVERAGE, HOW MANY DAYS PER WEEK DO YOU ENGAGE IN MODERATE TO STRENUOUS EXERCISE (LIKE A BRISK WALK)?: 1 DAY

## 2025-01-01 ASSESSMENT — SOCIAL DETERMINANTS OF HEALTH (SDOH): HOW OFTEN DO YOU GET TOGETHER WITH FRIENDS OR RELATIVES?: MORE THAN THREE TIMES A WEEK

## 2025-01-05 ENCOUNTER — HEALTH MAINTENANCE LETTER (OUTPATIENT)
Age: 65
End: 2025-01-05

## 2025-01-06 ENCOUNTER — OFFICE VISIT (OUTPATIENT)
Dept: FAMILY MEDICINE | Facility: CLINIC | Age: 65
End: 2025-01-06
Attending: FAMILY MEDICINE
Payer: MEDICARE

## 2025-01-06 VITALS
BODY MASS INDEX: 42.99 KG/M2 | OXYGEN SATURATION: 97 % | HEART RATE: 77 BPM | TEMPERATURE: 97.9 F | RESPIRATION RATE: 16 BRPM | SYSTOLIC BLOOD PRESSURE: 124 MMHG | DIASTOLIC BLOOD PRESSURE: 76 MMHG | WEIGHT: 258 LBS | HEIGHT: 65 IN

## 2025-01-06 DIAGNOSIS — G25.81 RESTLESS LEG SYNDROME: ICD-10-CM

## 2025-01-06 DIAGNOSIS — N39.41 URGENCY INCONTINENCE: ICD-10-CM

## 2025-01-06 DIAGNOSIS — B37.2 CANDIDIASIS OF SKIN: ICD-10-CM

## 2025-01-06 DIAGNOSIS — Z12.11 SCREEN FOR COLON CANCER: ICD-10-CM

## 2025-01-06 DIAGNOSIS — N18.31 TYPE 2 DIABETES MELLITUS WITH STAGE 3A CHRONIC KIDNEY DISEASE, WITHOUT LONG-TERM CURRENT USE OF INSULIN (H): ICD-10-CM

## 2025-01-06 DIAGNOSIS — Z00.00 ANNUAL PHYSICAL EXAM: Primary | ICD-10-CM

## 2025-01-06 DIAGNOSIS — E11.22 TYPE 2 DIABETES MELLITUS WITH STAGE 3A CHRONIC KIDNEY DISEASE, WITHOUT LONG-TERM CURRENT USE OF INSULIN (H): ICD-10-CM

## 2025-01-06 DIAGNOSIS — E03.9 ACQUIRED HYPOTHYROIDISM: ICD-10-CM

## 2025-01-06 LAB
CREAT UR-MCNC: 46 MG/DL
EST. AVERAGE GLUCOSE BLD GHB EST-MCNC: 166 MG/DL
HBA1C MFR BLD: 7.4 % (ref 0–5.6)
HOLD SPECIMEN: NORMAL
MICROALBUMIN UR-MCNC: 13.3 MG/L
MICROALBUMIN/CREAT UR: 28.91 MG/G CR (ref 0–25)
T4 FREE SERPL-MCNC: 0.92 NG/DL (ref 0.9–1.7)
TSH SERPL DL<=0.005 MIU/L-ACNC: 9.51 UIU/ML (ref 0.3–4.2)

## 2025-01-06 PROCEDURE — 99207 PR FOOT EXAM NO CHARGE: CPT | Performed by: FAMILY MEDICINE

## 2025-01-06 PROCEDURE — 82570 ASSAY OF URINE CREATININE: CPT | Performed by: FAMILY MEDICINE

## 2025-01-06 PROCEDURE — 82043 UR ALBUMIN QUANTITATIVE: CPT | Performed by: FAMILY MEDICINE

## 2025-01-06 PROCEDURE — 36415 COLL VENOUS BLD VENIPUNCTURE: CPT | Performed by: FAMILY MEDICINE

## 2025-01-06 PROCEDURE — 99396 PREV VISIT EST AGE 40-64: CPT | Performed by: FAMILY MEDICINE

## 2025-01-06 PROCEDURE — 99214 OFFICE O/P EST MOD 30 MIN: CPT | Mod: 25 | Performed by: FAMILY MEDICINE

## 2025-01-06 PROCEDURE — 84439 ASSAY OF FREE THYROXINE: CPT | Performed by: FAMILY MEDICINE

## 2025-01-06 PROCEDURE — 83036 HEMOGLOBIN GLYCOSYLATED A1C: CPT | Performed by: FAMILY MEDICINE

## 2025-01-06 PROCEDURE — G2211 COMPLEX E/M VISIT ADD ON: HCPCS | Performed by: FAMILY MEDICINE

## 2025-01-06 PROCEDURE — 84443 ASSAY THYROID STIM HORMONE: CPT | Performed by: FAMILY MEDICINE

## 2025-01-06 RX ORDER — TIRZEPATIDE 12.5 MG/.5ML
12.5 INJECTION, SOLUTION SUBCUTANEOUS
Qty: 6 ML | Refills: 0 | Status: SHIPPED | OUTPATIENT
Start: 2025-03-06

## 2025-01-06 RX ORDER — ROPINIROLE 0.25 MG/1
0.25 TABLET, FILM COATED ORAL AT BEDTIME
Qty: 30 TABLET | Refills: 0 | Status: SHIPPED | OUTPATIENT
Start: 2025-01-06

## 2025-01-06 RX ORDER — MULTIVITAMIN WITH IRON
1 TABLET ORAL DAILY
Qty: 30 TABLET | Refills: 1 | Status: SHIPPED | OUTPATIENT
Start: 2025-01-06

## 2025-01-06 RX ORDER — TIRZEPATIDE 10 MG/.5ML
10 INJECTION, SOLUTION SUBCUTANEOUS
Qty: 2 ML | Refills: 0 | Status: SHIPPED | OUTPATIENT
Start: 2025-02-06

## 2025-01-06 RX ORDER — NYSTATIN 100000 [USP'U]/G
POWDER TOPICAL DAILY
Qty: 60 G | Refills: 0 | Status: SHIPPED | OUTPATIENT
Start: 2025-01-06

## 2025-01-06 RX ORDER — TIRZEPATIDE 7.5 MG/.5ML
7.5 INJECTION, SOLUTION SUBCUTANEOUS
Qty: 30 ML | Refills: 0 | Status: SHIPPED | OUTPATIENT
Start: 2025-01-06

## 2025-01-06 ASSESSMENT — PATIENT HEALTH QUESTIONNAIRE - PHQ9
10. IF YOU CHECKED OFF ANY PROBLEMS, HOW DIFFICULT HAVE THESE PROBLEMS MADE IT FOR YOU TO DO YOUR WORK, TAKE CARE OF THINGS AT HOME, OR GET ALONG WITH OTHER PEOPLE: SOMEWHAT DIFFICULT
SUM OF ALL RESPONSES TO PHQ QUESTIONS 1-9: 1
SUM OF ALL RESPONSES TO PHQ QUESTIONS 1-9: 1

## 2025-01-06 NOTE — PROGRESS NOTES
"Preventive Care Visit  [facility]  Leonides Juárez MD, Family Medicine  Jan 6, 2025      Assessment & Plan     Annual physical exam    Acquired hypothyroidism  Recheck TSH, FT4 abnormal, recommend restarting levothyroxine  - TSH WITH FREE T4 REFLEX  - TSH WITH FREE T4 REFLEX    Type 2 diabetes mellitus with stage 3a chronic kidney disease, without long-term current use of insulin (H)  Controlled, A1c 7.4.  Continue titrating tirzepatide, feet exam shows bilateral neuropathy with evidence of callus formation, recommend diabetic shoe.  Patient is up-to-date on diabetic eye exam without evidence of retinopathy.  Check A1c in 3 months follow-up with me in 6 months.  - Albumin Random Urine Quantitative with Creat Ratio  - Albumin Random Urine Quantitative with Creat Ratio  - Hemoglobin A1c  - Hemoglobin A1c  - Tirzepatide (MOUNJARO) 7.5 MG/0.5ML SOAJ  Dispense: 30 mL; Refill: 0  - MOUNJARO 10 MG/0.5ML SOAJ  Dispense: 2 mL; Refill: 0  - MOUNJARO 12.5 MG/0.5ML SOAJ  Dispense: 6 mL; Refill: 0  - Miscellaneous DME Supply Order (Use only if a more specific DME order does not already exist)    Restless leg syndrome  Patient has symptoms consistent with restless leg, start magnesium supplementation if not helpful after a week start ropinirole.  - magnesium 250 MG tablet  Dispense: 30 tablet; Refill: 1  - rOPINIRole (REQUIP) 0.25 MG tablet  Dispense: 30 tablet; Refill: 0    Urgency incontinence  Rule out urinary tract infection, check UA  - UA with Microscopic reflex to Culture - lab collect    Candidiasis of skin  Start nystatin  - nystatin (MYCOSTATIN) 510676 UNIT/GM external powder  Dispense: 60 g; Refill: 0    Screen for colon cancer  Patient looks for FIT testing, continue yearly.  - Fecal colorectal cancer screen FIT - Future (S+30)              BMI  Estimated body mass index is 42.93 kg/m  as calculated from the following:    Height as of this encounter: 1.651 m (5' 5\").    Weight as of this " encounter: 117 kg (258 lb).   Weight management plan: Discussed healthy diet and exercise guidelines    Counseling  Appropriate preventive services were addressed with this patient via screening, questionnaire, or discussion as appropriate for fall prevention, nutrition, physical activity, Tobacco-use cessation, social engagement, weight loss and cognition.  Checklist reviewing preventive services available has been given to the patient.  Reviewed patient's diet, addressing concerns and/or questions.   She is at risk for lack of exercise and has been provided with information to increase physical activity for the benefit of her well-being.   The patient was instructed to see the dentist every 6 months.   She is at risk for psychosocial distress and has been provided with information to reduce risk.   Information on urinary incontinence and treatment options given to patient.           Carmen Orosco is a 64 year old, presenting for the following:  Physical and Diabetes        1/6/2025     7:53 AM   Additional Questions   Roomed by Marquita LEONARDO      Diabetes Follow-up    How often are you checking your blood sugar? Two times daily  Blood sugar testing frequency justification:   On new medication  What time of day are you checking your blood sugars (select all that apply)?  Before meals and At bedtime  Have you had any blood sugars above 200?  No  Have you had any blood sugars below 70?  No  What symptoms do you notice when your blood sugar is low?  Lethargy  What concerns do you have today about your diabetes? None   Do you have any of these symptoms? (Select all that apply)  Numbness in feet and Burning in feet  Have you had a diabetic eye exam in the last 12 months? Yes-  Location: [facility]    Interval diabetes follow-up.  Additionally has been noticing the sensation of moving her legs, significant pain with her feet, notably she has diabetic neuropathy on gabapentin.  Also been having  a rash underneath her breast which is not helpful with over-the-counter powder.    Last 2 weeks has also noticed increased incontinence with a sudden urge of urinating.  There is no painful urination.      BP Readings from Last 2 Encounters:   01/06/25 124/76   08/28/24 112/70     Hemoglobin A1C (%)   Date Value   01/06/2025 7.4 (H)   08/28/2024 7.4 (H)   05/11/2021 7.9 (H)   02/17/2021 9.2 (H)     LDL Cholesterol Calculated (mg/dL)   Date Value   05/14/2024 76   02/12/2024 69   11/25/2020 229 (H)   07/22/2019 101 (H)           Health Care Directive  Patient does not have a Health Care Directive: Discussed advance care planning with patient; however, patient declined at this time.      1/1/2025   General Health   How would you rate your overall physical health? Good   Feel stress (tense, anxious, or unable to sleep) To some extent   (!) STRESS CONCERN      1/1/2025   Nutrition   Diet: I don't know         1/1/2025   Exercise   Days per week of moderate/strenous exercise 1 day   Average minutes spent exercising at this level 30 min   (!) EXERCISE CONCERN      1/1/2025   Social Factors   Frequency of gathering with friends or relatives More than three times a week   Worry food won't last until get money to buy more Yes   Food not last or not have enough money for food? No   Do you have housing? (Housing is defined as stable permanent housing and does not include staying ouside in a car, in a tent, in an abandoned building, in an overnight shelter, or couch-surfing.) Yes   Are you worried about losing your housing? No   Lack of transportation? No   Unable to get utilities (heat,electricity)? No   (!) FOOD SECURITY CONCERN PRESENT      1/1/2025   Fall Risk   Fallen 2 or more times in the past year? No   Trouble with walking or balance? No          1/1/2025   Activities of Daily Living- Home Safety   Needs help with the following daily activites None of the above   Safety concerns in the home None of the above          1/1/2025   Dental   Dentist two times every year? (!) NO         1/1/2025   Hearing Screening   Hearing concerns? None of the above         1/1/2025   Driving Risk Screening   Patient/family members have concerns about driving No         1/1/2025   General Alertness/Fatigue Screening   Have you been more tired than usual lately? No         1/1/2025   Urinary Incontinence Screening   Bothered by leaking urine in past 6 months Yes         1/1/2025   TB Screening   Were you born outside of the [place]? No       Today's PHQ-9 Score:       1/6/2025     7:49 AM   PHQ-9 SCORE   PHQ-9 Total Score MyChart 1 (Minimal depression)   PHQ-9 Total Score 1        Patient-reported         1/1/2025   Substance Use   Alcohol more than 3/day or more than 7/wk No   Do you have a current opioid prescription? No   How severe/bad is pain from 1 to 10? 6/10   Do you use any other substances recreationally? No     Social History     Tobacco Use    Smoking status: Never    Smokeless tobacco: Never   Vaping Use    Vaping status: Never Used   Substance Use Topics    Alcohol use: No    Drug use: No           4/3/2023   LAST FHS-7 RESULTS   1st degree relative breast or ovarian cancer No   Any relative bilateral breast cancer No   Any male have breast cancer No   Any ONE woman have BOTH breast AND ovarian cancer No   Any woman with breast cancer before 50yrs No   2 or more relatives with breast AND/OR ovarian cancer No   2 or more relatives with breast AND/OR bowel cancer No        Mammogram Screening - Mammogram every 1-2 years updated in Health Maintenance based on mutual decision making      History of abnormal Pap smear: No - age 30- 64 PAP with HPV every 5 years recommended        Latest Ref Rng & Units 5/19/2021     4:57 PM 5/19/2021     4:35 PM 6/21/2018     8:37 AM   PAP / HPV   PAP (Historical)  NIL      HPV 16 DNA NEG^Negative  Negative  Negative    HPV 18 DNA NEG^Negative  Negative  Negative    Other HR HPV NEG^Negative  Negative  Negative   "    ASCVD Risk   The ASCVD Risk score (Wilner THOMAS, et al., 2019) failed to calculate for the following reasons:    Risk score cannot be calculated because patient has a medical history suggesting prior/existing ASCVD            Reviewed and updated as needed this visit by Provider     Meds                  Current providers sharing in care for this patient include:  Patient Care Team:  Leonides Juárez MD as PCP - General (Family Practice)  Leonides Juárez MD as Assigned PCP  Jill Cole EP as Cardiac Rehabilitation Therapist  Luis Spain MD as Assigned Heart and Vascular Provider    The following health maintenance items are reviewed in Epic and correct as of today:  Health Maintenance   Topic Date Due    RSV VACCINE (1 - Risk 60-74 years 1-dose series) Never done    TSH W/FREE T4 REFLEX  07/22/2020    INFLUENZA VACCINE (1) 09/01/2024    COVID-19 Vaccine (4 - 2024-25 season) 09/01/2024    MICROALBUMIN  12/13/2024    COLORECTAL CANCER SCREENING  12/13/2024    MEDICARE ANNUAL WELLNESS VISIT  12/13/2024    EYE EXAM  01/29/2025    Pneumococcal Vaccine: 50+ Years (3 of 3 - PCV20 or PCV21) 02/25/2025    MAMMO SCREENING  04/03/2025    A1C  04/06/2025    LIPID  05/14/2025    BMP  07/29/2025    HEMOGLOBIN  07/29/2025    DIABETIC FOOT EXAM  01/06/2026    ANNUAL REVIEW OF HM ORDERS  01/06/2026    HPV TEST  05/19/2026    PAP  05/19/2026    ADVANCE CARE PLANNING  12/13/2028    DTAP/TDAP/TD IMMUNIZATION (3 - Td or Tdap) 01/31/2033    HEPATITIS C SCREENING  Completed    HIV SCREENING  Completed    PHQ-2 (once per calendar year)  Completed    URINALYSIS  Completed    ZOSTER IMMUNIZATION  Completed    HPV IMMUNIZATION  Aged Out    MENINGITIS IMMUNIZATION  Aged Out    RSV MONOCLONAL ANTIBODY  Aged Out    URINE DRUG SCREEN  Discontinued            Objective    Exam  /76 (Cuff Size: Adult Large)   Pulse 77   Temp 97.9  F (36.6  C)   Resp 16   Ht 1.651 m (5' 5\")   Wt 117 kg (258 lb)   LMP 02/13/2009   " "SpO2 97%   BMI 42.93 kg/m     Estimated body mass index is 42.93 kg/m  as calculated from the following:    Height as of this encounter: 1.651 m (5' 5\").    Weight as of this encounter: 117 kg (258 lb).    Physical Exam  Vitals reviewed.   Constitutional:       General: She is not in acute distress.     Appearance: Normal appearance. She is not ill-appearing or diaphoretic.   HENT:      Head: Normocephalic and atraumatic.      Right Ear: Tympanic membrane normal.      Left Ear: Tympanic membrane normal.      Nose: Nose normal. No congestion or rhinorrhea.      Mouth/Throat:      Mouth: Mucous membranes are moist.      Pharynx: Oropharynx is clear.   Eyes:      General: No scleral icterus.        Right eye: No discharge.         Left eye: No discharge.      Extraocular Movements: Extraocular movements intact.      Pupils: Pupils are equal, round, and reactive to light.   Cardiovascular:      Rate and Rhythm: Normal rate and regular rhythm.      Heart sounds: No murmur heard.  Pulmonary:      Effort: Pulmonary effort is normal.      Breath sounds: Normal breath sounds.   Abdominal:      General: Abdomen is flat. There is no distension.   Musculoskeletal:         General: No swelling.      Cervical back: Normal range of motion.   Lymphadenopathy:      Cervical: No cervical adenopathy.   Skin:     General: Skin is warm.      Capillary Refill: Capillary refill takes less than 2 seconds.      Comments: Callus formation on both feet   Neurological:      Mental Status: She is alert.      Comments: Unable to sense monofilament testing on both feet.   Psychiatric:         Mood and Affect: Mood normal.         Behavior: Behavior normal.         Thought Content: Thought content normal.         Judgment: Judgment normal.            TSH   Date Value Ref Range Status   07/22/2019 6.04 (H) 0.40 - 4.00 mU/L Final           1/6/2025   Mini Cog   Clock Draw Score 2 Normal   3 Item Recall 2 objects recalled   Mini Cog Total Score 4 "              Signed Electronically by: Leonides Juárez MD    Answers submitted by the patient for this visit:  Patient Health Questionnaire (Submitted on 1/6/2025)  If you checked off any problems, how difficult have these problems made it for you to do your work, take care of things at home, or get along with other people?: Somewhat difficult  PHQ9 TOTAL SCORE: 1     caffeine/Denies drug use

## 2025-01-07 ENCOUNTER — MYC MEDICAL ADVICE (OUTPATIENT)
Dept: FAMILY MEDICINE | Facility: CLINIC | Age: 65
End: 2025-01-07
Payer: MEDICARE

## 2025-01-07 DIAGNOSIS — E03.8 SUBCLINICAL HYPOTHYROIDISM: Primary | ICD-10-CM

## 2025-01-07 RX ORDER — LEVOTHYROXINE SODIUM 25 UG/1
25 TABLET ORAL
Qty: 30 TABLET | Refills: 5 | Status: SHIPPED | OUTPATIENT
Start: 2025-01-07

## 2025-01-07 NOTE — TELEPHONE ENCOUNTER
Patient was notified of message, no questions for RN. Lab only scheduled for 2/18/25.  Alice Hoang,

## 2025-01-07 NOTE — TELEPHONE ENCOUNTER
Please contact patient we will start her on 25 mcg daily for subclinical hypothyroidism.  Take medication by itself first thing in the morning for at least 15 to 30 minutes prior to her other meds.  Repeat lab test in 6 weeks, lab pended. Schedule lab only.    EMILIA

## 2025-01-15 ENCOUNTER — MYC REFILL (OUTPATIENT)
Dept: CARDIOLOGY | Facility: CLINIC | Age: 65
End: 2025-01-15
Payer: MEDICARE

## 2025-01-15 ENCOUNTER — MYC REFILL (OUTPATIENT)
Dept: FAMILY MEDICINE | Facility: CLINIC | Age: 65
End: 2025-01-15
Payer: MEDICARE

## 2025-01-15 DIAGNOSIS — G47.09 OTHER INSOMNIA: ICD-10-CM

## 2025-01-15 DIAGNOSIS — K21.9 GASTROESOPHAGEAL REFLUX DISEASE WITHOUT ESOPHAGITIS: ICD-10-CM

## 2025-01-15 DIAGNOSIS — G43.809 OTHER MIGRAINE WITHOUT STATUS MIGRAINOSUS, NOT INTRACTABLE: ICD-10-CM

## 2025-01-15 RX ORDER — PANTOPRAZOLE SODIUM 40 MG/1
40 TABLET, DELAYED RELEASE ORAL DAILY
Qty: 90 TABLET | Refills: 0 | Status: SHIPPED | OUTPATIENT
Start: 2025-01-15

## 2025-01-15 RX ORDER — SUMATRIPTAN SUCCINATE 25 MG/1
25 TABLET ORAL
Qty: 12 TABLET | Refills: 11 | Status: SHIPPED | OUTPATIENT
Start: 2025-01-15

## 2025-01-15 RX ORDER — ZOLPIDEM TARTRATE 10 MG/1
TABLET ORAL
Qty: 30 TABLET | Refills: 5 | Status: SHIPPED | OUTPATIENT
Start: 2025-01-15

## 2025-01-15 NOTE — TELEPHONE ENCOUNTER
[facility] Cardiology Refill Guideline reviewed.  Medication meets criteria for refill. Apt with Dr. Spain scheduled in February for ongoing refills.

## 2025-01-18 ENCOUNTER — MYC REFILL (OUTPATIENT)
Dept: FAMILY MEDICINE | Facility: CLINIC | Age: 65
End: 2025-01-18
Payer: MEDICARE

## 2025-01-18 DIAGNOSIS — G43.809 OTHER MIGRAINE WITHOUT STATUS MIGRAINOSUS, NOT INTRACTABLE: ICD-10-CM

## 2025-01-19 ENCOUNTER — E-VISIT (OUTPATIENT)
Dept: FAMILY MEDICINE | Facility: CLINIC | Age: 65
End: 2025-01-19
Payer: MEDICARE

## 2025-01-19 DIAGNOSIS — R51.9 INTRACTABLE HEADACHE, UNSPECIFIED CHRONICITY PATTERN, UNSPECIFIED HEADACHE TYPE: Primary | ICD-10-CM

## 2025-01-19 PROCEDURE — 99207 PR NON-BILLABLE SERV PER CHARTING: CPT | Performed by: FAMILY MEDICINE

## 2025-01-20 ENCOUNTER — TELEPHONE (OUTPATIENT)
Dept: FAMILY MEDICINE | Facility: CLINIC | Age: 65
End: 2025-01-20
Payer: MEDICARE

## 2025-01-20 RX ORDER — SUMATRIPTAN SUCCINATE 25 MG/1
25 TABLET ORAL
Qty: 12 TABLET | Refills: 11 | OUTPATIENT
Start: 2025-01-20

## 2025-01-20 NOTE — TELEPHONE ENCOUNTER
----- Message from Leonides Juárez sent at 1/20/2025  8:40 AM CST -----  Regarding: urgent appt  Can we please call and offer a double book with patient today for headache?    Ladan NIETO

## 2025-01-20 NOTE — TELEPHONE ENCOUNTER
Left message for patient to return call, okay to double book patient today for headaches with PCP     Shannan Bellamy/

## 2025-01-22 DIAGNOSIS — Z86.39 HISTORY OF INSULIN DEPENDENT DIABETES MELLITUS: ICD-10-CM

## 2025-01-22 DIAGNOSIS — G25.81 RESTLESS LEG SYNDROME: ICD-10-CM

## 2025-01-22 RX ORDER — PEN NEEDLE, DIABETIC 32GX 5/32"
NEEDLE, DISPOSABLE MISCELLANEOUS
Qty: 200 EACH | Refills: 5 | Status: SHIPPED | OUTPATIENT
Start: 2025-01-22

## 2025-01-22 RX ORDER — ROPINIROLE 0.25 MG/1
TABLET, FILM COATED ORAL
Qty: 90 TABLET | Refills: 0 | Status: SHIPPED | OUTPATIENT
Start: 2025-01-22

## 2025-02-06 ENCOUNTER — LAB (OUTPATIENT)
Dept: LAB | Facility: CLINIC | Age: 65
End: 2025-02-06
Payer: MEDICARE

## 2025-02-06 DIAGNOSIS — I25.10 CORONARY ARTERY DISEASE INVOLVING NATIVE CORONARY ARTERY OF NATIVE HEART WITHOUT ANGINA PECTORIS: ICD-10-CM

## 2025-02-06 LAB
ALT SERPL W P-5'-P-CCNC: 38 U/L (ref 0–50)
ANION GAP SERPL CALCULATED.3IONS-SCNC: 13 MMOL/L (ref 7–15)
BUN SERPL-MCNC: 14 MG/DL (ref 8–23)
CALCIUM SERPL-MCNC: 9.3 MG/DL (ref 8.8–10.4)
CHLORIDE SERPL-SCNC: 101 MMOL/L (ref 98–107)
CHOLEST SERPL-MCNC: 148 MG/DL
CREAT SERPL-MCNC: 0.9 MG/DL (ref 0.51–0.95)
EGFRCR SERPLBLD CKD-EPI 2021: 71 ML/MIN/1.73M2
FASTING STATUS PATIENT QL REPORTED: YES
GLUCOSE SERPL-MCNC: 222 MG/DL (ref 70–99)
HCO3 SERPL-SCNC: 23 MMOL/L (ref 22–29)
HDLC SERPL-MCNC: 54 MG/DL
LDLC SERPL CALC-MCNC: 73 MG/DL
NONHDLC SERPL-MCNC: 94 MG/DL
POTASSIUM SERPL-SCNC: 4.1 MMOL/L (ref 3.4–5.3)
SODIUM SERPL-SCNC: 137 MMOL/L (ref 135–145)
TRIGL SERPL-MCNC: 107 MG/DL

## 2025-02-11 ENCOUNTER — TELEPHONE (OUTPATIENT)
Dept: CARDIOLOGY | Facility: CLINIC | Age: 65
End: 2025-02-11

## 2025-02-11 ENCOUNTER — OFFICE VISIT (OUTPATIENT)
Dept: CARDIOLOGY | Facility: CLINIC | Age: 65
End: 2025-02-11
Payer: MEDICARE

## 2025-02-11 VITALS
BODY MASS INDEX: 43.39 KG/M2 | HEIGHT: 65 IN | SYSTOLIC BLOOD PRESSURE: 116 MMHG | HEART RATE: 84 BPM | WEIGHT: 260.4 LBS | DIASTOLIC BLOOD PRESSURE: 78 MMHG | OXYGEN SATURATION: 96 %

## 2025-02-11 DIAGNOSIS — I10 HTN, GOAL BELOW 140/90: ICD-10-CM

## 2025-02-11 DIAGNOSIS — I21.4 NSTEMI (NON-ST ELEVATED MYOCARDIAL INFARCTION) (H): ICD-10-CM

## 2025-02-11 DIAGNOSIS — E88.810 METABOLIC SYNDROME X: ICD-10-CM

## 2025-02-11 DIAGNOSIS — E66.01 MORBID OBESITY (H): ICD-10-CM

## 2025-02-11 DIAGNOSIS — I25.10 CORONARY ARTERY DISEASE INVOLVING NATIVE CORONARY ARTERY OF NATIVE HEART WITHOUT ANGINA PECTORIS: Primary | ICD-10-CM

## 2025-02-11 DIAGNOSIS — E78.01 FAMILIAL HYPERCHOLESTEROLEMIA: ICD-10-CM

## 2025-02-11 DIAGNOSIS — R60.9 DEPENDENT EDEMA: ICD-10-CM

## 2025-02-11 RX ORDER — CLOPIDOGREL BISULFATE 75 MG/1
75 TABLET ORAL DAILY
Qty: 93 TABLET | Refills: 3 | Status: SHIPPED | OUTPATIENT
Start: 2025-02-11

## 2025-02-11 NOTE — TELEPHONE ENCOUNTER
Dr. Spain received the below fax from patient's pharmacy. Routed to prior authorization team for review.

## 2025-02-11 NOTE — PROGRESS NOTES
HPI and Plan:   Ana Luisa is a very nice 65-year-old woman with past medical history significant for non-ST segment elevation myocardial infarction, January 2024 with fibromyalgia, GERD, morbid obesity, probable familial hypercholesterolemia and diabetes mellitus.     Ana Luisa's family history is significant for her mother and father dying of congestive heart failure in her 60s.  She has a brother 4 years older who has had coronary bypass grafting x 4.  Ana Luisa is a lifelong non-smoker does not drink alcohol.          January 2024 she underwent stenting of her proximal left anterior descending artery, proximal circumflex coronary artery and second obtuse marginal.  She had mild to moderate other disease that was thought to be best treated medically.       She initially had shortness of breath with Brilinta which resolved with change to Plavix.     Since her heart attack she has changed her diet.  She got rid of her deep fryer.  She is eating less red meat.  She states she still does not like vegetables but is doing fairly well with fruit.  She is watching her portions.  She walks every other day for 30 minutes to 2 hours without any symptoms or limitations.      She is on Mounjaro and is managed to lose 10 pounds since I saw her last in April.     Follow-up echocardiogram May 2024 demonstrates ejection fraction of 55 to 60% without focal wall motion abnormalities or valvular pathology     Ana Luisa has no chest, arm, neck, jaw or shoulder discomfort.  No dyspnea on exertion, orthopnea, or PND.  No palpitations, lightheadedness, dizziness, syncope, or near syncope.  No peripheral edema.     Assessment and plan Ana Luisa has no symptoms to suggest ischemia, heart failure or significant arrhythmia     Blood pressure is very well-controlled today     Cholesterol profile is outstanding on atorvastatin 80 with a total cholesterol of 148, HDL 54, LDL 73 and triglycerides of 107.  Her initial cholesterol was 322 with an LDL of  243.  We reviewed the trend in the chart and I congratulated on her healthy lifestyle and we will continue her atorvastatin 80.  Her numbers are still improving probably due to lifestyle.  If she trends upward as her LDL is not at 55 I would consider adding Zetia.     I have congratulated her on her current exercise regiment encouraged her to make sure she is getting 30 minutes of aerobic activity 5 times a week.  I have also asked her to add resistance activity twice a week.     We talked about the importance of eating a predominately plant-based diet even cutting back on chicken.     We talked about the importance of weight loss.     I have recommended lifelong clopidogrel as opposed to aspirin.    She recently has been found to have an elevated TSH and is initiated on thyroid replacement therapy.  I pointed out that this will help with all other parameters including her weight, lipid profile, glucose and overall cardiovascular fitness.     I will have her follow-up with an [facility] in 1 year.  We talked about trimming back her medical regimen but will continue at this time.     Thank you for allow me to participate in this patient's care.  Sincerely,                                Luis Spain MD [facility]       Today's clinic visit entailed:  Review of the result(s) of each unique test - lab work, echocardiogram  Ordering of each unique test  Prescription drug management  25 minutes spent by me on the date of the encounter doing chart review, history and exam, documentation and further activities per the note  Provider  Link to [facility] Help Grid           Orders Placed This Encounter   Procedures    Basic metabolic panel    Lipid Profile    ALT    Follow-Up with Cardiology [facility]       Orders Placed This Encounter   Medications    clopidogrel (PLAVIX) 75 MG tablet     Sig: Take 1 tablet (75 mg) by mouth daily. The first day you take Plavix, take 4 tablets (300 mg). Each day thereafter, you will take 1 tablet (75 mg) once  daily.     Dispense:  93 tablet     Refill:  3       Medications Discontinued During This Encounter   Medication Reason    clopidogrel (PLAVIX) 75 MG tablet Reorder (No AVS)         Encounter Diagnoses   Name Primary?    NSTEMI (non-ST elevated myocardial infarction) (H)     Coronary artery disease involving native coronary artery of native heart without angina pectoris Yes    Familial hypercholesterolemia     HTN, goal below 140/90     Dependent edema     Metabolic syndrome X     Morbid obesity (H)        CURRENT MEDICATIONS:  Current Outpatient Medications   Medication Sig Dispense Refill    atorvastatin (LIPITOR) 80 MG tablet Take 1 tablet (80 mg) by mouth daily 90 tablet 4    blood glucose (NO BRAND SPECIFIED) test strip Use to test blood sugar as directed. 300 strip 3    blood glucose monitoring (NO BRAND SPECIFIED) meter device kit Use to test blood sugar as directed. 1 kit 0    Blood Pressure Monitoring (COMFORT TOUCH BP CUFF/LARGE) MISC 1 Units daily 1 each 0    cetirizine (ZYRTEC) 10 MG tablet Take 1 tablet (10 mg) by mouth daily 30 tablet 0    clopidogrel (PLAVIX) 75 MG tablet Take 1 tablet (75 mg) by mouth daily. The first day you take Plavix, take 4 tablets (300 mg). Each day thereafter, you will take 1 tablet (75 mg) once daily. 93 tablet 3    Dulaglutide 3 MG/0.5ML SOPN Inject 3 mg Subcutaneous once a week 2 mL 1    EPINEPHrine (ANY BX GENERIC EQUIV) 0.3 MG/0.3ML injection 2-pack Inject 0.3 mLs (0.3 mg) into the muscle as needed for anaphylaxis May repeat one time in 5-15 minutes if response to initial dose is inadequate. 2 each 1    furosemide (LASIX) 40 MG tablet Take 1 tablet (40 mg) by mouth daily. 90 tablet 2    insulin glargine (LANTUS PEN) 100 UNIT/ML pen Inject 44 Units Subcutaneous at bedtime 45 mL 3    insulin pen needle (BD PEN NEEDLE FAIZAN 2ND GEN) 32G X 4 MM miscellaneous USE 1 PEN NEEDLE DAILY OR AS DIRECTED. 200 each 5    levothyroxine (SYNTHROID/LEVOTHROID) 25 MCG tablet Take 1 tablet  (25 mcg) by mouth every morning (before breakfast). 30 tablet 5    losartan (COZAAR) 25 MG tablet TAKE 1 TABLET(25 MG) BY MOUTH DAILY 90 tablet 3    magnesium 250 MG tablet Take 1 tablet (250 mg) by mouth daily. 30 tablet 1    meclizine (ANTIVERT) 25 MG tablet Take 1 tablet (25 mg) by mouth 3 times daily as needed for dizziness 30 tablet 0    metoprolol succinate ER (TOPROL XL) 25 MG 24 hr tablet Take 1 tablet (25 mg) by mouth daily 90 tablet 3    nitroGLYcerin (NITROSTAT) 0.4 MG sublingual tablet For chest pain place 1 tablet under the tongue every 5 minutes for 3 doses. If symptoms persist 5 minutes after 1st dose call 911. 30 tablet 0    nystatin (MYCOSTATIN) 230710 UNIT/GM external powder Apply topically daily. 60 g 0    order for DME Equipment being ordered: Diabetic Shoes 1 Units 0    pantoprazole (PROTONIX) 40 MG EC tablet Take 1 tablet (40 mg) by mouth daily. 90 tablet 0    pregabalin (LYRICA) 25 MG capsule Take 1 capsule (25 mg) by mouth 2 times daily 60 capsule 5    rOPINIRole (REQUIP) 0.25 MG tablet TAKE 1 TABLET(0.25 MG) BY MOUTH AT BEDTIME 90 tablet 0    SUMAtriptan (IMITREX) 25 MG tablet Take 1 tablet (25 mg) by mouth at onset of headache for migraine. May repeat in 2 hours. Max 8 tablets/24 hours. 12 tablet 11    Tirzepatide (MOUNJARO) 7.5 MG/0.5ML SOAJ Inject 0.5 mLs (7.5 mg) subcutaneously every 7 days. 30 mL 0    zolpidem (AMBIEN) 10 MG tablet TAKE 1 TABLET(10 MG) BY MOUTH EVERY NIGHT AS NEEDED FOR SLEEP 30 tablet 5    MOUNJARO 10 MG/0.5ML SOAJ Inject 0.5 mLs (10 mg) subcutaneously every 7 days. (Patient not taking: Reported on 2/11/2025) 2 mL 0    [START ON 3/6/2025] MOUNJARO 12.5 MG/0.5ML SOAJ Inject 0.5 mLs (12.5 mg) subcutaneously every 7 days. (Patient not taking: Reported on 2/11/2025) 6 mL 0    Tirzepatide (MOUNJARO) 5 MG/0.5ML SOAJ Inject 0.5 mLs (5 mg) subcutaneously every 7 days. (Patient not taking: Reported on 2/11/2025) 2 mL 1       ALLERGIES     Allergies   Allergen Reactions     Penicillins Anaphylaxis    Augmentin [Amoxicillin-Pot Clavulanate] GI Disturbance    Bee Swelling    Gabapentin Other (See Comments)     Mood Swings    Lisinopril Rash    Metformin Itching    Topiramate Other (See Comments)     numbness       PAST MEDICAL HISTORY:  Past Medical History:   Diagnosis Date    Arthritis     left shoulder and back    Chronic pain     fibromalgia     CKD (chronic kidney disease), stage III (H)     Congestive heart failure (H)     Depressive disorder     Fibromyalgia     Gastro-oesophageal reflux disease     Heart disease     HTN, goal below 140/90     Hyperlipidemia LDL goal <100     Hypothyroidism     Major depression     chronic kidney disease-stage 3    Peripheral neuropathy 02/2015    + callus. diabetic shoes rx done    PONV (postoperative nausea and vomiting)     Rheumatoid arthritis of foot (H)     Tongue abnormality     Type 2 diabetes, HbA1C goal < 8% (H)     pre-diabetic       PAST SURGICAL HISTORY:  Past Surgical History:   Procedure Laterality Date    ARTHROPLASTY SHOULDER  7/25/2013    Procedure: right ARTHROPLASTY SHOULDER;  RIGHT TOTAL SHOULDER ARTHROPLASTY (TORNIER AFFINITY SHOULDER SYSTEM)^;  Surgeon: Dung Morales MD;  Location:  OR    BACK SURGERY  2011    L45 laminectomy    CV CORONARY ANGIOGRAM N/A 1/15/2024    Procedure: Coronary Angiogram;  Surgeon: Dung Quintero MD;  Location:  HEART CARDI[facility] CATH LAB    CV PCI N/A 1/15/2024    Procedure: Percutaneous Coronary Intervention;  Surgeon: Dung Quintero MD;  Location:  HEART CARDI[facility] CATH LAB    ELBOW WRIST HAND FINGER ORTHOSIS, RIGID, WITHOUT JOINTS, MAY INCLUDE SOFT  5/2007    put in [place]    ESOPHAGOSCOPY, GASTROSCOPY, DUODENOSCOPY (EGD), COMBINED  9/23/2015    Dr[facility]    ESOPHAGOSCOPY, GASTROSCOPY, DUODENOSCOPY (EGD), COMBINED N/A 9/23/2015    Procedure: COMBINED ESOPHAGOSCOPY, GASTROSCOPY, DUODENOSCOPY (EGD), BIOPSY SINGLE OR MULTIPLE;  Surgeon: Jose Thomas MD;  Location:  [facility]    ESOPHAGOSCOPY, GASTROSCOPY, DUODENOSCOPY (EGD), COMBINED  02/04/2020    [facility]    ESOPHAGOSCOPY, GASTROSCOPY, DUODENOSCOPY (EGD), COMBINED N/A 2/4/2020    Procedure: ESOPHAGOGASTRODUODENOSCOPY (EGD);  Surgeon: Jose Thomas MD;  Location: [facility]    HCL PAP SMEAR  6/2008    [facility]    ORTHOPEDIC SURGERY      left shoulder scoped and plate left elbow    SURGICAL HISTORY OF -   2009    ulnar nerve release, carpal tunnel- left    ZZC APPENDECTOMY  age 19    ZZC LIGATE FALLOPIAN TUBE,POSTPARTUM  1982    Tubal Ligation       FAMILY HISTORY:  Family History   Problem Relation Age of Onset    C.A.D. Mother     Neurologic Disorder Mother         lupus    Depression Mother     Diabetes Mother     Coronary Artery Disease Mother     Cerebrovascular Disease Mother     Breast Cancer Mother     Osteoporosis Mother     Family History Negative Father     Diabetes Maternal Aunt     Colon Cancer No family hx of        SOCIAL HISTORY:  Social History     Socioeconomic History    Marital status:      Spouse name: None    Number of children: 4    Years of education: None    Highest education level: None   Occupational History     Employer: UNEMPLOYED   Tobacco Use    Smoking status: Never    Smokeless tobacco: Never   Vaping Use    Vaping status: Never Used   Substance and Sexual Activity    Alcohol use: No    Drug use: No    Sexual activity: Not Currently     Partners: Male     Birth control/protection: None   Other Topics Concern    Parent/sibling w/ CABG, MI or angioplasty before 65F 55M? Yes     Comment: brother and mom     Social Drivers of Health     Financial Resource Strain: Low Risk  (1/1/2025)    Financial Resource Strain     Within the past 12 months, have you or your family members you live with been unable to get utilities (heat, electricity) when it was really needed?: No   Food Insecurity: High Risk (1/1/2025)    Food Insecurity     Within the past 12 months, did you worry that your food would  "run out before you got money to buy more?: No     Within the past 12 months, did the food you bought just not last and you didn t have money to get more?: Yes   Transportation Needs: Low Risk  (1/1/2025)    Transportation Needs     Within the past 12 months, has lack of transportation kept you from medical appointments, getting your medicines, non-medical meetings or appointments, work, or from getting things that you need?: No   Physical Activity: Insufficiently Active (1/1/2025)    Exercise Vital Sign     Days of Exercise per Week: 1 day     Minutes of Exercise per Session: 30 min   Stress: Stress Concern Present (1/1/2025)    [employer] [place] [employer] - Occupational Stress Questionnaire     Feeling of Stress : To some extent   Social Connections: Unknown (1/1/2025)    Social Connection and Isolation Panel [NHANES]     Frequency of Social Gatherings with Friends and Family: More than three times a week   Interpersonal Safety: Low Risk  (1/6/2025)    Interpersonal Safety     Do you feel physically and emotionally safe where you currently live?: Yes     Within the past 12 months, have you been hit, slapped, kicked or otherwise physically hurt by someone?: No     Within the past 12 months, have you been humiliated or emotionally abused in other ways by your partner or ex-partner?: No   Housing Stability: Low Risk  (1/1/2025)    Housing Stability     Do you have housing? : Yes     Are you worried about losing your housing?: No       Review of Systems:  Skin:          Eyes:         ENT:         Respiratory:  Negative       Cardiovascular:  Negative      Gastroenterology:        Genitourinary:         Musculoskeletal:         Neurologic:         Psychiatric:         Heme/Lymph/Imm:         Endocrine:           Physical Exam:  Vitals: /78 (BP Location: Right arm, Patient Position: Sitting, Cuff Size: Adult Large)   Pulse 84   Ht 1.651 m (5' 5\")   Wt 118.1 kg (260 lb 6.4 oz)   LMP 02/13/2009   " SpO2 96%   BMI 43.33 kg/m      Constitutional:  cooperative, alert and oriented, well developed, well nourished, in no acute distress morbidly obese      Skin:  warm and dry to the touch, no apparent skin lesions or masses noted          Head:  normocephalic, no masses or lesions        Eyes:  pupils equal and round, conjunctivae and lids unremarkable, sclera white, no xanthalasma, EOMS intact, no nystagmus        Lymph:      ENT:  no pallor or cyanosis, dentition good        Neck:  no carotid bruit        Respiratory:  normal breath sounds, clear to auscultation, normal A-P diameter, normal symmetry, normal respiratory excursion, no use of accessory muscles         Cardiac: regular rhythm, no murmurs, gallops or rubs detected                pulses full and equal                                        GI:    obese      Extremities and Muscular Skeletal:  no spinal abnormalities noted, normal muscle strength and tone   bilateral LE edema, trace          Neurological:  no gross motor deficits        Psych:  affect appropriate, oriented to time, person and place        CC  Luis Spain MD  [id number]  [place] [id number]

## 2025-02-11 NOTE — LETTER
2/11/2025    Leonides Juárez MD  [address]  [facility] [address]    RE: Ana Luisa Byers       Dear Colleague,     I had the pleasure of seeing Ana Luisa Byers in the [facility].  HPI and Plan:   Ana Luisa is a very nice 65-year-old woman with past medical history significant for non-ST segment elevation myocardial infarction, January 2024 with fibromyalgia, GERD, morbid obesity, probable familial hypercholesterolemia and diabetes mellitus.     Ana Luisa's family history is significant for her mother and father dying of congestive heart failure in her 60s.  She has a brother 4 years older who has had coronary bypass grafting x 4.  Ana Luisa is a lifelong non-smoker does not drink alcohol.          January 2024 she underwent stenting of her proximal left anterior descending artery, proximal circumflex coronary artery and second obtuse marginal.  She had mild to moderate other disease that was thought to be best treated medically.       She initially had shortness of breath with Brilinta which resolved with change to Plavix.     Since her heart attack she has changed her diet.  She got rid of her deep fryer.  She is eating less red meat.  She states she still does not like vegetables but is doing fairly well with fruit.  She is watching her portions.  She walks every other day for 30 minutes to 2 hours without any symptoms or limitations.      She is on Mounjaro and is managed to lose 10 pounds since I saw her last in April.     Follow-up echocardiogram May 2024 demonstrates ejection fraction of 55 to 60% without focal wall motion abnormalities or valvular pathology     Ana Luisa has no chest, arm, neck, jaw or shoulder discomfort.  No dyspnea on exertion, orthopnea, or PND.  No palpitations, lightheadedness, dizziness, syncope, or near syncope.  No peripheral edema.     Assessment and plan Ana Luisa has no symptoms to suggest ischemia, heart failure or significant arrhythmia     Blood pressure is very  well-controlled today     Cholesterol profile is outstanding on atorvastatin 80 with a total cholesterol of 148, HDL 54, LDL 73 and triglycerides of 107.  Her initial cholesterol was 322 with an LDL of 243.  We reviewed the trend in the chart and I congratulated on her healthy lifestyle and we will continue her atorvastatin 80.  Her numbers are still improving probably due to lifestyle.  If she trends upward as her LDL is not at 55 I would consider adding Zetia.     I have congratulated her on her current exercise regiment encouraged her to make sure she is getting 30 minutes of aerobic activity 5 times a week.  I have also asked her to add resistance activity twice a week.     We talked about the importance of eating a predominately plant-based diet even cutting back on chicken.     We talked about the importance of weight loss.     I have recommended lifelong clopidogrel as opposed to aspirin.    She recently has been found to have an elevated TSH and is initiated on thyroid replacement therapy.  I pointed out that this will help with all other parameters including her weight, lipid profile, glucose and overall cardiovascular fitness.     I will have her follow-up with an [place] in 1 year.  We talked about trimming back her medical regimen but will continue at this time.     Thank you for allow me to participate in this patient's care.  Sincerely,                                Luis Spain MD [facility]       Today's clinic visit entailed:  Review of the result(s) of each unique test - lab work, echocardiogram  Ordering of each unique test  Prescription drug management  25 minutes spent by me on the date of the encounter doing chart review, history and exam, documentation and further activities per the note  Provider  Link to [facility] Help Grid           Orders Placed This Encounter   Procedures     Basic metabolic panel     Lipid Profile     ALT     Follow-Up with Cardiology [place]       Orders Placed This Encounter    Medications     clopidogrel (PLAVIX) 75 MG tablet     Sig: Take 1 tablet (75 mg) by mouth daily. The first day you take Plavix, take 4 tablets (300 mg). Each day thereafter, you will take 1 tablet (75 mg) once daily.     Dispense:  93 tablet     Refill:  3       Medications Discontinued During This Encounter   Medication Reason     clopidogrel (PLAVIX) 75 MG tablet Reorder (No AVS)         Encounter Diagnoses   Name Primary?     NSTEMI (non-ST elevated myocardial infarction) (H)      Coronary artery disease involving native coronary artery of native heart without angina pectoris Yes     Familial hypercholesterolemia      HTN, goal below 140/90      Dependent edema      Metabolic syndrome X      Morbid obesity (H)        CURRENT MEDICATIONS:  Current Outpatient Medications   Medication Sig Dispense Refill     atorvastatin (LIPITOR) 80 MG tablet Take 1 tablet (80 mg) by mouth daily 90 tablet 4     blood glucose (NO BRAND SPECIFIED) test strip Use to test blood sugar as directed. 300 strip 3     blood glucose monitoring (NO BRAND SPECIFIED) meter device kit Use to test blood sugar as directed. 1 kit 0     Blood Pressure Monitoring (COMFORT TOUCH BP CUFF/LARGE) MISC 1 Units daily 1 each 0     cetirizine (ZYRTEC) 10 MG tablet Take 1 tablet (10 mg) by mouth daily 30 tablet 0     clopidogrel (PLAVIX) 75 MG tablet Take 1 tablet (75 mg) by mouth daily. The first day you take Plavix, take 4 tablets (300 mg). Each day thereafter, you will take 1 tablet (75 mg) once daily. 93 tablet 3     Dulaglutide 3 MG/0.5ML SOPN Inject 3 mg Subcutaneous once a week 2 mL 1     EPINEPHrine (ANY BX GENERIC EQUIV) 0.3 MG/0.3ML injection 2-pack Inject 0.3 mLs (0.3 mg) into the muscle as needed for anaphylaxis May repeat one time in 5-15 minutes if response to initial dose is inadequate. 2 each 1     furosemide (LASIX) 40 MG tablet Take 1 tablet (40 mg) by mouth daily. 90 tablet 2     insulin glargine (LANTUS PEN) 100 UNIT/ML pen Inject 44  Units Subcutaneous at bedtime 45 mL 3     insulin pen needle (BD PEN NEEDLE FAIZAN 2ND GEN) 32G X 4 MM miscellaneous USE 1 PEN NEEDLE DAILY OR AS DIRECTED. 200 each 5     levothyroxine (SYNTHROID/LEVOTHROID) 25 MCG tablet Take 1 tablet (25 mcg) by mouth every morning (before breakfast). 30 tablet 5     losartan (COZAAR) 25 MG tablet TAKE 1 TABLET(25 MG) BY MOUTH DAILY 90 tablet 3     magnesium 250 MG tablet Take 1 tablet (250 mg) by mouth daily. 30 tablet 1     meclizine (ANTIVERT) 25 MG tablet Take 1 tablet (25 mg) by mouth 3 times daily as needed for dizziness 30 tablet 0     metoprolol succinate ER (TOPROL XL) 25 MG 24 hr tablet Take 1 tablet (25 mg) by mouth daily 90 tablet 3     nitroGLYcerin (NITROSTAT) 0.4 MG sublingual tablet For chest pain place 1 tablet under the tongue every 5 minutes for 3 doses. If symptoms persist 5 minutes after 1st dose call 911. 30 tablet 0     nystatin (MYCOSTATIN) 545521 UNIT/GM external powder Apply topically daily. 60 g 0     order for DME Equipment being ordered: Diabetic Shoes 1 Units 0     pantoprazole (PROTONIX) 40 MG EC tablet Take 1 tablet (40 mg) by mouth daily. 90 tablet 0     pregabalin (LYRICA) 25 MG capsule Take 1 capsule (25 mg) by mouth 2 times daily 60 capsule 5     rOPINIRole (REQUIP) 0.25 MG tablet TAKE 1 TABLET(0.25 MG) BY MOUTH AT BEDTIME 90 tablet 0     SUMAtriptan (IMITREX) 25 MG tablet Take 1 tablet (25 mg) by mouth at onset of headache for migraine. May repeat in 2 hours. Max 8 tablets/24 hours. 12 tablet 11     Tirzepatide (MOUNJARO) 7.5 MG/0.5ML SOAJ Inject 0.5 mLs (7.5 mg) subcutaneously every 7 days. 30 mL 0     zolpidem (AMBIEN) 10 MG tablet TAKE 1 TABLET(10 MG) BY MOUTH EVERY NIGHT AS NEEDED FOR SLEEP 30 tablet 5     MOUNJARO 10 MG/0.5ML SOAJ Inject 0.5 mLs (10 mg) subcutaneously every 7 days. (Patient not taking: Reported on 2/11/2025) 2 mL 0     [START ON 3/6/2025] MOUNJARO 12.5 MG/0.5ML SOAJ Inject 0.5 mLs (12.5 mg) subcutaneously every 7 days.  (Patient not taking: Reported on 2/11/2025) 6 mL 0     Tirzepatide (MOUNJARO) 5 MG/0.5ML SOAJ Inject 0.5 mLs (5 mg) subcutaneously every 7 days. (Patient not taking: Reported on 2/11/2025) 2 mL 1       ALLERGIES     Allergies   Allergen Reactions     Penicillins Anaphylaxis     Augmentin [Amoxicillin-Pot Clavulanate] GI Disturbance     Bee Swelling     Gabapentin Other (See Comments)     Mood Swings     Lisinopril Rash     Metformin Itching     Topiramate Other (See Comments)     numbness       PAST MEDICAL HISTORY:  Past Medical History:   Diagnosis Date     Arthritis     left shoulder and back     Chronic pain     fibromalgia      CKD (chronic kidney disease), stage III (H)      Congestive heart failure (H)      Depressive disorder      Fibromyalgia      Gastro-oesophageal reflux disease      Heart disease      HTN, goal below 140/90      Hyperlipidemia LDL goal <100      Hypothyroidism      Major depression     chronic kidney disease-stage 3     Peripheral neuropathy 02/2015    + callus. diabetic shoes rx done     PONV (postoperative nausea and vomiting)      Rheumatoid arthritis of foot (H)      Tongue abnormality      Type 2 diabetes, HbA1C goal < 8% (H)     pre-diabetic       PAST SURGICAL HISTORY:  Past Surgical History:   Procedure Laterality Date     ARTHROPLASTY SHOULDER  7/25/2013    Procedure: right ARTHROPLASTY SHOULDER;  RIGHT TOTAL SHOULDER ARTHROPLASTY (TORNIER AFFINITY SHOULDER SYSTEM)^;  Surgeon: Dung Morales MD;  Location:  OR     BACK SURGERY  2011    L45 laminectomy     CV CORONARY ANGIOGRAM N/A 1/15/2024    Procedure: Coronary Angiogram;  Surgeon: Dung Quintero MD;  Location:  HEART CARDIAC CATH LAB     CV PCI N/A 1/15/2024    Procedure: Percutaneous Coronary Intervention;  Surgeon: Dung Quintero MD;  Location:  HEART CARDIAC CATH LAB     ELBOW WRIST HAND FINGER ORTHOSIS, RIGID, WITHOUT JOINTS, MAY INCLUDE SOFT  5/2007    put in [place]     ESOPHAGOSCOPY,  GASTROSCOPY, DUODENOSCOPY (EGD), COMBINED  9/23/2015    Dr. Thomas [facility]     ESOPHAGOSCOPY, GASTROSCOPY, DUODENOSCOPY (EGD), COMBINED N/A 9/23/2015    Procedure: COMBINED ESOPHAGOSCOPY, GASTROSCOPY, DUODENOSCOPY (EGD), BIOPSY SINGLE OR MULTIPLE;  Surgeon: Jose Thomas MD;  Location: [facility]     ESOPHAGOSCOPY, GASTROSCOPY, DUODENOSCOPY (EGD), COMBINED  02/04/2020    Dr. Thomas [facility]     ESOPHAGOSCOPY, GASTROSCOPY, DUODENOSCOPY (EGD), COMBINED N/A 2/4/2020    Procedure: ESOPHAGOGASTRODUODENOSCOPY (EGD);  Surgeon: Jose Thomas MD;  Location: [facility]     HCL PAP SMEAR  6/2008    WNL     ORTHOPEDIC SURGERY      left shoulder scoped and plate left elbow     SURGICAL HISTORY OF -   2009    ulnar nerve release, carpal tunnel- left     ZZC APPENDECTOMY  age 19     ZZC LIGATE FALLOPIAN TUBE,POSTPARTUM  1982    Tubal Ligation       FAMILY HISTORY:  Family History   Problem Relation Age of Onset     C.A.D. Mother      Neurologic Disorder Mother         lupus     Depression Mother      Diabetes Mother      Coronary Artery Disease Mother      Cerebrovascular Disease Mother      Breast Cancer Mother      Osteoporosis Mother      Family History Negative Father      Diabetes Maternal Aunt      Colon Cancer No family hx of        SOCIAL HISTORY:  Social History     Socioeconomic History     Marital status:      Spouse name: None     Number of children: 4     Years of education: None     Highest education level: None   Occupational History     Employer: UNEMPLOYED   Tobacco Use     Smoking status: Never     Smokeless tobacco: Never   Vaping Use     Vaping status: Never Used   Substance and Sexual Activity     Alcohol use: No     Drug use: No     Sexual activity: Not Currently     Partners: Male     Birth control/protection: None   Other Topics Concern     Parent/sibling w/ CABG, MI or angioplasty before 65F 55M? Yes     Comment: brother and mom     Social Drivers of Health     Financial Resource Strain: Low Risk   (1/1/2025)    Financial Resource Strain      Within the past 12 months, have you or your family members you live with been unable to get utilities (heat, electricity) when it was really needed?: No   Food Insecurity: High Risk (1/1/2025)    Food Insecurity      Within the past 12 months, did you worry that your food would run out before you got money to buy more?: No      Within the past 12 months, did the food you bought just not last and you didn t have money to get more?: Yes   Transportation Needs: Low Risk  (1/1/2025)    Transportation Needs      Within the past 12 months, has lack of transportation kept you from medical appointments, getting your medicines, non-medical meetings or appointments, work, or from getting things that you need?: No   Physical Activity: Insufficiently Active (1/1/2025)    Exercise Vital Sign      Days of Exercise per Week: 1 day      Minutes of Exercise per Session: 30 min   Stress: Stress Concern Present (1/1/2025)    [employer] - Occupational Stress Questionnaire      Feeling of Stress : To some extent   Social Connections: Unknown (1/1/2025)    Social Connection and Isolation Panel [NHANES]      Frequency of Social Gatherings with Friends and Family: More than three times a week   Interpersonal Safety: Low Risk  (1/6/2025)    Interpersonal Safety      Do you feel physically and emotionally safe where you currently live?: Yes      Within the past 12 months, have you been hit, slapped, kicked or otherwise physically hurt by someone?: No      Within the past 12 months, have you been humiliated or emotionally abused in other ways by your partner or ex-partner?: No   Housing Stability: Low Risk  (1/1/2025)    Housing Stability      Do you have housing? : Yes      Are you worried about losing your housing?: No       Review of Systems:  Skin:          Eyes:         ENT:         Respiratory:  Negative       Cardiovascular:  Negative      Gastroenterology:       "  Genitourinary:         Musculoskeletal:         Neurologic:         Psychiatric:         Heme/Lymph/Imm:         Endocrine:           Physical Exam:  Vitals: /78 (BP Location: Right arm, Patient Position: Sitting, Cuff Size: Adult Large)   Pulse 84   Ht 1.651 m (5' 5\")   Wt 118.1 kg (260 lb 6.4 oz)   LMP 02/13/2009   SpO2 96%   BMI 43.33 kg/m      Constitutional:  cooperative, alert and oriented, well developed, well nourished, in no acute distress morbidly obese      Skin:  warm and dry to the touch, no apparent skin lesions or masses noted          Head:  normocephalic, no masses or lesions        Eyes:  pupils equal and round, conjunctivae and lids unremarkable, sclera white, no xanthalasma, EOMS intact, no nystagmus        Lymph:      ENT:  no pallor or cyanosis, dentition good        Neck:  no carotid bruit        Respiratory:  normal breath sounds, clear to auscultation, normal A-P diameter, normal symmetry, normal respiratory excursion, no use of accessory muscles         Cardiac: regular rhythm, no murmurs, gallops or rubs detected                pulses full and equal                                        GI:    obese      Extremities and Muscular Skeletal:  no spinal abnormalities noted, normal muscle strength and tone   bilateral LE edema, trace          Neurological:  no gross motor deficits        Psych:  affect appropriate, oriented to time, person and place        CC  Luis Spain MD  [address]  [place]  [place] [address]                  Thank you for allowing me to participate in the care of your patient.      Sincerely,     Luis Spain MD     [facility]  cc:   Luis Spain MD  [address]  [place] [address]      "

## 2025-02-15 NOTE — TELEPHONE ENCOUNTER
PA Initiation    Medication: CLOPIDOGREL BISULFATE 75 MG PO TABS  Insurance Company: [employer] - Phone [phone] Fax [phone]  Pharmacy Filling the Rx: [id number] [place], [place] - [address] [facility] AT [address]  Filling Pharmacy Phone:    Filling Pharmacy Fax:    Start Date: 2/15/2025

## 2025-02-15 NOTE — TELEPHONE ENCOUNTER
Triage Assessment (Adult)       Row Name 08/10/24 5335          Triage Assessment    Airway WDL WDL        Respiratory WDL    Respiratory WDL WDL        Skin Circulation/Temperature WDL    Skin Circulation/Temperature WDL WDL        Cardiac WDL    Cardiac WDL WDL        Peripheral/Neurovascular WDL    Peripheral Neurovascular WDL WDL        Cognitive/Neuro/Behavioral WDL    Cognitive/Neuro/Behavioral WDL WDL                      Prior Authorization Approval    Medication: CLOPIDOGREL BISULFATE 75 MG PO TABS  Authorization Effective Date: 1/1/2025  Authorization Expiration Date: 12/31/2025  Approved Dose/Quantity: UD  Reference #:     Insurance Company: [employer] - Phone [phone] Fax [phone]  Expected CoPay: $1.60    CoPay Card Available: No    Financial Assistance Needed: n/a  Which Pharmacy is filling the prescription: [id number] [place], [place] - [address] [address] [facility] [address]  Pharmacy Notified: yes  Patient Notified: no

## 2025-02-27 ENCOUNTER — ORDERS ONLY (AUTO-RELEASED) (OUTPATIENT)
Dept: FAMILY MEDICINE | Facility: CLINIC | Age: 65
End: 2025-02-27
Payer: MEDICARE

## 2025-02-27 DIAGNOSIS — Z12.11 SCREENING FOR MALIGNANT NEOPLASM OF COLON: ICD-10-CM

## 2025-03-04 ENCOUNTER — PATIENT OUTREACH (OUTPATIENT)
Dept: CARE COORDINATION | Facility: CLINIC | Age: 65
End: 2025-03-04
Payer: MEDICARE

## 2025-03-10 DIAGNOSIS — G47.09 OTHER INSOMNIA: ICD-10-CM

## 2025-03-10 DIAGNOSIS — I21.4 NSTEMI (NON-ST ELEVATED MYOCARDIAL INFARCTION) (H): ICD-10-CM

## 2025-03-10 DIAGNOSIS — G25.81 RESTLESS LEG SYNDROME: ICD-10-CM

## 2025-03-10 DIAGNOSIS — E03.8 SUBCLINICAL HYPOTHYROIDISM: ICD-10-CM

## 2025-03-10 RX ORDER — ATORVASTATIN CALCIUM 80 MG/1
80 TABLET, FILM COATED ORAL DAILY
Qty: 90 TABLET | Refills: 3 | Status: CANCELLED | OUTPATIENT
Start: 2025-03-10

## 2025-03-10 RX ORDER — LEVOTHYROXINE SODIUM 25 UG/1
25 TABLET ORAL
Qty: 90 TABLET | Refills: 0 | Status: SHIPPED | OUTPATIENT
Start: 2025-03-10

## 2025-03-10 RX ORDER — MULTIVITAMIN WITH IRON
1 TABLET ORAL DAILY
Qty: 90 TABLET | Refills: 3 | Status: SHIPPED | OUTPATIENT
Start: 2025-03-10

## 2025-03-10 RX ORDER — ZOLPIDEM TARTRATE 10 MG/1
TABLET ORAL
Qty: 90 TABLET | Refills: 3 | Status: SHIPPED | OUTPATIENT
Start: 2025-03-10

## 2025-03-16 ENCOUNTER — MYC REFILL (OUTPATIENT)
Dept: URGENT CARE | Facility: URGENT CARE | Age: 65
End: 2025-03-16
Payer: MEDICARE

## 2025-03-16 DIAGNOSIS — G47.00 INSOMNIA, UNSPECIFIED TYPE: ICD-10-CM

## 2025-03-17 RX ORDER — TRAZODONE HYDROCHLORIDE 50 MG/1
50 TABLET ORAL AT BEDTIME
Qty: 30 TABLET | Refills: 2 | Status: SHIPPED | OUTPATIENT
Start: 2025-03-17

## 2025-03-18 ENCOUNTER — OFFICE VISIT (OUTPATIENT)
Dept: FAMILY MEDICINE | Facility: CLINIC | Age: 65
End: 2025-03-18
Payer: MEDICARE

## 2025-03-18 ENCOUNTER — ANCILLARY PROCEDURE (OUTPATIENT)
Dept: GENERAL RADIOLOGY | Facility: CLINIC | Age: 65
End: 2025-03-18
Attending: FAMILY MEDICINE
Payer: MEDICARE

## 2025-03-18 VITALS
BODY MASS INDEX: 41.65 KG/M2 | HEART RATE: 82 BPM | HEIGHT: 65 IN | DIASTOLIC BLOOD PRESSURE: 82 MMHG | TEMPERATURE: 97.8 F | SYSTOLIC BLOOD PRESSURE: 121 MMHG | RESPIRATION RATE: 16 BRPM | WEIGHT: 250 LBS | OXYGEN SATURATION: 97 %

## 2025-03-18 DIAGNOSIS — M17.11 PRIMARY OSTEOARTHRITIS OF RIGHT KNEE: Primary | ICD-10-CM

## 2025-03-18 DIAGNOSIS — M17.11 PRIMARY OSTEOARTHRITIS OF RIGHT KNEE: ICD-10-CM

## 2025-03-18 DIAGNOSIS — Z12.31 VISIT FOR SCREENING MAMMOGRAM: ICD-10-CM

## 2025-03-18 DIAGNOSIS — J01.10 ACUTE NON-RECURRENT FRONTAL SINUSITIS: ICD-10-CM

## 2025-03-18 DIAGNOSIS — Z78.0 ASYMPTOMATIC POSTMENOPAUSAL STATUS: ICD-10-CM

## 2025-03-18 PROCEDURE — 3074F SYST BP LT 130 MM HG: CPT | Performed by: FAMILY MEDICINE

## 2025-03-18 PROCEDURE — 99214 OFFICE O/P EST MOD 30 MIN: CPT | Performed by: FAMILY MEDICINE

## 2025-03-18 PROCEDURE — 3079F DIAST BP 80-89 MM HG: CPT | Performed by: FAMILY MEDICINE

## 2025-03-18 PROCEDURE — 73562 X-RAY EXAM OF KNEE 3: CPT | Mod: TC | Performed by: RADIOLOGY

## 2025-03-18 PROCEDURE — G2211 COMPLEX E/M VISIT ADD ON: HCPCS | Performed by: FAMILY MEDICINE

## 2025-03-18 RX ORDER — DOXYCYCLINE 100 MG/1
100 CAPSULE ORAL 2 TIMES DAILY
Qty: 20 CAPSULE | Refills: 0 | Status: SHIPPED | OUTPATIENT
Start: 2025-03-18 | End: 2025-03-28

## 2025-03-18 ASSESSMENT — ANXIETY QUESTIONNAIRES
2. NOT BEING ABLE TO STOP OR CONTROL WORRYING: SEVERAL DAYS
GAD7 TOTAL SCORE: 8
6. BECOMING EASILY ANNOYED OR IRRITABLE: NOT AT ALL
IF YOU CHECKED OFF ANY PROBLEMS ON THIS QUESTIONNAIRE, HOW DIFFICULT HAVE THESE PROBLEMS MADE IT FOR YOU TO DO YOUR WORK, TAKE CARE OF THINGS AT HOME, OR GET ALONG WITH OTHER PEOPLE: NOT DIFFICULT AT ALL
7. FEELING AFRAID AS IF SOMETHING AWFUL MIGHT HAPPEN: NOT AT ALL
GAD7 TOTAL SCORE: 8
1. FEELING NERVOUS, ANXIOUS, OR ON EDGE: MORE THAN HALF THE DAYS
4. TROUBLE RELAXING: MORE THAN HALF THE DAYS
GAD7 TOTAL SCORE: 8
7. FEELING AFRAID AS IF SOMETHING AWFUL MIGHT HAPPEN: NOT AT ALL
5. BEING SO RESTLESS THAT IT IS HARD TO SIT STILL: MORE THAN HALF THE DAYS
8. IF YOU CHECKED OFF ANY PROBLEMS, HOW DIFFICULT HAVE THESE MADE IT FOR YOU TO DO YOUR WORK, TAKE CARE OF THINGS AT HOME, OR GET ALONG WITH OTHER PEOPLE?: NOT DIFFICULT AT ALL
3. WORRYING TOO MUCH ABOUT DIFFERENT THINGS: SEVERAL DAYS

## 2025-03-18 ASSESSMENT — ENCOUNTER SYMPTOMS: HEADACHES: 1

## 2025-03-18 NOTE — PROGRESS NOTES
Assessment & Plan     Primary osteoarthritis of right knee  Interval chronic worsening.  I compared previous x-rays from 2000 1730 today, there continues to be moderate to severe medial tibiofemoral component joint space narrowing.  Looks like there is about a 1 mm loss.  At this time I recommend consultation with sports medicine for consideration of viscosupplementation, she does have known history of diabetes.  Appreciate your assistance.- XR Knee Right 3 Views  - Orthopedic  Referral    Acute non-recurrent frontal sinusitis  Recommend starting doxycycline, has penicillin allergy.  If not better recommend CT sinus study or consultation with ENT.  - doxycycline hyclate (VIBRAMYCIN) 100 MG capsule  Dispense: 20 capsule; Refill: 0    Asymptomatic postmenopausal status  - DEXA HIP/PELVIS/SPINE - Future    Visit for screening mammogram  - MA Screening Bilateral w/ Jimenez              Carmen Orosco is a 65 year old, presenting for the following health issues:  Headache (Follow-up HA's, not improving), Knee Pain (Right knee pain x1 year, no known injury), and Derm Problem (Skin tag removal under right arm)        3/18/2025     1:14 PM   Additional Questions   Roomed by Maddie Coffey     Headache     Knee Pain  Associated symptoms include headaches.   History of Present Illness       Back Pain:  She presents for follow up of back pain. Patient's back pain is a recurring problem.  Location of back pain:  Other  Description of back pain: burning and stabbing  Back pain spreads: right buttocks, left buttocks, right thigh, left thigh and right knee    Since patient first noticed back pain, pain is: unchanged  Does back pain interfere with her job:  Not applicable       Mental Health Follow-up:  Patient presents to follow-up on Depression & Anxiety.Patient's depression since last visit has been:  Better  The patient is having other symptoms associated with depression.  Patient's anxiety since last visit has  "been:  Better  The patient is having other symptoms associated with anxiety.  Any significant life events: No  Patient is feeling anxious or having panic attacks.  Patient has no concerns about alcohol or drug use.    Headaches:   Since the patient's last clinic visit, headaches are: worsened  The patient is getting headaches:  Everyday  She is not able to do normal daily activities when she has a migraine.  The patient is taking the following rescue/relief medications:  Other   Patient states \"I get some relief\" from the rescue/relief medications.   The patient is taking the following medications to prevent migraines:  Other  In the past 4 weeks, the patient has gone to an Urgent Care or Emergency Room 0 times times due to headaches.    Reason for visit:  Concerns  Symptom onset:  More than a month  Symptoms include:  Clicking in my right knee  Symptom intensity:  Severe  Symptom progression:  Worsening  Had these symptoms before:  No  What makes it worse:  Walking  What makes it better:  Not really       Patient presents for additional concerns of a frontal headache to which she suspects as a sinus infection;symptoms greater than 10 days, pressure behind her eyes in the upper sinus region with associated congestion.    Concern is for right knee clunking and pain.  Symptoms getting worse.  She would like to have further evaluations for this.                    Objective    /82 (BP Location: Right arm, Patient Position: Chair, Cuff Size: Adult Large)   Pulse 82   Temp 97.8  F (36.6  C) (Oral)   Resp 16   Ht 1.651 m (5' 5\")   Wt 113.4 kg (250 lb)   LMP 02/13/2009   SpO2 97%   BMI 41.60 kg/m    Body mass index is 41.6 kg/m .  Physical Exam         3 view right knee x-ray, independently reviewed by me.  No acute fractures or dislocations.  Moderate to moderate severe medial narrowing.     Signed Electronically by: Leonides Juárez MD    "

## 2025-03-19 ENCOUNTER — PATIENT OUTREACH (OUTPATIENT)
Dept: CARE COORDINATION | Facility: CLINIC | Age: 65
End: 2025-03-19
Payer: MEDICARE

## 2025-03-20 ENCOUNTER — VIRTUAL VISIT (OUTPATIENT)
Dept: FAMILY MEDICINE | Facility: CLINIC | Age: 65
End: 2025-03-20
Payer: MEDICARE

## 2025-03-20 DIAGNOSIS — Z63.9: Primary | ICD-10-CM

## 2025-03-20 SDOH — SOCIAL STABILITY - SOCIAL INSECURITY: PROBLEM RELATED TO PRIMARY SUPPORT GROUP, UNSPECIFIED: Z63.9

## 2025-03-20 NOTE — PROGRESS NOTES
Ana Luisa is a 65 year old who is being evaluated via a billable telephone visit.    What phone number would you like to be contacted at? [phone]  How would you like to obtain your AVS? MyChart  Originating Location (pt. Location): Home    Distant Location (provider location):  On-site  Telephone visit completed due to the patient did not consent to a video visit.    Assessment & Plan     Family is cause for concern  Because another has developed signs of peripheral edema, she is requesting consultation for bringing him in for evaluation, will have team schedule him for further evaluation.  Advised patient to monitor for any symptoms of shortness of breath chest discomfort, to contact me if this occurs.  If unavailable and he is acutely symptomatic, please go to the urgent care or ER.                Subjective   Ana Luisa is a 65 year old, presenting for the following health issues:  No chief complaint on file.        3/20/2025     2:37 PM   Additional Questions   Roomed by Gabino Cho           Patient has concerns that her significant other is developing worsening bilateral leg swelling without inciting factors there is no redness, pain or shortness of breath at this time.              Objective           Vitals:  No vitals were obtained today due to virtual visit.    Physical Exam   General: Alert and no distress //Respiratory: No audible wheeze, cough, or shortness of breath // Psychiatric:  Appropriate affect, tone, and pace of words            Phone call duration:5 minutes  Signed Electronically by: Leonides Juárez MD

## 2025-03-30 ENCOUNTER — MYC REFILL (OUTPATIENT)
Dept: URGENT CARE | Facility: URGENT CARE | Age: 65
End: 2025-03-30
Payer: MEDICARE

## 2025-03-30 DIAGNOSIS — G47.00 INSOMNIA, UNSPECIFIED TYPE: ICD-10-CM

## 2025-03-31 ENCOUNTER — MYC REFILL (OUTPATIENT)
Dept: FAMILY MEDICINE | Facility: CLINIC | Age: 65
End: 2025-03-31
Payer: MEDICARE

## 2025-03-31 ENCOUNTER — MYC MEDICAL ADVICE (OUTPATIENT)
Dept: FAMILY MEDICINE | Facility: CLINIC | Age: 65
End: 2025-03-31
Payer: MEDICARE

## 2025-03-31 ENCOUNTER — MYC MEDICAL ADVICE (OUTPATIENT)
Dept: CARDIOLOGY | Facility: CLINIC | Age: 65
End: 2025-03-31
Payer: MEDICARE

## 2025-03-31 DIAGNOSIS — I21.4 NSTEMI (NON-ST ELEVATED MYOCARDIAL INFARCTION) (H): ICD-10-CM

## 2025-03-31 DIAGNOSIS — L23.9 ALLERGIC DERMATITIS: ICD-10-CM

## 2025-03-31 DIAGNOSIS — N18.31 TYPE 2 DIABETES MELLITUS WITH STAGE 3A CHRONIC KIDNEY DISEASE, WITHOUT LONG-TERM CURRENT USE OF INSULIN (H): ICD-10-CM

## 2025-03-31 DIAGNOSIS — E11.22 TYPE 2 DIABETES MELLITUS WITH STAGE 3A CHRONIC KIDNEY DISEASE, WITHOUT LONG-TERM CURRENT USE OF INSULIN (H): ICD-10-CM

## 2025-03-31 RX ORDER — METOPROLOL SUCCINATE 25 MG/1
25 TABLET, EXTENDED RELEASE ORAL DAILY
Qty: 90 TABLET | Refills: 3 | Status: SHIPPED | OUTPATIENT
Start: 2025-03-31

## 2025-03-31 RX ORDER — TRAZODONE HYDROCHLORIDE 50 MG/1
50 TABLET ORAL AT BEDTIME
Qty: 30 TABLET | Refills: 2 | OUTPATIENT
Start: 2025-03-31

## 2025-03-31 RX ORDER — CETIRIZINE HYDROCHLORIDE 10 MG/1
10 TABLET ORAL DAILY
Qty: 30 TABLET | Refills: 0 | Status: SHIPPED | OUTPATIENT
Start: 2025-03-31

## 2025-03-31 RX ORDER — TIRZEPATIDE 12.5 MG/.5ML
12.5 INJECTION, SOLUTION SUBCUTANEOUS
Qty: 6 ML | Refills: 0 | Status: SHIPPED | OUTPATIENT
Start: 2025-03-31

## 2025-04-01 ENCOUNTER — PATIENT OUTREACH (OUTPATIENT)
Dept: CARE COORDINATION | Facility: CLINIC | Age: 65
End: 2025-04-01
Payer: MEDICARE

## 2025-04-06 ENCOUNTER — MYC REFILL (OUTPATIENT)
Dept: FAMILY MEDICINE | Facility: CLINIC | Age: 65
End: 2025-04-06
Payer: COMMERCIAL

## 2025-04-06 DIAGNOSIS — G25.81 RESTLESS LEG SYNDROME: ICD-10-CM

## 2025-04-06 DIAGNOSIS — L23.9 ALLERGIC DERMATITIS: ICD-10-CM

## 2025-04-07 ENCOUNTER — LAB (OUTPATIENT)
Dept: LAB | Facility: CLINIC | Age: 65
End: 2025-04-07
Payer: COMMERCIAL

## 2025-04-07 DIAGNOSIS — E11.22 TYPE 2 DIABETES MELLITUS WITH STAGE 3A CHRONIC KIDNEY DISEASE, WITHOUT LONG-TERM CURRENT USE OF INSULIN (H): Primary | ICD-10-CM

## 2025-04-07 DIAGNOSIS — E03.8 SUBCLINICAL HYPOTHYROIDISM: ICD-10-CM

## 2025-04-07 DIAGNOSIS — N18.31 TYPE 2 DIABETES MELLITUS WITH STAGE 3A CHRONIC KIDNEY DISEASE, WITHOUT LONG-TERM CURRENT USE OF INSULIN (H): Primary | ICD-10-CM

## 2025-04-07 LAB
EST. AVERAGE GLUCOSE BLD GHB EST-MCNC: 134 MG/DL
HBA1C MFR BLD: 6.3 % (ref 0–5.6)
T4 FREE SERPL-MCNC: 1.15 NG/DL (ref 0.9–1.7)
TSH SERPL DL<=0.005 MIU/L-ACNC: 4.76 UIU/ML (ref 0.3–4.2)

## 2025-04-07 PROCEDURE — 83036 HEMOGLOBIN GLYCOSYLATED A1C: CPT

## 2025-04-07 PROCEDURE — 84439 ASSAY OF FREE THYROXINE: CPT

## 2025-04-07 PROCEDURE — 36415 COLL VENOUS BLD VENIPUNCTURE: CPT

## 2025-04-07 PROCEDURE — 84443 ASSAY THYROID STIM HORMONE: CPT

## 2025-04-07 RX ORDER — CETIRIZINE HYDROCHLORIDE 10 MG/1
10 TABLET ORAL DAILY
Qty: 90 TABLET | Refills: 3 | Status: SHIPPED | OUTPATIENT
Start: 2025-04-07

## 2025-04-07 RX ORDER — MULTIVITAMIN WITH IRON
1 TABLET ORAL DAILY
Qty: 90 TABLET | Refills: 3 | OUTPATIENT
Start: 2025-04-07

## 2025-04-13 ENCOUNTER — MYC REFILL (OUTPATIENT)
Dept: URGENT CARE | Facility: URGENT CARE | Age: 65
End: 2025-04-13
Payer: COMMERCIAL

## 2025-04-13 ENCOUNTER — MYC REFILL (OUTPATIENT)
Dept: CARDIOLOGY | Facility: CLINIC | Age: 65
End: 2025-04-13
Payer: COMMERCIAL

## 2025-04-13 DIAGNOSIS — K21.9 GASTROESOPHAGEAL REFLUX DISEASE WITHOUT ESOPHAGITIS: ICD-10-CM

## 2025-04-13 DIAGNOSIS — G47.00 INSOMNIA, UNSPECIFIED TYPE: ICD-10-CM

## 2025-04-14 RX ORDER — PANTOPRAZOLE SODIUM 40 MG/1
40 TABLET, DELAYED RELEASE ORAL DAILY
Qty: 90 TABLET | Refills: 3 | Status: SHIPPED | OUTPATIENT
Start: 2025-04-14

## 2025-04-15 RX ORDER — TRAZODONE HYDROCHLORIDE 50 MG/1
50 TABLET ORAL AT BEDTIME
Qty: 30 TABLET | Refills: 2 | OUTPATIENT
Start: 2025-04-15

## 2025-04-22 ENCOUNTER — TELEPHONE (OUTPATIENT)
Dept: FAMILY MEDICINE | Facility: CLINIC | Age: 65
End: 2025-04-22
Payer: COMMERCIAL

## 2025-04-22 NOTE — TELEPHONE ENCOUNTER
"Pt called regarding needing information for Medicare Flex Card. Advised pt would need to contact insurance for further information. Pt states has contacted insurance and is not getting any answers. Tried to give information for SHIP (state health insurance program) for [place] however pt denied taking the number.    Advised again unsure other than contacting insurance company to find out what exactly is needed. Pt stated \"okay thank you\" and hung up.    Eboni CAREY, RN, PHN    "

## 2025-04-28 ENCOUNTER — TELEPHONE (OUTPATIENT)
Dept: FAMILY MEDICINE | Facility: CLINIC | Age: 65
End: 2025-04-28
Payer: COMMERCIAL

## 2025-04-28 ENCOUNTER — MYC MEDICAL ADVICE (OUTPATIENT)
Dept: FAMILY MEDICINE | Facility: CLINIC | Age: 65
End: 2025-04-28
Payer: COMMERCIAL

## 2025-04-28 DIAGNOSIS — J32.9 CHRONIC SINUSITIS, UNSPECIFIED LOCATION: Primary | ICD-10-CM

## 2025-04-28 RX ORDER — DOXYCYCLINE 100 MG/1
100 CAPSULE ORAL 2 TIMES DAILY
Qty: 20 CAPSULE | Refills: 0 | Status: SHIPPED | OUTPATIENT
Start: 2025-04-28 | End: 2025-05-08

## 2025-04-28 NOTE — TELEPHONE ENCOUNTER
Prior Authorization Retail Medication Request    Medication/Dose: MOUNJARO 12.5 MG/0.5ML SOAJ auto-injector pen  Diagnosis and ICD code (if different than what is on RX):    New/renewal/insurance change PA/secondary ins. PA:  Previously Tried and Failed: dulaglutide (TRULICITY) 0.75 MG/0.5ML pen, insulin glargine (LANTUS PEN) 100 UNIT/ML pen       Rationale:    Insurance     [employer]    KEY: [id number]  PATIENT LAST NAME: MARLON  : 1960      Secondary (if applicable):  Insurance ID:      Pharmacy Information (if different than what is on RX)  Name:    Phone:    Fax:    Clinic Information  Preferred routing pool for dept communication:     Vidal RICHEY

## 2025-04-28 NOTE — TELEPHONE ENCOUNTER
Please see MyChart and advise. Symptoms went away and have now returned.    Per 3/18/25 OV:  Acute non-recurrent frontal sinusitis  Recommend starting doxycycline, has penicillin allergy.  If not better recommend CT sinus study or consultation with ENT.    Eboni RAMÍREZN, RN, PHN

## 2025-04-30 NOTE — TELEPHONE ENCOUNTER
Prior Authorization Approval    Medication: MOUNJARO 12.5 MG/0.5ML SC SOAJ  Authorization Effective Date: 4/1/2025  Authorization Expiration Date: 4/30/2026  Approved Dose/Quantity:   Reference #:     Insurance Company: [employer] - Phone [phone] Fax [phone]  Expected CoPay: $    CoPay Card Available:      Financial Assistance Needed:   Which Pharmacy is filling the prescription: [id number] - [place], [place] - [address]  Pharmacy Notified: Y  Patient Notified: Instructed pharmacy to notify patient once order is ready.

## 2025-04-30 NOTE — TELEPHONE ENCOUNTER
PA Initiation    Medication: MOUNJARO 12.5 MG/0.5ML SC SOAJ  Insurance Company: [employer] - Phone [phone] Fax [phone]  Pharmacy Filling the Rx: [id number] - [place], [place] - [address]  Filling Pharmacy Phone:    Filling Pharmacy Fax:    Start Date: 4/29/2025

## 2025-05-18 ENCOUNTER — HOSPITAL ENCOUNTER (OUTPATIENT)
Dept: CT IMAGING | Facility: CLINIC | Age: 65
Discharge: HOME | End: 2025-05-18
Attending: FAMILY MEDICINE | Admitting: FAMILY MEDICINE
Payer: COMMERCIAL

## 2025-05-18 DIAGNOSIS — J32.9 CHRONIC SINUSITIS, UNSPECIFIED LOCATION: ICD-10-CM

## 2025-05-18 PROCEDURE — 70486 CT MAXILLOFACIAL W/O DYE: CPT

## 2025-05-19 ENCOUNTER — TELEPHONE (OUTPATIENT)
Dept: CARDIOLOGY | Facility: CLINIC | Age: 65
End: 2025-05-19
Payer: COMMERCIAL

## 2025-05-19 NOTE — TELEPHONE ENCOUNTER
Patient dropped off the below form in Dr. Spain's office.     Writer contacted dental clinic directly and spoke with Assistant who reports they would appreciate Cardiology input on Plavix hold and that they do not have a standard or recommendation. Reports patient is having 4 implants placed under local anaesthesia and they can do this without holding plavix, however she will be at higher risk for bleeding.    Informed patient does not have a cardiac condition that would.     Recommendation Plavix hold sent to Ingris Patel PA-C on Dr. Spain's [facility].

## 2025-05-19 NOTE — TELEPHONE ENCOUNTER
Ingris Patel PA-C to Me  5/19/25  3:39 PM  It has been greater than 1 year patient had her stent placed.  Therefore, she is safe to interrupt Plavix if needed for procedures.  I would recommend holding Plavix x 5 days prior to her dental work.  If this form needs to be filled out, can you please fax it to the [facility] or find me in [place]? I tried to print it without success. I am only in the office today and tomorrow, then will not be back until 5/27.     Thanks,  Ingris Patel PA-C on 5/19/2025 at 3:39 PM    ======================================    Form completed and placed on Ingris Patel PA-C's desk for signature.     UPDATE 1635: Form signed and faxed to patient's dental clinic at fax provided. Patient called with recommended home and detailed voicemail left. Copy of letter also mailed to patient's home address.

## 2025-05-21 ENCOUNTER — RESULTS FOLLOW-UP (OUTPATIENT)
Dept: FAMILY MEDICINE | Facility: CLINIC | Age: 65
End: 2025-05-21

## 2025-05-25 DIAGNOSIS — L30.9 DERMATITIS: ICD-10-CM

## 2025-05-28 DIAGNOSIS — I21.4 NSTEMI (NON-ST ELEVATED MYOCARDIAL INFARCTION) (H): ICD-10-CM

## 2025-05-28 RX ORDER — CLOTRIMAZOLE AND BETAMETHASONE DIPROPIONATE 10; .64 MG/G; MG/G
CREAM TOPICAL 2 TIMES DAILY
Qty: 45 G | Refills: 0 | Status: SHIPPED | OUTPATIENT
Start: 2025-05-28

## 2025-05-28 RX ORDER — ATORVASTATIN CALCIUM 80 MG/1
80 TABLET, FILM COATED ORAL DAILY
Qty: 90 TABLET | Refills: 2 | Status: SHIPPED | OUTPATIENT
Start: 2025-05-28

## 2025-06-19 DIAGNOSIS — G47.00 INSOMNIA, UNSPECIFIED TYPE: ICD-10-CM

## 2025-06-19 DIAGNOSIS — E03.8 SUBCLINICAL HYPOTHYROIDISM: ICD-10-CM

## 2025-06-19 RX ORDER — TRAZODONE HYDROCHLORIDE 50 MG/1
50 TABLET ORAL AT BEDTIME
Qty: 30 TABLET | Refills: 8 | Status: SHIPPED | OUTPATIENT
Start: 2025-06-19

## 2025-06-19 RX ORDER — LEVOTHYROXINE SODIUM 25 UG/1
25 TABLET ORAL
Qty: 90 TABLET | Refills: 3 | Status: SHIPPED | OUTPATIENT
Start: 2025-06-19

## 2025-06-22 ENCOUNTER — HEALTH MAINTENANCE LETTER (OUTPATIENT)
Age: 65
End: 2025-06-22

## 2025-06-22 ASSESSMENT — PATIENT HEALTH QUESTIONNAIRE - PHQ9
10. IF YOU CHECKED OFF ANY PROBLEMS, HOW DIFFICULT HAVE THESE PROBLEMS MADE IT FOR YOU TO DO YOUR WORK, TAKE CARE OF THINGS AT HOME, OR GET ALONG WITH OTHER PEOPLE: NOT DIFFICULT AT ALL
SUM OF ALL RESPONSES TO PHQ QUESTIONS 1-9: 8
SUM OF ALL RESPONSES TO PHQ QUESTIONS 1-9: 8

## 2025-06-22 ASSESSMENT — ANXIETY QUESTIONNAIRES
4. TROUBLE RELAXING: MORE THAN HALF THE DAYS
GAD7 TOTAL SCORE: 6
8. IF YOU CHECKED OFF ANY PROBLEMS, HOW DIFFICULT HAVE THESE MADE IT FOR YOU TO DO YOUR WORK, TAKE CARE OF THINGS AT HOME, OR GET ALONG WITH OTHER PEOPLE?: SOMEWHAT DIFFICULT
1. FEELING NERVOUS, ANXIOUS, OR ON EDGE: MORE THAN HALF THE DAYS
5. BEING SO RESTLESS THAT IT IS HARD TO SIT STILL: MORE THAN HALF THE DAYS
GAD7 TOTAL SCORE: 6
2. NOT BEING ABLE TO STOP OR CONTROL WORRYING: NOT AT ALL
7. FEELING AFRAID AS IF SOMETHING AWFUL MIGHT HAPPEN: NOT AT ALL
GAD7 TOTAL SCORE: 6
6. BECOMING EASILY ANNOYED OR IRRITABLE: NOT AT ALL
IF YOU CHECKED OFF ANY PROBLEMS ON THIS QUESTIONNAIRE, HOW DIFFICULT HAVE THESE PROBLEMS MADE IT FOR YOU TO DO YOUR WORK, TAKE CARE OF THINGS AT HOME, OR GET ALONG WITH OTHER PEOPLE: SOMEWHAT DIFFICULT
3. WORRYING TOO MUCH ABOUT DIFFERENT THINGS: NOT AT ALL
7. FEELING AFRAID AS IF SOMETHING AWFUL MIGHT HAPPEN: NOT AT ALL

## 2025-06-23 ENCOUNTER — OFFICE VISIT (OUTPATIENT)
Dept: FAMILY MEDICINE | Facility: CLINIC | Age: 65
End: 2025-06-23
Payer: COMMERCIAL

## 2025-06-23 VITALS
RESPIRATION RATE: 12 BRPM | TEMPERATURE: 97.8 F | SYSTOLIC BLOOD PRESSURE: 126 MMHG | OXYGEN SATURATION: 97 % | HEART RATE: 77 BPM | DIASTOLIC BLOOD PRESSURE: 78 MMHG | HEIGHT: 65 IN | BODY MASS INDEX: 38.99 KG/M2 | WEIGHT: 234 LBS

## 2025-06-23 DIAGNOSIS — E11.22 TYPE 2 DIABETES MELLITUS WITH STAGE 3A CHRONIC KIDNEY DISEASE, WITHOUT LONG-TERM CURRENT USE OF INSULIN (H): Primary | ICD-10-CM

## 2025-06-23 DIAGNOSIS — G44.89 HEADACHE SYNDROME: ICD-10-CM

## 2025-06-23 DIAGNOSIS — N18.31 TYPE 2 DIABETES MELLITUS WITH STAGE 3A CHRONIC KIDNEY DISEASE, WITHOUT LONG-TERM CURRENT USE OF INSULIN (H): Primary | ICD-10-CM

## 2025-06-23 LAB
EST. AVERAGE GLUCOSE BLD GHB EST-MCNC: 111 MG/DL
HBA1C MFR BLD: 5.5 % (ref 0–5.6)
HGB BLD-MCNC: 12.8 G/DL (ref 11.7–15.7)
HOLD SPECIMEN: NORMAL
MCV RBC AUTO: 89 FL (ref 78–100)

## 2025-06-23 PROCEDURE — 99214 OFFICE O/P EST MOD 30 MIN: CPT | Performed by: FAMILY MEDICINE

## 2025-06-23 PROCEDURE — 83036 HEMOGLOBIN GLYCOSYLATED A1C: CPT | Performed by: FAMILY MEDICINE

## 2025-06-23 PROCEDURE — 85018 HEMOGLOBIN: CPT | Performed by: FAMILY MEDICINE

## 2025-06-23 PROCEDURE — G2211 COMPLEX E/M VISIT ADD ON: HCPCS | Performed by: FAMILY MEDICINE

## 2025-06-23 PROCEDURE — 36415 COLL VENOUS BLD VENIPUNCTURE: CPT | Performed by: FAMILY MEDICINE

## 2025-06-23 ASSESSMENT — PAIN SCALES - GENERAL: PAINLEVEL_OUTOF10: MODERATE PAIN (5)

## 2025-06-23 NOTE — PROGRESS NOTES
Assessment & Plan     Type 2 diabetes mellitus with stage 3a chronic kidney disease, without long-term current use of insulin (H)  Controlled, patient doing well on GLP-1's, uptitrate Mounjaro to 50 mg q. weekly, decrease Lantus from 40 units to 30 units daily.  Recheck A1c in 3 months, follow-up with me in 6 months for diabetes follow-up.  Follow-up with her with future insulin dose adjustments in 3 months, she will likely need weaning off of the Lantus as she has better control of her A1c's on GLP-1's.  - Hemoglobin A1c  - Hemoglobin  - Hemoglobin A1c  - Extra Red Top Tube (LAB USE ONLY)  - Extra Blue Top Tube (LAB USE ONLY)  - Extra Red Top Tube (LAB USE ONLY)  - Hemoglobin  - tirzepatide (MOUNJARO) 15 MG/0.5ML SOAJ auto-injector pen  Dispense: 2 mL; Refill: 11  - insulin glargine (LANTUS PEN) 100 UNIT/ML pen  Dispense: 30 mL; Refill: 3    Headache syndrome  The usual regimen including over-the-counter analgesics, Imitrex.  She had concerns for ongoing sinusitis a CT sinus study was completed last month which showed mild mucosal thickening of the left sphenoid sinuses, I do not believe her headache is from sinusitis.  Symptoms more suggestive of tension headaches.  She may benefit from consideration of Botox.  Management per neurology, appreciate your help.  - Adult Neurology  Referral                  Subjective   Ana Luisa is a 65 year old, presenting for the following health issues:  Recheck Medication        6/23/2025    10:10 AM   Additional Questions   Roomed by OSCAR BENDER CMA   Accompanied by SELF     History of Present Illness       Mental Health Follow-up:  Patient presents to follow-up on Depression & Anxiety.Patient's depression since last visit has been:  No change  The patient is not having other symptoms associated with depression.  Patient's anxiety since last visit has been:  Worse    Any significant life events: health concerns  Patient is feeling anxious or having panic attacks.  Patient  "has no concerns about alcohol or drug use.    Diabetes:   She presents for follow up of diabetes.  She is checking home blood glucose one time daily.   She checks blood glucose before and after meals.  Blood glucose is never over 200 and never under 70.  When her blood glucose is low, the patient is asymptomatic for confusion, blurred vision, lethargy and reports not feeling dizzy, shaky, or weak.     She is having numbness in feet.  The patient has had a diabetic eye exam in the last 12 months. Eye exam performed on Jan 2025. Location of last eye exam [facility] in [place].        Hyperlipidemia:  She presents for follow up of hyperlipidemia.   She is taking medication to lower cholesterol.     Hypertension: She presents for follow up of hypertension.  She does check blood pressure  regularly outside of the clinic. Outpatient blood pressures have not been over 140/90. She follows a low salt diet.     Headaches:   Since the patient's last clinic visit, headaches are: worsened  The patient is getting headaches:  Headache  She is able to do normal daily activities when she has a migraine.  The patient is taking the following rescue/relief medications:  Sumatriptan (Imitrex)   Patient states \"I get no relief\" from the rescue/relief medications.   The patient is taking the following medications to prevent migraines:  No medications to prevent migraines  In the past 4 weeks, the patient has gone to an Urgent Care or Emergency Room 0 times times due to headaches.    She eats 2-3 servings of fruits and vegetables daily.She consumes 0 sweetened beverage(s) daily.She exercises with enough effort to increase her heart rate 10 to 19 minutes per day.  She exercises with enough effort to increase her heart rate 4 days per week.   She is taking medications regularly.      Patient is pleasant 65-year-old female presents for interval follow-up regarding type 2 diabetes, she continues to also have ongoing headaches.  She has no " "concerns regarding her diabetes however has been decreasing her insulin down to 44 units 4 units daily as she was having low blood sugars in the morning.    Also has an ongoing pressure-like headache in the front of her head that goes to the temples, medication are not helpful.                Objective    /78   Pulse 77   Temp 97.8  F (36.6  C) (Tympanic)   Resp 12   Ht 1.651 m (5' 5\")   Wt 106.1 kg (234 lb)   LMP 02/13/2009   SpO2 97%   BMI 38.94 kg/m    Body mass index is 38.94 kg/m .  Physical Exam  Vitals reviewed.   Constitutional:       Appearance: She is not ill-appearing.   Cardiovascular:      Rate and Rhythm: Normal rate and regular rhythm.   Pulmonary:      Effort: Pulmonary effort is normal.      Breath sounds: Normal breath sounds.            Results for orders placed or performed in visit on 06/23/25   Hemoglobin A1c     Status: Normal   Result Value Ref Range    Estimated Average Glucose 111 <117 mg/dL    Hemoglobin A1C 5.5 0.0 - 5.6 %   Extra Tube     Status: None    Narrative    The following orders were created for panel order Extra Tube.  Procedure                               Abnormality         Status                     ---------                               -----------         ------                     Extra Blue Top Tube[[id number]]                                                        Extra Red Top Tube[[id number]]                                                         Extra Green Top (Lithiu...[[id number]]                      Final result               Extra Purple Top Tube[[id number]]                                                        Please view results for these tests on the individual orders.   Extra Green Top (Lithium Heparin) Tube     Status: None   Result Value Ref Range    Hold Specimen JIC    Extra Blue Top Tube (LAB USE ONLY)     Status: None   Result Value Ref Range    Hold Specimen JIC    Extra Red Top Tube (LAB USE ONLY)     Status: None   Result Value " Ref Range    Hold Specimen JIC      *Note: Due to a large number of results and/or encounters for the requested time period, some results have not been displayed. A complete set of results can be found in Results Review.           Signed Electronically by: Leonides Juárez MD

## 2025-07-01 LAB — HEMOCCULT STL QL IA: NEGATIVE

## 2025-07-13 ENCOUNTER — MYC REFILL (OUTPATIENT)
Dept: FAMILY MEDICINE | Facility: CLINIC | Age: 65
End: 2025-07-13
Payer: COMMERCIAL

## 2025-07-13 DIAGNOSIS — N18.31 TYPE 2 DIABETES MELLITUS WITH STAGE 3A CHRONIC KIDNEY DISEASE, WITHOUT LONG-TERM CURRENT USE OF INSULIN (H): ICD-10-CM

## 2025-07-13 DIAGNOSIS — E11.22 TYPE 2 DIABETES MELLITUS WITH STAGE 3A CHRONIC KIDNEY DISEASE, WITHOUT LONG-TERM CURRENT USE OF INSULIN (H): ICD-10-CM

## (undated) DEVICE — CATH BALLOON NC EMERGE 3.50X8MM H7493926708350

## (undated) DEVICE — CATH ANGIO INFINITI 3DRC 4FRX100CM 538476

## (undated) DEVICE — CATH DIAG 4FR JL 4.5 538417

## (undated) DEVICE — NDL PERC ENTRY THINWALL 18GA 7.0" G00166

## (undated) DEVICE — DEFIB PRO-PADZ LVP LQD GEL ADULT 8900-2105-01

## (undated) DEVICE — GUIDEWIRE VASC 0.035INX150CM INQWIRE J TIP IQ35F150J3F/A

## (undated) DEVICE — INTRODUCER SHEATH GREEN 6.5FRX11CM .038IN PSI-6F-11-038ACT

## (undated) DEVICE — INTRO SHEATH 4FRX10CM PINNACLE RSS402

## (undated) DEVICE — CATH BALLOON EMERGE 2.5X12MM H7493918912250

## (undated) DEVICE — TOTE ANGIO CORP PC15AT SAN32CC83O

## (undated) DEVICE — INFL DVC BASIXCOMPAK PLYCRB 30 ATM 13IN 20ML IN4530

## (undated) DEVICE — ENDO BITE BLOCK ADULT OLYMPUS LATEX FREE MAJ-1632

## (undated) DEVICE — MANIFOLD KIT ANGIO AUTOMATED 014613

## (undated) DEVICE — CATH GUIDING BLUE YELLOW PTFE XB3.5 6FRX100CM 67005400

## (undated) DEVICE — KIT HAND CONTROL ANGIOTOUCH ACIST 65CM AT-P65

## (undated) DEVICE — GUIDEWIRE VASC 0.014INX180CM RUNTHROUGH 25-1011

## (undated) DEVICE — CATH BALLOON NC EMERGE 2.75X12MM H7493926712270

## (undated) DEVICE — CATH GUIDING GUIDELINER JR3.5 ST CURVE 5.5FR COAST 5270

## (undated) DEVICE — KIT ENDO TURNOVER/PROCEDURE W/CLEAN A SCOPE LINERS 103888

## (undated) RX ORDER — NITROGLYCERIN 5 MG/ML
VIAL (ML) INTRAVENOUS
Status: DISPENSED
Start: 2024-01-15

## (undated) RX ORDER — HEPARIN SODIUM 200 [USP'U]/100ML
INJECTION, SOLUTION INTRAVENOUS
Status: DISPENSED
Start: 2024-01-15

## (undated) RX ORDER — FENTANYL CITRATE 50 UG/ML
INJECTION, SOLUTION INTRAMUSCULAR; INTRAVENOUS
Status: DISPENSED
Start: 2024-01-15

## (undated) RX ORDER — LIDOCAINE HYDROCHLORIDE 10 MG/ML
INJECTION, SOLUTION EPIDURAL; INFILTRATION; INTRACAUDAL; PERINEURAL
Status: DISPENSED
Start: 2024-01-15

## (undated) RX ORDER — FENTANYL CITRATE 50 UG/ML
INJECTION, SOLUTION INTRAMUSCULAR; INTRAVENOUS
Status: DISPENSED
Start: 2020-02-04

## (undated) RX ORDER — HEPARIN SODIUM 1000 [USP'U]/ML
INJECTION, SOLUTION INTRAVENOUS; SUBCUTANEOUS
Status: DISPENSED
Start: 2024-01-15